# Patient Record
Sex: MALE | Race: WHITE | Employment: OTHER | URBAN - METROPOLITAN AREA
[De-identification: names, ages, dates, MRNs, and addresses within clinical notes are randomized per-mention and may not be internally consistent; named-entity substitution may affect disease eponyms.]

---

## 2017-03-13 PROBLEM — E66.9 OBESITY: Status: ACTIVE | Noted: 2017-03-13

## 2017-08-28 ENCOUNTER — OFFICE VISIT (OUTPATIENT)
Dept: UROLOGY | Age: 77
End: 2017-08-28
Payer: MEDICARE

## 2017-08-28 VITALS
TEMPERATURE: 98.1 F | HEIGHT: 67 IN | WEIGHT: 255 LBS | DIASTOLIC BLOOD PRESSURE: 65 MMHG | SYSTOLIC BLOOD PRESSURE: 112 MMHG | BODY MASS INDEX: 40.02 KG/M2 | HEART RATE: 72 BPM

## 2017-08-28 DIAGNOSIS — N48.1 BALANITIS: Primary | ICD-10-CM

## 2017-08-28 DIAGNOSIS — R35.1 NOCTURIA: ICD-10-CM

## 2017-08-28 DIAGNOSIS — R39.15 URGENCY OF URINATION: ICD-10-CM

## 2017-08-28 DIAGNOSIS — N40.1 BENIGN NON-NODULAR PROSTATIC HYPERPLASIA WITH LOWER URINARY TRACT SYMPTOMS: ICD-10-CM

## 2017-08-28 PROCEDURE — 1123F ACP DISCUSS/DSCN MKR DOCD: CPT | Performed by: NURSE PRACTITIONER

## 2017-08-28 PROCEDURE — G8417 CALC BMI ABV UP PARAM F/U: HCPCS | Performed by: NURSE PRACTITIONER

## 2017-08-28 PROCEDURE — 4040F PNEUMOC VAC/ADMIN/RCVD: CPT | Performed by: NURSE PRACTITIONER

## 2017-08-28 PROCEDURE — 99213 OFFICE O/P EST LOW 20 MIN: CPT | Performed by: NURSE PRACTITIONER

## 2017-08-28 PROCEDURE — G8427 DOCREV CUR MEDS BY ELIG CLIN: HCPCS | Performed by: NURSE PRACTITIONER

## 2017-08-28 PROCEDURE — G8598 ASA/ANTIPLAT THER USED: HCPCS | Performed by: NURSE PRACTITIONER

## 2017-08-28 PROCEDURE — 1036F TOBACCO NON-USER: CPT | Performed by: NURSE PRACTITIONER

## 2017-08-28 ASSESSMENT — ENCOUNTER SYMPTOMS
COLOR CHANGE: 0
COUGH: 0
WHEEZING: 0
BACK PAIN: 1
EYE REDNESS: 0
NAUSEA: 0
VOMITING: 0
SHORTNESS OF BREATH: 0
EYE PAIN: 0
ABDOMINAL PAIN: 0

## 2018-02-13 PROBLEM — M81.8 OTHER OSTEOPOROSIS WITHOUT CURRENT PATHOLOGICAL FRACTURE: Status: ACTIVE | Noted: 2018-02-13

## 2018-08-28 ENCOUNTER — OFFICE VISIT (OUTPATIENT)
Dept: UROLOGY | Age: 78
End: 2018-08-28
Payer: MEDICARE

## 2018-08-28 VITALS
DIASTOLIC BLOOD PRESSURE: 72 MMHG | BODY MASS INDEX: 41.12 KG/M2 | HEIGHT: 67 IN | HEART RATE: 63 BPM | SYSTOLIC BLOOD PRESSURE: 112 MMHG | TEMPERATURE: 97.8 F | WEIGHT: 262 LBS

## 2018-08-28 DIAGNOSIS — N48.1 BALANITIS: Primary | ICD-10-CM

## 2018-08-28 DIAGNOSIS — R35.1 NOCTURIA: ICD-10-CM

## 2018-08-28 PROCEDURE — 1101F PT FALLS ASSESS-DOCD LE1/YR: CPT | Performed by: NURSE PRACTITIONER

## 2018-08-28 PROCEDURE — G8427 DOCREV CUR MEDS BY ELIG CLIN: HCPCS | Performed by: NURSE PRACTITIONER

## 2018-08-28 PROCEDURE — 1123F ACP DISCUSS/DSCN MKR DOCD: CPT | Performed by: NURSE PRACTITIONER

## 2018-08-28 PROCEDURE — G8417 CALC BMI ABV UP PARAM F/U: HCPCS | Performed by: NURSE PRACTITIONER

## 2018-08-28 PROCEDURE — 4040F PNEUMOC VAC/ADMIN/RCVD: CPT | Performed by: NURSE PRACTITIONER

## 2018-08-28 PROCEDURE — 99213 OFFICE O/P EST LOW 20 MIN: CPT | Performed by: NURSE PRACTITIONER

## 2018-08-28 PROCEDURE — G8598 ASA/ANTIPLAT THER USED: HCPCS | Performed by: NURSE PRACTITIONER

## 2018-08-28 PROCEDURE — 1036F TOBACCO NON-USER: CPT | Performed by: NURSE PRACTITIONER

## 2018-08-28 RX ORDER — CLOTRIMAZOLE AND BETAMETHASONE DIPROPIONATE 10; .64 MG/G; MG/G
CREAM TOPICAL
Qty: 45 G | Refills: 0 | Status: SHIPPED | OUTPATIENT
Start: 2018-08-28 | End: 2019-08-27 | Stop reason: SDUPTHER

## 2018-08-28 ASSESSMENT — ENCOUNTER SYMPTOMS
EYE PAIN: 0
BACK PAIN: 1
VOMITING: 0
WHEEZING: 0
ABDOMINAL PAIN: 0
SHORTNESS OF BREATH: 0
COUGH: 0
COLOR CHANGE: 0
EYE REDNESS: 0
NAUSEA: 0

## 2018-08-28 NOTE — LETTER
[facility]  [facility] UROLOGY SPECIALISTS - [place]  [address]  [address]  [address] [place] [id number]  Dept: [phone]  Dept Fax: [phone]        8/28/18    Patient: Jevon Aguiar  YOB: 1940    Dear Gabriel Teran MD,    I had the pleasure of seeing one of your patients, Andreia Vale today in the office today. Below are the relevant portions of my assessment and plan of care. IMPRESSION:     1. Balanitis    2. Nocturia        PLAN:   keep skin clean and dry    Call with worsening symptoms. No Follow-up on file. Prescriptions Ordered:  Orders Placed This Encounter   Medications    clotrimazole-betamethasone (LOTRISONE) 1-0.05 % cream     Sig: Apply to clean and dry skin 1-2 times daily until redness ans soreness decrease- then stop. ( if not cost effective provide in 2 separate tubes or call for better covered script)     Dispense:  45 g     Refill:  0      Orders Placed:  No orders of the defined types were placed in this encounter. Thank you for allowing me to participate in the care of this patient. I will keep you updated on this patient's follow up and I look forward to serving you and your patients again in the future.     LILLI Velasquez - CNP

## 2018-08-28 NOTE — PROGRESS NOTES
[facility]  [facility] [address]  [address]  [address] [facility] [address]  Dept: [address] [facility] Urology Office Note - Established    Patient:  Evelin Capellan  YOB: 1940  Date: 8/28/2018    The patient is a 68 y.o. male who presents today for evaluation of the following problems:   Chief Complaint   Patient presents with    Follow-up     Balanitis has come back, he would like some cream        HPI  Here for follow up on balanitis. He has some retraction and this creates flairs. He needs more cream. He is voiding every 2 hours, feels he empties well, no straining, nocturia 1-2x is happy with urination. Summary of old records: N/A    Additional History: N/A    Procedures Today: N/A    Urinalysis today:  No results found for this visit on 08/28/18. Last several PSA's:  No results found for: PSA  Last total testosterone:  No results found for: TESTOSTERONE    AUA Symptom Score (8/28/2018):   INCOMPLETE EMPTYING: How often have you had the sensation of not emptying your bladder?: Not at all  FREQUENCY: How often do you have to urinate less than every two hours?: Not at all  INTERMITTENCY: How often have you found you stopped and started again several times when you urinated?: Not at all  URGENCY: How often have you found it difficult to postpone urination?: Not at all  WEAK STREAM: How often have you had a weak urinary stream?: Not at all  STRAINING: How often have you had to strain to start  urination?: Less than 1 to 5 times  NOCTURIA: How many times did you typically get up at night to uriniate?: 1 Time  TOTAL I-PSS SCORE[de-identified] 2  How would you feel if you were to spend the rest of your life with your urinary condition?: Pleased    Last BUN and creatinine:  No results found for: BUN  No results found for: CREATININE    Additional Lab/Culture results:     Imaging Reviewed during this Office Visit:   (results were independently reviewed by physician and 1/1/1991   Smokeless Tobacco    Never Used     (If patient a smoker, smoking cessation counseling offered)    History   Alcohol Use    0.6 oz/week    1 Standard drinks or equivalent per week     Comment: social       REVIEW OF SYSTEMS:  Review of Systems    Physical Exam:      Vitals:    08/28/18 0859   BP: 112/72   Pulse: 63   Temp: 97.8 °F (36.6 °C)     Body mass index is 41.04 kg/m². Patient is a 68 y.o. male in no acute distress and alert and oriented to person, place and time. Physical Exam  Constitutional: Patient in no acute distress. Neuro: Alert and oriented to person, place and time. Psych: Mood normal, affect normal  Skin: warm, pink, No rash noted  HEENT: Head: Normocephalic and atraumatic  Conjunctivae and EOM are normal. Pupils are equal, round  Nose: Normal  Right External Ear: Normal; Left External Ear: Normal  Mouth: Mucosa Moist  Neck: Supple  Lungs: Respiratory effort is normal  Cardiovascular: Warm & Pink  Abdomen: Soft, non-tender, non-distended  Lymphatics: No cervical palpable lymphadenopathy. Bladder non-tender and not distended. Musculoskeletal: cane  Penis: erythema on glans, no discharge. Assessment and Plan      1. Balanitis    2. Nocturia           Plan:    keep skin clean and dry    Call with worsening symptoms. No Follow-up on file. Prescriptions Ordered:  Orders Placed This Encounter   Medications    clotrimazole-betamethasone (LOTRISONE) 1-0.05 % cream     Sig: Apply to clean and dry skin 1-2 times daily until redness ans soreness decrease- then stop. ( if not cost effective provide in 2 separate tubes or call for better covered script)     Dispense:  45 g     Refill:  0      Orders Placed:  No orders of the defined types were placed in this encounter. Gladis Gonzalez APRN - CNP    Agree with the ROS entered by the MA.

## 2018-08-28 NOTE — PROGRESS NOTES
Review of Systems   Constitutional: Negative for appetite change, chills and fever. Eyes: Negative for pain, redness and visual disturbance. Respiratory: Negative for cough, shortness of breath and wheezing. Cardiovascular: Negative for chest pain and leg swelling. Gastrointestinal: Negative for abdominal pain, nausea and vomiting. Genitourinary: Negative for difficulty urinating, discharge, dysuria, flank pain, frequency, hematuria, penile pain, scrotal swelling, testicular pain and urgency. Musculoskeletal: Positive for back pain (chronic). Negative for joint swelling and myalgias. Skin: Positive for rash (on penis). Negative for color change and wound. Neurological: Negative for dizziness, tremors and numbness. Hematological: Negative for adenopathy. Does not bruise/bleed easily.

## 2018-12-20 ENCOUNTER — OFFICE VISIT (OUTPATIENT)
Dept: PRIMARY CARE CLINIC | Age: 78
End: 2018-12-20
Payer: MEDICARE

## 2018-12-20 VITALS
OXYGEN SATURATION: 96 % | BODY MASS INDEX: 41.38 KG/M2 | DIASTOLIC BLOOD PRESSURE: 64 MMHG | HEART RATE: 68 BPM | SYSTOLIC BLOOD PRESSURE: 108 MMHG | WEIGHT: 264.2 LBS

## 2018-12-20 DIAGNOSIS — E11.9 TYPE 2 DIABETES MELLITUS WITHOUT COMPLICATION, WITHOUT LONG-TERM CURRENT USE OF INSULIN (HCC): Primary | ICD-10-CM

## 2018-12-20 DIAGNOSIS — I10 ESSENTIAL HYPERTENSION: ICD-10-CM

## 2018-12-20 PROBLEM — Z95.5 PRESENCE OF CORONARY ANGIOPLASTY IMPLANT AND GRAFT: Status: ACTIVE | Noted: 2018-02-20

## 2018-12-20 LAB — HBA1C MFR BLD: 7.2 %

## 2018-12-20 PROCEDURE — G8427 DOCREV CUR MEDS BY ELIG CLIN: HCPCS | Performed by: FAMILY MEDICINE

## 2018-12-20 PROCEDURE — 4040F PNEUMOC VAC/ADMIN/RCVD: CPT | Performed by: FAMILY MEDICINE

## 2018-12-20 PROCEDURE — 83037 HB GLYCOSYLATED A1C HOME DEV: CPT | Performed by: FAMILY MEDICINE

## 2018-12-20 PROCEDURE — G8417 CALC BMI ABV UP PARAM F/U: HCPCS | Performed by: FAMILY MEDICINE

## 2018-12-20 PROCEDURE — 1101F PT FALLS ASSESS-DOCD LE1/YR: CPT | Performed by: FAMILY MEDICINE

## 2018-12-20 PROCEDURE — 1036F TOBACCO NON-USER: CPT | Performed by: FAMILY MEDICINE

## 2018-12-20 PROCEDURE — G8482 FLU IMMUNIZE ORDER/ADMIN: HCPCS | Performed by: FAMILY MEDICINE

## 2018-12-20 PROCEDURE — 99213 OFFICE O/P EST LOW 20 MIN: CPT | Performed by: FAMILY MEDICINE

## 2018-12-20 PROCEDURE — 1123F ACP DISCUSS/DSCN MKR DOCD: CPT | Performed by: FAMILY MEDICINE

## 2018-12-20 PROCEDURE — G8598 ASA/ANTIPLAT THER USED: HCPCS | Performed by: FAMILY MEDICINE

## 2018-12-20 ASSESSMENT — ENCOUNTER SYMPTOMS: BACK PAIN: 1

## 2018-12-20 NOTE — PROGRESS NOTES
Hyperlipidemia     Hypertension     Osteoarthritis         Social History   Substance Use Topics    Smoking status: Former Smoker     Packs/day: 1.50     Years: 40.00     Quit date: 1/1/1991    Smokeless tobacco: Never Used    Alcohol use 0.6 oz/week     1 Standard drinks or equivalent per week      Comment: social      Current Outpatient Prescriptions   Medication Sig Dispense Refill    metFORMIN (GLUCOPHAGE) 1000 MG tablet Take 1 tablet by mouth 2 times daily (with meals) 180 tablet 2    lisinopril (PRINIVIL;ZESTRIL) 2.5 MG tablet TAKE 1 TABLET DAILY 90 tablet 3    Dulaglutide (TRULICITY) 6.31 BK/5.6GD SOPN Inject 0.75 mg into the skin once a week 12 pen 1    meloxicam (MOBIC) 15 MG tablet       metoprolol succinate (TOPROL XL) 25 MG extended release tablet Take 0.5 tablets by mouth daily 90 tablet 0    omeprazole (PRILOSEC) 40 MG delayed release capsule TAKE 1 CAPSULE DAILY 90 capsule 3    hydrochlorothiazide (HYDRODIURIL) 25 MG tablet TAKE 1 TABLET DAILY 90 tablet 3    glucose blood VI test strips (ONE TOUCH ULTRA TEST) strip USE ONE STRIP TO TEST DAILY AS NEEDED 100 strip 2    atorvastatin (LIPITOR) 20 MG tablet       aspirin EC 81 MG EC tablet Take 81 mg by mouth daily      Multiple Vitamin (MULTI-VITAMIN DAILY) TABS Take 1 tablet by mouth daily      betamethasone dipropionate (DIPROLENE) 0.05 % cream APPLY TO AFFECTED AREA 1 TO 2 TIMES DAILY UNTIL IMPROVED THEN DISCONTINUE  0    clotrimazole (LOTRIMIN) 1 % cream APPLY TO AFFECTED AREA 1 TO 2 TIMES DAILY UNTIL IMPROVED THEN DISCONTINUE  0    clotrimazole-betamethasone (LOTRISONE) 1-0.05 % cream Apply to clean and dry skin 1-2 times daily until redness ans soreness decrease- then stop. ( if not cost effective provide in 2 separate tubes or call for better covered script) 45 g 0     No current facility-administered medications for this visit.       Allergies   Allergen Reactions    Feldene [Piroxicam]     Flagyl [Metronidazole]     Naprosyn normal mood and affect. His behavior is normal. Thought content normal.   Nursing note and vitals reviewed. Assessment:       Diagnosis Orders   1. Type 2 diabetes mellitus without complication, without long-term current use of insulin ([facility])  POCT glycosylated hemoglobin, home device (HbA1C)    MD COLLECTION CAPILLARY BLOOD SPECIMEN   2. Essential hypertension          Plan:      Return in about 3 months (around 3/20/2019) for diabetes mellitus, hypertension. He should f/u with PM.   Increase metformin, watch diet    Orders Placed This Encounter   Procedures    POCT glycosylated hemoglobin, home device (HbA1C)    MD COLLECTION CAPILLARY BLOOD SPECIMEN     Orders Placed This Encounter   Medications    metFORMIN (GLUCOPHAGE) 1000 MG tablet     Sig: Take 1 tablet by mouth 2 times daily (with meals)     Dispense:  180 tablet     Refill:  2       Patient given educational materials - see patient instructions. Do diabetic foot checks at home. Discussed use, benefit, and side effects of prescribed medications. All patient questionsanswered. Pt voiced understanding. Reviewed health maintenance. Instructed to continuecurrent medications, diet. Patient agreed with treatment plan. Followup as directed.      Electronically signed by Federica Andino MD on 12/20/2018 at 10:01 AM

## 2019-02-04 DIAGNOSIS — E11.9 TYPE 2 DIABETES MELLITUS WITHOUT COMPLICATION, WITHOUT LONG-TERM CURRENT USE OF INSULIN (HCC): ICD-10-CM

## 2019-02-06 RX ORDER — DULAGLUTIDE 0.75 MG/.5ML
INJECTION, SOLUTION SUBCUTANEOUS
Qty: 6 ML | Refills: 3 | Status: SHIPPED | OUTPATIENT
Start: 2019-02-06 | End: 2020-03-12 | Stop reason: SDUPTHER

## 2019-03-26 RX ORDER — HYDROCHLOROTHIAZIDE 25 MG/1
TABLET ORAL
Qty: 90 TABLET | Refills: 3 | Status: SHIPPED | OUTPATIENT
Start: 2019-03-26 | End: 2020-03-10 | Stop reason: SDUPTHER

## 2019-04-03 ENCOUNTER — OFFICE VISIT (OUTPATIENT)
Dept: PRIMARY CARE CLINIC | Age: 79
End: 2019-04-03
Payer: MEDICARE

## 2019-04-03 VITALS
DIASTOLIC BLOOD PRESSURE: 66 MMHG | WEIGHT: 265.4 LBS | HEART RATE: 76 BPM | SYSTOLIC BLOOD PRESSURE: 122 MMHG | BODY MASS INDEX: 41.57 KG/M2 | OXYGEN SATURATION: 93 %

## 2019-04-03 DIAGNOSIS — E78.49 OTHER HYPERLIPIDEMIA: ICD-10-CM

## 2019-04-03 DIAGNOSIS — I10 ESSENTIAL HYPERTENSION: Primary | ICD-10-CM

## 2019-04-03 DIAGNOSIS — E66.01 MORBID OBESITY WITH BMI OF 40.0-44.9, ADULT (HCC): ICD-10-CM

## 2019-04-03 DIAGNOSIS — E11.9 TYPE 2 DIABETES MELLITUS WITHOUT COMPLICATION, WITHOUT LONG-TERM CURRENT USE OF INSULIN (HCC): ICD-10-CM

## 2019-04-03 LAB — HBA1C MFR BLD: 6.9 %

## 2019-04-03 PROCEDURE — 1123F ACP DISCUSS/DSCN MKR DOCD: CPT | Performed by: FAMILY MEDICINE

## 2019-04-03 PROCEDURE — G8417 CALC BMI ABV UP PARAM F/U: HCPCS | Performed by: FAMILY MEDICINE

## 2019-04-03 PROCEDURE — 4040F PNEUMOC VAC/ADMIN/RCVD: CPT | Performed by: FAMILY MEDICINE

## 2019-04-03 PROCEDURE — 83036 HEMOGLOBIN GLYCOSYLATED A1C: CPT | Performed by: FAMILY MEDICINE

## 2019-04-03 PROCEDURE — G8427 DOCREV CUR MEDS BY ELIG CLIN: HCPCS | Performed by: FAMILY MEDICINE

## 2019-04-03 PROCEDURE — 1036F TOBACCO NON-USER: CPT | Performed by: FAMILY MEDICINE

## 2019-04-03 PROCEDURE — G8598 ASA/ANTIPLAT THER USED: HCPCS | Performed by: FAMILY MEDICINE

## 2019-04-03 PROCEDURE — 99214 OFFICE O/P EST MOD 30 MIN: CPT | Performed by: FAMILY MEDICINE

## 2019-04-03 ASSESSMENT — ENCOUNTER SYMPTOMS: BACK PAIN: 1

## 2019-04-03 NOTE — PROGRESS NOTES
[address] [place] [address] PRIMARY CARE  [address] [place] [address]  Dept: [phone]  Dept Fax: [phone]    Vj Kennedy is a 66 y.o. male who presents today for his medical conditions/complaintsas noted below. Vj Kennedy is c/o of   Chief Complaint   Patient presents with    Diabetes     Last a1c 12/20/18 7.2, microalbumin 6/12/18, lipid 6/21/18. HPI:     Diabetes   He presents for his follow-up diabetic visit. He has type 2 diabetes mellitus. His disease course has been stable. There are no hypoglycemic associated symptoms. There are no diabetic associated symptoms. There are no hypoglycemic complications. Symptoms are stable. Diabetic complications include heart disease. Risk factors for coronary artery disease include male sex, obesity, sedentary lifestyle, diabetes mellitus and dyslipidemia. Current diabetic treatment includes oral agent (monotherapy) (Trulicity). He is compliant with treatment all of the time. His weight is stable. He is following a generally healthy diet. He never participates in exercise. There is no change in his home blood glucose trend. His breakfast blood glucose range is generally 140-180 mg/dl. (140 average. ) An ACE inhibitor/angiotensin II receptor blocker is being taken. Eye exam is current. Home sugars : 150, 146, 146, 153, 167, 144, 210, 152, 155    Can't walk far or do work due to lower back pain. Sitting doesn't hurt.    Had 2 back surgeries and nerve burning  Hemoglobin A1C (%)   Date Value   04/03/2019 6.9   12/20/2018 7.2   09/17/2018 7.1         ( goal A1C is < 7)   No results found for: LABMICR  LDL Calculated (mg/dL)   Date Value   06/21/2018 77   06/23/2017 72       (goal LDL is <100)   No results found for: AST, ALT, BUN  BP Readings from Last 3 Encounters:   04/03/19 122/66   12/20/18 108/64   09/17/18 136/82          ([id number])    Past Medical History:   Diagnosis Date    Diabetes ([facility] [address]     Diverticulitis     History of allergy     Hyperlipidemia     Hypertension     Osteoarthritis         Social History     Tobacco Use    Smoking status: Former Smoker     Packs/day: 1.50     Years: 40.00     Pack years: 60.00     Last attempt to quit: 1991     Years since quittin.2    Smokeless tobacco: Never Used   Substance Use Topics    Alcohol use: Yes     Alcohol/week: 0.6 oz     Types: 1 Standard drinks or equivalent per week     Comment: social      Current Outpatient Medications   Medication Sig Dispense Refill    Omega-3 Fatty Acids (FISH OIL PO) Take 1 g by mouth      hydrochlorothiazide (HYDRODIURIL) 25 MG tablet TAKE 1 TABLET DAILY 90 tablet 3    TRULICITY 1.30 RQ/5.3SH SOPN INJECT 0.5ML (=0.75 MG)    SUBCUTANEOUSLY ONCE WEEKLY 6 mL 3    metFORMIN (GLUCOPHAGE) 1000 MG tablet Take 1 tablet by mouth 2 times daily (with meals) 180 tablet 2    lisinopril (PRINIVIL;ZESTRIL) 2.5 MG tablet TAKE 1 TABLET DAILY 90 tablet 3    betamethasone dipropionate (DIPROLENE) 0.05 % cream APPLY TO AFFECTED AREA 1 TO 2 TIMES DAILY UNTIL IMPROVED THEN DISCONTINUE  0    clotrimazole (LOTRIMIN) 1 % cream APPLY TO AFFECTED AREA 1 TO 2 TIMES DAILY UNTIL IMPROVED THEN DISCONTINUE  0    clotrimazole-betamethasone (LOTRISONE) 1-0.05 % cream Apply to clean and dry skin 1-2 times daily until redness ans soreness decrease- then stop.  ( if not cost effective provide in 2 separate tubes or call for better covered script) 45 g 0    meloxicam (MOBIC) 15 MG tablet       metoprolol succinate (TOPROL XL) 25 MG extended release tablet Take 0.5 tablets by mouth daily 90 tablet 0    omeprazole (PRILOSEC) 40 MG delayed release capsule TAKE 1 CAPSULE DAILY 90 capsule 3    glucose blood VI test strips (ONE TOUCH ULTRA TEST) strip USE ONE STRIP TO TEST DAILY AS NEEDED 100 strip 2    atorvastatin (LIPITOR) 20 MG tablet       aspirin EC 81 MG EC tablet Take 81 mg by mouth daily      Multiple Vitamin (MULTI-VITAMIN DAILY) TABS Take 1 tablet by mouth daily       No current facility-administered medications for this visit. Allergies   Allergen Reactions    Feldene [Piroxicam]     Flagyl [Metronidazole]     Naprosyn [Naproxen]     Penicillins     Sulfa Antibiotics     Sulfasalazine        Health Maintenance   Topic Date Due    DTaP/Tdap/Td vaccine (1 - Tdap) 11/02/1959    Shingles Vaccine (1 of 2) 11/02/1990    Diabetic foot exam  02/13/2019    A1C test (Diabetic or Prediabetic)  06/20/2019    Lipid screen  06/21/2019    Potassium monitoring  06/21/2019    Creatinine monitoring  06/21/2019    Diabetic retinal exam  12/17/2019    Flu vaccine  Completed    Pneumococcal 65+ years Vaccine  Completed       Subjective:     Review of Systems   Musculoskeletal: Positive for arthralgias and back pain. Objective:     /66   Pulse 76   Wt 265 lb 6.4 oz (120.4 kg)   SpO2 93%   BMI 41.57 kg/m²   Physical Exam   Constitutional: He is oriented to person, place, and time. He appears well-developed and well-nourished. No distress. HENT:   Head: Normocephalic and atraumatic. Right Ear: External ear normal.   Left Ear: External ear normal.   Mouth/Throat: Oropharynx is clear and moist.   Eyes: Pupils are equal, round, and reactive to light. Conjunctivae are normal. Right eye exhibits no discharge. Left eye exhibits no discharge. No scleral icterus. Neck: No tracheal deviation present. No thyromegaly present. Cardiovascular: Normal rate, regular rhythm, normal heart sounds and intact distal pulses. No murmur heard. Pulses:       Dorsalis pedis pulses are 1+ on the right side, and 1+ on the left side. Posterior tibial pulses are 1+ on the right side, and 1+ on the left side. No carotid bruits   Pulmonary/Chest: Effort normal and breath sounds normal. No respiratory distress. He has no wheezes. Musculoskeletal: He exhibits edema (lower leg edema). Right foot: There is deformity.         Left defined types were placed in this encounter. Patient given educational materials - see patient instructions. Do diabetic foot checks at home. Discussed use, benefit, and side effects of prescribed medications. All patient questions answered. Pt voiced understanding. Reviewed health maintenance. Instructed to continue current medications, diet and exercise. Patient agreed with treatment plan. Follow up as directed.        Electronically signed by Massiel Jaimes MD on 4/3/2019 at 10:54 AM

## 2019-05-31 RX ORDER — OMEPRAZOLE 40 MG/1
CAPSULE, DELAYED RELEASE ORAL
Qty: 90 CAPSULE | Refills: 3 | Status: SHIPPED | OUTPATIENT
Start: 2019-05-31 | End: 2020-05-04

## 2019-07-08 ENCOUNTER — OFFICE VISIT (OUTPATIENT)
Dept: PRIMARY CARE CLINIC | Age: 79
End: 2019-07-08
Payer: MEDICARE

## 2019-07-08 VITALS
BODY MASS INDEX: 40.75 KG/M2 | DIASTOLIC BLOOD PRESSURE: 70 MMHG | SYSTOLIC BLOOD PRESSURE: 116 MMHG | HEART RATE: 65 BPM | OXYGEN SATURATION: 97 % | WEIGHT: 260.2 LBS

## 2019-07-08 DIAGNOSIS — I10 ESSENTIAL HYPERTENSION: ICD-10-CM

## 2019-07-08 DIAGNOSIS — M54.40 CHRONIC MIDLINE LOW BACK PAIN WITH SCIATICA, SCIATICA LATERALITY UNSPECIFIED: ICD-10-CM

## 2019-07-08 DIAGNOSIS — G89.29 CHRONIC MIDLINE LOW BACK PAIN WITH SCIATICA, SCIATICA LATERALITY UNSPECIFIED: ICD-10-CM

## 2019-07-08 DIAGNOSIS — E66.01 MORBID OBESITY WITH BMI OF 40.0-44.9, ADULT (HCC): ICD-10-CM

## 2019-07-08 DIAGNOSIS — E11.9 TYPE 2 DIABETES MELLITUS WITHOUT COMPLICATION, WITHOUT LONG-TERM CURRENT USE OF INSULIN (HCC): Primary | ICD-10-CM

## 2019-07-08 LAB
CREATININE URINE POCT: 100
HBA1C MFR BLD: 6.7 %
MICROALBUMIN/CREAT 24H UR: 10 MG/G{CREAT}
MICROALBUMIN/CREAT UR-RTO: <30

## 2019-07-08 PROCEDURE — 99214 OFFICE O/P EST MOD 30 MIN: CPT | Performed by: FAMILY MEDICINE

## 2019-07-08 PROCEDURE — 82044 UR ALBUMIN SEMIQUANTITATIVE: CPT | Performed by: FAMILY MEDICINE

## 2019-07-08 PROCEDURE — G8427 DOCREV CUR MEDS BY ELIG CLIN: HCPCS | Performed by: FAMILY MEDICINE

## 2019-07-08 PROCEDURE — 1036F TOBACCO NON-USER: CPT | Performed by: FAMILY MEDICINE

## 2019-07-08 PROCEDURE — 83036 HEMOGLOBIN GLYCOSYLATED A1C: CPT | Performed by: FAMILY MEDICINE

## 2019-07-08 PROCEDURE — 4040F PNEUMOC VAC/ADMIN/RCVD: CPT | Performed by: FAMILY MEDICINE

## 2019-07-08 PROCEDURE — 1123F ACP DISCUSS/DSCN MKR DOCD: CPT | Performed by: FAMILY MEDICINE

## 2019-07-08 PROCEDURE — G8417 CALC BMI ABV UP PARAM F/U: HCPCS | Performed by: FAMILY MEDICINE

## 2019-07-08 PROCEDURE — G8598 ASA/ANTIPLAT THER USED: HCPCS | Performed by: FAMILY MEDICINE

## 2019-07-08 RX ORDER — TRAMADOL HYDROCHLORIDE 50 MG/1
50 TABLET ORAL EVERY 6 HOURS PRN
Qty: 120 TABLET | Refills: 0
Start: 2019-07-08 | End: 2019-08-07

## 2019-07-08 ASSESSMENT — PATIENT HEALTH QUESTIONNAIRE - PHQ9
1. LITTLE INTEREST OR PLEASURE IN DOING THINGS: 0
SUM OF ALL RESPONSES TO PHQ QUESTIONS 1-9: 0
SUM OF ALL RESPONSES TO PHQ QUESTIONS 1-9: 0
2. FEELING DOWN, DEPRESSED OR HOPELESS: 0
SUM OF ALL RESPONSES TO PHQ9 QUESTIONS 1 & 2: 0

## 2019-07-08 ASSESSMENT — ENCOUNTER SYMPTOMS
SHORTNESS OF BREATH: 1
BACK PAIN: 1

## 2019-07-08 NOTE — PROGRESS NOTES
Use Topics    Alcohol use: Yes     Alcohol/week: 0.6 oz     Types: 1 Standard drinks or equivalent per week     Comment: social      Current Outpatient Medications   Medication Sig Dispense Refill    traMADol (ULTRAM) 50 MG tablet Take 1 tablet by mouth every 6 hours as needed for Pain for up to 30 days. 1-2 tablets every 6 hours prn 120 tablet 0    blood glucose test strips (ASCENSIA AUTODISC VI;ONE TOUCH ULTRA TEST VI) strip Use with associated glucose meter. Use daily 100 strip 11    omeprazole (PRILOSEC) 40 MG delayed release capsule TAKE 1 CAPSULE DAILY 90 capsule 3    Omega-3 Fatty Acids (FISH OIL PO) Take 1 g by mouth      hydrochlorothiazide (HYDRODIURIL) 25 MG tablet TAKE 1 TABLET DAILY 90 tablet 3    TRULICITY 8.12 UH/5.4ZC SOPN INJECT 0.5ML (=0.75 MG)    SUBCUTANEOUSLY ONCE WEEKLY 6 mL 3    metFORMIN (GLUCOPHAGE) 1000 MG tablet Take 1 tablet by mouth 2 times daily (with meals) 180 tablet 2    lisinopril (PRINIVIL;ZESTRIL) 2.5 MG tablet TAKE 1 TABLET DAILY 90 tablet 3    betamethasone dipropionate (DIPROLENE) 0.05 % cream APPLY TO AFFECTED AREA 1 TO 2 TIMES DAILY UNTIL IMPROVED THEN DISCONTINUE  0    clotrimazole-betamethasone (LOTRISONE) 1-0.05 % cream Apply to clean and dry skin 1-2 times daily until redness ans soreness decrease- then stop.  ( if not cost effective provide in 2 separate tubes or call for better covered script) 45 g 0    meloxicam (MOBIC) 15 MG tablet       metoprolol succinate (TOPROL XL) 25 MG extended release tablet Take 0.5 tablets by mouth daily 90 tablet 0    glucose blood VI test strips (ONE TOUCH ULTRA TEST) strip USE ONE STRIP TO TEST DAILY AS NEEDED 100 strip 2    atorvastatin (LIPITOR) 20 MG tablet       aspirin EC 81 MG EC tablet Take 81 mg by mouth daily      Multiple Vitamin (MULTI-VITAMIN DAILY) TABS Take 1 tablet by mouth daily      clotrimazole (LOTRIMIN) 1 % cream APPLY TO AFFECTED AREA 1 TO 2 TIMES DAILY UNTIL IMPROVED THEN DISCONTINUE  0     No current facility-administered medications for this visit. Allergies   Allergen Reactions    Feldene [Piroxicam]     Flagyl [Metronidazole]     Naprosyn [Naproxen]     Penicillins     Sulfa Antibiotics     Sulfasalazine        Health Maintenance   Topic Date Due    DTaP/Tdap/Td vaccine (1 - Tdap) 11/02/1959    Shingles Vaccine (1 of 2) 11/02/1990    Annual Wellness Visit (AWV)  11/02/2003    Potassium monitoring  06/21/2019    Creatinine monitoring  06/21/2019    Lipid screen  06/21/2019    Flu vaccine (1) 09/01/2019    A1C test (Diabetic or Prediabetic)  10/03/2019    Diabetic retinal exam  12/17/2019    Diabetic foot exam  04/03/2020    Pneumococcal 65+ years Vaccine  Completed       Subjective:     Review of Systems   Respiratory: Positive for shortness of breath (with working in the yard). Cardiovascular: Positive for chest pain. Mid chest pain : mild with resting and occasional palpations. Warren Braun. Musculoskeletal: Positive for arthralgias and back pain. Skin: Positive for rash. Starts with 4-5 red papules left post hand and right lower leg and those coalesce into a flat red/brown rash. Has happened before with plavix. Had biopsy with Dr Savi Hair, nothing showed. Both are drying up and stopping growing now. Lower back pain: severely limits his activity. Tramadol sometimes helps. Objective:     /70   Pulse 65   Wt 260 lb 3.2 oz (118 kg)   SpO2 97%   BMI 40.75 kg/m²   Physical Exam   Constitutional: He is oriented to person, place, and time. He appears well-developed and well-nourished. No distress. HENT:   Head: Normocephalic and atraumatic. Right Ear: External ear normal.   Left Ear: External ear normal.   Mouth/Throat: Oropharynx is clear and moist.   Eyes: Pupils are equal, round, and reactive to light. Conjunctivae are normal. Right eye exhibits no discharge. Left eye exhibits no discharge. No scleral icterus.    Neck: No pain becomes manageable    blood glucose test strips (ASCENSIA AUTODISC VI;ONE TOUCH ULTRA TEST VI) strip     Sig: Use with associated glucose meter. Use daily     Dispense:  100 strip     Refill:  11       Patient given educational materials - see patient instructions. Do diabetic foot checks at home. Discussed use, benefit, and side effects of prescribed medications. All patient questions answered. Pt voiced understanding. Reviewed health maintenance. Instructed to continue current medications, diet and exercise. Patient agreed with treatment plan. Follow up as directed.        Electronically signed by Betzaida Nixon MD on 7/8/2019 at 9:34 AM

## 2019-07-10 LAB
ALBUMIN SERPL-MCNC: NORMAL G/DL
ALP BLD-CCNC: NORMAL U/L
ALT SERPL-CCNC: NORMAL U/L
AST SERPL-CCNC: NORMAL U/L
BILIRUB SERPL-MCNC: NORMAL MG/DL (ref 0.1–1.4)
BUN BLDV-MCNC: NORMAL MG/DL
CALCIUM SERPL-MCNC: NORMAL MG/DL
CHLORIDE BLD-SCNC: NORMAL MMOL/L
CHOLESTEROL, TOTAL: 154 MG/DL
CHOLESTEROL/HDL RATIO: 4.4
CO2: NORMAL MMOL/L
CREAT SERPL-MCNC: NORMAL MG/DL
GLUCOSE FASTING: 148 MG/DL
HDLC SERPL-MCNC: 35 MG/DL (ref 35–70)
LDL CHOLESTEROL CALCULATED: 98 MG/DL (ref 0–160)
POTASSIUM SERPL-SCNC: NORMAL MMOL/L
SODIUM BLD-SCNC: NORMAL MMOL/L
TOTAL PROTEIN: NORMAL G/DL (ref 6.4–8.2)
TRIGL SERPL-MCNC: 106 MG/DL
VLDLC SERPL CALC-MCNC: 21 MG/DL

## 2019-07-15 DIAGNOSIS — I10 ESSENTIAL HYPERTENSION: ICD-10-CM

## 2019-07-15 DIAGNOSIS — E11.9 TYPE 2 DIABETES MELLITUS WITHOUT COMPLICATION, WITHOUT LONG-TERM CURRENT USE OF INSULIN (HCC): ICD-10-CM

## 2019-07-15 DIAGNOSIS — E78.49 OTHER HYPERLIPIDEMIA: ICD-10-CM

## 2019-07-20 DIAGNOSIS — I10 ESSENTIAL HYPERTENSION: ICD-10-CM

## 2019-07-20 DIAGNOSIS — E11.8 TYPE 2 DIABETES MELLITUS WITH COMPLICATION (HCC): ICD-10-CM

## 2019-07-22 RX ORDER — LISINOPRIL 2.5 MG/1
TABLET ORAL
Qty: 90 TABLET | Refills: 3 | Status: SHIPPED | OUTPATIENT
Start: 2019-07-22 | End: 2021-02-23

## 2019-08-27 ENCOUNTER — OFFICE VISIT (OUTPATIENT)
Dept: UROLOGY | Age: 79
End: 2019-08-27
Payer: MEDICARE

## 2019-08-27 VITALS
DIASTOLIC BLOOD PRESSURE: 58 MMHG | HEIGHT: 67 IN | BODY MASS INDEX: 40.02 KG/M2 | WEIGHT: 255 LBS | SYSTOLIC BLOOD PRESSURE: 90 MMHG | TEMPERATURE: 97.8 F

## 2019-08-27 DIAGNOSIS — R35.0 FREQUENCY OF URINATION: ICD-10-CM

## 2019-08-27 DIAGNOSIS — N48.1 BALANITIS: Primary | ICD-10-CM

## 2019-08-27 DIAGNOSIS — R35.1 NOCTURIA: ICD-10-CM

## 2019-08-27 DIAGNOSIS — K59.00 CONSTIPATION, UNSPECIFIED CONSTIPATION TYPE: ICD-10-CM

## 2019-08-27 PROCEDURE — 99214 OFFICE O/P EST MOD 30 MIN: CPT | Performed by: NURSE PRACTITIONER

## 2019-08-27 PROCEDURE — G8417 CALC BMI ABV UP PARAM F/U: HCPCS | Performed by: NURSE PRACTITIONER

## 2019-08-27 PROCEDURE — 4040F PNEUMOC VAC/ADMIN/RCVD: CPT | Performed by: NURSE PRACTITIONER

## 2019-08-27 PROCEDURE — 1123F ACP DISCUSS/DSCN MKR DOCD: CPT | Performed by: NURSE PRACTITIONER

## 2019-08-27 PROCEDURE — 1036F TOBACCO NON-USER: CPT | Performed by: NURSE PRACTITIONER

## 2019-08-27 PROCEDURE — G8598 ASA/ANTIPLAT THER USED: HCPCS | Performed by: NURSE PRACTITIONER

## 2019-08-27 PROCEDURE — G8427 DOCREV CUR MEDS BY ELIG CLIN: HCPCS | Performed by: NURSE PRACTITIONER

## 2019-08-27 RX ORDER — CLOTRIMAZOLE 1 %
CREAM (GRAM) TOPICAL
Qty: 45 G | Refills: 1 | Status: ON HOLD | OUTPATIENT
Start: 2019-08-27 | End: 2020-12-30

## 2019-08-27 ASSESSMENT — ENCOUNTER SYMPTOMS
SHORTNESS OF BREATH: 0
ABDOMINAL PAIN: 0
EYE PAIN: 0
COLOR CHANGE: 0
EYE REDNESS: 0
BACK PAIN: 0
NAUSEA: 0
COUGH: 0
VOMITING: 0
WHEEZING: 0

## 2019-08-27 NOTE — PROGRESS NOTES
[facility]  [facility] UROLOGY SPECIALISTS - [facility]  [address] [facility] [id number]  Dept: [address] [facility] Urology Office Note - Established    Patient:  Raymon Dale  YOB: 1940  Date: 8/27/2019    The patient is a 66 y.o. male who presents todayfor evaluation of the following problems:   Chief Complaint   Patient presents with    Rash     Balanitis comes and goes        HPI  Here for follow up on ballanitis. He is using Lotrimin cream every other day after his shower. He tries to dab his skin with toilet paper to help with drying of skin after voiding, but he has varying flairs. The cream helps keep his rash stay controlled. Frequency- has tried Myrbetriq in the past, but the cost of his other meds prohibit the use of it. He continues with frequency, nocturia x1, feels he empties well. No dysuria, no hematuria, no UTI. Summary of old records: N/A    Additional History: N/A    Procedures Today: N/A    Urinalysis today:  No results found for this visit on 08/27/19.   Last several PSA's:  No results found for: PSA  Last total testosterone:  No results found for: TESTOSTERONE    AUA Symptom Score (8/27/2019):  INCOMPLETE EMPTYING: How often have you had the sensation of not emptying your bladder?: Not at all  FREQUENCY: How often do you have to urinate less than every two hours?: Almost always  INTERMITTENCY: How often have you found you stopped and started again several times when you urinated?: Not at all  URGENCY: How often have you found it difficult to postpone urination?: Not at all  WEAK STREAM: How often have you had a weak urinary stream?: Not at all  STRAINING: How often have you had to strain to start  urination?: Not at all  NOCTURIA: How many times did you typically get up at night to uriniate?: 1 Time  TOTAL I-PSS SCORE[de-identified] 6  How would you feel if you were to spend the rest of your life with your urinary condition?: Pleased    Last BUN and 90 tablet 0    glucose blood VI test strips (ONE TOUCH ULTRA TEST) strip USE ONE STRIP TO TEST DAILY AS NEEDED 100 strip 2    atorvastatin (LIPITOR) 20 MG tablet       aspirin EC 81 MG EC tablet Take 81 mg by mouth daily      Multiple Vitamin (MULTI-VITAMIN DAILY) TABS Take 1 tablet by mouth daily         Feldene [piroxicam]; Flagyl [metronidazole]; Naprosyn [naproxen]; Penicillins; Sulfa antibiotics; and Sulfasalazine  Social History     Tobacco Use   Smoking Status Former Smoker    Packs/day: 1.50    Years: 40.00    Pack years: 60.00    Last attempt to quit: 1991    Years since quittin.6   Smokeless Tobacco Never Used     (Ifpatient a smoker, smoking cessation counseling offered)    Social History     Substance and Sexual Activity   Alcohol Use Yes    Alcohol/week: 1.0 standard drinks    Types: 1 Standard drinks or equivalent per week    Comment: social       REVIEW OF SYSTEMS:  Review of Systems    Physical Exam:      Vitals:    19 0733   BP: (!) 90/58   Temp: 97.8 °F (36.6 °C)     Body mass index is 39.94 kg/m². Patient is a 66 y.o. male in no acute distress and alert and oriented to person, place and time. Physical Exam  Constitutional: Patient in no acute distress. Neuro: Alert and oriented to person, place and time. Psych: Mood normal, affect normal  Skin: No rash noted  HEENT: Head: Normocephalic andatraumatic  Conjunctivae and EOM are normal. Pupils are equal, round  Nose:Normal  Right External Ear: Normal; Left External Ear: Normal  Mouth: Mucosa Moist  Neck: Supple  Lungs: Respiratory effort is normal  Cardiovascular: strong and reg, lower leg edema  Abdomen: Soft, non-tender, non-distended    Bladder non-tender and not distended. Musculoskeletal: Normal gait and station  Penis: skin without evidence of rash or breakdown. Assessment and Plan      1. Balanitis    2. Nocturia    3. Frequency of urination    4.  Constipation, unspecified constipation type           Plan:

## 2019-08-27 NOTE — LETTER
[facility]  [facility] UROLOGY SPECIALISTS - [address]  Dept: [phone]  Dept Fax: [phone]        8/27/19    Patient: David De  YOB: 1940    Dear Jorge Hammonds MD,    I had the pleasure of seeing one of your patients, Mena Haile today in the office today. Below are the relevant portions of my assessment and plan of care. IMPRESSION:  1. Balanitis    2. Nocturia    3. Frequency of urination    4. Constipation, unspecified constipation type        PLAN:  continue to clean daily    May use cream as needed     Consider Miralax daily- start with a capful and taper to the smallest amount of daily med needed to promote a soft formed stool. Call with questions or concern. 1 yr and as needed follow up   No follow-ups on file. Prescriptions Ordered:  No orders of the defined types were placed in this encounter. Orders Placed:  No orders of the defined types were placed in this encounter. Thank you for allowing me to participate in the care of this patient. I will keep you updated on this patient's follow up and I look forward to serving you and your patients again in the future.     Carol Colon, LILLI - CNP

## 2019-11-08 ENCOUNTER — OFFICE VISIT (OUTPATIENT)
Dept: PRIMARY CARE CLINIC | Age: 79
End: 2019-11-08
Payer: MEDICARE

## 2019-11-08 VITALS
HEART RATE: 72 BPM | WEIGHT: 258.6 LBS | DIASTOLIC BLOOD PRESSURE: 76 MMHG | OXYGEN SATURATION: 97 % | SYSTOLIC BLOOD PRESSURE: 128 MMHG | BODY MASS INDEX: 40.5 KG/M2

## 2019-11-08 DIAGNOSIS — Z23 NEEDS FLU SHOT: ICD-10-CM

## 2019-11-08 DIAGNOSIS — I10 ESSENTIAL HYPERTENSION: ICD-10-CM

## 2019-11-08 DIAGNOSIS — B35.3 TINEA PEDIS OF LEFT FOOT: ICD-10-CM

## 2019-11-08 DIAGNOSIS — E11.9 TYPE 2 DIABETES MELLITUS WITHOUT COMPLICATION, WITHOUT LONG-TERM CURRENT USE OF INSULIN (HCC): Primary | ICD-10-CM

## 2019-11-08 LAB — HBA1C MFR BLD: 6.5 %

## 2019-11-08 PROCEDURE — G8598 ASA/ANTIPLAT THER USED: HCPCS | Performed by: FAMILY MEDICINE

## 2019-11-08 PROCEDURE — G8417 CALC BMI ABV UP PARAM F/U: HCPCS | Performed by: FAMILY MEDICINE

## 2019-11-08 PROCEDURE — 1123F ACP DISCUSS/DSCN MKR DOCD: CPT | Performed by: FAMILY MEDICINE

## 2019-11-08 PROCEDURE — G8482 FLU IMMUNIZE ORDER/ADMIN: HCPCS | Performed by: FAMILY MEDICINE

## 2019-11-08 PROCEDURE — 99214 OFFICE O/P EST MOD 30 MIN: CPT | Performed by: FAMILY MEDICINE

## 2019-11-08 PROCEDURE — 4040F PNEUMOC VAC/ADMIN/RCVD: CPT | Performed by: FAMILY MEDICINE

## 2019-11-08 PROCEDURE — 1036F TOBACCO NON-USER: CPT | Performed by: FAMILY MEDICINE

## 2019-11-08 PROCEDURE — G0008 ADMIN INFLUENZA VIRUS VAC: HCPCS | Performed by: FAMILY MEDICINE

## 2019-11-08 PROCEDURE — G8427 DOCREV CUR MEDS BY ELIG CLIN: HCPCS | Performed by: FAMILY MEDICINE

## 2019-11-08 PROCEDURE — 90653 IIV ADJUVANT VACCINE IM: CPT | Performed by: FAMILY MEDICINE

## 2019-11-08 PROCEDURE — 83036 HEMOGLOBIN GLYCOSYLATED A1C: CPT | Performed by: FAMILY MEDICINE

## 2019-11-08 RX ORDER — GABAPENTIN 300 MG/1
300 CAPSULE ORAL DAILY
COMMUNITY
Start: 2019-11-04 | End: 2021-02-23

## 2019-11-08 ASSESSMENT — ENCOUNTER SYMPTOMS
SINUS PRESSURE: 0
SORE THROAT: 0
SHORTNESS OF BREATH: 0
VISUAL CHANGE: 0
RHINORRHEA: 0
COUGH: 0
SINUS PAIN: 0
BLURRED VISION: 0

## 2020-02-13 ENCOUNTER — OFFICE VISIT (OUTPATIENT)
Dept: PRIMARY CARE CLINIC | Age: 80
End: 2020-02-13
Payer: MEDICARE

## 2020-02-13 VITALS
WEIGHT: 253.4 LBS | BODY MASS INDEX: 39.69 KG/M2 | SYSTOLIC BLOOD PRESSURE: 110 MMHG | DIASTOLIC BLOOD PRESSURE: 76 MMHG | OXYGEN SATURATION: 96 % | HEART RATE: 66 BPM | TEMPERATURE: 97.6 F

## 2020-02-13 PROCEDURE — G8482 FLU IMMUNIZE ORDER/ADMIN: HCPCS | Performed by: FAMILY MEDICINE

## 2020-02-13 PROCEDURE — 1123F ACP DISCUSS/DSCN MKR DOCD: CPT | Performed by: FAMILY MEDICINE

## 2020-02-13 PROCEDURE — G8417 CALC BMI ABV UP PARAM F/U: HCPCS | Performed by: FAMILY MEDICINE

## 2020-02-13 PROCEDURE — 99212 OFFICE O/P EST SF 10 MIN: CPT | Performed by: FAMILY MEDICINE

## 2020-02-13 PROCEDURE — 1036F TOBACCO NON-USER: CPT | Performed by: FAMILY MEDICINE

## 2020-02-13 PROCEDURE — 4040F PNEUMOC VAC/ADMIN/RCVD: CPT | Performed by: FAMILY MEDICINE

## 2020-02-13 PROCEDURE — G8427 DOCREV CUR MEDS BY ELIG CLIN: HCPCS | Performed by: FAMILY MEDICINE

## 2020-02-13 RX ORDER — NYSTATIN 100000 [USP'U]/G
POWDER TOPICAL
Qty: 30 G | Refills: 2 | Status: ON HOLD
Start: 2020-02-13 | End: 2021-01-05 | Stop reason: HOSPADM

## 2020-02-13 RX ORDER — NYSTATIN 100000 U/G
CREAM TOPICAL
Qty: 30 G | Refills: 3 | Status: ON HOLD
Start: 2020-02-13 | End: 2021-01-05 | Stop reason: HOSPADM

## 2020-02-13 ASSESSMENT — ENCOUNTER SYMPTOMS: CONSTIPATION: 1

## 2020-02-13 NOTE — PROGRESS NOTES
Cathi Sultana is a 78 y.o. Barbaraann Iba presents today for his medical conditions/complaints as noted below. Chief Complaint   Patient presents with    Mass     on right buttock x2 weeks         HPI:     HPI    2 small painful blisters 2 weeks ago, on buttock, right first and now both sides. Has been trying otc creams, spray for jock itch. Still painful. Has trouble sitting. Current Outpatient Medications   Medication Sig Dispense Refill    nystatin (MYCOSTATIN) 675193 UNIT/GM cream Apply topically 2 times daily. 30 g 3    nystatin (MYCOSTATIN) 179528 UNIT/GM powder Apply 3 times daily. 30 g 2    gabapentin (NEURONTIN) 300 MG capsule Take 300 mg by mouth daily.  metFORMIN (GLUCOPHAGE) 1000 MG tablet Take 1 tablet by mouth 2 times daily (with meals) 180 tablet 2    lisinopril (PRINIVIL;ZESTRIL) 2.5 MG tablet TAKE 1 TABLET DAILY 90 tablet 3    blood glucose test strips (ASCENSIA AUTODISC VI;ONE TOUCH ULTRA TEST VI) strip Use with associated glucose meter.  Use daily 100 strip 11    omeprazole (PRILOSEC) 40 MG delayed release capsule TAKE 1 CAPSULE DAILY 90 capsule 3    Omega-3 Fatty Acids (FISH OIL PO) Take 1 g by mouth      hydrochlorothiazide (HYDRODIURIL) 25 MG tablet TAKE 1 TABLET DAILY 90 tablet 3    TRULICITY 1.39 EO/5.9FX SOPN INJECT 0.5ML (=0.75 MG)    SUBCUTANEOUSLY ONCE WEEKLY 6 mL 3    betamethasone dipropionate (DIPROLENE) 0.05 % cream APPLY TO AFFECTED AREA 1 TO 2 TIMES DAILY UNTIL IMPROVED THEN DISCONTINUE  0    meloxicam (MOBIC) 15 MG tablet       metoprolol succinate (TOPROL XL) 25 MG extended release tablet Take 0.5 tablets by mouth daily 90 tablet 0    glucose blood VI test strips (ONE TOUCH ULTRA TEST) strip USE ONE STRIP TO TEST DAILY AS NEEDED 100 strip 2    atorvastatin (LIPITOR) 20 MG tablet       aspirin EC 81 MG EC tablet Take 81 mg by mouth daily      Multiple Vitamin (MULTI-VITAMIN DAILY) TABS Take 1 tablet by mouth daily      clotrimazole (LOTRIMIN) 1 %

## 2020-02-13 NOTE — PATIENT INSTRUCTIONS
staying in a wet bathing suit or sweaty clothes. When should you call for help? Call your doctor now or seek immediate medical care if:    · You have signs of infection such as:  ? Pain, warmth, or swelling in your skin. ? Red streaks near a wound in the skin. ? Pus coming from the rash on your skin. ? A fever.    Watch closely for changes in your health, and be sure to contact your doctor if:    · Your ringworm does not improve after 2 weeks of treatment.     · You do not get better as expected. Where can you learn more? Go to [email or web address] [email or web address] org and sign in to your Eag[employer]EyeMed account. Enter O370 in the Analytics Quotient box to learn more about \"Ringworm: Care Instructions. \"     If you do not have an account, please click on the \"Sign Up Now\" link. Current as of: April 1, 2019  Content Version: 12.3  © 3432-6324 Healthwise, [employer] (by [employer]). Care instructions adapted under license by [facility] ([facility]). If you have questions about a medical condition or this instruction, always ask your healthcare professional. [place] [address] any warranty or liability for your use of this information.

## 2020-02-18 ENCOUNTER — TELEPHONE (OUTPATIENT)
Dept: PRIMARY CARE CLINIC | Age: 80
End: 2020-02-18

## 2020-02-18 RX ORDER — CLOBETASOL PROPIONATE 0.5 MG/G
CREAM TOPICAL
Qty: 30 G | Refills: 1 | Status: ON HOLD | OUTPATIENT
Start: 2020-02-18 | End: 2020-12-30

## 2020-03-10 RX ORDER — HYDROCHLOROTHIAZIDE 25 MG/1
25 TABLET ORAL DAILY
Qty: 90 TABLET | Refills: 1 | Status: SHIPPED | OUTPATIENT
Start: 2020-03-10 | End: 2020-08-26

## 2020-03-12 ENCOUNTER — OFFICE VISIT (OUTPATIENT)
Dept: PRIMARY CARE CLINIC | Age: 80
End: 2020-03-12
Payer: MEDICARE

## 2020-03-12 VITALS
WEIGHT: 256.2 LBS | DIASTOLIC BLOOD PRESSURE: 64 MMHG | HEART RATE: 63 BPM | SYSTOLIC BLOOD PRESSURE: 112 MMHG | BODY MASS INDEX: 40.13 KG/M2 | OXYGEN SATURATION: 97 %

## 2020-03-12 LAB — HBA1C MFR BLD: 6.9 %

## 2020-03-12 PROCEDURE — 83036 HEMOGLOBIN GLYCOSYLATED A1C: CPT | Performed by: FAMILY MEDICINE

## 2020-03-12 PROCEDURE — G8482 FLU IMMUNIZE ORDER/ADMIN: HCPCS | Performed by: FAMILY MEDICINE

## 2020-03-12 PROCEDURE — G8417 CALC BMI ABV UP PARAM F/U: HCPCS | Performed by: FAMILY MEDICINE

## 2020-03-12 PROCEDURE — 1036F TOBACCO NON-USER: CPT | Performed by: FAMILY MEDICINE

## 2020-03-12 PROCEDURE — 99214 OFFICE O/P EST MOD 30 MIN: CPT | Performed by: FAMILY MEDICINE

## 2020-03-12 PROCEDURE — 1123F ACP DISCUSS/DSCN MKR DOCD: CPT | Performed by: FAMILY MEDICINE

## 2020-03-12 PROCEDURE — 4040F PNEUMOC VAC/ADMIN/RCVD: CPT | Performed by: FAMILY MEDICINE

## 2020-03-12 PROCEDURE — G8427 DOCREV CUR MEDS BY ELIG CLIN: HCPCS | Performed by: FAMILY MEDICINE

## 2020-03-12 RX ORDER — DULAGLUTIDE 0.75 MG/.5ML
INJECTION, SOLUTION SUBCUTANEOUS
Qty: 12 PEN | Refills: 3 | Status: SHIPPED | OUTPATIENT
Start: 2020-03-12 | End: 2021-03-18 | Stop reason: SDUPTHER

## 2020-03-12 ASSESSMENT — PATIENT HEALTH QUESTIONNAIRE - PHQ9
SUM OF ALL RESPONSES TO PHQ QUESTIONS 1-9: 0
SUM OF ALL RESPONSES TO PHQ QUESTIONS 1-9: 0
SUM OF ALL RESPONSES TO PHQ9 QUESTIONS 1 & 2: 0
2. FEELING DOWN, DEPRESSED OR HOPELESS: 0
1. LITTLE INTEREST OR PLEASURE IN DOING THINGS: 0

## 2020-03-12 ASSESSMENT — ENCOUNTER SYMPTOMS
BACK PAIN: 1
RESPIRATORY NEGATIVE: 1

## 2020-03-12 NOTE — PROGRESS NOTES
Conjunctiva/sclera: Conjunctivae normal.      Pupils: Pupils are equal, round, and reactive to light. Neck:      Thyroid: No thyromegaly. Trachea: No tracheal deviation. Cardiovascular:      Rate and Rhythm: Normal rate and regular rhythm. Pulses:           Dorsalis pedis pulses are 1+ on the right side and 1+ on the left side. Heart sounds: Normal heart sounds. Comments: No carotid bruits  Pulmonary:      Effort: Pulmonary effort is normal. No respiratory distress. Breath sounds: Normal breath sounds. No wheezing. Musculoskeletal:      Right lower leg: No edema. Left lower leg: No edema. Comments: luis eduardo ankle edema  Venous stasis changes lower legs and dry skin   Feet:      Right foot:      Skin integrity: Callus and dry skin present. Left foot:      Skin integrity: Callus and dry skin present. Lymphadenopathy:      Cervical: No cervical adenopathy. Skin:     General: Skin is warm. Findings: No rash. Neurological:      Mental Status: He is alert and oriented to person, place, and time. Psychiatric:         Mood and Affect: Mood normal.         Behavior: Behavior normal.         Thought Content: Thought content normal.         Assessment:       Diagnosis Orders   1. Type 2 diabetes mellitus with complication (HCC)  POCT glycosylated hemoglobin (Hb A1C)    VA COLLECTION CAPILLARY BLOOD SPECIMEN    Lipid Panel    Comprehensive Metabolic Panel, Fasting    Dulaglutide (TRULICITY) 0.61 OQ/1.0ZB SOPN   2. Essential hypertension  Lipid Panel    Comprehensive Metabolic Panel, Fasting        Plan:      Return in about 4 months (around 7/12/2020) for diabetes mellitus, hypertension.     Orders Placed This Encounter   Procedures    Lipid Panel     Standing Status:   Future     Standing Expiration Date:   3/12/2021     Order Specific Question:   Is Patient Fasting?/# of Hours     Answer:   yes    Comprehensive Metabolic Panel, Fasting     Standing Status:   Future

## 2020-03-20 ENCOUNTER — TELEPHONE (OUTPATIENT)
Dept: PRIMARY CARE CLINIC | Age: 80
End: 2020-03-20

## 2020-03-20 RX ORDER — AZITHROMYCIN 250 MG/1
TABLET, FILM COATED ORAL
Qty: 1 PACKET | Refills: 0 | Status: SHIPPED | OUTPATIENT
Start: 2020-03-20 | End: 2020-03-30

## 2020-04-22 ENCOUNTER — TELEPHONE (OUTPATIENT)
Dept: PRIMARY CARE CLINIC | Age: 80
End: 2020-04-22

## 2020-05-04 RX ORDER — OMEPRAZOLE 40 MG/1
CAPSULE, DELAYED RELEASE ORAL
Qty: 90 CAPSULE | Refills: 3 | Status: SHIPPED | OUTPATIENT
Start: 2020-05-04 | End: 2021-04-27

## 2020-07-13 ENCOUNTER — OFFICE VISIT (OUTPATIENT)
Dept: PRIMARY CARE CLINIC | Age: 80
End: 2020-07-13
Payer: MEDICARE

## 2020-07-13 VITALS
RESPIRATION RATE: 16 BRPM | WEIGHT: 249.6 LBS | OXYGEN SATURATION: 96 % | HEIGHT: 67 IN | HEART RATE: 60 BPM | SYSTOLIC BLOOD PRESSURE: 118 MMHG | DIASTOLIC BLOOD PRESSURE: 70 MMHG | TEMPERATURE: 97 F | BODY MASS INDEX: 39.18 KG/M2

## 2020-07-13 LAB — HBA1C MFR BLD: 6.2 %

## 2020-07-13 PROCEDURE — 90471 IMMUNIZATION ADMIN: CPT | Performed by: FAMILY MEDICINE

## 2020-07-13 PROCEDURE — 1036F TOBACCO NON-USER: CPT | Performed by: FAMILY MEDICINE

## 2020-07-13 PROCEDURE — 99214 OFFICE O/P EST MOD 30 MIN: CPT | Performed by: FAMILY MEDICINE

## 2020-07-13 PROCEDURE — 1123F ACP DISCUSS/DSCN MKR DOCD: CPT | Performed by: FAMILY MEDICINE

## 2020-07-13 PROCEDURE — G8427 DOCREV CUR MEDS BY ELIG CLIN: HCPCS | Performed by: FAMILY MEDICINE

## 2020-07-13 PROCEDURE — 4040F PNEUMOC VAC/ADMIN/RCVD: CPT | Performed by: FAMILY MEDICINE

## 2020-07-13 PROCEDURE — G8417 CALC BMI ABV UP PARAM F/U: HCPCS | Performed by: FAMILY MEDICINE

## 2020-07-13 PROCEDURE — 83036 HEMOGLOBIN GLYCOSYLATED A1C: CPT | Performed by: FAMILY MEDICINE

## 2020-07-13 ASSESSMENT — PATIENT HEALTH QUESTIONNAIRE - PHQ9
1. LITTLE INTEREST OR PLEASURE IN DOING THINGS: 0
SUM OF ALL RESPONSES TO PHQ9 QUESTIONS 1 & 2: 0
2. FEELING DOWN, DEPRESSED OR HOPELESS: 0
SUM OF ALL RESPONSES TO PHQ QUESTIONS 1-9: 0
SUM OF ALL RESPONSES TO PHQ QUESTIONS 1-9: 0

## 2020-07-13 ASSESSMENT — ENCOUNTER SYMPTOMS: SHORTNESS OF BREATH: 0

## 2020-07-13 NOTE — PATIENT INSTRUCTIONS
Patient Education        Diabetes Foot Health: Care Instructions  Your Care Instructions     When you have diabetes, your feet need extra care and attention. Diabetes can damage the nerve endings and blood vessels in your feet, making you less likely to notice when your feet are injured. Diabetes also limits your body's ability to fight infection and get blood to areas that need it. If you get a minor foot injury, it could become an ulcer or a serious infection. With good foot care, you can prevent most of these problems. Caring for your feet can be quick and easy. Most of the care can be done when you are bathing or getting ready for bed. Follow-up care is a key part of your treatment and safety. Be sure to make and go to all appointments, and call your doctor if you are having problems. It's also a good idea to know your test results and keep a list of the medicines you take. How can you care for yourself at home? · Keep your blood sugar close to normal by watching what and how much you eat, monitoring blood sugar, taking medicines if prescribed, and getting regular exercise. · Do not smoke. Smoking affects blood flow and can make foot problems worse. If you need help quitting, talk to your doctor about stop-smoking programs and medicines. These can increase your chances of quitting for good. · Eat a diet that is low in fats. High fat intake can cause fat to build up in your blood vessels and decrease blood flow. · Inspect your feet daily for blisters, cuts, cracks, or sores. If you cannot see well, use a mirror or have someone help you. · Take care of your feet:  ? Wash your feet every day. Use warm (not hot) water. Check the water temperature with your wrists or other part of your body, not your feet. ? Dry your feet well. Pat them dry. Do not rub the skin on your feet too hard. Dry well between your toes.  If the skin on your feet stays moist, bacteria or a fungus can grow, which can lead to infection. ? Keep your skin soft. Use moisturizing skin cream to keep the skin on your feet soft and prevent calluses and cracks. But do not put the cream between your toes, and stop using any cream that causes a rash. ? Clean underneath your toenails carefully. Do not use a sharp object to clean underneath your toenails. Use the blunt end of a nail file or other rounded tool. ? Trim and file your toenails straight across to prevent ingrown toenails. Use a nail clipper, not scissors. Use an emery board to smooth the edges. · Change socks daily. Socks without seams are best, because seams often rub the feet. You can find socks for people with diabetes from specialty catalogs. · Look inside your shoes every day for things like gravel or torn linings, which could cause blisters or sores. · Buy shoes that fit well:  ? Look for shoes that have plenty of space around the toes. This helps prevent bunions and blisters. ? Try on shoes while wearing the kind of socks you will usually wear with the shoes. ? Avoid plastic shoes. They may rub your feet and cause blisters. Good shoes should be made of materials that are flexible and breathable, such as leather or cloth. ? Break in new shoes slowly by wearing them for no more than an hour a day for several days. Take extra time to check your feet for red areas, blisters, or other problems after you wear new shoes. · Do not go barefoot. Do not wear sandals, and do not wear shoes with very thin soles. Thin soles are easy to puncture. They also do not protect your feet from hot pavement or cold weather. · Have your doctor check your feet during each visit. If you have a foot problem, see your doctor. Do not try to treat an early foot problem at home. Home remedies or treatments that you can buy without a prescription (such as corn removers) can be harmful. · Always get early treatment for foot problems.  A minor irritation can lead to a major problem if not properly cared for early. When should you call for help? Call your doctor now or seek immediate medical care if:  · You have a foot sore, an ulcer or break in the skin that is not healing after 4 days, bleeding corns or calluses, or an ingrown toenail. · You have blue or black areas, which can mean bruising or blood flow problems. · You have peeling skin or tiny blisters between your toes or cracking or oozing of the skin. · You have a fever for more than 24 hours and a foot sore. · You have new numbness or tingling in your feet that does not go away after you move your feet or change positions. · You have unexplained or unusual swelling of the foot or ankle. Watch closely for changes in your health, and be sure to contact your doctor if:  · You cannot do proper foot care. Where can you learn more? Go to https://PictureMe UniversepeIdentification Solutionseb.Xerico Technologies. org and sign in to your Novel account. Enter A739 in the iHookup Social box to learn more about \"Diabetes Foot Health: Care Instructions. \"     If you do not have an account, please click on the \"Sign Up Now\" link. Current as of: December 20, 2019               Content Version: 12.5  © 2006-2020 [employer]. Care instructions adapted under license by [facility] ([facility]). If you have questions about a medical condition or this instruction, always ask your healthcare professional. [place] [address] any warranty or liability for your use of this information. Patient Education        tetanus, diphtheria, acellular pertussis vaccine (Tdap)  Pronunciation:  JIMENEZ sun, dif RONN ordaz, and ay LANCE argelia ler per TUS iss  Brand:  Adacel (Tdap), Boostrix (Tdap)  What is the most important information I should know about this vaccine? You should not receive this vaccine if you have ever had had a life-threatening allergic reaction to a tetanus, diphtheria, or pertussis vaccine.  You also should not receive this vaccine if you had a neurologic disorder affecting your brain within 7 days after having a previous pertussis vaccine. What is tetanus, diphtheria, acellular pertussis vaccine (Tdap)? Tetanus, diphtheria, and pertussis are serious diseases caused by bacteria. Tetanus (lockjaw) causes painful tightening of the muscles, usually all over the body. It can lead to \"locking\" of the jaw so the victim cannot open the mouth or swallow. Tetanus leads to death in about 1 out of 10 cases. Diphtheria causes a thick coating in the nose, throat, and airways. It can lead to breathing problems, paralysis, heart failure, or death. Pertussis (whooping cough) causes coughing so severe that it interferes with eating, drinking, or breathing. These spells can last for weeks and can lead to pneumonia, seizures (convulsions), brain damage, and death. Diphtheria and pertussis are spread from person to person. Tetanus enters the body through a cut or wound. The diphtheria, tetanus acellular, and pertussis adult vaccine (also called Tdap) is used to help prevent these diseases in people who are at least 8years old. Most people in this age group require only one Tdap shot for protection against these diseases. Tdap vaccine is especially important for healthcare workers or people who have close contact with a baby younger than 13 months old. This vaccine works by exposing you to a small dose of the bacteria or a protein from the bacteria, which causes the body to develop immunity to the disease. This vaccine will not treat an active infection that has already developed in the body. Like any vaccine, the Tdap vaccine may not provide protection from disease in every person. What should I discuss with my healthcare provider before receiving this vaccine?   You should not receive this vaccine if:  · you had a life-threatening allergic reaction to any vaccine that contains tetanus, diphtheria, or pertussis; or  · you had a neurologic disorder affecting your brain (such as loss available for use in children younger than 8years old. How is this vaccine given? This vaccine is given as an injection (shot) into a muscle. You will receive this injection in a doctor's office or clinic setting. Tdap vaccine is usually given as a one-time injection. Unless your doctor's tells you otherwise, you will not need a booster vaccine. Tdap vaccine is usually given once every 10 years. What happens if I miss a dose? Since the Tdap vaccine is usually given only once, you are not likely to miss a dose. What happens if I overdose? An overdose of this vaccine is unlikely to occur. What should I avoid before or after receiving this vaccine? Follow your doctor's instructions about any restrictions on food, beverages, or activity after receiving a Tdap vaccine. What are the possible side effects of this vaccine? Keep track of any and all side effects you have after receiving this vaccine. If you ever need to receive a booster dose, you will need to tell your doctor if the previous shot caused any side effects. You should not receive a booster vaccine if you had a life threatening allergic reaction after the first shot. Becoming infected with diphtheria, pertussis, or tetanus is much more dangerous to your health than receiving this vaccine. However, like any medicine, this vaccine can cause side effects but the risk of serious side effects is extremely low. Get emergency medical help if you have signs of an allergic reaction: hives; difficult breathing; swelling of your face, lips, tongue, or throat.   Call your doctor at once if you have any of these side effects within 7 days after receiving Tdap vaccine:  · numbness, weakness, or tingling in your feet and legs;  · problems with walking or coordination;  · sudden pain in your arms or shoulders;  · a light-headed feeling, like you might pass out;  · vision problems, ringing in your ears;  · seizure (black-out or convulsions); or  · redness, swelling, bleeding, or severe pain where the shot was given. Common side effects may include:  · mild pain or tenderness where the shot was given;  · headache or tiredness;  · body aches; or  · mild nausea, diarrhea, or vomiting. This is not a complete list of side effects and others may occur. Call your doctor for medical advice about side effects. You may report vaccine side effects to the [address] [address] and [facility] at [phone]. What other drugs will affect tetanus, diphtheria, acellular pertussis vaccine? Before receiving this vaccine, tell your doctor about all other vaccines you have recently received. Also tell the doctor if you have recently received drugs or treatments that can weaken the immune system, including:  · an oral, nasal, inhaled, or injectable steroid medicine;  · medications to treat psoriasis, rheumatoid arthritis, or other autoimmune disorders; or  · medicines to treat or prevent organ transplant rejection. If you are using any of these medications, you may not be able to receive the vaccine, or may need to wait until the other treatments are finished. This list is not complete. Other drugs may interact with this vaccine, including prescription and over-the-counter medicines, vitamins, and herbal products. Not all possible interactions are listed in this medication guide. Where can I get more information? Your doctor or pharmacist can provide more information about this vaccine. Additional information is available from your local health department or the Centers for Disease Control and Prevention. Remember, keep this and all other medicines out of the reach of children, never share your medicines with others, and use this medication only for the indication prescribed. Every effort has been made to ensure that the information provided by [address] is accurate, up-to-date, and complete, but no guarantee is made to that effect.  Drug information contained herein may be time sensitive. [employer] information has been compiled for use by healthcare practitioners and consumers in the [place] and therefore [employer] does not warrant that uses outside of the [place] are appropriate, unless specifically indicated otherwise. [facility] drug information does not endorse drugs, diagnose patients or recommend therapy. [facility] drug information is an informational resource designed to assist licensed healthcare practitioners in caring for their patients and/or to serve consumers viewing this service as a supplement to, and not a substitute for, the expertise, skill, knowledge and judgment of healthcare practitioners. The absence of a warning for a given drug or drug combination in no way should be construed to indicate that the drug or drug combination is safe, effective or appropriate for any given patient. [facility] does not assume any responsibility for any aspect of healthcare administered with the aid of information [facility] provides. The information contained herein is not intended to cover all possible uses, directions, precautions, warnings, drug interactions, allergic reactions, or adverse effects. If you have questions about the drugs you are taking, check with your doctor, nurse or pharmacist.  Copyright 3542-6582 [address] [place] [address] Dr TRAVIS. Version: 3.01. Revision date: 8/19/2016. Care instructions adapted under license by [facility] ([facility]). If you have questions about a medical condition or this instruction, always ask your healthcare professional. [place] [address] any warranty or liability for your use of this information.

## 2020-07-13 NOTE — PROGRESS NOTES
Jake Gil a 78 y.o. male who presents today for his medical conditions/complaints as notedbelow. Chief Complaint   Patient presents with    Diabetes    Hypertension     No headaches, light-headedness, dizziness, chest pain or sob. HPI:   Diabetes   He presents for his follow-up diabetic visit. He has type 2 diabetes mellitus. His disease course has been stable. There are no hypoglycemic associated symptoms. Pertinent negatives for hypoglycemia include no dizziness. There are no diabetic associated symptoms. There are no hypoglycemic complications. Symptoms are stable. Risk factors for coronary artery disease include diabetes mellitus, obesity, sedentary lifestyle, male sex and hypertension. Current diabetic treatment includes oral agent (monotherapy) (trulicity). He is compliant with treatment most of the time. There is no change in his home blood glucose trend. His breakfast blood glucose range is generally 130-140 mg/dl. (Am 134 sugars  )   Hypertension   Pertinent negatives include no shortness of breath. 2 weeks ago had back surgery. And right ear surgery. Saw eye doctor Dr Gita Lemus. LDL Calculated (mg/dL)   Date Value   07/10/2019 98   06/21/2018 77   06/23/2017 72       (goal LDL is <100)   No results found for: AST, ALT, BUN  BP Readings from Last 3 Encounters:   07/13/20 118/70   03/12/20 112/64   02/13/20 110/76          (goal 120/80)    Past Medical History:   Diagnosis Date    Diabetes ([address]     Diverticulitis     History of allergy     Hyperlipidemia     Hypertension     Osteoarthritis       Past Surgical History:   Procedure Laterality Date    BACK SURGERY  09/03/2017    Marene Degree  Dr Dasha Dover, lumbar fusion   [facility]  2015    partial colectomy due to diveritc    COLONOSCOPY      TOTAL KNEE ARTHROPLASTY Right        No family history on file.     Social History     Tobacco Use    Smoking status: Former Smoker     Packs/day: 1.50     Years: 40.00     Pack years: 60.00     Last attempt to quit: 1991     Years since quittin.5    Smokeless tobacco: Never Used   Substance Use Topics    Alcohol use: Yes     Alcohol/week: 1.0 standard drinks     Types: 1 Standard drinks or equivalent per week     Comment: social      Current Outpatient Medications   Medication Sig Dispense Refill    metFORMIN (GLUCOPHAGE) 500 MG tablet Take 1 tablet by mouth 2 times daily (with meals) 180 tablet 1    omeprazole (PRILOSEC) 40 MG delayed release capsule TAKE 1 CAPSULE DAILY 90 capsule 3    Dulaglutide (TRULICITY) 0.29 EK/6.4AF SOPN INJECT 0.5ML (=0.75 MG)    SUBCUTANEOUSLY ONCE WEEKLY 12 pen 3    hydroCHLOROthiazide (HYDRODIURIL) 25 MG tablet Take 1 tablet by mouth daily 90 tablet 1    gabapentin (NEURONTIN) 300 MG capsule Take 300 mg by mouth daily.  lisinopril (PRINIVIL;ZESTRIL) 2.5 MG tablet TAKE 1 TABLET DAILY 90 tablet 3    blood glucose test strips (ASCENSIA AUTODISC VI;ONE TOUCH ULTRA TEST VI) strip Use with associated glucose meter. Use daily 100 strip 11    Omega-3 Fatty Acids (FISH OIL PO) Take 1 g by mouth      meloxicam (MOBIC) 15 MG tablet       metoprolol succinate (TOPROL XL) 25 MG extended release tablet Take 0.5 tablets by mouth daily 90 tablet 0    glucose blood VI test strips (ONE TOUCH ULTRA TEST) strip USE ONE STRIP TO TEST DAILY AS NEEDED 100 strip 2    atorvastatin (LIPITOR) 20 MG tablet       aspirin EC 81 MG EC tablet Take 81 mg by mouth daily      Multiple Vitamin (MULTI-VITAMIN DAILY) TABS Take 1 tablet by mouth daily      clobetasol (TEMOVATE) 0.05 % cream Apply topically 2 times daily. (Patient not taking: Reported on 2020) 30 g 1    nystatin (MYCOSTATIN) 250088 UNIT/GM cream Apply topically 2 times daily. (Patient not taking: Reported on 2020) 30 g 3    nystatin (MYCOSTATIN) 230264 UNIT/GM powder Apply 3 times daily.  (Patient not taking: Reported on 2020) 30 g 2    clotrimazole (LOTRIMIN) 1 % cream APPLY TO clean, dry skin, 1 TO 2 TIMES DAILY UNTIL IMPROVED THEN DISCONTINUE (Patient not taking: Reported on 7/13/2020) 45 g 1    betamethasone dipropionate (DIPROLENE) 0.05 % cream APPLY TO AFFECTED AREA 1 TO 2 TIMES DAILY UNTIL IMPROVED THEN DISCONTINUE  0     No current facility-administered medications for this visit. Allergies   Allergen Reactions    Feldene [Piroxicam]     Flagyl [Metronidazole]     Naprosyn [Naproxen]     Penicillins     Sulfa Antibiotics     Sulfasalazine        Health Maintenance   Topic Date Due    Shingles Vaccine (1 of 2) 11/02/1990    Annual Wellness Visit (AWV)  05/29/2019    Diabetic retinal exam  12/17/2019    Potassium monitoring  07/10/2020    Creatinine monitoring  07/10/2020    Lipid screen  07/10/2020    Flu vaccine (1) 09/01/2020    A1C test (Diabetic or Prediabetic)  01/13/2021    Diabetic foot exam  07/13/2021    DTaP/Tdap/Td vaccine (2 - Td) 07/13/2030    Pneumococcal 65+ years Vaccine  Completed    Hepatitis A vaccine  Aged Out    Hib vaccine  Aged Out    Meningococcal (ACWY) vaccine  Aged Out       Subjective:      Review of Systems   Respiratory: Negative for shortness of breath. Has to do catch up breath off and on   Cardiovascular: Negative. Neurological: Negative for dizziness and light-headedness. Objective:     /70   Pulse 60   Temp 97 °F (36.1 °C)   Resp 16   Ht 5' 7\" (1.702 m)   Wt 249 lb 9.6 oz (113.2 kg)   SpO2 96%   BMI 39.09 kg/m²   Physical Exam  Vitals signs and nursing note reviewed. Constitutional:       General: He is not in acute distress. Appearance: He is well-developed. HENT:      Head: Normocephalic and atraumatic. Right Ear: Tympanic membrane and ear canal normal.      Left Ear: Tympanic membrane and ear canal normal.      Mouth/Throat:      Mouth: Mucous membranes are moist.   Eyes:      General: No scleral icterus. Right eye: No discharge.          Left hemoglobin (Hb A1C)     DIABETES FOOT EXAM    NH COLLECTION CAPILLARY BLOOD SPECIMEN     Orders Placed This Encounter   Medications    metFORMIN (GLUCOPHAGE) 500 MG tablet     Sig: Take 1 tablet by mouth 2 times daily (with meals)     Dispense:  180 tablet     Refill:  1       Patient given educational materials - see patient instructions. Discussed use, benefit, and side effects of prescribed medications. All patient questions answered. Pt voiced understanding. Reviewed health maintenance. Instructed to continue current medications, diet and try to do some exercise. Patient agreed with treatment plan. Follow up as directed.      Electronicallysigned by Jeramy Celis MD on 7/13/2020 at 9:47 AM

## 2020-08-26 RX ORDER — HYDROCHLOROTHIAZIDE 25 MG/1
TABLET ORAL
Qty: 90 TABLET | Refills: 1 | Status: SHIPPED | OUTPATIENT
Start: 2020-08-26 | End: 2021-02-13

## 2020-09-03 ENCOUNTER — OFFICE VISIT (OUTPATIENT)
Dept: PRIMARY CARE CLINIC | Age: 80
End: 2020-09-03
Payer: MEDICARE

## 2020-09-03 VITALS
WEIGHT: 248.6 LBS | OXYGEN SATURATION: 96 % | BODY MASS INDEX: 39.02 KG/M2 | TEMPERATURE: 97.2 F | SYSTOLIC BLOOD PRESSURE: 90 MMHG | DIASTOLIC BLOOD PRESSURE: 58 MMHG | HEART RATE: 70 BPM | HEIGHT: 67 IN

## 2020-09-03 PROCEDURE — 90694 VACC AIIV4 NO PRSRV 0.5ML IM: CPT | Performed by: FAMILY MEDICINE

## 2020-09-03 PROCEDURE — 1123F ACP DISCUSS/DSCN MKR DOCD: CPT | Performed by: FAMILY MEDICINE

## 2020-09-03 PROCEDURE — 4040F PNEUMOC VAC/ADMIN/RCVD: CPT | Performed by: FAMILY MEDICINE

## 2020-09-03 PROCEDURE — G0438 PPPS, INITIAL VISIT: HCPCS | Performed by: FAMILY MEDICINE

## 2020-09-03 PROCEDURE — G0008 ADMIN INFLUENZA VIRUS VAC: HCPCS | Performed by: FAMILY MEDICINE

## 2020-09-03 ASSESSMENT — PATIENT HEALTH QUESTIONNAIRE - PHQ9
1. LITTLE INTEREST OR PLEASURE IN DOING THINGS: 0
SUM OF ALL RESPONSES TO PHQ QUESTIONS 1-9: 0
SUM OF ALL RESPONSES TO PHQ9 QUESTIONS 1 & 2: 0
SUM OF ALL RESPONSES TO PHQ QUESTIONS 1-9: 0
2. FEELING DOWN, DEPRESSED OR HOPELESS: 0

## 2020-09-03 NOTE — PATIENT INSTRUCTIONS
Personalized Preventive Plan for Horace June - 9/3/2020  Medicare offers a range of preventive health benefits. Some of the tests and screenings are paid in full while other may be subject to a deductible, co-insurance, and/or copay. Some of these benefits include a comprehensive review of your medical history including lifestyle, illnesses that may run in your family, and various assessments and screenings as appropriate. After reviewing your medical record and screening and assessments performed today your provider may have ordered immunizations, labs, imaging, and/or referrals for you. A list of these orders (if applicable) as well as your Preventive Care list are included within your After Visit Summary for your review. Other Preventive Recommendations:    · A preventive eye exam performed by an eye specialist is recommended every 1-2 years to screen for glaucoma; cataracts, macular degeneration, and other eye disorders. · A preventive dental visit is recommended every 6 months. · Try to get at least 150 minutes of exercise per week or 10,000 steps per day on a pedometer . · Order or download the FREE \"Exercise & Physical Activity: Your Everyday Guide\" from [employer]. Call 2-914.599.5434 or search [employer] online. · You need 1936-3581 mg of calcium and 0461-2249 IU of vitamin D per day. It is possible to meet your calcium requirement with diet alone, but a vitamin D supplement is usually necessary to meet this goal.  · When exposed to the sun, use a sunscreen that protects against both UVA and UVB radiation with an SPF of 30 or greater. Reapply every 2 to 3 hours or after sweating, drying off with a towel, or swimming. · Always wear a seat belt when traveling in a car. Always wear a helmet when riding a bicycle or motorcycle.

## 2020-09-03 NOTE — PROGRESS NOTES
Medicare Annual Wellness Visit  Name: Aubrey Palacios Date: 9/3/2020   MRN: [id number] Sex: Male   Age: 78 y.o. Ethnicity: Non-/Non    : 1940 Race: Helen Cohn is here for Medicare AWV and Other (Pt BP low 90/58)    Screenings for behavioral, psychosocial and functional/safety risks, and cognitive dysfunction are all negative except as indicated below. These results, as well as other patient data from the [address] form, are documented in Flowsheets linked to this Encounter. Allergies   Allergen Reactions    Feldene [Piroxicam]     Flagyl [Metronidazole]     Naprosyn [Naproxen]     Penicillins     Sulfa Antibiotics     Sulfasalazine          Prior to Visit Medications    Medication Sig Taking? Authorizing Provider   blood glucose test strips (ASCENSIA AUTODISC VI;ONE TOUCH ULTRA TEST VI) strip Use with associated glucose meter. Use daily. Type 2 DM not on insulin Yes Wilton Arias MD   hydroCHLOROthiazide (HYDRODIURIL) 25 MG tablet TAKE 1 TABLET DAILY Yes Wilton Arias MD   metFORMIN (GLUCOPHAGE) 500 MG tablet Take 1 tablet by mouth 2 times daily (with meals) Yes Wilton Arias MD   omeprazole (PRILOSEC) 40 MG delayed release capsule TAKE 1 CAPSULE DAILY Yes Wilton Arias MD   Dulaglutide (TRULICITY) 6.19 XN/8.2CY SOPN INJECT 0.5ML (=0.75 MG)    SUBCUTANEOUSLY ONCE WEEKLY Yes Wilton Arias MD   gabapentin (NEURONTIN) 300 MG capsule Take 300 mg by mouth daily.   Yes Historical Provider, MD   lisinopril (PRINIVIL;ZESTRIL) 2.5 MG tablet TAKE 1 TABLET DAILY Yes Wilton Arias MD   Omega-3 Fatty Acids (FISH OIL PO) Take 1 g by mouth Yes Historical Provider, MD   betamethasone dipropionate (DIPROLENE) 0.05 % cream APPLY TO AFFECTED AREA 1 TO 2 TIMES DAILY UNTIL IMPROVED THEN DISCONTINUE Yes Historical Provider, MD   meloxicam (MOBIC) 15 MG tablet  Yes Historical Provider, MD   metoprolol succinate (TOPROL XL) 25 MG extended release tablet Take 0.5 tablets by mouth daily Yes Jaci Busby MD   atorvastatin (LIPITOR) 20 MG tablet  Yes Historical Provider, MD   aspirin EC 81 MG EC tablet Take 81 mg by mouth daily Yes Jaci Busby MD   Multiple Vitamin (MULTI-VITAMIN DAILY) TABS Take 1 tablet by mouth daily Yes Jaci Busby MD   clobetasol (TEMOVATE) 0.05 % cream Apply topically 2 times daily. Patient not taking: Reported on 7/13/2020  Jaci Busby MD   nystatin (MYCOSTATIN) 954030 UNIT/GM cream Apply topically 2 times daily. Patient not taking: Reported on 7/13/2020  Jaci Busby MD   nystatin (MYCOSTATIN) 068955 UNIT/GM powder Apply 3 times daily. Patient not taking: Reported on 7/13/2020  Jaci Busby MD   clotrimazole (LOTRIMIN) 1 % cream APPLY TO clean, dry skin, 1 TO 2 TIMES DAILY UNTIL IMPROVED THEN DISCONTINUE  Patient not taking: Reported on 7/13/2020  Shavonne Sanford, APRN - CNP         Past Medical History:   Diagnosis Date    Diabetes [facility])     Diverticulitis     History of allergy     Hyperlipidemia     Hypertension     Osteoarthritis        Past Surgical History:   Procedure Laterality Date    BACK SURGERY  09/03/2017    Alisa Diamond, lumbar fusion   [facility]  2015    partial colectomy due to diveritc    COLONOSCOPY      TOTAL KNEE ARTHROPLASTY Right        No family history on file. CareTeam (Including outside providers/suppliers regularly involved in providing care):   Patient Care Team:  Jaci Busby MD as PCP - General (Family Medicine)  Jaci Busby MD as PCP - [facility] Empaneled Provider    Wt Readings from Last 3 Encounters:   09/03/20 248 lb 9.6 oz (112.8 kg)   07/13/20 249 lb 9.6 oz (113.2 kg)   03/12/20 256 lb 3.2 oz (116.2 kg)     Vitals:    09/03/20 1005   BP: (!) 90/58   Pulse: 70   Temp: 97.2 °F (36.2 °C)   SpO2: 96%   Weight: 248 lb 9.6 oz (112.8 kg)   Height: 5' 7\" (1.702 m)     Body mass index is 38.94 kg/m².     ROS: no lightheadedness, No  Have you seen a dentist within the past year?: Yes  Body mass index is 38.94 kg/m². Health Habits/Nutrition Interventions:  · had back surgery and was told not to exercise for 4 months. Hearing/Vision:  No exam data present  Hearing/Vision  Do you or your family notice any trouble with your hearing?: (!) Yes  Do you have difficulty driving, watching TV, or doing any of your daily activities because of your eyesight?: No  Have you had an eye exam within the past year?: Yes  Hearing/Vision Interventions:  · Hearing concerns:  audiology referral provided    Home sugars 181, 164, 147, 132, 155    Personalized Preventive Plan   Current Health Maintenance Status  Immunization History   Administered Date(s) Administered    Influenza Virus Vaccine 11/08/2010, 10/02/2014, 10/12/2015    Influenza, High Dose (Fluzone 65 yrs and older) 10/12/2016, 11/14/2017, 09/17/2018    Influenza, Triv, inactivated, subunit, adjuvanted, IM (Fluad 65 yrs and older) 11/08/2019    Pneumococcal Conjugate 13-valent ([id number]) 03/13/2017    Pneumococcal Polysaccharide ([id number]) 09/19/2014    Tdap (Boostrix, Adacel) 07/13/2020        Health Maintenance   Topic Date Due    Shingles Vaccine (1 of 2) 11/02/1990    Annual Wellness Visit (AWV)  05/29/2019    Diabetic retinal exam  12/17/2019    Lipid screen  07/10/2020    Potassium monitoring  07/10/2020    Creatinine monitoring  07/10/2020    Flu vaccine (1) 09/01/2020    A1C test (Diabetic or Prediabetic)  01/13/2021    Diabetic foot exam  07/13/2021    DTaP/Tdap/Td vaccine (2 - Td) 07/13/2030    Pneumococcal 65+ years Vaccine  Completed    Hepatitis A vaccine  Aged Out    Hib vaccine  Aged Out    Meningococcal (ACWY) vaccine  Aged Out     Recommendations for [employer] Due: see orders and patient instructions/AVS.  .   Recommended screening schedule for the next 5-10 years is provided to the patient in written form: see Patient Alexis Moise was seen today for medicare awv and other. Diagnoses and all orders for this visit:    Routine general medical examination at a health care facility    If BP continues to run low then consider cutting back HCTZ to 1/2 pill    Has been watching diet better  Try to walk 10 min a day.

## 2020-09-04 LAB
ALBUMIN SERPL-MCNC: NORMAL G/DL
ALP BLD-CCNC: NORMAL U/L
ALT SERPL-CCNC: NORMAL U/L
AST SERPL-CCNC: NORMAL U/L
BILIRUB SERPL-MCNC: NORMAL MG/DL
BUN BLDV-MCNC: NORMAL MG/DL
CALCIUM SERPL-MCNC: NORMAL MG/DL
CHLORIDE BLD-SCNC: NORMAL MMOL/L
CHOLESTEROL, TOTAL: 171 MG/DL
CHOLESTEROL/HDL RATIO: 5
CO2: NORMAL
CREAT SERPL-MCNC: NORMAL MG/DL
GLUCOSE FASTING: 157 MG/DL
HDLC SERPL-MCNC: 34 MG/DL (ref 35–70)
LDL CHOLESTEROL CALCULATED: 115 MG/DL (ref 0–160)
NONHDLC SERPL-MCNC: ABNORMAL MG/DL
POTASSIUM SERPL-SCNC: NORMAL MMOL/L
SODIUM BLD-SCNC: NORMAL MMOL/L
TOTAL PROTEIN: NORMAL
TRIGL SERPL-MCNC: 111 MG/DL
VLDLC SERPL CALC-MCNC: 22 MG/DL

## 2020-09-14 RX ORDER — ATORVASTATIN CALCIUM 20 MG/1
20 TABLET, FILM COATED ORAL NIGHTLY
Qty: 30 TABLET | Refills: 5 | Status: SHIPPED | OUTPATIENT
Start: 2020-09-14 | End: 2020-10-07 | Stop reason: SDUPTHER

## 2020-10-08 RX ORDER — ATORVASTATIN CALCIUM 20 MG/1
20 TABLET, FILM COATED ORAL NIGHTLY
Qty: 90 TABLET | Refills: 3 | Status: SHIPPED | OUTPATIENT
Start: 2020-10-08 | End: 2021-12-13

## 2020-11-19 ENCOUNTER — OFFICE VISIT (OUTPATIENT)
Dept: PRIMARY CARE CLINIC | Age: 80
End: 2020-11-19
Payer: MEDICARE

## 2020-11-19 VITALS
HEART RATE: 62 BPM | OXYGEN SATURATION: 95 % | WEIGHT: 245.4 LBS | SYSTOLIC BLOOD PRESSURE: 102 MMHG | TEMPERATURE: 97 F | BODY MASS INDEX: 38.44 KG/M2 | DIASTOLIC BLOOD PRESSURE: 60 MMHG

## 2020-11-19 PROBLEM — E66.01 MORBID OBESITY WITH BMI OF 40.0-44.9, ADULT (HCC): Status: ACTIVE | Noted: 2020-11-19

## 2020-11-19 LAB — HBA1C MFR BLD: 6.7 %

## 2020-11-19 PROCEDURE — G8427 DOCREV CUR MEDS BY ELIG CLIN: HCPCS | Performed by: FAMILY MEDICINE

## 2020-11-19 PROCEDURE — 83036 HEMOGLOBIN GLYCOSYLATED A1C: CPT | Performed by: FAMILY MEDICINE

## 2020-11-19 PROCEDURE — G8417 CALC BMI ABV UP PARAM F/U: HCPCS | Performed by: FAMILY MEDICINE

## 2020-11-19 PROCEDURE — 1123F ACP DISCUSS/DSCN MKR DOCD: CPT | Performed by: FAMILY MEDICINE

## 2020-11-19 PROCEDURE — 99214 OFFICE O/P EST MOD 30 MIN: CPT | Performed by: FAMILY MEDICINE

## 2020-11-19 PROCEDURE — G8484 FLU IMMUNIZE NO ADMIN: HCPCS | Performed by: FAMILY MEDICINE

## 2020-11-19 PROCEDURE — 1036F TOBACCO NON-USER: CPT | Performed by: FAMILY MEDICINE

## 2020-11-19 PROCEDURE — 4040F PNEUMOC VAC/ADMIN/RCVD: CPT | Performed by: FAMILY MEDICINE

## 2020-11-19 ASSESSMENT — ENCOUNTER SYMPTOMS: RESPIRATORY NEGATIVE: 1

## 2020-11-19 NOTE — PATIENT INSTRUCTIONS
Patient Education        Learning About Diabetes Food Guidelines  Your Care Instructions     Meal planning is important to manage diabetes. It helps keep your blood sugar at a target level (which you set with your doctor). You don't have to eat special foods. You can eat what your family eats, including sweets once in a while. But you do have to pay attention to how often you eat and how much you eat of certain foods. You may want to work with a dietitian or a certified diabetes educator (CDE) to help you plan meals and snacks. A dietitian or CDE can also help you lose weight if that is one of your goals. What should you know about eating carbs? Managing the amount of carbohydrate (carbs) you eat is an important part of healthy meals when you have diabetes. Carbohydrate is found in many foods. · Learn which foods have carbs. And learn the amounts of carbs in different foods. ? Bread, cereal, pasta, and rice have about 15 grams of carbs in a serving. A serving is 1 slice of bread (1 ounce), ½ cup of cooked cereal, or 1/3 cup of cooked pasta or rice. ? Fruits have 15 grams of carbs in a serving. A serving is 1 small fresh fruit, such as an apple or orange; ½ of a banana; ½ cup of cooked or canned fruit; ½ cup of fruit juice; 1 cup of melon or raspberries; or 2 tablespoons of dried fruit. ? Milk and no-sugar-added yogurt have 15 grams of carbs in a serving. A serving is 1 cup of milk or 2/3 cup of no-sugar-added yogurt. ? Starchy vegetables have 15 grams of carbs in a serving. A serving is ½ cup of mashed potatoes or sweet potato; 1 cup winter squash; ½ of a small baked potato; ½ cup of cooked beans; or ½ cup cooked corn or green peas. · Learn how much carbs to eat each day and at each meal. A dietitian or CDE can teach you how to keep track of the amount of carbs you eat. This is called carbohydrate counting. · If you are not sure how to count carbohydrate grams, use the Plate Method to plan meals.  It is a good, quick way to make sure that you have a balanced meal. It also helps you spread carbs throughout the day. ? Divide your plate by types of foods. Put non-starchy vegetables on half the plate, meat or other protein food on one-quarter of the plate, and a grain or starchy vegetable in the final quarter of the plate. To this you can add a small piece of fruit and 1 cup of milk or yogurt, depending on how many carbs you are supposed to eat at a meal.  · Try to eat about the same amount of carbs at each meal. Do not \"save up\" your daily allowance of carbs to eat at one meal.  · Proteins have very little or no carbs per serving. Examples of proteins are beef, chicken, turkey, fish, eggs, tofu, cheese, cottage cheese, and peanut butter. A serving size of meat is 3 ounces, which is about the size of a deck of cards. Examples of meat substitute serving sizes (equal to 1 ounce of meat) are 1/4 cup of cottage cheese, 1 egg, 1 tablespoon of peanut butter, and ½ cup of tofu. How can you eat out and still eat healthy? · Learn to estimate the serving sizes of foods that have carbohydrate. If you measure food at home, it will be easier to estimate the amount in a serving of restaurant food. · If the meal you order has too much carbohydrate (such as potatoes, corn, or baked beans), ask to have a low-carbohydrate food instead. Ask for a salad or green vegetables. · If you use insulin, check your blood sugar before and after eating out to help you plan how much to eat in the future. · If you eat more carbohydrate at a meal than you had planned, take a walk or do other exercise. This will help lower your blood sugar. What else should you know? · Limit saturated fat, such as the fat from meat and dairy products. This is a healthy choice because people who have diabetes are at higher risk of heart disease. So choose lean cuts of meat and nonfat or low-fat dairy products.  Use olive or canola oil instead of butter or shortening when cooking. · Don't skip meals. Your blood sugar may drop too low if you skip meals and take insulin or certain medicines for diabetes. · Check with your doctor before you drink alcohol. Alcohol can cause your blood sugar to drop too low. Alcohol can also cause a bad reaction if you take certain diabetes medicines. Follow-up care is a key part of your treatment and safety. Be sure to make and go to all appointments, and call your doctor if you are having problems. It's also a good idea to know your test results and keep a list of the medicines you take. Where can you learn more? Go to https://Ipselexpepiceweb.[employer]. org and sign in to your [employer] account. Enter V394 in the Bouf box to learn more about \"Learning About Diabetes Food Guidelines. \"     If you do not have an account, please click on the \"Sign Up Now\" link. Current as of: December 20, 2019               Content Version: 12.6  © 3632-6289 [employer]. Care instructions adapted under license by [facility] ([facility]). If you have questions about a medical condition or this instruction, always ask your healthcare professional. [place] [address] any warranty or liability for your use of this information.

## 2020-11-19 NOTE — PROGRESS NOTES
Lana Mosher a [de-identified] y.o. male who presents today for his medical conditions/complaints as notedbelow. Chief Complaint   Patient presents with    Diabetes     5mth Dm Check, Last A1C 7/13/20 6.5    Hypertension     5mth BP check         HPI:   Diabetes   He presents for his follow-up diabetic visit. He has type 2 diabetes mellitus. His disease course has been stable. There are no hypoglycemic associated symptoms. Pertinent negatives for hypoglycemia include no dizziness. There are no diabetic associated symptoms. There are no hypoglycemic complications. Diabetic complications include nephropathy. Risk factors for coronary artery disease include male sex, hypertension, diabetes mellitus and sedentary lifestyle. Current diabetic treatment includes oral agent (monotherapy) (Trulicity weekly). He is compliant with treatment all of the time. His weight is decreasing steadily. He is following a generally healthy diet. He never participates in exercise. There is no change in his home blood glucose trend. His breakfast blood glucose range is generally 130-140 mg/dl. An ACE inhibitor/angiotensin II receptor blocker is being taken. Eye exam is current. Doing well with his weight. Gets a flutter in his chest sometimes and he feels like  He has to take a deeper breath. Only flutters about 3 beats and then ok. Has severe back pain with walking unless he has assistive device.      LDL Calculated (mg/dL)   Date Value   09/04/2020 115   07/10/2019 98   06/21/2018 77       (goal LDL is <100)   No results found for: AST, ALT, BUN  BP Readings from Last 3 Encounters:   11/19/20 102/60   09/03/20 (!) 90/58   07/13/20 118/70          (goal 120/80)    Past Medical History:   Diagnosis Date    Diabetes ([address]     Diverticulitis     History of allergy     Hyperlipidemia     Hypertension     Osteoarthritis       Past Surgical History:   Procedure Laterality Date    BACK SURGERY  09/03/2017    Lucille [address] [facility], lumbar fusion   [place]      partial colectomy due to diveritc    COLONOSCOPY      TOTAL KNEE ARTHROPLASTY Right        No family history on file. Social History     Tobacco Use    Smoking status: Former Smoker     Packs/day: 1.50     Years: 40.00     Pack years: 60.00     Last attempt to quit: 1991     Years since quittin.9    Smokeless tobacco: Never Used   Substance Use Topics    Alcohol use: Yes     Alcohol/week: 1.0 standard drinks     Types: 1 Standard drinks or equivalent per week     Comment: social      Current Outpatient Medications   Medication Sig Dispense Refill    atorvastatin (LIPITOR) 20 MG tablet Take 1 tablet by mouth nightly 90 tablet 3    blood glucose test strips (ASCENSIA AUTODISC VI;ONE TOUCH ULTRA TEST VI) strip Use with associated glucose meter. Use daily. Type 2 DM not on insulin 100 strip 11    hydroCHLOROthiazide (HYDRODIURIL) 25 MG tablet TAKE 1 TABLET DAILY 90 tablet 1    metFORMIN (GLUCOPHAGE) 500 MG tablet Take 1 tablet by mouth 2 times daily (with meals) 180 tablet 1    omeprazole (PRILOSEC) 40 MG delayed release capsule TAKE 1 CAPSULE DAILY 90 capsule 3    Dulaglutide (TRULICITY) 1.12 HL/7.7AI SOPN INJECT 0.5ML (=0.75 MG)    SUBCUTANEOUSLY ONCE WEEKLY 12 pen 3    gabapentin (NEURONTIN) 300 MG capsule Take 300 mg by mouth daily.        lisinopril (PRINIVIL;ZESTRIL) 2.5 MG tablet TAKE 1 TABLET DAILY 90 tablet 3    Omega-3 Fatty Acids (FISH OIL PO) Take 1 g by mouth      betamethasone dipropionate (DIPROLENE) 0.05 % cream APPLY TO AFFECTED AREA 1 TO 2 TIMES DAILY UNTIL IMPROVED THEN DISCONTINUE  0    meloxicam (MOBIC) 15 MG tablet       metoprolol succinate (TOPROL XL) 25 MG extended release tablet Take 0.5 tablets by mouth daily 90 tablet 0    aspirin EC 81 MG EC tablet Take 81 mg by mouth daily      Multiple Vitamin (MULTI-VITAMIN DAILY) TABS Take 1 tablet by mouth daily      clobetasol (TEMOVATE) 0.05 % cream Apply topically 2 times daily. (Patient not taking: Reported on 7/13/2020) 30 g 1    nystatin (MYCOSTATIN) 700873 UNIT/GM cream Apply topically 2 times daily. (Patient not taking: Reported on 7/13/2020) 30 g 3    nystatin (MYCOSTATIN) 155128 UNIT/GM powder Apply 3 times daily. (Patient not taking: Reported on 7/13/2020) 30 g 2    clotrimazole (LOTRIMIN) 1 % cream APPLY TO clean, dry skin, 1 TO 2 TIMES DAILY UNTIL IMPROVED THEN DISCONTINUE (Patient not taking: Reported on 7/13/2020) 45 g 1     No current facility-administered medications for this visit. Allergies   Allergen Reactions    Feldene [Piroxicam]     Flagyl [Metronidazole]     Naprosyn [Naproxen]     Penicillins     Sulfa Antibiotics     Sulfasalazine        Health Maintenance   Topic Date Due    Shingles Vaccine (1 of 2) 11/02/1990    Diabetic retinal exam  12/17/2019    A1C test (Diabetic or Prediabetic)  01/13/2021    Diabetic foot exam  07/13/2021    Lipid screen  09/04/2021    Annual Wellness Visit (AWV)  09/04/2021    Potassium monitoring  09/04/2021    Creatinine monitoring  09/04/2021    DTaP/Tdap/Td vaccine (2 - Td) 07/13/2030    Flu vaccine  Completed    Pneumococcal 65+ years Vaccine  Completed    Hepatitis A vaccine  Aged Out    Hib vaccine  Aged Out    Meningococcal (ACWY) vaccine  Aged Out       Subjective:      Review of Systems   Respiratory: Negative. Cardiovascular: Positive for palpitations. Neurological: Negative for dizziness and light-headedness. Objective:     /60   Pulse 62   Temp 97 °F (36.1 °C)   Wt 245 lb 6.4 oz (111.3 kg)   SpO2 95%   BMI 38.44 kg/m²   Physical Exam  Vitals signs and nursing note reviewed. Constitutional:       General: He is not in acute distress. Appearance: He is well-developed. He is not ill-appearing. HENT:      Head: Normocephalic and atraumatic.       Right Ear: Tympanic membrane, ear canal and external ear normal.      Left Ear: Tympanic membrane, ear canal and external ear normal.   Eyes:      General: No scleral icterus. Right eye: No discharge. Left eye: No discharge. Conjunctiva/sclera: Conjunctivae normal.      Pupils: Pupils are equal, round, and reactive to light. Neck:      Thyroid: No thyromegaly. Trachea: No tracheal deviation. Cardiovascular:      Rate and Rhythm: Normal rate and regular rhythm. Heart sounds: Normal heart sounds. Pulmonary:      Effort: Pulmonary effort is normal. No respiratory distress. Breath sounds: Normal breath sounds. No wheezing. Musculoskeletal:      Right lower leg: Edema present. Left lower leg: Edema present. Comments: Mild lower leg edema   Lymphadenopathy:      Cervical: No cervical adenopathy. Skin:     General: Skin is warm. Findings: No rash. Neurological:      Mental Status: He is alert and oriented to person, place, and time. Psychiatric:         Mood and Affect: Mood normal.         Behavior: Behavior normal.         Thought Content: Thought content normal.         Assessment:       Diagnosis Orders   1. 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 Essential hypertension     3. BMI 38.0-38.9,adult          Plan:      Return in about 4 months (around 3/19/2021) for diabetes mellitus. Yissel Money He is doing well with his slow decrease in his weight, A1c and blood pressure. No orders of the defined types were placed in this encounter. No orders of the defined types were placed in this encounter. Patient given educational materials - see patient instructions. Discussed use, benefit, and side effects of prescribed medications. All patient questions answered. Pt voiced understanding. Reviewed health maintenance. Instructed to continue current medications, diet and try to do walking exercise. Patient agreed with treatment plan. Follow up as directed.      Electronicallysigned by Hitesh White MD on 11/19/2020 at 10:25 AM

## 2020-12-21 ENCOUNTER — TELEPHONE (OUTPATIENT)
Dept: PRIMARY CARE CLINIC | Age: 80
End: 2020-12-21

## 2020-12-21 NOTE — TELEPHONE ENCOUNTER
Dry cough usually doesn't need antibiotics. Call if worse or if productive.    Use mucinex or mucinex DM tablets

## 2020-12-21 NOTE — TELEPHONE ENCOUNTER
Pt complaining of a cough x2 days. Mostly non productive, no other sx. He declined an appt, states he gets it every year.  Pharm brenda Medel Masker

## 2020-12-29 ENCOUNTER — NURSE TRIAGE (OUTPATIENT)
Dept: OTHER | Facility: CLINIC | Age: 80
End: 2020-12-29

## 2020-12-29 ENCOUNTER — APPOINTMENT (OUTPATIENT)
Dept: GENERAL RADIOLOGY | Age: 80
DRG: 177 | End: 2020-12-29
Payer: MEDICARE

## 2020-12-29 ENCOUNTER — HOSPITAL ENCOUNTER (INPATIENT)
Age: 80
LOS: 14 days | Discharge: NURSING, REHAB OR LONG-TERM CARE | DRG: 177 | End: 2021-01-12
Attending: EMERGENCY MEDICINE | Admitting: FAMILY MEDICINE
Payer: MEDICARE

## 2020-12-29 ENCOUNTER — OFFICE VISIT (OUTPATIENT)
Dept: PRIMARY CARE CLINIC | Age: 80
End: 2020-12-29
Payer: MEDICARE

## 2020-12-29 VITALS
BODY MASS INDEX: 36.73 KG/M2 | HEART RATE: 79 BPM | OXYGEN SATURATION: 86 % | HEIGHT: 67 IN | TEMPERATURE: 98.4 F | WEIGHT: 234 LBS

## 2020-12-29 DIAGNOSIS — R09.02 HYPOXIA: Primary | ICD-10-CM

## 2020-12-29 DIAGNOSIS — J12.82 PNEUMONIA DUE TO COVID-19 VIRUS: ICD-10-CM

## 2020-12-29 DIAGNOSIS — J96.01 ACUTE RESPIRATORY FAILURE WITH HYPOXIA (HCC): ICD-10-CM

## 2020-12-29 DIAGNOSIS — U07.1 COVID-19: ICD-10-CM

## 2020-12-29 DIAGNOSIS — U07.1 PNEUMONIA DUE TO COVID-19 VIRUS: ICD-10-CM

## 2020-12-29 LAB
ABSOLUTE EOS #: 0 K/UL (ref 0–0.4)
ABSOLUTE IMMATURE GRANULOCYTE: ABNORMAL K/UL (ref 0–0.3)
ABSOLUTE LYMPH #: 0.6 K/UL (ref 1–4.8)
ABSOLUTE MONO #: 0.4 K/UL (ref 0.1–1.3)
ALBUMIN SERPL-MCNC: 3.9 G/DL (ref 3.5–5.2)
ALBUMIN/GLOBULIN RATIO: ABNORMAL (ref 1–2.5)
ALP BLD-CCNC: 59 U/L (ref 40–129)
ALT SERPL-CCNC: 55 U/L (ref 5–41)
ANION GAP SERPL CALCULATED.3IONS-SCNC: 13 MMOL/L (ref 9–17)
AST SERPL-CCNC: 58 U/L
BASOPHILS # BLD: 0 % (ref 0–2)
BASOPHILS ABSOLUTE: 0 K/UL (ref 0–0.2)
BILIRUB SERPL-MCNC: 0.95 MG/DL (ref 0.3–1.2)
BUN BLDV-MCNC: 14 MG/DL (ref 8–23)
BUN/CREAT BLD: ABNORMAL (ref 9–20)
C-REACTIVE PROTEIN: 56 MG/L (ref 0–5)
CALCIUM SERPL-MCNC: 9.2 MG/DL (ref 8.6–10.4)
CHLORIDE BLD-SCNC: 100 MMOL/L (ref 98–107)
CO2: 24 MMOL/L (ref 20–31)
CREAT SERPL-MCNC: 0.97 MG/DL (ref 0.7–1.2)
D-DIMER QUANTITATIVE: 0.84 MG/L FEU (ref 0–0.59)
DIFFERENTIAL TYPE: ABNORMAL
EOSINOPHILS RELATIVE PERCENT: 0 % (ref 0–4)
FERRITIN: 909 UG/L (ref 30–400)
GFR AFRICAN AMERICAN: >60 ML/MIN
GFR NON-AFRICAN AMERICAN: >60 ML/MIN
GFR SERPL CREATININE-BSD FRML MDRD: ABNORMAL ML/MIN/{1.73_M2}
GFR SERPL CREATININE-BSD FRML MDRD: ABNORMAL ML/MIN/{1.73_M2}
GLUCOSE BLD-MCNC: 118 MG/DL (ref 70–99)
HCT VFR BLD CALC: 42.9 % (ref 41–53)
HEMOGLOBIN: 14.3 G/DL (ref 13.5–17.5)
IMMATURE GRANULOCYTES: ABNORMAL %
INR BLD: 1
LYMPHOCYTES # BLD: 15 % (ref 24–44)
MAGNESIUM: 1.6 MG/DL (ref 1.6–2.6)
MCH RBC QN AUTO: 30.6 PG (ref 26–34)
MCHC RBC AUTO-ENTMCNC: 33.4 G/DL (ref 31–37)
MCV RBC AUTO: 91.6 FL (ref 80–100)
MONOCYTES # BLD: 10 % (ref 1–7)
NRBC AUTOMATED: ABNORMAL PER 100 WBC
PDW BLD-RTO: 14.5 % (ref 11.5–14.9)
PLATELET # BLD: 121 K/UL (ref 150–450)
PLATELET ESTIMATE: ABNORMAL
PMV BLD AUTO: 7.2 FL (ref 6–12)
POTASSIUM SERPL-SCNC: 4.7 MMOL/L (ref 3.7–5.3)
PROTHROMBIN TIME: 13.3 SEC (ref 11.8–14.6)
RBC # BLD: 4.68 M/UL (ref 4.5–5.9)
RBC # BLD: ABNORMAL 10*6/UL
SARS-COV-2, RAPID: DETECTED
SARS-COV-2: ABNORMAL
SARS-COV-2: ABNORMAL
SEG NEUTROPHILS: 75 % (ref 36–66)
SEGMENTED NEUTROPHILS ABSOLUTE COUNT: 2.9 K/UL (ref 1.3–9.1)
SODIUM BLD-SCNC: 137 MMOL/L (ref 135–144)
SOURCE: ABNORMAL
TOTAL PROTEIN: 7 G/DL (ref 6.4–8.3)
TROPONIN INTERP: NORMAL
TROPONIN T: NORMAL NG/ML
TROPONIN, HIGH SENSITIVITY: 21 NG/L (ref 0–22)
WBC # BLD: 3.8 K/UL (ref 3.5–11)
WBC # BLD: ABNORMAL 10*3/UL

## 2020-12-29 PROCEDURE — 80053 COMPREHEN METABOLIC PANEL: CPT

## 2020-12-29 PROCEDURE — 83735 ASSAY OF MAGNESIUM: CPT

## 2020-12-29 PROCEDURE — 82728 ASSAY OF FERRITIN: CPT

## 2020-12-29 PROCEDURE — 71045 X-RAY EXAM CHEST 1 VIEW: CPT

## 2020-12-29 PROCEDURE — 2500000003 HC RX 250 WO HCPCS: Performed by: INTERNAL MEDICINE

## 2020-12-29 PROCEDURE — 85025 COMPLETE CBC W/AUTO DIFF WBC: CPT

## 2020-12-29 PROCEDURE — G8417 CALC BMI ABV UP PARAM F/U: HCPCS | Performed by: NURSE PRACTITIONER

## 2020-12-29 PROCEDURE — 6370000000 HC RX 637 (ALT 250 FOR IP): Performed by: INTERNAL MEDICINE

## 2020-12-29 PROCEDURE — 6360000002 HC RX W HCPCS: Performed by: FAMILY MEDICINE

## 2020-12-29 PROCEDURE — G8484 FLU IMMUNIZE NO ADMIN: HCPCS | Performed by: NURSE PRACTITIONER

## 2020-12-29 PROCEDURE — 99285 EMERGENCY DEPT VISIT HI MDM: CPT

## 2020-12-29 PROCEDURE — 1123F ACP DISCUSS/DSCN MKR DOCD: CPT | Performed by: NURSE PRACTITIONER

## 2020-12-29 PROCEDURE — 85610 PROTHROMBIN TIME: CPT

## 2020-12-29 PROCEDURE — 2060000000 HC ICU INTERMEDIATE R&B

## 2020-12-29 PROCEDURE — 99212 OFFICE O/P EST SF 10 MIN: CPT | Performed by: NURSE PRACTITIONER

## 2020-12-29 PROCEDURE — 84484 ASSAY OF TROPONIN QUANT: CPT

## 2020-12-29 PROCEDURE — 2580000003 HC RX 258: Performed by: FAMILY MEDICINE

## 2020-12-29 PROCEDURE — XW033E5 INTRODUCTION OF REMDESIVIR ANTI-INFECTIVE INTO PERIPHERAL VEIN, PERCUTANEOUS APPROACH, NEW TECHNOLOGY GROUP 5: ICD-10-PCS | Performed by: FAMILY MEDICINE

## 2020-12-29 PROCEDURE — U0002 COVID-19 LAB TEST NON-CDC: HCPCS

## 2020-12-29 PROCEDURE — 93005 ELECTROCARDIOGRAM TRACING: CPT | Performed by: EMERGENCY MEDICINE

## 2020-12-29 PROCEDURE — 2700000000 HC OXYGEN THERAPY PER DAY

## 2020-12-29 PROCEDURE — 6360000002 HC RX W HCPCS: Performed by: EMERGENCY MEDICINE

## 2020-12-29 PROCEDURE — 86140 C-REACTIVE PROTEIN: CPT

## 2020-12-29 PROCEDURE — G8427 DOCREV CUR MEDS BY ELIG CLIN: HCPCS | Performed by: NURSE PRACTITIONER

## 2020-12-29 PROCEDURE — 36415 COLL VENOUS BLD VENIPUNCTURE: CPT

## 2020-12-29 PROCEDURE — 4040F PNEUMOC VAC/ADMIN/RCVD: CPT | Performed by: NURSE PRACTITIONER

## 2020-12-29 PROCEDURE — 1036F TOBACCO NON-USER: CPT | Performed by: NURSE PRACTITIONER

## 2020-12-29 PROCEDURE — 94761 N-INVAS EAR/PLS OXIMETRY MLT: CPT

## 2020-12-29 PROCEDURE — 2580000003 HC RX 258: Performed by: INTERNAL MEDICINE

## 2020-12-29 PROCEDURE — 85379 FIBRIN DEGRADATION QUANT: CPT

## 2020-12-29 RX ORDER — SODIUM CHLORIDE 0.9 % (FLUSH) 0.9 %
10 SYRINGE (ML) INJECTION EVERY 12 HOURS SCHEDULED
Status: DISCONTINUED | OUTPATIENT
Start: 2020-12-29 | End: 2021-01-12 | Stop reason: HOSPADM

## 2020-12-29 RX ORDER — SODIUM CHLORIDE 0.9 % (FLUSH) 0.9 %
10 SYRINGE (ML) INJECTION PRN
Status: DISCONTINUED | OUTPATIENT
Start: 2020-12-29 | End: 2021-01-12 | Stop reason: HOSPADM

## 2020-12-29 RX ORDER — DEXAMETHASONE 4 MG/1
6 TABLET ORAL ONCE
Status: COMPLETED | OUTPATIENT
Start: 2020-12-29 | End: 2020-12-29

## 2020-12-29 RX ORDER — DEXAMETHASONE 2 MG/1
6 TABLET ORAL DAILY
Status: COMPLETED | OUTPATIENT
Start: 2020-12-29 | End: 2021-01-07

## 2020-12-29 RX ORDER — 0.9 % SODIUM CHLORIDE 0.9 %
30 INTRAVENOUS SOLUTION INTRAVENOUS PRN
Status: DISCONTINUED | OUTPATIENT
Start: 2020-12-29 | End: 2021-01-12 | Stop reason: HOSPADM

## 2020-12-29 RX ORDER — ACETAMINOPHEN 325 MG/1
650 TABLET ORAL EVERY 4 HOURS PRN
Status: DISCONTINUED | OUTPATIENT
Start: 2020-12-29 | End: 2021-01-12 | Stop reason: HOSPADM

## 2020-12-29 RX ADMIN — ENOXAPARIN SODIUM 30 MG: 30 INJECTION SUBCUTANEOUS at 20:37

## 2020-12-29 RX ADMIN — Medication 10 ML: at 20:37

## 2020-12-29 RX ADMIN — DEXAMETHASONE 6 MG: 4 TABLET ORAL at 16:51

## 2020-12-29 RX ADMIN — REMDESIVIR 200 MG: 100 INJECTION, POWDER, LYOPHILIZED, FOR SOLUTION INTRAVENOUS at 22:40

## 2020-12-29 RX ADMIN — DEXAMETHASONE 6 MG: 2 TABLET ORAL at 20:38

## 2020-12-29 ASSESSMENT — ENCOUNTER SYMPTOMS
DIARRHEA: 1
COUGH: 1
COUGH: 1
SHORTNESS OF BREATH: 1
SHORTNESS OF BREATH: 1

## 2020-12-29 NOTE — TELEPHONE ENCOUNTER
C/o cough. Taking mucinex without relief. Reason for Disposition   Continuous (nonstop) coughing interferes with work or school and no improvement using cough treatment per Care Advice    Answer Assessment - Initial Assessment Questions  1. ONSET: \"When did the cough begin? \"       1 week ago    2. SEVERITY: \"How bad is the cough today? \"       Getting worse    3. RESPIRATORY DISTRESS: \"Describe your breathing. \"       Shortness of breath    4. FEVER: \"Do you have a fever? \" If so, ask: \"What is your temperature, how was it measured, and when did it start? \"      Denies    5. HEMOPTYSIS: \"Are you coughing up any blood? \" If so ask: \"How much? \" (flecks, streaks, tablespoons, etc.)      Denies    6. TREATMENT: \"What have you done so far to treat the cough? \" (e.g., meds, fluids, humidifier)      mucinex    7. CARDIAC HISTORY: \"Do you have any history of heart disease? \" (e.g., heart attack, congestive heart failure)       Denies    8. LUNG HISTORY: \"Do you have any history of lung disease? \"  (e.g., pulmonary embolus, asthma, emphysema)      Denies    9. PE RISK FACTORS: \"Do you have a history of blood clots? \" (or: recent major surgery, recent prolonged travel, bedridden)      Denies    10. OTHER SYMPTOMS: \"Do you have any other symptoms? (e.g., runny nose, wheezing, chest pain)        See above    11. PREGNANCY: \"Is there any chance you are pregnant? \" \"When was your last menstrual period? \"        Na    12. TRAVEL: \"Have you traveled out of the country in the last month? \" (e.g., travel history, exposures)        Denies    Protocols used: COUGH-ADULT-[place]    Patient called pre-service center [facility]) to schedule appointment, with red flag complaint, transferred to RN access for triage. See above questions and answers. Caller talking full sentences without any distress on phone. Discussed disposition and patient agreeable. Discussed potential consequences for not following disposition recommendation.   Aware to call back

## 2020-12-29 NOTE — ED PROVIDER NOTES
EMERGENCY DEPARTMENT ENCOUNTER    Pt Name: Lorna Wilson  MRN: [id number]  [place] 1940  Date of evaluation: 12/29/20  CHIEF COMPLAINT       Chief Complaint   Patient presents with    Shortness of Breath    Cough    Diarrhea     HISTORY OF PRESENT ILLNESS     Shortness of Breath  Severity:  Severe  Onset quality:  Gradual  Duration:  2 weeks  Timing:  Constant  Progression:  Worsening  Chronicity:  New  Context: URI and weather changes    Relieved by:  Nothing  Worsened by:  Nothing  Ineffective treatments:  None tried  Associated symptoms: cough      Wife is sick also  Loss of smell and taste  Had kids over for holidays      REVIEW OF SYSTEMS     Review of Systems   Constitutional: Positive for fatigue. Respiratory: Positive for cough and shortness of breath. All other systems reviewed and are negative.     PASTMEDICAL HISTORY     Past Medical History:   Diagnosis Date    Diabetes ([facility] [address]     Diverticulitis     History of allergy     Hyperlipidemia     Hypertension     Osteoarthritis      Past Problem List  Patient Active Problem List   Diagnosis Code    Acquired deviated nasal septum J34.2    Actinic keratosis L57.0    Arthritis M19.90    Benign prostatic hyperplasia N40.0    Bloating R14.0    Chronic serous otitis media H65.20    Claustrophobia F40.240    Coronary artery disease I25.10    Esophageal reflux K21.9    Flatus R14.3    Hearing loss H91.90    Hemorrhoids K64.9    History of allergy Z88.9    Hyperlipidemia E78.5    Essential hypertension I10    Leg swelling M79.89    Lumbar radiculopathy M54.16    Lumbar stenosis M48.061    Skin neoplasm D49.2    Tinea corporis B35.4    Ventral hernia K43.9    Weight gain R63.5    Type 2 diabetes mellitus with complication (HCC) T31.6    Obesity E66.9    Other osteoporosis without current pathological fracture M81.8    Presence of coronary angioplasty implant and graft Z95.5  Morbid obesity with BMI of 40.0-44.9, adult ([facility] [address] E66.01, Z68.41    COVID-19 U07.1     SURGICAL HISTORY       Past Surgical History:   Procedure Laterality Date    BACK SURGERY  09/03/2017    Sunny Mitchell, lumbar fusion   [place]  2015    partial colectomy due to diveritc    COLONOSCOPY      TOTAL KNEE ARTHROPLASTY Right      CURRENT MEDICATIONS       Previous Medications    ASPIRIN EC 81 MG EC TABLET    Take 81 mg by mouth daily    ATORVASTATIN (LIPITOR) 20 MG TABLET    Take 1 tablet by mouth nightly    BETAMETHASONE DIPROPIONATE (DIPROLENE) 0.05 % CREAM    APPLY TO AFFECTED AREA 1 TO 2 TIMES DAILY UNTIL IMPROVED THEN DISCONTINUE    BLOOD GLUCOSE TEST STRIPS (ASCENSIA AUTODISC VI;ONE TOUCH ULTRA TEST VI) STRIP    Use with associated glucose meter. Use daily. Type 2 DM not on insulin    CLOBETASOL (TEMOVATE) 0.05 % CREAM    Apply topically 2 times daily. CLOTRIMAZOLE (LOTRIMIN) 1 % CREAM    APPLY TO clean, dry skin, 1 TO 2 TIMES DAILY UNTIL IMPROVED THEN DISCONTINUE    DULAGLUTIDE (TRULICITY) 2.84 RH/1.9RI SOPN    INJECT 0.5ML (=0.75 MG)    SUBCUTANEOUSLY ONCE WEEKLY    GABAPENTIN (NEURONTIN) 300 MG CAPSULE    Take 300 mg by mouth daily. HYDROCHLOROTHIAZIDE (HYDRODIURIL) 25 MG TABLET    TAKE 1 TABLET DAILY    LISINOPRIL (PRINIVIL;ZESTRIL) 2.5 MG TABLET    TAKE 1 TABLET DAILY    MELOXICAM (MOBIC) 15 MG TABLET        METFORMIN (GLUCOPHAGE) 500 MG TABLET    Take 1 tablet by mouth 2 times daily (with meals)    METOPROLOL SUCCINATE (TOPROL XL) 25 MG EXTENDED RELEASE TABLET    Take 0.5 tablets by mouth daily    MULTIPLE VITAMIN (MULTI-VITAMIN DAILY) TABS    Take 1 tablet by mouth daily    NYSTATIN (MYCOSTATIN) 182699 UNIT/GM CREAM    Apply topically 2 times daily. NYSTATIN (MYCOSTATIN) 893588 UNIT/GM POWDER    Apply 3 times daily.     OMEGA-3 FATTY ACIDS (FISH OIL PO)    Take 1 g by mouth Palpations: Abdomen is soft. Tenderness: There is no abdominal tenderness. There is no guarding or rebound. Musculoskeletal: Normal range of motion. General: No tenderness or deformity. Skin:     General: Skin is warm and dry. Capillary Refill: Capillary refill takes less than 2 seconds. Findings: No erythema or rash. Neurological:      General: No focal deficit present. Mental Status: He is alert and oriented to person, place, and time. Cranial Nerves: No cranial nerve deficit. Coordination: Coordination normal.   Psychiatric:         Mood and Affect: Mood normal.         Behavior: Behavior normal.         Thought Content: Thought content normal.         Judgment: Judgment normal.         MEDICAL DECISION MAKING:       ED Course as of Dec 29 1751   Tue Dec 29, 2020   1512 Patient not tolerating nasal canula oxygen, put on high flow    [WM]   1607 Suspect covid    [WM]      ED Course User Index  [WM] Cynthia Artis MD       4:59 PM EST  DW Dr Burton Garrison for admission to intermediate ICU   Consulting pulm and ID  Will write short orders  Wife also being admitted for covid and hypoxia today in our ED    CRITICAL CARE:   Due to the immediate potential for life-threatening deterioration due to hypoxia and respiratory failure, I spent 33 minutes providing critical care. This time is excluding time spent performing procedures. PROCEDURES:    Procedures    DIAGNOSTIC RESULTS   EKG:All EKG's are interpreted by the Emergency Department Physician who either signs or Co-signs this chart in the absence of a cardiologist.  Isabel Webber with first degree av block, some artifact, no acute ischemic changes, rate 84 bpm      RADIOLOGY:All plain film, CT, MRI, and formal ultrasound images (except ED bedside ultrasound) are read by the radiologist, see reports below, unless otherwisenoted in MDM or here.   XR CHEST PORTABLE   Final Result New Prescriptions    No medications on file     Arthur Villavicencio MD  Attending Emergency Physician                   Arthur Villavicencio MD  12/29/20 1656    DW Dr Alisson Felix agrees with plan. Do not suspect PE or bacterial infection.      Arthur Villavicencio MD  12/29/20 7627

## 2020-12-29 NOTE — ED NOTES
Report given to AARON Milner from ICU. Report method in person. The following was reviewed with receiving RN:   Current vital signs:  BP (!) 124/108   Pulse 96   Temp 99.6 °F (37.6 °C) (Oral)   Resp 28   Ht 5' 7\" (1.702 m)   Wt 235 lb (106.6 kg)   SpO2 91%   BMI 36.81 kg/m²                MEWS Score: 2     Any medication or safety alerts were reviewed. Any pending diagnostics and notifications were also reviewed, as well as any safety concerns or issues, abnormal labs, abnormal imaging, and abnormal assessment findings. Questions were answered.             Kalyn Dejesus RN  12/29/20 4945

## 2020-12-29 NOTE — ED NOTES
Admission Dx: [id number]    Pts Chief Complaints on Arrival: SOB, weakness    ADL's - Partial assistance    Pending Diagnostics:  none    Residence PTA: single story home    Special Considerations/Circumstances:  none    Vitals: Current vital signs:  BP (!) 124/108   Pulse 96   Temp 99.6 °F (37.6 °C) (Oral)   Resp 28   Ht 5' 7\" (1.702 m)   Wt 235 lb (106.6 kg)   SpO2 91%   BMI 36.81 kg/m²                MEWS Score: 2            Yan Reyes RN  12/29/20 0234

## 2020-12-29 NOTE — PROGRESS NOTES
[facility] [address] [facility] [address]  [address] [place] [address]  Dept: [phone]  Dept Fax: [phone]    Yoni Cruz a [de-identified] y.o. male who presents to the urgent care today for his medical conditions/complaintsas noted below. Damion Villagran is c/o of Concern For COVID-19      HPI:     Cc- presents to walk in after battling illness since before [place] he says  Feels worse  Has shortness of breath, cough is worse, and chest hurts with walking at times  [facility] with cane  Has had some diarrhea  Denies vomiting  Tried mucinex, not helping  Much  Feels worse  Wife drove him      Past Medical History:   Diagnosis Date    Diabetes ([address]     Diverticulitis     History of allergy     Hyperlipidemia     Hypertension     Osteoarthritis        Current Outpatient Medications   Medication Sig Dispense Refill    atorvastatin (LIPITOR) 20 MG tablet Take 1 tablet by mouth nightly 90 tablet 3    blood glucose test strips (ASCENSIA AUTODISC VI;ONE TOUCH ULTRA TEST VI) strip Use with associated glucose meter. Use daily. Type 2 DM not on insulin 100 strip 11    hydroCHLOROthiazide (HYDRODIURIL) 25 MG tablet TAKE 1 TABLET DAILY 90 tablet 1    metFORMIN (GLUCOPHAGE) 500 MG tablet Take 1 tablet by mouth 2 times daily (with meals) 180 tablet 1    omeprazole (PRILOSEC) 40 MG delayed release capsule TAKE 1 CAPSULE DAILY 90 capsule 3    Dulaglutide (TRULICITY) 0.86 VX/7.1UY SOPN INJECT 0.5ML (=0.75 MG)    SUBCUTANEOUSLY ONCE WEEKLY 12 pen 3    clobetasol (TEMOVATE) 0.05 % cream Apply topically 2 times daily. (Patient not taking: Reported on 7/13/2020) 30 g 1    nystatin (MYCOSTATIN) 923213 UNIT/GM cream Apply topically 2 times daily. (Patient not taking: Reported on 7/13/2020) 30 g 3    nystatin (MYCOSTATIN) 088278 UNIT/GM powder Apply 3 times daily. (Patient not taking: Reported on 7/13/2020) 30 g 2    gabapentin (NEURONTIN) 300 MG capsule Take 300 mg by mouth daily.  clotrimazole (LOTRIMIN) 1 % cream APPLY TO clean, dry skin, 1 TO 2 TIMES DAILY UNTIL IMPROVED THEN DISCONTINUE (Patient not taking: Reported on 7/13/2020) 45 g 1    lisinopril (PRINIVIL;ZESTRIL) 2.5 MG tablet TAKE 1 TABLET DAILY 90 tablet 3    Omega-3 Fatty Acids (FISH OIL PO) Take 1 g by mouth      betamethasone dipropionate (DIPROLENE) 0.05 % cream APPLY TO AFFECTED AREA 1 TO 2 TIMES DAILY UNTIL IMPROVED THEN DISCONTINUE  0    meloxicam (MOBIC) 15 MG tablet       metoprolol succinate (TOPROL XL) 25 MG extended release tablet Take 0.5 tablets by mouth daily 90 tablet 0    aspirin EC 81 MG EC tablet Take 81 mg by mouth daily      Multiple Vitamin (MULTI-VITAMIN DAILY) TABS Take 1 tablet by mouth daily       No current facility-administered medications for this visit. Allergies   Allergen Reactions    Feldene [Piroxicam]     Flagyl [Metronidazole]     Naprosyn [Naproxen]     Penicillins     Sulfa Antibiotics     Sulfasalazine        Subjective:     Review of Systems   Constitutional: Positive for fatigue. HENT: Positive for congestion. Respiratory: Positive for cough and shortness of breath. Cardiovascular: Positive for chest pain. Gastrointestinal: Positive for diarrhea. All other systems reviewed and are negative. Objective:      Physical Exam  Vitals signs and nursing note reviewed. Constitutional:       Appearance: Normal appearance. He is well-developed. He is ill-appearing. He is not diaphoretic. Comments: Pulse ox initial is 90%   Pulse ox after ambulation in small hallway 86% and visible shortness of breath   HENT:      Head: Normocephalic and atraumatic. Nose:      Comments: Wearing a mask during covid 19 pandemic  Eyes:      General: No scleral icterus. Right eye: No discharge. Left eye: No discharge. Neck:      Musculoskeletal: Normal range of motion and neck supple. No neck rigidity.    Cardiovascular: Rate and Rhythm: Regular rhythm. Tachycardia present. Pulmonary:      Effort: Respiratory distress (visibly short of breath after walking and has frequent dry cough) present. Musculoskeletal: Normal range of motion. General: No signs of injury. Comments: Walks with cane   Skin:     General: Skin is warm and dry. Coloration: Skin is not jaundiced or pale. Findings: No erythema or rash. Neurological:      General: No focal deficit present. Mental Status: He is alert and oriented to person, place, and time. Psychiatric:         Mood and Affect: Mood normal.         Behavior: Behavior normal.       Pulse 79   Temp 98.4 °F (36.9 °C) (Infrared)   Ht 5' 7\" (1.702 m)   Wt 234 lb (106.1 kg)   SpO2 (!) 86%   BMI 36.65 kg/m²     Assessment:          Diagnosis Orders   1. Shortness of breath     2. Hypoxia         Plan:    Recommend ER evaluation  Short of breath, cough  Pulse ox 86% with ambulation on room air  Return for recommend er evaluation. Wife driving to ER for evaluation right across the street     Patient given educational materials - see patientinstructions. Discussed use, benefit, and side effects of prescribed medications. All patient questions answered. Pt voiced understanding.     Electronically signed by LILLI Prince 12/29/2020 at 2:41 PM

## 2020-12-30 ENCOUNTER — APPOINTMENT (OUTPATIENT)
Dept: GENERAL RADIOLOGY | Age: 80
DRG: 177 | End: 2020-12-30
Payer: MEDICARE

## 2020-12-30 LAB
D-DIMER QUANTITATIVE: 0.55 MG/L FEU (ref 0–0.59)
EKG ATRIAL RATE: 84 BPM
EKG P AXIS: 36 DEGREES
EKG P-R INTERVAL: 296 MS
EKG Q-T INTERVAL: 352 MS
EKG QRS DURATION: 102 MS
EKG QTC CALCULATION (BAZETT): 415 MS
EKG R AXIS: -41 DEGREES
EKG T AXIS: 13 DEGREES
EKG VENTRICULAR RATE: 84 BPM
GLUCOSE BLD-MCNC: 192 MG/DL (ref 75–110)
GLUCOSE BLD-MCNC: 197 MG/DL (ref 75–110)
GLUCOSE BLD-MCNC: 206 MG/DL (ref 75–110)
GLUCOSE BLD-MCNC: 234 MG/DL (ref 75–110)

## 2020-12-30 PROCEDURE — 6360000002 HC RX W HCPCS: Performed by: FAMILY MEDICINE

## 2020-12-30 PROCEDURE — 2580000003 HC RX 258: Performed by: FAMILY MEDICINE

## 2020-12-30 PROCEDURE — 71045 X-RAY EXAM CHEST 1 VIEW: CPT

## 2020-12-30 PROCEDURE — 82947 ASSAY GLUCOSE BLOOD QUANT: CPT

## 2020-12-30 PROCEDURE — 93010 ELECTROCARDIOGRAM REPORT: CPT | Performed by: INTERNAL MEDICINE

## 2020-12-30 PROCEDURE — 6370000000 HC RX 637 (ALT 250 FOR IP): Performed by: INTERNAL MEDICINE

## 2020-12-30 PROCEDURE — 36415 COLL VENOUS BLD VENIPUNCTURE: CPT

## 2020-12-30 PROCEDURE — 94761 N-INVAS EAR/PLS OXIMETRY MLT: CPT

## 2020-12-30 PROCEDURE — 2580000003 HC RX 258: Performed by: INTERNAL MEDICINE

## 2020-12-30 PROCEDURE — 2500000003 HC RX 250 WO HCPCS: Performed by: INTERNAL MEDICINE

## 2020-12-30 PROCEDURE — 6370000000 HC RX 637 (ALT 250 FOR IP): Performed by: FAMILY MEDICINE

## 2020-12-30 PROCEDURE — 85379 FIBRIN DEGRADATION QUANT: CPT

## 2020-12-30 PROCEDURE — 99222 1ST HOSP IP/OBS MODERATE 55: CPT | Performed by: FAMILY MEDICINE

## 2020-12-30 PROCEDURE — 2700000000 HC OXYGEN THERAPY PER DAY

## 2020-12-30 PROCEDURE — 99222 1ST HOSP IP/OBS MODERATE 55: CPT | Performed by: INTERNAL MEDICINE

## 2020-12-30 PROCEDURE — 2060000000 HC ICU INTERMEDIATE R&B

## 2020-12-30 RX ORDER — HYDROCHLOROTHIAZIDE 25 MG/1
25 TABLET ORAL DAILY
Status: DISCONTINUED | OUTPATIENT
Start: 2020-12-30 | End: 2021-01-12 | Stop reason: HOSPADM

## 2020-12-30 RX ORDER — METOPROLOL SUCCINATE 25 MG/1
12.5 TABLET, EXTENDED RELEASE ORAL DAILY
Status: DISCONTINUED | OUTPATIENT
Start: 2020-12-30 | End: 2020-12-31

## 2020-12-30 RX ORDER — GABAPENTIN 300 MG/1
300 CAPSULE ORAL DAILY
Status: DISCONTINUED | OUTPATIENT
Start: 2020-12-30 | End: 2021-01-12 | Stop reason: HOSPADM

## 2020-12-30 RX ORDER — PANTOPRAZOLE SODIUM 40 MG/1
40 TABLET, DELAYED RELEASE ORAL
Status: DISCONTINUED | OUTPATIENT
Start: 2020-12-31 | End: 2021-01-12 | Stop reason: HOSPADM

## 2020-12-30 RX ORDER — DEXTROMETHORPHAN POLISTIREX 30 MG/5ML
30 SUSPENSION ORAL EVERY 12 HOURS SCHEDULED
Status: DISCONTINUED | OUTPATIENT
Start: 2020-12-30 | End: 2021-01-12 | Stop reason: HOSPADM

## 2020-12-30 RX ORDER — ATORVASTATIN CALCIUM 20 MG/1
20 TABLET, FILM COATED ORAL NIGHTLY
Status: DISCONTINUED | OUTPATIENT
Start: 2020-12-30 | End: 2021-01-12 | Stop reason: HOSPADM

## 2020-12-30 RX ORDER — ASPIRIN 81 MG/1
81 TABLET ORAL DAILY
Status: DISCONTINUED | OUTPATIENT
Start: 2020-12-30 | End: 2021-01-12 | Stop reason: HOSPADM

## 2020-12-30 RX ORDER — BENZONATATE 100 MG/1
100 CAPSULE ORAL 3 TIMES DAILY PRN
Status: DISCONTINUED | OUTPATIENT
Start: 2020-12-30 | End: 2021-01-12 | Stop reason: HOSPADM

## 2020-12-30 RX ADMIN — REMDESIVIR 100 MG: 100 INJECTION, POWDER, LYOPHILIZED, FOR SOLUTION INTRAVENOUS at 22:41

## 2020-12-30 RX ADMIN — HYDROCHLOROTHIAZIDE 25 MG: 25 TABLET ORAL at 10:32

## 2020-12-30 RX ADMIN — GABAPENTIN 300 MG: 300 CAPSULE ORAL at 10:32

## 2020-12-30 RX ADMIN — ENOXAPARIN SODIUM 30 MG: 30 INJECTION SUBCUTANEOUS at 08:26

## 2020-12-30 RX ADMIN — ASPIRIN 81 MG: 81 TABLET, COATED ORAL at 10:32

## 2020-12-30 RX ADMIN — INSULIN LISPRO 2 UNITS: 100 INJECTION, SOLUTION INTRAVENOUS; SUBCUTANEOUS at 17:05

## 2020-12-30 RX ADMIN — Medication 10 ML: at 08:27

## 2020-12-30 RX ADMIN — INSULIN LISPRO 1 UNITS: 100 INJECTION, SOLUTION INTRAVENOUS; SUBCUTANEOUS at 20:19

## 2020-12-30 RX ADMIN — BENZONATATE 100 MG: 100 CAPSULE ORAL at 20:14

## 2020-12-30 RX ADMIN — DEXAMETHASONE 6 MG: 2 TABLET ORAL at 08:26

## 2020-12-30 RX ADMIN — BENZONATATE 100 MG: 100 CAPSULE ORAL at 10:33

## 2020-12-30 RX ADMIN — INSULIN LISPRO 1 UNITS: 100 INJECTION, SOLUTION INTRAVENOUS; SUBCUTANEOUS at 12:10

## 2020-12-30 RX ADMIN — Medication 30 MG: at 20:11

## 2020-12-30 RX ADMIN — Medication 10 ML: at 20:18

## 2020-12-30 RX ADMIN — ENOXAPARIN SODIUM 30 MG: 30 INJECTION SUBCUTANEOUS at 20:12

## 2020-12-30 RX ADMIN — METOPROLOL SUCCINATE 12.5 MG: 25 TABLET, EXTENDED RELEASE ORAL at 10:32

## 2020-12-30 RX ADMIN — ATORVASTATIN CALCIUM 20 MG: 20 TABLET, FILM COATED ORAL at 20:14

## 2020-12-30 NOTE — PLAN OF CARE
Problem: Airway Clearance - Ineffective  Goal: Achieve or maintain patent airway  12/30/2020 1547 by Suha Abarca RN  Outcome: Ongoing  Note: Patient remained above 88% on high flow nasal cannula. Problem: Body Temperature -  Risk of, Imbalanced  Goal: Ability to maintain a body temperature within defined limits  12/30/2020 1547 by Suha Abarca RN  Outcome: Ongoing  Note: Patient's temperature were within normal limits throughout this shift. Problem: Risk for Fluid Volume Deficit  Goal: Maintain normal heart rhythm  12/30/2020 1547 by Suha Abarca RN  Outcome: Ongoing  Note: Patient's HR remained within normal limits throughout this shift. Problem: Falls - Risk of:  Goal: Will remain free from falls  Description: Will remain free from falls  12/30/2020 1547 by Suha Abarca RN  Outcome: Ongoing  Note: Patient remained free from falls. Call light within reach.

## 2020-12-30 NOTE — PROGRESS NOTES
[facility]ULMON[facility] CRIT[facility] CARE SPECIALISTS   CONSULT NOTE:      DATE OF CONSULT 12/30/2020    REASON FOR CONSULTATION:  Acute restaurant failure with hypoxemia      PCP Maribel Mclean MD     CHIEF COMPLAINT: ' I have been short of breath for a few days'    HISTORY OF PRESENT ILLNESS:     Patient is a very pleasant 75-year-old male. Former smoker quitting in [address] [facility]. He has no history of asthma or COPD. He is never been diagnosed with sleep apnea. He presented because of a 2-week history of cough, diarrhea, headache. He did have problems with progressive worsening shortness throat for the couple days. He underwent chest x-ray which revealed low lung volumes in the left side infiltrate. The patient is on a considerable amount of oxygen. He was seen with an FiO2 of 70% 40 L/min high flow with O2 saturations 90 to 93%. Today's chest x-ray reveals persistent low lung volumes as well. Labs included a CRP elevated at 56, elevated transaminases, ferritin elevated 909  D-dimer elevated 0.84. Repeat D-dimer today 0.55. Currently patient is on Decadron, Lovenox for DVT prophylaxis, remdesivir which was started yesterday. He claims he is feeling okay all things considered and a little better from yesterday. ALLERGIES:  Allergies   Allergen Reactions    Feldene [Piroxicam]     Flagyl [Metronidazole]     Naprosyn [Naproxen]     Penicillins     Sulfa Antibiotics     Sulfasalazine        HOME MEDICATIONS:  Medications Prior to Admission: atorvastatin (LIPITOR) 20 MG tablet, Take 1 tablet by mouth nightly  blood glucose test strips (ASCENSIA AUTODISC VI;ONE TOUCH ULTRA TEST VI) strip, Use with associated glucose meter. Use daily.  Type 2 DM not on insulin  hydroCHLOROthiazide (HYDRODIURIL) 25 MG tablet, TAKE 1 TABLET DAILY  metFORMIN (GLUCOPHAGE) 500 MG tablet, Take 1 tablet by mouth 2 times daily (with meals)  omeprazole (PRILOSEC) 40 MG delayed release capsule, TAKE 1 CAPSULE DAILY Dulaglutide (TRULICITY) 4.31 YL/8.9QY SOPN, INJECT 0.5ML (=0.75 MG)    SUBCUTANEOUSLY ONCE WEEKLY  [DISCONTINUED] clobetasol (TEMOVATE) 0.05 % cream, Apply topically 2 times daily. (Patient not taking: Reported on 7/13/2020)  nystatin (MYCOSTATIN) 349905 UNIT/GM cream, Apply topically 2 times daily. (Patient not taking: Reported on 7/13/2020)  nystatin (MYCOSTATIN) 762984 UNIT/GM powder, Apply 3 times daily. (Patient not taking: Reported on 7/13/2020)  gabapentin (NEURONTIN) 300 MG capsule, Take 300 mg by mouth daily.    [DISCONTINUED] clotrimazole (LOTRIMIN) 1 % cream, APPLY TO clean, dry skin, 1 TO 2 TIMES DAILY UNTIL IMPROVED THEN DISCONTINUE (Patient not taking: Reported on 7/13/2020)  lisinopril (PRINIVIL;ZESTRIL) 2.5 MG tablet, TAKE 1 TABLET DAILY  Omega-3 Fatty Acids (FISH OIL PO), Take 1 g by mouth  betamethasone dipropionate (DIPROLENE) 0.05 % cream, APPLY TO AFFECTED AREA 1 TO 2 TIMES DAILY UNTIL IMPROVED THEN DISCONTINUE  meloxicam (MOBIC) 15 MG tablet,   metoprolol succinate (TOPROL XL) 25 MG extended release tablet, Take 0.5 tablets by mouth daily  aspirin EC 81 MG EC tablet, Take 81 mg by mouth daily  Multiple Vitamin (MULTI-VITAMIN DAILY) TABS, Take 1 tablet by mouth daily      PAST MEDICAL HISTORY:  Past Medical History:   Diagnosis Date    Diabetes ([address]     Diverticulitis     History of allergy     Hyperlipidemia     Hypertension     Osteoarthritis        PAST SURGICAL HISTORY:  Past Surgical History:   Procedure Laterality Date    BACK SURGERY  09/03/2017    Danielito Herron, lumbar fusion   [facility]  2015    partial colectomy due to diveritc    COLONOSCOPY      TOTAL KNEE ARTHROPLASTY Right           SOCIAL HISTORY:  Social History     Socioeconomic History    Marital status:      Spouse name: Not on file    Number of children: Not on file    Years of education: Not on file    Highest education level: Not on file   Occupational History  Not on file   Social Needs    Financial resource strain: Not on file    Food insecurity     Worry: Not on file     Inability: Not on file    Transportation needs     Medical: Not on file     Non-medical: Not on file   Tobacco Use    Smoking status: Former Smoker     Packs/day: 1.50     Years: 40.00     Pack years: 60.00     Quit date: 1991     Years since quittin.0    Smokeless tobacco: Never Used   Substance and Sexual Activity    Alcohol use: Yes     Alcohol/week: 1.0 standard drinks     Types: 1 Standard drinks or equivalent per week     Comment: social    Drug use: No    Sexual activity: Not on file   Lifestyle    Physical activity     Days per week: Not on file     Minutes per session: Not on file    Stress: Not on file   Relationships    Social connections     Talks on phone: Not on file     Gets together: Not on file     Attends Jain service: Not on file     Active member of club or organization: Not on file     Attends meetings of clubs or organizations: Not on file     Relationship status: Not on file    Intimate partner violence     Fear of current or ex partner: Not on file     Emotionally abused: Not on file     Physically abused: Not on file     Forced sexual activity: Not on file   Other Topics Concern    Not on file   Social History Narrative    Not on file       FAMILY HISTORY:  History reviewed. No pertinent family history. REVIEW OF SYSTEMS:  All other systems reviewed and are negative. PHYSICAL EXAM:  Vital Signs Blood pressure (!) 158/78, pulse 62, temperature 98.1 °F (36.7 °C), temperature source Oral, resp. rate 26, height 5' 7\" (1.702 m), weight 238 lb 12.1 oz (108.3 kg), SpO2 93 %. Oxygen Amount and Delivery: O2 Flow Rate (L/min): 40 L/min    Admission Weight Weight: 235 lb (106.6 kg)    General Appearance     Head  Normocephalic, without obvious abnormality, atraumatic    Eyes  conjunctivae clear. PERRL, EOM's intact. Fundi benign. Neck  no adenopathy,   Lungs coarse breath sounds posteriorly but no crackles rales or wheezes  Heart: regular rate and rhythm, S1, S2 normal, no murmur, click, rub or gallop  Abdomen  soft, non-tender; bowel sounds normal obese  Extremities no gross pitting edema    Skin  Skin color, texture, turgor normal. No rashes or lesions  Neurologic: Alert and oriented X 3,           Lab Review  CBC     Lab Results   Component Value Date    WBC 3.8 12/29/2020    RBC 4.68 12/29/2020    HGB 14.3 12/29/2020    HCT 42.9 12/29/2020     12/29/2020    MCV 91.6 12/29/2020    MCH 30.6 12/29/2020    MCHC 33.4 12/29/2020    RDW 14.5 12/29/2020    LYMPHOPCT 15 12/29/2020    MONOPCT 10 12/29/2020    BASOPCT 0 12/29/2020    MONOSABS 0.40 12/29/2020    LYMPHSABS 0.60 12/29/2020    EOSABS 0.00 12/29/2020    BASOSABS 0.00 12/29/2020    DIFFTYPE NOT REPORTED 12/29/2020       BMP   Lab Results   Component Value Date     12/29/2020    K 4.7 12/29/2020     12/29/2020    CO2 24 12/29/2020    BUN 14 12/29/2020    CREATININE 0.97 12/29/2020    GLUCOSE 118 12/29/2020    CALCIUM 9.2 12/29/2020       LFTS  Lab Results   Component Value Date    ALKPHOS 59 12/29/2020    ALT 55 12/29/2020    AST 58 12/29/2020    PROT 7.0 12/29/2020    BILITOT 0.95 12/29/2020    LABALBU 3.9 12/29/2020       INR  Recent Labs     12/29/20  1543   PROTIME 13.3   INR 1.0       APTT  No results for input(s): APTT in the last 72 hours. Lactic Acid  No results found for: LACTA     PRO-BNP   No results for input(s): PROBNP in the last 72 hours. ABGs: No results found for: PHART, PO2ART, UMH1HBN    No results found for: IFIO2, MODE, SETTIDVOL, SETPEEP      Impression  #1 acute respiratory failure with hypoxemia --- severe  #2. Viral pneumonia from COVID-19  #3. Elevated inflammatory markers    Continue with Decadron and remdesivir  O2 therapy. Encouraging prone or laying on his side. Patient is declining.   Feels like it is not comfortable Repeat chest x-ray in a.m. Repeat D-dimer in a.m.     We will have to watch closely for signs of worsening respiratory decline    Thank you for the consult    Syed Davis MD

## 2020-12-30 NOTE — PROGRESS NOTES
Progress Note  12/30/2020 9:13 AM  Subjective:   Admit Date: 12/29/2020  PCP: Joes Harvey MD  CC: covid pneumonia  Interval History: pt started with symptoms he states about 1-2 weeks ago. Got worse over last few days. Having diarrhea, cough, SOB. On high flow oxygen right now. Diet: DIET CARDIAC;  Medications:   Scheduled Meds:   aspirin EC  81 mg Oral Daily    atorvastatin  20 mg Oral Nightly    gabapentin  300 mg Oral Daily    hydroCHLOROthiazide  25 mg Oral Daily    metoprolol succinate  12.5 mg Oral Daily    [START ON 12/31/2020] pantoprazole  40 mg Oral QAM AC    sodium chloride flush  10 mL Intravenous 2 times per day    enoxaparin  30 mg Subcutaneous BID    dexamethasone  6 mg Oral Daily    remdesivir IVPB  100 mg Intravenous Q24H     Continuous Infusions:  CBC:   Recent Labs     12/29/20  1543   WBC 3.8   HGB 14.3   *     BMP:    Recent Labs     12/29/20  1543      K 4.7      CO2 24   BUN 14   CREATININE 0.97   GLUCOSE 118*     Hepatic:   Recent Labs     12/29/20  1543   AST 58*   ALT 55*   BILITOT 0.95   ALKPHOS 59     Troponin: No results for input(s): TROPONINI in the last 72 hours. BNP: No results for input(s): BNP in the last 72 hours. Lipids: No results for input(s): CHOL, HDL in the last 72 hours.     Invalid input(s): LDLCALCU  INR:   Recent Labs     12/29/20  1543   INR 1.0       Objective:   Vitals: BP (!) 156/69   Pulse 76   Temp 98.2 °F (36.8 °C) (Oral)   Resp 19   Ht 5' 7\" (1.702 m)   Wt 238 lb 12.1 oz (108.3 kg)   SpO2 (!) 89%   BMI 37.39 kg/m²   General appearance: alert and cooperative with exam  Neck: supple, symmetrical, trachea midline  Lungs: clear to auscultation bilaterally  Heart: regular rate and rhythm, S1, S2 normal, no murmur, click, rub or gallop  Abdomen: soft, non-tender; bowel sounds normal; no masses,  no organomegaly  Extremities: extremities normal, atraumatic, no cyanosis or edema Neurologic: Mental status: Alert, oriented, thought content appropriate    Assessment and Plan:   1. covid pneumonia- continue remdesivir and decadron and oxygen. 2. DM  3. HTN  4. Some home meds ordered, but will wait and see how he is eating and how his BP is before restarting them all.      Patient Active Problem List:     Acquired deviated nasal septum     Actinic keratosis     Arthritis     Benign prostatic hyperplasia     Bloating     Chronic serous otitis media     Claustrophobia     Coronary artery disease     Esophageal reflux     Flatus     Hearing loss     Hemorrhoids     History of allergy     Hyperlipidemia     Essential hypertension     Leg swelling     Lumbar radiculopathy     Lumbar stenosis     Skin neoplasm     Tinea corporis     Ventral hernia     Weight gain     Type 2 diabetes mellitus with complication (HCC)     Obesity     Other osteoporosis without current pathological fracture     Presence of coronary angioplasty implant and graft     Morbid obesity with BMI of 40.0-44.9, adult ([facility] [address]     COVID-19      Electronically signed by Jacquenette Osler, MD on 12/30/2020 at 9:13 AM

## 2020-12-30 NOTE — FLOWSHEET NOTE
12/30/20 1125   Encounter Summary   Services provided to: Patient not available  (Bad Phone Connection )

## 2020-12-30 NOTE — PLAN OF CARE
Problem: Airway Clearance - Ineffective  Goal: Achieve or maintain patent airway  Outcome: Ongoing   Patent airway maintained this shift    Problem: Breathing Pattern - Ineffective  Goal: Ability to achieve and maintain a regular respiratory rate  Outcome: Ongoing   No s/s respiratory distress noted this shift    Problem: Nutrition Deficits  Goal: Optimize nutrtional status  Outcome: Ongoing     Problem: Fatigue  Goal: Verbalize increase energy and improved vitality  Outcome: Ongoing     Problem: Falls - Risk of:  Goal: Will remain free from falls  Description: Will remain free from falls  Outcome: Ongoing   Pt assessed as a fall risk this shift. Remains free from falls and accidental injury at this time. Fall precautions in place, including falling star sign and fall risk band on pt. Floor free from obstacles, and bed is locked and in lowest position. Adequate lighting provided. Pt encouraged to call before getting OOB for any need. Bed alarm activated. Will continue to monitor needs during hourly rounding, and reinforce education on use of call light.

## 2020-12-30 NOTE — ACP (ADVANCE CARE PLANNING)
Advance Care Planning     Advance Care Planning Activator (Inpatient)  Conversation Note      Date of ACP Conversation: 12/29/2020    Conversation Conducted with: Patient with Decision Making Capacity    ACP Activator: Mirella Pearl makes decisions on behalf of the incapacitated patient: Decision Maker is asked to consider and make decisions based on patient values, known preferences, or best interests. Health Care Decision Maker:     Current Designated Health Care Decision Maker:   (If there is a valid [place] named in the [address]\" box in the ACP activity, but it is not visible above, be sure to open that field and then select the health care decision maker relationship (ie \"primary\") in the blank space to the right of the name.) Validate  this information as still accurate & up-to-date; edit [place] field as needed.)    Note: Assess and validate information in current ACP documents, as indicated. If no Decision Maker listed above or available through scanned documents, then:    If no Authorized Decision Maker has previously been identified, then patient chooses [place]:  \"Who would you like to name as your primary health care decision-maker? \"               Name: Juan Antonio Funk       Relationship: Spouse          Phone number: [phone]  [place] this person be reached easily? \" Yes  \"Who would you like to name as your back-up decision maker? \"   Name: Oscar Galvez        Relationship: Daughter          Phone number: [phone]  [place] this person be reached easily? \" Yes    Note: If the relationship of these Decision-Makers to the patient does NOT follow your state's Next of Kin hierarchy, recommend that patient complete ACP document that meets state-specific requirements to allow them to act on the patient's behalf when appropriate.     Care Preferences    Ventilation: \"If you were in your present state of health and suddenly became very ill and were unable to breathe on your own, what would your preference be about the use of a ventilator (breathing machine) if it were available to you? \"      Would the patient desire the use of ventilator (breathing machine)?: yes    \"If your health worsens and it becomes clear that your chance of recovery is unlikely, what would your preference be about the use of a ventilator (breathing machine) if it were available to you? \"     Would the patient desire the use of ventilator (breathing machine)?: Yes      Resuscitation  \"CPR works best to restart the heart when there is a sudden event, like a heart attack, in someone who is otherwise healthy. Unfortunately, CPR does not typically restart the heart for people who have serious health conditions or who are very sick. \"    \"In the event your heart stopped as a result of an underlying serious health condition, would you want attempts to be made to restart your heart (answer \"yes\" for attempt to resuscitate) or would you prefer a natural death (answer \"no\" for do not attempt to resuscitate)? \" yes      NOTE: If the patient has a valid advance directive AND now provides care preference(s) that are inconsistent with that prior directive, advise the patient to consider either: creating a new advance directive that complies with state-specific requirements; or, if that is not possible, orally revoking that prior directive in accordance with state-specific requirements, which must be documented in the EHR. [x] Yes   [] No   Educated Patient / Monico Avila regarding differences between Advance Directives and portable DNR orders.     Length of ACP Conversation in minutes:      Conversation Outcomes:  [x] ACP discussion completed  [] Existing advance directive reviewed with patient; no changes to patient's previously recorded wishes  [] New Advance Directive completed [] Portable Do Not Rescitate prepared for Provider review and signature  [] POLST/POST/MOLST/MOST prepared for Provider review and signature      Follow-up plan:    [] Schedule follow-up conversation to continue planning  [] Referred individual to Provider for additional questions/concerns   [] Advised patient/agent/surrogate to review completed ACP document and update if needed with changes in condition, patient preferences or care setting    [x] This note routed to one or more involved healthcare providers

## 2020-12-30 NOTE — H&P
History and Physical    Patient:  Nayla Garzon  MRN: [id number]    CHIEF COMPLAINT:  SOB    History Obtained From:  patient, spouse  PCP: Yao Feliz MD    HISTORY OF PRESENT ILLNESS:   The patient is a [de-identified] y.o. male who presents with SOB with diarrhea and cough for the last 1-2 weeks. Got worse with SOB and came in. Found to have covid pneumonia and hypoxia. On high flow oxygen and remdesivir and decadron now. Past Medical History:        Diagnosis Date    Diabetes ([address]     Diverticulitis     History of allergy     Hyperlipidemia     Hypertension     Osteoarthritis        Past Surgical History:        Procedure Laterality Date    BACK SURGERY  09/03/2017    Katy Rangel, lumbar fusion   [place]  2015    partial colectomy due to diveritc    COLONOSCOPY      TOTAL KNEE ARTHROPLASTY Right        Medications Prior to Admission:    Prior to Admission medications    Medication Sig Start Date End Date Taking? Authorizing Provider   atorvastatin (LIPITOR) 20 MG tablet Take 1 tablet by mouth nightly 10/8/20   Yao Feliz MD   blood glucose test strips (ASCENSIA AUTODISC VI;ONE TOUCH ULTRA TEST VI) strip Use with associated glucose meter. Use daily. Type 2 DM not on insulin 9/3/20   Yao Feliz MD   hydroCHLOROthiazide (HYDRODIURIL) 25 MG tablet TAKE 1 TABLET DAILY 8/26/20   Yao Feliz MD   metFORMIN (GLUCOPHAGE) 500 MG tablet Take 1 tablet by mouth 2 times daily (with meals) 7/13/20   Yao Feliz MD   omeprazole (PRILOSEC) 40 MG delayed release capsule TAKE 1 CAPSULE DAILY 5/4/20   Yao Feliz MD   Dulaglutide (TRULICITY) 8.18 SA/7.3OT SOPN INJECT 0.5ML (=0.75 MG)    SUBCUTANEOUSLY ONCE WEEKLY 3/12/20   Yao Feliz MD   nystatin (MYCOSTATIN) 709287 UNIT/GM cream Apply topically 2 times daily.   Patient not taking: Reported on 7/13/2020 2/13/20   Yao Feliz MD nystatin (MYCOSTATIN) 971944 UNIT/GM powder Apply 3 times daily. Patient not taking: Reported on 7/13/2020 2/13/20   Mary Farah MD   gabapentin (NEURONTIN) 300 MG capsule Take 300 mg by mouth daily. 11/4/19   Historical Provider, MD   lisinopril (PRINIVIL;ZESTRIL) 2.5 MG tablet TAKE 1 TABLET DAILY 7/22/19   Mary Farah MD   Omega-3 Fatty Acids (FISH OIL PO) Take 1 g by mouth    Historical Provider, MD   betamethasone dipropionate (DIPROLENE) 0.05 % cream APPLY TO AFFECTED AREA 1 TO 2 TIMES DAILY UNTIL IMPROVED THEN DISCONTINUE 8/28/18   Historical Provider, MD   meloxicam (MOBIC) 15 MG tablet  5/14/18   Historical Provider, MD   metoprolol succinate (TOPROL XL) 25 MG extended release tablet Take 0.5 tablets by mouth daily 6/12/18   Mary Farah MD   aspirin EC 81 MG EC tablet Take 81 mg by mouth daily    Mary Farah MD   Multiple Vitamin (MULTI-VITAMIN DAILY) TABS Take 1 tablet by mouth daily    Mary Farah MD       Allergies:  Feldene [piroxicam], Flagyl [metronidazole], Naprosyn [naproxen], Penicillins, Sulfa antibiotics, and Sulfasalazine    Social History:   TOBACCO:   reports that he quit smoking about 30 years ago. He has a 60.00 pack-year smoking history. He has never used smokeless tobacco.  ETOH:   reports current alcohol use of about 1.0 standard drinks of alcohol per week. OCCUPATION:      Family History:   History reviewed. No pertinent family history. REVIEW OF SYSTEMS:  Negative except for fever, chills, body aches, nausea, diarrhea, SOB and bad cough. Physical Exam:    Vitals: BP (!) 156/69   Pulse 76   Temp 98.2 °F (36.8 °C) (Oral)   Resp 19   Ht 5' 7\" (1.702 m)   Wt 238 lb 12.1 oz (108.3 kg)   SpO2 (!) 89%   BMI 37.39 kg/m²   General appearance: alert, appears stated age and cooperative  Skin: Skin color, texture, turgor normal. No rashes or lesions  HEENT: Head: Normocephalic, no lesions, without obvious abnormality. Neck: supple, symmetrical, trachea midline  Lungs: clear to auscultation bilaterally  Heart: regular rate and rhythm, S1, S2 normal, no murmur, click, rub or gallop  Abdomen: soft, non-tender; bowel sounds normal; no masses,  no organomegaly  Extremities: extremities normal, atraumatic, no cyanosis or edema  Neurologic: Mental status: Alert, oriented, thought content appropriate    CBC:   Recent Labs     12/29/20  1543   WBC 3.8   HGB 14.3   *     BMP:    Recent Labs     12/29/20  1543      K 4.7      CO2 24   BUN 14   CREATININE 0.97   GLUCOSE 118*     Hepatic:   Recent Labs     12/29/20  1543   AST 58*   ALT 55*   BILITOT 0.95   ALKPHOS 59     Troponin: No results for input(s): TROPONINI in the last 72 hours. BNP: No results for input(s): BNP in the last 72 hours. Lipids: No results for input(s): CHOL, HDL in the last 72 hours. Invalid input(s): LDLCALCU  ABGs: No results found for: PHART, PO2ART, [id number]  INR:   Recent Labs     12/29/20  1543   INR 1.0     -----------------------------------------------------------------  PA/lat CXR: see results  EKG: -    Assessment and Plan   1. covid pneumonia  2. HTN  3. DM    Continue some home meds, but will wait and see how BP and HR and BS are doing before restarting all.       Patient Active Problem List   Diagnosis Code    Acquired deviated nasal septum J34.2    Actinic keratosis L57.0    Arthritis M19.90    Benign prostatic hyperplasia N40.0    Bloating R14.0    Chronic serous otitis media H65.20    Claustrophobia F40.240    Coronary artery disease I25.10    Esophageal reflux K21.9    Flatus R14.3    Hearing loss H91.90    Hemorrhoids K64.9    History of allergy Z88.9    Hyperlipidemia E78.5    Essential hypertension I10    Leg swelling M79.89    Lumbar radiculopathy M54.16    Lumbar stenosis M48.061    Skin neoplasm D49.2    Tinea corporis B35.4    Ventral hernia K43.9    Weight gain R63.5  Type 2 diabetes mellitus with complication (HCC) P03.8    Obesity E66.9    Other osteoporosis without current pathological fracture M81.8    Presence of coronary angioplasty implant and graft Z95.5    Morbid obesity with BMI of 40.0-44.9, adult [facility] E66.01, Z68.41    COVID-19 U07.1       Electronically signed by Pilo Manuel MD on 12/30/2020 at 9:15 AM

## 2020-12-30 NOTE — CARE COORDINATION
CASE MANAGEMENT NOTE:    Admission Date:  12/29/2020 Don Mcnally is a [de-identified] y.o.  male    Admitted for : COVID-19 [U07.1]    Met with:  Per telephone conversation with spouse Pranay Walker    PCP:  Dr Cassidy Hall:  Medicare       Current Residence/ Living Arrangements:  independently at home             Current Services PTA:  No    Is patient agreeable to VNS: No    Freedom of choice provided:  NA    List of [address] provided: NA    VNS chosen:  No    DME:  tiffany Lopez, Built in [facility]: No    Nebulizer: No    CPAP/BIPAP: No    Supplier: N/A    Potential Assistance Needed: No    SNF needed: No    Freedom of choice and list provided: NA    Pharmacy:  [facility]       Does Patient want to use MEDS to BEDS? No    Is patient currently receiving oral anticoagulation therapy? No    Is the Patient an [facility] with Readmission Risk Score greater than 14%? No  If yes, pt needs a follow up appointment made within 7 days. Family Members/Caregivers that pt would like involved in their care:    Yes    If yes, list name here:  [address] [place] [address]    Transportation Provider:  Family             Is patient in Isolation/One on One/Altered Mental Status? Yes  If yes, skip next question. If no, would they like an I-Pad to  use? No  If yes, call [phone]. Discharge Plan:  12/30/20  + COVID,  Temp 98.2, on high flow oxygen 93% on 40L. Medicare - Patient is from home with spouse. DME; [employer], built in shower seat. Denies VNS at this time. Will have PT/Ot work with Patient to get their recommendations. Following for home oxygen eval and anti coags at discharge. Spouse Pranay Walker is admitted in ICU bed 20. Rowdy Thurston Plan is for home with no needs. Will follow closely.  .//pf            Electronically signed by: Stacie Voss RN on 12/30/2020 at 2:09 PM

## 2020-12-30 NOTE — CONSULTS
[facility] -   Infectious diseases evaluation  admission date 12/29/2020    reason for consultation:   COVID-19 infection    Impression :   Current:  · COVID-19 infection  · Acute respiratory failure with hypoxia required high flow oxygen  · Elevated D-dimer  · History of smoking  · Diabetes mellitus  · Diverticulitis  · Hyperlipidemia  · Hypertension    Other:  · Flagyl, penicillin and sulfa allergy    Recommendations   · IV Decadron and remdesivir  · On Lovenox  · Blood cultures pending  · Stool for C. difficile pending  · Follow inflammatory markers and chest x-ray  · Follow CBC, renal function and liver enzymes  · Supportive care  · Droplet plus isolation  · Discussed with nursing staff      History of Present Illness:   Initial history:  Lorelei Sarmiento is a [de-identified]y.o.-year-old male presented to the hospital with worsening shortness of breath for 2 days associated with cough headache and diarrhea for 2 weeks  He was hypoxic was placed on high flow oxygen  COVID-19 rapid test was positive  Inflammatory markers and liver enzymes were elevated  Chest x-ray showed left lower lobe infiltrate with possible small pleural fluid  Interval changes  12/30/2020   Temperature max was 99.6, blood pressure stable  Patient Vitals for the past 8 hrs:   BP Temp Temp src Pulse Resp SpO2   12/30/20 1620 (!) 156/74 98.1 °F (36.7 °C) Oral 70 16 90 %   12/30/20 1200 (!) 158/78 98.1 °F (36.7 °C) Oral 62 26 93 %   12/30/20 1144     29 92 %   12/30/20 1032 (!) 145/78   68             I have personally reviewed the past medical history, past surgical history, medications, social history, and family history, and I haveupdated the database accordingly.       Allergies:   Feldene [piroxicam], Flagyl [metronidazole], Naprosyn [naproxen], Penicillins, Sulfa antibiotics, and Sulfasalazine     Review of Systems:     Review of Systems  Smoking status: Former Smoker     Packs/day: 1.50     Years: 40.00     Pack years: 60.00     Quit date: 1991     Years since quittin.0    Smokeless tobacco: Never Used   Substance and Sexual Activity    Alcohol use: Yes     Alcohol/week: 1.0 standard drinks     Types: 1 Standard drinks or equivalent per week     Comment: social    Drug use: No    Sexual activity: Not on file   Lifestyle    Physical activity     Days per week: Not on file     Minutes per session: Not on file    Stress: Not on file   Relationships    Social connections     Talks on phone: Not on file     Gets together: Not on file     Attends Quaker service: Not on file     Active member of club or organization: Not on file     Attends meetings of clubs or organizations: Not on file     Relationship status: Not on file    Intimate partner violence     Fear of current or ex partner: Not on file     Emotionally abused: Not on file     Physically abused: Not on file     Forced sexual activity: Not on file   Other Topics Concern    Not on file   Social History Narrative    Not on file       Family History:   History reviewed. No pertinent family history. Medical Decision Making:   I have independently reviewed/ordered the following labs:    CBC with Differential:   Recent Labs     20  1543   WBC 3.8   HGB 14.3   HCT 42.9   *   LYMPHOPCT 15*   MONOPCT 10*     BMP:  Recent Labs     20  1543      K 4.7      CO2 24   BUN 14   CREATININE 0.97   MG 1.6     Hepatic Function Panel:   Recent Labs     20  1543   PROT 7.0   LABALBU 3.9   BILITOT 0.95   ALKPHOS 59   ALT 55*   AST 58*     No results for input(s): RPR in the last 72 hours. No results for input(s): HIV in the last 72 hours. No results for input(s): BC in the last 72 hours.   Lab Results   Component Value Date    CREATININE 0.97 2020    GLUCOSE 118 2020       Detailed results: Thank you for allowing us to participate in the care of this patient. Please call with questions. This note is created with the assistance of a speech recognition program.  While intending to generate adocument that actually reflects the content of the visit, the document can still have some errors including those of syntax and sound a like substitutions which may escape proof reading. It such instances, actual meaningcan be extrapolated by contextual diversion.     Thea Merlos MD  Office: [phone]  Perfect serve / office [phone]

## 2020-12-31 ENCOUNTER — APPOINTMENT (OUTPATIENT)
Dept: GENERAL RADIOLOGY | Age: 80
DRG: 177 | End: 2020-12-31
Payer: MEDICARE

## 2020-12-31 LAB
ANION GAP SERPL CALCULATED.3IONS-SCNC: 12 MMOL/L (ref 9–17)
BUN BLDV-MCNC: 20 MG/DL (ref 8–23)
BUN/CREAT BLD: ABNORMAL (ref 9–20)
CALCIUM SERPL-MCNC: 9 MG/DL (ref 8.6–10.4)
CHLORIDE BLD-SCNC: 96 MMOL/L (ref 98–107)
CO2: 23 MMOL/L (ref 20–31)
CREAT SERPL-MCNC: 1.01 MG/DL (ref 0.7–1.2)
D-DIMER QUANTITATIVE: 0.54 MG/L FEU (ref 0–0.59)
GFR AFRICAN AMERICAN: >60 ML/MIN
GFR NON-AFRICAN AMERICAN: >60 ML/MIN
GFR SERPL CREATININE-BSD FRML MDRD: ABNORMAL ML/MIN/{1.73_M2}
GFR SERPL CREATININE-BSD FRML MDRD: ABNORMAL ML/MIN/{1.73_M2}
GLUCOSE BLD-MCNC: 153 MG/DL (ref 75–110)
GLUCOSE BLD-MCNC: 185 MG/DL (ref 75–110)
GLUCOSE BLD-MCNC: 232 MG/DL (ref 75–110)
GLUCOSE BLD-MCNC: 251 MG/DL (ref 70–99)
GLUCOSE BLD-MCNC: 264 MG/DL (ref 75–110)
MAGNESIUM: 1.8 MG/DL (ref 1.6–2.6)
POTASSIUM SERPL-SCNC: 4.2 MMOL/L (ref 3.7–5.3)
SODIUM BLD-SCNC: 131 MMOL/L (ref 135–144)
TROPONIN INTERP: NORMAL
TROPONIN T: NORMAL NG/ML
TROPONIN, HIGH SENSITIVITY: 14 NG/L (ref 0–22)

## 2020-12-31 PROCEDURE — 71045 X-RAY EXAM CHEST 1 VIEW: CPT

## 2020-12-31 PROCEDURE — 2060000000 HC ICU INTERMEDIATE R&B

## 2020-12-31 PROCEDURE — 85379 FIBRIN DEGRADATION QUANT: CPT

## 2020-12-31 PROCEDURE — 83735 ASSAY OF MAGNESIUM: CPT

## 2020-12-31 PROCEDURE — 84484 ASSAY OF TROPONIN QUANT: CPT

## 2020-12-31 PROCEDURE — 6370000000 HC RX 637 (ALT 250 FOR IP): Performed by: INTERNAL MEDICINE

## 2020-12-31 PROCEDURE — 99232 SBSQ HOSP IP/OBS MODERATE 35: CPT | Performed by: FAMILY MEDICINE

## 2020-12-31 PROCEDURE — 2500000003 HC RX 250 WO HCPCS: Performed by: INTERNAL MEDICINE

## 2020-12-31 PROCEDURE — 6360000002 HC RX W HCPCS: Performed by: FAMILY MEDICINE

## 2020-12-31 PROCEDURE — 80048 BASIC METABOLIC PNL TOTAL CA: CPT

## 2020-12-31 PROCEDURE — 2700000000 HC OXYGEN THERAPY PER DAY

## 2020-12-31 PROCEDURE — 82947 ASSAY GLUCOSE BLOOD QUANT: CPT

## 2020-12-31 PROCEDURE — 6370000000 HC RX 637 (ALT 250 FOR IP): Performed by: FAMILY MEDICINE

## 2020-12-31 PROCEDURE — 2580000003 HC RX 258: Performed by: INTERNAL MEDICINE

## 2020-12-31 PROCEDURE — 94761 N-INVAS EAR/PLS OXIMETRY MLT: CPT

## 2020-12-31 PROCEDURE — 93005 ELECTROCARDIOGRAM TRACING: CPT | Performed by: INTERNAL MEDICINE

## 2020-12-31 PROCEDURE — 2580000003 HC RX 258: Performed by: FAMILY MEDICINE

## 2020-12-31 PROCEDURE — 36415 COLL VENOUS BLD VENIPUNCTURE: CPT

## 2020-12-31 RX ORDER — NICOTINE POLACRILEX 4 MG
15 LOZENGE BUCCAL PRN
Status: DISCONTINUED | OUTPATIENT
Start: 2020-12-31 | End: 2021-01-12 | Stop reason: HOSPADM

## 2020-12-31 RX ORDER — DEXTROSE MONOHYDRATE 25 G/50ML
12.5 INJECTION, SOLUTION INTRAVENOUS PRN
Status: DISCONTINUED | OUTPATIENT
Start: 2020-12-31 | End: 2021-01-12 | Stop reason: HOSPADM

## 2020-12-31 RX ORDER — DEXTROSE MONOHYDRATE 50 MG/ML
100 INJECTION, SOLUTION INTRAVENOUS PRN
Status: DISCONTINUED | OUTPATIENT
Start: 2020-12-31 | End: 2021-01-12 | Stop reason: HOSPADM

## 2020-12-31 RX ADMIN — ENOXAPARIN SODIUM 30 MG: 30 INJECTION SUBCUTANEOUS at 20:43

## 2020-12-31 RX ADMIN — INSULIN LISPRO 2 UNITS: 100 INJECTION, SOLUTION INTRAVENOUS; SUBCUTANEOUS at 20:48

## 2020-12-31 RX ADMIN — ATORVASTATIN CALCIUM 20 MG: 20 TABLET, FILM COATED ORAL at 20:40

## 2020-12-31 RX ADMIN — Medication 10 ML: at 20:45

## 2020-12-31 RX ADMIN — DEXAMETHASONE 6 MG: 2 TABLET ORAL at 08:27

## 2020-12-31 RX ADMIN — BENZONATATE 100 MG: 100 CAPSULE ORAL at 05:19

## 2020-12-31 RX ADMIN — METOPROLOL SUCCINATE 12.5 MG: 25 TABLET, EXTENDED RELEASE ORAL at 08:27

## 2020-12-31 RX ADMIN — Medication 10 ML: at 08:34

## 2020-12-31 RX ADMIN — Medication 30 MG: at 20:42

## 2020-12-31 RX ADMIN — INSULIN LISPRO 1 UNITS: 100 INJECTION, SOLUTION INTRAVENOUS; SUBCUTANEOUS at 08:00

## 2020-12-31 RX ADMIN — INSULIN LISPRO 2 UNITS: 100 INJECTION, SOLUTION INTRAVENOUS; SUBCUTANEOUS at 17:47

## 2020-12-31 RX ADMIN — ASPIRIN 81 MG: 81 TABLET, COATED ORAL at 08:27

## 2020-12-31 RX ADMIN — ENOXAPARIN SODIUM 30 MG: 30 INJECTION SUBCUTANEOUS at 08:27

## 2020-12-31 RX ADMIN — BENZONATATE 100 MG: 100 CAPSULE ORAL at 13:01

## 2020-12-31 RX ADMIN — BENZONATATE 100 MG: 100 CAPSULE ORAL at 20:41

## 2020-12-31 RX ADMIN — HYDROCHLOROTHIAZIDE 25 MG: 25 TABLET ORAL at 08:27

## 2020-12-31 RX ADMIN — PANTOPRAZOLE SODIUM 40 MG: 40 TABLET, DELAYED RELEASE ORAL at 06:43

## 2020-12-31 RX ADMIN — REMDESIVIR 100 MG: 100 INJECTION, POWDER, LYOPHILIZED, FOR SOLUTION INTRAVENOUS at 21:26

## 2020-12-31 RX ADMIN — GABAPENTIN 300 MG: 300 CAPSULE ORAL at 08:27

## 2020-12-31 RX ADMIN — INSULIN LISPRO 1 UNITS: 100 INJECTION, SOLUTION INTRAVENOUS; SUBCUTANEOUS at 12:20

## 2020-12-31 RX ADMIN — Medication 30 MG: at 08:27

## 2020-12-31 ASSESSMENT — PAIN SCALES - GENERAL
PAINLEVEL_OUTOF10: 0

## 2020-12-31 NOTE — PROGRESS NOTES
Progress Note  12/31/2020 3:18 PM  Subjective:   Admit Date: 12/29/2020  PCP: Yao Feliz MD  CC: covid  Interval History: pt states breathing is still not good. No new complaints. Appetite is okay. No nausea. Diet: DIET CARDIAC;  Medications:   Scheduled Meds:   aspirin EC  81 mg Oral Daily    atorvastatin  20 mg Oral Nightly    gabapentin  300 mg Oral Daily    hydroCHLOROthiazide  25 mg Oral Daily    metoprolol succinate  12.5 mg Oral Daily    pantoprazole  40 mg Oral QAM AC    insulin lispro  0-6 Units Subcutaneous TID WC    insulin lispro  0-3 Units Subcutaneous Nightly    dextromethorphan  30 mg Oral 2 times per day    sodium chloride flush  10 mL Intravenous 2 times per day    enoxaparin  30 mg Subcutaneous BID    dexamethasone  6 mg Oral Daily    remdesivir IVPB  100 mg Intravenous Q24H     Continuous Infusions:  CBC:   Recent Labs     12/29/20  1543   WBC 3.8   HGB 14.3   *     BMP:    Recent Labs     12/29/20  1543 12/31/20  1349    131*   K 4.7 4.2    96*   CO2 24 23   BUN 14 20   CREATININE 0.97 1.01   GLUCOSE 118* 251*     Hepatic:   Recent Labs     12/29/20  1543   AST 58*   ALT 55*   BILITOT 0.95   ALKPHOS 59     Troponin: No results for input(s): TROPONINI in the last 72 hours. BNP: No results for input(s): BNP in the last 72 hours. Lipids: No results for input(s): CHOL, HDL in the last 72 hours.     Invalid input(s): LDLCALCU  INR:   Recent Labs     12/29/20  1543   INR 1.0       Objective:   Vitals: /67   Pulse 60   Temp 97.8 °F (36.6 °C) (Oral)   Resp 28   Ht 5' 7\" (1.702 m)   Wt 238 lb 12.1 oz (108.3 kg)   SpO2 94%   BMI 37.39 kg/m²   General appearance: alert and cooperative with exam  Neck: supple, symmetrical, trachea midline  Lungs: clear to auscultation bilaterally  Heart: regular rate and rhythm, S1, S2 normal, no murmur, click, rub or gallop  Abdomen: soft, non-tender; bowel sounds normal; no masses,  no organomegaly Extremities: extremities normal, atraumatic, no cyanosis or edema  Neurologic: Mental status: Alert, oriented, thought content appropriate    Assessment and Plan:   1. covid pneumonia with hypoxia- continue oxygen as needed. Continue remdesivir and steroids. 2. DM- continue sliding scale, home meds.       Patient Active Problem List:     Acquired deviated nasal septum     Actinic keratosis     Arthritis     Benign prostatic hyperplasia     Bloating     Chronic serous otitis media     Claustrophobia     Coronary artery disease     Esophageal reflux     Flatus     Hearing loss     Hemorrhoids     History of allergy     Hyperlipidemia     Essential hypertension     Leg swelling     Lumbar radiculopathy     Lumbar stenosis     Skin neoplasm     Tinea corporis     Ventral hernia     Weight gain     Type 2 diabetes mellitus with complication (HCC)     Obesity     Other osteoporosis without current pathological fracture     Presence of coronary angioplasty implant and graft     Morbid obesity with BMI of 40.0-44.9, adult ([facility] [address]     COVID-19      Electronically signed by Val Howard MD on 12/31/2020 at 3:18 PM

## 2020-12-31 NOTE — PLAN OF CARE
Problem: Airway Clearance - Ineffective  Goal: Achieve or maintain patent airway  Outcome: Ongoing     Problem: Gas Exchange - Impaired  Goal: Absence of hypoxia  Outcome: Ongoing     Problem: Gas Exchange - Impaired  Goal: Promote optimal lung function  Outcome: Ongoing     Problem: Breathing Pattern - Ineffective  Goal: Ability to achieve and maintain a regular respiratory rate  Outcome: Ongoing     Problem: Body Temperature -  Risk of, Imbalanced  Goal: Ability to maintain a body temperature within defined limits  Outcome: Ongoing     Problem: Body Temperature -  Risk of, Imbalanced  Goal: Will regain or maintain usual level of consciousness  Outcome: Ongoing     Problem:  Body Temperature -  Risk of, Imbalanced  Goal: Complications related to the disease process, condition or treatment will be avoided or minimized  Outcome: Ongoing     Problem: Isolation Precautions - Risk of Spread of Infection  Goal: Prevent transmission of infection  Outcome: Ongoing     Problem: Nutrition Deficits  Goal: Optimize nutrtional status  Outcome: Ongoing     Problem: Risk for Fluid Volume Deficit  Goal: Maintain normal heart rhythm  Outcome: Ongoing     Problem: Risk for Fluid Volume Deficit  Goal: Maintain absence of muscle cramping  Outcome: Ongoing     Problem: Risk for Fluid Volume Deficit  Goal: Maintain normal serum potassium, sodium, calcium, phosphorus, and pH  Outcome: Ongoing     Problem: Loneliness or Risk for Loneliness  Goal: Demonstrate positive use of time alone when socialization is not possible  Outcome: Ongoing     Problem: Fatigue  Goal: Verbalize increase energy and improved vitality  Outcome: Ongoing     Problem: Patient Education: Go to Patient Education Activity  Goal: Patient/Family Education  Outcome: Ongoing     Problem: Falls - Risk of:  Goal: Will remain free from falls  Description: Will remain free from falls  Outcome: Ongoing     Problem: Falls - Risk of:  Goal: Absence of physical injury Description: Absence of physical injury  Outcome: Ongoing

## 2020-12-31 NOTE — DISCHARGE INSTR - COC
Continuity of Care Form    Patient Name: Lorelei Sarmiento   :  1940  MRN:  [id number]    Admit date:  2020  Discharge date:  2021    Code Status Order: Full Code   Advance Directives:      Admitting Physician:  Maegan Yan MD  PCP: Maegan Yan MD    Discharging Nurse: [facility] Unit/Room#:   Discharging Unit Phone Number: [phone]    Emergency Contact:   Extended Emergency Contact Information  Primary Emergency Contact: Jaki Escalante [address] Phone: [phone]  Relation: Spouse  Secondary Emergency Contact: Liane Meza Phone: [phone]  Relation: Child    Past Surgical History:  Past Surgical History:   Procedure Laterality Date    BACK SURGERY  2017    Lizabeth Cordero, lumbar fusion   [place]  2015    partial colectomy due to diveritc    COLONOSCOPY      TOTAL KNEE ARTHROPLASTY Right        Immunization History:   Immunization History   Administered Date(s) Administered    Influenza Virus Vaccine 2010, 10/02/2014, 10/12/2015    Influenza, High Dose (Fluzone 65 yrs and older) 10/12/2016, 2017, 2018    Influenza, Quadv, adjuvanted, 65 yrs +, IM, PF (Fluad) 2020    Influenza, Triv, inactivated, subunit, adjuvanted, IM (Fluad 65 yrs and older) 2019    Pneumococcal Conjugate 13-valent ([id number]) 2017    Pneumococcal Polysaccharide ([id number]) 2014    Tdap (Boostrix, Adacel) 2020       Active Problems:  Patient Active Problem List   Diagnosis Code    Acquired deviated nasal septum J34.2    Actinic keratosis L57.0    Arthritis M19.90    Benign prostatic hyperplasia N40.0    Bloating R14.0    Chronic serous otitis media H65.20    Claustrophobia F40.240    Coronary artery disease I25.10    Esophageal reflux K21.9    Flatus R14.3    Hearing loss H91.90    Hemorrhoids K64.9    History of allergy Z88.9  Hyperlipidemia E78.5    Essential hypertension I10    Leg swelling M79.89    Lumbar radiculopathy M54.16    Lumbar stenosis M48.061    Skin neoplasm D49.2    Tinea corporis B35.4    Ventral hernia K43.9    Weight gain R63.5    Type 2 diabetes mellitus with complication (HCC) Q95.4    Obesity E66.9    Other osteoporosis without current pathological fracture M81.8    Presence of coronary angioplasty implant and graft Z95.5    Morbid obesity with BMI of 40.0-44.9, adult (HCC) E66.01, Z68.41    COVID-19 U07.1       Isolation/Infection:   Isolation            Contact  Droplet Plus          Patient Infection Status       Infection Onset Added Last Indicated Last Indicated By Review Planned Expiration Resolved Resolved By    C-diff Rule Out 12/30/20 12/30/20 12/30/20 C DIFF TOXIN/ANTIGEN (Ordered)        COVID-19 12/29/20 12/29/20 12/29/20 COVID-19 01/05/21 01/12/21      Resolved    COVID-19 Rule Out 12/29/20 12/29/20 12/29/20 COVID-19 (Ordered)   12/29/20 Rule-Out Test Resulted            Nurse Assessment:  Last Vital Signs: BP (!) 161/94   Pulse 66   Temp 97.8 °F (36.6 °C) (Oral)   Resp 28   Ht 5' 7\" (1.702 m)   Wt 238 lb 12.1 oz (108.3 kg)   SpO2 94%   BMI 37.39 kg/m²     Last documented pain score (0-10 scale): Pain Level: 0  Last Weight:   Wt Readings from Last 1 Encounters:   12/30/20 238 lb 12.1 oz (108.3 kg)     Mental Status:  oriented, alert and able to concentrate and follow conversation    IV Access:  - None    Nursing Mobility/ADLs:  Walking   Assisted  Transfer  Assisted  Bathing  Assisted  Dressing  Assisted  Toileting  Assisted  Feeding  Independent  Med Admin  Assisted  Med Delivery   whole    Wound Care Documentation and Therapy:        Elimination:  Continence:   · Bowel: No  · Bladder: Yes  Urinary Catheter: None   Colostomy/Ileostomy/Ileal Conduit: No       Date of Last BM: 1/06/2021        Intake/Output Summary (Last 24 hours) at 12/31/2020 1616 Last data filed at 12/31/2020 1227  Gross per 24 hour   Intake 10 ml   Output 725 ml   Net -715 ml     I/O last 3 completed shifts: In: 10 [I.V.:10]  Out: 725 [Urine:725]    Safety Concerns: At Risk for Falls    Impairments/Disabilities:      None    Nutrition Therapy:  Current Nutrition Therapy:   - Oral Diet:  Cardiac    Routes of Feeding: Oral  Liquids: No Restrictions  Daily Fluid Restriction: no  Last Modified Barium Swallow with Video (Video Swallowing Test): not done    Treatments at the Time of Hospital Discharge:   Respiratory Treatments:       Oxygen Therapy:    Ventilator:    - No ventilator support    Rehab Therapies: Physical Therapy and Occupational Therapy  Weight Bearing Status/Restrictions: No weight bearing restirctions  Other Medical Equipment (for information only, NOT a DME order):  cane  Other Treatments: Skilled Nursing assessment and monitoring, medication Educartion    Patient's personal belongings (please select all that are sent with patient):  Deric WALKER SIGNATURE:  Alcario Cockayne RN    CASE MANAGEMENT/SOCIAL WORK SECTION    Inpatient Status Date: 12/29/2020    Readmission Risk Assessment Score:  Readmission Risk              Risk of Unplanned Readmission:        8           Discharging to [address] [place] [address]: [phone]  F: [facility]'s  to evaluate patient two weeks prior to discharge from home health to determine post-discharge services. / signature: Electronically signed by Roseanne Oliver RN on 12/31/20 at 4:17 PM EST    PHYSICIAN SECTION    Prognosis: Good    Condition at Discharge: Stable    Rehab Potential (if transferring to Rehab): Good    Recommended Labs or Other Treatments After Discharge: BMP, CRP in 3-4 days. Physician Certification: I certify the above information and transfer of Alex Melara  is necessary for the continuing treatment of the diagnosis listed and that he requires [facility] for less 30 days.      Update Admission H&P: No change in H&P    PHYSICIAN SIGNATURE:  Electronically signed by Cece Sidhu MD on 1/5/21 at 7:49 AM EST

## 2020-12-31 NOTE — PROGRESS NOTES
PROVIDE ADEQUATE OXYGENATION WITH ACCEPTABLE SP02/ABG'S    [x]  IDENTIFY APPROPRIATE OXYGEN THERAPY  [x]   MONITOR SP02/ABG'S AS NEEDED   [x]   PATIENT EDUCATION AS NEEDED    Pt frequently coughing while at the bedside. Pt doesn't appear to be short of breath and reports no breathing concerns. Pt tolerating heated high flow nasal cannula set to 40LPM and 70% O2 with SpO2 of 93%.

## 2020-12-31 NOTE — PROGRESS NOTES
Dr. Izbaela Cardenas notified of consult/ bradycardia. Patient baseline Sinus with 1 degree AV block. Orders received.

## 2020-12-31 NOTE — PROGRESS NOTES
Physician Progress Note      PATIENT:               Roxy ZULUAGA #:                  [id number]  :                       1940  ADMIT DATE:       2020 2:40 PM  [facility] DATE:  RESPONDING  PROVIDER #:        Kash Avendano MD          QUERY TEXT:    Patient admitted with COVID-19 pneumonia. Documentation reflects Acute   respiratory failure with hypoxia in ED notes dated 2020. If possible,   please document in the progress notes and discharge summary if:    The medical record reflects the following:  Risk Factors: [de-identified] yr old male, HTN, DM  Clinical Indicators: Pt requiring high flow oxygen, RR 19-36, hypoxic 71% on   room air  Treatment: Admit to ICU, Pulm consult, initiate oxygen therapy protocol, resp   eval & treat, spot check pulse ox, decadron/remdesivir  Options provided:  -- Acute respiratory failure with hypoxia ruled out after study  -- Acute respiratory failure with hypoxia confirmed after study  -- Other - I will add my own diagnosis  -- Disagree - Not applicable / Not valid  -- Disagree - Clinically unable to determine / Unknown  -- Refer to Clinical Documentation Reviewer    PROVIDER RESPONSE TEXT:    Acute respiratory failure with hypoxia confirmed after study. Query created by:  Remi Ibarra on 2020 11:04 AM      Electronically signed by:  Kash Avendano MD 2020 3:21 PM

## 2020-12-31 NOTE — CARE COORDINATION
ONGOING DISCHARGE PLAN:    Writer following for discharge needs at discharge. Per chart notes patient denies VNS at this time. Writer did not call patient to discuss D/C plan s as his rate of oxygen has increased  And continues to be on High flow but at 40L FIO2 is 80% sats 91%. LW may need to follow for ECF. Will follow closely. Remains on Po Decadron, OV Remdesivir    Will continue to follow for additional discharge needs. Electronically signed by Matthew Cartwright RN on 12/31/2020 at 11:52 AM     Sent referral to Eunice Parikh at [facility].     Electronically signed by Matthew Cartwright RN on 12/31/2020 at 4:18 PM

## 2020-12-31 NOTE — CONSULTS
Consult        Date of Admission:  12/29/2020  Date of Consultation:  12/31/2020      PCP:  Isma Bailey MD      Reason for the consult: Bradycardia    History of Present Illness:  Johnathan English is a [de-identified] y.o. male admitted for Covid pneumonia and hypoxemia, I was asked to see for bradycardia and. Of high-grade AV block  I spoke to the patient through the intercom did not enter his room. Denies any chest pains he denies any significant shortness of breath but has been having dry cough he feels lightheaded at times    PMH:   has a past medical history of Diabetes ([facility] [address] Diverticulitis, History of allergy, Hyperlipidemia, Hypertension, and Osteoarthritis. PSH:   has a past surgical history that includes Cardiac catheterization; Colonoscopy; Total knee arthroplasty (Right); colectomy (2015); and back surgery (09/03/2017). Allergies: Allergies   Allergen Reactions    Feldene [Piroxicam]     Flagyl [Metronidazole]     Naprosyn [Naproxen]     Penicillins     Sulfa Antibiotics     Sulfasalazine         Home Meds:    Prior to Admission medications    Medication Sig Start Date End Date Taking? Authorizing Provider   atorvastatin (LIPITOR) 20 MG tablet Take 1 tablet by mouth nightly 10/8/20   Isma Bailey MD   blood glucose test strips (ASCENSIA AUTODISC VI;ONE TOUCH ULTRA TEST VI) strip Use with associated glucose meter. Use daily.  Type 2 DM not on insulin 9/3/20   Isma Bailey MD   hydroCHLOROthiazide (HYDRODIURIL) 25 MG tablet TAKE 1 TABLET DAILY 8/26/20   Isma Bailey MD   metFORMIN (GLUCOPHAGE) 500 MG tablet Take 1 tablet by mouth 2 times daily (with meals) 7/13/20   Isma Bailey MD   omeprazole (PRILOSEC) 40 MG delayed release capsule TAKE 1 CAPSULE DAILY 5/4/20   Isma Bailey MD   Dulaglutide (TRULICITY) 6.52 HP/1.1YW SOPN INJECT 0.5ML (=0.75 MG)    SUBCUTANEOUSLY ONCE WEEKLY 3/12/20   Isma Bailey MD nystatin (MYCOSTATIN) 561207 UNIT/GM cream Apply topically 2 times daily. Patient not taking: Reported on 7/13/2020 2/13/20   Dony Olea MD   nystatin (MYCOSTATIN) 694795 UNIT/GM powder Apply 3 times daily. Patient not taking: Reported on 7/13/2020 2/13/20   Dony Olea MD   gabapentin (NEURONTIN) 300 MG capsule Take 300 mg by mouth daily.   11/4/19   Historical Provider, MD   lisinopril (PRINIVIL;ZESTRIL) 2.5 MG tablet TAKE 1 TABLET DAILY 7/22/19   Dony Olea MD   Omega-3 Fatty Acids (FISH OIL PO) Take 1 g by mouth    Historical Provider, MD   betamethasone dipropionate (DIPROLENE) 0.05 % cream APPLY TO AFFECTED AREA 1 TO 2 TIMES DAILY UNTIL IMPROVED THEN DISCONTINUE 8/28/18   Historical Provider, MD   meloxicam (MOBIC) 15 MG tablet  5/14/18   Historical Provider, MD   metoprolol succinate (TOPROL XL) 25 MG extended release tablet Take 0.5 tablets by mouth daily 6/12/18   Dony Olea MD   aspirin EC 81 MG EC tablet Take 81 mg by mouth daily    Dony Olea MD   Multiple Vitamin (MULTI-VITAMIN DAILY) TABS Take 1 tablet by mouth daily    Dony Olea MD        Hospital Meds:    Current Facility-Administered Medications   Medication Dose Route Frequency Provider Last Rate Last Admin    benzonatate (TESSALON) capsule 100 mg  100 mg Oral TID PRN Mell Rosario MD   100 mg at 12/31/20 1301    aspirin EC tablet 81 mg  81 mg Oral Daily Mell Rosario MD   81 mg at 12/31/20 0827    atorvastatin (LIPITOR) tablet 20 mg  20 mg Oral Nightly Mell Rosario MD   20 mg at 12/30/20 2014    gabapentin (NEURONTIN) capsule 300 mg  300 mg Oral Daily Mell Rosario MD   300 mg at 12/31/20 0827    hydroCHLOROthiazide (HYDRODIURIL) tablet 25 mg  25 mg Oral Daily Mell Rosario MD   25 mg at 12/31/20 0827    metoprolol succinate (TOPROL XL) extended release tablet 12.5 mg  12.5 mg Oral Daily Mell Rosario MD   12.5 mg at 12/31/20 0255  pantoprazole (PROTONIX) tablet 40 mg  40 mg Oral QAM AC Oleg Mas MD   40 mg at 20 2813    insulin lispro (HUMALOG) injection vial 0-6 Units  0-6 Units Subcutaneous TID WC Oleg Mas MD   1 Units at 20 1220    insulin lispro (HUMALOG) injection vial 0-3 Units  0-3 Units Subcutaneous Nightly Oleg Mas MD   1 Units at 20    dextromethorphan (DELSYM) 30 MG/5ML extended release liquid 30 mg  30 mg Oral 2 times per day Oleg Mas MD   30 mg at 20 0827    sodium chloride flush 0.9 % injection 10 mL  10 mL Intravenous 2 times per day Sean Jeans, MD   10 mL at 20 0834    sodium chloride flush 0.9 % injection 10 mL  10 mL Intravenous PRN Sean Jeans, MD        enoxaparin (LOVENOX) injection 30 mg  30 mg Subcutaneous BID Sean Jeans, MD   30 mg at 20 0827    acetaminophen (TYLENOL) tablet 650 mg  650 mg Oral Q4H PRN Sean Jeans, MD        dexamethasone (DECADRON) tablet 6 mg  6 mg Oral Daily Crispin Cagle MD   6 mg at 20 0827    remdesivir 100 mg in sodium chloride 0.9 % 250 mL IVPB  100 mg Intravenous Q24H Crispin Cagle MD   Stopped at 20 2311    0.9 % sodium chloride bolus  30 mL Intravenous PRN Crispin Cagle MD           Social History:    Social History     Socioeconomic History    Marital status:      Spouse name: Not on file    Number of children: Not on file    Years of education: Not on file    Highest education level: Not on file   Occupational History    Not on file   Social Needs    Financial resource strain: Not on file    Food insecurity     Worry: Not on file     Inability: Not on file    Transportation needs     Medical: Not on file     Non-medical: Not on file   Tobacco Use    Smoking status: Former Smoker     Packs/day: 1.50     Years: 40.00     Pack years: 60.00     Quit date: 1991     Years since quittin.0  Smokeless tobacco: Never Used   Substance and Sexual Activity    Alcohol use: Yes     Alcohol/week: 1.0 standard drinks     Types: 1 Standard drinks or equivalent per week     Comment: social    Drug use: No    Sexual activity: Not on file   Lifestyle    Physical activity     Days per week: Not on file     Minutes per session: Not on file    Stress: Not on file   Relationships    Social connections     Talks on phone: Not on file     Gets together: Not on file     Attends [employer] service: Not on file     Active member of club or organization: Not on file     Attends meetings of clubs or organizations: Not on file     Relationship status: Not on file    Intimate partner violence     Fear of current or ex partner: Not on file     Emotionally abused: Not on file     Physically abused: Not on file     Forced sexual activity: Not on file   Other Topics Concern    Not on file   Social History Narrative    Not on file      Family History: History reviewed. No pertinent family history. Review of Systems:   · Constitutional: No Fevers/Chills, No recent weight gain weight loss, No fatigue. · Eyes: No visual changes or diplopia. · ENT: No Headaches, hearing loss or vertigo. · Cardiovascular: Per HPI  · Respiratory: Cough and shortness of breath  · Gastrointestinal: No Nausea, Vomiting, Diarrhea, or Constipation  · Genitourinary: No dysuria,hematuria. · Musculoskeletal:  No gait disturbance, weakness or joint complaints. · Integumentary: No rash or pruritis. · Neurological: No headache, No Hx CVA/TIA  · Psychiatric: No anxiety, or depression. · Endocrine: No temperature intolerance.    · Hematologic/Lymphatic: No abnormal bruising or bleeding     Physical Exam   Vital Signs: /67   Pulse 60   Temp 97.8 °F (36.6 °C) (Oral)   Resp 24   Ht 5' 7\" (1.702 m)   Wt 238 lb 12.1 oz (108.3 kg)   SpO2 91%   BMI 37.39 kg/m²  O2 Flow Rate (L/min): 40 L/min     Admission Weight: 235 lb (106.6 kg) General appearance: Alert oriented and cooperative. In no acute distress     patient was not examined to reduce exposure to COVID-19 pneumonia    Pertinent Testing:     Cardiac Cath:   2012 patent stent in the left anterior descending      STRESS: 2017  · Abnormal nuclear study showing reversibility to mild extent in the lateral wall over a moderate area which indicates an area of ischemia  · Normal LV function with ejection fraction of 73%  · No ischemic ECG changes  · Intermediate risk study  EKG:      CXR:  Low lung volumes without brody consolidation or effusion. Labs:      CBC:   Recent Labs     12/29/20  1543   WBC 3.8   HGB 14.3   HCT 42.9   MCV 91.6   *     BMP:   Recent Labs     12/29/20  1543 12/31/20  1349    131*   K 4.7 4.2    96*   CO2 24 23   BUN 14 20   CREATININE 0.97 1.01     PT/INR:   Recent Labs     12/29/20  1543   PROTIME 13.3   INR 1.0     APTT: No results for input(s): APTT in the last 72 hours. MAG:   Recent Labs     12/29/20  1543 12/31/20  1349   MG 1.6 1.8     D Dimer:   Recent Labs     12/29/20  1543 12/30/20  0426 12/31/20  0402   DDIMER 0.84* 0.55 0.54     Troponin T   Recent Labs     12/29/20  1543 12/31/20  1349   TROPONINT NOT REPORTED NOT REPORTED      Pro-BNP No results for input(s): PROBNP in the last 72 hours. Impression   1. Sinus bradycardia with first-degree AV block, second-degree AV block and.'s of high-grade AV block with P waves no QRS probably related to use of beta-blockers and possible underlying sleep apnea together with the acute illness. 2. Respiratory failure with COVID-19 pneumonia  2.  History of coronary artery disease remote stenting of the left anterior descending   In 1997    Recommendations  Discontinue beta-blockers  Discussed with Dr. Bhavik Bailey to monitor  Advised nursing staff if worsening or recurrent pauses to call us            Electronically signed by Norm Moreira MD on 12/31/2020 at 2:28 PM This note was created with the assistance of a speech-recognition program.  Although the intention is to generate a document that actually reflects the content of the visit, no guarantees can be provided that every mistake has been identified and corrected by editing.     Jalyn Duran MD [facility]

## 2020-12-31 NOTE — PLAN OF CARE
Problem: Airway Clearance - Ineffective  Goal: Achieve or maintain patent airway  12/31/2020 1721 by Kris Kinney RN  Outcome: Met This Shift  12/31/2020 0758 by Ion Lopez RN  Outcome: Ongoing     Problem: Gas Exchange - Impaired  Goal: Absence of hypoxia  12/31/2020 1721 by Kris Kinney RN  Outcome: Met This Shift  12/31/2020 0758 by Ion Lopez RN  Outcome: Ongoing  Goal: Promote optimal lung function  12/31/2020 1721 by Kris Kinney RN  Outcome: Met This Shift  12/31/2020 0758 by Ion Lopez RN  Outcome: Ongoing     Problem: Breathing Pattern - Ineffective  Goal: Ability to achieve and maintain a regular respiratory rate  12/31/2020 1721 by Kris Kinney RN  Outcome: Met This Shift  12/31/2020 0758 by Ion Lopez RN  Outcome: Ongoing     Problem:  Body Temperature -  Risk of, Imbalanced  Goal: Ability to maintain a body temperature within defined limits  12/31/2020 1721 by Kris Kinney RN  Outcome: Met This Shift  12/31/2020 0758 by Ion Lopez RN  Outcome: Ongoing  Goal: Will regain or maintain usual level of consciousness  12/31/2020 1721 by Kris Kinney RN  Outcome: Met This Shift  12/31/2020 0758 by Ion Lopez RN  Outcome: Ongoing  Goal: Complications related to the disease process, condition or treatment will be avoided or minimized  12/31/2020 1721 by Kris Kinney RN  Outcome: Met This Shift  12/31/2020 0758 by Ion Lopez RN  Outcome: Ongoing     Problem: Isolation Precautions - Risk of Spread of Infection  Goal: Prevent transmission of infection  12/31/2020 1721 by Kris Kinney RN  Outcome: Met This Shift  12/31/2020 0758 by Ion Lopez RN  Outcome: Ongoing     Problem: Nutrition Deficits  Goal: Optimize nutrtional status  12/31/2020 1721 by Kris Kinney RN  Outcome: Met This Shift  12/31/2020 0758 by Ion Lopez RN  Outcome: Ongoing     Problem: Risk for Fluid Volume Deficit  Goal: Maintain normal heart rhythm  12/31/2020 1721 by Kris Kinney RN Outcome: Met This Shift  12/31/2020 0758 by Mary Lamas RN  Outcome: Ongoing  Goal: Maintain absence of muscle cramping  12/31/2020 1721 by Gayla Osuna RN  Outcome: Met This Shift  12/31/2020 0758 by Mary Lamas RN  Outcome: Ongoing  Goal: Maintain normal serum potassium, sodium, calcium, phosphorus, and pH  12/31/2020 1721 by Gayla Osuna RN  Outcome: Met This Shift  12/31/2020 0758 by Mary Lamas RN  Outcome: Ongoing     Problem: Loneliness or Risk for Loneliness  Goal: Demonstrate positive use of time alone when socialization is not possible  12/31/2020 1721 by Gayla Osuna RN  Outcome: Met This Shift  12/31/2020 0758 by Mary Lamas RN  Outcome: Ongoing     Problem: Fatigue  Goal: Verbalize increase energy and improved vitality  12/31/2020 1721 by Gayla Osuna RN  Outcome: Met This Shift  12/31/2020 0758 by Mary Lamas RN  Outcome: Ongoing     Problem: Patient Education: Go to Patient Education Activity  Goal: Patient/Family Education  12/31/2020 1721 by Gayla Osuna RN  Outcome: Met This Shift  12/31/2020 0758 by Mary Lamas RN  Outcome: Ongoing     Problem: Falls - Risk of:  Goal: Will remain free from falls  12/31/2020 1721 by Gayla Osuna RN  Outcome: Met This Shift  12/31/2020 0758 by Mary Lamas RN  Outcome: Ongoing  Goal: Absence of physical injury  12/31/2020 1721 by Gayla Osuna RN  Outcome: Met This Shift  12/31/2020 0758 by Mary Lamas RN  Outcome: Ongoing

## 2020-12-31 NOTE — PROGRESS NOTES
Dr. Ambrose Art was in to evaluate patient. Spoke to him phone. Writer spoke with Dr. Ambrose Art. Oder to continue to evaluate patient, if he continues to have second degree blocks and high grade AV blocks call for further orders. Dr. Ambrose Art to speak with Dr. Ramone Cruz for evaluation of sleep apnea and possible use of bipap.

## 2020-12-31 NOTE — PROGRESS NOTES
ICU Progress Note (Non-Vent)  [facility]    Patient - Esequiel Aldridge,  Age - [de-identified] y.o.    - 1940      Room Number -    MRN -  [id number]   Acct # - [de-identified]  Date of Admission -  2020  2:40 PM    Events of Past 24 Hours   Of note, there was a concern for bradycardia. I did request a cardiology consult for this. Patient currently is on a flow rate of 40 L/min but FiO2 requirements up to 80% on high flow. He does not look like he is using accessory muscles no belly breathing at this time. No fevers appreciated. Very alert and oriented. Vitals    height is 5' 7\" (1.702 m) and weight is 238 lb 12.1 oz (108.3 kg). His oral temperature is 97.8 °F (36.6 °C). His blood pressure is 132/67 and his pulse is 60. His respiration is 24 and oxygen saturation is 91%.        Temperature Range: Temp: 97.8 °F (36.6 °C) Temp  Av.9 °F (36.6 °C)  Min: 97.5 °F (36.4 °C)  Max: 98.5 °F (36.9 °C)  BP Range:  Systolic (16KND), ATY:266 , Min:103 , NBI:249     Diastolic (88LUB), BIE:32, Min:56, Max:112    Pulse Range: Pulse  Av.5  Min: 53  Max: 83  Respiration Range: Resp  Av.8  Min: 16  Max: 37  Current Pulse Ox[de-identified]  SpO2: 91 %  24HR Pulse Ox Range:  SpO2  Av.8 %  Min: 87 %  Max: 95 %  Oxygen Amount and Delivery: O2 Flow Rate (L/min): 40 L/min    Wt Readings from Last 3 Encounters:   20 238 lb 12.1 oz (108.3 kg)   20 234 lb (106.1 kg)   20 245 lb 6.4 oz (111.3 kg)     I/O       Intake/Output Summary (Last 24 hours) at 2020 1502  Last data filed at 2020 1227  Gross per 24 hour   Intake 10 ml   Output 725 ml   Net -715 ml     DRAIN/TUBE OUTPUT       Invasive Lines   ICP PRESSURE RANGE  No data recorded  CVP PRESSURE RANGE  No data recorded      Medications      aspirin EC  81 mg Oral Daily    atorvastatin  20 mg Oral Nightly  gabapentin  300 mg Oral Daily    hydroCHLOROthiazide  25 mg Oral Daily    metoprolol succinate  12.5 mg Oral Daily    pantoprazole  40 mg Oral QAM AC    insulin lispro  0-6 Units Subcutaneous TID WC    insulin lispro  0-3 Units Subcutaneous Nightly    dextromethorphan  30 mg Oral 2 times per day    sodium chloride flush  10 mL Intravenous 2 times per day    enoxaparin  30 mg Subcutaneous BID    dexamethasone  6 mg Oral Daily    remdesivir IVPB  100 mg Intravenous Q24H     benzonatate, sodium chloride flush, acetaminophen, sodium chloride  IV Drips/Infusions      Diet/Nutrition   DIET CARDIAC; Exam      Constitutional - Alert, arousable  General Appearance  well developed, well nourished  HEENT -normocephalic, atraumatic. Lungs - Chest expands equally, no wheezes, rales or rhonchi.   Posteriorly  Cardiovascular - Heart sounds are normal.  normal rate and rhythm regular  Abdomen - soft, nontender, nondistended, obese    Skin - no bruising or bleeding  Extremities - no cyanosis, clubbing or edema    Lab Results   CBC     Lab Results   Component Value Date    WBC 3.8 12/29/2020    RBC 4.68 12/29/2020    HGB 14.3 12/29/2020    HCT 42.9 12/29/2020     12/29/2020    MCV 91.6 12/29/2020    MCH 30.6 12/29/2020    MCHC 33.4 12/29/2020    RDW 14.5 12/29/2020    LYMPHOPCT 15 12/29/2020    MONOPCT 10 12/29/2020    BASOPCT 0 12/29/2020    MONOSABS 0.40 12/29/2020    LYMPHSABS 0.60 12/29/2020    EOSABS 0.00 12/29/2020    BASOSABS 0.00 12/29/2020    DIFFTYPE NOT REPORTED 12/29/2020       BMP   Lab Results   Component Value Date     12/31/2020    K 4.2 12/31/2020    CL 96 12/31/2020    CO2 23 12/31/2020    BUN 20 12/31/2020    CREATININE 1.01 12/31/2020    GLUCOSE 251 12/31/2020       LFTS  Lab Results   Component Value Date    ALKPHOS 59 12/29/2020    ALT 55 12/29/2020    AST 58 12/29/2020    PROT 7.0 12/29/2020    BILITOT 0.95 12/29/2020    LABALBU 3.9 12/29/2020 ABG ABGs: No results found for: PHART, PO2ART, CZC7TXP    No results found for: IFIO2, MODE, SETTIDVOL, SETPEEP      INR  Recent Labs     12/29/20  1543   PROTIME 13.3   INR 1.0       APTT  No results for input(s): APTT in the last 72 hours. Lactic Acid  No results found for: LACTA     BNP   No results for input(s): BNP in the last 72 hours. Cultures       Radiology     This x-ray revealed no change in the bilateral infiltrates appreciated    SYSTEMS ASSESSMENT    #1   acute respiratory failure with hypoxemia --- severe  #2. Viral pneumonia from COVID-19  #3. Elevated inflammatory markers    Neuro   Alert and oriented    Respiratory   Wean oxygen as tolerated. Keep O2 sat > 88%    Cardiovascular   Not in shock  Bradycardia noted. Cardiology evaluation underway    Gastrointestinal       Renal   Creatinine normal    Infectious Disease       Hematology/Oncology     D-dimer normal.  Endocrine       Social/Spiritual/DNR/Disposition/Other   Following closely for signs of worsening respiratory type a repeat chest x-ray in a.m.     Critical Care Time   30 min    Electronically signed by Tania Dhaliwal MD on 12/31/2020 at 3:02 PM

## 2020-12-31 NOTE — PROGRESS NOTES
Pt SpO2 88% on heated high flow while at rest on his side. O2 titrated from 70% to 80%, per order to maintaining at least 90% SpO2.

## 2021-01-01 ENCOUNTER — APPOINTMENT (OUTPATIENT)
Dept: GENERAL RADIOLOGY | Age: 81
DRG: 177 | End: 2021-01-01
Payer: MEDICARE

## 2021-01-01 LAB
ANION GAP SERPL CALCULATED.3IONS-SCNC: 11 MMOL/L (ref 9–17)
BUN BLDV-MCNC: 18 MG/DL (ref 8–23)
BUN/CREAT BLD: ABNORMAL (ref 9–20)
CALCIUM SERPL-MCNC: 9.3 MG/DL (ref 8.6–10.4)
CHLORIDE BLD-SCNC: 101 MMOL/L (ref 98–107)
CO2: 27 MMOL/L (ref 20–31)
CREAT SERPL-MCNC: 0.81 MG/DL (ref 0.7–1.2)
GFR AFRICAN AMERICAN: >60 ML/MIN
GFR NON-AFRICAN AMERICAN: >60 ML/MIN
GFR SERPL CREATININE-BSD FRML MDRD: ABNORMAL ML/MIN/{1.73_M2}
GFR SERPL CREATININE-BSD FRML MDRD: ABNORMAL ML/MIN/{1.73_M2}
GLUCOSE BLD-MCNC: 170 MG/DL (ref 75–110)
GLUCOSE BLD-MCNC: 191 MG/DL (ref 70–99)
GLUCOSE BLD-MCNC: 198 MG/DL (ref 75–110)
GLUCOSE BLD-MCNC: 241 MG/DL (ref 75–110)
GLUCOSE BLD-MCNC: 242 MG/DL (ref 75–110)
GLUCOSE BLD-MCNC: 260 MG/DL (ref 75–110)
POTASSIUM SERPL-SCNC: 4.8 MMOL/L (ref 3.7–5.3)
SODIUM BLD-SCNC: 139 MMOL/L (ref 135–144)

## 2021-01-01 PROCEDURE — 94761 N-INVAS EAR/PLS OXIMETRY MLT: CPT

## 2021-01-01 PROCEDURE — 2580000003 HC RX 258: Performed by: FAMILY MEDICINE

## 2021-01-01 PROCEDURE — 6370000000 HC RX 637 (ALT 250 FOR IP): Performed by: FAMILY MEDICINE

## 2021-01-01 PROCEDURE — 2060000000 HC ICU INTERMEDIATE R&B

## 2021-01-01 PROCEDURE — 99232 SBSQ HOSP IP/OBS MODERATE 35: CPT | Performed by: NURSE PRACTITIONER

## 2021-01-01 PROCEDURE — 36415 COLL VENOUS BLD VENIPUNCTURE: CPT

## 2021-01-01 PROCEDURE — 2700000000 HC OXYGEN THERAPY PER DAY

## 2021-01-01 PROCEDURE — 2500000003 HC RX 250 WO HCPCS: Performed by: INTERNAL MEDICINE

## 2021-01-01 PROCEDURE — 99232 SBSQ HOSP IP/OBS MODERATE 35: CPT | Performed by: FAMILY MEDICINE

## 2021-01-01 PROCEDURE — 80048 BASIC METABOLIC PNL TOTAL CA: CPT

## 2021-01-01 PROCEDURE — 82947 ASSAY GLUCOSE BLOOD QUANT: CPT

## 2021-01-01 PROCEDURE — 6360000002 HC RX W HCPCS: Performed by: FAMILY MEDICINE

## 2021-01-01 PROCEDURE — 2580000003 HC RX 258: Performed by: INTERNAL MEDICINE

## 2021-01-01 PROCEDURE — 71045 X-RAY EXAM CHEST 1 VIEW: CPT

## 2021-01-01 PROCEDURE — 6370000000 HC RX 637 (ALT 250 FOR IP): Performed by: INTERNAL MEDICINE

## 2021-01-01 RX ADMIN — Medication 10 ML: at 08:43

## 2021-01-01 RX ADMIN — ENOXAPARIN SODIUM 30 MG: 30 INJECTION SUBCUTANEOUS at 21:28

## 2021-01-01 RX ADMIN — HYDROCHLOROTHIAZIDE 25 MG: 25 TABLET ORAL at 08:42

## 2021-01-01 RX ADMIN — ATORVASTATIN CALCIUM 20 MG: 20 TABLET, FILM COATED ORAL at 21:28

## 2021-01-01 RX ADMIN — INSULIN LISPRO 1 UNITS: 100 INJECTION, SOLUTION INTRAVENOUS; SUBCUTANEOUS at 08:43

## 2021-01-01 RX ADMIN — Medication 30 MG: at 08:42

## 2021-01-01 RX ADMIN — REMDESIVIR 100 MG: 100 INJECTION, POWDER, LYOPHILIZED, FOR SOLUTION INTRAVENOUS at 21:30

## 2021-01-01 RX ADMIN — ENOXAPARIN SODIUM 30 MG: 30 INJECTION SUBCUTANEOUS at 08:42

## 2021-01-01 RX ADMIN — GABAPENTIN 300 MG: 300 CAPSULE ORAL at 08:41

## 2021-01-01 RX ADMIN — INSULIN LISPRO 1 UNITS: 100 INJECTION, SOLUTION INTRAVENOUS; SUBCUTANEOUS at 21:28

## 2021-01-01 RX ADMIN — INSULIN LISPRO 3 UNITS: 100 INJECTION, SOLUTION INTRAVENOUS; SUBCUTANEOUS at 17:54

## 2021-01-01 RX ADMIN — BENZONATATE 100 MG: 100 CAPSULE ORAL at 14:22

## 2021-01-01 RX ADMIN — INSULIN LISPRO 1 UNITS: 100 INJECTION, SOLUTION INTRAVENOUS; SUBCUTANEOUS at 13:30

## 2021-01-01 RX ADMIN — ASPIRIN 81 MG: 81 TABLET, COATED ORAL at 08:41

## 2021-01-01 RX ADMIN — Medication 10 ML: at 21:00

## 2021-01-01 RX ADMIN — DEXAMETHASONE 6 MG: 2 TABLET ORAL at 08:42

## 2021-01-01 RX ADMIN — Medication 30 MG: at 21:28

## 2021-01-01 RX ADMIN — PANTOPRAZOLE SODIUM 40 MG: 40 TABLET, DELAYED RELEASE ORAL at 06:22

## 2021-01-01 RX ADMIN — BENZONATATE 100 MG: 100 CAPSULE ORAL at 06:22

## 2021-01-01 ASSESSMENT — ENCOUNTER SYMPTOMS
GASTROINTESTINAL NEGATIVE: 1
SHORTNESS OF BREATH: 1
EYES NEGATIVE: 1
COUGH: 1

## 2021-01-01 ASSESSMENT — PAIN SCALES - GENERAL: PAINLEVEL_OUTOF10: 0

## 2021-01-01 NOTE — PLAN OF CARE
Problem: Airway Clearance - Ineffective  Goal: Achieve or maintain patent airway  1/1/2021 1727 by Vonnie Dakin, RN  Outcome: Ongoing  Note: Patients respiratory status stable at this time. No signs or symptoms of distress noted. Respirations non-labored and regular. High flow nasal canula in place as needed to maintain patient sats, see flowsheets. Continuous SpO2 monitoring in place. 1/1/2021 0455 by Jackelyn Navarro RN  Outcome: Ongoing     Problem: Gas Exchange - Impaired  Goal: Absence of hypoxia  1/1/2021 1727 by Vonnie Dakin, RN  Outcome: Ongoing  1/1/2021 0455 by Jackelyn Navarro RN  Outcome: Ongoing  Goal: Promote optimal lung function  1/1/2021 1727 by Vonnie Dakin, RN  Outcome: Ongoing  1/1/2021 0455 by Jackelyn Navarro RN  Outcome: Ongoing     Problem:  Body Temperature -  Risk of, Imbalanced  Goal: Ability to maintain a body temperature within defined limits  1/1/2021 1727 by Vonnie Dakin, RN  Outcome: Ongoing  1/1/2021 0455 by Jackelyn Navarro RN  Outcome: Ongoing  Goal: Will regain or maintain usual level of consciousness  1/1/2021 1727 by Vonnie Dakin, RN  Outcome: Ongoing  1/1/2021 0455 by Jackelyn Navarro RN  Outcome: Ongoing  Goal: Complications related to the disease process, condition or treatment will be avoided or minimized  1/1/2021 1727 by Vonnie Dakin, RN  Outcome: Ongoing  1/1/2021 0455 by Jackelyn Navarro RN  Outcome: Ongoing     Problem: Isolation Precautions - Risk of Spread of Infection  Goal: Prevent transmission of infection  1/1/2021 1727 by Vonnie Dakin, RN  Outcome: Ongoing  1/1/2021 0455 by Jackelyn Navarro RN  Outcome: Ongoing     Problem: Risk for Fluid Volume Deficit  Goal: Maintain normal heart rhythm  1/1/2021 1727 by Vonnie Dakin, RN  Outcome: Ongoing  1/1/2021 0455 by Jackelyn Navarro RN  Outcome: Ongoing  Goal: Maintain absence of muscle cramping  1/1/2021 1727 by Vonnie Dakin, RN  Outcome: Ongoing  1/1/2021 0455 by Jackelyn Navarro RN  Outcome: Ongoing Goal: Maintain normal serum potassium, sodium, calcium, phosphorus, and pH  1/1/2021 1727 by Lilian Asif RN  Outcome: Ongoing  1/1/2021 0455 by Jennifer Olivas RN  Outcome: Ongoing     Problem: Loneliness or Risk for Loneliness  Goal: Demonstrate positive use of time alone when socialization is not possible  1/1/2021 1727 by Lilian Asif RN  Outcome: Ongoing  1/1/2021 0455 by Jennifer Olivas RN  Outcome: Ongoing     Problem: Patient Education: Go to Patient Education Activity  Goal: Patient/Family Education  1/1/2021 1727 by Lilian Asif RN  Outcome: Ongoing  1/1/2021 0455 by Jennifer Olivas RN  Outcome: Ongoing     Problem: Falls - Risk of:  Goal: Will remain free from falls  Description: Will remain free from falls  1/1/2021 1727 by Lilian Asif RN  Outcome: Ongoing  Note: Patient remains free of falls this shift  1/1/2021 0455 by Jennifer Olivas RN  Outcome: Ongoing  Goal: Absence of physical injury  Description: Absence of physical injury  1/1/2021 1727 by Lilian Asif RN  Outcome: Ongoing  1/1/2021 0455 by Jennifer Olivas RN  Outcome: Ongoing

## 2021-01-01 NOTE — PROGRESS NOTES
During pt care pt desaturated to 84-85% for a few minutes. Fio2 increased to 75% on HHFNC in order to reach goal of spo2 >88%.

## 2021-01-01 NOTE — PROGRESS NOTES
Progress Note  1/1/2021 12:28 PM  Subjective:   Admit Date: 12/29/2020  PCP: Durga Raygoza MD  CC: covid pneumonia  Interval History: Pt would like to get in the chair today. No new complaints except congestion. No pain today. Diet: DIET CARDIAC;  Medications:   Scheduled Meds:   aspirin EC  81 mg Oral Daily    atorvastatin  20 mg Oral Nightly    gabapentin  300 mg Oral Daily    hydroCHLOROthiazide  25 mg Oral Daily    pantoprazole  40 mg Oral QAM AC    insulin lispro  0-6 Units Subcutaneous TID WC    insulin lispro  0-3 Units Subcutaneous Nightly    dextromethorphan  30 mg Oral 2 times per day    sodium chloride flush  10 mL Intravenous 2 times per day    enoxaparin  30 mg Subcutaneous BID    dexamethasone  6 mg Oral Daily    remdesivir IVPB  100 mg Intravenous Q24H     Continuous Infusions:   dextrose       CBC:   Recent Labs     12/29/20  1543   WBC 3.8   HGB 14.3   *     BMP:    Recent Labs     12/29/20  1543 12/31/20  1349 01/01/21  0535    131* 139   K 4.7 4.2 4.8    96* 101   CO2 24 23 27   BUN 14 20 18   CREATININE 0.97 1.01 0.81   GLUCOSE 118* 251* 191*     Hepatic:   Recent Labs     12/29/20  1543   AST 58*   ALT 55*   BILITOT 0.95   ALKPHOS 59     Troponin: No results for input(s): TROPONINI in the last 72 hours. BNP: No results for input(s): BNP in the last 72 hours. Lipids: No results for input(s): CHOL, HDL in the last 72 hours.     Invalid input(s): LDLCALCU  INR:   Recent Labs     12/29/20  1543   INR 1.0       Objective:   Vitals: BP (!) 132/46   Pulse (!) 43   Temp 97.5 °F (36.4 °C) (Oral)   Resp 21   Ht 5' 7\" (1.702 m)   Wt 238 lb 12.1 oz (108.3 kg)   SpO2 93%   BMI 37.39 kg/m²   General appearance: alert and cooperative with exam  Neck: supple, symmetrical, trachea midline  Lungs: clear to auscultation bilaterally  Heart: regular rate and rhythm, S1, S2 normal, no murmur, click, rub or gallop  Abdomen: soft, non tender Extremities: extremities normal, atraumatic, no cyanosis or edema  Neurologic: Mental status: Alert, oriented, thought content appropriate    Assessment and Plan:   1. covid pneumonia- continue hig flow oxygen. Wean as tolerated. Continue remdesivir and decadron. Up in chair if tolerates. 2. Bradycardia- cardiology following. On bipap at night.    3. HTN- stable    Patient Active Problem List:     Acquired deviated nasal septum     Actinic keratosis     Arthritis     Benign prostatic hyperplasia     Bloating     Chronic serous otitis media     Claustrophobia     Coronary artery disease     Esophageal reflux     Flatus     Hearing loss     Hemorrhoids     History of allergy     Hyperlipidemia     Essential hypertension     Leg swelling     Lumbar radiculopathy     Lumbar stenosis     Skin neoplasm     Tinea corporis     Ventral hernia     Weight gain     Type 2 diabetes mellitus with complication (HCC)     Obesity     Other osteoporosis without current pathological fracture     Presence of coronary angioplasty implant and graft     Morbid obesity with BMI of 40.0-44.9, adult ([facility] [address]     COVID-19      Electronically signed by Layla Marrero MD on 1/1/2021 at 12:28 PM

## 2021-01-01 NOTE — PROGRESS NOTES
ICU Progress Note (Non-Vent)  [facility]    Patient - Gregory Sevilla,  Age - [de-identified] y.o.    - 1940      Room Number -    N -  [id number]   [facility] # - [de-identified]  Date of Admission -  2020  2:40 PM    Events of Past 24 Hours   Patient remains on high flow. He denies having issues breathing when he is resting. Vitals    height is 5' 7\" (1.702 m) and weight is 238 lb 12.1 oz (108.3 kg). His oral temperature is 98 °F (36.7 °C). His blood pressure is 103/84 and his pulse is 71. His respiration is 24 and oxygen saturation is 93%.        Temperature Range: Temp: 98 °F (36.7 °C) Temp  Av °F (36.7 °C)  Min: 97.5 °F (36.4 °C)  Max: 98.5 °F (36.9 °C)  BP Range:  Systolic (52LZQ), ODL:845 , Min:103 , YVZ:220     Diastolic (35UES), WIP:62, Min:46, Max:96    Pulse Range: Pulse  Av.7  Min: 38  Max: 82  Respiration Range: Resp  Av.7  Min: 17  Max: 33  Current Pulse Ox[de-identified]  SpO2: 93 %  24HR Pulse Ox Range:  SpO2  Av.6 %  Min: 85 %  Max: 95 %  Oxygen Amount and Delivery: O2 Flow Rate (L/min): 40 L/min    Wt Readings from Last 3 Encounters:   20 238 lb 12.1 oz (108.3 kg)   20 234 lb (106.1 kg)   20 245 lb 6.4 oz (111.3 kg)     I/O       Intake/Output Summary (Last 24 hours) at 2021 1346  Last data filed at 2021 1217  Gross per 24 hour   Intake 500 ml   Output 2000 ml   Net -1500 ml     DRAIN/TUBE OUTPUT       Invasive Lines   ICP PRESSURE RANGE  No data recorded  CVP PRESSURE RANGE  No data recorded      Medications      aspirin EC  81 mg Oral Daily    atorvastatin  20 mg Oral Nightly    gabapentin  300 mg Oral Daily    hydroCHLOROthiazide  25 mg Oral Daily    pantoprazole  40 mg Oral QAM AC    insulin lispro  0-6 Units Subcutaneous TID WC    insulin lispro  0-3 Units Subcutaneous Nightly    dextromethorphan  30 mg Oral 2 times per day  sodium chloride flush  10 mL Intravenous 2 times per day    enoxaparin  30 mg Subcutaneous BID    dexamethasone  6 mg Oral Daily    remdesivir IVPB  100 mg Intravenous Q24H     glucose, dextrose, glucagon (rDNA), dextrose, benzonatate, sodium chloride flush, acetaminophen, sodium chloride  IV Drips/Infusions   dextrose         Diet/Nutrition   DIET CARDIAC; Exam      Constitutional - Alert, arousable  General Appearance  well developed, well nourished  HEENT -normocephalic, atraumatic  Lungs - Chest expands equally, no wheezes, rales or rhonchi. Cardiovascular - Heart sounds are normal.  normal rate and rhythm regular, no murmur, gallop or rub. Abdomen - soft, nontender, nondistended obese   Skin - no bruising or bleeding  Extremities - no cyanosis,    Lab Results   CBC     Lab Results   Component Value Date    WBC 3.8 12/29/2020    RBC 4.68 12/29/2020    HGB 14.3 12/29/2020    HCT 42.9 12/29/2020     12/29/2020    MCV 91.6 12/29/2020    MCH 30.6 12/29/2020    MCHC 33.4 12/29/2020    RDW 14.5 12/29/2020    LYMPHOPCT 15 12/29/2020    MONOPCT 10 12/29/2020    BASOPCT 0 12/29/2020    MONOSABS 0.40 12/29/2020    LYMPHSABS 0.60 12/29/2020    EOSABS 0.00 12/29/2020    BASOSABS 0.00 12/29/2020    DIFFTYPE NOT REPORTED 12/29/2020       BMP   Lab Results   Component Value Date     01/01/2021    K 4.8 01/01/2021     01/01/2021    CO2 27 01/01/2021    BUN 18 01/01/2021    CREATININE 0.81 01/01/2021    GLUCOSE 191 01/01/2021       LFTS  Lab Results   Component Value Date    ALKPHOS 59 12/29/2020    ALT 55 12/29/2020    AST 58 12/29/2020    PROT 7.0 12/29/2020    BILITOT 0.95 12/29/2020    LABALBU 3.9 12/29/2020       ABG ABGs: No results found for: PHART, PO2ART, RFG5IPE    No results found for: IFIO2, MODE, SETTIDVOL, SETPEEP      INR  Recent Labs     12/29/20  1543   PROTIME 13.3   INR 1.0       APTT  No results for input(s): APTT in the last 72 hours.     Lactic Acid

## 2021-01-01 NOTE — CARE COORDINATION
ONGOING DISCHARGE PLAN:    COVID POSITIVE. Afebrile, on 40L HFNC at 75% Fi02. Spoke with patient's spouse regarding discharge plan over the phone, and she confirmed that plan is home with Danielle Roe. Active order for IV Remdesivir through 1/2. PO Decadron. D-Dimer 0.54. On Lovenox 30mg BID for anticoagulation. Following for home 02 and PO AC. Will continue to follow for additional discharge needs.     Electronically signed by Ariella Kern RN on 1/1/2021 at 3:27 PM

## 2021-01-01 NOTE — PROGRESS NOTES
[facility] -   Infectious diseases evaluation  admission date 12/29/2020    reason for consultation:   COVID-19 infection    Impression :   Current:  · COVID-19 infection  · Acute respiratory failure with hypoxia required high flow oxygen  · Elevated D-dimer  · History of smoking  · Diabetes mellitus  · Diverticulitis  · Hyperlipidemia  · Hypertension    Other:  · Flagyl, penicillin and sulfa allergy    Recommendations   · IV Decadron and remdesivir  · On Lovenox  · Blood cultures pending  · Stool for C. difficile pending  · Follow inflammatory markers and chest x-ray  · Follow CBC, renal function and liver enzymes  · Supportive care  · Droplet plus isolation  · Discussed with nursing staff      History of Present Illness:   Initial history:  Matilde Winn is a [de-identified]y.o.-year-old male presented to the hospital with worsening shortness of breath for 2 days associated with cough headache and diarrhea for 2 weeks  He was hypoxic was placed on high flow oxygen  COVID-19 rapid test was positive  Inflammatory markers and liver enzymes were elevated  Chest x-ray showed left lower lobe infiltrate with possible small pleural fluid    Interval changes  1/1/2021   Afebrile  Hi Flow 40L, FIO2 75%  No complaints    Labs  Creat 0.97-1.01-0.81  CRP 56.0  WBC 3.8  Ferritin 909  D dimer 0.84-0.55-0.54    Micro  12/29 COVID19 positive    Imaging  1/1/ CXR  Cardiac silhouette is enlarged.  No pneumothorax.  No pleural effusion.  Hazy multifocal airspace opacities, worse on the left, unchanged      Patient Vitals for the past 8 hrs:   BP Temp Temp src Pulse Resp SpO2   01/01/21 1247 103/84 98 °F (36.7 °C) Oral 71 24 93 %   01/01/21 1128     21 93 %   01/01/21 1100 (!) 132/46   (!) 43 26 92 %   01/01/21 1000 (!) 145/70   (!) 44 25 94 %   01/01/21 0900 (!) 137/57   72 29 94 %   01/01/21 0804     25 (!) 88 %   01/01/21 0800 (!) 148/62 97.5 °F (36.4 °C) Oral 65 27 90 % 01/01/21 0700 (!) 147/74   (!) 45 21 90 %         I have personally reviewed the past medical history, past surgical history, medications, social history, and family history, and I haveupdated the database accordingly. Allergies:   Feldene [piroxicam], Flagyl [metronidazole], Naprosyn [naproxen], Penicillins, Sulfa antibiotics, and Sulfasalazine     Review of Systems:     Review of Systems   Constitutional: Negative. HENT: Negative. Eyes: Negative. Respiratory: Positive for cough and shortness of breath. Cardiovascular: Negative. Gastrointestinal: Negative. Musculoskeletal: Negative. Skin: Negative. As per history present illness, other than above 12 system review was negative  Physical Examination :       Physical Exam  HENT:      Head: Normocephalic and atraumatic. Nose: Nose normal.      Mouth/Throat:      Pharynx: Oropharynx is clear. Eyes:      Conjunctiva/sclera: Conjunctivae normal.   Pulmonary:      Comments: Hi Flow, 75% FIO2, diminished, NIETO  Abdominal:      Palpations: Abdomen is soft. Skin:     General: Skin is warm and dry. Neurological:      Mental Status: He is alert and oriented to person, place, and time.    Psychiatric:         Behavior: Behavior normal.         Past Medical History:     Past Medical History:   Diagnosis Date    Diabetes ([facility] [address]     Diverticulitis     History of allergy     Hyperlipidemia     Hypertension     Osteoarthritis        Past Surgical  History:     Past Surgical History:   Procedure Laterality Date    BACK SURGERY  09/03/2017    Arlene Yanes, lumbar fusion   [place]  2015    partial colectomy due to diveritc    COLONOSCOPY      TOTAL KNEE ARTHROPLASTY Right        Medications:      aspirin EC  81 mg Oral Daily    atorvastatin  20 mg Oral Nightly    gabapentin  300 mg Oral Daily    hydroCHLOROthiazide  25 mg Oral Daily    pantoprazole  40 mg Oral QAM AC  insulin lispro  0-6 Units Subcutaneous TID WC    insulin lispro  0-3 Units Subcutaneous Nightly    dextromethorphan  30 mg Oral 2 times per day    sodium chloride flush  10 mL Intravenous 2 times per day    enoxaparin  30 mg Subcutaneous BID    dexamethasone  6 mg Oral Daily    remdesivir IVPB  100 mg Intravenous Q24H       Social History:     Social History     Socioeconomic History    Marital status:      Spouse name: Not on file    Number of children: Not on file    Years of education: Not on file    Highest education level: Not on file   Occupational History    Not on file   Social Needs    Financial resource strain: Not on file    Food insecurity     Worry: Not on file     Inability: Not on file    Transportation needs     Medical: Not on file     Non-medical: Not on file   Tobacco Use    Smoking status: Former Smoker     Packs/day: 1.50     Years: 40.00     Pack years: 60.00     Quit date: 1991     Years since quittin.0    Smokeless tobacco: Never Used   Substance and Sexual Activity    Alcohol use:  Yes     Alcohol/week: 1.0 standard drinks     Types: 1 Standard drinks or equivalent per week     Comment: social    Drug use: No    Sexual activity: Not on file   Lifestyle    Physical activity     Days per week: Not on file     Minutes per session: Not on file    Stress: Not on file   Relationships    Social connections     Talks on phone: Not on file     Gets together: Not on file     Attends Zoroastrian service: Not on file     Active member of club or organization: Not on file     Attends meetings of clubs or organizations: Not on file     Relationship status: Not on file    Intimate partner violence     Fear of current or ex partner: Not on file     Emotionally abused: Not on file     Physically abused: Not on file     Forced sexual activity: Not on file   Other Topics Concern    Not on file   Social History Narrative    Not on file       Family History: History reviewed. No pertinent family history. Medical Decision Making:   I have independently reviewed/ordered the following labs:    CBC with Differential:   Recent Labs     12/29/20  1543   WBC 3.8   HGB 14.3   HCT 42.9   *   LYMPHOPCT 15*   MONOPCT 10*     BMP:  Recent Labs     12/29/20  1543 12/31/20  1349 01/01/21  0535    131* 139   K 4.7 4.2 4.8    96* 101   CO2 24 23 27   BUN 14 20 18   CREATININE 0.97 1.01 0.81   MG 1.6 1.8  --      Hepatic Function Panel:   Recent Labs     12/29/20  1543   PROT 7.0   LABALBU 3.9   BILITOT 0.95   ALKPHOS 59   ALT 55*   AST 58*     No results for input(s): RPR in the last 72 hours. No results for input(s): HIV in the last 72 hours. No results for input(s): BC in the last 72 hours. Lab Results   Component Value Date    CREATININE 0.81 01/01/2021    GLUCOSE 191 01/01/2021       Detailed results: Thank you for allowing us to participate in the care of this patient. Please call with questions. This note is created with the assistance of a speech recognition program.  While intending to generate adocument that actually reflects the content of the visit, the document can still have some errors including those of syntax and sound a like substitutions which may escape proof reading. It such instances, actual meaningcan be extrapolated by contextual diversion.     LILLI Hallman - CNP  Office: [phone]  Perfect serve / office [phone]

## 2021-01-01 NOTE — PROGRESS NOTES
Writer allowed this pt to use hospital cell phone to be able to speak with his wife, Esme Hutson, pt in  2020

## 2021-01-01 NOTE — PROGRESS NOTES
Progress Note    2021 8:16 AM      Subjective:  Mr. Alethea Cm continues to have problems with hypoxia on high oxygen intake but not intubated so far. Not requiring inotropic therapy. Bradycardia continues to happen mostly Wenckebach as reviewed in telemetry. No syncope or dizzy spells           LABS:     CBC:   Recent Labs     20  1543   WBC 3.8   HGB 14.3   HCT 42.9   MCV 91.6   *     BMP:   Recent Labs     20  1543 20  1349 21  0535    131* 139   K 4.7 4.2 4.8    96* 101   CO2 24 23 27   BUN 14 20 18   CREATININE 0.97 1.01 0.81     PT/INR:   Recent Labs     20  1543   PROTIME 13.3   INR 1.0     APTT: No results for input(s): APTT in the last 72 hours. MAG:   Recent Labs     20  1543 20  1349   MG 1.6 1.8     D Dimer:   Recent Labs     20  1543 20  0426 20  0402   DDIMER 0.84* 0.55 0.54     Troponin I No results for input(s): TROPONINI in the last 72 hours. BNP No results for input(s): BNP in the last 72 hours. Pulse Ox:  SpO2  Av.6 %  Min: 85 %  Max: 95 %  Supplemental O2: O2 Flow Rate (L/min): 40 L/min     Current Meds:    aspirin EC  81 mg Oral Daily    atorvastatin  20 mg Oral Nightly    gabapentin  300 mg Oral Daily    hydroCHLOROthiazide  25 mg Oral Daily    pantoprazole  40 mg Oral QAM AC    insulin lispro  0-6 Units Subcutaneous TID WC    insulin lispro  0-3 Units Subcutaneous Nightly    dextromethorphan  30 mg Oral 2 times per day    sodium chloride flush  10 mL Intravenous 2 times per day    enoxaparin  30 mg Subcutaneous BID    dexamethasone  6 mg Oral Daily    remdesivir IVPB  100 mg Intravenous Q24H     Continuous Infusions:    dextrose            VITAL SIGNS:    BP (!) 147/74   Pulse (!) 45   Temp 98.5 °F (36.9 °C) (Axillary)   Resp 25   Ht 5' 7\" (1.702 m)   Wt 238 lb 12.1 oz (108.3 kg)   SpO2 (!) 88%   BMI 37.39 kg/m²  40 L/min      Admit Weight:  235 lb (106.6 kg)    Last 3 weights: Wt Readings from Last 3 Encounters:   12/30/20 238 lb 12.1 oz (108.3 kg)   12/29/20 234 lb (106.1 kg)   11/19/20 245 lb 6.4 oz (111.3 kg)       BMI: Body mass index is 37.39 kg/m². INPUT/OUTPUT:          Intake/Output Summary (Last 24 hours) at 1/1/2021 0817  Last data filed at 1/1/2021 0725  Gross per 24 hour   Intake 980 ml   Output 2125 ml   Net -1145 ml         EXAM:     Did not enter the room to avoid spread of infection and preserved PPE's per CDC guidelines. Amatory reviewed, patient is in sinus rhythm with occasional Wenckebach and dropped beats noted. But no significant pauses seen    Active Problems:    COVID-19  Resolved Problems:    * No resolved hospital problems. *  1. Sinus bradycardia with first-degree AV block, second-degree AV block and.'s of high-grade AV block with P waves no QRS probably related to use of beta-blockers and possible underlying sleep apnea together with the acute illness. 2. Respiratory failure with COVID-19 pneumonia  2. History of coronary artery disease remote stenting of the left anterior descending   In 1997    PLAN  Conservative cardiac care at this point there is no significant pauses. So far no indication for pacemaker implantation. No AV dawn blocking agents to be given.       Electronically signed by Eran Zheng MD on 1/1/2021 at 8:17 AM

## 2021-01-01 NOTE — PLAN OF CARE
Problem: Airway Clearance - Ineffective  Goal: Achieve or maintain patent airway  1/1/2021 0455 by Cristi Davis RN  Outcome: Ongoing     Problem: Airway Clearance - Ineffective  Goal: Achieve or maintain patent airway  1/1/2021 0455 by Cristi Davis RN  Outcome: Ongoing     Problem: Gas Exchange - Impaired  Goal: Absence of hypoxia  1/1/2021 0455 by Cristi Davis RN  Outcome: Ongoing     Problem: Gas Exchange - Impaired  Goal: Absence of hypoxia  1/1/2021 0455 by Cristi Davis RN  Outcome: Ongoing     Problem: Gas Exchange - Impaired  Goal: Promote optimal lung function  1/1/2021 0455 by Cristi Davis RN  Outcome: Ongoing     Problem: Breathing Pattern - Ineffective  Goal: Ability to achieve and maintain a regular respiratory rate  1/1/2021 0455 by Cristi Davis RN  Outcome: Ongoing     Problem: Body Temperature -  Risk of, Imbalanced  Goal: Ability to maintain a body temperature within defined limits  1/1/2021 0455 by Cristi Davis RN  Outcome: Ongoing     Problem: Body Temperature -  Risk of, Imbalanced  Goal: Will regain or maintain usual level of consciousness  1/1/2021 0455 by Cristi Davis RN  Outcome: Ongoing     Problem:  Body Temperature -  Risk of, Imbalanced  Goal: Complications related to the disease process, condition or treatment will be avoided or minimized  1/1/2021 0455 by Cristi Davis RN  Outcome: Ongoing     Problem: Isolation Precautions - Risk of Spread of Infection  Goal: Prevent transmission of infection  1/1/2021 0455 by Cristi Davis RN  Outcome: Ongoing     Problem: Isolation Precautions - Risk of Spread of Infection  Goal: Prevent transmission of infection  1/1/2021 0455 by Cristi Davis RN  Outcome: Ongoing     Problem: Nutrition Deficits  Goal: Optimize nutrtional status  1/1/2021 0455 by Cristi Davis RN  Outcome: Ongoing     Problem: Risk for Fluid Volume Deficit  Goal: Maintain normal heart rhythm  1/1/2021 0455 by Cristi Davis RN  Outcome: Ongoing

## 2021-01-02 LAB
ABSOLUTE EOS #: 0 K/UL (ref 0–0.4)
ABSOLUTE IMMATURE GRANULOCYTE: ABNORMAL K/UL (ref 0–0.3)
ABSOLUTE LYMPH #: 0.6 K/UL (ref 1–4.8)
ABSOLUTE MONO #: 0.5 K/UL (ref 0.1–1.3)
ANION GAP SERPL CALCULATED.3IONS-SCNC: 10 MMOL/L (ref 9–17)
BASOPHILS # BLD: 1 % (ref 0–2)
BASOPHILS ABSOLUTE: 0 K/UL (ref 0–0.2)
BUN BLDV-MCNC: 17 MG/DL (ref 8–23)
BUN/CREAT BLD: ABNORMAL (ref 9–20)
CALCIUM SERPL-MCNC: 9.1 MG/DL (ref 8.6–10.4)
CHLORIDE BLD-SCNC: 99 MMOL/L (ref 98–107)
CO2: 28 MMOL/L (ref 20–31)
CREAT SERPL-MCNC: 0.89 MG/DL (ref 0.7–1.2)
DIFFERENTIAL TYPE: ABNORMAL
EKG ATRIAL RATE: 67 BPM
EKG P AXIS: 40 DEGREES
EKG P-R INTERVAL: 300 MS
EKG Q-T INTERVAL: 396 MS
EKG QRS DURATION: 100 MS
EKG QTC CALCULATION (BAZETT): 418 MS
EKG R AXIS: -36 DEGREES
EKG T AXIS: -4 DEGREES
EKG VENTRICULAR RATE: 67 BPM
EOSINOPHILS RELATIVE PERCENT: 0 % (ref 0–4)
GFR AFRICAN AMERICAN: >60 ML/MIN
GFR NON-AFRICAN AMERICAN: >60 ML/MIN
GFR SERPL CREATININE-BSD FRML MDRD: ABNORMAL ML/MIN/{1.73_M2}
GFR SERPL CREATININE-BSD FRML MDRD: ABNORMAL ML/MIN/{1.73_M2}
GLUCOSE BLD-MCNC: 196 MG/DL (ref 70–99)
GLUCOSE BLD-MCNC: 197 MG/DL (ref 75–110)
GLUCOSE BLD-MCNC: 202 MG/DL (ref 75–110)
GLUCOSE BLD-MCNC: 261 MG/DL (ref 75–110)
HCT VFR BLD CALC: 42.4 % (ref 41–53)
HEMOGLOBIN: 14.3 G/DL (ref 13.5–17.5)
IMMATURE GRANULOCYTES: ABNORMAL %
LYMPHOCYTES # BLD: 11 % (ref 24–44)
MAGNESIUM: 1.7 MG/DL (ref 1.6–2.6)
MCH RBC QN AUTO: 30.6 PG (ref 26–34)
MCHC RBC AUTO-ENTMCNC: 33.7 G/DL (ref 31–37)
MCV RBC AUTO: 90.8 FL (ref 80–100)
MONOCYTES # BLD: 9 % (ref 1–7)
NRBC AUTOMATED: ABNORMAL PER 100 WBC
PDW BLD-RTO: 14 % (ref 11.5–14.9)
PLATELET # BLD: 195 K/UL (ref 150–450)
PLATELET ESTIMATE: ABNORMAL
PMV BLD AUTO: 8.1 FL (ref 6–12)
POTASSIUM SERPL-SCNC: 3.9 MMOL/L (ref 3.7–5.3)
RBC # BLD: 4.67 M/UL (ref 4.5–5.9)
RBC # BLD: ABNORMAL 10*6/UL
SEG NEUTROPHILS: 79 % (ref 36–66)
SEGMENTED NEUTROPHILS ABSOLUTE COUNT: 4.5 K/UL (ref 1.3–9.1)
SODIUM BLD-SCNC: 137 MMOL/L (ref 135–144)
WBC # BLD: 5.7 K/UL (ref 3.5–11)
WBC # BLD: ABNORMAL 10*3/UL

## 2021-01-02 PROCEDURE — 93010 ELECTROCARDIOGRAM REPORT: CPT | Performed by: INTERNAL MEDICINE

## 2021-01-02 PROCEDURE — 99231 SBSQ HOSP IP/OBS SF/LOW 25: CPT | Performed by: FAMILY MEDICINE

## 2021-01-02 PROCEDURE — 2700000000 HC OXYGEN THERAPY PER DAY

## 2021-01-02 PROCEDURE — 36415 COLL VENOUS BLD VENIPUNCTURE: CPT

## 2021-01-02 PROCEDURE — 99232 SBSQ HOSP IP/OBS MODERATE 35: CPT | Performed by: NURSE PRACTITIONER

## 2021-01-02 PROCEDURE — 6360000002 HC RX W HCPCS: Performed by: FAMILY MEDICINE

## 2021-01-02 PROCEDURE — 2580000003 HC RX 258: Performed by: INTERNAL MEDICINE

## 2021-01-02 PROCEDURE — 2500000003 HC RX 250 WO HCPCS: Performed by: INTERNAL MEDICINE

## 2021-01-02 PROCEDURE — 85025 COMPLETE CBC W/AUTO DIFF WBC: CPT

## 2021-01-02 PROCEDURE — 6370000000 HC RX 637 (ALT 250 FOR IP): Performed by: INTERNAL MEDICINE

## 2021-01-02 PROCEDURE — 2000000000 HC ICU R&B

## 2021-01-02 PROCEDURE — 2580000003 HC RX 258: Performed by: FAMILY MEDICINE

## 2021-01-02 PROCEDURE — 82947 ASSAY GLUCOSE BLOOD QUANT: CPT

## 2021-01-02 PROCEDURE — 94660 CPAP INITIATION&MGMT: CPT

## 2021-01-02 PROCEDURE — 94761 N-INVAS EAR/PLS OXIMETRY MLT: CPT

## 2021-01-02 PROCEDURE — 80048 BASIC METABOLIC PNL TOTAL CA: CPT

## 2021-01-02 PROCEDURE — 6370000000 HC RX 637 (ALT 250 FOR IP): Performed by: FAMILY MEDICINE

## 2021-01-02 PROCEDURE — 83735 ASSAY OF MAGNESIUM: CPT

## 2021-01-02 RX ORDER — MAGNESIUM SULFATE 1 G/100ML
1 INJECTION INTRAVENOUS PRN
Status: DISCONTINUED | OUTPATIENT
Start: 2021-01-02 | End: 2021-01-12 | Stop reason: HOSPADM

## 2021-01-02 RX ADMIN — Medication 30 MG: at 20:10

## 2021-01-02 RX ADMIN — INSULIN LISPRO 2 UNITS: 100 INJECTION, SOLUTION INTRAVENOUS; SUBCUTANEOUS at 20:13

## 2021-01-02 RX ADMIN — Medication 10 ML: at 21:00

## 2021-01-02 RX ADMIN — Medication 30 MG: at 09:02

## 2021-01-02 RX ADMIN — Medication 10 ML: at 09:02

## 2021-01-02 RX ADMIN — GABAPENTIN 300 MG: 300 CAPSULE ORAL at 09:02

## 2021-01-02 RX ADMIN — INSULIN LISPRO 2 UNITS: 100 INJECTION, SOLUTION INTRAVENOUS; SUBCUTANEOUS at 17:23

## 2021-01-02 RX ADMIN — PANTOPRAZOLE SODIUM 40 MG: 40 TABLET, DELAYED RELEASE ORAL at 06:22

## 2021-01-02 RX ADMIN — HYDROCHLOROTHIAZIDE 25 MG: 25 TABLET ORAL at 09:02

## 2021-01-02 RX ADMIN — ENOXAPARIN SODIUM 30 MG: 30 INJECTION SUBCUTANEOUS at 20:10

## 2021-01-02 RX ADMIN — REMDESIVIR 100 MG: 100 INJECTION, POWDER, LYOPHILIZED, FOR SOLUTION INTRAVENOUS at 22:06

## 2021-01-02 RX ADMIN — INSULIN LISPRO 1 UNITS: 100 INJECTION, SOLUTION INTRAVENOUS; SUBCUTANEOUS at 09:02

## 2021-01-02 RX ADMIN — ENOXAPARIN SODIUM 30 MG: 30 INJECTION SUBCUTANEOUS at 09:02

## 2021-01-02 RX ADMIN — ATORVASTATIN CALCIUM 20 MG: 20 TABLET, FILM COATED ORAL at 20:10

## 2021-01-02 RX ADMIN — INSULIN LISPRO 1 UNITS: 100 INJECTION, SOLUTION INTRAVENOUS; SUBCUTANEOUS at 12:37

## 2021-01-02 RX ADMIN — DEXAMETHASONE 6 MG: 2 TABLET ORAL at 12:36

## 2021-01-02 RX ADMIN — ASPIRIN 81 MG: 81 TABLET, COATED ORAL at 09:02

## 2021-01-02 ASSESSMENT — ENCOUNTER SYMPTOMS
SHORTNESS OF BREATH: 1
EYES NEGATIVE: 1
COUGH: 1
GASTROINTESTINAL NEGATIVE: 1

## 2021-01-02 ASSESSMENT — PAIN SCALES - GENERAL: PAINLEVEL_OUTOF10: 0

## 2021-01-02 NOTE — PROGRESS NOTES
Pt continues to be on HFNC (vapotherm) 40LPM and 75%. SpO2 90%. Did educated by on the importance of laying on sides and rotating.  He appears to understand and stated he has been doing that.pt has bipap in room however does not tolerate wearing it

## 2021-01-02 NOTE — PROGRESS NOTES
Acquired deviated nasal septum     Actinic keratosis     Arthritis     Benign prostatic hyperplasia     Bloating     Chronic serous otitis media     Claustrophobia     Coronary artery disease     Esophageal reflux     Flatus     Hearing loss     Hemorrhoids     History of allergy     Hyperlipidemia     Essential hypertension     Leg swelling     Lumbar radiculopathy     Lumbar stenosis     Skin neoplasm     Tinea corporis     Ventral hernia     Weight gain     Type 2 diabetes mellitus with complication (HCC)     Obesity     Other osteoporosis without current pathological fracture     Presence of coronary angioplasty implant and graft     Morbid obesity with BMI of 40.0-44.9, adult ([facility] [address]     COVID-19      Electronically signed by Alem Orozco MD on 1/2/2021 at 10:19 AM

## 2021-01-02 NOTE — PROGRESS NOTES
NONINVASIVE VENTILATION    PROVIDE OPTIMAL VENTILATION/ACCEPTABLE SPO2   IMPLEMENT NONINVASIVE VENTILATION PROTOCOL   MAINTAIN ACCEPTABLE SPO2   ASSESS SKIN INTEGRITY/BREAKDOWN SCORE   PATIENT EDUCATION AS NEEDED   BIPAP AS NEEDED        PROVIDE ADEQUATE OXYGENATION WITH ACCEPTABLE SP02/ABG'S    [x]  IDENTIFY APPROPRIATE OXYGEN THERAPY  [x]   MONITOR SP02/ABG'S AS NEEDED   [x]   PATIENT EDUCATION AS NEEDED

## 2021-01-02 NOTE — PLAN OF CARE
Problem: Falls - Risk of:  Goal: Will remain free from falls  Description: Will remain free from falls  1/2/2021 0502 by Kristan Dumas RN  Outcome: Met This Shift  Note: Patient was free of falls and injuries this shift. 1/1/2021 1727 by Ernesto Green RN  Outcome: Ongoing  Note: Patient remains free of falls this shift  Goal: Absence of physical injury  Description: Absence of physical injury  1/2/2021 0502 by Kristan Dumas RN  Outcome: Met This Shift  1/1/2021 1727 by Ernesto Green RN  Outcome: Ongoing     Problem: Airway Clearance - Ineffective  Goal: Achieve or maintain patent airway  1/2/2021 0502 by Kristan Dumas RN  Outcome: Ongoing  1/1/2021 1727 by Ernesto Green RN  Outcome: Ongoing  Note: Patients respiratory status stable at this time. No signs or symptoms of distress noted. Respirations non-labored and regular. High flow nasal canula in place as needed to maintain patient sats, see flowsheets. Continuous SpO2 monitoring in place. Problem: Gas Exchange - Impaired  Goal: Absence of hypoxia  1/2/2021 0502 by Kristan Dumas RN  Outcome: Ongoing  1/1/2021 1727 by Ernesto Green RN  Outcome: Ongoing  Goal: Promote optimal lung function  1/2/2021 0502 by Kristan Dumas RN  Outcome: Ongoing  1/1/2021 1727 by Ernesto Green RN  Outcome: Ongoing     Problem: Breathing Pattern - Ineffective  Goal: Ability to achieve and maintain a regular respiratory rate  1/2/2021 0502 by Kristan Dumas RN  Outcome: Ongoing  1/1/2021 1727 by Ernesto Green RN  Outcome: Ongoing     Problem:  Body Temperature -  Risk of, Imbalanced  Goal: Ability to maintain a body temperature within defined limits  1/2/2021 0502 by Kristan Dumas RN  Outcome: Ongoing  1/1/2021 1727 by Ernesto Green RN  Outcome: Ongoing  Goal: Will regain or maintain usual level of consciousness  1/2/2021 0502 by Kristan Dumas RN  Outcome: Ongoing  1/1/2021 1727 by Ernesto Green RN  Outcome: Ongoing Goal: Complications related to the disease process, condition or treatment will be avoided or minimized  1/2/2021 0502 by Varsha Ross RN  Outcome: Ongoing  1/1/2021 1727 by Clinton Smith RN  Outcome: Ongoing     Problem: Isolation Precautions - Risk of Spread of Infection  Goal: Prevent transmission of infection  1/2/2021 0502 by Varsha Ross RN  Outcome: Ongoing  1/1/2021 1727 by Clinton Smith RN  Outcome: Ongoing     Problem: Nutrition Deficits  Goal: Optimize nutrtional status  1/2/2021 0502 by Varsha Ross RN  Outcome: Ongoing  1/1/2021 1727 by Clinton Smith RN  Outcome: Ongoing     Problem: Risk for Fluid Volume Deficit  Goal: Maintain normal heart rhythm  1/2/2021 0502 by Varsha Ross RN  Outcome: Ongoing  1/1/2021 1727 by Clinton Smith RN  Outcome: Ongoing  Goal: Maintain absence of muscle cramping  1/2/2021 0502 by Varsha Ross RN  Outcome: Ongoing  1/1/2021 1727 by Clinton Smith RN  Outcome: Ongoing  Goal: Maintain normal serum potassium, sodium, calcium, phosphorus, and pH  1/2/2021 0502 by Varsha Ross RN  Outcome: Ongoing  1/1/2021 1727 by Clinton Smith RN  Outcome: Ongoing     Problem: Loneliness or Risk for Loneliness  Goal: Demonstrate positive use of time alone when socialization is not possible  1/2/2021 0502 by Varsha Ross RN  Outcome: Ongoing  1/1/2021 1727 by Clinton Smith RN  Outcome: Ongoing     Problem: Fatigue  Goal: Verbalize increase energy and improved vitality  1/2/2021 0502 by Varsha Ross RN  Outcome: Ongoing  1/1/2021 1727 by Clinton Smith RN  Outcome: Ongoing     Problem: Patient Education: Go to Patient Education Activity  Goal: Patient/Family Education  1/2/2021 0502 by Varsha Ross RN  Outcome: Ongoing  1/1/2021 1727 by Clinton Smith RN  Outcome: Ongoing

## 2021-01-02 NOTE — PROGRESS NOTES
Progress Note    2021 9:09 AM      Subjective:  Mr. Maddie Lira continues to have problems with hypoxia on high oxygen intake but not intubated so far. Not requiring inotropic therapy. Bradycardia has resolved she is not physically of a block heart rate in the mid 60s. No pauses and no dropped beats noted           LABS:     CBC:   No results for input(s): WBC, HGB, HCT, MCV, PLT in the last 72 hours. BMP:   Recent Labs     20  1349 21  0535 21  0421   * 139 137   K 4.2 4.8 3.9   CL 96* 101 99   CO2 23 27 28   BUN 20 18 17   CREATININE 1.01 0.81 0.89     PT/INR:   No results for input(s): PROTIME, INR in the last 72 hours. APTT: No results for input(s): APTT in the last 72 hours. MAG:   Recent Labs     20  1349   MG 1.8     D Dimer:   Recent Labs     20  0402   DDIMER 0.54     Troponin I No results for input(s): TROPONINI in the last 72 hours. BNP No results for input(s): BNP in the last 72 hours. Pulse Ox: SpO2  Av.4 %  Min: 89 %  Max: 96 %  Supplemental O2: O2 Flow Rate (L/min): 40 L/min     Current Meds:    aspirin EC  81 mg Oral Daily    atorvastatin  20 mg Oral Nightly    gabapentin  300 mg Oral Daily    hydroCHLOROthiazide  25 mg Oral Daily    pantoprazole  40 mg Oral QAM AC    insulin lispro  0-6 Units Subcutaneous TID WC    insulin lispro  0-3 Units Subcutaneous Nightly    dextromethorphan  30 mg Oral 2 times per day    sodium chloride flush  10 mL Intravenous 2 times per day    enoxaparin  30 mg Subcutaneous BID    dexamethasone  6 mg Oral Daily    remdesivir IVPB  100 mg Intravenous Q24H     Continuous Infusions:    dextrose            VITAL SIGNS:    /66   Pulse 60   Temp 98.4 °F (36.9 °C) (Oral)   Resp 29   Ht 5' 7\" (1.702 m)   Wt 238 lb 12.1 oz (108.3 kg)   SpO2 90%   BMI 37.39 kg/m²  40 L/min      Admit Weight:  235 lb (106.6 kg)    Last 3 weights:   Wt Readings from Last 3 Encounters:   20 238 lb 12.1 oz (108.3 kg) 12/29/20 234 lb (106.1 kg)   11/19/20 245 lb 6.4 oz (111.3 kg)       BMI: Body mass index is 37.39 kg/m². INPUT/OUTPUT:          Intake/Output Summary (Last 24 hours) at 1/2/2021 0909  Last data filed at 1/2/2021 0500  Gross per 24 hour   Intake 260 ml   Output 1750 ml   Net -1490 ml         EXAM:     Did not enter the room to avoid spread of infection and preserved PPE's per CDC guidelines. Amatory reviewed, patient is in sinus rhythm with occasional Wenckebach and dropped beats noted. But no significant pauses seen    Active Problems:    COVID-19  Resolved Problems:    * No resolved hospital problems. *  1. Sinus bradycardia with first-degree AV block, second-degree AV block and.'s of high-grade AV block with P waves no QRS probably related to use of beta-blockers and possible underlying sleep apnea together with the acute illness. 2. Respiratory failure with COVID-19 pneumonia  2. History of coronary artery disease remote stenting of the left anterior descending   In 1997    PLAN  Conservative cardiac care at this point there is no significant pauses. Rhythm strips overnight all indicative of first-degree AV block no pauses no dropped beats anymore. Overall seems to be improving rhythm. Would continue to avoid AV dawn blocking agents. Please call back if needed cardiology signing off will need follow-up as outpatient after discharge.     Electronically signed by Yesica Rinaldi MD on 1/2/2021 at 9:09 AM

## 2021-01-02 NOTE — PROGRESS NOTES
Pulmonary Progress Note  [facility]      Patient - Kristy Poole,  Age - [de-identified] y.o.    - 1940      Room Number -    N -  [id number]   Acct # - [de-identified]  Date of Admission -  2020  2:40 Alison Payan MD  Primary Care Physician - Jose Harvey MD     SUBJECTIVE   Patient is actually looking like he is in no distress. Resting quietly    OBJECTIVE   VITALS    height is 5' 7\" (1.702 m) and weight is 238 lb 12.1 oz (108.3 kg). His axillary temperature is 98.4 °F (36.9 °C). His blood pressure is 153/91 (abnormal) and his pulse is 72. His respiration is 26 and oxygen saturation is 89% (abnormal). Body mass index is 37.39 kg/m². Temperature Range: Temp: 98.4 °F (36.9 °C) Temp  Av.1 °F (36.7 °C)  Min: 97.5 °F (36.4 °C)  Max: 98.4 °F (36.9 °C)  BP Range:  Systolic (57UMF), IF , Min:94 , QXA:624     Diastolic (09QCB), UOO:59, Min:50, Max:91    Pulse Range: Pulse  Av.2  Min: 48  Max: 80  Respiration Range: Resp  Av.3  Min: 19  Max: 30  Current Pulse Ox[de-identified]  SpO2: (!) 89 %  24HR Pulse Ox Range:  SpO2  Av.4 %  Min: 87 %  Max: 96 %  Oxygen Amount and Delivery: O2 Flow Rate (L/min): 40 L/min    Wt Readings from Last 3 Encounters:   20 238 lb 12.1 oz (108.3 kg)   20 234 lb (106.1 kg)   20 245 lb 6.4 oz (111.3 kg)       I/O (24 Hours)    Intake/Output Summary (Last 24 hours) at 2021 1401  Last data filed at 2021 0500  Gross per 24 hour   Intake 260 ml   Output 1550 ml   Net -1290 ml       EXAM     General Appearance  Awake, alert, oriented, in no acute distress  HEENT - normocephalic, atraumatic. Neck - Supple,  trachea midline   Lungs -coarse breath sounds posteriorly no crackles rales or  Heart Exam:PMI normal. No lifts, heaves, or thrills. RRR.  No murmurs, clicks, gallops, or rubs  Abdomen Exam: Abdomen soft, obese   extremity Exam: No signs of cyanosis    MEDS  aspirin EC  81 mg Oral Daily    atorvastatin  20 mg Oral Nightly    gabapentin  300 mg Oral Daily    hydroCHLOROthiazide  25 mg Oral Daily    pantoprazole  40 mg Oral QAM AC    insulin lispro  0-6 Units Subcutaneous TID WC    insulin lispro  0-3 Units Subcutaneous Nightly    dextromethorphan  30 mg Oral 2 times per day    sodium chloride flush  10 mL Intravenous 2 times per day    enoxaparin  30 mg Subcutaneous BID    dexamethasone  6 mg Oral Daily    remdesivir IVPB  100 mg Intravenous Q24H      dextrose       magnesium sulfate, glucose, dextrose, glucagon (rDNA), dextrose, benzonatate, sodium chloride flush, acetaminophen, sodium chloride    LABS   CBC   Recent Labs     01/02/21  0421   WBC 5.7   HGB 14.3   HCT 42.4   MCV 90.8        BMP:   Lab Results   Component Value Date     01/02/2021    K 3.9 01/02/2021    CL 99 01/02/2021    CO2 28 01/02/2021    BUN 17 01/02/2021    LABALBU 3.9 12/29/2020    CREATININE 0.89 01/02/2021    CALCIUM 9.1 01/02/2021    GFRAA >60 01/02/2021    LABGLOM >60 01/02/2021     ABGs:No results found for: PHART, PO2ART, [id number] No results found for: IFIO2, MODE, SETTIDVOL, SETPEEP  Ionized Calcium:  No results found for: IONCA  Magnesium:    Lab Results   Component Value Date    MG 1.7 01/02/2021     Phosphorus:  No results found for: PHOS     LIVER PROFILE No results for input(s): AST, ALT, LIPASE, BILIDIR, BILITOT, ALKPHOS in the last 72 hours. Invalid input(s): AMYLASE,  ALB  INR No results for input(s): INR in the last 72 hours.   PTT No results found for: APTT      RADIOLOGY     (See actual reports for details)    ASSESSMENT/PLAN     Patient Active Problem List   Diagnosis    Acquired deviated nasal septum    Actinic keratosis    Arthritis    Benign prostatic hyperplasia    Bloating    Chronic serous otitis media    Claustrophobia    Coronary artery disease    Esophageal reflux    Flatus    Hearing loss    Hemorrhoids    History of allergy  Hyperlipidemia    Essential hypertension    Leg swelling    Lumbar radiculopathy    Lumbar stenosis    Skin neoplasm    Tinea corporis    Ventral hernia    Weight gain    Type 2 diabetes mellitus with complication (HCC)    Obesity    Other osteoporosis without current pathological fracture    Presence of coronary angioplasty implant and graft    Morbid obesity with BMI of 40.0-44.9, adult ([facility] [address]    COVID-19     Impression     #1   acute respiratory failure with hypoxemia --- severe  #2.  Viral pneumonia from COVID-19  #3.  Elevated inflammatory markers  #4. Bradycardia  #5. Clinical suspicion for KAREY  6. Obesity. On Decadron. Last dose of remdesivir. Recheck D-dimer in a.m. Recheck chest x-ray in a.m. Still requiring a consider amount of high flow. Currently FiO2 of 75% with 40 L. O2 saturations 89 to 93%. Watching very closely.       Electronically signed by Reed Rodriguez MD on 1/2/2021 at 2:01 PM

## 2021-01-02 NOTE — CARE COORDINATION
Social Work-Received phone call from daughter. She and her mother have decided on [facility] as first choice. Referral sent.  Yo Wilson

## 2021-01-02 NOTE — CARE COORDINATION
Social Work-Spoke with wife regarding [facility]. Discussed that he may need LTAC at dc and explained LTAC. Lyla Galvin Offered choice of LTAC. Her first choice is [facility]. Sent referral. If patient does not require high flow O2, she would prefer patient to dc to [address] [place] [address]. Sent referral there also.  Flakita Gallegos

## 2021-01-02 NOTE — CARE COORDINATION
ONGOING DISCHARGE PLAN:    COVID POSITIVE. Afebrile, on 40L HFNC @ 75% Fi02. Writer did not speak with patient as he has high 02 requirements at this time. Active order for IV Remdesivir through today. PO Decadron. D-Dimer 0.54. On Lovenox 30mg BID. Following for home 02. Asked LSW to follow for possible LTACH as pt has high 02 requirements. Will continue to follow for additional discharge needs.     Electronically signed by Patricia Sharma RN on 1/2/2021 at 9:50 AM

## 2021-01-02 NOTE — PROGRESS NOTES
[facility] -   Infectious diseases evaluation  admission date 12/29/2020    reason for consultation:   COVID-19 infection    Impression :   Current:  · COVID-19 infection  · Acute respiratory failure with hypoxia required high flow oxygen  · Elevated D-dimer  · History of smoking  · Diabetes mellitus  · Diverticulitis  · Hyperlipidemia  · Hypertension    Other:  · Flagyl, penicillin and sulfa allergy    Recommendations   · On Decadron  · Last dose remdesivir today  · On Lovenox  · Follow inflammatory markers and chest x-ray  · Follow CBC, renal function and liver enzymes  · Supportive care  · Droplet plus isolation      History of Present Illness:   Initial history:  Karina Gonzalez is a [de-identified]y.o.-year-old male presented to the hospital with worsening shortness of breath for 2 days associated with cough headache and diarrhea for 2 weeks  He was hypoxic was placed on high flow oxygen  COVID-19 rapid test was positive  Inflammatory markers and liver enzymes were elevated  Chest x-ray showed left lower lobe infiltrate with possible small pleural fluid    Interval changes  1/2/2021   Afebrile  Hi Flow 40L, FIO2 75% SpO2 89%  No complaints    Labs  Creat 0.97-1.01-0.81  CRP 56.0  WBC 3.8-5.7  Ferritin 909  D dimer 0.84-0.55-0.54    Micro  12/29 COVID19 positive    Imaging  1/1/ CXR  Cardiac silhouette is enlarged.  No pneumothorax.  No pleural effusion.  Hazy multifocal airspace opacities, worse on the left, unchanged      Patient Vitals for the past 8 hrs:   BP Temp Temp src Pulse Resp SpO2   01/02/21 1300 (!) 153/91   72 26 (!) 89 %   01/02/21 1200 (!) 133/50 98.4 °F (36.9 °C) Axillary (!) 48 25 (!) 89 %   01/02/21 1128     29 (!) 89 %   01/02/21 1100 (!) 152/69   80 19 (!) 88 %   01/02/21 1000 120/64   80 29 90 %   01/02/21 0900 94/60 98 °F (36.7 °C) Axillary 77 25 (!) 87 %   01/02/21 0801     29    01/02/21 0800 (!) 140/73   59  91 % 01/02/21 0759     23 90 %   01/02/21 0700 138/66   60 28 90 %         I have personally reviewed the past medical history, past surgical history, medications, social history, and family history, and I haveupdated the database accordingly. Allergies:   Feldene [piroxicam], Flagyl [metronidazole], Naprosyn [naproxen], Penicillins, Sulfa antibiotics, and Sulfasalazine     Review of Systems:     Review of Systems   Constitutional: Negative. HENT: Negative. Eyes: Negative. Respiratory: Positive for cough and shortness of breath. Cardiovascular: Negative. Gastrointestinal: Negative. Musculoskeletal: Negative. Skin: Negative. As per history present illness, other than above 12 system review was negative  Physical Examination :       Physical Exam  HENT:      Head: Normocephalic and atraumatic. Nose: Nose normal.      Mouth/Throat:      Pharynx: Oropharynx is clear. Eyes:      Conjunctiva/sclera: Conjunctivae normal.   Pulmonary:      Comments: Hi Flow, 75% FIO2, diminished, NIETO  Abdominal:      Palpations: Abdomen is soft. Skin:     General: Skin is warm and dry. Neurological:      Mental Status: He is oriented to person, place, and time.    Psychiatric:         Behavior: Behavior normal.         Past Medical History:     Past Medical History:   Diagnosis Date    Diabetes ([facility] [address]     Diverticulitis     History of allergy     Hyperlipidemia     Hypertension     Osteoarthritis        Past Surgical  History:     Past Surgical History:   Procedure Laterality Date    BACK SURGERY  09/03/2017    Darian Hint  Dr Bailey Evans, lumbar fusion   [facility]  2015    partial colectomy due to diveritc    COLONOSCOPY      TOTAL KNEE ARTHROPLASTY Right        Medications:      aspirin EC  81 mg Oral Daily    atorvastatin  20 mg Oral Nightly    gabapentin  300 mg Oral Daily    hydroCHLOROthiazide  25 mg Oral Daily    pantoprazole  40 mg Oral QAM AC  insulin lispro  0-6 Units Subcutaneous TID WC    insulin lispro  0-3 Units Subcutaneous Nightly    dextromethorphan  30 mg Oral 2 times per day    sodium chloride flush  10 mL Intravenous 2 times per day    enoxaparin  30 mg Subcutaneous BID    dexamethasone  6 mg Oral Daily    remdesivir IVPB  100 mg Intravenous Q24H       Social History:     Social History     Socioeconomic History    Marital status:      Spouse name: Not on file    Number of children: Not on file    Years of education: Not on file    Highest education level: Not on file   Occupational History    Not on file   Social Needs    Financial resource strain: Not on file    Food insecurity     Worry: Not on file     Inability: Not on file    Transportation needs     Medical: Not on file     Non-medical: Not on file   Tobacco Use    Smoking status: Former Smoker     Packs/day: 1.50     Years: 40.00     Pack years: 60.00     Quit date: 1991     Years since quittin.0    Smokeless tobacco: Never Used   Substance and Sexual Activity    Alcohol use:  Yes     Alcohol/week: 1.0 standard drinks     Types: 1 Standard drinks or equivalent per week     Comment: social    Drug use: No    Sexual activity: Not on file   Lifestyle    Physical activity     Days per week: Not on file     Minutes per session: Not on file    Stress: Not on file   Relationships    Social connections     Talks on phone: Not on file     Gets together: Not on file     Attends Congregation service: Not on file     Active member of club or organization: Not on file     Attends meetings of clubs or organizations: Not on file     Relationship status: Not on file    Intimate partner violence     Fear of current or ex partner: Not on file     Emotionally abused: Not on file     Physically abused: Not on file     Forced sexual activity: Not on file   Other Topics Concern    Not on file   Social History Narrative    Not on file       Family History: History reviewed. No pertinent family history. Medical Decision Making:   I have independently reviewed/ordered the following labs:    CBC with Differential:   Recent Labs     01/02/21 0421   WBC 5.7   HGB 14.3   HCT 42.4      LYMPHOPCT 11*   MONOPCT 9*     BMP:  Recent Labs     12/31/20  1349 01/01/21  0535 01/02/21  0421   * 139 137   K 4.2 4.8 3.9   CL 96* 101 99   CO2 23 27 28   BUN 20 18 17   CREATININE 1.01 0.81 0.89   MG 1.8  --  1.7     Hepatic Function Panel:   No results for input(s): PROT, LABALBU, BILIDIR, IBILI, BILITOT, ALKPHOS, ALT, AST in the last 72 hours. No results for input(s): RPR in the last 72 hours. No results for input(s): HIV in the last 72 hours. No results for input(s): BC in the last 72 hours. Lab Results   Component Value Date    CREATININE 0.89 01/02/2021    GLUCOSE 196 01/02/2021       Detailed results: Thank you for allowing us to participate in the care of this patient. Please call with questions. This note is created with the assistance of a speech recognition program.  While intending to generate adocument that actually reflects the content of the visit, the document can still have some errors including those of syntax and sound a like substitutions which may escape proof reading. It such instances, actual meaningcan be extrapolated by contextual diversion.     LILLI Hallman - CNP  Office: [phone]  Perfect serve / office [phone]

## 2021-01-03 ENCOUNTER — APPOINTMENT (OUTPATIENT)
Dept: GENERAL RADIOLOGY | Age: 81
DRG: 177 | End: 2021-01-03
Payer: MEDICARE

## 2021-01-03 LAB
ANION GAP SERPL CALCULATED.3IONS-SCNC: 12 MMOL/L (ref 9–17)
BUN BLDV-MCNC: 20 MG/DL (ref 8–23)
BUN/CREAT BLD: ABNORMAL (ref 9–20)
C-REACTIVE PROTEIN: 14.1 MG/L (ref 0–5)
CALCIUM SERPL-MCNC: 9.3 MG/DL (ref 8.6–10.4)
CHLORIDE BLD-SCNC: 95 MMOL/L (ref 98–107)
CO2: 25 MMOL/L (ref 20–31)
CREAT SERPL-MCNC: 0.83 MG/DL (ref 0.7–1.2)
D-DIMER QUANTITATIVE: 0.58 MG/L FEU (ref 0–0.59)
FERRITIN: 736 UG/L (ref 30–400)
GFR AFRICAN AMERICAN: >60 ML/MIN
GFR NON-AFRICAN AMERICAN: >60 ML/MIN
GFR SERPL CREATININE-BSD FRML MDRD: ABNORMAL ML/MIN/{1.73_M2}
GFR SERPL CREATININE-BSD FRML MDRD: ABNORMAL ML/MIN/{1.73_M2}
GLUCOSE BLD-MCNC: 180 MG/DL (ref 75–110)
GLUCOSE BLD-MCNC: 218 MG/DL (ref 70–99)
GLUCOSE BLD-MCNC: 275 MG/DL (ref 75–110)
LACTATE DEHYDROGENASE: 329 U/L (ref 135–225)
POTASSIUM SERPL-SCNC: 3.8 MMOL/L (ref 3.7–5.3)
PROCALCITONIN: 0.08 NG/ML
SODIUM BLD-SCNC: 132 MMOL/L (ref 135–144)

## 2021-01-03 PROCEDURE — 6370000000 HC RX 637 (ALT 250 FOR IP): Performed by: FAMILY MEDICINE

## 2021-01-03 PROCEDURE — 6370000000 HC RX 637 (ALT 250 FOR IP): Performed by: INTERNAL MEDICINE

## 2021-01-03 PROCEDURE — 6360000002 HC RX W HCPCS: Performed by: FAMILY MEDICINE

## 2021-01-03 PROCEDURE — 86140 C-REACTIVE PROTEIN: CPT

## 2021-01-03 PROCEDURE — 82728 ASSAY OF FERRITIN: CPT

## 2021-01-03 PROCEDURE — 36415 COLL VENOUS BLD VENIPUNCTURE: CPT

## 2021-01-03 PROCEDURE — 94761 N-INVAS EAR/PLS OXIMETRY MLT: CPT

## 2021-01-03 PROCEDURE — 71045 X-RAY EXAM CHEST 1 VIEW: CPT

## 2021-01-03 PROCEDURE — 2580000003 HC RX 258: Performed by: FAMILY MEDICINE

## 2021-01-03 PROCEDURE — 99232 SBSQ HOSP IP/OBS MODERATE 35: CPT | Performed by: NURSE PRACTITIONER

## 2021-01-03 PROCEDURE — 85379 FIBRIN DEGRADATION QUANT: CPT

## 2021-01-03 PROCEDURE — 83615 LACTATE (LD) (LDH) ENZYME: CPT

## 2021-01-03 PROCEDURE — 82947 ASSAY GLUCOSE BLOOD QUANT: CPT

## 2021-01-03 PROCEDURE — 80048 BASIC METABOLIC PNL TOTAL CA: CPT

## 2021-01-03 PROCEDURE — 2700000000 HC OXYGEN THERAPY PER DAY

## 2021-01-03 PROCEDURE — 2000000000 HC ICU R&B

## 2021-01-03 PROCEDURE — 84145 PROCALCITONIN (PCT): CPT

## 2021-01-03 PROCEDURE — 99231 SBSQ HOSP IP/OBS SF/LOW 25: CPT | Performed by: FAMILY MEDICINE

## 2021-01-03 RX ADMIN — HYDROCHLOROTHIAZIDE 25 MG: 25 TABLET ORAL at 07:42

## 2021-01-03 RX ADMIN — INSULIN LISPRO 2 UNITS: 100 INJECTION, SOLUTION INTRAVENOUS; SUBCUTANEOUS at 07:47

## 2021-01-03 RX ADMIN — Medication 30 MG: at 20:02

## 2021-01-03 RX ADMIN — DEXAMETHASONE 6 MG: 2 TABLET ORAL at 12:18

## 2021-01-03 RX ADMIN — INSULIN LISPRO 3 UNITS: 100 INJECTION, SOLUTION INTRAVENOUS; SUBCUTANEOUS at 12:18

## 2021-01-03 RX ADMIN — Medication 10 ML: at 22:07

## 2021-01-03 RX ADMIN — ENOXAPARIN SODIUM 30 MG: 30 INJECTION SUBCUTANEOUS at 07:43

## 2021-01-03 RX ADMIN — PANTOPRAZOLE SODIUM 40 MG: 40 TABLET, DELAYED RELEASE ORAL at 07:42

## 2021-01-03 RX ADMIN — ASPIRIN 81 MG: 81 TABLET, COATED ORAL at 07:42

## 2021-01-03 RX ADMIN — GABAPENTIN 300 MG: 300 CAPSULE ORAL at 07:42

## 2021-01-03 RX ADMIN — Medication 30 MG: at 07:43

## 2021-01-03 RX ADMIN — ATORVASTATIN CALCIUM 20 MG: 20 TABLET, FILM COATED ORAL at 20:02

## 2021-01-03 RX ADMIN — INSULIN LISPRO 2 UNITS: 100 INJECTION, SOLUTION INTRAVENOUS; SUBCUTANEOUS at 22:06

## 2021-01-03 RX ADMIN — ENOXAPARIN SODIUM 30 MG: 30 INJECTION SUBCUTANEOUS at 20:02

## 2021-01-03 RX ADMIN — INSULIN LISPRO 3 UNITS: 100 INJECTION, SOLUTION INTRAVENOUS; SUBCUTANEOUS at 18:25

## 2021-01-03 ASSESSMENT — ENCOUNTER SYMPTOMS
SHORTNESS OF BREATH: 1
GASTROINTESTINAL NEGATIVE: 1
COUGH: 1
EYES NEGATIVE: 1

## 2021-01-03 NOTE — PROGRESS NOTES
Pt continues to be on HFNC 40LPM and 75% FiO2.  Pt refused to wear bipap overnight, pt appears to be in good spirits, current SpO2 91%

## 2021-01-03 NOTE — PLAN OF CARE
Problem: Airway Clearance - Ineffective  Goal: Achieve or maintain patent airway  Outcome: Ongoing     Problem: Gas Exchange - Impaired  Goal: Absence of hypoxia  Outcome: Ongoing  Continuous pulse ox. HOB elevated. TCDB encouraged. Pt maintained on HFNC. Decadron and Delsym administered. Breathing tx and equipment checks per RT. Goal: Promote optimal lung function  Outcome: Ongoing     Problem: Breathing Pattern - Ineffective  Goal: Ability to achieve and maintain a regular respiratory rate  Outcome: Ongoing     Problem: Body Temperature -  Risk of, Imbalanced  Goal: Ability to maintain a body temperature within defined limits  Outcome: Ongoing  Pt remains afebrile this shift. Goal: Will regain or maintain usual level of consciousness  Outcome: Ongoing  Goal: Complications related to the disease process, condition or treatment will be avoided or minimized  Outcome: Ongoing     Problem: Isolation Precautions - Risk of Spread of Infection  Goal: Prevent transmission of infection  Outcome: Ongoing  Pt maintained in COVID isolation. Problem: Nutrition Deficits  Goal: Optimize nutrtional status  Outcome: Ongoing     Problem: Risk for Fluid Volume Deficit  Goal: Maintain normal heart rhythm  Outcome: Ongoing  Goal: Maintain absence of muscle cramping  Outcome: Ongoing  Goal: Maintain normal serum potassium, sodium, calcium, phosphorus, and pH  Outcome: Ongoing  Monitoring intake, output, labs, and vital signs.       Problem: Loneliness or Risk for Loneliness  Goal: Demonstrate positive use of time alone when socialization is not possible  Outcome: Ongoing     Problem: Fatigue  Goal: Verbalize increase energy and improved vitality  Outcome: Ongoing     Problem: Patient Education: Go to Patient Education Activity  Goal: Patient/Family Education  Outcome: Ongoing     Problem: Falls - Risk of:  Goal: Will remain free from falls  Description: Will remain free from falls  Outcome: Ongoing Pt remains free of falls this shift. Side rails up. Bed alarm on. Bed in lowest position. Bed wheels locked. Call light and bedside table within reach. Goal: Absence of physical injury  Description: Absence of physical injury  Outcome: Ongoing     Problem: Skin Integrity:  Goal: Will show no infection signs and symptoms  Description: Will show no infection signs and symptoms  Outcome: Ongoing  Goal: Absence of new skin breakdown  Description: Absence of new skin breakdown  Outcome: Ongoing  No new skin breakdown noted this shift. Pt bathed. Lotion applied. Linens changed. Feet elevated.

## 2021-01-03 NOTE — PROGRESS NOTES
Progress Note    1/3/2021 10:02 AM  Subjective: Interval History: staff report no problems. Still on 75% FIO2. In good spirits. Diet: DIET CARDIAC;    Medications:   Reviewed medications    Labs:   CBC:   Recent Labs     01/02/21 0421   WBC 5.7   HGB 14.3        BMP:    Recent Labs     01/03/21  0434   *   K 3.8   CL 95*   CO2 25   BUN 20   CREATININE 0.83   GLUCOSE 218*     Hepatic: No results for input(s): AST, ALT, ALB, BILITOT, ALKPHOS in the last 72 hours. Troponin: No results for input(s): TROPONINI in the last 72 hours. BNP: No results for input(s): BNP in the last 72 hours. Lipids: No results for input(s): CHOL, HDL in the last 72 hours. Invalid input(s): LDLCALCU  INR: No results for input(s): INR in the last 72 hours. CBC:   Lab Results   Component Value Date    WBC 5.7 01/02/2021    RBC 4.67 01/02/2021    HGB 14.3 01/02/2021    HCT 42.4 01/02/2021    MCV 90.8 01/02/2021    MCH 30.6 01/02/2021    MCHC 33.7 01/02/2021    RDW 14.0 01/02/2021     01/02/2021    MPV 8.1 01/02/2021     BMP:    Lab Results   Component Value Date     01/03/2021    K 3.8 01/03/2021    CL 95 01/03/2021    CO2 25 01/03/2021    BUN 20 01/03/2021    LABALBU 3.9 12/29/2020    CREATININE 0.83 01/03/2021    CALCIUM 9.3 01/03/2021    GFRAA >60 01/03/2021    LABGLOM >60 01/03/2021    GLUCOSE 218 01/03/2021       Objective:   Vitals: BP (!) 145/69   Pulse 68   Temp 97.5 °F (36.4 °C) (Oral)   Resp 27   Ht 5' 7\" (1.702 m)   Wt 238 lb 12.1 oz (108.3 kg)   SpO2 90%   BMI 37.39 kg/m²     PE deferred to preserve PPE    Assessment and Plan:   1. Covid pneumonia- per ID and pulmonary  2. Continue supportive care.      Patient Active Problem List:     Acquired deviated nasal septum     Actinic keratosis     Arthritis     Benign prostatic hyperplasia     Bloating     Chronic serous otitis media     Claustrophobia     Coronary artery disease     Esophageal reflux     Flatus     Hearing loss Hemorrhoids     History of allergy     Hyperlipidemia     Essential hypertension     Leg swelling     Lumbar radiculopathy     Lumbar stenosis     Skin neoplasm     Tinea corporis     Ventral hernia     Weight gain     Type 2 diabetes mellitus with complication (HCC)     Obesity     Other osteoporosis without current pathological fracture     Presence of coronary angioplasty implant and graft     Morbid obesity with BMI of 40.0-44.9, adult (HCC)     COVID-19     Elevated C-reactive protein (CRP)      Electronically signed by Monie Carranza MD on 1/3/2021 at 10:02 AM

## 2021-01-03 NOTE — PROGRESS NOTES
ICU Progress Note (Non-Vent)  [facility]    Patient - Ja Dyer,  Age - [de-identified] y.o.    - 1940      Room Number -    MRN -  [id number]   Acct # - [de-identified]  Date of Admission -  2020  2:40 PM    Events of Past 24 Hours   Patient alert in very good spirits. Resting quietly. He denies any acute distress while resting. He does relate to dyspnea upon movement    Vitals    height is 5' 7\" (1.702 m) and weight is 238 lb 12.1 oz (108.3 kg). His oral temperature is 97.5 °F (36.4 °C). His blood pressure is 115/64 and his pulse is 67. His respiration is 23 and oxygen saturation is 91%.        Temperature Range: Temp: 97.5 °F (36.4 °C) Temp  Av.4 °F (36.3 °C)  Min: 97 °F (36.1 °C)  Max: 98.4 °F (36.9 °C)  BP Range:  Systolic (63LAG), KME:723 , Min:115 , RVD:780     Diastolic (20OWM), EVX:01, Min:52, Max:120    Pulse Range: Pulse  Av.7  Min: 36  Max: 82  Respiration Range: Resp  Av.7  Min: 14  Max: 34  Current Pulse Ox[de-identified]  SpO2: 91 %  24HR Pulse Ox Range:  SpO2  Av.9 %  Min: 83 %  Max: 95 %  Oxygen Amount and Delivery: O2 Flow Rate (L/min): 40 L/min    Wt Readings from Last 3 Encounters:   20 238 lb 12.1 oz (108.3 kg)   20 234 lb (106.1 kg)   20 245 lb 6.4 oz (111.3 kg)     I/O       Intake/Output Summary (Last 24 hours) at 1/3/2021 1330  Last data filed at 1/3/2021 0600  Gross per 24 hour   Intake 275 ml   Output 900 ml   Net -625 ml     DRAIN/TUBE OUTPUT       Invasive Lines   ICP PRESSURE RANGE  No data recorded  CVP PRESSURE RANGE  No data recorded      Medications      aspirin EC  81 mg Oral Daily    atorvastatin  20 mg Oral Nightly    gabapentin  300 mg Oral Daily    hydroCHLOROthiazide  25 mg Oral Daily    pantoprazole  40 mg Oral QAM AC    insulin lispro  0-6 Units Subcutaneous TID WC    insulin lispro  0-3 Units Subcutaneous Nightly  dextromethorphan  30 mg Oral 2 times per day    sodium chloride flush  10 mL Intravenous 2 times per day    enoxaparin  30 mg Subcutaneous BID    dexamethasone  6 mg Oral Daily     magnesium sulfate, glucose, dextrose, glucagon (rDNA), dextrose, benzonatate, sodium chloride flush, acetaminophen, sodium chloride  IV Drips/Infusions   dextrose         Diet/Nutrition   DIET CARDIAC; Exam      Constitutional - Alert, arousable  General Appearance  well developed, well nourished  HEENT -normocephalic, atraumatic. Lungs - Chest expands equally,  Cardiovascular - Heart sounds are normal.  normal rate and rhythm regular, no murmur, gallop or rub. Abdomen - soft, nontender, nondistended, no masses or   Skin - no bruising or bleeding  Extremities - no cyanosis,     Lab Results   CBC     Lab Results   Component Value Date    WBC 5.7 01/02/2021    RBC 4.67 01/02/2021    HGB 14.3 01/02/2021    HCT 42.4 01/02/2021     01/02/2021    MCV 90.8 01/02/2021    MCH 30.6 01/02/2021    MCHC 33.7 01/02/2021    RDW 14.0 01/02/2021    LYMPHOPCT 11 01/02/2021    MONOPCT 9 01/02/2021    BASOPCT 1 01/02/2021    MONOSABS 0.50 01/02/2021    LYMPHSABS 0.60 01/02/2021    EOSABS 0.00 01/02/2021    BASOSABS 0.00 01/02/2021    DIFFTYPE NOT REPORTED 01/02/2021       BMP   Lab Results   Component Value Date     01/03/2021    K 3.8 01/03/2021    CL 95 01/03/2021    CO2 25 01/03/2021    BUN 20 01/03/2021    CREATININE 0.83 01/03/2021    GLUCOSE 218 01/03/2021       LFTS  Lab Results   Component Value Date    ALKPHOS 59 12/29/2020    ALT 55 12/29/2020    AST 58 12/29/2020    PROT 7.0 12/29/2020    BILITOT 0.95 12/29/2020    LABALBU 3.9 12/29/2020       ABG ABGs: No results found for: PHART, PO2ART, ZYC5CDZ    No results found for: IFIO2, MODE, SETTIDVOL, SETPEEP      INR  No results for input(s): PROTIME, INR in the last 72 hours. APTT  No results for input(s): APTT in the last 72 hours.     Lactic Acid No results found for: LACTA     BNP   No results for input(s): BNP in the last 72 hours. Cultures       Radiology     Chest x-ray reviewed. There is left lower lung density that is not better or worse. Low lung volumes persist    SYSTEMS ASSESSMENT    #1   acute respiratory failure with hypoxemia --- severe  #2.  Viral pneumonia from COVID-19  #3.  Elevated inflammatory markers  #4.  Bradycardia  #5.  Clinical suspicion for KAREY  #6. Obesity. D-dimer is normal      Neuro       Respiratory   Currently the patient is between 70 and 75% of oxygen FiO2 with 40 L bleed in. He was at 80% [employer] [place] to [employer]. Cardiovascular     Appreciate cardiology input  Gastrointestinal       Renal     0.83 creatinine  Infectious Disease     On Decadron, remdesivir completed  Hematology/Oncology     Lovenox for DVT prophylaxis. Endocrine     Blood sugars under fair control   Social/Spiritual/DNR/Disposition/Other   Following very closely. No need for to intubate at this time. Trying to have the patient acclimate the BiPAP given high clinical suspicion for KAREY while sleeping.      Critical Care Time   30 min    Electronically signed by Elida St MD on 1/3/2021 at 1:30 PM

## 2021-01-03 NOTE — PLAN OF CARE
Problem: Airway Clearance - Ineffective  Goal: Achieve or maintain patent airway  1/3/2021 0428 by Tejas Rosales  Outcome: Ongoing       Problem: Gas Exchange - Impaired  Goal: Absence of hypoxia  1/3/2021 0428 by Tejas Rosales  Outcome: Ongoing   Pt continues to be on heated high flow with O2 sats remaining in the 90's. Problem: Breathing Pattern - Ineffective  Goal: Ability to achieve and maintain a regular respiratory rate  1/3/2021 0428 by Tejas Rosales  Outcome: Ongoing       Problem: Body Temperature -  Risk of, Imbalanced  Goal: Will regain or maintain usual level of consciousness  1/3/2021 0428 by Tejas Rosales  Outcome: Ongoing         Problem: Falls - Risk of:  Goal: Will remain free from falls  Description: Will remain free from falls  1/3/2021 0428 by Tejas Rosales  Outcome: Ongoing  Pt remained free from falls this shift. 2/4 side rails up, bed wheels locked and in lowest position, nonskid footwear on, and proper safety measures put in place.

## 2021-01-04 PROBLEM — R09.02 HYPOXEMIA REQUIRING SUPPLEMENTAL OXYGEN: Status: ACTIVE | Noted: 2021-01-04

## 2021-01-04 PROBLEM — Z99.81 HYPOXEMIA REQUIRING SUPPLEMENTAL OXYGEN: Status: ACTIVE | Noted: 2021-01-04

## 2021-01-04 LAB
ABSOLUTE EOS #: 0.1 K/UL (ref 0–0.4)
ABSOLUTE IMMATURE GRANULOCYTE: ABNORMAL K/UL (ref 0–0.3)
ABSOLUTE LYMPH #: 0.7 K/UL (ref 1–4.8)
ABSOLUTE MONO #: 0.5 K/UL (ref 0.1–1.3)
ANION GAP SERPL CALCULATED.3IONS-SCNC: 10 MMOL/L (ref 9–17)
BASOPHILS # BLD: 1 % (ref 0–2)
BASOPHILS ABSOLUTE: 0.1 K/UL (ref 0–0.2)
BUN BLDV-MCNC: 21 MG/DL (ref 8–23)
BUN/CREAT BLD: ABNORMAL (ref 9–20)
CALCIUM SERPL-MCNC: 9.3 MG/DL (ref 8.6–10.4)
CHLORIDE BLD-SCNC: 94 MMOL/L (ref 98–107)
CO2: 30 MMOL/L (ref 20–31)
CREAT SERPL-MCNC: 0.9 MG/DL (ref 0.7–1.2)
DIFFERENTIAL TYPE: ABNORMAL
EOSINOPHILS RELATIVE PERCENT: 1 % (ref 0–4)
GFR AFRICAN AMERICAN: >60 ML/MIN
GFR NON-AFRICAN AMERICAN: >60 ML/MIN
GFR SERPL CREATININE-BSD FRML MDRD: ABNORMAL ML/MIN/{1.73_M2}
GFR SERPL CREATININE-BSD FRML MDRD: ABNORMAL ML/MIN/{1.73_M2}
GLUCOSE BLD-MCNC: 207 MG/DL (ref 70–99)
GLUCOSE BLD-MCNC: 260 MG/DL (ref 75–110)
GLUCOSE BLD-MCNC: 267 MG/DL (ref 75–110)
GLUCOSE BLD-MCNC: 349 MG/DL (ref 75–110)
GLUCOSE BLD-MCNC: 371 MG/DL (ref 75–110)
HCT VFR BLD CALC: 45.5 % (ref 41–53)
HEMOGLOBIN: 15.4 G/DL (ref 13.5–17.5)
IMMATURE GRANULOCYTES: ABNORMAL %
LYMPHOCYTES # BLD: 10 % (ref 24–44)
MCH RBC QN AUTO: 30.8 PG (ref 26–34)
MCHC RBC AUTO-ENTMCNC: 33.8 G/DL (ref 31–37)
MCV RBC AUTO: 91.1 FL (ref 80–100)
MONOCYTES # BLD: 7 % (ref 1–7)
NRBC AUTOMATED: ABNORMAL PER 100 WBC
PDW BLD-RTO: 14.1 % (ref 11.5–14.9)
PLATELET # BLD: 235 K/UL (ref 150–450)
PLATELET ESTIMATE: ABNORMAL
PMV BLD AUTO: 8.5 FL (ref 6–12)
POTASSIUM SERPL-SCNC: 4.3 MMOL/L (ref 3.7–5.3)
RBC # BLD: 4.99 M/UL (ref 4.5–5.9)
RBC # BLD: ABNORMAL 10*6/UL
SEG NEUTROPHILS: 81 % (ref 36–66)
SEGMENTED NEUTROPHILS ABSOLUTE COUNT: 5.5 K/UL (ref 1.3–9.1)
SODIUM BLD-SCNC: 134 MMOL/L (ref 135–144)
WBC # BLD: 6.9 K/UL (ref 3.5–11)
WBC # BLD: ABNORMAL 10*3/UL

## 2021-01-04 PROCEDURE — 99232 SBSQ HOSP IP/OBS MODERATE 35: CPT | Performed by: NURSE PRACTITIONER

## 2021-01-04 PROCEDURE — 6370000000 HC RX 637 (ALT 250 FOR IP): Performed by: INTERNAL MEDICINE

## 2021-01-04 PROCEDURE — 2580000003 HC RX 258: Performed by: FAMILY MEDICINE

## 2021-01-04 PROCEDURE — 2060000000 HC ICU INTERMEDIATE R&B

## 2021-01-04 PROCEDURE — 6370000000 HC RX 637 (ALT 250 FOR IP): Performed by: FAMILY MEDICINE

## 2021-01-04 PROCEDURE — 80048 BASIC METABOLIC PNL TOTAL CA: CPT

## 2021-01-04 PROCEDURE — 85025 COMPLETE CBC W/AUTO DIFF WBC: CPT

## 2021-01-04 PROCEDURE — 82947 ASSAY GLUCOSE BLOOD QUANT: CPT

## 2021-01-04 PROCEDURE — 94761 N-INVAS EAR/PLS OXIMETRY MLT: CPT

## 2021-01-04 PROCEDURE — 99232 SBSQ HOSP IP/OBS MODERATE 35: CPT | Performed by: FAMILY MEDICINE

## 2021-01-04 PROCEDURE — 6360000002 HC RX W HCPCS: Performed by: FAMILY MEDICINE

## 2021-01-04 PROCEDURE — 2700000000 HC OXYGEN THERAPY PER DAY

## 2021-01-04 PROCEDURE — 36415 COLL VENOUS BLD VENIPUNCTURE: CPT

## 2021-01-04 RX ORDER — DEXMEDETOMIDINE HYDROCHLORIDE 4 UG/ML
0.2 INJECTION, SOLUTION INTRAVENOUS CONTINUOUS PRN
Status: DISCONTINUED | OUTPATIENT
Start: 2021-01-04 | End: 2021-01-04

## 2021-01-04 RX ADMIN — ENOXAPARIN SODIUM 30 MG: 30 INJECTION SUBCUTANEOUS at 08:14

## 2021-01-04 RX ADMIN — DEXAMETHASONE 6 MG: 2 TABLET ORAL at 08:13

## 2021-01-04 RX ADMIN — Medication 30 MG: at 21:13

## 2021-01-04 RX ADMIN — INSULIN LISPRO 3 UNITS: 100 INJECTION, SOLUTION INTRAVENOUS; SUBCUTANEOUS at 21:13

## 2021-01-04 RX ADMIN — Medication 10 ML: at 21:00

## 2021-01-04 RX ADMIN — INSULIN LISPRO 2 UNITS: 100 INJECTION, SOLUTION INTRAVENOUS; SUBCUTANEOUS at 08:14

## 2021-01-04 RX ADMIN — ENOXAPARIN SODIUM 30 MG: 30 INJECTION SUBCUTANEOUS at 21:13

## 2021-01-04 RX ADMIN — INSULIN LISPRO 3 UNITS: 100 INJECTION, SOLUTION INTRAVENOUS; SUBCUTANEOUS at 17:54

## 2021-01-04 RX ADMIN — ATORVASTATIN CALCIUM 20 MG: 20 TABLET, FILM COATED ORAL at 21:13

## 2021-01-04 RX ADMIN — ASPIRIN 81 MG: 81 TABLET, COATED ORAL at 08:13

## 2021-01-04 RX ADMIN — Medication 30 MG: at 08:13

## 2021-01-04 RX ADMIN — ACETAMINOPHEN 650 MG: 325 TABLET, FILM COATED ORAL at 04:45

## 2021-01-04 RX ADMIN — INSULIN LISPRO 3 UNITS: 100 INJECTION, SOLUTION INTRAVENOUS; SUBCUTANEOUS at 12:34

## 2021-01-04 RX ADMIN — PANTOPRAZOLE SODIUM 40 MG: 40 TABLET, DELAYED RELEASE ORAL at 08:14

## 2021-01-04 RX ADMIN — Medication 10 ML: at 08:14

## 2021-01-04 RX ADMIN — GABAPENTIN 300 MG: 300 CAPSULE ORAL at 08:13

## 2021-01-04 RX ADMIN — ACETAMINOPHEN 650 MG: 325 TABLET, FILM COATED ORAL at 21:13

## 2021-01-04 RX ADMIN — HYDROCHLOROTHIAZIDE 25 MG: 25 TABLET ORAL at 08:13

## 2021-01-04 ASSESSMENT — ENCOUNTER SYMPTOMS
SHORTNESS OF BREATH: 1
GASTROINTESTINAL NEGATIVE: 1
COUGH: 1
EYES NEGATIVE: 1

## 2021-01-04 ASSESSMENT — PAIN SCALES - GENERAL
PAINLEVEL_OUTOF10: 0
PAINLEVEL_OUTOF10: 0

## 2021-01-04 NOTE — CARE COORDINATION
Social work; called [facility] [phone] [address] rep [phone] and fax [phone]. Await a call back to see how bed situation may be after weekend.  Will need jose at discharge Her[facility] dugan

## 2021-01-04 NOTE — PROGRESS NOTES
ICU Progress Note (Non-Vent)  [facility]    Patient - Don Mcnally,  Age - [de-identified] y.o.    - 1940      Room Number -    MRN -  [id number]   Acct # - [de-identified]  Date of Admission -  2020  2:40 PM     Follow-up: Acute respiratory failure    Events of Past 24 Hours   Feeling better, on high flow nasal cannula at 40 L and 75% FiO2  Short of breath with activity, little dry cough, no wheezing  Denies any fever or chills, no chest pain  No nausea vomiting or diarrhea    Vitals    height is 5' 7\" (1.702 m) and weight is 238 lb 12.1 oz (108.3 kg). His axillary temperature is 98.2 °F (36.8 °C). His blood pressure is 139/79 and his pulse is 65. His respiration is 27 and oxygen saturation is 93%.        Temperature Range: Temp: 98.2 °F (36.8 °C) Temp  Av.1 °F (36.7 °C)  Min: 97.8 °F (36.6 °C)  Max: 98.3 °F (36.8 °C)  BP Range:  Systolic (47NPG), XUS:435 , Min:96 , KZL:622     Diastolic (48DZU), ALW:08, Min:52, Max:126    Pulse Range: Pulse  Av.5  Min: 43  Max: 87  Respiration Range: Resp  Av.2  Min: 10  Max: 31  Current Pulse Ox[de-identified]  SpO2: 93 %  24HR Pulse Ox Range:  SpO2  Av.9 %  Min: 85 %  Max: 97 %  Oxygen Amount and Delivery: O2 Flow Rate (L/min): 40 L/min    Wt Readings from Last 3 Encounters:   20 238 lb 12.1 oz (108.3 kg)   20 234 lb (106.1 kg)   20 245 lb 6.4 oz (111.3 kg)     I/O       Intake/Output Summary (Last 24 hours) at 2021 1354  Last data filed at 2021 0800  Gross per 24 hour   Intake 240 ml   Output 1075 ml   Net -835 ml     DRAIN/TUBE OUTPUT       Invasive Lines   ICP PRESSURE RANGE  No data recorded  CVP PRESSURE RANGE  No data recorded      Medications      aspirin EC  81 mg Oral Daily    atorvastatin  20 mg Oral Nightly    gabapentin  300 mg Oral Daily    hydroCHLOROthiazide  25 mg Oral Daily    pantoprazole  40 mg Oral QAM AC  insulin lispro  0-6 Units Subcutaneous TID     insulin lispro  0-3 Units Subcutaneous Nightly    dextromethorphan  30 mg Oral 2 times per day    sodium chloride flush  10 mL Intravenous 2 times per day    enoxaparin  30 mg Subcutaneous BID    dexamethasone  6 mg Oral Daily     magnesium sulfate, glucose, dextrose, glucagon (rDNA), dextrose, benzonatate, sodium chloride flush, acetaminophen, sodium chloride  IV Drips/Infusions   dextrose         Diet/Nutrition   DIET CARDIAC; Exam      Constitutional - Alert, awake  General Appearance  well developed, well nourished  HEENT -normocephalic, atraumatic. PERRLA  Lungs - Chest expands equally, no wheezes, rales or rhonchi. Decreased sounds, coarse  Cardiovascular - Heart sounds are normal.  normal rate and rhythm regular, no murmur, gallop or rub.   Abdomen - soft, nontender, nondistended, no guarding  Neurologic -appropriate, following commands  Skin - no bruising or bleeding  Extremities - no cyanosis, clubbing or edema    Lab Results   CBC     Lab Results   Component Value Date    WBC 6.9 01/04/2021    RBC 4.99 01/04/2021    HGB 15.4 01/04/2021    HCT 45.5 01/04/2021     01/04/2021    MCV 91.1 01/04/2021    MCH 30.8 01/04/2021    MCHC 33.8 01/04/2021    RDW 14.1 01/04/2021    LYMPHOPCT 10 01/04/2021    MONOPCT 7 01/04/2021    BASOPCT 1 01/04/2021    MONOSABS 0.50 01/04/2021    LYMPHSABS 0.70 01/04/2021    EOSABS 0.10 01/04/2021    BASOSABS 0.10 01/04/2021    DIFFTYPE NOT REPORTED 01/04/2021       BMP   Lab Results   Component Value Date     01/04/2021    K 4.3 01/04/2021    CL 94 01/04/2021    CO2 30 01/04/2021    BUN 21 01/04/2021    CREATININE 0.90 01/04/2021    GLUCOSE 207 01/04/2021       LFTS  Lab Results   Component Value Date    ALKPHOS 59 12/29/2020    ALT 55 12/29/2020    AST 58 12/29/2020    PROT 7.0 12/29/2020    BILITOT 0.95 12/29/2020    LABALBU 3.9 12/29/2020       ABG ABGs: No results found for: PHART, PO2ART, XCY1JNI No results found for: IFIO2, MODE, SETTIDVOL, SETPEEP      INR  No results for input(s): PROTIME, INR in the last 72 hours. APTT  No results for input(s): APTT in the last 72 hours. Lactic Acid  No results found for: LACTA     BNP   No results for input(s): BNP in the last 72 hours. Cultures       Radiology     CXR      CT Scans    (See actual reports for details)      SYSTEMS ASSESSMENT    Neuro       Respiratory   Wean oxygen as tolerated.  Keep O2 sat > 88%    Cardiovascular        Gastrointestinal       Renal       Infectious Disease       Hematology/Oncology       Endocrine       Social/Spiritual/DNR/Disposition/Other     Acute respiratory failure with hypoxia  COVID-19 pneumonia  Elevated inflammatory markers with CRP, LDH and ferritin, unremarkable D-dimer  Former smoker 60 pack year history quit 1991  HTN  Diabetes mellitus    Wean FiO2 as tolerated, as needed BiPAP  Completed remdesivir, dexamethasone through 1/8  On Lovenox  Discussed with staff    Electronically signed by Daylin Galvin MD on 1/4/2021 at 1:54 PM

## 2021-01-04 NOTE — PLAN OF CARE
Problem: Airway Clearance - Ineffective  Goal: Achieve or maintain patent airway  Outcome: Ongoing       Problem: Gas Exchange - Impaired  Goal: Absence of hypoxia  Outcome: Ongoing   Pt continues to be on heated high flow with sats maintaining in the 90's. Problem: Breathing Pattern - Ineffective  Goal: Ability to achieve and maintain a regular respiratory rate  Outcome: Ongoing       Problem: Falls - Risk of:  Goal: Will remain free from falls  Description: Will remain free from falls  Outcome: Ongoing   Pt remained free from falls this shift. 2/4 side rails up, bed wheels locked and in lowest position, nonskid footwear on, and proper safety measures put in place.

## 2021-01-04 NOTE — PLAN OF CARE
Problem: Airway Clearance - Ineffective  Goal: Achieve or maintain patent airway  1/4/2021 1825 by Sushila Alvarado RN  Outcome: Ongoing  Airway way maintained     Problem: Breathing Pattern - Ineffective  Goal: Ability to achieve and maintain a regular respiratory rate  1/4/2021 1825 by Sushila Alvarado RN  Outcome: Ongoing  Resp rate maintained     Problem: Falls - Risk of:  Goal: Will remain free from falls  Description: Will remain free from falls  1/4/2021 1825 by Sushila Alvarado RN  Outcome: Ongoing  No falls this shift. Problem: Skin Integrity:  Goal: Absence of new skin breakdown  Description: Absence of new skin breakdown  1/4/2021 1825 by Sushila Alvarado RN  Outcome: Ongoing  Skin maintained no new skin breakdown.  Patient turns self well

## 2021-01-04 NOTE — PROGRESS NOTES
[facility] -   Infectious diseases evaluation  admission date 12/29/2020    reason for consultation:   COVID-19 infection    Impression :   Current:  · COVID-19 infection  · Acute respiratory failure with hypoxia required high flow oxygen  · Elevated D-dimer  · History of smoking  · Diabetes mellitus  · Diverticulitis  · Hyperlipidemia  · Hypertension    Other:  · Flagyl, penicillin and sulfa allergy    Recommendations   · Decadron last dose scheduled for 1/8  · Last dose remdesivir completed 1/2  · On Lovenox  · Follow inflammatory markers and chest x-ray  · Follow CBC, renal function and liver enzymes  · Supportive care  · Droplet plus isolation, re-assess 1/8      History of Present Illness:   Initial history:  Kenn Pena is a [de-identified]y.o.-year-old male presented to the hospital with worsening shortness of breath for 2 days associated with cough headache and diarrhea for 2 weeks  He was hypoxic was placed on high flow oxygen  COVID-19 rapid test was positive  Inflammatory markers and liver enzymes were elevated  Chest x-ray showed left lower lobe infiltrate with possible small pleural fluid    Interval changes  1/4/2021   Afebrile  Hi Flow 40L, FIO2 75% SpO2 89%  No complaints  Inflammatory markers trending down    Labs  Creat 0.97-1.01-0.81-0.89-0.83-0.90  CRP 56.0-14.1  Procalcitonin 0.08  CRP 56.0-14.1    WBC 3.8-5.7  Ferritin 909-736  D dimer 0.84-0.55-0.54-0.58    Micro  12/29 COVID19 positive    Imaging  1/3 CXR  No significant interval change    1/1/ CXR  Cardiac silhouette is enlarged.  No pneumothorax.  No pleural effusion.  Hazy multifocal airspace opacities, worse on the left, unchanged      Patient Vitals for the past 8 hrs:   BP Temp Temp src Pulse Resp SpO2   01/04/21 0800 117/75 97.8 °F (36.6 °C) Axillary 50 21 (!) 89 %   01/04/21 0715     22 91 %   01/04/21 0700 (!) 143/61   56 18 93 %   01/04/21 0615     21 95 % 01/04/21 0600 128/79   (!) 43 18 95 %   01/04/21 0545    62 21 97 %   01/04/21 0530    67 25 91 %   01/04/21 0515    69 21 96 %   01/04/21 0500 135/77   65 26 94 %   01/04/21 0445    66 23 93 %   01/04/21 0430    72 25 (!) 86 %   01/04/21 0415    65 27 91 %   01/04/21 0400 (!) 154/80 98 °F (36.7 °C) Oral 57 25    01/04/21 0345    51 26 90 %   01/04/21 0330    63 26 90 %   01/04/21 0317     26 90 %   01/04/21 0315     22 90 %   01/04/21 0300     22 (!) 89 %   01/04/21 0245    66 24 90 %   01/04/21 0230    62 22 92 %   01/04/21 0215    (!) 43 23 91 %   01/04/21 0200    66 26 93 %   01/04/21 0145    50 27 93 %   01/04/21 0130    70 25 (!) 89 %         I have personally reviewed the past medical history, past surgical history, medications, social history, and family history, and I haveupdated the database accordingly. Allergies:   Feldene [piroxicam], Flagyl [metronidazole], Naprosyn [naproxen], Penicillins, Sulfa antibiotics, and Sulfasalazine     Review of Systems:     Review of Systems   Constitutional: Negative. HENT: Negative. Eyes: Negative. Respiratory: Positive for cough and shortness of breath. Cardiovascular: Negative. Gastrointestinal: Negative. Genitourinary: Negative. Musculoskeletal: Negative. Skin: Negative. As per history present illness, other than above 12 system review was negative  Physical Examination :       Physical Exam  Vitals signs and nursing note reviewed. HENT:      Head: Normocephalic and atraumatic. Nose: Nose normal.      Mouth/Throat:      Pharynx: Oropharynx is clear. Eyes:      Conjunctiva/sclera: Conjunctivae normal.   Pulmonary:      Comments:  diminished, INETO  Abdominal:      Palpations: Abdomen is soft. Skin:     General: Skin is warm and dry. Neurological:      Mental Status: He is oriented to person, place, and time.    Psychiatric:         Behavior: Behavior normal. Past Medical History:     Past Medical History:   Diagnosis Date    Diabetes ([facility] [address]     Diverticulitis     History of allergy     Hyperlipidemia     Hypertension     Osteoarthritis        Past Surgical  History:     Past Surgical History:   Procedure Laterality Date    BACK SURGERY  2017    Regla Moreno, lumbar fusion   [place]      partial colectomy due to diveritc    COLONOSCOPY      TOTAL KNEE ARTHROPLASTY Right        Medications:      aspirin EC  81 mg Oral Daily    atorvastatin  20 mg Oral Nightly    gabapentin  300 mg Oral Daily    hydroCHLOROthiazide  25 mg Oral Daily    pantoprazole  40 mg Oral QAM AC    insulin lispro  0-6 Units Subcutaneous TID WC    insulin lispro  0-3 Units Subcutaneous Nightly    dextromethorphan  30 mg Oral 2 times per day    sodium chloride flush  10 mL Intravenous 2 times per day    enoxaparin  30 mg Subcutaneous BID    dexamethasone  6 mg Oral Daily       Social History:     Social History     Socioeconomic History    Marital status:      Spouse name: Not on file    Number of children: Not on file    Years of education: Not on file    Highest education level: Not on file   Occupational History    Not on file   Social Needs    Financial resource strain: Not on file    Food insecurity     Worry: Not on file     Inability: Not on file    Transportation needs     Medical: Not on file     Non-medical: Not on file   Tobacco Use    Smoking status: Former Smoker     Packs/day: 1.50     Years: 40.00     Pack years: 60.00     Quit date: 1991     Years since quittin.0    Smokeless tobacco: Never Used   Substance and Sexual Activity    Alcohol use:  Yes     Alcohol/week: 1.0 standard drinks     Types: 1 Standard drinks or equivalent per week     Comment: social    Drug use: No    Sexual activity: Not on file   Lifestyle    Physical activity     Days per week: Not on file This note is created with the assistance of a speech recognition program.  While intending to generate adocument that actually reflects the content of the visit, the document can still have some errors including those of syntax and sound a like substitutions which may escape proof reading. It such instances, actual meaningcan be extrapolated by contextual diversion.     LILLI Hallman - CNP  Office: [phone]  Perfect serve / office [phone]

## 2021-01-04 NOTE — PROGRESS NOTES
Progress Note    1/4/2021 7:48 AM  Subjective: Interval History: poor apettite. Still has harsh cough. No complaints of abdomen pain or nausea. Diet: DIET CARDIAC;    Medications:   Reviewed medications    Labs:   CBC:   Recent Labs     01/02/21 0421   WBC 5.7   HGB 14.3        BMP:    Recent Labs     01/04/21  0421   *   K 4.3   CL 94*   CO2 30   BUN 21   CREATININE 0.90   GLUCOSE 207*     INR: No results for input(s): INR in the last 72 hours.       Objective:   Vitals: /79   Pulse (!) 43   Temp 98 °F (36.7 °C) (Oral)   Resp 22   Ht 5' 7\" (1.702 m)   Wt 238 lb 12.1 oz (108.3 kg)   SpO2 91%   BMI 37.39 kg/m²   General appearance: alert and cooperative with exam  Neck: no adenopathy and thyroid not enlarged, symmetric, no tenderness/mass/nodules  Lungs: decreased BS and mild end exp wheezing in left base  Heart: regular rate and rhythm  Abdomen: soft, non-tender; bowel sounds normal; no masses,  no organomegaly  Extremities: extremities normal, atraumatic, no cyanosis or edema  Neurologic: Mental status: Alert, oriented, thought content appropriate    Assessment and Plan:   covid infectio  Hypoxemia    P: encourage po intake: ask about supplements he may like  Encourage deep breaths and IS use and changing body position  H is ok with regency    Patient Active Problem List:     Acquired deviated nasal septum     Actinic keratosis     Arthritis     Benign prostatic hyperplasia     Bloating     Chronic serous otitis media     Claustrophobia     Coronary artery disease     Esophageal reflux     Flatus     Hearing loss     Hemorrhoids     History of allergy     Hyperlipidemia     Essential hypertension     Leg swelling     Lumbar radiculopathy     Lumbar stenosis     Skin neoplasm     Tinea corporis     Ventral hernia     Weight gain     Type 2 diabetes mellitus with complication (HCC)     Obesity     Other osteoporosis without current pathological fracture Presence of coronary angioplasty implant and graft     Morbid obesity with BMI of 40.0-44.9, adult (HCC)     COVID-19     Elevated C-reactive protein (CRP)      Electronically signed by Suzanna Martínez MD on 1/4/2021 at 7:48 AM

## 2021-01-05 LAB
ANION GAP SERPL CALCULATED.3IONS-SCNC: 12 MMOL/L (ref 9–17)
BUN BLDV-MCNC: 24 MG/DL (ref 8–23)
BUN/CREAT BLD: ABNORMAL (ref 9–20)
CALCIUM SERPL-MCNC: 9.1 MG/DL (ref 8.6–10.4)
CHLORIDE BLD-SCNC: 96 MMOL/L (ref 98–107)
CO2: 26 MMOL/L (ref 20–31)
CREAT SERPL-MCNC: 0.82 MG/DL (ref 0.7–1.2)
GFR AFRICAN AMERICAN: >60 ML/MIN
GFR NON-AFRICAN AMERICAN: >60 ML/MIN
GFR SERPL CREATININE-BSD FRML MDRD: ABNORMAL ML/MIN/{1.73_M2}
GFR SERPL CREATININE-BSD FRML MDRD: ABNORMAL ML/MIN/{1.73_M2}
GLUCOSE BLD-MCNC: 203 MG/DL (ref 70–99)
GLUCOSE BLD-MCNC: 259 MG/DL (ref 75–110)
GLUCOSE BLD-MCNC: 263 MG/DL (ref 75–110)
GLUCOSE BLD-MCNC: 307 MG/DL (ref 75–110)
GLUCOSE BLD-MCNC: 433 MG/DL (ref 75–110)
POTASSIUM SERPL-SCNC: 3.8 MMOL/L (ref 3.7–5.3)
SODIUM BLD-SCNC: 134 MMOL/L (ref 135–144)

## 2021-01-05 PROCEDURE — 6360000002 HC RX W HCPCS: Performed by: FAMILY MEDICINE

## 2021-01-05 PROCEDURE — 99232 SBSQ HOSP IP/OBS MODERATE 35: CPT | Performed by: INTERNAL MEDICINE

## 2021-01-05 PROCEDURE — 94761 N-INVAS EAR/PLS OXIMETRY MLT: CPT

## 2021-01-05 PROCEDURE — 6370000000 HC RX 637 (ALT 250 FOR IP): Performed by: FAMILY MEDICINE

## 2021-01-05 PROCEDURE — 36415 COLL VENOUS BLD VENIPUNCTURE: CPT

## 2021-01-05 PROCEDURE — 97530 THERAPEUTIC ACTIVITIES: CPT

## 2021-01-05 PROCEDURE — 2060000000 HC ICU INTERMEDIATE R&B

## 2021-01-05 PROCEDURE — 97166 OT EVAL MOD COMPLEX 45 MIN: CPT

## 2021-01-05 PROCEDURE — 6370000000 HC RX 637 (ALT 250 FOR IP): Performed by: INTERNAL MEDICINE

## 2021-01-05 PROCEDURE — 99232 SBSQ HOSP IP/OBS MODERATE 35: CPT | Performed by: FAMILY MEDICINE

## 2021-01-05 PROCEDURE — 97535 SELF CARE MNGMENT TRAINING: CPT

## 2021-01-05 PROCEDURE — 2700000000 HC OXYGEN THERAPY PER DAY

## 2021-01-05 PROCEDURE — 2580000003 HC RX 258: Performed by: FAMILY MEDICINE

## 2021-01-05 PROCEDURE — 82947 ASSAY GLUCOSE BLOOD QUANT: CPT

## 2021-01-05 PROCEDURE — 80048 BASIC METABOLIC PNL TOTAL CA: CPT

## 2021-01-05 RX ORDER — PSEUDOEPHEDRINE HCL 30 MG
100 TABLET ORAL 2 TIMES DAILY PRN
Qty: 60 CAPSULE | Refills: 0
Start: 2021-01-05

## 2021-01-05 RX ORDER — BENZONATATE 100 MG/1
100 CAPSULE ORAL 3 TIMES DAILY PRN
Qty: 30 CAPSULE | Refills: 0
Start: 2021-01-05 | End: 2021-01-12

## 2021-01-05 RX ORDER — DOCUSATE SODIUM 100 MG/1
100 CAPSULE, LIQUID FILLED ORAL 2 TIMES DAILY PRN
Status: DISCONTINUED | OUTPATIENT
Start: 2021-01-05 | End: 2021-01-12 | Stop reason: HOSPADM

## 2021-01-05 RX ORDER — MELOXICAM 15 MG/1
15 TABLET ORAL DAILY
Status: DISCONTINUED | OUTPATIENT
Start: 2021-01-05 | End: 2021-01-12 | Stop reason: HOSPADM

## 2021-01-05 RX ORDER — INSULIN GLARGINE 100 [IU]/ML
5 INJECTION, SOLUTION SUBCUTANEOUS NIGHTLY
Qty: 1 VIAL | Refills: 3
Start: 2021-01-05 | End: 2021-01-12 | Stop reason: HOSPADM

## 2021-01-05 RX ORDER — DEXAMETHASONE 6 MG/1
6 TABLET ORAL DAILY
Qty: 2 TABLET | Refills: 0
Start: 2021-01-06 | End: 2021-01-12 | Stop reason: HOSPADM

## 2021-01-05 RX ORDER — DEXTROMETHORPHAN POLISTIREX 30 MG/5ML
30 SUSPENSION ORAL EVERY 12 HOURS SCHEDULED
Qty: 100 ML | Refills: 0
Start: 2021-01-05 | End: 2021-01-15

## 2021-01-05 RX ORDER — LISINOPRIL 5 MG/1
2.5 TABLET ORAL DAILY
Status: DISCONTINUED | OUTPATIENT
Start: 2021-01-05 | End: 2021-01-12 | Stop reason: HOSPADM

## 2021-01-05 RX ORDER — NICOTINE POLACRILEX 4 MG
15 LOZENGE BUCCAL PRN
Qty: 45 G | Refills: 1
Start: 2021-01-05

## 2021-01-05 RX ORDER — INSULIN GLARGINE 100 [IU]/ML
5 INJECTION, SOLUTION SUBCUTANEOUS NIGHTLY
Status: DISCONTINUED | OUTPATIENT
Start: 2021-01-05 | End: 2021-01-08

## 2021-01-05 RX ADMIN — ACETAMINOPHEN 650 MG: 325 TABLET, FILM COATED ORAL at 21:30

## 2021-01-05 RX ADMIN — ENOXAPARIN SODIUM 30 MG: 30 INJECTION SUBCUTANEOUS at 21:23

## 2021-01-05 RX ADMIN — BENZONATATE 100 MG: 100 CAPSULE ORAL at 10:34

## 2021-01-05 RX ADMIN — Medication 10 ML: at 21:23

## 2021-01-05 RX ADMIN — INSULIN GLARGINE 5 UNITS: 100 INJECTION, SOLUTION SUBCUTANEOUS at 21:26

## 2021-01-05 RX ADMIN — INSULIN LISPRO 4 UNITS: 100 INJECTION, SOLUTION INTRAVENOUS; SUBCUTANEOUS at 09:00

## 2021-01-05 RX ADMIN — INSULIN LISPRO 3 UNITS: 100 INJECTION, SOLUTION INTRAVENOUS; SUBCUTANEOUS at 21:26

## 2021-01-05 RX ADMIN — ATORVASTATIN CALCIUM 20 MG: 20 TABLET, FILM COATED ORAL at 21:24

## 2021-01-05 RX ADMIN — METFORMIN HYDROCHLORIDE 500 MG: 500 TABLET ORAL at 17:31

## 2021-01-05 RX ADMIN — Medication 30 MG: at 09:18

## 2021-01-05 RX ADMIN — Medication 10 ML: at 09:19

## 2021-01-05 RX ADMIN — HYDROCHLOROTHIAZIDE 25 MG: 25 TABLET ORAL at 09:18

## 2021-01-05 RX ADMIN — ENOXAPARIN SODIUM 30 MG: 30 INJECTION SUBCUTANEOUS at 09:18

## 2021-01-05 RX ADMIN — INSULIN LISPRO 6 UNITS: 100 INJECTION, SOLUTION INTRAVENOUS; SUBCUTANEOUS at 17:53

## 2021-01-05 RX ADMIN — PANTOPRAZOLE SODIUM 40 MG: 40 TABLET, DELAYED RELEASE ORAL at 09:18

## 2021-01-05 RX ADMIN — INSULIN LISPRO 3 UNITS: 100 INJECTION, SOLUTION INTRAVENOUS; SUBCUTANEOUS at 12:45

## 2021-01-05 RX ADMIN — LISINOPRIL 2.5 MG: 2.5 TABLET ORAL at 09:18

## 2021-01-05 RX ADMIN — MELOXICAM 15 MG: 15 TABLET ORAL at 09:17

## 2021-01-05 RX ADMIN — METFORMIN HYDROCHLORIDE 500 MG: 500 TABLET ORAL at 10:26

## 2021-01-05 RX ADMIN — ASPIRIN 81 MG: 81 TABLET, COATED ORAL at 09:18

## 2021-01-05 RX ADMIN — Medication 30 MG: at 21:24

## 2021-01-05 RX ADMIN — DEXAMETHASONE 6 MG: 2 TABLET ORAL at 10:26

## 2021-01-05 RX ADMIN — GABAPENTIN 300 MG: 300 CAPSULE ORAL at 09:17

## 2021-01-05 ASSESSMENT — PAIN SCALES - GENERAL
PAINLEVEL_OUTOF10: 0

## 2021-01-05 ASSESSMENT — PAIN SCALES - WONG BAKER
WONGBAKER_NUMERICALRESPONSE: 0

## 2021-01-05 ASSESSMENT — ENCOUNTER SYMPTOMS
COUGH: 1
GASTROINTESTINAL NEGATIVE: 1
SHORTNESS OF BREATH: 1
EYES NEGATIVE: 1

## 2021-01-05 NOTE — PROGRESS NOTES
Pt has incentive spirometer ordered. Incentive spirometer provided to pt with education on use including technique, frequency. Pt states understanding. Denies questions at this time.

## 2021-01-05 NOTE — PROGRESS NOTES
Pt continues to be on vapotherm 40lpm and 65% FiO2 SpO2 at this time 91% pt has bipap in room however he as refused wearing it 1/1/21.

## 2021-01-05 NOTE — FLOWSHEET NOTE
Writer spoke with pt's wife Regina Kocher, who had also been a patient here. She is feeling better and just does what she can do with her fatigue and SOB from time to time. They have a daughter who is helping with needs. Regina Kocher appreciated phone call and prayer. 01/05/21 1300   Encounter Summary   Services provided to: Family   Referral/Consult From: Rounding  (by phone)   Support System Spouse; Children   Continue Visiting   (1-5-21)   Complexity of Encounter Moderate   Length of Encounter 15 minutes   Spiritual Assessment Completed Yes   Routine   Type Initial   Spiritual/Amish   Type Spiritual support   Assessment Approachable;Calm   Intervention Active listening;Explored feelings, thoughts, concerns;Prayer;Sustaining presence/ Ministry of presence; Discussed illness/injury and it's impact   Outcome Expressed gratitude;Engaged in conversation;Expressed feelings/needs/concerns;Receptive; Hopeful

## 2021-01-05 NOTE — PLAN OF CARE
Problem: Airway Clearance - Ineffective  Goal: Achieve or maintain patent airway  1/5/2021 1852 by Trang Green  Outcome: Ongoing  Note: Pt maintained a patent airway, remains on HFNC    1/5/2021 0526 by Aide Bermeo  Outcome: Ongoing     Problem: Gas Exchange - Impaired  Goal: Absence of hypoxia  1/5/2021 1852 by Trang Green  Outcome: Ongoing  Note: Pt maintained adequate oxygenation this shift, minimal desaturation with increased exertion. 1/5/2021 0526 by Aide Bermeo  Outcome: Ongoing  Goal: Promote optimal lung function  1/5/2021 1852 by Trang Green  Outcome: Ongoing  1/5/2021 0526 by Aide Bermeo  Outcome: Ongoing     Problem: Breathing Pattern - Ineffective  Goal: Ability to achieve and maintain a regular respiratory rate  1/5/2021 1852 by Trang Green  Outcome: Ongoing  1/5/2021 0526 by Aide Bermeo  Outcome: Ongoing     Problem:  Body Temperature -  Risk of, Imbalanced  Goal: Ability to maintain a body temperature within defined limits  1/5/2021 1852 by Trang Green  Outcome: Ongoing  1/5/2021 0526 by Aide Bermeo  Outcome: Ongoing  Goal: Will regain or maintain usual level of consciousness  1/5/2021 1852 by Trang Green  Outcome: Ongoing  1/5/2021 0526 by Aide Bermeo  Outcome: Ongoing  Goal: Complications related to the disease process, condition or treatment will be avoided or minimized  1/5/2021 1852 by Trang Green  Outcome: Ongoing  1/5/2021 0526 by Aide Bermeo  Outcome: Ongoing     Problem: Isolation Precautions - Risk of Spread of Infection  Goal: Prevent transmission of infection  1/5/2021 1852 by Trang Green  Outcome: Ongoing  1/5/2021 0526 by Aide Bermeo  Outcome: Ongoing     Problem: Nutrition Deficits  Goal: Optimize nutrtional status  1/5/2021 1852 by Trang Green  Outcome: Ongoing  1/5/2021 0526 by Aide Bermeo  Outcome: Ongoing     Problem: Risk for Fluid Volume Deficit  Goal: Maintain normal heart rhythm  1/5/2021 1852 by Trang Green Outcome: Ongoing  1/5/2021 0526 by Ban Kathleen  Outcome: Ongoing  Goal: Maintain absence of muscle cramping  1/5/2021 1852 by Millie Andino  Outcome: Ongoing  1/5/2021 0526 by Ban Kathleen  Outcome: Ongoing  Goal: Maintain normal serum potassium, sodium, calcium, phosphorus, and pH  1/5/2021 1852 by Millie Andino  Outcome: Ongoing  1/5/2021 0526 by Ban Kathleen  Outcome: Ongoing     Problem: Loneliness or Risk for Loneliness  Goal: Demonstrate positive use of time alone when socialization is not possible  1/5/2021 1852 by Millie Andino  Outcome: Ongoing  1/5/2021 0526 by Ban Kathleen  Outcome: Ongoing     Problem: Fatigue  Goal: Verbalize increase energy and improved vitality  1/5/2021 1852 by Millie Andino  Outcome: Ongoing  1/5/2021 0526 by Ban Kathleen  Outcome: Ongoing     Problem: Patient Education: Go to Patient Education Activity  Goal: Patient/Family Education  1/5/2021 1852 by Millie Andino  Outcome: Ongoing  1/5/2021 0526 by Ban Kathleen  Outcome: Ongoing     Problem: Falls - Risk of:  Goal: Will remain free from falls  Description: Will remain free from falls  1/5/2021 1852 by Millie Andino  Outcome: Ongoing  Note: Bed remains in lowest position, call light within reach. Patient remains free of falls at this time. RN will continue to monitor.    1/5/2021 0526 by Ban Kathleen  Outcome: Ongoing  Goal: Absence of physical injury  Description: Absence of physical injury  1/5/2021 1852 by Millie Andino  Outcome: Ongoing  1/5/2021 0526 by Ban Kathleen  Outcome: Ongoing     Problem: Skin Integrity:  Goal: Will show no infection signs and symptoms  Description: Will show no infection signs and symptoms  1/5/2021 1852 by Millie Andino  Outcome: Ongoing  1/5/2021 0526 by Ban Kathleen  Outcome: Ongoing  Goal: Absence of new skin breakdown  Description: Absence of new skin breakdown  1/5/2021 1852 by Millie Andino  Outcome: Ongoing

## 2021-01-05 NOTE — PROGRESS NOTES
[address]    Occupational Therapy Evaluation  Date: 21  Patient Name: Johnathan English       Room:   MRN: [id number]  Account: [de-identified]   : 1940  ([de-identified] y.o.) Gender: male     Discharge Recommendations: The patient would benefit from an intensive level of therapy after discharge from the facility. They should be able to tolerate 3-hours of Combined OT/PT/ST over 5 days/week or at least 900 minutes of  Combined Therapy over 7 days. Equipment Needed: (TBD)    Referring Practitioner: Isma Bailey MD  Diagnosis: COVID-19       Treatment Diagnosis: Impaired self-care status. Past Medical History:  has a past medical history of Diabetes ([address] Diverticulitis, History of allergy, Hyperlipidemia, Hypertension, and Osteoarthritis. Past Surgical History:   has a past surgical history that includes Cardiac catheterization; Colonoscopy; Total knee arthroplasty (Right); colectomy (2015); and back surgery (2017). Restrictions  Restrictions/Precautions: General Precautions, Isolation, Contact Precautions, Fall Risk(DROPLET PLUS (+) COVID)  Implants present? : Metal implants(back sx, cardiac stent)  Other position/activity restrictions: PT OT eval and treat; up in chair  Required Braces or Orthoses? : (has back brace for comfort)     Vitals  Temp: 98.2 °F (36.8 °C)  Pulse: 73  Resp: 18  BP: 110/62  Height: 5' 7\" (170.2 cm)  Weight: 235 lb 14.3 oz (107 kg)  BMI (Calculated): 37  Oxygen Therapy  SpO2: 99 %  Pulse Oximeter Device Mode: Continuous  Pulse Oximeter Device Location: Finger  O2 Device: High flow nasal cannula  Skin Assessment: Clean, dry, & intact  FiO2 : 98 %  O2 Flow Rate (L/min): 40 L/min  Blood Gas  Performed?: No  Level of Consciousness: Alert (0)    Subjective  Subjective: \"Way too many wires\" Pt is frustrated regarding wires, however understands their purpose  Overall Orientation Status: Impaired Orientation Level: Oriented to person, Oriented to situation, Oriented to time, Disoriented to place  Vision  Vision: Impaired  Vision Exceptions: Wears glasses at all times  Hearing  Hearing: Exceptions to [facility]  Hearing Exceptions: Hard of hearing/hearing concerns, No hearing aid  Social/Functional History  Lives With: Spouse  Type of Home: House  Home Layout: One level  Home Access: Level entry(3 steps into kitchen without handrails)  Bathroom Shower/Tub: Walk-in shower, Curtain  Bathroom Equipment: Built-in shower seat, Hand-held shower  Bathroom Accessibility: Accessible  Home Equipment: Cane, Standard walker, 4 wheeled walker  ADL Assistance: [facility]: (wife does)  Homemaking Responsibilities: No  Ambulation Assistance: Independent(uses cane/walker prn, sometimes no device)  Transfer Assistance: Independent  Active : No  Patient's  Info: wife  Mode of Transportation: Car  Occupation: Retired  Additional Comments: Pt has supportive daughter. Pt reports wife doing better post COVID. Objective  Vision - Basic Assessment  Prior Vision: Wears glasses all the time   Cognition  Overall Cognitive Status: Impaired  Arousal/Alertness: Delayed responses to stimuli  Following Directions: Follows one step commands  Attention Span: Attends with cues to redirect  Memory: Decreased short term memory, Decreased recall of recent events  Following Commands:  Follows one step commands with repetition  Safety Judgement: Decreased awareness of need for safety  Awareness of Errors: Assistance required to correct errors made  Insights: Decreased awareness of deficits  Sequencing and Organization: Assistance required to generate solutions, Assistance required to identify errors made   Perception  Overall Perceptual Status: WFL  Sensation  Overall Sensation Status: WFL(pt denies)   ADL  Feeding: Setup, Verbal cueing, Increased time to complete Grooming: Minimal assistance, Setup, Increased time to complete  UE Bathing: Minimal assistance, Setup, Increased time to complete  LE Bathing: Maximum assistance, Setup, Verbal cueing, Increased time to complete  UE Dressing: Moderate assistance, Setup, Verbal cueing, Increased time to complete  LE Dressing: Maximum assistance, Setup, Verbal cueing, Increased time to complete  Toileting: Dependent/Total  Additional Comments: Pt rolled side to side in bed with stand by assist for bedpan placement. Deferred ambulation to toilet due to Pt's respiratory status. ADL scores based on skilled observations and clinical reasoning unless otherwise noted. UE Function           LUE Strength  Gross LUE Strength: WFL  L Hand General: 4/5  LUE Strength Comment: Grossly 4/5     LUE Tone: Normotonic     LUE AROM (degrees)  LUE AROM : WFL     Left Hand AROM (degrees)  Left Hand AROM: WFL  RUE Strength  Gross RUE Strength: WFL  R Hand General: 4/5  RUE Strength Comment: Grossly 4/5      RUE Tone: Normotonic     RUE AROM (degrees)  RUE AROM : WFL     Right Hand AROM (degrees)  Right Hand AROM: WFL    Fine Motor Skills  Coordination  Movements Are Fluid And Coordinated: Yes                           Mobility  Supine to Sit: Stand by assistance       Balance  Sitting Balance: Contact guard assistance  Standing Balance: Minimal assistance        Bed mobility  Rolling to Left: Stand by assistance  Rolling to Right: Stand by assistance  Supine to Sit: Stand by assistance  Scooting: Stand by assistance  Comment: head of bed slightly elevated     Transfers  Stand Pivot Transfers: Minimal assistance  Sit to stand: Minimal assistance  Stand to sit: Minimal assistance  Transfer Comments: to bedside recliner from edge of bed  Functional Activity Tolerance  Functional Activity Tolerance:  Tolerates 10 - 20 min exercise with multiple rests   Assessment Performance deficits / Impairments: Decreased functional mobility , Decreased ADL status, Decreased strength, Decreased cognition, Decreased safe awareness, Decreased endurance, Decreased balance, Decreased high-level IADLs, Decreased fine motor control, Decreased posture  Treatment Diagnosis: Impaired self-care status. Prognosis: Good  Decision Making: Medium Complexity  REQUIRES OT FOLLOW UP: Yes  Discharge Recommendations: Patient would benefit from continued therapy after discharge  Activity Tolerance: Patient Tolerated treatment well         Functional Outcome Measures  AM-PAC Daily Activity Inpatient   How much help for putting on and taking off regular lower body clothing?: A Lot  How much help for Bathing?: A Lot  How much help for Toileting?: Total  How much help for putting on and taking off regular upper body clothing?: A Lot  How much help for taking care of personal grooming?: A Little  How much help for eating meals?: A Little  AM-[facility] Inpatient Daily Activity Raw Score: 13  AM-PAC Inpatient ADL T-Scale Score : 32.03  ADL Inpatient CMS 0-100% Score: 63.03  ADL Inpatient CMS G-Code Modifier : CL       Goals  Patient Goals   Patient goals : To feel better  Short term goals  Time Frame for Short term goals: By discharge  Short term goal 1: Pt will verbalize/demonstrate Good understanding of assistive equipment/durable medical equipment/modified techniques for increased independence with self-care and mobility. Short term goal 2: Pt will verbalize/demonstrate Good understanding of breathing techniques for increased independence with self-care and mobility. Short term goal 3: Pt will perform upper body bathing/dressing with stand by assist and lower body bathing/dressing with moderate assist while maintaining oxygen saturation greater than 90%. Short term goal 4: Pt will perform functional mobility and transfers during self-care consistently with contact guard assist, least restrictive mobility device and Good safety while maintaining oxygen saturation greater than 90%. Short term goal 5: Pt will actively participate in 15+ minutes of therapeutic exercise/functional activities to promote increased independence with self-care and mobility while maintaining oxygen saturation greater than 90%. Plan  Safety Devices  Safety Devices in place: Yes  Type of devices:  All fall risk precautions in place, Call light within reach, Gait belt, Patient at risk for falls, Left in chair, Nurse notified     Plan  Times per week: 3-5  Times per day: Daily  Current Treatment Recommendations: Self-Care / ADL, Strengthening, Balance Training, Functional Mobility Training, Endurance Training, Pain Management, Safety Education & Training, Patient/Caregiver Education & Training, Equipment Evaluation, Education, & procurement, Cognitive/Perceptual Training       Equipment Recommendations  Equipment Needed: (TBD)  OT Individual Minutes  Time In: 9669  Time Out: 5167  Minutes: 34    Electronically signed by Flavia Belle OT on 1/5/21 at 2:49 PM EST

## 2021-01-05 NOTE — PROGRESS NOTES
[facility] -   Infectious diseases evaluation  admission date 12/29/2020    reason for consultation:   COVID-19 infection    Impression :   Current:  · COVID-19 infection first test + 12/29/2020  · Acute respiratory failure with hypoxia required high flow oxygen  · Elevated D-dimer  · History of smoking  · Diabetes mellitus  · Diverticulitis  · Hyperlipidemia  · Hypertension    Other:  · Flagyl, penicillin and sulfa allergy    Recommendations   · Decadron until 1/8  · Remdesivir completed 1/2  · On Lovenox  · Follow inflammatory markers and chest x-ray  · Follow CBC, renal function and liver enzymes  · Supportive care  · Droplet plus isolation  · Discussed with nursing staff      History of Present Illness:   Initial history:  Esequiel Aldridge is a [de-identified]y.o.-year-old male presented to the hospital with worsening shortness of breath for 2 days associated with cough headache and diarrhea for 2 weeks  He was hypoxic was placed on high flow oxygen  COVID-19 rapid test was positive  Inflammatory markers and liver enzymes were elevated  Chest x-ray showed left lower lobe infiltrate with possible small pleural fluid    Interval changes  1/5/2021   Afebrile  He remains on high flow oxygen, no reported fever, shortness of breath improved, no reported vomiting or diarrhea, no acute events    Micro  12/29 COVID19 positive    Imaging  1/3 CXR  No significant interval change    1/1/ CXR  Cardiac silhouette is enlarged.  No pneumothorax.  No pleural effusion.  Hazy multifocal airspace opacities, worse on the left, unchanged      Patient Vitals for the past 8 hrs:   BP Temp Temp src Pulse Resp SpO2 Weight   01/05/21 1223     18 (!) 89 %    01/05/21 1200 110/62 98.2 °F (36.8 °C) Oral 73 24 95 %    01/05/21 1100 118/68   80 25 97 %    01/05/21 1030 122/66   82 (!) 33 95 %    01/05/21 1000 122/66 98 °F (36.7 °C) Oral 85 26 97 %    01/05/21 0900 (!) 126/57   77 27 96 %   insulin glargine  5 Units Subcutaneous Nightly    lisinopril  2.5 mg Oral Daily    metFORMIN  500 mg Oral BID WC    meloxicam  15 mg Oral Daily    aspirin EC  81 mg Oral Daily    atorvastatin  20 mg Oral Nightly    gabapentin  300 mg Oral Daily    hydroCHLOROthiazide  25 mg Oral Daily    pantoprazole  40 mg Oral QAM AC    insulin lispro  0-6 Units Subcutaneous TID WC    insulin lispro  0-3 Units Subcutaneous Nightly    dextromethorphan  30 mg Oral 2 times per day    sodium chloride flush  10 mL Intravenous 2 times per day    enoxaparin  30 mg Subcutaneous BID    dexamethasone  6 mg Oral Daily       Social History:     Social History     Socioeconomic History    Marital status:      Spouse name: Not on file    Number of children: Not on file    Years of education: Not on file    Highest education level: Not on file   Occupational History    Not on file   Social Needs    Financial resource strain: Not on file    Food insecurity     Worry: Not on file     Inability: Not on file    Transportation needs     Medical: Not on file     Non-medical: Not on file   Tobacco Use    Smoking status: Former Smoker     Packs/day: 1.50     Years: 40.00     Pack years: 60.00     Quit date: 1991     Years since quittin.0    Smokeless tobacco: Never Used   Substance and Sexual Activity    Alcohol use:  Yes     Alcohol/week: 1.0 standard drinks     Types: 1 Standard drinks or equivalent per week     Comment: social    Drug use: No    Sexual activity: Not on file   Lifestyle    Physical activity     Days per week: Not on file     Minutes per session: Not on file    Stress: Not on file   Relationships    Social connections     Talks on phone: Not on file     Gets together: Not on file     Attends Judaism service: Not on file     Active member of club or organization: Not on file     Attends meetings of clubs or organizations: Not on file     Relationship status: Not on file  Intimate partner violence     Fear of current or ex partner: Not on file     Emotionally abused: Not on file     Physically abused: Not on file     Forced sexual activity: Not on file   Other Topics Concern    Not on file   Social History Narrative    Not on file       Family History:   History reviewed. No pertinent family history. Medical Decision Making:   I have independently reviewed/ordered the following labs:    CBC with Differential:   Recent Labs     01/04/21 0421   WBC 6.9   HGB 15.4   HCT 45.5      LYMPHOPCT 10*   MONOPCT 7     BMP:  Recent Labs     01/04/21 0421 01/05/21  0457   * 134*   K 4.3 3.8   CL 94* 96*   CO2 30 26   BUN 21 24*   CREATININE 0.90 0.82     Hepatic Function Panel:   No results for input(s): PROT, LABALBU, BILIDIR, IBILI, BILITOT, ALKPHOS, ALT, AST in the last 72 hours. No results for input(s): RPR in the last 72 hours. No results for input(s): HIV in the last 72 hours. No results for input(s): BC in the last 72 hours. Lab Results   Component Value Date    CREATININE 0.82 01/05/2021    GLUCOSE 203 01/05/2021       Detailed results: Thank you for allowing us to participate in the care of this patient. Please call with questions. This note is created with the assistance of a speech recognition program.  While intending to generate adocument that actually reflects the content of the visit, the document can still have some errors including those of syntax and sound a like substitutions which may escape proof reading. It such instances, actual meaningcan be extrapolated by contextual diversion.     Dotty Ba MD  Office: [phone]  [employer] / office [phone]

## 2021-01-05 NOTE — CARE COORDINATION
Social work: Mine RN Care manager was able to speak with [facility] [phone]. They will accept when bed opens possibly tomorrow. Will need jose. Phone for report [phone] and fax [phone].  Mena rajput

## 2021-01-05 NOTE — PROGRESS NOTES
Pt continues to have occasional dry, non-productive cough despite PRN medication. Continue to monitor at this time.  Pt states \"tolerable\"

## 2021-01-05 NOTE — PROGRESS NOTES
Physical Therapy    Facility/Department: [facility] ICU  Initial Assessment    NAME: Johnathan English  : 1940  MRN: [id number]    Date of Service: 2021    Discharge Recommendations:  Patient would benefit from continued therapy after discharge  The patient would benefit from an intensive level of therapy after discharge from the facility. They should be able to tolerate 3-hours of Combined OT/PT/ST over 5 days/week or at least 900 minutes of  Combined Therapy over 7 days. PT Equipment Recommendations  Equipment Needed: No    Assessment   Body structures, Functions, Activity limitations: Decreased functional mobility ; Decreased ADL status; Decreased strength;Decreased endurance;Decreased balance;Decreased posture  Assessment: Pt requires 1 assist for mobility, remains on heated high flow. Pt is unsteady and limited by heated high flow this date. Pt would benefit from continued PT and is motivated. Treatment Diagnosis: Impaired functional mobility 2* covid 19  Specific instructions for Next Treatment: progress gait distance, therex, monitor SaO2/HR  Prognosis: Good  Decision Making: Medium Complexity  History: [de-identified] y.o. male who presents with SOB with diarrhea and cough for the last 1-2 weeks. Got worse with SOB and came in. Found to have covid pneumonia and hypoxia. On high flow oxygen and remdesivir and decadron now. Exam: ROM, MMT, bed mobility, transfers, balance, activity tolerance  Clinical Presentation: Pt alert, cooperative, motivated. Barriers to Learning: none  REQUIRES PT FOLLOW UP: Yes  Activity Tolerance  Activity Tolerance: Patient Tolerated treatment well;Patient limited by endurance       Patient Diagnosis(es): The primary encounter diagnosis was Hypoxia. Diagnoses of Acute respiratory failure with hypoxia ([address] and COVID-19 were also pertinent to this visit. has a past medical history of Diabetes ([address] Diverticulitis, History of allergy, Hyperlipidemia, Hypertension, and Osteoarthritis. has a past surgical history that includes Cardiac catheterization; Colonoscopy; Total knee arthroplasty (Right); colectomy (2015); and back surgery (09/03/2017). Restrictions  Restrictions/Precautions  Restrictions/Precautions: General Precautions, Isolation, Contact Precautions, Fall Risk(DROPLET PLUS (+) COVID)  Required Braces or Orthoses? : (has back brace for comfort)  Implants present? : Metal implants(back sx, cardiac stent)  Position Activity Restriction  Other position/activity restrictions: PT OT eval and treat; up in chair  Vision/Hearing  Vision: Impaired  Vision Exceptions: Wears glasses at all times  Hearing: Exceptions to [facility]  Hearing Exceptions: Hard of hearing/hearing concerns; No hearing aid     Subjective  General  Chart Reviewed: Yes  Patient assessed for rehabilitation services?: Yes  Additional Pertinent Hx: DM, OA, colectomy  Family / Caregiver Present: No  Referring Practitioner: Daya Chan MD  Referral Date : 01/05/21  Diagnosis: covid 23  Follows Commands: Within Functional Limits  Subjective  Subjective: Pt in bed, using incentive spirometer. RN Destiney Evans.    Pain Screening  Patient Currently in Pain: Denies  Vital Signs  Patient Currently in Pain: Denies  Oxygen Therapy  SpO2: 99 %  O2 Device: High flow nasal cannula       Orientation  Orientation  Overall Orientation Status: Within Functional Limits  Social/Functional History  Social/Functional History  Lives With: Spouse  Type of Home: House  Home Layout: One level  Home Access: Level entry(3 steps into kitchen without handrails)  Bathroom Shower/Tub: Walk-in shower, Curtain  Bathroom Equipment: Built-in shower seat, Hand-held shower  Bathroom Accessibility: Accessible  Home Equipment: Cane, Standard walker, 4 wheeled walker  ADL Assistance: [address] [place] [address] Avenue: (wife does)  Homemaking Responsibilities: No  Ambulation Assistance: Independent(uses cane/walker prn, sometimes no device) Transfer Assistance: Independent  Active : No  Patient's  Info: wife  Mode of Transportation: Car  Occupation: Retired  IADL Comments: sleeps in [address] chair  Additional Comments: Pt has supportive daughter. Pt reports wife doing better post COVID. Cognition        Objective          AROM RLE (degrees)  RLE AROM: WFL  AROM LLE (degrees)  LLE AROM : WFL  AROM RUE (degrees)  RUE General AROM: See OT  AROM LUE (degrees)  LUE General AROM: See OT  Strength RLE  Strength RLE: WFL  Comment: Grossly 3+ to 4-/5  Strength LLE  Strength LLE: WFL  Comment: Grossly 3+ to 4-/5  Strength RUE  Comment: See OT  Strength LUE  Comment: See OT     Sensation  Overall Sensation Status: WFL(pt denies)  Bed mobility  Rolling to Left: Minimal assistance  Rolling to Right: Minimal assistance  Supine to Sit: Stand by assistance  Sit to Supine: Unable to assess  Scooting: Stand by assistance  Comment: Head of bed slightly elevated. mod assist to roll for bed pan use/pericare. Pt sits EOB without difficulty, vitals monitored throughout. Pt denies dizziness. Transfers  Sit to Stand: Minimal Assistance  Stand to sit: Minimal Assistance  Bed to Chair: Minimal assistance  Comment: Min x1 no device. Writer managed oxygen and multiple lines. Pt requires increased time and cueing to pivot to recliner. Ambulation  Ambulation?: No  Stairs/Curb  Stairs?: No     Balance  Posture: Fair  Sitting - Static: Good  Sitting - Dynamic: Good;-  Standing - Static: Fair;+  Standing - Dynamic: Fair  Comments: Fall risk, standing balance no device for pivot this date.         Plan   Plan  Times per week: 3-4x/week  Specific instructions for Next Treatment: progress gait distance, therex, monitor SaO2/HR

## 2021-01-05 NOTE — PROGRESS NOTES
Attempt to page Dr. Ivet Chandra again to notify of  [phone], answering service states there is \"no-one on at this time. \" When asked when coverage will be available, they stated they were unsure. Perfect serve message sent to Dr. Antwon Lozano to notify.

## 2021-01-05 NOTE — PLAN OF CARE
Problem: Gas Exchange - Impaired  Goal: Absence of hypoxia  Outcome: Ongoing   Pt continues to be on heated high flow with oxygen saturations maintaining in the 90's. Problem: Airway Clearance - Ineffective  Goal: Achieve or maintain patent airway  1/5/2021 0526 by Ban Kathleen  Outcome: Ongoing       Problem: Body Temperature -  Risk of, Imbalanced  Goal: Ability to maintain a body temperature within defined limits  Outcome: Ongoing       Problem: Falls - Risk of:  Goal: Will remain free from falls  Description: Will remain free from falls  1/5/2021 0526 by Ban Kathleen  Outcome: Ongoing  Pt remained free from falls this shift. 2/4 side rails up, bed wheels locked and in lowest position, nonskid footwear on, and proper safety measures put in place.         Problem: Skin Integrity:  Goal: Absence of new skin breakdown  Description: Absence of new skin breakdown  1/5/2021 0526 by Ban Kathleen  Outcome: Ongoing

## 2021-01-05 NOTE — PROGRESS NOTES
Progress Note    1/5/2021 7:49 AM  Subjective: Interval History: patient states he feels ok. He liked the nutrition supplement. He states he did not get up in a chair yesterday. Diet: DIET CARDIAC; Dietary Nutrition Supplements: Clear Liquid Oral Supplement    Medications:   Reviewed medications    Labs:   CBC:   Recent Labs     01/04/21  0421   WBC 6.9   HGB 15.4        BMP:    Recent Labs     01/05/21  0457   *   K 3.8   CL 96*   CO2 26   BUN 24*   CREATININE 0.82   GLUCOSE 203*     INR: No results for input(s): INR in the last 72 hours. Objective:   Vitals: /78   Pulse 63   Temp 98.5 °F (36.9 °C) (Oral)   Resp 23   Ht 5' 7\" (1.702 m)   Wt 238 lb 12.1 oz (108.3 kg)   SpO2 96%   BMI 37.39 kg/m²   General appearance: alert and cooperative with exam  Neck: no adenopathy and thyroid not enlarged, symmetric, no tenderness/mass/nodules  Lungs: clear to auscultation bilaterally and distant BS, fair insp effort  Heart: regular rate and rhythm, S1, S2 normal, no murmur, click, rub or gallop  Abdomen: normal findings: soft, non-tender  Extremities: extremities normal, atraumatic, no cyanosis or edema  Neurologic: Mental status: Alert, oriented, thought content appropriate    Assessment and Plan:    COVID-19   hypoxia  Occasional bradycardia. Type 2 diabetes    P: encourage deep breathing, IS. PT and OT to get patient moving. He needs to start sitting up and moving. Sugars running high with steroids will start basal insulin if needed. Reorder mobic, metformin and lisinopril.      Patient Active Problem List:     Acquired deviated nasal septum     Actinic keratosis     Arthritis     Benign prostatic hyperplasia     Bloating     Chronic serous otitis media     Claustrophobia     Coronary artery disease     Esophageal reflux     Flatus     Hearing loss     Hemorrhoids     History of allergy     Hyperlipidemia     Essential hypertension     Leg swelling     Lumbar radiculopathy

## 2021-01-05 NOTE — PROGRESS NOTES
ICU Progress Note (Non-Vent)  [facility]    Patient - Donna Smith,  Age - [de-identified] y.o.    - 1940      Room Number -    MR[facility]  [id number]   [facility]  Date of Admission -  2020  2:40 PM     Follow-up: Acute respiratory failure    Events of Past 24 Hours    little better, on high flow nasal cannula at 40 L and 65% FiO2  Decreased Short of breath ,  dry cough, no wheezing  Denies any fever or chills, no chest pain  No nausea vomiting or diarrhea  Discussed with staff    Vitals    height is 5' 7\" (1.702 m) and weight is 235 lb 14.3 oz (107 kg). His oral temperature is 98 °F (36.7 °C). His blood pressure is 118/68 and his pulse is 80. His respiration is 25 and oxygen saturation is 97%.        Temperature Range: Temp: 98 °F (36.7 °C) Temp  Av °F (36.7 °C)  Min: 97.5 °F (36.4 °C)  Max: 98.5 °F (36.9 °C)  BP Range:  Systolic (04BQB), GNZ:199 , Min:112 , CBN:771     Diastolic (91JVY), MHB:73, Min:53, Max:85    Pulse Range: Pulse  Av.1  Min: 35  Max: 85  Respiration Range: Resp  Av.9  Min: 19  Max: 34  Current Pulse Ox[de-identified]  SpO2: 97 %  24HR Pulse Ox Range:  SpO2  Av.6 %  Min: 79 %  Max: 98 %  Oxygen Amount and Delivery: O2 Flow Rate (L/min): 40 L/min    Wt Readings from Last 3 Encounters:   21 235 lb 14.3 oz (107 kg)   20 234 lb (106.1 kg)   20 245 lb 6.4 oz (111.3 kg)     I/O       Intake/Output Summary (Last 24 hours) at 2021 1117  Last data filed at 2021 0930  Gross per 24 hour   Intake 360 ml   Output 1475 ml   Net -1115 ml     DRAIN/TUBE OUTPUT       Invasive Lines   ICP PRESSURE RANGE  No data recorded  CVP PRESSURE RANGE  No data recorded      Medications      insulin glargine  5 Units Subcutaneous Nightly    lisinopril  2.5 mg Oral Daily    metFORMIN  500 mg Oral BID WC    meloxicam  15 mg Oral Daily    aspirin EC  81 mg Oral Daily  atorvastatin  20 mg Oral Nightly    gabapentin  300 mg Oral Daily    hydroCHLOROthiazide  25 mg Oral Daily    pantoprazole  40 mg Oral QAM AC    insulin lispro  0-6 Units Subcutaneous TID WC    insulin lispro  0-3 Units Subcutaneous Nightly    dextromethorphan  30 mg Oral 2 times per day    sodium chloride flush  10 mL Intravenous 2 times per day    enoxaparin  30 mg Subcutaneous BID    dexamethasone  6 mg Oral Daily     magnesium sulfate, glucose, dextrose, glucagon (rDNA), dextrose, benzonatate, sodium chloride flush, acetaminophen, sodium chloride  IV Drips/Infusions   dexmedetomidine (PRECEDEX) IV infusion      dextrose         Diet/Nutrition   DIET CARDIAC; Dietary Nutrition Supplements: Clear Liquid Oral Supplement    Exam      Constitutional - Alert, awake, no distress  General Appearance  well developed, well nourished  HEENT -normocephalic, atraumatic. PERRLA  Lungs - Chest expands equally, no wheezes, rales or rhonchi. Decreased sounds, coarse  Cardiovascular - Heart sounds are normal.  normal rate and rhythm regular, no murmur, gallop or rub.   Abdomen - soft, nontender, nondistended, no guarding  Neurologic -appropriate, following commands  Skin - no bruising or bleeding  Extremities - no cyanosis, clubbing or edema    Lab Results   CBC     Lab Results   Component Value Date    WBC 6.9 01/04/2021    RBC 4.99 01/04/2021    HGB 15.4 01/04/2021    HCT 45.5 01/04/2021     01/04/2021    MCV 91.1 01/04/2021    MCH 30.8 01/04/2021    MCHC 33.8 01/04/2021    RDW 14.1 01/04/2021    LYMPHOPCT 10 01/04/2021    MONOPCT 7 01/04/2021    BASOPCT 1 01/04/2021    MONOSABS 0.50 01/04/2021    LYMPHSABS 0.70 01/04/2021    EOSABS 0.10 01/04/2021    BASOSABS 0.10 01/04/2021    DIFFTYPE NOT REPORTED 01/04/2021       BMP   Lab Results   Component Value Date     01/05/2021    K 3.8 01/05/2021    CL 96 01/05/2021    CO2 26 01/05/2021    BUN 24 01/05/2021    CREATININE 0.82 01/05/2021 GLUCOSE 203 01/05/2021       LFTS  Lab Results   Component Value Date    ALKPHOS 59 12/29/2020    ALT 55 12/29/2020    AST 58 12/29/2020    PROT 7.0 12/29/2020    BILITOT 0.95 12/29/2020    LABALBU 3.9 12/29/2020       ABG ABGs: No results found for: PHART, PO2ART, JMZ3YCQ    No results found for: IFIO2, MODE, SETTIDVOL, SETPEEP      INR  No results for input(s): PROTIME, INR in the last 72 hours. APTT  No results for input(s): APTT in the last 72 hours. Lactic Acid  No results found for: LACTA     BNP   No results for input(s): BNP in the last 72 hours. Cultures       Radiology     CXR      CT Scans    (See actual reports for details)      SYSTEMS ASSESSMENT    Neuro       Respiratory   Wean oxygen as tolerated.  Keep O2 sat > 88%    Cardiovascular        Gastrointestinal       Renal       Infectious Disease       Hematology/Oncology       Endocrine       Social/Spiritual/DNR/Disposition/Other     Acute respiratory failure with hypoxia  COVID-19 pneumonia  Elevated inflammatory markers with CRP, LDH and ferritin, unremarkable D-dimer  Former smoker 60 pack year history quit 1991  HTN  Diabetes mellitus    Wean FiO2 as tolerated, as needed BiPAP, IS  Completed remdesivir, dexamethasone through 1/8  On Lovenox  Discussed with staff    Electronically signed by Jeanmarie Basilio MD on 1/5/2021 at 11:17 AM

## 2021-01-05 NOTE — PROGRESS NOTES
Pt back to bed with RN minimal assist, gait belt in use. Use of furniture for transport. Non-slip footwear in place. Moderate SOB with transfer, maintained SPO2. Positioned for comfort, offloading, optimal oxygenation. Call light in reach. Pt denies further needs at this time. Safety maintained.

## 2021-01-05 NOTE — PROGRESS NOTES
Noted occasional cough. Per pt, cough is not productive in nature. PRN Tessalon give.  Will re evaluate

## 2021-01-06 ENCOUNTER — APPOINTMENT (OUTPATIENT)
Dept: GENERAL RADIOLOGY | Age: 81
DRG: 177 | End: 2021-01-06
Payer: MEDICARE

## 2021-01-06 LAB
ANION GAP SERPL CALCULATED.3IONS-SCNC: 9 MMOL/L (ref 9–17)
BUN BLDV-MCNC: 26 MG/DL (ref 8–23)
BUN/CREAT BLD: ABNORMAL (ref 9–20)
C-REACTIVE PROTEIN: 5.1 MG/L (ref 0–5)
CALCIUM SERPL-MCNC: 9.3 MG/DL (ref 8.6–10.4)
CHLORIDE BLD-SCNC: 93 MMOL/L (ref 98–107)
CO2: 30 MMOL/L (ref 20–31)
CREAT SERPL-MCNC: 1.01 MG/DL (ref 0.7–1.2)
D-DIMER QUANTITATIVE: 0.66 MG/L FEU (ref 0–0.59)
FERRITIN: 704 UG/L (ref 30–400)
GFR AFRICAN AMERICAN: >60 ML/MIN
GFR NON-AFRICAN AMERICAN: >60 ML/MIN
GFR SERPL CREATININE-BSD FRML MDRD: ABNORMAL ML/MIN/{1.73_M2}
GFR SERPL CREATININE-BSD FRML MDRD: ABNORMAL ML/MIN/{1.73_M2}
GLUCOSE BLD-MCNC: 150 MG/DL (ref 75–110)
GLUCOSE BLD-MCNC: 191 MG/DL (ref 70–99)
GLUCOSE BLD-MCNC: 219 MG/DL (ref 75–110)
GLUCOSE BLD-MCNC: 241 MG/DL (ref 75–110)
GLUCOSE BLD-MCNC: 300 MG/DL (ref 75–110)
GLUCOSE BLD-MCNC: 302 MG/DL (ref 75–110)
LACTATE DEHYDROGENASE: 300 U/L (ref 135–225)
POTASSIUM SERPL-SCNC: 4.2 MMOL/L (ref 3.7–5.3)
PROCALCITONIN: 0.11 NG/ML
SODIUM BLD-SCNC: 132 MMOL/L (ref 135–144)

## 2021-01-06 PROCEDURE — 6360000002 HC RX W HCPCS: Performed by: FAMILY MEDICINE

## 2021-01-06 PROCEDURE — 85379 FIBRIN DEGRADATION QUANT: CPT

## 2021-01-06 PROCEDURE — 71045 X-RAY EXAM CHEST 1 VIEW: CPT

## 2021-01-06 PROCEDURE — 82947 ASSAY GLUCOSE BLOOD QUANT: CPT

## 2021-01-06 PROCEDURE — 36415 COLL VENOUS BLD VENIPUNCTURE: CPT

## 2021-01-06 PROCEDURE — 83615 LACTATE (LD) (LDH) ENZYME: CPT

## 2021-01-06 PROCEDURE — 80048 BASIC METABOLIC PNL TOTAL CA: CPT

## 2021-01-06 PROCEDURE — 6370000000 HC RX 637 (ALT 250 FOR IP): Performed by: FAMILY MEDICINE

## 2021-01-06 PROCEDURE — 6370000000 HC RX 637 (ALT 250 FOR IP): Performed by: INTERNAL MEDICINE

## 2021-01-06 PROCEDURE — 2580000003 HC RX 258: Performed by: FAMILY MEDICINE

## 2021-01-06 PROCEDURE — 99232 SBSQ HOSP IP/OBS MODERATE 35: CPT | Performed by: FAMILY MEDICINE

## 2021-01-06 PROCEDURE — 82728 ASSAY OF FERRITIN: CPT

## 2021-01-06 PROCEDURE — 84145 PROCALCITONIN (PCT): CPT

## 2021-01-06 PROCEDURE — 99233 SBSQ HOSP IP/OBS HIGH 50: CPT | Performed by: INTERNAL MEDICINE

## 2021-01-06 PROCEDURE — 2060000000 HC ICU INTERMEDIATE R&B

## 2021-01-06 PROCEDURE — 94761 N-INVAS EAR/PLS OXIMETRY MLT: CPT

## 2021-01-06 PROCEDURE — 2700000000 HC OXYGEN THERAPY PER DAY

## 2021-01-06 PROCEDURE — 86140 C-REACTIVE PROTEIN: CPT

## 2021-01-06 RX ADMIN — METFORMIN HYDROCHLORIDE 500 MG: 500 TABLET ORAL at 07:59

## 2021-01-06 RX ADMIN — INSULIN LISPRO 4 UNITS: 100 INJECTION, SOLUTION INTRAVENOUS; SUBCUTANEOUS at 12:59

## 2021-01-06 RX ADMIN — Medication 30 MG: at 20:59

## 2021-01-06 RX ADMIN — DEXAMETHASONE 6 MG: 2 TABLET ORAL at 10:38

## 2021-01-06 RX ADMIN — ENOXAPARIN SODIUM 30 MG: 30 INJECTION SUBCUTANEOUS at 20:59

## 2021-01-06 RX ADMIN — ASPIRIN 81 MG: 81 TABLET, COATED ORAL at 07:58

## 2021-01-06 RX ADMIN — METFORMIN HYDROCHLORIDE 500 MG: 500 TABLET ORAL at 17:42

## 2021-01-06 RX ADMIN — GABAPENTIN 300 MG: 300 CAPSULE ORAL at 07:59

## 2021-01-06 RX ADMIN — ENOXAPARIN SODIUM 30 MG: 30 INJECTION SUBCUTANEOUS at 07:59

## 2021-01-06 RX ADMIN — HYDROCHLOROTHIAZIDE 25 MG: 25 TABLET ORAL at 07:58

## 2021-01-06 RX ADMIN — INSULIN LISPRO 4 UNITS: 100 INJECTION, SOLUTION INTRAVENOUS; SUBCUTANEOUS at 17:43

## 2021-01-06 RX ADMIN — ATORVASTATIN CALCIUM 20 MG: 20 TABLET, FILM COATED ORAL at 20:59

## 2021-01-06 RX ADMIN — INSULIN LISPRO 1 UNITS: 100 INJECTION, SOLUTION INTRAVENOUS; SUBCUTANEOUS at 09:02

## 2021-01-06 RX ADMIN — INSULIN GLARGINE 5 UNITS: 100 INJECTION, SOLUTION SUBCUTANEOUS at 20:58

## 2021-01-06 RX ADMIN — Medication 30 MG: at 07:58

## 2021-01-06 RX ADMIN — Medication 10 ML: at 20:59

## 2021-01-06 RX ADMIN — INSULIN LISPRO 4 UNITS: 100 INJECTION, SOLUTION INTRAVENOUS; SUBCUTANEOUS at 20:58

## 2021-01-06 RX ADMIN — LISINOPRIL 2.5 MG: 2.5 TABLET ORAL at 12:58

## 2021-01-06 RX ADMIN — PANTOPRAZOLE SODIUM 40 MG: 40 TABLET, DELAYED RELEASE ORAL at 06:00

## 2021-01-06 RX ADMIN — MELOXICAM 15 MG: 15 TABLET ORAL at 07:58

## 2021-01-06 RX ADMIN — Medication 10 ML: at 07:59

## 2021-01-06 ASSESSMENT — ENCOUNTER SYMPTOMS
COUGH: 1
SHORTNESS OF BREATH: 1
EYES NEGATIVE: 1
GASTROINTESTINAL NEGATIVE: 1

## 2021-01-06 ASSESSMENT — PAIN SCALES - GENERAL
PAINLEVEL_OUTOF10: 0

## 2021-01-06 NOTE — PROGRESS NOTES
ICU Progress Note (Non-Vent)  [facility]    Patient - Chace Solis,  Age - [de-identified] y.o.    - 1940      Room Number -    [facility]  [id number]   [facility]  Date of Admission -  2020  2:40 PM     Follow-up: Acute respiratory failure    Events of Past 24 Hours   better, on high flow nasal cannula at 35 L and 40 % FiO2  Not much Short of breath ,   cough, or wheezing  Denies any fever or chills, no chest pain  No nausea vomiting or diarrhea  Discussed with staff    Vitals    height is 5' 7\" (1.702 m) and weight is 235 lb 14.3 oz (107 kg). His oral temperature is 97.9 °F (36.6 °C). His blood pressure is 93/42 (abnormal) and his pulse is 74. His respiration is 20 and oxygen saturation is 96%.        Temperature Range: Temp: 97.9 °F (36.6 °C) Temp  Av.9 °F (36.6 °C)  Min: 97.6 °F (36.4 °C)  Max: 98.4 °F (36.9 °C)  BP Range:  Systolic (06MYP), XCK:151 , Min:93 , JLA:477     Diastolic (44WHI), ANQ:53, Min:42, Max:80    Pulse Range: Pulse  Av.6  Min: 32  Max: 79  Respiration Range: Resp  Av.9  Min: 18  Max: 29  Current Pulse Ox[de-identified]  SpO2: 96 %  24HR Pulse Ox Range:  SpO2  Av.8 %  Min: 89 %  Max: 100 %  Oxygen Amount and Delivery: O2 Flow Rate (L/min): 35 L/min    Wt Readings from Last 3 Encounters:   21 235 lb 14.3 oz (107 kg)   20 234 lb (106.1 kg)   20 245 lb 6.4 oz (111.3 kg)     I/O       Intake/Output Summary (Last 24 hours) at 2021 1314  Last data filed at 2021 0947  Gross per 24 hour   Intake 250 ml   Output 1325 ml   Net -1075 ml     DRAIN/TUBE OUTPUT       Invasive Lines   ICP PRESSURE RANGE  No data recorded  CVP PRESSURE RANGE  No data recorded      Medications      insulin glargine  5 Units Subcutaneous Nightly    lisinopril  2.5 mg Oral Daily    metFORMIN  500 mg Oral BID WC    meloxicam  15 mg Oral Daily  insulin lispro  0-12 Units Subcutaneous TID WC    insulin lispro  0-6 Units Subcutaneous Nightly    aspirin EC  81 mg Oral Daily    atorvastatin  20 mg Oral Nightly    gabapentin  300 mg Oral Daily    hydroCHLOROthiazide  25 mg Oral Daily    pantoprazole  40 mg Oral QAM AC    dextromethorphan  30 mg Oral 2 times per day    sodium chloride flush  10 mL Intravenous 2 times per day    enoxaparin  30 mg Subcutaneous BID    dexamethasone  6 mg Oral Daily     docusate sodium, magnesium sulfate, glucose, dextrose, glucagon (rDNA), dextrose, benzonatate, sodium chloride flush, acetaminophen, sodium chloride  IV Drips/Infusions   dexmedetomidine (PRECEDEX) IV infusion      dextrose         Diet/Nutrition   DIET CARDIAC; Dietary Nutrition Supplements: Clear Liquid Oral Supplement    Exam      Constitutional - Alert, awake, no distress  General Appearance  well developed, well nourished  HEENT -normocephalic, atraumatic. PERRLA  Lungs - Chest expands equally, no wheezes, rales or rhonchi. Decreased sounds, coarse  Cardiovascular - Heart sounds are normal.  normal rate and rhythm regular, no murmur, gallop or rub.   Abdomen - soft, nontender, nondistended, no guarding  Neurologic -appropriate, following commands  Skin - no bruising or bleeding  Extremities - no cyanosis, clubbing or edema    Lab Results   CBC     Lab Results   Component Value Date    WBC 6.9 01/04/2021    RBC 4.99 01/04/2021    HGB 15.4 01/04/2021    HCT 45.5 01/04/2021     01/04/2021    MCV 91.1 01/04/2021    MCH 30.8 01/04/2021    MCHC 33.8 01/04/2021    RDW 14.1 01/04/2021    LYMPHOPCT 10 01/04/2021    MONOPCT 7 01/04/2021    BASOPCT 1 01/04/2021    MONOSABS 0.50 01/04/2021    LYMPHSABS 0.70 01/04/2021    EOSABS 0.10 01/04/2021    BASOSABS 0.10 01/04/2021    DIFFTYPE NOT REPORTED 01/04/2021       BMP   Lab Results   Component Value Date     01/06/2021    K 4.2 01/06/2021    CL 93 01/06/2021    CO2 30 01/06/2021

## 2021-01-06 NOTE — PROGRESS NOTES
Progress Note  1/6/2021 11:54 AM  Subjective:   Admit Date: 12/29/2020  PCP: Jasper Davidson MD  CC: alise  Interval History:  Pt still on high flow. Working on weaning so pt can go to rehab. No c/o today. Nursing states pt was a bit confused this morning. Sleeping now. Diet: DIET CARDIAC; Dietary Nutrition Supplements: Clear Liquid Oral Supplement  Medications:   Scheduled Meds:   insulin glargine  5 Units Subcutaneous Nightly    lisinopril  2.5 mg Oral Daily    metFORMIN  500 mg Oral BID WC    meloxicam  15 mg Oral Daily    insulin lispro  0-12 Units Subcutaneous TID WC    insulin lispro  0-6 Units Subcutaneous Nightly    aspirin EC  81 mg Oral Daily    atorvastatin  20 mg Oral Nightly    gabapentin  300 mg Oral Daily    hydroCHLOROthiazide  25 mg Oral Daily    pantoprazole  40 mg Oral QAM AC    dextromethorphan  30 mg Oral 2 times per day    sodium chloride flush  10 mL Intravenous 2 times per day    enoxaparin  30 mg Subcutaneous BID    dexamethasone  6 mg Oral Daily     Continuous Infusions:   dexmedetomidine (PRECEDEX) IV infusion      dextrose       CBC:   Recent Labs     01/04/21  0421   WBC 6.9   HGB 15.4        BMP:    Recent Labs     01/04/21  0421 01/05/21  0457 01/06/21  0407   * 134* 132*   K 4.3 3.8 4.2   CL 94* 96* 93*   CO2 30 26 30   BUN 21 24* 26*   CREATININE 0.90 0.82 1.01   GLUCOSE 207* 203* 191*     Hepatic: No results for input(s): AST, ALT, ALB, BILITOT, ALKPHOS in the last 72 hours. Troponin: No results for input(s): TROPONINI in the last 72 hours. BNP: No results for input(s): BNP in the last 72 hours. Lipids: No results for input(s): CHOL, HDL in the last 72 hours. Invalid input(s): LDLCALCU  INR: No results for input(s): INR in the last 72 hours.     Objective:   Vitals: BP (!) 93/42   Pulse 74   Temp 97.9 °F (36.6 °C) (Oral)   Resp 20   Ht 5' 7\" (1.702 m)   Wt 235 lb 14.3 oz (107 kg)   SpO2 96%   BMI 36.95 kg/m² General appearance: alert and cooperative with exam  Neck: supple, symmetrical, trachea midline  Lungs: clear to auscultation bilaterally  Heart: regular rate and rhythm, S1, S2 normal, no murmur, click, rub or gallop  Abdomen: soft, non tender  Extremities: extremities normal, atraumatic, no cyanosis or edema  Neurologic: Mental status: Alert, oriented, thought content appropriate    Assessment and Plan:   1. covid- continue to wean oxygen. Work on discharge.       Patient Active Problem List:     Acquired deviated nasal septum     Actinic keratosis     Arthritis     Benign prostatic hyperplasia     Bloating     Chronic serous otitis media     Claustrophobia     Coronary artery disease     Esophageal reflux     Flatus     Hearing loss     Hemorrhoids     History of allergy     Hyperlipidemia     Essential hypertension     Leg swelling     Lumbar radiculopathy     Lumbar stenosis     Skin neoplasm     Tinea corporis     Ventral hernia     Weight gain     Type 2 diabetes mellitus with complication (HCC)     Obesity     Other osteoporosis without current pathological fracture     Presence of coronary angioplasty implant and graft     Morbid obesity with BMI of 40.0-44.9, adult (HCC)     COVID-19     Elevated C-reactive protein (CRP)     Hypoxemia requiring supplemental oxygen      Electronically signed by Maribell Jaeger MD on 1/6/2021 at 11:54 AM

## 2021-01-06 NOTE — PROGRESS NOTES
Dr. Arlene Foster rounding a bedside. Discussed patient's condition, Oxygen demand, plan of care. See Dr. Saloni Woodward progress.

## 2021-01-06 NOTE — PLAN OF CARE
Problem: Airway Clearance - Ineffective  Goal: Achieve or maintain patent airway  1/6/2021 1841 by Melody Jorge RN  Outcome: Met This Shift  1/6/2021 0510 by Simon Veliz RN  Outcome: Ongoing     Problem: Gas Exchange - Impaired  Goal: Absence of hypoxia  1/6/2021 1841 by Melody Jorge RN  Outcome: Met This Shift  1/6/2021 0510 by Simon Veliz RN  Outcome: Ongoing  Goal: Promote optimal lung function  1/6/2021 1841 by Melody Jorge RN  Outcome: Met This Shift  1/6/2021 0510 by Simon Veliz RN  Outcome: Ongoing     Problem: Breathing Pattern - Ineffective  Goal: Ability to achieve and maintain a regular respiratory rate  1/6/2021 1841 by Melody Jorge RN  Outcome: Met This Shift  1/6/2021 0510 by Simon Veliz RN  Outcome: Ongoing     Problem:  Body Temperature -  Risk of, Imbalanced  Goal: Ability to maintain a body temperature within defined limits  1/6/2021 1841 by Melody Jorge RN  Outcome: Met This Shift  1/6/2021 0510 by Simon Veliz RN  Outcome: Ongoing  Goal: Will regain or maintain usual level of consciousness  1/6/2021 1841 by Melody Jorge RN  Outcome: Met This Shift  1/6/2021 0510 by Simon Veliz RN  Outcome: Ongoing  Goal: Complications related to the disease process, condition or treatment will be avoided or minimized  1/6/2021 1841 by Melody Jorge RN  Outcome: Met This Shift  1/6/2021 0510 by Simon Veliz RN  Outcome: Ongoing     Problem: Isolation Precautions - Risk of Spread of Infection  Goal: Prevent transmission of infection  1/6/2021 1841 by Melody Jorge RN  Outcome: Met This Shift  1/6/2021 0510 by Simon Veliz RN  Outcome: Ongoing     Problem: Nutrition Deficits  Goal: Optimize nutrtional status  1/6/2021 1841 by Melody Jorge RN  Outcome: Met This Shift  1/6/2021 0510 by Simno Veliz RN  Outcome: Ongoing     Problem: Risk for Fluid Volume Deficit  Goal: Maintain normal heart rhythm  1/6/2021 1841 by Melody Jorge RN  Outcome: Met This Shift 1/6/2021 0510 by Dick Aldridge RN  Outcome: Ongoing  Goal: Maintain absence of muscle cramping  1/6/2021 1841 by Wili Robb RN  Outcome: Met This Shift  1/6/2021 0510 by Dick Aldridge RN  Outcome: Ongoing  Goal: Maintain normal serum potassium, sodium, calcium, phosphorus, and pH  1/6/2021 1841 by Wili Robb RN  Outcome: Met This Shift  1/6/2021 0510 by Dick Aldridge RN  Outcome: Ongoing     Problem: Loneliness or Risk for Loneliness  Goal: Demonstrate positive use of time alone when socialization is not possible  1/6/2021 1841 by Wili Robb RN  Outcome: Met This Shift  1/6/2021 0510 by Dick Aldridge RN  Outcome: Ongoing     Problem: Fatigue  Goal: Verbalize increase energy and improved vitality  1/6/2021 1841 by Wili Robb RN  Outcome: Met This Shift  1/6/2021 0510 by Dick Aldridge RN  Outcome: Ongoing     Problem: Patient Education: Go to Patient Education Activity  Goal: Patient/Family Education  1/6/2021 1841 by Wlii Robb RN  Outcome: Met This Shift  1/6/2021 0510 by Dick Aldridge RN  Outcome: Ongoing     Problem: Falls - Risk of:  Goal: Will remain free from falls  1/6/2021 1841 by Wili Robb RN  Outcome: Met This Shift  1/6/2021 0510 by Dick Aldridge RN  Outcome: Met This Shift  Note: Patient was free of falls and injuries this shift.    Goal: Absence of physical injury  1/6/2021 1841 by Wili Robb RN  Outcome: Met This Shift  1/6/2021 0510 by Dick Aldridge RN  Outcome: Met This Shift     Problem: Skin Integrity:  Goal: Will show no infection signs and symptoms  1/6/2021 1841 by Wili Robb RN  Outcome: Met This Shift  1/6/2021 0510 by Dick Aldridge RN  Outcome: Ongoing  Goal: Absence of new skin breakdown  1/6/2021 1841 by Wili Robb RN  Outcome: Met This Shift  1/6/2021 0510 by Dick Aldridge RN  Outcome: Ongoing     Problem: Musculor/Skeletal Functional Status  Goal: Highest potential functional level  1/6/2021 1841 by Wili Robb RN  Outcome: Met This Shift 1/6/2021 0510 by Dick Aldridge RN  Outcome: Ongoing  Goal: Absence of falls  1/6/2021 1841 by Wili Robb RN  Outcome: Met This Shift  1/6/2021 0510 by Dick Aldridge RN  Outcome: Ongoing     Problem: Musculor/Skeletal Functional Status  Goal: Highest potential functional level  1/6/2021 1841 by Wili Robb RN  Outcome: Met This Shift  1/6/2021 0510 by Dick Aldridge RN  Outcome: Ongoing  Goal: Absence of falls  1/6/2021 1841 by Wili Robb RN  Outcome: Met This Shift  1/6/2021 0510 by Dick Aldridge RN  Outcome: Ongoing

## 2021-01-06 NOTE — PLAN OF CARE
Problem: Falls - Risk of:  Goal: Will remain free from falls  Description: Will remain free from falls  1/6/2021 0510 by Carlee Lamas RN  Outcome: Met This Shift  Note: Patient was free of falls and injuries this shift. 1/5/2021 1852 by Troy Hansen  Outcome: Ongoing  Note: Bed remains in lowest position, call light within reach. Patient remains free of falls at this time. RN will continue to monitor. Goal: Absence of physical injury  Description: Absence of physical injury  1/6/2021 0510 by Carlee Lamas RN  Outcome: Met This Shift  1/5/2021 1852 by Troy Hansen  Outcome: Ongoing     Problem: Airway Clearance - Ineffective  Goal: Achieve or maintain patent airway  1/6/2021 0510 by Carlee Lamas RN  Outcome: Ongoing  1/5/2021 1852 by Troy Hansen  Outcome: Ongoing  Note: Pt maintained a patent airway, remains on HFNC       Problem: Gas Exchange - Impaired  Goal: Absence of hypoxia  1/6/2021 0510 by Carlee Lamas RN  Outcome: Ongoing  1/5/2021 1852 by Troy Hansen  Outcome: Ongoing  Note: Pt maintained adequate oxygenation this shift, minimal desaturation with increased exertion. Goal: Promote optimal lung function  1/6/2021 0510 by Carlee Lamas RN  Outcome: Ongoing  1/5/2021 1852 by Troy Hansen  Outcome: Ongoing     Problem: Breathing Pattern - Ineffective  Goal: Ability to achieve and maintain a regular respiratory rate  1/6/2021 0510 by Carlee Lamas RN  Outcome: Ongoing  1/5/2021 1852 by Troy Hansen  Outcome: Ongoing     Problem:  Body Temperature -  Risk of, Imbalanced  Goal: Ability to maintain a body temperature within defined limits  1/6/2021 0510 by Carlee Lamas RN  Outcome: Ongoing  1/5/2021 1852 by Troy Hansen  Outcome: Ongoing  Goal: Will regain or maintain usual level of consciousness  1/6/2021 0510 by Carlee Lamas RN  Outcome: Ongoing  1/5/2021 1852 by Troy Hansen  Outcome: Ongoing Goal: Complications related to the disease process, condition or treatment will be avoided or minimized  1/6/2021 0510 by Karina Ortega RN  Outcome: Ongoing  1/5/2021 1852 by Vee Oleary  Outcome: Ongoing     Problem: Isolation Precautions - Risk of Spread of Infection  Goal: Prevent transmission of infection  1/6/2021 0510 by Karina Ortega RN  Outcome: Ongoing  1/5/2021 1852 by Vee Oleary  Outcome: Ongoing     Problem: Nutrition Deficits  Goal: Optimize nutrtional status  1/6/2021 0510 by Karina Ortega RN  Outcome: Ongoing  1/5/2021 1852 by Vee Oleary  Outcome: Ongoing     Problem: Risk for Fluid Volume Deficit  Goal: Maintain normal heart rhythm  1/6/2021 0510 by Karina Ortega RN  Outcome: Ongoing  1/5/2021 1852 by Vee Oleary  Outcome: Ongoing  Goal: Maintain absence of muscle cramping  1/6/2021 0510 by Karina Ortega RN  Outcome: Ongoing  1/5/2021 1852 by Vee Oleary  Outcome: Ongoing  Goal: Maintain normal serum potassium, sodium, calcium, phosphorus, and pH  1/6/2021 0510 by Karina Ortega RN  Outcome: Ongoing  1/5/2021 1852 by Vee Oleary  Outcome: Ongoing     Problem: Loneliness or Risk for Loneliness  Goal: Demonstrate positive use of time alone when socialization is not possible  1/6/2021 0510 by Karina Ortega RN  Outcome: Ongoing  1/5/2021 1852 by Vee Oleary  Outcome: Ongoing     Problem: Fatigue  Goal: Verbalize increase energy and improved vitality  1/6/2021 0510 by Karina Ortega RN  Outcome: Ongoing  1/5/2021 1852 by Vee Oleary  Outcome: Ongoing     Problem: Patient Education: Go to Patient Education Activity  Goal: Patient/Family Education  1/6/2021 0510 by Karina Ortega RN  Outcome: Ongoing  1/5/2021 1852 by Vee Oleary  Outcome: Ongoing     Problem: Skin Integrity:  Goal: Will show no infection signs and symptoms  Description: Will show no infection signs and symptoms  1/6/2021 0510 by Karina Ortega RN  Outcome: Ongoing 1/5/2021 1852 by Marcia Rodriguez  Outcome: Ongoing  Goal: Absence of new skin breakdown  Description: Absence of new skin breakdown  1/6/2021 0510 by Dread Barber RN  Outcome: Ongoing  1/5/2021 1852 by Marcia Rodriguez  Outcome: Ongoing  Note: Patient turned and repositioned every 2 hours and as needed for comfort. Skin remains dry and intact. No new skin breakdown noted.       Problem: Musculor/Skeletal Functional Status  Goal: Highest potential functional level  1/6/2021 0510 by Dread Barber RN  Outcome: Ongoing  1/5/2021 1852 by Marcia Rodriguez  Outcome: Ongoing  Goal: Absence of falls  1/6/2021 0510 by Dread Barber RN  Outcome: Ongoing  1/5/2021 1852 by Marcia Rodriguez  Outcome: Ongoing     Problem: Musculor/Skeletal Functional Status  Goal: Highest potential functional level  1/6/2021 0510 by Dread Barber RN  Outcome: Ongoing  1/5/2021 1852 by Marcia Rodriguez  Outcome: Ongoing  Goal: Absence of falls  1/6/2021 0510 by Dread Barber RN  Outcome: Ongoing  1/5/2021 1852 by Marcia Rodriguez  Outcome: Ongoing

## 2021-01-06 NOTE — CARE COORDINATION
Social work: called liaison Diomedesee [phone] and await her ok to set up pt to come today if they do have the discharge they are planning today. Will need jose at discharge.  Madelin dugan

## 2021-01-06 NOTE — PROGRESS NOTES
[facility] -   Infectious diseases evaluation  admission date 12/29/2020    reason for consultation:   COVID-19 infection    Impression :   Current:  · COVID-19 infection first test + 12/29/2020  · Acute respiratory failure with hypoxia required high flow oxygen  · Elevated D-dimer  · History of smoking  · Diabetes mellitus  · Diverticulitis  · Hyperlipidemia  · Hypertension    Other:  · Flagyl, penicillin and sulfa allergy    Recommendations   · Decadron until 1/8  · Remdesivir completed 1/2  · On Lovenox  · Follow inflammatory markers and chest x-ray  · Follow CBC, renal function and liver enzymes  · Supportive care  · Droplet plus isolation  · Discussed with nursing staff      History of Present Illness:   Initial history:  Di Lyle is a [de-identified]y.o.-year-old male presented to the hospital with worsening shortness of breath for 2 days associated with cough headache and diarrhea for 2 weeks  He was hypoxic was placed on high flow oxygen  COVID-19 rapid test was positive  Inflammatory markers and liver enzymes were elevated  Chest x-ray showed left lower lobe infiltrate with possible small pleural fluid    Interval changes  1/6/2021   Afebrile  The patient remains on high flow oxygen 50% 35 L, no increased shortness of breath or cough, had loose stool, no reported vomiting, no new events    Micro  12/29 COVID19 positive    Imaging  1/3 CXR  No significant interval change    1/1/ CXR  Cardiac silhouette is enlarged.  No pneumothorax.  No pleural effusion.  Hazy multifocal airspace opacities, worse on the left, unchanged      Patient Vitals for the past 8 hrs:   BP Temp Temp src Pulse Resp SpO2   01/06/21 1116     20 96 %   01/06/21 1000 (!) 111/57   70 20 95 %   01/06/21 0900 125/65   72 29 94 %   01/06/21 0842     24 93 %   01/06/21 0800 114/80   61 23 96 %   01/06/21 0755  97.9 °F (36.6 °C) Oral   96 %   01/06/21 0700 (!) 121/43   (!) 45 26 93 % 01/06/21 0600 (!) 136/50   (!) 35 18 93 %   01/06/21 0500 (!) 139/46   (!) 42 18 93 %   01/06/21 0418     23 93 %   01/06/21 0400 (!) 122/46 97.6 °F (36.4 °C) Oral (!) 33 18 94 %         I have personally reviewed the past medical history, past surgical history, medications, social history, and family history, and I haveupdated the database accordingly. Allergies:   Feldene [piroxicam], Flagyl [metronidazole], Naprosyn [naproxen], Penicillins, Sulfa antibiotics, and Sulfasalazine     Review of Systems:     Review of Systems   Constitutional: Negative. HENT: Negative. Eyes: Negative. Respiratory: Positive for cough and shortness of breath. Cardiovascular: Negative. Gastrointestinal: Negative. Genitourinary: Negative. Musculoskeletal: Negative. Skin: Negative. As per history present illness, other than above 12 system review was negative  Physical Examination :       Remainder of examination deferred due to excessive risk conferred by physical examination during a global pandemic due to coronavirus 19.   In a setting where local and national supplies of personal protective equipment are depleted    Past Medical History:     Past Medical History:   Diagnosis Date    Diabetes ([facility] [address]     Diverticulitis     History of allergy     Hyperlipidemia     Hypertension     Osteoarthritis        Past Surgical  History:     Past Surgical History:   Procedure Laterality Date    BACK SURGERY  09/03/2017    [facility]    partial colectomy due to diveritc    COLONOSCOPY      TOTAL KNEE ARTHROPLASTY Right        Medications:      insulin glargine  5 Units Subcutaneous Nightly    lisinopril  2.5 mg Oral Daily    metFORMIN  500 mg Oral BID WC    meloxicam  15 mg Oral Daily    insulin lispro  0-12 Units Subcutaneous TID WC    insulin lispro  0-6 Units Subcutaneous Nightly    aspirin EC  81 mg Oral Daily  atorvastatin  20 mg Oral Nightly    gabapentin  300 mg Oral Daily    hydroCHLOROthiazide  25 mg Oral Daily    pantoprazole  40 mg Oral QAM AC    dextromethorphan  30 mg Oral 2 times per day    sodium chloride flush  10 mL Intravenous 2 times per day    enoxaparin  30 mg Subcutaneous BID    dexamethasone  6 mg Oral Daily       Social History:     Social History     Socioeconomic History    Marital status:      Spouse name: Not on file    Number of children: Not on file    Years of education: Not on file    Highest education level: Not on file   Occupational History    Not on file   Social Needs    Financial resource strain: Not on file    Food insecurity     Worry: Not on file     Inability: Not on file    Transportation needs     Medical: Not on file     Non-medical: Not on file   Tobacco Use    Smoking status: Former Smoker     Packs/day: 1.50     Years: 40.00     Pack years: 60.00     Quit date: 1991     Years since quittin.0    Smokeless tobacco: Never Used   Substance and Sexual Activity    Alcohol use:  Yes     Alcohol/week: 1.0 standard drinks     Types: 1 Standard drinks or equivalent per week     Comment: social    Drug use: No    Sexual activity: Not on file   Lifestyle    Physical activity     Days per week: Not on file     Minutes per session: Not on file    Stress: Not on file   Relationships    Social connections     Talks on phone: Not on file     Gets together: Not on file     Attends Gnosticist service: Not on file     Active member of club or organization: Not on file     Attends meetings of clubs or organizations: Not on file     Relationship status: Not on file    Intimate partner violence     Fear of current or ex partner: Not on file     Emotionally abused: Not on file     Physically abused: Not on file     Forced sexual activity: Not on file   Other Topics Concern    Not on file   Social History Narrative    Not on file       Family History: History reviewed. No pertinent family history. Medical Decision Making:   I have independently reviewed/ordered the following labs:    CBC with Differential:   Recent Labs     01/04/21  0421   WBC 6.9   HGB 15.4   HCT 45.5      LYMPHOPCT 10*   MONOPCT 7     BMP:  Recent Labs     01/05/21  0457 01/06/21  0407   * 132*   K 3.8 4.2   CL 96* 93*   CO2 26 30   BUN 24* 26*   CREATININE 0.82 1.01     Hepatic Function Panel:   No results for input(s): PROT, LABALBU, BILIDIR, IBILI, BILITOT, ALKPHOS, ALT, AST in the last 72 hours. No results for input(s): RPR in the last 72 hours. No results for input(s): HIV in the last 72 hours. No results for input(s): BC in the last 72 hours. Lab Results   Component Value Date    CREATININE 1.01 01/06/2021    GLUCOSE 191 01/06/2021       Detailed results: Thank you for allowing us to participate in the care of this patient. Please call with questions. This note is created with the assistance of a speech recognition program.  While intending to generate adocument that actually reflects the content of the visit, the document can still have some errors including those of syntax and sound a like substitutions which may escape proof reading. It such instances, actual meaningcan be extrapolated by contextual diversion.     Esequiel Florence MD  Office: [phone]  Perfect serve / office [phone]

## 2021-01-06 NOTE — CARE COORDINATION
nHpredict in media. Variation noted between nHpredict recommendations and current patient discharge plan. Please note that nHpredict is a tool, used as a guide, and should not alter the final discharge plan. Please see the attached nH Predict/Outcome completed on patient Jackeline Garcia. Recommended discharge plan at this time is for SNF placement with an Estimated Length of Stay to be approximately 16 days. Please share with patient, family, appropriate staff and upload to the EMR as per your process. If you have any questions, feel free to call or contact me.       Thanks,  Tamara Ortega, Centralized Team  O: [phone]  C: [phone]

## 2021-01-07 LAB
-: NORMAL
ANION GAP SERPL CALCULATED.3IONS-SCNC: 11 MMOL/L (ref 9–17)
BUN BLDV-MCNC: 25 MG/DL (ref 8–23)
BUN/CREAT BLD: ABNORMAL (ref 9–20)
CALCIUM SERPL-MCNC: 8.9 MG/DL (ref 8.6–10.4)
CHLORIDE BLD-SCNC: 94 MMOL/L (ref 98–107)
CO2: 26 MMOL/L (ref 20–31)
CREAT SERPL-MCNC: 0.82 MG/DL (ref 0.7–1.2)
GFR AFRICAN AMERICAN: >60 ML/MIN
GFR NON-AFRICAN AMERICAN: >60 ML/MIN
GFR SERPL CREATININE-BSD FRML MDRD: ABNORMAL ML/MIN/{1.73_M2}
GFR SERPL CREATININE-BSD FRML MDRD: ABNORMAL ML/MIN/{1.73_M2}
GLUCOSE BLD-MCNC: 143 MG/DL (ref 75–110)
GLUCOSE BLD-MCNC: 187 MG/DL (ref 75–110)
GLUCOSE BLD-MCNC: 187 MG/DL (ref 75–110)
GLUCOSE BLD-MCNC: 188 MG/DL (ref 70–99)
GLUCOSE BLD-MCNC: 219 MG/DL (ref 75–110)
POTASSIUM SERPL-SCNC: 4.2 MMOL/L (ref 3.7–5.3)
REASON FOR REJECTION: NORMAL
SODIUM BLD-SCNC: 131 MMOL/L (ref 135–144)
ZZ NTE CLEAN UP: ORDERED TEST: NORMAL
ZZ NTE WITH NAME CLEAN UP: SPECIMEN SOURCE: NORMAL

## 2021-01-07 PROCEDURE — 6360000002 HC RX W HCPCS: Performed by: FAMILY MEDICINE

## 2021-01-07 PROCEDURE — 97110 THERAPEUTIC EXERCISES: CPT

## 2021-01-07 PROCEDURE — 99232 SBSQ HOSP IP/OBS MODERATE 35: CPT | Performed by: FAMILY MEDICINE

## 2021-01-07 PROCEDURE — 6370000000 HC RX 637 (ALT 250 FOR IP): Performed by: FAMILY MEDICINE

## 2021-01-07 PROCEDURE — 36415 COLL VENOUS BLD VENIPUNCTURE: CPT

## 2021-01-07 PROCEDURE — 2700000000 HC OXYGEN THERAPY PER DAY

## 2021-01-07 PROCEDURE — 6370000000 HC RX 637 (ALT 250 FOR IP): Performed by: INTERNAL MEDICINE

## 2021-01-07 PROCEDURE — 99233 SBSQ HOSP IP/OBS HIGH 50: CPT | Performed by: INTERNAL MEDICINE

## 2021-01-07 PROCEDURE — 2580000003 HC RX 258: Performed by: FAMILY MEDICINE

## 2021-01-07 PROCEDURE — 80048 BASIC METABOLIC PNL TOTAL CA: CPT

## 2021-01-07 PROCEDURE — 2060000000 HC ICU INTERMEDIATE R&B

## 2021-01-07 PROCEDURE — 97530 THERAPEUTIC ACTIVITIES: CPT

## 2021-01-07 PROCEDURE — 82947 ASSAY GLUCOSE BLOOD QUANT: CPT

## 2021-01-07 PROCEDURE — 94761 N-INVAS EAR/PLS OXIMETRY MLT: CPT

## 2021-01-07 RX ADMIN — INSULIN LISPRO 2 UNITS: 100 INJECTION, SOLUTION INTRAVENOUS; SUBCUTANEOUS at 17:34

## 2021-01-07 RX ADMIN — METFORMIN HYDROCHLORIDE 500 MG: 500 TABLET ORAL at 17:33

## 2021-01-07 RX ADMIN — INSULIN GLARGINE 5 UNITS: 100 INJECTION, SOLUTION SUBCUTANEOUS at 20:39

## 2021-01-07 RX ADMIN — Medication 10 ML: at 08:59

## 2021-01-07 RX ADMIN — MELOXICAM 15 MG: 15 TABLET ORAL at 08:58

## 2021-01-07 RX ADMIN — PANTOPRAZOLE SODIUM 40 MG: 40 TABLET, DELAYED RELEASE ORAL at 05:48

## 2021-01-07 RX ADMIN — INSULIN LISPRO 2 UNITS: 100 INJECTION, SOLUTION INTRAVENOUS; SUBCUTANEOUS at 12:16

## 2021-01-07 RX ADMIN — HYDROCHLOROTHIAZIDE 25 MG: 25 TABLET ORAL at 08:58

## 2021-01-07 RX ADMIN — ENOXAPARIN SODIUM 30 MG: 30 INJECTION SUBCUTANEOUS at 20:27

## 2021-01-07 RX ADMIN — GABAPENTIN 300 MG: 300 CAPSULE ORAL at 08:58

## 2021-01-07 RX ADMIN — ACETAMINOPHEN 650 MG: 325 TABLET, FILM COATED ORAL at 20:39

## 2021-01-07 RX ADMIN — ASPIRIN 81 MG: 81 TABLET, COATED ORAL at 08:58

## 2021-01-07 RX ADMIN — LISINOPRIL 2.5 MG: 2.5 TABLET ORAL at 08:59

## 2021-01-07 RX ADMIN — Medication 30 MG: at 20:28

## 2021-01-07 RX ADMIN — METFORMIN HYDROCHLORIDE 500 MG: 500 TABLET ORAL at 08:58

## 2021-01-07 RX ADMIN — Medication 10 ML: at 20:27

## 2021-01-07 RX ADMIN — Medication 30 MG: at 09:00

## 2021-01-07 RX ADMIN — ATORVASTATIN CALCIUM 20 MG: 20 TABLET, FILM COATED ORAL at 20:28

## 2021-01-07 RX ADMIN — INSULIN LISPRO 2 UNITS: 100 INJECTION, SOLUTION INTRAVENOUS; SUBCUTANEOUS at 09:01

## 2021-01-07 RX ADMIN — INSULIN LISPRO 4 UNITS: 100 INJECTION, SOLUTION INTRAVENOUS; SUBCUTANEOUS at 20:39

## 2021-01-07 RX ADMIN — ENOXAPARIN SODIUM 30 MG: 30 INJECTION SUBCUTANEOUS at 08:59

## 2021-01-07 RX ADMIN — DEXAMETHASONE 6 MG: 2 TABLET ORAL at 17:33

## 2021-01-07 ASSESSMENT — PAIN DESCRIPTION - PAIN TYPE: TYPE: ACUTE PAIN

## 2021-01-07 ASSESSMENT — PAIN SCALES - GENERAL
PAINLEVEL_OUTOF10: 0
PAINLEVEL_OUTOF10: 0
PAINLEVEL_OUTOF10: 3
PAINLEVEL_OUTOF10: 0

## 2021-01-07 ASSESSMENT — ENCOUNTER SYMPTOMS
EYES NEGATIVE: 1
COUGH: 1
GASTROINTESTINAL NEGATIVE: 1
SHORTNESS OF BREATH: 1

## 2021-01-07 ASSESSMENT — PAIN DESCRIPTION - LOCATION: LOCATION: GENERALIZED

## 2021-01-07 ASSESSMENT — PAIN DESCRIPTION - DESCRIPTORS: DESCRIPTORS: ACHING

## 2021-01-07 NOTE — PROGRESS NOTES
ICU Progress Note (Non-Vent)  [facility]    Patient - Kenn Pena,  Age - [de-identified] y.o.    - 1940      Room Number -    N -  [id number]   [facility]  Date of Admission -  2020  2:40 PM     Follow-up: Acute respiratory failure    Events of Past 24 Hours   Feeling better, on 6 L nasal cannula   No Short of breath , cough, or wheezing  Denies any fever or chills, no chest pain  No nausea vomiting or diarrhea  Discussed with staff    Vitals    height is 5' 7\" (1.702 m) and weight is 235 lb 14.3 oz (107 kg). His oral temperature is 97.5 °F (36.4 °C). His blood pressure is 118/59 (abnormal) and his pulse is 72. His respiration is 26 and oxygen saturation is 93%.        Temperature Range: Temp: 97.5 °F (36.4 °C) Temp  Av.8 °F (36.6 °C)  Min: 97.5 °F (36.4 °C)  Max: 98.4 °F (36.9 °C)  BP Range:  Systolic (06ZOT), KAF:418 , Min:103 , AWJ:971     Diastolic (02GSR), EDITH:61, Min:43, Max:110    Pulse Range: Pulse  Av.5  Min: 32  Max: 78  Respiration Range: Resp  Av.8  Min: 15  Max: 29  Current Pulse Ox[de-identified]  SpO2: 93 %  24HR Pulse Ox Range:  SpO2  Av.4 %  Min: 90 %  Max: 98 %  Oxygen Amount and Delivery: O2 Flow Rate (L/min): 6 L/min    Wt Readings from Last 3 Encounters:   21 235 lb 14.3 oz (107 kg)   20 234 lb (106.1 kg)   20 245 lb 6.4 oz (111.3 kg)     I/O       Intake/Output Summary (Last 24 hours) at 2021 1212  Last data filed at 2021 0900  Gross per 24 hour   Intake 650 ml   Output 700 ml   Net -50 ml     DRAIN/TUBE OUTPUT       Invasive Lines   ICP PRESSURE RANGE  No data recorded  CVP PRESSURE RANGE  No data recorded      Medications      insulin glargine  5 Units Subcutaneous Nightly    lisinopril  2.5 mg Oral Daily    metFORMIN  500 mg Oral BID WC    meloxicam  15 mg Oral Daily    insulin lispro  0-12 Units Subcutaneous TID WC  insulin lispro  0-6 Units Subcutaneous Nightly    aspirin EC  81 mg Oral Daily    atorvastatin  20 mg Oral Nightly    gabapentin  300 mg Oral Daily    hydroCHLOROthiazide  25 mg Oral Daily    pantoprazole  40 mg Oral QAM AC    dextromethorphan  30 mg Oral 2 times per day    sodium chloride flush  10 mL Intravenous 2 times per day    enoxaparin  30 mg Subcutaneous BID    dexamethasone  6 mg Oral Daily     docusate sodium, magnesium sulfate, glucose, dextrose, glucagon (rDNA), dextrose, benzonatate, sodium chloride flush, acetaminophen, sodium chloride  IV Drips/Infusions   dexmedetomidine (PRECEDEX) IV infusion      dextrose         Diet/Nutrition   DIET CARDIAC; Dietary Nutrition Supplements: Clear Liquid Oral Supplement    Exam      Constitutional - Alert, awake, no distress  General Appearance  well developed, well nourished  HEENT -normocephalic, atraumatic. PERRLA  Lungs - Chest expands equally, no wheezes, rales or rhonchi. Decreased sounds, coarse  Cardiovascular - Heart sounds are normal.  normal rate and rhythm regular, no murmur, gallop or rub.   Abdomen - soft, nontender, nondistended, no guarding  Neurologic -appropriate, following commands  Skin - no bruising or bleeding  Extremities - no cyanosis, clubbing or edema    Lab Results   CBC     Lab Results   Component Value Date    WBC 6.9 01/04/2021    RBC 4.99 01/04/2021    HGB 15.4 01/04/2021    HCT 45.5 01/04/2021     01/04/2021    MCV 91.1 01/04/2021    MCH 30.8 01/04/2021    MCHC 33.8 01/04/2021    RDW 14.1 01/04/2021    LYMPHOPCT 10 01/04/2021    MONOPCT 7 01/04/2021    BASOPCT 1 01/04/2021    MONOSABS 0.50 01/04/2021    LYMPHSABS 0.70 01/04/2021    EOSABS 0.10 01/04/2021    BASOSABS 0.10 01/04/2021    DIFFTYPE NOT REPORTED 01/04/2021       BMP   Lab Results   Component Value Date     01/07/2021    K 4.2 01/07/2021    CL 94 01/07/2021    CO2 26 01/07/2021    BUN 25 01/07/2021    CREATININE 0.82 01/07/2021 GLUCOSE 188 01/07/2021       LFTS  Lab Results   Component Value Date    ALKPHOS 59 12/29/2020    ALT 55 12/29/2020    AST 58 12/29/2020    PROT 7.0 12/29/2020    BILITOT 0.95 12/29/2020    LABALBU 3.9 12/29/2020       ABG ABGs: No results found for: PHART, PO2ART, FJL8FIB    No results found for: IFIO2, MODE, SETTIDVOL, SETPEEP      INR  No results for input(s): PROTIME, INR in the last 72 hours. APTT  No results for input(s): APTT in the last 72 hours. Lactic Acid  No results found for: LACTA     BNP   No results for input(s): BNP in the last 72 hours. Cultures       Radiology     CXR       CT Scans    (See actual reports for details)      SYSTEMS ASSESSMENT    Neuro       Respiratory   Wean oxygen as tolerated.  Keep O2 sat > 88%    Cardiovascular        Gastrointestinal       Renal       Infectious Disease       Hematology/Oncology       Endocrine       Social/Spiritual/DNR/Disposition/Other     Acute respiratory failure with hypoxia  COVID-19 pneumonia  Elevated inflammatory markers with CRP, LDH and ferritin, unremarkable D-dimer  Former smoker 60 pack year history quit 1991  HTN  Diabetes mellitus     wean FiO2 as tolerated, as needed BiPAP, IS  Home O2 eval upon discharge if goes home  Completed remdesivir, dexamethasone   On Lovenox  Discussed with staff, no objection for intermediate ICU    Electronically signed by Jacky Breen MD on 1/7/2021 at 12:12 PM

## 2021-01-07 NOTE — PROGRESS NOTES
[facility]    INPATIENT OCCUPATIONAL THERAPY  PROGRESS NOTE  Date: 2021  Patient Name: Tosha Ozuna      Room:   MRN: [id number]    : 1940  ([de-identified] y.o.) Gender: male     Discharge Recommendations:  Further Occupational Therapy is recommended upon facility discharge. Equipment Needed: (TBD)    Referring Practitioner: Mehnaz Nelson MD  Diagnosis: COVID-19  General  Chart Reviewed: Yes, Orders, Progress Notes, History and Physical  Patient assessed for rehabilitation services?: Yes  Family / Caregiver Present: No  Referring Practitioner: Mehnaz Nelson MD  Diagnosis: COVID-19    Restrictions  Restrictions/Precautions: General Precautions, Isolation, Contact Precautions, Fall Risk(DROPLET PLUS (+) COVID)  Implants present? : Metal implants(back sx, cardiac stent, R TKA)  Other position/activity restrictions: PT OT eval and treat; up in chair  Required Braces or Orthoses? : (has back brace for comfort)      Subjective  Subjective: \"No, but if I have too\" pt reponds when therapists ask pt about therapy participation and getting out of bed. \"I just got back in\" regarding comfortable in bed. Comments: Pt pleasant and agreeable with minimal encouragement. Patient Currently in Pain: Denies  Overall Orientation Status: Within Functional Limits          Objective  Cognition  Overall Cognitive Status: Impaired  Arousal/Alertness: Delayed responses to stimuli  Following Directions: Follows two step commands  Attention Span: Attends with cues to redirect  Memory: Appears intact  Following Commands:  Follows one step commands with increased time  Safety Judgement: Decreased awareness of need for safety  Awareness of Errors: Assistance required to correct errors made  Insights: Decreased awareness of deficits  Sequencing and Organization: Assistance required to generate solutions;Assistance required to identify errors made  Bed mobility  Supine to Sit: Stand by assistance Sit to Supine: Stand by assistance  Comment: head of bed slightly elevated. Balance  Sitting Balance: Stand by assistance (~15 min)  Standing Balance: Contact guard assistance  Standing Balance  Time: ~1 min, ~2 min  Activity: static standing, brief dynamic standing. Comment: O2 tolerance limited in standing; desats to 86% on high flow. Increases with rest breaks, Verbal cues provided for proper breathing techniques while seated with fair followthrough. No distress verbalized or demonstrated. ADL  Additional Comments: Writer offered grooming tasks however pt stated already completed with set up earlier this date. Pt states he is using hand held urinal for voiding with good management; assist for emptying only. Transfers  Sit to stand: Minimal assistance  Stand to sit: Minimal assistance  Transfer Comments: pt uses BUE to pull self up into RW. Type of ROM/Therapeutic Exercise  Type of ROM/Therapeutic Exercise: AROM; Resistive Bands  Comment: only minimal resistance tolerated due decrease O2 levels-drops into 80s consistently with exertion, AROM completed for seated endurance. Increased rest breaks required. Exercises  Scapular Protraction: x  Scapular Retraction: x  Shoulder Flexion: x  Shoulder Extension: x  Horizontal ABduction: x  Horizontal ADduction: x     Assessment  Performance deficits / Impairments: Decreased functional mobility ; Decreased ADL status; Decreased strength;Decreased cognition;Decreased safe awareness;Decreased endurance;Decreased balance;Decreased high-level IADLs;Decreased fine motor control;Decreased posture  Prognosis: Good  Discharge Recommendations: Patient would benefit from continued therapy after discharge  Activity Tolerance: Patient Tolerated treatment well  Safety Devices in place: Yes  Type of devices: All fall risk precautions in place;Call light within reach;Gait belt;Patient at risk for falls; Left in chair;Nurse notified           Plan  Safety Devices Safety Devices in place: Yes  Type of devices: All fall risk precautions in place, Call light within reach, Gait belt, Patient at risk for falls, Left in chair, Nurse notified  Plan  Times per week: 3-5  Times per day: Daily  Current Treatment Recommendations: Self-Care / ADL, Strengthening, Balance Training, Functional Mobility Training, Endurance Training, Pain Management, Safety Education & Training, Patient/Caregiver Education & Training, Equipment Evaluation, Education, & procurement, Cognitive/Perceptual Training      Goals  Short term goals  Time Frame for Short term goals: By discharge  Short term goal 1: Pt will verbalize/demonstrate Good understanding of assistive equipment/durable medical equipment/modified techniques for increased independence with self-care and mobility. Short term goal 2: Pt will verbalize/demonstrate Good understanding of breathing techniques for increased independence with self-care and mobility. Short term goal 3: Pt will perform upper body bathing/dressing with stand by assist and lower body bathing/dressing with moderate assist while maintaining oxygen saturation greater than 90%. Short term goal 4: Pt will perform functional mobility and transfers during self-care consistently with contact guard assist, least restrictive mobility device and Good safety while maintaining oxygen saturation greater than 90%. Short term goal 5: Pt will actively participate in 15+ minutes of therapeutic exercise/functional activities to promote increased independence with self-care and mobility while maintaining oxygen saturation greater than 90%.     OT Individual Minutes  Time In: 1440  Time Out: 0681  Minutes: 28      Electronically signed by DOMINGA Alonso on 1/7/21 at 4:07 PM EST

## 2021-01-07 NOTE — PROGRESS NOTES
Coordination of Nutrition Care:  Continue to monitor while inpatient    Goals:  Meet 75% of nutrient needs with PO diet and supplements. Nutrition Monitoring and Evaluation:   Food/Nutrient Intake Outcomes:  Food and Nutrient Intake, Supplement Intake  Physical Signs/Symptoms Outcomes:  Biochemical Data, Hemodynamic Status, Nutrition Focused Physical Findings, Skin, Weight     Discharge Planning:    Continue current diet, Continue Oral Nutrition Supplement     Some areas of assessment may be incomplete due to COVID-19 precautions. Valerie Cortez R.D., L.D.   Clinical Dietitian  Office: [phone]

## 2021-01-07 NOTE — PLAN OF CARE
Problem: Airway Clearance - Ineffective  Goal: Achieve or maintain patent airway  1/7/2021 0427 by Jessie Mei RN  Outcome: Ongoing  Note: Patient maintained patent airway throughout this shift. Problem: Gas Exchange - Impaired  Goal: Absence of hypoxia  1/7/2021 0427 by Jessie Mei RN  Outcome: Ongoing  Note: Patient stating in 90s on high flow throughout this shift. Problem: Body Temperature -  Risk of, Imbalanced  Goal: Ability to maintain a body temperature within defined limits  1/7/2021 0427 by Jessie Mei RN  Outcome: Ongoing  Note: No fever detected throughout this shift. Problem: Falls - Risk of:  Goal: Will remain free from falls  Description: Will remain free from falls  1/7/2021 0427 by Jessie Mei RN  Outcome: Ongoing  Note: Patient remained free from falls. Call light within reach.

## 2021-01-07 NOTE — CARE COORDINATION
Social work; spoke to rep for [facility] at her morning meeting she will discuss how soon they could bring in this pt if ready. Will need jose completed at discharge.  Herberth dugan

## 2021-01-07 NOTE — PROGRESS NOTES
Pt placed on 6lnc. Pt tolerating well at this time.  Current SpO2 95%, pt does not appear to be in any distress at this time

## 2021-01-07 NOTE — PROGRESS NOTES
Progress Note    1/7/2021 8:10 AM  Subjective: Interval History: Patient states he feels somewhat better and he is anxious to go home. He states he did tolerate getting up and participating with physical therapy. Diet: DIET CARDIAC; Dietary Nutrition Supplements: Clear Liquid Oral Supplement    Medications:   Reviewed medications    Labs:   CBC: No results for input(s): WBC, HGB, PLT in the last 72 hours. BMP:    Recent Labs     01/07/21  0732   *   K 4.2   CL 94*   CO2 26   BUN 25*   CREATININE 0.82   GLUCOSE 188*     INR: No results for input(s): INR in the last 72 hours. Objective:   Vitals: BP (!) 129/43   Pulse (!) 37   Temp 97.5 °F (36.4 °C) (Oral)   Resp 18   Ht 5' 7\" (1.702 m)   Wt 235 lb 14.3 oz (107 kg)   SpO2 93%   BMI 36.95 kg/m²   General appearance: alert and cooperative with exam  Neck: no adenopathy and thyroid not enlarged, symmetric, no tenderness/mass/nodules  Lungs: rhonchi posterior - right and distant BS  Heart: regular rate and rhythm, S1, S2 normal, no murmur, click, rub or gallop  Abdomen: soft, non-tender; bowel sounds normal; no masses,  no organomegaly  Extremities: extremities normal, atraumatic, no cyanosis or edema  Neurologic: Mental status: Alert, oriented, thought content appropriate    Assessment and Plan:    COVID-19   hypoxemia   cough     P: Continue supportive care. He continues to need high flow oxygen. Hopefully this can be weaned soon. Continue to work with physical therapy.   Encourage patient to take deep breaths, sit in a chair, walk in the room    Patient Active Problem List:     Acquired deviated nasal septum     Actinic keratosis     Arthritis     Benign prostatic hyperplasia     Bloating     Chronic serous otitis media     Claustrophobia     Coronary artery disease     Esophageal reflux     Flatus     Hearing loss     Hemorrhoids     History of allergy     Hyperlipidemia     Essential hypertension     Leg swelling     Lumbar radiculopathy Lumbar stenosis     Skin neoplasm     Tinea corporis     Ventral hernia     Weight gain     Type 2 diabetes mellitus with complication (HCC)     Obesity     Other osteoporosis without current pathological fracture     Presence of coronary angioplasty implant and graft     Morbid obesity with BMI of 40.0-44.9, adult (HCC)     COVID-19     Elevated C-reactive protein (CRP)     Hypoxemia requiring supplemental oxygen      Electronically signed by Sergio Mancia MD on 1/7/2021 at 8:10 AM

## 2021-01-07 NOTE — PLAN OF CARE
Nutrition Problem #1: Inadequate oral intake  Intervention: Food and/or Nutrient Delivery: Continue Current Diet, Continue Oral Nutrition Supplement  Nutritional Goals: Meet 75% of nutrient needs with PO diet and supplements.

## 2021-01-07 NOTE — PROGRESS NOTES
Physical Therapy  Facility/Department: [facility] ICU  Daily Treatment Note  NAME: Chace Solis  : 1940  MRN: [id number]    Date of Service: 2021    Discharge Recommendations:  Patient would benefit from continued therapy after discharge   PT Equipment Recommendations  Equipment Needed: No    Assessment   Body structures, Functions, Activity limitations: Decreased functional mobility ; Decreased ADL status; Decreased strength;Decreased endurance;Decreased balance;Decreased posture  Treatment Diagnosis: Impaired functional mobility 2* covid 19  History: [de-identified] y.o. male who presents with SOB with diarrhea and cough for the last 1-2 weeks. Got worse with SOB and came in. Found to have covid pneumonia and hypoxia. On high flow oxygen and remdesivir and decadron now. Barriers to Learning: none  REQUIRES PT FOLLOW UP: Yes  Activity Tolerance  Activity Tolerance: Patient Tolerated treatment well;Patient limited by endurance     Patient Diagnosis(es): The primary encounter diagnosis was Hypoxia. Diagnoses of Acute respiratory failure with hypoxia ([address] and COVID-19 were also pertinent to this visit. has a past medical history of Diabetes ([address] Diverticulitis, History of allergy, Hyperlipidemia, Hypertension, and Osteoarthritis. has a past surgical history that includes Cardiac catheterization; Colonoscopy; Total knee arthroplasty (Right); colectomy (2015); and back surgery (2017). Restrictions  Restrictions/Precautions  Restrictions/Precautions: General Precautions, Isolation, Contact Precautions, Fall Risk(DROPLET PLUS (+) COVID)  Required Braces or Orthoses? : (has back brace for comfort)  Implants present? : Metal implants(back sx, cardiac stent, R TKA)  Position Activity Restriction  Other position/activity restrictions: PT OT eval and treat; up in chair  Subjective   General  Chart Reviewed:  Yes  Additional Pertinent Hx: DM, OA, colectomy  Family / Caregiver Present: No Referring Practitioner: Ryne Olvera MD  Subjective  Subjective: Patient very pleasant. Patient sleeping upon arrival but agreeable to therapy, requests to lay back down at end of session. General Comment  Comments: RN reports patient has been up in recliner most of day, ok'd if he would like to rest in bed at end of session. SpO2 desat to 86% during activites on high flow, able to recover quickly with seated rest breaks. Pain Screening  Patient Currently in Pain: Denies  Vital Signs  Patient Currently in Pain: Denies       Orientation  Orientation  Overall Orientation Status: Within Functional Limits  Objective   Bed mobility  Rolling to Left: Stand by assistance  Rolling to Right: Stand by assistance  Supine to Sit: Stand by assistance  Sit to Supine: Stand by assistance  Scooting: Stand by assistance  Transfers  Sit to Stand: Minimal Assistance  Stand to sit: Minimal Assistance  Comment: Patient pulls up on RW, cues for hand placement and safety with poor return. Writer braces walker for support. Ambulation  Ambulation?: Yes(Unable to assess at this time, desat with standing activities. Patient defers up to chair at this time.)  Ambulation 1  Surface: level tile  Device: [address]: Minimal assistance  Distance: 5 steps  Comments: x5 lateral steps towards [facility], patient defers up to chair at this time     Other exercises  Other exercises?: Yes  Other exercises 1: Sit to stand x3 (standing tolerance 1-3 minutes, SpO2 86% with standing activities but recovers >90% with seated rest break)  Other exercises 2: Seated (B) LE exercises x15 (AROM, rest breaks PRN)  Other exercises 3: Seated (B) UE exercises x15 (initially orange minimal resistance band utilized, patient begins to desat below 90%, continues exercises with AROM)        Goals  Short term goals  Time Frame for Short term goals: 4-5 days  Short term goal 1: pt to demo L/R rolling min to CGA x1. Short term goal 2: Pt to perform supine<-->sit with supervision. Short term goal 3: Pt to perform transfers with device CGA. Short term goal 4: Pt to progress to amb 48' with device min to CGA x1. Short term goal 5: Pt to improve BLE strength by 1/2 MMG. Short term goal 6: Pt to tolerate 30-45 minute PT session with O2 sats maintained >90%  Patient Goals   Patient goals : To move better/get stronger    Plan    Plan  Times per week: 3-4x/week  Specific instructions for Next Treatment: progress gait distance, therex, monitor SaO2/HR  Current Treatment Recommendations: Strengthening, Balance Training, Functional Mobility Training, Transfer Training, Endurance Training, Gait Training, Equipment Evaluation, Education, & procurement, Patient/Caregiver Education & Training, Safety Education & Training  Safety Devices  Type of devices:  All fall risk precautions in place, Call light within reach, Gait belt, Patient at risk for falls, Nurse notified, Left in bed, Bed alarm in place     Therapy Time   Individual Concurrent Group Co-treatment   Time In 1440         Time Out 1508         Minutes [address] [place] [address]

## 2021-01-07 NOTE — PLAN OF CARE
Goal: Absence of physical injury  Outcome: Met This Shift  Patient free from falls and injuries. Effectively able to utilize call system for help. Problem: Skin Integrity:  Goal: Will show no infection signs and symptoms  Outcome: Met This Shift  Goal: Absence of new skin breakdown  Outcome: Met This Shift     Problem: Musculor/Skeletal Functional Status  Goal: Highest potential functional level  Outcome: Met This Shift  Goal: Absence of falls  Outcome: Met This Shift     Problem: Musculor/Skeletal Functional Status  Goal: Highest potential functional level  Outcome: Met This Shift  Goal: Absence of falls  Outcome: Met This Shift     Problem: Nutrition  Goal: Optimal nutrition therapy  Outcome: Met This Shift  Patient eating and tolerating at least 50% of diet. Glucose check completed and coverage given as needed.

## 2021-01-08 LAB
ANION GAP SERPL CALCULATED.3IONS-SCNC: 12 MMOL/L (ref 9–17)
BUN BLDV-MCNC: 29 MG/DL (ref 8–23)
BUN/CREAT BLD: ABNORMAL (ref 9–20)
CALCIUM SERPL-MCNC: 8.6 MG/DL (ref 8.6–10.4)
CHLORIDE BLD-SCNC: 91 MMOL/L (ref 98–107)
CO2: 23 MMOL/L (ref 20–31)
CREAT SERPL-MCNC: 1.01 MG/DL (ref 0.7–1.2)
GFR AFRICAN AMERICAN: >60 ML/MIN
GFR NON-AFRICAN AMERICAN: >60 ML/MIN
GFR SERPL CREATININE-BSD FRML MDRD: ABNORMAL ML/MIN/{1.73_M2}
GFR SERPL CREATININE-BSD FRML MDRD: ABNORMAL ML/MIN/{1.73_M2}
GLUCOSE BLD-MCNC: 144 MG/DL (ref 75–110)
GLUCOSE BLD-MCNC: 147 MG/DL (ref 75–110)
GLUCOSE BLD-MCNC: 272 MG/DL (ref 75–110)
GLUCOSE BLD-MCNC: 295 MG/DL (ref 75–110)
GLUCOSE BLD-MCNC: 380 MG/DL (ref 70–99)
POTASSIUM SERPL-SCNC: 4.6 MMOL/L (ref 3.7–5.3)
SODIUM BLD-SCNC: 126 MMOL/L (ref 135–144)

## 2021-01-08 PROCEDURE — 6370000000 HC RX 637 (ALT 250 FOR IP): Performed by: FAMILY MEDICINE

## 2021-01-08 PROCEDURE — 2060000000 HC ICU INTERMEDIATE R&B

## 2021-01-08 PROCEDURE — 97110 THERAPEUTIC EXERCISES: CPT

## 2021-01-08 PROCEDURE — 2700000000 HC OXYGEN THERAPY PER DAY

## 2021-01-08 PROCEDURE — 82947 ASSAY GLUCOSE BLOOD QUANT: CPT

## 2021-01-08 PROCEDURE — 94761 N-INVAS EAR/PLS OXIMETRY MLT: CPT

## 2021-01-08 PROCEDURE — 80048 BASIC METABOLIC PNL TOTAL CA: CPT

## 2021-01-08 PROCEDURE — 36415 COLL VENOUS BLD VENIPUNCTURE: CPT

## 2021-01-08 PROCEDURE — 97530 THERAPEUTIC ACTIVITIES: CPT

## 2021-01-08 PROCEDURE — 99232 SBSQ HOSP IP/OBS MODERATE 35: CPT | Performed by: INTERNAL MEDICINE

## 2021-01-08 PROCEDURE — 6360000002 HC RX W HCPCS: Performed by: FAMILY MEDICINE

## 2021-01-08 PROCEDURE — 99232 SBSQ HOSP IP/OBS MODERATE 35: CPT | Performed by: FAMILY MEDICINE

## 2021-01-08 PROCEDURE — 2580000003 HC RX 258: Performed by: FAMILY MEDICINE

## 2021-01-08 RX ORDER — INSULIN GLARGINE 100 [IU]/ML
10 INJECTION, SOLUTION SUBCUTANEOUS NIGHTLY
Status: DISCONTINUED | OUTPATIENT
Start: 2021-01-08 | End: 2021-01-12 | Stop reason: HOSPADM

## 2021-01-08 RX ADMIN — MELOXICAM 15 MG: 15 TABLET ORAL at 09:02

## 2021-01-08 RX ADMIN — HYDROCHLOROTHIAZIDE 25 MG: 25 TABLET ORAL at 09:01

## 2021-01-08 RX ADMIN — Medication 10 ML: at 20:33

## 2021-01-08 RX ADMIN — METFORMIN HYDROCHLORIDE 500 MG: 500 TABLET ORAL at 18:25

## 2021-01-08 RX ADMIN — GABAPENTIN 300 MG: 300 CAPSULE ORAL at 09:01

## 2021-01-08 RX ADMIN — INSULIN LISPRO 2 UNITS: 100 INJECTION, SOLUTION INTRAVENOUS; SUBCUTANEOUS at 09:00

## 2021-01-08 RX ADMIN — Medication 30 MG: at 20:33

## 2021-01-08 RX ADMIN — PANTOPRAZOLE SODIUM 40 MG: 40 TABLET, DELAYED RELEASE ORAL at 09:02

## 2021-01-08 RX ADMIN — LISINOPRIL 2.5 MG: 2.5 TABLET ORAL at 09:01

## 2021-01-08 RX ADMIN — ENOXAPARIN SODIUM 30 MG: 30 INJECTION SUBCUTANEOUS at 20:33

## 2021-01-08 RX ADMIN — INSULIN LISPRO 2 UNITS: 100 INJECTION, SOLUTION INTRAVENOUS; SUBCUTANEOUS at 12:45

## 2021-01-08 RX ADMIN — METFORMIN HYDROCHLORIDE 500 MG: 500 TABLET ORAL at 09:01

## 2021-01-08 RX ADMIN — ENOXAPARIN SODIUM 30 MG: 30 INJECTION SUBCUTANEOUS at 09:00

## 2021-01-08 RX ADMIN — Medication 30 MG: at 09:00

## 2021-01-08 RX ADMIN — INSULIN LISPRO 2 UNITS: 100 INJECTION, SOLUTION INTRAVENOUS; SUBCUTANEOUS at 18:56

## 2021-01-08 RX ADMIN — INSULIN LISPRO 3 UNITS: 100 INJECTION, SOLUTION INTRAVENOUS; SUBCUTANEOUS at 20:43

## 2021-01-08 RX ADMIN — Medication 10 ML: at 09:02

## 2021-01-08 RX ADMIN — INSULIN GLARGINE 10 UNITS: 100 INJECTION, SOLUTION SUBCUTANEOUS at 20:43

## 2021-01-08 RX ADMIN — ASPIRIN 81 MG: 81 TABLET, COATED ORAL at 09:01

## 2021-01-08 RX ADMIN — ATORVASTATIN CALCIUM 20 MG: 20 TABLET, FILM COATED ORAL at 20:33

## 2021-01-08 RX ADMIN — ACETAMINOPHEN 650 MG: 325 TABLET, FILM COATED ORAL at 20:43

## 2021-01-08 ASSESSMENT — PAIN SCALES - GENERAL
PAINLEVEL_OUTOF10: 0
PAINLEVEL_OUTOF10: 4

## 2021-01-08 ASSESSMENT — PAIN DESCRIPTION - DESCRIPTORS: DESCRIPTORS: ACHING

## 2021-01-08 ASSESSMENT — ENCOUNTER SYMPTOMS
SHORTNESS OF BREATH: 1
GASTROINTESTINAL NEGATIVE: 1
COUGH: 1
EYES NEGATIVE: 1

## 2021-01-08 ASSESSMENT — PAIN DESCRIPTION - PAIN TYPE: TYPE: ACUTE PAIN

## 2021-01-08 NOTE — CARE COORDINATION
Social work: faxed referral to [facility] of  [address] and [address]. Will need jose and Rx at discharge.  Abad rajputw

## 2021-01-08 NOTE — CARE COORDINATION
ONGOING DISCHARGE PLAN:    Writer spoke with Patients spouse Regina Kocher. And advised [address] [facility] will not accept patient for another 7 days. Advised bell that is not acceptable. Gave her a SNF selection that take COVID patients, such as [facility] in [address]. Regina Kocher wants us to try [facility]. Notified Paresh Myrick of this. Electronically signed by Alexei Moser RN on 1/8/2021 at 4:29 PM        Will continue to follow for additional discharge needs.     Electronically signed by Alexei Moser RN on 1/8/2021 at 4:27 PM

## 2021-01-08 NOTE — PROGRESS NOTES
[facility] -   Infectious diseases evaluation  admission date 12/29/2020    reason for consultation:   COVID-19 infection    Impression :   Current:  · COVID-19 infection first test + 12/29/2020  · Acute respiratory failure with hypoxia required high flow oxygen  · Elevated D-dimer  · History of smoking  · Diabetes mellitus  · Diverticulitis  · Hyperlipidemia  · Hypertension    Other:  · Flagyl, penicillin and sulfa allergy    Recommendations   · Decadron day 10 course completed today/ discontinued  · Remdesivir completed 1/2/21  · On Lovenox per pulmonary  · Supportive care  · Droplet plus isolation  · Discussed with nursing staff  · Home O2 evaluation  · The patient may be discharged from infectious disease point of view. History of Present Illness:   Initial history:  Kodi Che is a [de-identified]y.o.-year-old male presented to the hospital with worsening shortness of breath for 2 days associated with cough headache and diarrhea for 2 weeks  He was hypoxic was placed on high flow oxygen initially  COVID-19 rapid test was positive  Inflammatory markers and liver enzymes were elevated  Chest x-ray showed left lower lobe infiltrate with possible small pleural fluid    Interval changes  1/8/2021   Afebrile  The patient is down to 4 L of oxygen per nasal cannula, no reported fever, up to the chair, mild cough, no reported diarrhea no acute events  Chest x-ray 1/6/2021 reviewed no significant change .     1/6/2021 D-dimer 0.66, procalcitonin 0.11, ferritin 704, , C-reactive proteins 5.1  Micro  12/29 COVID19 positive    Imaging  1/3 CXR  No significant interval change    1/1/ CXR  Cardiac silhouette is enlarged.  No pneumothorax.  No pleural effusion.  Hazy multifocal airspace opacities, worse on the left, unchanged      Patient Vitals for the past 8 hrs:   BP Temp Pulse Resp SpO2   01/08/21 1118    20 97 %   01/08/21 1000   71 21    01/08/21 0930   78 21  01/08/21 0908  98 °F (36.7 °C)      01/08/21 0900 (!) 141/63 98 °F (36.7 °C) (!) 39 17    01/08/21 0822    17 95 %   01/08/21 0800 (!) 141/63  (!) 33 16    01/08/21 0700 (!) 145/52  (!) 33 15 96 %   01/08/21 0600 (!) 131/49  (!) 35 21 95 %   01/08/21 0500 (!) 139/41  (!) 39 22 94 %   01/08/21 0441    19 93 %         I have personally reviewed the past medical history, past surgical history, medications, social history, and family history, and I haveupdated the database accordingly. Allergies:   Feldene [piroxicam], Flagyl [metronidazole], Naprosyn [naproxen], Penicillins, Sulfa antibiotics, and Sulfasalazine     Review of Systems:     Review of Systems   Constitutional: Negative. HENT: Negative. Eyes: Negative. Respiratory: Positive for cough and shortness of breath. Cardiovascular: Negative. Gastrointestinal: Negative. Genitourinary: Negative. Musculoskeletal: Negative. Skin: Negative. As per history present illness, other than above 12 system review was negative  Physical Examination :       Remainder of examination deferred due to excessive risk conferred by physical examination during a global pandemic due to coronavirus 19.   In a setting where local and national supplies of personal protective equipment are depleted    Past Medical History:     Past Medical History:   Diagnosis Date    Diabetes ([address]     Diverticulitis     History of allergy     Hyperlipidemia     Hypertension     Osteoarthritis        Past Surgical  History:     Past Surgical History:   Procedure Laterality Date    BACK SURGERY  09/03/2017    Jeanne Lawson, lumbar fusion   [facility]  2015    partial colectomy due to diveritc    COLONOSCOPY      TOTAL KNEE ARTHROPLASTY Right        Medications:      insulin glargine  10 Units Subcutaneous Nightly    lisinopril  2.5 mg Oral Daily    metFORMIN  500 mg Oral BID WC Forced sexual activity: Not on file   Other Topics Concern    Not on file   Social History Narrative    Not on file       Family History:   History reviewed. No pertinent family history. Medical Decision Making:   I have independently reviewed/ordered the following labs:    CBC with Differential:   No results for input(s): WBC, HGB, HCT, PLT, SEGSPCT, BANDSPCT, LYMPHOPCT, MONOPCT, EOSPCT in the last 72 hours. BMP:  Recent Labs     01/07/21  0732 01/08/21  0535   * 126*   K 4.2 4.6   CL 94* 91*   CO2 26 23   BUN 25* 29*   CREATININE 0.82 1.01     Hepatic Function Panel:   No results for input(s): PROT, LABALBU, BILIDIR, IBILI, BILITOT, ALKPHOS, ALT, AST in the last 72 hours. No results for input(s): RPR in the last 72 hours. No results for input(s): HIV in the last 72 hours. No results for input(s): BC in the last 72 hours. Lab Results   Component Value Date    CREATININE 1.01 01/08/2021    GLUCOSE 380 01/08/2021       Detailed results: Thank you for allowing us to participate in the care of this patient. Please call with questions. This note is created with the assistance of a speech recognition program.  While intending to generate adocument that actually reflects the content of the visit, the document can still have some errors including those of syntax and sound a like substitutions which may escape proof reading. It such instances, actual meaningcan be extrapolated by contextual diversion.     Eboni Sapp MD  Office: [phone]  Perfect serve / office [phone]

## 2021-01-08 NOTE — PROGRESS NOTES
[facility] -   Infectious diseases evaluation  admission date 12/29/2020    reason for consultation:   COVID-19 infection    Impression :   Current:  · COVID-19 infection first test + 12/29/2020  · Acute respiratory failure with hypoxia required high flow oxygen  · Elevated D-dimer  · History of smoking  · Diabetes mellitus  · Diverticulitis  · Hyperlipidemia  · Hypertension    Other:  · Flagyl, penicillin and sulfa allergy    Recommendations   · Decadron until 1/8  · Remdesivir completed 1/2  · On Lovenox  · Follow inflammatory markers and chest x-ray  · Follow CBC, renal function and liver enzymes  · Supportive care  · Droplet plus isolation  · Discussed with nursing staff  · Home O2 evaluation      History of Present Illness:   Initial history:  Alex Melara is a [de-identified]y.o.-year-old male presented to the hospital with worsening shortness of breath for 2 days associated with cough headache and diarrhea for 2 weeks  He was hypoxic was placed on high flow oxygen  COVID-19 rapid test was positive  Inflammatory markers and liver enzymes were elevated  Chest x-ray showed left lower lobe infiltrate with possible small pleural fluid    Interval changes  1/7/2021   Afebrile  The patient is on 6 L of oxygen per nasal cannula, no reported fever no increased cough or shortness of breath, no new events    Micro  12/29 COVID19 positive    Imaging  1/3 CXR  No significant interval change    1/1/ CXR  Cardiac silhouette is enlarged.  No pneumothorax.  No pleural effusion.  Hazy multifocal airspace opacities, worse on the left, unchanged      Patient Vitals for the past 8 hrs:   BP Temp Temp src Pulse Resp SpO2   01/07/21 1800 134/62   (!) 42 19 90 %   01/07/21 1700 (!) 116/52   (!) 49 26 93 %   01/07/21 1600 (!) 102/27 98.7 °F (37.1 °C) Axillary (!) 39 21 95 %   01/07/21 1541     22 96 %   01/07/21 1500 (!) 111/94   78 22 (!) 89 % 01/07/21 1400 (!) 103/59   69 22 94 %   01/07/21 1300 (!) 102/55   63 21 96 %   01/07/21 1221     21 93 %   01/07/21 1200 (!) 98/41 97.6 °F (36.4 °C) Oral (!) 35 20 95 %         I have personally reviewed the past medical history, past surgical history, medications, social history, and family history, and I haveupdated the database accordingly. Allergies:   Feldene [piroxicam], Flagyl [metronidazole], Naprosyn [naproxen], Penicillins, Sulfa antibiotics, and Sulfasalazine     Review of Systems:     Review of Systems   Constitutional: Negative. HENT: Negative. Eyes: Negative. Respiratory: Positive for cough and shortness of breath. Cardiovascular: Negative. Gastrointestinal: Negative. Genitourinary: Negative. Musculoskeletal: Negative. Skin: Negative. As per history present illness, other than above 12 system review was negative  Physical Examination :       Remainder of examination deferred due to excessive risk conferred by physical examination during a global pandemic due to coronavirus 19.   In a setting where local and national supplies of personal protective equipment are depleted    Past Medical History:     Past Medical History:   Diagnosis Date    Diabetes ([facility] [address]     Diverticulitis     History of allergy     Hyperlipidemia     Hypertension     Osteoarthritis        Past Surgical  History:     Past Surgical History:   Procedure Laterality Date    BACK SURGERY  09/03/2017    Humza Isaacs, lumbar fusion   [facility]  2015    partial colectomy due to diveritc    COLONOSCOPY      TOTAL KNEE ARTHROPLASTY Right        Medications:      insulin glargine  5 Units Subcutaneous Nightly    lisinopril  2.5 mg Oral Daily    metFORMIN  500 mg Oral BID WC    meloxicam  15 mg Oral Daily    insulin lispro  0-12 Units Subcutaneous TID WC    insulin lispro  0-6 Units Subcutaneous Nightly    aspirin EC  81 mg Oral Daily  atorvastatin  20 mg Oral Nightly    gabapentin  300 mg Oral Daily    hydroCHLOROthiazide  25 mg Oral Daily    pantoprazole  40 mg Oral QAM AC    dextromethorphan  30 mg Oral 2 times per day    sodium chloride flush  10 mL Intravenous 2 times per day    enoxaparin  30 mg Subcutaneous BID       Social History:     Social History     Socioeconomic History    Marital status:      Spouse name: Not on file    Number of children: Not on file    Years of education: Not on file    Highest education level: Not on file   Occupational History    Not on file   Social Needs    Financial resource strain: Not on file    Food insecurity     Worry: Not on file     Inability: Not on file    Transportation needs     Medical: Not on file     Non-medical: Not on file   Tobacco Use    Smoking status: Former Smoker     Packs/day: 1.50     Years: 40.00     Pack years: 60.00     Quit date: 1991     Years since quittin.0    Smokeless tobacco: Never Used   Substance and Sexual Activity    Alcohol use:  Yes     Alcohol/week: 1.0 standard drinks     Types: 1 Standard drinks or equivalent per week     Comment: social    Drug use: No    Sexual activity: Not on file   Lifestyle    Physical activity     Days per week: Not on file     Minutes per session: Not on file    Stress: Not on file   Relationships    Social connections     Talks on phone: Not on file     Gets together: Not on file     Attends [employer] service: Not on file     Active member of club or organization: Not on file     Attends meetings of clubs or organizations: Not on file     Relationship status: Not on file    Intimate partner violence     Fear of current or ex partner: Not on file     Emotionally abused: Not on file     Physically abused: Not on file     Forced sexual activity: Not on file   Other Topics Concern    Not on file   Social History Narrative    Not on file       Family History: History reviewed. No pertinent family history. Medical Decision Making:   I have independently reviewed/ordered the following labs:    CBC with Differential:   No results for input(s): WBC, HGB, HCT, PLT, SEGSPCT, BANDSPCT, LYMPHOPCT, MONOPCT, EOSPCT in the last 72 hours. BMP:  Recent Labs     01/06/21  0407 01/07/21  0732   * 131*   K 4.2 4.2   CL 93* 94*   CO2 30 26   BUN 26* 25*   CREATININE 1.01 0.82     Hepatic Function Panel:   No results for input(s): PROT, LABALBU, BILIDIR, IBILI, BILITOT, ALKPHOS, ALT, AST in the last 72 hours. No results for input(s): RPR in the last 72 hours. No results for input(s): HIV in the last 72 hours. No results for input(s): BC in the last 72 hours. Lab Results   Component Value Date    CREATININE 0.82 01/07/2021    GLUCOSE 188 01/07/2021       Detailed results: Thank you for allowing us to participate in the care of this patient. Please call with questions. This note is created with the assistance of a speech recognition program.  While intending to generate adocument that actually reflects the content of the visit, the document can still have some errors including those of syntax and sound a like substitutions which may escape proof reading. It such instances, actual meaningcan be extrapolated by contextual diversion.     Luis A Wolff MD  Office: [phone]  Perfect serve / office [phone]

## 2021-01-08 NOTE — PROGRESS NOTES
Progress Note    1/8/2021 7:50 AM  Subjective: Interval History: patient states he is feeling better. Did have PT and OT yesterday. He feels he has an apettite today. Diet: DIET CARDIAC; Dietary Nutrition Supplements: Clear Liquid Oral Supplement    Medications:   Reviewed medications    Labs:   CBC: No results for input(s): WBC, HGB, PLT in the last 72 hours. BMP:    Recent Labs     01/08/21  0535   *   K 4.6   CL 91*   CO2 23   BUN 29*   CREATININE 1.01   GLUCOSE 380*     INR: No results for input(s): INR in the last 72 hours. Objective:   Vitals: BP (!) 145/52   Pulse (!) 33   Temp 97.8 °F (36.6 °C) (Oral)   Resp 15   Ht 5' 7\" (1.702 m)   Wt 235 lb 14.3 oz (107 kg)   SpO2 96%   BMI 36.95 kg/m²   General appearance: alert and cooperative with exam  Neck: no adenopathy and thyroid not enlarged, symmetric, no tenderness/mass/nodules  Lungs: dry rales posterior lobes, ant is clear, good insp effort  Heart: regular rate and rhythm  Abdomen: normal findings: soft, non-tender  Extremities: extremities normal, atraumatic, no cyanosis or edema  Neurologic: Mental status: Alert, oriented, thought content appropriate    Assessment and Plan:    COVID-19 pneumonia   weakness : improved  Intermittent bradycardia  Elevated sugars with the steroids. Steroid finished now    P: increase lantus and hold if sugars go below 200. Ready for skilled or LTAC       On nasal cannula now.      Patient Active Problem List:     Acquired deviated nasal septum     Actinic keratosis     Arthritis     Benign prostatic hyperplasia     Bloating     Chronic serous otitis media     Claustrophobia     Coronary artery disease     Esophageal reflux     Flatus     Hearing loss     Hemorrhoids     History of allergy     Hyperlipidemia     Essential hypertension     Leg swelling     Lumbar radiculopathy     Lumbar stenosis     Skin neoplasm     Tinea corporis     Ventral hernia     Weight gain Type 2 diabetes mellitus with complication (HCC)     Obesity     Other osteoporosis without current pathological fracture     Presence of coronary angioplasty implant and graft     Morbid obesity with BMI of 40.0-44.9, adult (HCC)     COVID-19     Elevated C-reactive protein (CRP)     Hypoxemia requiring supplemental oxygen      Electronically signed by Maegan Yan MD on 1/8/2021 at 7:50 AM

## 2021-01-08 NOTE — PROGRESS NOTES
ICU Progress Note (Non-Vent)  [facility]    Patient - Lis Parkinson,  Age - [de-identified] y.o.    - 1940      Room Number -    MRN -  [id number]   Acct # - [de-identified]  Date of Admission -  2020  2:40 PM     Follow-up: Acute respiratory failure    Events of Past 24 Hours   Feeling fine, on 4 L nasal cannula   No Short of breath , some cough, no wheezing  Denies any fever or chills, no chest pain  No nausea vomiting or diarrhea  Discussed with staff     Vitals    height is 5' 7\" (1.702 m) and weight is 235 lb 14.3 oz (107 kg). His temperature is 98 °F (36.7 °C). His blood pressure is 141/63 (abnormal) and his pulse is 71. His respiration is 20 and oxygen saturation is 97%.        Temperature Range: Temp: 98 °F (36.7 °C) Temp  Av.9 °F (36.6 °C)  Min: 97.3 °F (36.3 °C)  Max: 98.7 °F (37.1 °C)  BP Range:  Systolic (20OYI), PQX:785 , Min:102 , YNW:802     Diastolic (51WIF), JFR:40, Min:27, Max:94    Pulse Range: Pulse  Av.3  Min: 33  Max: 78  Respiration Range: Resp  Av.6  Min: 15  Max: 26  Current Pulse Ox[de-identified]  SpO2: 97 %  24HR Pulse Ox Range:  SpO2  Av.6 %  Min: 88 %  Max: 97 %  Oxygen Amount and Delivery: O2 Flow Rate (L/min): 6 L/min    Wt Readings from Last 3 Encounters:   21 235 lb 14.3 oz (107 kg)   20 234 lb (106.1 kg)   20 245 lb 6.4 oz (111.3 kg)     I/O       Intake/Output Summary (Last 24 hours) at 2021 1232  Last data filed at 2021 0957  Gross per 24 hour   Intake 440 ml   Output 1190 ml   Net -750 ml     DRAIN/TUBE OUTPUT       Invasive Lines   ICP PRESSURE RANGE  No data recorded  CVP PRESSURE RANGE  No data recorded      Medications      insulin glargine  10 Units Subcutaneous Nightly    lisinopril  2.5 mg Oral Daily    metFORMIN  500 mg Oral BID WC    meloxicam  15 mg Oral Daily    insulin lispro  0-12 Units Subcutaneous TID WC  insulin lispro  0-6 Units Subcutaneous Nightly    aspirin EC  81 mg Oral Daily    atorvastatin  20 mg Oral Nightly    gabapentin  300 mg Oral Daily    hydroCHLOROthiazide  25 mg Oral Daily    pantoprazole  40 mg Oral QAM AC    dextromethorphan  30 mg Oral 2 times per day    sodium chloride flush  10 mL Intravenous 2 times per day    enoxaparin  30 mg Subcutaneous BID     docusate sodium, magnesium sulfate, glucose, dextrose, glucagon (rDNA), dextrose, benzonatate, sodium chloride flush, acetaminophen, sodium chloride  IV Drips/Infusions   dexmedetomidine (PRECEDEX) IV infusion      dextrose         Diet/Nutrition   DIET CARDIAC; Exam      Constitutional - Alert, awake, no distress  General Appearance  well developed, well nourished  HEENT -normocephalic, atraumatic. PERRLA  Lungs - Chest expands equally, no wheezes, rales or rhonchi. Decreased sounds, coarse  Cardiovascular - Heart sounds are normal.  normal rate and rhythm regular, no murmur, gallop or rub.   Abdomen - soft, nontender, nondistended, no guarding  Neurologic -appropriate, following commands  Extremities - no cyanosis, clubbing or edema    Lab Results   CBC     Lab Results   Component Value Date    WBC 6.9 01/04/2021    RBC 4.99 01/04/2021    HGB 15.4 01/04/2021    HCT 45.5 01/04/2021     01/04/2021    MCV 91.1 01/04/2021    MCH 30.8 01/04/2021    MCHC 33.8 01/04/2021    RDW 14.1 01/04/2021    LYMPHOPCT 10 01/04/2021    MONOPCT 7 01/04/2021    BASOPCT 1 01/04/2021    MONOSABS 0.50 01/04/2021    LYMPHSABS 0.70 01/04/2021    EOSABS 0.10 01/04/2021    BASOSABS 0.10 01/04/2021    DIFFTYPE NOT REPORTED 01/04/2021       BMP   Lab Results   Component Value Date     01/08/2021    K 4.6 01/08/2021    CL 91 01/08/2021    CO2 23 01/08/2021    BUN 29 01/08/2021    CREATININE 1.01 01/08/2021    GLUCOSE 380 01/08/2021       LFTS  Lab Results   Component Value Date    ALKPHOS 59 12/29/2020    ALT 55 12/29/2020    AST 58 12/29/2020

## 2021-01-08 NOTE — CARE COORDINATION
Social work: [address] has the referral .  Await a call back, Will need jose and Rx at discharge.  Regla dugan

## 2021-01-08 NOTE — CARE COORDINATION
ONGOING DISCHARGE PLAN:    + COVID    LSW have been following for LTACH, but they were unable to take until he was down between 40L on high flow. Today, He is off high flow and on 4L/nc at 95% , therefore does not qualify for [facility].  LSW following for SNF at [facility]. Following for home oxygen eval  And any anti coags if patient goes home    Patient has been accepted to [facility] should this be the discharge plan. Current vitals, on 4L/nc at 95%, Temp 98, P 71, Resp 21    Will continue to follow for additional discharge needs.     Electronically signed by Lakeisha Cornejo RN on 1/8/2021 at 12:41 PM

## 2021-01-08 NOTE — PROGRESS NOTES
Physical Therapy  [address] [facility]    Physical Therapy Progress Note    Date: 21  Patient Name: Lis Parkinson       Room:   MRN: [id number]   Account: [de-identified]   : 1940  ([de-identified] y.o.)   Gender: male     Discharge Recommendations   Patient would benefit from continued therapy after discharge  Equipment Needed: No    Referring Practitioner: Gladys Baum MD  Diagnosis: COVID-19  Restrictions/Precautions: General Precautions, Isolation, Contact Precautions, Fall Risk(DROPLET PLUS (+) COVID)  Implants present? : Metal implants(back sx, cardiac stent, R TKA)  Other position/activity restrictions: PT OT eval and treat; up in chair   Past Medical History:  has a past medical history of Diabetes ([facility] [address] Diverticulitis, History of allergy, Hyperlipidemia, Hypertension, and Osteoarthritis. Past Surgical History:   has a past surgical history that includes Cardiac catheterization; Colonoscopy; Total knee arthroplasty (Right); colectomy (2015); and back surgery (2017). Additional Pertinent Hx: DM, OA, colectomy    Overall Orientation Status: Within Functional Limits  Restrictions/Precautions  Restrictions/Precautions: General Precautions;Isolation;Contact Precautions; Fall Risk(DROPLET PLUS (+) COVID)  Required Braces or Orthoses? : (has back brace for comfort)  Implants present? : Metal implants(back sx, cardiac stent, R TKA)    Subjective: Patient pleasant and sleeping in recliner upon my arrival.  Roma Encinas to complete PT, but wanting to return to bed post session. Comments: Patient returned to bed post PT session. Desat. on 4L to 80's with activity.     Vital Signs  Patient Currently in Pain: Denies        Oxygen Therapy  SpO2: 92 %(desat to 86 with standing and short distance amb.)  Pulse Oximeter Device Mode: Continuous  Pulse Oximeter Device Location: Finger  O2 Device: Nasal cannula  O2 Flow Rate (L/min): 4 L/min          Bed Mobility:   Sit to Supine: Stand by assistance Scooting: Stand by assistance      Transfers:  Sit to Stand: Minimal Assistance  Stand to sit: Minimal Assistance  Bed to Chair: Minimal assistance              Ambulation 1  Surface: level tile  Device: Rolling Walker  Assistance: Minimal assistance  Quality of Gait: Slow pace with poor rolling walker safety. Cues for upright posture. Poor endurance. Distance: 5 ft within room  Comments: Safety concerns. Stairs/Curb  Stairs?: No         Posture: Fair  Sitting - Static: Good  Sitting - Dynamic: Good;-  Standing - Static: Fair;+  Standing - Dynamic: Fair     Other exercises?: Yes  Other exercises 1: Sit to stand x3 (standing tolerance 1-3 minutes, SpO2 86% with standing activities but recovers >90% with seated rest break)  Other exercises 2: Seated (B) LE exercises x15 (AROM, rest breaks PRN)  Other exercises 3: Seated (B) UE exercises x15 (initially orange minimal resistance band utilized, patient begins to desat below 90%, continues exercises with AROM)  Other Activities  Comment: Breaks given PRN        Activity Tolerance: Patient limited by endurance; Patient limited by fatigue  PT Equipment Recommendations  Equipment Needed: No       Assessment  Activity Tolerance: Patient limited by endurance; Patient limited by fatigue   Body structures, Functions, Activity limitations: Decreased functional mobility ; Decreased ADL status; Decreased strength;Decreased endurance;Decreased balance;Decreased posture  Specific instructions for Next Treatment: progress gait distance, therex, monitor SaO2/HR  Prognosis: Good  Discharge Recommendations: Patient would benefit from continued therapy after discharge     Type of devices: All fall risk precautions in place;Call light within reach;Gait belt;Patient at risk for falls;Nurse notified; Left in bed;Bed alarm in place     Plan  Times per week: 3-4x/week Current Treatment Recommendations: Strengthening, Balance Training, Functional Mobility Training, Transfer Training, Endurance Training, Gait Training, Equipment Evaluation, Education, & procurement, Patient/Caregiver Education & Training, Safety Education & Training      Goals  Short term goals  Time Frame for Short term goals: 4-5 days  Short term goal 1: pt to demo L/R rolling min to CGA x1. Short term goal 2: Pt to perform supine<-->sit with supervision. Short term goal 3: Pt to perform transfers with device CGA. Short term goal 4: Pt to progress to amb 48' with device min to CGA x1. Short term goal 5: Pt to improve BLE strength by 1/2 MMG.   Short term goal 6: Pt to tolerate 30-45 minute PT session with O2 sats maintained >90%    PT Individual Minutes  Time In: 1450  Time Out: 1515  Minutes: 25    Electronically signed by Heraclio Ramirez PTA on 1/8/21 at 4:20 PM EST

## 2021-01-08 NOTE — PLAN OF CARE
Patient alert and oriented this shift. Call light in reach. Airway clear. Patient on 4 L NC. Patient afebrile this shift. No falls this shift/   Patient ambulated this shift with assistance. Problem: Airway Clearance - Ineffective  Goal: Achieve or maintain patent airway  Outcome: Ongoing     Problem: Gas Exchange - Impaired  Goal: Promote optimal lung function  Outcome: Ongoing     Problem: Breathing Pattern - Ineffective  Goal: Ability to achieve and maintain a regular respiratory rate  Outcome: Ongoing     Problem: Body Temperature -  Risk of, Imbalanced  Goal: Ability to maintain a body temperature within defined limits  Outcome: Ongoing     Problem: Body Temperature -  Risk of, Imbalanced  Goal: Will regain or maintain usual level of consciousness  Outcome: Ongoing     Problem:  Body Temperature -  Risk of, Imbalanced  Goal: Complications related to the disease process, condition or treatment will be avoided or minimized  Outcome: Ongoing     Problem: Risk for Fluid Volume Deficit  Goal: Maintain normal heart rhythm  Outcome: Ongoing     Problem: Risk for Fluid Volume Deficit  Goal: Maintain absence of muscle cramping  Outcome: Ongoing     Problem: Loneliness or Risk for Loneliness  Goal: Demonstrate positive use of time alone when socialization is not possible  Outcome: Ongoing     Problem: Falls - Risk of:  Goal: Will remain free from falls  Description: Will remain free from falls  Outcome: Ongoing

## 2021-01-09 LAB
ANION GAP SERPL CALCULATED.3IONS-SCNC: 10 MMOL/L (ref 9–17)
BUN BLDV-MCNC: 25 MG/DL (ref 8–23)
BUN/CREAT BLD: ABNORMAL (ref 9–20)
CALCIUM SERPL-MCNC: 8.5 MG/DL (ref 8.6–10.4)
CHLORIDE BLD-SCNC: 96 MMOL/L (ref 98–107)
CO2: 26 MMOL/L (ref 20–31)
CREAT SERPL-MCNC: 0.97 MG/DL (ref 0.7–1.2)
GFR AFRICAN AMERICAN: >60 ML/MIN
GFR NON-AFRICAN AMERICAN: >60 ML/MIN
GFR SERPL CREATININE-BSD FRML MDRD: ABNORMAL ML/MIN/{1.73_M2}
GFR SERPL CREATININE-BSD FRML MDRD: ABNORMAL ML/MIN/{1.73_M2}
GLUCOSE BLD-MCNC: 129 MG/DL (ref 70–99)
GLUCOSE BLD-MCNC: 134 MG/DL (ref 75–110)
GLUCOSE BLD-MCNC: 186 MG/DL (ref 75–110)
GLUCOSE BLD-MCNC: 206 MG/DL (ref 75–110)
POTASSIUM SERPL-SCNC: 4 MMOL/L (ref 3.7–5.3)
SODIUM BLD-SCNC: 132 MMOL/L (ref 135–144)

## 2021-01-09 PROCEDURE — 6370000000 HC RX 637 (ALT 250 FOR IP): Performed by: FAMILY MEDICINE

## 2021-01-09 PROCEDURE — 2060000000 HC ICU INTERMEDIATE R&B

## 2021-01-09 PROCEDURE — 6360000002 HC RX W HCPCS: Performed by: FAMILY MEDICINE

## 2021-01-09 PROCEDURE — 99232 SBSQ HOSP IP/OBS MODERATE 35: CPT | Performed by: FAMILY MEDICINE

## 2021-01-09 PROCEDURE — 80048 BASIC METABOLIC PNL TOTAL CA: CPT

## 2021-01-09 PROCEDURE — 82947 ASSAY GLUCOSE BLOOD QUANT: CPT

## 2021-01-09 PROCEDURE — 99232 SBSQ HOSP IP/OBS MODERATE 35: CPT | Performed by: INTERNAL MEDICINE

## 2021-01-09 PROCEDURE — 2580000003 HC RX 258: Performed by: FAMILY MEDICINE

## 2021-01-09 PROCEDURE — 2700000000 HC OXYGEN THERAPY PER DAY

## 2021-01-09 PROCEDURE — 36415 COLL VENOUS BLD VENIPUNCTURE: CPT

## 2021-01-09 PROCEDURE — 94761 N-INVAS EAR/PLS OXIMETRY MLT: CPT

## 2021-01-09 RX ADMIN — INSULIN GLARGINE 10 UNITS: 100 INJECTION, SOLUTION SUBCUTANEOUS at 20:25

## 2021-01-09 RX ADMIN — ENOXAPARIN SODIUM 30 MG: 30 INJECTION SUBCUTANEOUS at 20:32

## 2021-01-09 RX ADMIN — MELOXICAM 15 MG: 15 TABLET ORAL at 08:53

## 2021-01-09 RX ADMIN — Medication 10 ML: at 21:14

## 2021-01-09 RX ADMIN — LISINOPRIL 2.5 MG: 2.5 TABLET ORAL at 08:53

## 2021-01-09 RX ADMIN — ACETAMINOPHEN 650 MG: 325 TABLET, FILM COATED ORAL at 18:04

## 2021-01-09 RX ADMIN — HYDROCHLOROTHIAZIDE 25 MG: 25 TABLET ORAL at 08:53

## 2021-01-09 RX ADMIN — GABAPENTIN 300 MG: 300 CAPSULE ORAL at 08:53

## 2021-01-09 RX ADMIN — Medication 30 MG: at 08:53

## 2021-01-09 RX ADMIN — ENOXAPARIN SODIUM 30 MG: 30 INJECTION SUBCUTANEOUS at 08:53

## 2021-01-09 RX ADMIN — INSULIN LISPRO 4 UNITS: 100 INJECTION, SOLUTION INTRAVENOUS; SUBCUTANEOUS at 13:05

## 2021-01-09 RX ADMIN — ASPIRIN 81 MG: 81 TABLET, COATED ORAL at 08:53

## 2021-01-09 RX ADMIN — METFORMIN HYDROCHLORIDE 500 MG: 500 TABLET ORAL at 17:48

## 2021-01-09 RX ADMIN — INSULIN LISPRO 1 UNITS: 100 INJECTION, SOLUTION INTRAVENOUS; SUBCUTANEOUS at 20:27

## 2021-01-09 RX ADMIN — PANTOPRAZOLE SODIUM 40 MG: 40 TABLET, DELAYED RELEASE ORAL at 11:04

## 2021-01-09 RX ADMIN — METFORMIN HYDROCHLORIDE 500 MG: 500 TABLET ORAL at 08:53

## 2021-01-09 RX ADMIN — Medication 30 MG: at 20:32

## 2021-01-09 RX ADMIN — Medication 10 ML: at 08:54

## 2021-01-09 RX ADMIN — ATORVASTATIN CALCIUM 20 MG: 20 TABLET, FILM COATED ORAL at 20:32

## 2021-01-09 ASSESSMENT — PAIN SCALES - GENERAL
PAINLEVEL_OUTOF10: 0
PAINLEVEL_OUTOF10: 4

## 2021-01-09 ASSESSMENT — ENCOUNTER SYMPTOMS
EYES NEGATIVE: 1
COUGH: 1
GASTROINTESTINAL NEGATIVE: 1
SHORTNESS OF BREATH: 1

## 2021-01-09 ASSESSMENT — PAIN DESCRIPTION - LOCATION: LOCATION: HEAD

## 2021-01-09 NOTE — CARE COORDINATION
SOPHIA spoke with Stacey Steel, liaison for [facility] and she reported that as long as his vital signs remain stable (apparantley low yesterday) then they do not have an issue with accepting this patient when he is ready.

## 2021-01-09 NOTE — PLAN OF CARE
Problem: Gas Exchange - Impaired  Goal: Absence of hypoxia  1/9/2021 0513 by Pattie Nation, RN  Outcome: Ongoing  Note: Pt now in 4l/NC and pulse ox 90's. No resp distress noted. 1/8/2021 1702 by Jena Rivera RN  Outcome: Ongoing     Problem: Pain:  Goal: Pain level will decrease  Description: Pain level will decrease  Outcome: Ongoing  Note: Pt c/o headache and was medicated times 1. Repositions self for comfort as needed.  Restful environment provided

## 2021-01-09 NOTE — PROGRESS NOTES
Progress Note  1/9/2021 11:07 AM  Subjective:   Admit Date: 12/29/2020  PCP: Julia Walker MD  CC: covid pneumonia  Interval History: pt doing better. Off high flow. Needs some rehab before home. No new issues otherwise. Diet: DIET CARDIAC;  Medications:   Scheduled Meds:   insulin glargine  10 Units Subcutaneous Nightly    lisinopril  2.5 mg Oral Daily    metFORMIN  500 mg Oral BID WC    meloxicam  15 mg Oral Daily    insulin lispro  0-12 Units Subcutaneous TID WC    insulin lispro  0-6 Units Subcutaneous Nightly    aspirin EC  81 mg Oral Daily    atorvastatin  20 mg Oral Nightly    gabapentin  300 mg Oral Daily    hydroCHLOROthiazide  25 mg Oral Daily    pantoprazole  40 mg Oral QAM AC    dextromethorphan  30 mg Oral 2 times per day    sodium chloride flush  10 mL Intravenous 2 times per day    enoxaparin  30 mg Subcutaneous BID     Continuous Infusions:   dexmedetomidine (PRECEDEX) IV infusion      dextrose       CBC: No results for input(s): WBC, HGB, PLT in the last 72 hours. BMP:    Recent Labs     01/07/21  0732 01/08/21  0535 01/09/21  0415   * 126* 132*   K 4.2 4.6 4.0   CL 94* 91* 96*   CO2 26 23 26   BUN 25* 29* 25*   CREATININE 0.82 1.01 0.97   GLUCOSE 188* 380* 129*     Hepatic: No results for input(s): AST, ALT, ALB, BILITOT, ALKPHOS in the last 72 hours. Troponin: No results for input(s): TROPONINI in the last 72 hours. BNP: No results for input(s): BNP in the last 72 hours. Lipids: No results for input(s): CHOL, HDL in the last 72 hours. Invalid input(s): LDLCALCU  INR: No results for input(s): INR in the last 72 hours.     Objective:   Vitals: BP (!) 135/51   Pulse 72   Temp 97.9 °F (36.6 °C) (Oral)   Resp 18   Ht 5' 7\" (1.702 m)   Wt 235 lb 14.3 oz (107 kg)   SpO2 92%   BMI 36.95 kg/m²   General appearance: alert and cooperative with exam  Neck: supple, symmetrical, trachea midline  Lungs: clear to auscultation bilaterally Heart: regular rate and rhythm, S1, S2 normal, no murmur, click, rub or gallop  Abdomen: soft, non tender  Extremities: extremities normal, atraumatic, no cyanosis or edema  Neurologic: Mental status: Alert, oriented, thought content appropriate    Assessment and Plan:   1. covid pnemonia with hypoxia- continue to wean off oxygen. Work on placement.    2. DM- stable    Patient Active Problem List:     Acquired deviated nasal septum     Actinic keratosis     Arthritis     Benign prostatic hyperplasia     Bloating     Chronic serous otitis media     Claustrophobia     Coronary artery disease     Esophageal reflux     Flatus     Hearing loss     Hemorrhoids     History of allergy     Hyperlipidemia     Essential hypertension     Leg swelling     Lumbar radiculopathy     Lumbar stenosis     Skin neoplasm     Tinea corporis     Ventral hernia     Weight gain     Type 2 diabetes mellitus with complication (HCC)     Obesity     Other osteoporosis without current pathological fracture     Presence of coronary angioplasty implant and graft     Morbid obesity with BMI of 40.0-44.9, adult (HCC)     COVID-19     Elevated C-reactive protein (CRP)     Hypoxemia requiring supplemental oxygen      Electronically signed by Mell Rosario MD on 1/9/2021 at 11:07 AM

## 2021-01-10 LAB
ANION GAP SERPL CALCULATED.3IONS-SCNC: 10 MMOL/L (ref 9–17)
BUN BLDV-MCNC: 24 MG/DL (ref 8–23)
BUN/CREAT BLD: ABNORMAL (ref 9–20)
CALCIUM SERPL-MCNC: 8.7 MG/DL (ref 8.6–10.4)
CHLORIDE BLD-SCNC: 92 MMOL/L (ref 98–107)
CO2: 24 MMOL/L (ref 20–31)
CREAT SERPL-MCNC: 0.85 MG/DL (ref 0.7–1.2)
GFR AFRICAN AMERICAN: >60 ML/MIN
GFR NON-AFRICAN AMERICAN: >60 ML/MIN
GFR SERPL CREATININE-BSD FRML MDRD: ABNORMAL ML/MIN/{1.73_M2}
GFR SERPL CREATININE-BSD FRML MDRD: ABNORMAL ML/MIN/{1.73_M2}
GLUCOSE BLD-MCNC: 113 MG/DL (ref 75–110)
GLUCOSE BLD-MCNC: 131 MG/DL (ref 70–99)
GLUCOSE BLD-MCNC: 155 MG/DL (ref 75–110)
GLUCOSE BLD-MCNC: 165 MG/DL (ref 75–110)
GLUCOSE BLD-MCNC: 205 MG/DL (ref 75–110)
POTASSIUM SERPL-SCNC: 4.1 MMOL/L (ref 3.7–5.3)
SODIUM BLD-SCNC: 126 MMOL/L (ref 135–144)

## 2021-01-10 PROCEDURE — 2580000003 HC RX 258: Performed by: FAMILY MEDICINE

## 2021-01-10 PROCEDURE — 6370000000 HC RX 637 (ALT 250 FOR IP): Performed by: FAMILY MEDICINE

## 2021-01-10 PROCEDURE — 94761 N-INVAS EAR/PLS OXIMETRY MLT: CPT

## 2021-01-10 PROCEDURE — 2700000000 HC OXYGEN THERAPY PER DAY

## 2021-01-10 PROCEDURE — 2060000000 HC ICU INTERMEDIATE R&B

## 2021-01-10 PROCEDURE — 6360000002 HC RX W HCPCS: Performed by: FAMILY MEDICINE

## 2021-01-10 PROCEDURE — 99238 HOSP IP/OBS DSCHRG MGMT 30/<: CPT | Performed by: FAMILY MEDICINE

## 2021-01-10 PROCEDURE — 80048 BASIC METABOLIC PNL TOTAL CA: CPT

## 2021-01-10 PROCEDURE — 36415 COLL VENOUS BLD VENIPUNCTURE: CPT

## 2021-01-10 PROCEDURE — 82947 ASSAY GLUCOSE BLOOD QUANT: CPT

## 2021-01-10 PROCEDURE — 99232 SBSQ HOSP IP/OBS MODERATE 35: CPT | Performed by: INTERNAL MEDICINE

## 2021-01-10 RX ADMIN — PANTOPRAZOLE SODIUM 40 MG: 40 TABLET, DELAYED RELEASE ORAL at 06:24

## 2021-01-10 RX ADMIN — HYDROCHLOROTHIAZIDE 25 MG: 25 TABLET ORAL at 10:06

## 2021-01-10 RX ADMIN — Medication 30 MG: at 20:27

## 2021-01-10 RX ADMIN — LISINOPRIL 2.5 MG: 2.5 TABLET ORAL at 10:08

## 2021-01-10 RX ADMIN — INSULIN LISPRO 2 UNITS: 100 INJECTION, SOLUTION INTRAVENOUS; SUBCUTANEOUS at 21:00

## 2021-01-10 RX ADMIN — INSULIN LISPRO 2 UNITS: 100 INJECTION, SOLUTION INTRAVENOUS; SUBCUTANEOUS at 10:13

## 2021-01-10 RX ADMIN — Medication 10 ML: at 10:06

## 2021-01-10 RX ADMIN — ENOXAPARIN SODIUM 30 MG: 30 INJECTION SUBCUTANEOUS at 10:06

## 2021-01-10 RX ADMIN — INSULIN GLARGINE 10 UNITS: 100 INJECTION, SOLUTION SUBCUTANEOUS at 21:00

## 2021-01-10 RX ADMIN — INSULIN LISPRO 2 UNITS: 100 INJECTION, SOLUTION INTRAVENOUS; SUBCUTANEOUS at 18:10

## 2021-01-10 RX ADMIN — MELOXICAM 15 MG: 15 TABLET ORAL at 10:06

## 2021-01-10 RX ADMIN — Medication 30 MG: at 10:06

## 2021-01-10 RX ADMIN — ATORVASTATIN CALCIUM 20 MG: 20 TABLET, FILM COATED ORAL at 20:27

## 2021-01-10 RX ADMIN — ASPIRIN 81 MG: 81 TABLET, COATED ORAL at 10:06

## 2021-01-10 RX ADMIN — GABAPENTIN 300 MG: 300 CAPSULE ORAL at 10:06

## 2021-01-10 RX ADMIN — METFORMIN HYDROCHLORIDE 500 MG: 500 TABLET ORAL at 10:13

## 2021-01-10 RX ADMIN — ENOXAPARIN SODIUM 30 MG: 30 INJECTION SUBCUTANEOUS at 20:27

## 2021-01-10 RX ADMIN — METFORMIN HYDROCHLORIDE 500 MG: 500 TABLET ORAL at 18:10

## 2021-01-10 RX ADMIN — Medication 10 ML: at 20:27

## 2021-01-10 ASSESSMENT — PAIN SCALES - GENERAL
PAINLEVEL_OUTOF10: 0
PAINLEVEL_OUTOF10: 0

## 2021-01-10 ASSESSMENT — ENCOUNTER SYMPTOMS
COUGH: 1
SHORTNESS OF BREATH: 1
GASTROINTESTINAL NEGATIVE: 1
EYES NEGATIVE: 1

## 2021-01-10 NOTE — PROGRESS NOTES
ICU Progress Note (Non-Vent)  [facility]    Patient - Flo Duverney,  Age - [de-identified] y.o.    - 1940      Room Number -    [facility]  [id number]   [facility]  Date of Admission -  2020  2:40 PM     Follow-up: Acute respiratory failure    Events of Past 24 Hours   Feels fine, on 3 L nasal cannula   No Short of breath ,  cough, or  wheezing  Denies any fever or chills, no chest pain  No nausea vomiting, or diarrhea  Staff  noted some bradycardia down to 30s during sleep, as per cardiology note on  that patient having sinus bradycardia and first-degree AV block without any pauses or other arrhythmia and they signed off  Patient is not aware of any snoring history or apnea       Vitals    height is 5' 7\" (1.702 m) and weight is 227 lb 1.2 oz (103 kg). His oral temperature is 97.9 °F (36.6 °C). His blood pressure is 105/58 (abnormal) and his pulse is 73. His respiration is 24 and oxygen saturation is 98%.        Temperature Range: Temp: 97.9 °F (36.6 °C) Temp  Av °F (36.7 °C)  Min: 97.6 °F (36.4 °C)  Max: 98.4 °F (36.9 °C)  BP Range:  Systolic (04KXD), DMM:502 , Min:88 , GZC:583     Diastolic (85ZPN), HAROON:06, Min:37, Max:122    Pulse Range: Pulse  Av.3  Min: 34  Max: 90  Respiration Range: Resp  Av.2  Min: 18  Max: 30  Current Pulse Ox[de-identified]  SpO2: 98 %  24HR Pulse Ox Range:  SpO2  Av.8 %  Min: 81 %  Max: 99 %  Oxygen Amount and Delivery: O2 Flow Rate (L/min): 3 L/min    Wt Readings from Last 3 Encounters:   01/10/21 227 lb 1.2 oz (103 kg)   20 234 lb (106.1 kg)   20 245 lb 6.4 oz (111.3 kg)     I/O       Intake/Output Summary (Last 24 hours) at 1/10/2021 1718  Last data filed at 1/10/2021 1603  Gross per 24 hour   Intake 855 ml   Output 1300 ml   Net -445 ml     DRAIN/TUBE OUTPUT       Invasive Lines   ICP PRESSURE RANGE  No data recorded CVP PRESSURE RANGE  No data recorded      Medications      insulin glargine  10 Units Subcutaneous Nightly    lisinopril  2.5 mg Oral Daily    metFORMIN  500 mg Oral BID WC    meloxicam  15 mg Oral Daily    insulin lispro  0-12 Units Subcutaneous TID WC    insulin lispro  0-6 Units Subcutaneous Nightly    aspirin EC  81 mg Oral Daily    atorvastatin  20 mg Oral Nightly    gabapentin  300 mg Oral Daily    hydroCHLOROthiazide  25 mg Oral Daily    pantoprazole  40 mg Oral QAM AC    dextromethorphan  30 mg Oral 2 times per day    sodium chloride flush  10 mL Intravenous 2 times per day    enoxaparin  30 mg Subcutaneous BID     docusate sodium, magnesium sulfate, glucose, dextrose, glucagon (rDNA), dextrose, benzonatate, sodium chloride flush, acetaminophen, sodium chloride  IV Drips/Infusions   dexmedetomidine (PRECEDEX) IV infusion      dextrose         Diet/Nutrition   DIET CARDIAC; Exam      Constitutional - Alert, awake, no distress  General Appearance  well developed, well nourished  HEENT -normocephalic, atraumatic. PERRLA  Lungs - Chest expands equally, no wheezes, rales or rhonchi. Decreased sounds, coarse  Cardiovascular - Heart sounds are normal.  normal rate and rhythm regular, no murmur, gallop or rub.   Abdomen - soft, nontender, nondistended, no guarding  Neurologic -appropriate, following commands  Extremities - no cyanosis, clubbing or edema    Lab Results   CBC     Lab Results   Component Value Date    WBC 6.9 01/04/2021    RBC 4.99 01/04/2021    HGB 15.4 01/04/2021    HCT 45.5 01/04/2021     01/04/2021    MCV 91.1 01/04/2021    MCH 30.8 01/04/2021    MCHC 33.8 01/04/2021    RDW 14.1 01/04/2021    LYMPHOPCT 10 01/04/2021    MONOPCT 7 01/04/2021    BASOPCT 1 01/04/2021    MONOSABS 0.50 01/04/2021    LYMPHSABS 0.70 01/04/2021    EOSABS 0.10 01/04/2021    BASOSABS 0.10 01/04/2021    DIFFTYPE NOT REPORTED 01/04/2021       BMP   Lab Results   Component Value Date  01/10/2021    K 4.1 01/10/2021    CL 92 01/10/2021    CO2 24 01/10/2021    BUN 24 01/10/2021    CREATININE 0.85 01/10/2021    GLUCOSE 131 01/10/2021       LFTS  Lab Results   Component Value Date    ALKPHOS 59 12/29/2020    ALT 55 12/29/2020    AST 58 12/29/2020    PROT 7.0 12/29/2020    BILITOT 0.95 12/29/2020    LABALBU 3.9 12/29/2020       ABG ABGs: No results found for: PHART, PO2ART, RBL8DEO    No results found for: IFIO2, MODE, SETTIDVOL, SETPEEP      INR  No results for input(s): PROTIME, INR in the last 72 hours. APTT  No results for input(s): APTT in the last 72 hours. Lactic Acid  No results found for: LACTA     BNP   No results for input(s): BNP in the last 72 hours. Cultures       Radiology     CXR       CT Scans    (See actual reports for details)      SYSTEMS ASSESSMENT    Neuro       Respiratory   Wean oxygen as tolerated.  Keep O2 sat > 88%    Cardiovascular        Gastrointestinal       Renal       Infectious Disease       Hematology/Oncology       Endocrine       Social/Spiritual/DNR/Disposition/Other     Acute respiratory failure with hypoxia  COVID-19 pneumonia  Elevated inflammatory markers with CRP, LDH and ferritin, unremarkable D-dimer  Former smoker 60 pack year history quit 1991  HTN  Diabetes mellitus     wean FiO2 as tolerated,   IS  Home O2 eval upon discharge if goes home  Completed remdesivir, and dexamethasone   On Lovenox   discharge planning  Discussed with staff to look for any apnea or snoring during sleep and to notify cardiology if can clear patient for discharge to [facility] or home    Electronically signed by Vinny Graham MD on 1/10/2021 at 5:18 PM

## 2021-01-10 NOTE — PLAN OF CARE
Problem: Airway Clearance - Ineffective  Goal: Achieve or maintain patent airway  Outcome: Ongoing     Problem: Gas Exchange - Impaired  Goal: Absence of hypoxia  Outcome: Ongoing     Problem: Breathing Pattern - Ineffective  Goal: Ability to achieve and maintain a regular respiratory rate  1/10/2021 1645 by Barrie Lundborg, RN  Outcome: Ongoing     Problem:  Body Temperature -  Risk of, Imbalanced  Goal: Ability to maintain a body temperature within defined limits  1/10/2021 1645 by Barrie Lundborg, RN  Outcome: Ongoing     Problem: Isolation Precautions - Risk of Spread of Infection  Goal: Prevent transmission of infection  Outcome: Ongoing     Problem: Nutrition Deficits  Goal: Optimize nutrtional status  Outcome: Ongoing     Problem: Risk for Fluid Volume Deficit  Goal: Maintain normal heart rhythm  Outcome: Ongoing     Problem: Loneliness or Risk for Loneliness  Goal: Demonstrate positive use of time alone when socialization is not possible  Outcome: Ongoing     Problem: Falls - Risk of:  Goal: Will remain free from falls  1/10/2021 1645 by Barrie Lundborg, RN  Outcome: Ongoing     Problem: Skin Integrity:  Goal: Will show no infection signs and symptoms  1/10/2021 1645 by Barrie Lundborg, RN  Outcome: Ongoing     Problem: Musculor/Skeletal Functional Status  Goal: Highest potential functional level  Outcome: Ongoing     Problem: Musculor/Skeletal Functional Status  Goal: Highest potential functional level  Outcome: Ongoing     Problem: Nutrition  Goal: Optimal nutrition therapy  Outcome: Ongoing     Problem: Pain:  Goal: Pain level will decrease  1/10/2021 1645 by Barrie Lundborg, RN  Outcome: Ongoing

## 2021-01-10 NOTE — PROGRESS NOTES
RN spoke with pt's wife Ceci Carpenter, and updated on pts progress through the night. Pt had a good night with little sleep, but SpO2 stayed between 94-95% on 4L Nasal canula with a decrease from beginning of shift of 5L. No further questions asked by family.

## 2021-01-10 NOTE — PROGRESS NOTES
Progress Note  1/10/2021 10:41 AM  Subjective:   Admit Date: 12/29/2020  PCP: Izabel Schmidt MD  CC: covid pneumonia  Interval History: pt seen from room window and doing fine. Up in chair and tolerating NC oxygen. No new issues. Diet: DIET CARDIAC;  Medications:   Scheduled Meds:   insulin glargine  10 Units Subcutaneous Nightly    lisinopril  2.5 mg Oral Daily    metFORMIN  500 mg Oral BID WC    meloxicam  15 mg Oral Daily    insulin lispro  0-12 Units Subcutaneous TID WC    insulin lispro  0-6 Units Subcutaneous Nightly    aspirin EC  81 mg Oral Daily    atorvastatin  20 mg Oral Nightly    gabapentin  300 mg Oral Daily    hydroCHLOROthiazide  25 mg Oral Daily    pantoprazole  40 mg Oral QAM AC    dextromethorphan  30 mg Oral 2 times per day    sodium chloride flush  10 mL Intravenous 2 times per day    enoxaparin  30 mg Subcutaneous BID     Continuous Infusions:   dexmedetomidine (PRECEDEX) IV infusion      dextrose       CBC: No results for input(s): WBC, HGB, PLT in the last 72 hours. BMP:    Recent Labs     01/08/21  0535 01/09/21  0415 01/10/21  0436   * 132* 126*   K 4.6 4.0 4.1   CL 91* 96* 92*   CO2 23 26 24   BUN 29* 25* 24*   CREATININE 1.01 0.97 0.85   GLUCOSE 380* 129* 131*     Hepatic: No results for input(s): AST, ALT, ALB, BILITOT, ALKPHOS in the last 72 hours. Troponin: No results for input(s): TROPONINI in the last 72 hours. BNP: No results for input(s): BNP in the last 72 hours. Lipids: No results for input(s): CHOL, HDL in the last 72 hours. Invalid input(s): LDLCALCU  INR: No results for input(s): INR in the last 72 hours.     Objective:   Vitals: BP (!) 126/50   Pulse 69   Temp 98.4 °F (36.9 °C) (Oral)   Resp 23   Ht 5' 7\" (1.702 m)   Wt 227 lb 1.2 oz (103 kg)   SpO2 95%   BMI 35.56 kg/m²   General appearance: alert   Up in chair, no visible SOB- other exam deferred today    Assessment and Plan: 1. covid pneumonia- discharge to [facility] if okay with others. Summary/ MARIAM and meds done.      Patient Active Problem List:     Acquired deviated nasal septum     Actinic keratosis     Arthritis     Benign prostatic hyperplasia     Bloating     Chronic serous otitis media     Claustrophobia     Coronary artery disease     Esophageal reflux     Flatus     Hearing loss     Hemorrhoids     History of allergy     Hyperlipidemia     Essential hypertension     Leg swelling     Lumbar radiculopathy     Lumbar stenosis     Skin neoplasm     Tinea corporis     Ventral hernia     Weight gain     Type 2 diabetes mellitus with complication (HCC)     Obesity     Other osteoporosis without current pathological fracture     Presence of coronary angioplasty implant and graft     Morbid obesity with BMI of 40.0-44.9, adult (HCC)     COVID-19     Elevated C-reactive protein (CRP)     Hypoxemia requiring supplemental oxygen      Electronically signed by Val Howard MD on 1/10/2021 at 10:41 AM

## 2021-01-10 NOTE — DISCHARGE SUMMARY
Discharge Summary    Tori Regalado  :  1940  MRN:  [id number]    Admit date:  2020  Discharge date:      Admitting Physician:  Roberto Balbuena MD    PCP: Roberto Balbuena MD    Discharge Diagnoses:    Patient Active Problem List   Diagnosis    Acquired deviated nasal septum    Actinic keratosis    Arthritis    Benign prostatic hyperplasia    Bloating    Chronic serous otitis media    Claustrophobia    Coronary artery disease    Esophageal reflux    Flatus    Hearing loss    Hemorrhoids    History of allergy    Hyperlipidemia    Essential hypertension    Leg swelling    Lumbar radiculopathy    Lumbar stenosis    Skin neoplasm    Tinea corporis    Ventral hernia    Weight gain    Type 2 diabetes mellitus with complication ([address]    Obesity    Other osteoporosis without current pathological fracture    Presence of coronary angioplasty implant and graft    Morbid obesity with BMI of 40.0-44.9, adult (HCC)    COVID-19    Elevated C-reactive protein (CRP)    Hypoxemia requiring supplemental oxygen       Discharged Condition:  stable    Hospital Course:   Patient admitted for covid pneumonia- pt had hypoxia on admission that continued to worsen and pt required high flow oxygen and was given remdesivir and decadron. Eventually improved and oxygen levels improved requiring only NC and no longer needing high flow. Pt had bradycardia into 30's especially when sleeping, but HR remained stable and asymptomatic. Hallie Rad       Discharge Medications:       [facility] Medication Instructions [id number]    Printed on:01/10/21 1041   Medication Information                      aspirin EC 81 MG EC tablet  Take 81 mg by mouth daily             atorvastatin (LIPITOR) 20 MG tablet  Take 1 tablet by mouth nightly             benzonatate (TESSALON) 100 MG capsule  Take 1 capsule by mouth 3 times daily as needed for Cough MONOSABS 0.50 01/04/2021    LYMPHSABS 0.70 01/04/2021    EOSABS 0.10 01/04/2021    BASOSABS 0.10 01/04/2021    DIFFTYPE NOT REPORTED 01/04/2021     BMP:    Lab Results   Component Value Date     01/10/2021    K 4.1 01/10/2021    CL 92 01/10/2021    CO2 24 01/10/2021    BUN 24 01/10/2021    LABALBU 3.9 12/29/2020    CREATININE 0.85 01/10/2021    CALCIUM 8.7 01/10/2021    GFRAA >60 01/10/2021    LABGLOM >60 01/10/2021    GLUCOSE 131 01/10/2021     Radiology Review:  See results  Other diagnostic test:  -    Disposition:   Skilled care  Follow up with Sergio Mancia MD in 2 weeks. Discharge patient to:  To a [facility] facility    Electronically signed by Zarina Wing MD on 1/10/2021 at 10:48 AM

## 2021-01-10 NOTE — PROGRESS NOTES
[facility] -   Infectious diseases evaluation  admission date 12/29/2020    reason for consultation:   COVID-19 infection    Impression :   Current:  · COVID-19 infection first test + 12/29/2020  · Acute respiratory failure with hypoxia required high flow oxygen  · Elevated D-dimer  · History of smoking  · Diabetes mellitus  · Diverticulitis  · Hyperlipidemia  · Hypertension    Other:  · Flagyl, penicillin and sulfa allergy    Recommendations   · Decadron day 10 course completed 1/8/2021  · Remdesivir completed 1/2/21  · On Lovenox per pulmonary  · Supportive care  · Droplet plus isolation  · Discussed with nursing staff  · Home O2 evaluation  · The patient may be discharged from infectious disease point of view.       History of Present Illness:   Initial history:  Johnathan English is a [de-identified]y.o.-year-old male presented to the hospital with worsening shortness of breath for 2 days associated with cough headache and diarrhea for 2 weeks  He was hypoxic was placed on high flow oxygen initially  COVID-19 rapid test was positive  Inflammatory markers and liver enzymes were elevated  Chest x-ray showed left lower lobe infiltrate with possible small pleural fluid    1/6/2021 D-dimer 0.66, procalcitonin 0.11, ferritin 704, , C-reactive proteins 5.1  Micro  12/29 COVID19 positive  Interval changes  1/9/2021   Afebrile  The patient on 4 L of oxygen per nasal cannula, nonproductive cough, no increased shortness of breath, no reported nausea or vomiting, no fever no new events      Imaging  1/3 CXR  No significant interval change    1/1/ CXR  Cardiac silhouette is enlarged.  No pneumothorax.  No pleural effusion.  Hazy multifocal airspace opacities, worse on the left, unchanged      Patient Vitals for the past 8 hrs:   BP Temp Temp src Pulse Resp SpO2   01/09/21 2000 117/65   77 22 93 %   01/09/21 1947      93 %   01/09/21 1931     22 94 % 01/09/21 1900 (!) 115/57 97.9 °F (36.6 °C) Oral 81 25 93 %   01/09/21 1800 (!) 145/122   90 29 92 %   01/09/21 1300  97.7 °F (36.5 °C) Oral  18          I have personally reviewed the past medical history, past surgical history, medications, social history, and family history, and I haveupdated the database accordingly. Allergies:   Feldene [piroxicam], Flagyl [metronidazole], Naprosyn [naproxen], Penicillins, Sulfa antibiotics, and Sulfasalazine     Review of Systems:     Review of Systems   Constitutional: Negative. HENT: Negative. Eyes: Negative. Respiratory: Positive for cough and shortness of breath. Cardiovascular: Negative. Gastrointestinal: Negative. Genitourinary: Negative. Musculoskeletal: Negative. Skin: Negative. As per history present illness, other than above 12 system review was negative  Physical Examination :       Remainder of examination deferred due to excessive risk conferred by physical examination during a global pandemic due to coronavirus 19.   In a setting where local and national supplies of personal protective equipment are depleted    Past Medical History:     Past Medical History:   Diagnosis Date    Diabetes ([facility] [address]     Diverticulitis     History of allergy     Hyperlipidemia     Hypertension     Osteoarthritis        Past Surgical  History:     Past Surgical History:   Procedure Laterality Date    BACK SURGERY  09/03/2017    Arlene Yanes, lumbar fusion   Yen  2015    partial colectomy due to diveritc    COLONOSCOPY      TOTAL KNEE ARTHROPLASTY Right        Medications:      insulin glargine  10 Units Subcutaneous Nightly    lisinopril  2.5 mg Oral Daily    metFORMIN  500 mg Oral BID WC    meloxicam  15 mg Oral Daily    insulin lispro  0-12 Units Subcutaneous TID WC    insulin lispro  0-6 Units Subcutaneous Nightly    aspirin EC  81 mg Oral Daily    atorvastatin  20 mg Oral Nightly I have independently reviewed/ordered the following labs:    CBC with Differential:   No results for input(s): WBC, HGB, HCT, PLT, SEGSPCT, BANDSPCT, LYMPHOPCT, MONOPCT, EOSPCT in the last 72 hours. BMP:  Recent Labs     01/08/21  0535 01/09/21  0415   * 132*   K 4.6 4.0   CL 91* 96*   CO2 23 26   BUN 29* 25*   CREATININE 1.01 0.97     Hepatic Function Panel:   No results for input(s): PROT, LABALBU, BILIDIR, IBILI, BILITOT, ALKPHOS, ALT, AST in the last 72 hours. No results for input(s): RPR in the last 72 hours. No results for input(s): HIV in the last 72 hours. No results for input(s): BC in the last 72 hours. Lab Results   Component Value Date    CREATININE 0.97 01/09/2021    GLUCOSE 129 01/09/2021       Detailed results: Thank you for allowing us to participate in the care of this patient. Please call with questions. This note is created with the assistance of a speech recognition program.  While intending to generate adocument that actually reflects the content of the visit, the document can still have some errors including those of syntax and sound a like substitutions which may escape proof reading. It such instances, actual meaningcan be extrapolated by contextual diversion.     Nathaly Patten MD  Office: [phone]  Perfect serve / office [phone]

## 2021-01-10 NOTE — CARE COORDINATION
ONGOING DISCHARGE PLAN:    Writer noted discharge order. Discharge plan is [facility]. Writer in communication with bedside RN Lis and Clinical Lead Annia regarding potential for discharge. Writer made aware that patients HR drops as low as 30 at rest/ sleeping but is asymptomatic and no Cardiology intervention at this time. Patients blood pressure is stable. Write in communication with [facility] and told the above. He was then in communication with [facility] and he told the writer that they cannot take the patient with a heart rate going into the 30s. [facility] told writer there is not anything that can be done with this case today and that it will need to be looked into by [place] tomorrow. Clinical Lead and bedside RN aware. Will continue to follow for additional discharge needs.     Electronically signed by Dennie Sobers, RN on 1/10/2021 at 3:12 PM

## 2021-01-10 NOTE — PROGRESS NOTES
ICU Progress Note (Non-Vent)  [facility]    Patient - Dane Stoner,  Age - [de-identified] y.o.    - 1940      Room Number -    MRN -  [id number]   Acct # - [de-identified]  Date of Admission -  2020  2:40 PM     Follow-up: Acute respiratory failure    Events of Past 24 Hours   Feels fine, on 4 L nasal cannula   No Short of breath , some dry cough, no wheezing  Denies any fever or chills, no chest pain  No nausea vomiting, soft stools x2       Vitals    height is 5' 7\" (1.702 m) and weight is 235 lb 14.3 oz (107 kg). His oral temperature is 97.7 °F (36.5 °C). His blood pressure is 135/51 (abnormal) and his pulse is 72. His respiration is 18 and oxygen saturation is 95%.        Temperature Range: Temp: 97.7 °F (36.5 °C) Temp  Av.7 °F (36.5 °C)  Min: 97.4 °F (36.3 °C)  Max: 97.9 °F (36.6 °C)  BP Range:  Systolic (69SZA), FLU:515 , Min:110 , KKO:518     Diastolic (54PYK), CFS:26, Min:44, Max:77    Pulse Range: Pulse  Av.9  Min: 32  Max: 72  Respiration Range: Resp  Av.9  Min: 14  Max: 26  Current Pulse Ox[de-identified]  SpO2: 95 %  24HR Pulse Ox Range:  SpO2  Av %  Min: 85 %  Max: 96 %  Oxygen Amount and Delivery: O2 Flow Rate (L/min): 4 L/min    Wt Readings from Last 3 Encounters:   21 235 lb 14.3 oz (107 kg)   20 234 lb (106.1 kg)   20 245 lb 6.4 oz (111.3 kg)     I/O       Intake/Output Summary (Last 24 hours) at 2021  Last data filed at 2021 1300  Gross per 24 hour   Intake 200 ml   Output 1100 ml   Net -900 ml     DRAIN/TUBE OUTPUT       Invasive Lines   ICP PRESSURE RANGE  No data recorded  CVP PRESSURE RANGE  No data recorded      Medications      insulin glargine  10 Units Subcutaneous Nightly    lisinopril  2.5 mg Oral Daily    metFORMIN  500 mg Oral BID WC    meloxicam  15 mg Oral Daily    insulin lispro  0-12 Units Subcutaneous TID WC  insulin lispro  0-6 Units Subcutaneous Nightly    aspirin EC  81 mg Oral Daily    atorvastatin  20 mg Oral Nightly    gabapentin  300 mg Oral Daily    hydroCHLOROthiazide  25 mg Oral Daily    pantoprazole  40 mg Oral QAM AC    dextromethorphan  30 mg Oral 2 times per day    sodium chloride flush  10 mL Intravenous 2 times per day    enoxaparin  30 mg Subcutaneous BID     docusate sodium, magnesium sulfate, glucose, dextrose, glucagon (rDNA), dextrose, benzonatate, sodium chloride flush, acetaminophen, sodium chloride  IV Drips/Infusions   dexmedetomidine (PRECEDEX) IV infusion      dextrose         Diet/Nutrition   DIET CARDIAC; Exam      Constitutional - Alert, awake, no distress  General Appearance  well developed, well nourished  HEENT -normocephalic, atraumatic. PERRLA  Lungs - Chest expands equally, no wheezes, rales or rhonchi. Decreased sounds, coarse  Cardiovascular - Heart sounds are normal.  normal rate and rhythm regular, no murmur, gallop or rub.   Abdomen - soft, nontender, nondistended, no guarding  Neurologic -appropriate, following commands  Extremities - no cyanosis, clubbing or edema    Lab Results   CBC     Lab Results   Component Value Date    WBC 6.9 01/04/2021    RBC 4.99 01/04/2021    HGB 15.4 01/04/2021    HCT 45.5 01/04/2021     01/04/2021    MCV 91.1 01/04/2021    MCH 30.8 01/04/2021    MCHC 33.8 01/04/2021    RDW 14.1 01/04/2021    LYMPHOPCT 10 01/04/2021    MONOPCT 7 01/04/2021    BASOPCT 1 01/04/2021    MONOSABS 0.50 01/04/2021    LYMPHSABS 0.70 01/04/2021    EOSABS 0.10 01/04/2021    BASOSABS 0.10 01/04/2021    DIFFTYPE NOT REPORTED 01/04/2021       BMP   Lab Results   Component Value Date     01/09/2021    K 4.0 01/09/2021    CL 96 01/09/2021    CO2 26 01/09/2021    BUN 25 01/09/2021    CREATININE 0.97 01/09/2021    GLUCOSE 129 01/09/2021       LFTS  Lab Results   Component Value Date    ALKPHOS 59 12/29/2020    ALT 55 12/29/2020    AST 58 12/29/2020 PROT 7.0 12/29/2020    BILITOT 0.95 12/29/2020    LABALBU 3.9 12/29/2020       ABG ABGs: No results found for: PHART, PO2ART, BSX1ZBO    No results found for: IFIO2, MODE, SETTIDVOL, SETPEEP      INR  No results for input(s): PROTIME, INR in the last 72 hours. APTT  No results for input(s): APTT in the last 72 hours. Lactic Acid  No results found for: LACTA     BNP   No results for input(s): BNP in the last 72 hours. Cultures       Radiology     CXR       CT Scans    (See actual reports for details)      SYSTEMS ASSESSMENT    Neuro       Respiratory   Wean oxygen as tolerated.  Keep O2 sat > 88%    Cardiovascular        Gastrointestinal       Renal       Infectious Disease       Hematology/Oncology       Endocrine       Social/Spiritual/DNR/Disposition/Other     Acute respiratory failure with hypoxia  COVID-19 pneumonia  Elevated inflammatory markers with CRP, LDH and ferritin, unremarkable D-dimer  Former smoker 60 pack year history quit 1991  HTN  Diabetes mellitus     wean FiO2 as tolerated,   IS  Home O2 eval upon discharge if goes home  Completed remdesivir, and dexamethasone   On Lovenox   discharge planning    Electronically signed by Della Gamez MD on 1/9/2021 at 7:21 PM

## 2021-01-10 NOTE — PROGRESS NOTES
Per Dr Jewel Nolen have cardiology see patient again tomorrow to evaluate  Patient having bradycardia episodes while sleeping.

## 2021-01-10 NOTE — PROGRESS NOTES
[address] cardiology updated on status and will have physician see patient tomorrow in rounds. Need more recent progress note from cardiology, last seen 1/2, and patient has had further episodes on bradycardia. Possible !st degree, mobitz 1 and mobitz 2. Blood pressure stable, however [facility] may not take if christina at [facility].  Social work to also follow up tomorrow 1/11

## 2021-01-10 NOTE — PLAN OF CARE
Problem: Breathing Pattern - Ineffective  Goal: Ability to achieve and maintain a regular respiratory rate  Outcome: Ongoing  Note: Patient resp rate 20-25 breath/min; pulse ox 93-95% on 4L oxygen via nasal canula; lung sounds bilateral fine crackles in the bases; will continue to monitor breathing pattern. Problem: Body Temperature -  Risk of, Imbalanced  Goal: Ability to maintain a body temperature within defined limits  Outcome: Ongoing  Note: Pt remains afebrile during this shift; will continue to monitor body temp. Problem: Falls - Risk of:  Goal: Will remain free from falls  Description: Will remain free from falls  Outcome: Ongoing  Note: No falls noted this shift. Patient ambulates with x1 staff assistance without difficulty. Bed kept in low position. Safe environment maintained. Bedside table & call light in reach. Uses call light appropriately when needing assistance. Problem: Skin Integrity:  Goal: Will show no infection signs and symptoms  Description: Will show no infection signs and symptoms  Outcome: Ongoing  Note: Skin assessment complete. Waffle mattress in place. Coccyx reddened. Sensicare applied PRN. Turned and repositioned every two hours per self. Area kept free from moisture. Proper nourishment and fluids encouraged, as appropriate. Will continue to monitor for additional needs and changes in skin breakdown. Problem: Pain:  Goal: Pain level will decrease  Description: Pain level will decrease  Outcome: Ongoing  Note: Pt medicated with pain medication prn. Assessed all pain characteristics including level, type, location, frequency, and onset. Non-pharmacologic interventions offered to pt as well. Pt states pain is tolerable at this time. Will continue to monitor.

## 2021-01-11 PROBLEM — J12.82 PNEUMONIA DUE TO COVID-19 VIRUS: Status: ACTIVE | Noted: 2020-12-29

## 2021-01-11 PROBLEM — J96.01 ACUTE RESPIRATORY FAILURE WITH HYPOXIA (HCC): Status: ACTIVE | Noted: 2021-01-11

## 2021-01-11 PROBLEM — G47.33 OSA (OBSTRUCTIVE SLEEP APNEA): Chronic | Status: ACTIVE | Noted: 2021-01-11

## 2021-01-11 LAB
ANION GAP SERPL CALCULATED.3IONS-SCNC: 9 MMOL/L (ref 9–17)
BUN BLDV-MCNC: 22 MG/DL (ref 8–23)
BUN/CREAT BLD: ABNORMAL (ref 9–20)
CALCIUM SERPL-MCNC: 8.7 MG/DL (ref 8.6–10.4)
CHLORIDE BLD-SCNC: 92 MMOL/L (ref 98–107)
CO2: 27 MMOL/L (ref 20–31)
CREAT SERPL-MCNC: 0.83 MG/DL (ref 0.7–1.2)
EKG ATRIAL RATE: 67 BPM
EKG P AXIS: 29 DEGREES
EKG P-R INTERVAL: 182 MS
EKG Q-T INTERVAL: 520 MS
EKG QRS DURATION: 108 MS
EKG QTC CALCULATION (BAZETT): 390 MS
EKG R AXIS: -33 DEGREES
EKG T AXIS: 23 DEGREES
EKG VENTRICULAR RATE: 34 BPM
GFR AFRICAN AMERICAN: >60 ML/MIN
GFR NON-AFRICAN AMERICAN: >60 ML/MIN
GFR SERPL CREATININE-BSD FRML MDRD: ABNORMAL ML/MIN/{1.73_M2}
GFR SERPL CREATININE-BSD FRML MDRD: ABNORMAL ML/MIN/{1.73_M2}
GLUCOSE BLD-MCNC: 116 MG/DL (ref 75–110)
GLUCOSE BLD-MCNC: 117 MG/DL (ref 70–99)
GLUCOSE BLD-MCNC: 118 MG/DL (ref 75–110)
GLUCOSE BLD-MCNC: 148 MG/DL (ref 75–110)
GLUCOSE BLD-MCNC: 159 MG/DL (ref 75–110)
POTASSIUM SERPL-SCNC: 3.9 MMOL/L (ref 3.7–5.3)
SODIUM BLD-SCNC: 128 MMOL/L (ref 135–144)
TSH SERPL DL<=0.05 MIU/L-ACNC: 1.65 MIU/L (ref 0.3–5)

## 2021-01-11 PROCEDURE — 97116 GAIT TRAINING THERAPY: CPT

## 2021-01-11 PROCEDURE — 97110 THERAPEUTIC EXERCISES: CPT

## 2021-01-11 PROCEDURE — 2060000000 HC ICU INTERMEDIATE R&B

## 2021-01-11 PROCEDURE — 2580000003 HC RX 258: Performed by: FAMILY MEDICINE

## 2021-01-11 PROCEDURE — 6370000000 HC RX 637 (ALT 250 FOR IP): Performed by: FAMILY MEDICINE

## 2021-01-11 PROCEDURE — 99232 SBSQ HOSP IP/OBS MODERATE 35: CPT | Performed by: INTERNAL MEDICINE

## 2021-01-11 PROCEDURE — 80048 BASIC METABOLIC PNL TOTAL CA: CPT

## 2021-01-11 PROCEDURE — 6360000002 HC RX W HCPCS: Performed by: FAMILY MEDICINE

## 2021-01-11 PROCEDURE — 94761 N-INVAS EAR/PLS OXIMETRY MLT: CPT

## 2021-01-11 PROCEDURE — 97530 THERAPEUTIC ACTIVITIES: CPT

## 2021-01-11 PROCEDURE — 99232 SBSQ HOSP IP/OBS MODERATE 35: CPT | Performed by: FAMILY MEDICINE

## 2021-01-11 PROCEDURE — 36415 COLL VENOUS BLD VENIPUNCTURE: CPT

## 2021-01-11 PROCEDURE — 84443 ASSAY THYROID STIM HORMONE: CPT

## 2021-01-11 PROCEDURE — 2700000000 HC OXYGEN THERAPY PER DAY

## 2021-01-11 PROCEDURE — 82947 ASSAY GLUCOSE BLOOD QUANT: CPT

## 2021-01-11 RX ADMIN — PANTOPRAZOLE SODIUM 40 MG: 40 TABLET, DELAYED RELEASE ORAL at 12:37

## 2021-01-11 RX ADMIN — METFORMIN HYDROCHLORIDE 500 MG: 500 TABLET ORAL at 08:58

## 2021-01-11 RX ADMIN — Medication 30 MG: at 21:40

## 2021-01-11 RX ADMIN — ACETAMINOPHEN 650 MG: 325 TABLET, FILM COATED ORAL at 21:42

## 2021-01-11 RX ADMIN — LISINOPRIL 2.5 MG: 2.5 TABLET ORAL at 08:58

## 2021-01-11 RX ADMIN — ENOXAPARIN SODIUM 30 MG: 30 INJECTION SUBCUTANEOUS at 08:57

## 2021-01-11 RX ADMIN — GABAPENTIN 300 MG: 300 CAPSULE ORAL at 08:58

## 2021-01-11 RX ADMIN — ENOXAPARIN SODIUM 30 MG: 30 INJECTION SUBCUTANEOUS at 21:40

## 2021-01-11 RX ADMIN — INSULIN LISPRO 2 UNITS: 100 INJECTION, SOLUTION INTRAVENOUS; SUBCUTANEOUS at 12:37

## 2021-01-11 RX ADMIN — MELOXICAM 15 MG: 15 TABLET ORAL at 08:58

## 2021-01-11 RX ADMIN — Medication 10 ML: at 08:57

## 2021-01-11 RX ADMIN — ASPIRIN 81 MG: 81 TABLET, COATED ORAL at 08:58

## 2021-01-11 RX ADMIN — INSULIN LISPRO 2 UNITS: 100 INJECTION, SOLUTION INTRAVENOUS; SUBCUTANEOUS at 17:52

## 2021-01-11 RX ADMIN — METFORMIN HYDROCHLORIDE 500 MG: 500 TABLET ORAL at 17:52

## 2021-01-11 RX ADMIN — HYDROCHLOROTHIAZIDE 25 MG: 25 TABLET ORAL at 08:58

## 2021-01-11 RX ADMIN — Medication 30 MG: at 09:00

## 2021-01-11 RX ADMIN — Medication 10 ML: at 21:41

## 2021-01-11 RX ADMIN — ATORVASTATIN CALCIUM 20 MG: 20 TABLET, FILM COATED ORAL at 21:40

## 2021-01-11 ASSESSMENT — PAIN SCALES - GENERAL
PAINLEVEL_OUTOF10: 0

## 2021-01-11 ASSESSMENT — PAIN SCALES - WONG BAKER: WONGBAKER_NUMERICALRESPONSE: 0

## 2021-01-11 ASSESSMENT — ENCOUNTER SYMPTOMS
EYES NEGATIVE: 1
GASTROINTESTINAL NEGATIVE: 1
SHORTNESS OF BREATH: 1
COUGH: 1

## 2021-01-11 NOTE — PLAN OF CARE
Problem: Airway Clearance - Ineffective  Goal: Achieve or maintain patent airway  Outcome: Ongoing     Problem: Gas Exchange - Impaired  Goal: Absence of hypoxia  Outcome: Ongoing     Problem: Breathing Pattern - Ineffective  Goal: Ability to achieve and maintain a regular respiratory rate  Outcome: Ongoing     Problem:  Body Temperature -  Risk of, Imbalanced  Goal: Ability to maintain a body temperature within defined limits  Outcome: Ongoing     Problem: Nutrition Deficits  Goal: Optimize nutrtional status  Outcome: Ongoing     Problem: Risk for Fluid Volume Deficit  Goal: Maintain normal heart rhythm  Outcome: Ongoing     Problem: Fatigue  Goal: Verbalize increase energy and improved vitality  Outcome: Ongoing     Problem: Patient Education: Go to Patient Education Activity  Goal: Patient/Family Education  Outcome: Ongoing     Problem: Falls - Risk of:  Goal: Will remain free from falls  Outcome: Ongoing     Problem: Skin Integrity:  Goal: Will show no infection signs and symptoms  Outcome: Ongoing     Problem: Musculor/Skeletal Functional Status  Goal: Highest potential functional level  Outcome: Ongoing

## 2021-01-11 NOTE — PROGRESS NOTES
Writer called to verify that PT and OT will be seeing this patient since he has not been seen for therapy since 1/8/21 and there are discharge issues pending that will be decided on their recommendations.

## 2021-01-11 NOTE — CARE COORDINATION
Social work: asked Care coordinator if heart rate still in 30's and it is. Therefore snf cannot take pt until that is resolved. If pt likely to continue to have issues can reconsult LTAC. Will need jose and Rx at discharge.  Lesley dugan

## 2021-01-11 NOTE — PLAN OF CARE
Problem: Airway Clearance - Ineffective  Goal: Achieve or maintain patent airway  1/11/2021 0410 by Ashlie Archibald RN  Outcome: Met This Shift  Note: On N/C at 3l/m and tolerated well  1/10/2021 1645 by Lance Yeung RN  Outcome: Ongoing     Problem: Breathing Pattern - Ineffective  Goal: Ability to achieve and maintain a regular respiratory rate  1/11/2021 0410 by Ashlie Archibald RN  Outcome: Met This Shift  1/10/2021 1645 by Lance Yeung RN  Outcome: Ongoing     Problem:  Body Temperature -  Risk of, Imbalanced  Goal: Ability to maintain a body temperature within defined limits  1/11/2021 0410 by Ashlie Archibald RN  Outcome: Met This Shift  1/10/2021 1645 by Lance Yeung RN  Outcome: Ongoing  Goal: Will regain or maintain usual level of consciousness  1/11/2021 0410 by Ashlie Archibald RN  Outcome: Met This Shift  1/10/2021 1645 by Lance Yeung RN  Outcome: Ongoing  Goal: Complications related to the disease process, condition or treatment will be avoided or minimized  1/11/2021 0410 by Ashlie Archibald RN  Outcome: Met This Shift  1/10/2021 1645 by Lance Yeung RN  Outcome: Ongoing     Problem: Nutrition Deficits  Goal: Optimize nutrtional status  1/11/2021 0410 by Ashlie Archibald RN  Outcome: Met This Shift  1/10/2021 1645 by Lance Yeung RN  Outcome: Ongoing     Problem: Risk for Fluid Volume Deficit  Goal: Maintain normal heart rhythm  1/11/2021 0410 by Ashlie Archibald RN  Outcome: Met This Shift  1/10/2021 1645 by Lance Yeung RN  Outcome: Ongoing  Goal: Maintain absence of muscle cramping  1/11/2021 0410 by Ashlie Archibald RN  Outcome: Met This Shift  1/10/2021 1645 by Lance Yeung RN  Outcome: Ongoing  Goal: Maintain normal serum potassium, sodium, calcium, phosphorus, and pH  1/11/2021 0410 by Ashlie Archibald RN  Outcome: Met This Shift  1/10/2021 1645 by Lance Yeung RN  Outcome: Ongoing     Problem: Falls - Risk of: Goal: Will remain free from falls  Description: Will remain free from falls  1/11/2021 0410 by Taylor Pickens RN  Outcome: Met This Shift  1/10/2021 1645 by Yany Geronimo RN  Outcome: Ongoing  Goal: Absence of physical injury  Description: Absence of physical injury  1/11/2021 0410 by Taylor Pickens RN  Outcome: Met This Shift  1/10/2021 1645 by Yany Geronimo RN  Outcome: Ongoing     Problem: Skin Integrity:  Goal: Will show no infection signs and symptoms  Description: Will show no infection signs and symptoms  1/11/2021 0410 by Taylor Pickens RN  Outcome: Met This Shift  1/10/2021 1645 by Yany Geronimo RN  Outcome: Ongoing  Goal: Absence of new skin breakdown  Description: Absence of new skin breakdown  1/11/2021 0410 by Taylor Pickens RN  Outcome: Met This Shift  1/10/2021 1645 by Yany Geronimo RN  Outcome: Ongoing     Problem: Nutrition  Goal: Optimal nutrition therapy  1/11/2021 0410 by Taylor Pickens RN  Outcome: Met This Shift  1/10/2021 1645 by Yany Geronimo RN  Outcome: Ongoing     Problem: Pain:  Goal: Pain level will decrease  Description: Pain level will decrease  1/11/2021 0410 by Taylor Pickens RN  Outcome: Met This Shift  1/10/2021 1645 by Yany Geronimo RN  Outcome: Ongoing  Goal: Control of acute pain  Description: Control of acute pain  1/11/2021 0410 by Taylor Pickens RN  Outcome: Met This Shift  1/10/2021 1645 by Yany Geronimo RN  Outcome: Ongoing

## 2021-01-11 NOTE — PROGRESS NOTES
Progress Note    Patient Name:  Lis Parkinson    :  1940 7:04 AM      SUBJECTIVE       Mr. Megan Holcomb seen in person today due to isolation for COVID-19 infection, to decrease risk of transmission of the disease, and preserved PPE's. I did speak to patient's nurse at bedside. Reviewed telemetry and patient's chart. Nurse, patient has been doing well overnight. Asymptomatic. Heart rate averages in the 60s while awake, and runs down to 30s when sleeping. No cardiac symptoms. Vitals show bradycardia in the 30s overnight. Blood pressure fairly stable. Sodium 128, potassium 3.9. Telemetry showing patient in second-degree Mobitz 2 AV block, heart rate in the 30s. Patient sleeping at that time. Pressure stable. OBJECTIVE     Vital signs:    BP (!) 148/50   Pulse (!) 35   Temp 97.6 °F (36.4 °C) (Oral)   Resp 18   Ht 5' 7\" (1.702 m)   Wt 227 lb 1.2 oz (103 kg)   SpO2 95%   BMI 35.56 kg/m²  3 L/min      Admit Weight:  235 lb (106.6 kg)    Last 3 weights: Wt Readings from Last 3 Encounters:   01/10/21 227 lb 1.2 oz (103 kg)   20 234 lb (106.1 kg)   20 245 lb 6.4 oz (111.3 kg)       BMI: Body mass index is 35.56 kg/m².     Input/Output:       Intake/Output Summary (Last 24 hours) at 2021 0704  Last data filed at 2021 0454  Gross per 24 hour   Intake 710 ml   Output 1150 ml   Net -440 ml       Date 21 0000 - 21 2359   Shift 7553-0053 4431-5637 4782-7749 24 Hour Total   INTAKE   Shift Total(mL/kg)       OUTPUT   Urine(mL/kg/hr) 200   200   Shift Total(mL/kg) 200(1.9)   200(1.9)   Weight (kg) 103 103 103 103     Exam: Patient will be physically examined once COVID has been officially ruled out. There is no current clinical indication to risk additional necessary exposures. The patient was already examined in the emergency room. CDC recommendations have been to limit physical exams to the minimum, and the remainder of the physical exam has been deferred due to an excessive risk of spreading SARS-CoV-2/COVID-19 in the context of a global pandemic -- and with limited local and national supplies of PPE, physical contact with patients is being limited to an absolute minimum. Laboratory Studies:     CBC: No results for input(s): WBC, HGB, HCT, MCV, PLT in the last 72 hours. BMP:   Recent Labs     21  0415 01/10/21  0436 21  0439   * 126* 128*   K 4.0 4.1 3.9   CL 96* 92* 92*   CO2 26 24 27   BUN 25* 24* 22   CREATININE 0.97 0.85 0.83     PT/INR: No results for input(s): PROTIME, INR in the last 72 hours. APTT: No results for input(s): APTT in the last 72 hours. MAG: No results for input(s): MG in the last 72 hours. D Dimer: No results for input(s): DDIMER in the last 72 hours. Troponin  No results for input(s): TROPONINI in the last 72 hours. No results for input(s): TROPONINT in the last 72 hours. BNP No results for input(s): BNP in the last 72 hours. No results for input(s): PROBNP in the last 72 hours. Pulse Ox:  SpO2  Av.3 %  Min: 81 %  Max: 99 %  Supplemental O2: O2 Flow Rate (L/min): 3 L/min     Current Meds:    insulin glargine  10 Units Subcutaneous Nightly    lisinopril  2.5 mg Oral Daily    metFORMIN  500 mg Oral BID WC    meloxicam  15 mg Oral Daily    insulin lispro  0-12 Units Subcutaneous TID WC    insulin lispro  0-6 Units Subcutaneous Nightly    aspirin EC  81 mg Oral Daily    atorvastatin  20 mg Oral Nightly    gabapentin  300 mg Oral Daily    hydroCHLOROthiazide  25 mg Oral Daily    pantoprazole  40 mg Oral QAM AC  dextromethorphan  30 mg Oral 2 times per day    sodium chloride flush  10 mL Intravenous 2 times per day    enoxaparin  30 mg Subcutaneous BID     Continuous Infusions:    dexmedetomidine (PRECEDEX) IV infusion      dextrose              ASSESSMENT     Principal Problem:    COVID-19  Active Problems:    Essential hypertension    Lumbar radiculopathy    Morbid obesity with BMI of 40.0-44.9, adult (HCC)    Elevated C-reactive protein (CRP)    Hypoxemia requiring supplemental oxygen  Resolved Problems:    * No resolved hospital problems. *      PLAN     Diagnosis    Bradycardia -Mobitz 2 second-degree AV block  On EKG 01/11/2021, Asymptomatic. First-degree AV block -/20 9/2020 EKG: Sinus rhythm, first-degree AV block left axis deviation, early repolarization leads I and aVL. Acute hypoxic respiratory failure  COVID-19 pneumonia -infectious disease following  Hypertension -hydrochlorothiazide, lisinopril  Hyperlipidemia -Lipitor  Diabetes  Obesity  Hyponatremia -defer to primary team    -Review of telemetry patient in second-degree history of AV block today.  -Heart rate in the 30s while sleeping, up to the 60s when awake. Patient asymptomatic.  -EKG today showing  Patient in second-degree AV block Mobitz 2 with 2-1 conduction, heart rate in the 30s.   -Recommend checking potassium and magnesium levels regularly with maintaining potassium levels > 4 and magnesium levels > 2  -   Check thyroid function.  -Recommend keeping tabs on test of patient, consider using transcutaneous pacing if emergently needed  -Can consider using IV dopamine infusion for rate control if patient can symptomatic  -  Maintain good oxygen saturations,  -Trend troponins -   Monitor overnight, will plan for monitoring heart rate and rhythm while exerting himself at bedside tomorrow to assess his response, as this will aid in determining whether AV dawn versus infranodal block. Discussed with electrophysiology team, agree to plan. Patient not a candidate for permanent pacemaker placement at this point.     We will continue to follow with you      Electronically signed by Chu Nuñez MD on 1/11/2021 at 7:04 AM

## 2021-01-11 NOTE — CARE COORDINATION
Social work called [facility] as requested and dania is looking for any possible option for ltac. Not found yet.  Chastity Found lsw

## 2021-01-11 NOTE — PROGRESS NOTES
Writer called and spoke with pt's wife, Celina Valente, condition update given re: pt now on N/C at 3 l.m and maintained O2 sats above 90%;also sodium level reviewed and that CXR report is not yet resulted; was also informed that Dr Yanni Jett, cardiology, was consulted again to evaluate cardiac dysrhythmia; was informed pt is asymptomatic at this time; Celina Valente was allowed time for questions and offered none, just hopes that pt can come home soon; was informed that [address] [place] [address] Rd will call her with findings from cardiology; Nel Philip RN in\formed to call Celina Valente with update

## 2021-01-11 NOTE — CARE COORDINATION
Social work: spoke to [facility] from [place]. She states that currently pt does not meet LTAC criteria. Will continue to work with snf's to see if any would consider him.   Kiera dugan

## 2021-01-11 NOTE — PROGRESS NOTES
Progress Note  1/11/2021 8:41 AM  Subjective:   Admit Date: 12/29/2020  PCP: Fito Carmen MD  CC: covid penumonia  Interval History: pt still bradycardic- asymptomatic- has had HR in 30's on and off for days. Rehab will not take him with bradycardia. Pt actually now ambulating to the toilet and chair on his own. Cardiology in to see him again today. Pt denies any symptoms or issues. Wants to know when he can go home. Diet: DIET CARDIAC;  Medications:   Scheduled Meds:   insulin glargine  10 Units Subcutaneous Nightly    lisinopril  2.5 mg Oral Daily    metFORMIN  500 mg Oral BID WC    meloxicam  15 mg Oral Daily    insulin lispro  0-12 Units Subcutaneous TID WC    insulin lispro  0-6 Units Subcutaneous Nightly    aspirin EC  81 mg Oral Daily    atorvastatin  20 mg Oral Nightly    gabapentin  300 mg Oral Daily    hydroCHLOROthiazide  25 mg Oral Daily    pantoprazole  40 mg Oral QAM AC    dextromethorphan  30 mg Oral 2 times per day    sodium chloride flush  10 mL Intravenous 2 times per day    enoxaparin  30 mg Subcutaneous BID     Continuous Infusions:   dexmedetomidine (PRECEDEX) IV infusion      dextrose       CBC: No results for input(s): WBC, HGB, PLT in the last 72 hours. BMP:    Recent Labs     01/09/21  0415 01/10/21  0436 01/11/21  0439   * 126* 128*   K 4.0 4.1 3.9   CL 96* 92* 92*   CO2 26 24 27   BUN 25* 24* 22   CREATININE 0.97 0.85 0.83   GLUCOSE 129* 131* 117*     Hepatic: No results for input(s): AST, ALT, ALB, BILITOT, ALKPHOS in the last 72 hours. Troponin: No results for input(s): TROPONINI in the last 72 hours. BNP: No results for input(s): BNP in the last 72 hours. Lipids: No results for input(s): CHOL, HDL in the last 72 hours. Invalid input(s): LDLCALCU  INR: No results for input(s): INR in the last 72 hours.     Objective: Vitals: BP (!) 148/50   Pulse (!) 35   Temp 97.6 °F (36.4 °C) (Oral)   Resp 20   Ht 5' 7\" (1.702 m)   Wt 227 lb 1.2 oz (103 kg)   SpO2 96%   BMI 35.56 kg/m²   General appearance: alert and cooperative with exam  Neck: supple, symmetrical, trachea midline  Lungs: clear to auscultation bilaterally  Heart: regular, but christina- in 30's  Abdomen: soft, non tender  Extremities: extremities normal, atraumatic, no cyanosis or edema  Neurologic: Mental status: Alert, oriented, thought content appropriate    Assessment and Plan:   1. covid pneumonia  2. Bradycardia  3. DM  4. Weakness  Continue with therapy- hopefully can just go home instead of rehab.       Patient Active Problem List:     Acquired deviated nasal septum     Actinic keratosis     Arthritis     Benign prostatic hyperplasia     Bloating     Chronic serous otitis media     Claustrophobia     Coronary artery disease     Esophageal reflux     Flatus     Hearing loss     Hemorrhoids     History of allergy     Hyperlipidemia     Essential hypertension     Leg swelling     Lumbar radiculopathy     Lumbar stenosis     Skin neoplasm     Tinea corporis     Ventral hernia     Weight gain     Type 2 diabetes mellitus with complication (HCC)     Obesity     Other osteoporosis without current pathological fracture     Presence of coronary angioplasty implant and graft     Morbid obesity with BMI of 40.0-44.9, adult (HCC)     COVID-19     Elevated C-reactive protein (CRP)     Hypoxemia requiring supplemental oxygen      Electronically signed by Pablo Stoeks MD on 1/11/2021 at 8:41 AM

## 2021-01-11 NOTE — PROGRESS NOTES
[facility] -   Infectious diseases evaluation  admission date 12/29/2020    reason for consultation:   COVID-19 infection    Impression :   Current:  · COVID-19 infection first test + 12/29/2020  · Acute respiratory failure with hypoxia required high flow oxygen  · Bradycardia  · Elevated D-dimer  · History of smoking  · Diabetes mellitus  · Diverticulitis  · Hyperlipidemia  · Hypertension    Other:  · Flagyl, penicillin and sulfa allergy    Recommendations   · Decadron day 10 course completed 1/8/2021  · Remdesivir completed 1/2/21  · On Lovenox per pulmonary  · Supportive care  · Droplet plus isolation  · Discussed with nursing staff  · Home O2 evaluation  · The patient may be discharged from infectious disease point of view. History of Present Illness:   Initial history:  Lis Parkinson is a [de-identified]y.o.-year-old male presented to the hospital with worsening shortness of breath for 2 days associated with cough headache and diarrhea for 2 weeks  He was hypoxic was placed on high flow oxygen initially  COVID-19 rapid test was positive  Inflammatory markers and liver enzymes were elevated  Chest x-ray showed left lower lobe infiltrate with possible small pleural fluid    1/6/2021 D-dimer 0.66, procalcitonin 0.11, ferritin 704, , C-reactive proteins 5.1  Micro  12/29 COVID19 positive  Interval changes  1/10/2021   Afebrile  The patient on 3 L of oxygen via nasal cannula, episodes of bradycardia while sleeping reported, no reported fever. Chest x-ray 1/6/2021 stable.     Imaging  1/3 CXR  No significant interval change    1/1/ CXR  Cardiac silhouette is enlarged.  No pneumothorax.  No pleural effusion.  Hazy multifocal airspace opacities, worse on the left, unchanged      Patient Vitals for the past 8 hrs:   BP Temp Temp src Pulse Resp SpO2   01/10/21 1603    73 24 98 %   01/10/21 1602    73 20 99 %   01/10/21 1601    73 22 96 % 01/10/21 1600 (!) 105/58 97.9 °F (36.6 °C) Oral 73 25 99 %   01/10/21 1559    76 19 94 %   01/10/21 1558    77 22 97 %   01/10/21 1430    72 22 92 %   01/10/21 1400 (!) 92/58   72 23 97 %   01/10/21 1330    71 19 92 %   01/10/21 1300 (!) 88/50   67 23 96 %   01/10/21 1230    72 24 95 %   01/10/21 1214     22 92 %   01/10/21 1200 (!) 132/38 97.9 °F (36.6 °C) Oral 56 24 (!) 81 %   01/10/21 1145    (!) 41 24 97 %         I have personally reviewed the past medical history, past surgical history, medications, social history, and family history, and I haveupdated the database accordingly. Allergies:   Feldene [piroxicam], Flagyl [metronidazole], Naprosyn [naproxen], Penicillins, Sulfa antibiotics, and Sulfasalazine     Review of Systems:     Review of Systems   Constitutional: Negative. HENT: Negative. Eyes: Negative. Respiratory: Positive for cough and shortness of breath. Cardiovascular: Negative. Gastrointestinal: Negative. Genitourinary: Negative. Musculoskeletal: Negative. Skin: Negative. As per history present illness, other than above 12 system review was negative  Physical Examination :       Remainder of examination deferred due to excessive risk conferred by physical examination during a global pandemic due to coronavirus 19.   In a setting where local and national supplies of personal protective equipment are depleted    Past Medical History:     Past Medical History:   Diagnosis Date    Diabetes ([address]     Diverticulitis     History of allergy     Hyperlipidemia     Hypertension     Osteoarthritis        Past Surgical  History:     Past Surgical History:   Procedure Laterality Date    BACK SURGERY  09/03/2017    Harvinder Duncan, lumbar fusion   [facility]  2015    partial colectomy due to diveritc    COLONOSCOPY      TOTAL KNEE ARTHROPLASTY Right        Medications:  insulin glargine  10 Units Subcutaneous Nightly    lisinopril  2.5 mg Oral Daily    metFORMIN  500 mg Oral BID WC    meloxicam  15 mg Oral Daily    insulin lispro  0-12 Units Subcutaneous TID WC    insulin lispro  0-6 Units Subcutaneous Nightly    aspirin EC  81 mg Oral Daily    atorvastatin  20 mg Oral Nightly    gabapentin  300 mg Oral Daily    hydroCHLOROthiazide  25 mg Oral Daily    pantoprazole  40 mg Oral QAM AC    dextromethorphan  30 mg Oral 2 times per day    sodium chloride flush  10 mL Intravenous 2 times per day    enoxaparin  30 mg Subcutaneous BID       Social History:     Social History     Socioeconomic History    Marital status:      Spouse name: Not on file    Number of children: Not on file    Years of education: Not on file    Highest education level: Not on file   Occupational History    Not on file   Social Needs    Financial resource strain: Not on file    Food insecurity     Worry: Not on file     Inability: Not on file   [employer] needs     Medical: Not on file     Non-medical: Not on file   Tobacco Use    Smoking status: Former Smoker     Packs/day: 1.50     Years: 40.00     Pack years: 60.00     Quit date: 1991     Years since quittin.0    Smokeless tobacco: Never Used   Substance and Sexual Activity    Alcohol use:  Yes     Alcohol/week: 1.0 standard drinks     Types: 1 Standard drinks or equivalent per week     Comment: social    Drug use: No    Sexual activity: Not on file   Lifestyle    Physical activity     Days per week: Not on file     Minutes per session: Not on file    Stress: Not on file   Relationships    Social connections     Talks on phone: Not on file     Gets together: Not on file     Attends Holiness service: Not on file     Active member of club or organization: Not on file     Attends meetings of clubs or organizations: Not on file     Relationship status: Not on file    Intimate partner violence Fear of current or ex partner: Not on file     Emotionally abused: Not on file     Physically abused: Not on file     Forced sexual activity: Not on file   Other Topics Concern    Not on file   Social History Narrative    Not on file       Family History:   History reviewed. No pertinent family history. Medical Decision Making:   I have independently reviewed/ordered the following labs:    CBC with Differential:   No results for input(s): WBC, HGB, HCT, PLT, SEGSPCT, BANDSPCT, LYMPHOPCT, MONOPCT, EOSPCT in the last 72 hours. BMP:  Recent Labs     01/09/21  0415 01/10/21  0436   * 126*   K 4.0 4.1   CL 96* 92*   CO2 26 24   BUN 25* 24*   CREATININE 0.97 0.85     Hepatic Function Panel:   No results for input(s): PROT, LABALBU, BILIDIR, IBILI, BILITOT, ALKPHOS, ALT, AST in the last 72 hours. No results for input(s): RPR in the last 72 hours. No results for input(s): HIV in the last 72 hours. No results for input(s): BC in the last 72 hours. Lab Results   Component Value Date    CREATININE 0.85 01/10/2021    GLUCOSE 131 01/10/2021       Detailed results: Thank you for allowing us to participate in the care of this patient. Please call with questions. This note is created with the assistance of a speech recognition program.  While intending to generate adocument that actually reflects the content of the visit, the document can still have some errors including those of syntax and sound a like substitutions which may escape proof reading. It such instances, actual meaningcan be extrapolated by contextual diversion.     Saul Singh MD  Office: [phone]  Perfect serve / office [phone]

## 2021-01-11 NOTE — PROGRESS NOTES
[facility]    INPATIENT OCCUPATIONAL THERAPY  PROGRESS NOTE  Date: 2021  Patient Name: Arthur White      Room:   MRN: [id number]    : 1940  ([de-identified] y.o.) Gender: male     Discharge Recommendations:  Further Occupational Therapy is recommended upon facility discharge. Equipment Needed: (TBD)    Referring Practitioner: Selena Rose MD  Diagnosis: COVID-19  General  Chart Reviewed: Yes, Orders, Progress Notes, History and Physical  Patient assessed for rehabilitation services?: Yes  Family / Caregiver Present: No  Referring Practitioner: Selena Rose MD  Diagnosis: COVID-19    Restrictions  Restrictions/Precautions: General Precautions, Isolation, Contact Precautions, Fall Risk(DROPLET PLUS (+) COVID)  Implants present? : Metal implants(back sx, cardiac stent, R TKA)  Other position/activity restrictions: PT OT eval and treat; up in chair  Required Braces or Orthoses? : (has back brace for comfort)      Subjective  Subjective: pt states thathe isn't too happy being on a cardiac diet  Comments: Pt pleasant and cooperative  Patient Currently in Pain: Denies  Overall Orientation Status: Within Functional Limits  Patient Observation  Observations:   Pain Assessment  Garza-Beyer Pain Rating: No hurt  Response to Pain Intervention: Patient Satisfied    Objective    Bed mobility  Comment: pt up in chair  Balance  Sitting Balance: Mod I  Standing Balance: (pt declines)         ADL  Feeding: Setup;Verbal cueing; Increased time to complete  Grooming: Setup; Increased time to complete  UE Bathing: Setup; Increased time to complete  LE Bathing: Setup;Verbal cueing; Increased time to complete;Minimal assistance(assist with foot care)  UE Dressing: Setup;Verbal cueing; Increased time to complete  LE Dressing: Setup;Verbal cueing; Increased time to complete;Stand by assistance(footies only this date)  Toileting: Increased time to complete;Stand by assistance(per pt report) Short term goals  Time Frame for Short term goals: By discharge  Short term goal 1: Pt will verbalize/demonstrate Good understanding of assistive equipment/durable medical equipment/modified techniques for increased independence with self-care and mobility. Short term goal 2: Pt will verbalize/demonstrate Good understanding of breathing techniques for increased independence with self-care and mobility. Short term goal 3: Pt will perform upper body bathing/dressing with stand by assist and lower body bathing/dressing with moderate assist while maintaining oxygen saturation greater than 90%. Short term goal 4: Pt will perform functional mobility and transfers during self-care consistently with contact guard assist, least restrictive mobility device and Good safety while maintaining oxygen saturation greater than 90%. Short term goal 5: Pt will actively participate in 15+ minutes of therapeutic exercise/functional activities to promote increased independence with self-care and mobility while maintaining oxygen saturation greater than 90%.     OT Individual Minutes  Time In: 5313  Time Out: 3983  Minutes: 24      Electronically signed by SCOOTER Mcgowan on 1/11/21 at 5:19 PM EST

## 2021-01-11 NOTE — PROGRESS NOTES
Pulmonary Progress Note  [facility]      Patient - Don Mcnally,  Age - [de-identified] y.o.    - 1940      Room Number -    MRN -  [id number]   Acct # - [de-identified]  Date of Admission -  2020  2:40 Sumeet Grant MD  Primary Care Physician - Rahel Gibson MD     SUBJECTIVE   He seems to be in good spirits, currently at 3 L nasal cannula, I cut him down to 2    OBJECTIVE   VITALS    height is 5' 7\" (1.702 m) and weight is 227 lb 1.2 oz (103 kg). His oral temperature is 97.6 °F (36.4 °C). His blood pressure is 161/50 (abnormal) and his pulse is 35 (abnormal). His respiration is 20 and oxygen saturation is 96%. Body mass index is 35.56 kg/m². Temperature Range: Temp: 97.6 °F (36.4 °C) Temp  Av.9 °F (36.6 °C)  Min: 97.5 °F (36.4 °C)  Max: 98.4 °F (36.9 °C)  BP Range:  Systolic (60FQJ), ENW:643 , Min:88 , ETO:077     Diastolic (39AJC), LSC:65, Min:37, Max:70    Pulse Range: Pulse  Av  Min: 33  Max: 77  Respiration Range: Resp  Av.5  Min: 16  Max: 28  Current Pulse Ox[de-identified]  SpO2: 96 %  24HR Pulse Ox Range:  SpO2  Av.5 %  Min: 81 %  Max: 99 %  Oxygen Amount and Delivery: O2 Flow Rate (L/min): 3 L/min    Wt Readings from Last 3 Encounters:   01/10/21 227 lb 1.2 oz (103 kg)   20 234 lb (106.1 kg)   20 245 lb 6.4 oz (111.3 kg)       I/O (24 Hours)    Intake/Output Summary (Last 24 hours) at 2021 0911  Last data filed at 2021 0454  Gross per 24 hour   Intake 710 ml   Output 1150 ml   Net -440 ml       EXAM     General Appearance  Awake, alert, oriented, in no acute distress  HEENT - normocephalic, atraumatic.  []  Mallampati  [x] Crowded airway   [x] Macroglossia  []  Retrognathia  [] Micrognathia  []  Normal tongue size []  Normal Bite  [] Grainger sign positive    Neck - Supple,  trachea midline   Lungs -diminished but clear Cardiovascular - Heart sounds are normal.  Regular rate and rhythm   Abdomen - Soft, nontender, nondistended, no masses or organomegaly  Neurologic - There are no focal motor or sensory deficits  Skin - No bruising or bleeding  Extremities - No clubbing, cyanosis, edema    MEDS      insulin glargine  10 Units Subcutaneous Nightly    lisinopril  2.5 mg Oral Daily    metFORMIN  500 mg Oral BID WC    meloxicam  15 mg Oral Daily    insulin lispro  0-12 Units Subcutaneous TID WC    insulin lispro  0-6 Units Subcutaneous Nightly    aspirin EC  81 mg Oral Daily    atorvastatin  20 mg Oral Nightly    gabapentin  300 mg Oral Daily    hydroCHLOROthiazide  25 mg Oral Daily    pantoprazole  40 mg Oral QAM AC    dextromethorphan  30 mg Oral 2 times per day    sodium chloride flush  10 mL Intravenous 2 times per day    enoxaparin  30 mg Subcutaneous BID      dexmedetomidine (PRECEDEX) IV infusion      dextrose       docusate sodium, magnesium sulfate, glucose, dextrose, glucagon (rDNA), dextrose, benzonatate, sodium chloride flush, acetaminophen, sodium chloride    LABS   CBC No results for input(s): WBC, HGB, HCT, MCV, PLT in the last 72 hours. BMP:   Lab Results   Component Value Date     01/11/2021    K 3.9 01/11/2021    CL 92 01/11/2021    CO2 27 01/11/2021    BUN 22 01/11/2021    LABALBU 3.9 12/29/2020    CREATININE 0.83 01/11/2021    CALCIUM 8.7 01/11/2021    GFRAA >60 01/11/2021    LABGLOM >60 01/11/2021     ABGs:No results found for: PHART, PO2ART, PHZ5FWF No results found for: IFIO2, MODE, SETTIDVOL, SETPEEP  Ionized Calcium:  No results found for: IONCA  Magnesium:    Lab Results   Component Value Date    MG 1.7 01/02/2021     Phosphorus:  No results found for: PHOS     LIVER PROFILE No results for input(s): AST, ALT, LIPASE, BILIDIR, BILITOT, ALKPHOS in the last 72 hours. Invalid input(s): AMYLASE,  ALB  INR No results for input(s): INR in the last 72 hours.   PTT No results found for: APTT RADIOLOGY     (See actual reports for details)    ASSESSMENT/PLAN   Principal Problem:    Pneumonia due to COVID-19 virus  Active Problems:    Essential hypertension    Lumbar radiculopathy    Morbid obesity with BMI of 40.0-44.9, adult (HCC)    Elevated C-reactive protein (CRP)    Acute respiratory failure with hypoxia (HCC)    KAREY (obstructive sleep apnea)  Resolved Problems:    * No resolved hospital problems.  *    Continue to wean oxygen down, may be able to go home tomorrow with oxygen  He is sitting in a chair but needs to walk around  He has clinically severe obstructive sleep apnea which I think is contributing to his bradycardia especially at nighttime  Recheck inflammatory markers tomorrow      Electronically signed by Lois Shirley MD on 1/11/2021 at 9:11 AM

## 2021-01-11 NOTE — PROGRESS NOTES
Physical Therapy  [facility]: VENKATESH SANTANA   Physical Therapy Progress Note    Date: 21  Patient Name: Ana Maria Payan       Room:   MRN: [id number]   Account: [de-identified]   : 1940  ([de-identified] y.o.) Gender: male     Referring Practitioner: Fito Carmen MD  Diagnosis: COVID-19  Past Medical History:  has a past medical history of Diabetes ([address] Diverticulitis, History of allergy, Hyperlipidemia, Hypertension, and Osteoarthritis. Past Surgical History:   has a past surgical history that includes Cardiac catheterization; Colonoscopy; Total knee arthroplasty (Right); colectomy (2015); and back surgery (2017). Additional Pertinent Hx: DM, OA, colectomy       Restrictions/Precautions  Restrictions/Precautions: General Precautions;Isolation;Contact Precautions; Fall Risk(DROPLET PLUS (+) COVID)  Required Braces or Orthoses? : (has back brace for comfort)  Implants present? : Metal implants(back sx, cardiac stent, R TKA)            Vital Signs  Patient Currently in Pain: Denies        Oxygen Therapy  SpO2: 95 %(at rest)  Pulse Oximeter Device Mode: Continuous  O2 Device: Nasal cannula  O2 Flow Rate (L/min): 2 L/min          Bed Mobility:   Bed Mobility  Supine to Sit: Stand by assistance(HOB elevated)  Scooting: Stand by assistance  Bed mobility  Scooting: Stand by assistance    Transfers:  Sit to Stand: Contact guard assistance(vc's for hand placment)  Stand to sit: Contact guard assistance           Ambulation 1  Surface: level tile  Device: Rolling Walker  Assistance: Contact guard assistance  Quality of Gait: Slow pace with poor rolling walker safety. Cues for upright posture. Poor endurance.   Distance: 5 ft to recliner  Comments: SPO2 87% on 2L post amb        Stairs/Curb  Stairs?: No(has 3 steps to enter home without rails)                BALANCE Posture: Fair  Sitting - Static: Good  Sitting - Dynamic: Good;-  Standing - Static: Fair;+  Standing - Dynamic: Fair Comments: standing with RW    EXERCISES    Other exercises?: Yes  Other exercises 1: (B) LE supine x 10(destated to 89% with ex)  Other exercises 2: (B) LE seated ex x 10-15, including lime green t-band  Other exercises 3: (B) LE standing ex x 5, seated rest breaks prn(pt destats to 75% with ex)  Other exercises 4: sit<>stand w/RW x 4              PT Equipment Recommendations  Equipment Needed: No  Other: Reports he has DME at  home  Other Comments  Comments: Pt able to maintain SPO2 levels at or above 90% on 2L while at rest, however destats with little exertion/activity. Pt weak and deconditioned. Current Treatment Recommendations: Strengthening, Balance Training, Functional Mobility Training, Transfer Training, Endurance Training, Gait Training, Equipment Evaluation, Education, & procurement, Patient/Caregiver Education & Training, Safety Education & Training    Conditions Requiring Skilled Therapeutic Intervention  Body structures, Functions, Activity limitations: Decreased functional mobility ; Decreased ADL status; Decreased strength;Decreased endurance;Decreased balance;Decreased posture  Assessment: Pt requires 1 assist for mobility. Pt is unsteady and limited by heated high flow this date. Pt would benefit from continued PT and is motivated. Treatment Diagnosis: Impaired functional mobility 2* covid 19  History: [de-identified] y.o. male who presents with SOB with diarrhea and cough for the last 1-2 weeks. Got worse with SOB and came in. Found to have covid pneumonia and hypoxia. REQUIRES PT FOLLOW UP: Yes  Discharge Recommendations: Patient would benefit from continued therapy after discharge    Goals  Short term goals  Time Frame for Short term goals: 4-5 days  Short term goal 1: pt to demo L/R rolling min to CGA x1. Short term goal 2: Pt to perform supine<-->sit with supervision. Short term goal 3: Pt to perform transfers with device CGA. Short term goal 4: Pt to progress to amb 48' with device min to CGA x1. Short term goal 5: Pt to improve BLE strength by 1/2 MMG.   Short term goal 6: Pt to tolerate 30-45 minute PT session with O2 sats maintained >90%       01/11/21 1341   PT Individual Minutes   Time In 1337   Time Out 1418   Minutes 41       Electronically signed by Parish Tapia PTA on 1/11/21 at 2:19 PM EST

## 2021-01-12 VITALS
BODY MASS INDEX: 35.64 KG/M2 | HEIGHT: 67 IN | HEART RATE: 78 BPM | WEIGHT: 227.07 LBS | RESPIRATION RATE: 30 BRPM | TEMPERATURE: 97.5 F | OXYGEN SATURATION: 95 % | DIASTOLIC BLOOD PRESSURE: 63 MMHG | SYSTOLIC BLOOD PRESSURE: 118 MMHG

## 2021-01-12 LAB
ABSOLUTE EOS #: 0.2 K/UL (ref 0–0.4)
ABSOLUTE IMMATURE GRANULOCYTE: ABNORMAL K/UL (ref 0–0.3)
ABSOLUTE LYMPH #: 1.5 K/UL (ref 1–4.8)
ABSOLUTE MONO #: 0.8 K/UL (ref 0.1–1.3)
ANION GAP SERPL CALCULATED.3IONS-SCNC: 7 MMOL/L (ref 9–17)
BASOPHILS # BLD: 1 % (ref 0–2)
BASOPHILS ABSOLUTE: 0.1 K/UL (ref 0–0.2)
BUN BLDV-MCNC: 17 MG/DL (ref 8–23)
BUN/CREAT BLD: ABNORMAL (ref 9–20)
C-REACTIVE PROTEIN: 4.7 MG/L (ref 0–5)
CALCIUM SERPL-MCNC: 8.7 MG/DL (ref 8.6–10.4)
CHLORIDE BLD-SCNC: 92 MMOL/L (ref 98–107)
CO2: 27 MMOL/L (ref 20–31)
CREAT SERPL-MCNC: 0.97 MG/DL (ref 0.7–1.2)
D-DIMER QUANTITATIVE: 0.36 MG/L FEU (ref 0–0.59)
DIFFERENTIAL TYPE: ABNORMAL
EOSINOPHILS RELATIVE PERCENT: 2 % (ref 0–4)
FERRITIN: 705 UG/L (ref 30–400)
GFR AFRICAN AMERICAN: >60 ML/MIN
GFR NON-AFRICAN AMERICAN: >60 ML/MIN
GFR SERPL CREATININE-BSD FRML MDRD: ABNORMAL ML/MIN/{1.73_M2}
GFR SERPL CREATININE-BSD FRML MDRD: ABNORMAL ML/MIN/{1.73_M2}
GLUCOSE BLD-MCNC: 113 MG/DL (ref 70–99)
GLUCOSE BLD-MCNC: 120 MG/DL (ref 75–110)
GLUCOSE BLD-MCNC: 130 MG/DL (ref 75–110)
HCT VFR BLD CALC: 42.6 % (ref 41–53)
HEMOGLOBIN: 14.1 G/DL (ref 13.5–17.5)
IMMATURE GRANULOCYTES: ABNORMAL %
LACTATE DEHYDROGENASE: 234 U/L (ref 135–225)
LYMPHOCYTES # BLD: 18 % (ref 24–44)
MAGNESIUM: 1.6 MG/DL (ref 1.6–2.6)
MCH RBC QN AUTO: 30.8 PG (ref 26–34)
MCHC RBC AUTO-ENTMCNC: 33.2 G/DL (ref 31–37)
MCV RBC AUTO: 92.8 FL (ref 80–100)
MONOCYTES # BLD: 9 % (ref 1–7)
NRBC AUTOMATED: ABNORMAL PER 100 WBC
PDW BLD-RTO: 13.9 % (ref 11.5–14.9)
PLATELET # BLD: 175 K/UL (ref 150–450)
PLATELET ESTIMATE: ABNORMAL
PMV BLD AUTO: 8 FL (ref 6–12)
POTASSIUM SERPL-SCNC: 4.4 MMOL/L (ref 3.7–5.3)
RBC # BLD: 4.59 M/UL (ref 4.5–5.9)
RBC # BLD: ABNORMAL 10*6/UL
SEG NEUTROPHILS: 70 % (ref 36–66)
SEGMENTED NEUTROPHILS ABSOLUTE COUNT: 5.8 K/UL (ref 1.3–9.1)
SODIUM BLD-SCNC: 126 MMOL/L (ref 135–144)
WBC # BLD: 8.4 K/UL (ref 3.5–11)
WBC # BLD: ABNORMAL 10*3/UL

## 2021-01-12 PROCEDURE — 2580000003 HC RX 258: Performed by: FAMILY MEDICINE

## 2021-01-12 PROCEDURE — 97530 THERAPEUTIC ACTIVITIES: CPT

## 2021-01-12 PROCEDURE — 83735 ASSAY OF MAGNESIUM: CPT

## 2021-01-12 PROCEDURE — 83615 LACTATE (LD) (LDH) ENZYME: CPT

## 2021-01-12 PROCEDURE — 85379 FIBRIN DEGRADATION QUANT: CPT

## 2021-01-12 PROCEDURE — 86140 C-REACTIVE PROTEIN: CPT

## 2021-01-12 PROCEDURE — 6370000000 HC RX 637 (ALT 250 FOR IP): Performed by: FAMILY MEDICINE

## 2021-01-12 PROCEDURE — 99238 HOSP IP/OBS DSCHRG MGMT 30/<: CPT | Performed by: FAMILY MEDICINE

## 2021-01-12 PROCEDURE — 99232 SBSQ HOSP IP/OBS MODERATE 35: CPT | Performed by: INTERNAL MEDICINE

## 2021-01-12 PROCEDURE — 6360000002 HC RX W HCPCS: Performed by: FAMILY MEDICINE

## 2021-01-12 PROCEDURE — 80048 BASIC METABOLIC PNL TOTAL CA: CPT

## 2021-01-12 PROCEDURE — 36415 COLL VENOUS BLD VENIPUNCTURE: CPT

## 2021-01-12 PROCEDURE — 82947 ASSAY GLUCOSE BLOOD QUANT: CPT

## 2021-01-12 PROCEDURE — 85025 COMPLETE CBC W/AUTO DIFF WBC: CPT

## 2021-01-12 PROCEDURE — 82728 ASSAY OF FERRITIN: CPT

## 2021-01-12 PROCEDURE — 97116 GAIT TRAINING THERAPY: CPT

## 2021-01-12 RX ADMIN — PANTOPRAZOLE SODIUM 40 MG: 40 TABLET, DELAYED RELEASE ORAL at 05:50

## 2021-01-12 RX ADMIN — ENOXAPARIN SODIUM 30 MG: 30 INJECTION SUBCUTANEOUS at 08:30

## 2021-01-12 RX ADMIN — LISINOPRIL 2.5 MG: 2.5 TABLET ORAL at 08:30

## 2021-01-12 RX ADMIN — Medication 30 MG: at 08:30

## 2021-01-12 RX ADMIN — ASPIRIN 81 MG: 81 TABLET, COATED ORAL at 08:30

## 2021-01-12 RX ADMIN — MELOXICAM 15 MG: 15 TABLET ORAL at 08:30

## 2021-01-12 RX ADMIN — HYDROCHLOROTHIAZIDE 25 MG: 25 TABLET ORAL at 08:30

## 2021-01-12 RX ADMIN — METFORMIN HYDROCHLORIDE 500 MG: 500 TABLET ORAL at 08:32

## 2021-01-12 RX ADMIN — MAGNESIUM SULFATE HEPTAHYDRATE 1 G: 1 INJECTION, SOLUTION INTRAVENOUS at 10:29

## 2021-01-12 RX ADMIN — Medication 10 ML: at 08:31

## 2021-01-12 RX ADMIN — GABAPENTIN 300 MG: 300 CAPSULE ORAL at 08:30

## 2021-01-12 ASSESSMENT — ENCOUNTER SYMPTOMS
GASTROINTESTINAL NEGATIVE: 1
COUGH: 1
SHORTNESS OF BREATH: 1
EYES NEGATIVE: 1

## 2021-01-12 ASSESSMENT — PAIN SCALES - GENERAL
PAINLEVEL_OUTOF10: 0

## 2021-01-12 NOTE — PLAN OF CARE
Problem: Airway Clearance - Ineffective  Goal: Achieve or maintain patent airway  1/12/2021 0441 by Francisco Mc RN  Outcome: Ongoing  Note: Patent airway maintained throughout this shift. No distress noted. Will continue to monitor. 1/11/2021 1817 by Anna Mireles RN  Outcome: Ongoing     Problem: Risk for Fluid Volume Deficit  Goal: Maintain normal heart rhythm  1/12/2021 0441 by Francisco Mc RN  Outcome: Ongoing  Note: Patient remains bradycardic throughout this shift. Physicians aware. Will continue to monitor. 1/11/2021 1817 by Anna Mireles RN  Outcome: Ongoing     Problem: Falls - Risk of:  Goal: Will remain free from falls  Description: Will remain free from falls  1/12/2021 0441 by Francisco Mc RN  Outcome: Ongoing  Note: No falls during this shift. Call light is within reach. Side rails up x2 with bed in lowest position. Patient safety maintained. 1/11/2021 1817 by Anna Mireles RN  Outcome: Ongoing     Problem: Falls - Risk of:  Goal: Absence of physical injury  Description: Absence of physical injury  1/12/2021 0441 by Francisco Mc RN  Outcome: Ongoing  Note: No injury this shift. Patient safety maintained.    1/11/2021 1817 by Anna Mireles RN  Outcome: Ongoing

## 2021-01-12 NOTE — PROGRESS NOTES
INR No results for input(s): INR in the last 72 hours. PTT No results found for: APTT      RADIOLOGY     (See actual reports for details)    ASSESSMENT/PLAN   Principal Problem:    Pneumonia due to COVID-19 virus  Active Problems:    Essential hypertension    Lumbar radiculopathy    Morbid obesity with BMI of 40.0-44.9, adult (HCC)    Elevated C-reactive protein (CRP)    Acute respiratory failure with hypoxia (HCC)    KAREY (obstructive sleep apnea)  Resolved Problems:    * No resolved hospital problems.  *    He requires oxygen for home use  A face-to-face discussion was done on the benefits of oxygen therapy and he is agreeable  We will keep him on 2 L at rest and 4 L with exertion and at nighttime  He does sleep in a recliner  He has asymptomatic bradycardia which is worse when he is sleeping  He will need an outpatient evaluation for obstructive sleep apnea  Inflammatory markers are stable or decreasing  He does not need outpatient anticoagulation since his D-dimer is normal   Patient does not want to go to [facility], he feels he can go home  He tells me his wife also can take care of him  We will check with his wife  Discussed with primary service  Electronically signed by Lydia Gamble MD on 1/12/2021 at 9:10 AM

## 2021-01-12 NOTE — CARE COORDINATION
CSN                                    Req/Control # [Problem retrieving Specimen ID]                                   Order Date:  2021  [id number]                                          Patient Information      Name:  Kodi Che  :  1940  Age:  [de-identified] y.o.    Address:  [address] [place] [address]   Zip:  [address]  PCP: Robbie Alanis MD Sex:  Thor Greene: [id number]  Home Phone: [phone]  Work Phone:    Patient MRN:  [id number]    Alt Patient ID:  [id number]  PCP Phone: [phone]       Authorizing Provider Information       AUTHORIZING PROVIDER: Deepti Smith MD  Physician ID: [id number]  NPI:  [id number]  Site:   Address: [address]: [facility], [address] [place] [address]  Phone: [phone]  Fax:               EQUIPMENT ORDERED  DME Order for Home Oxygen as OP [[id number]] (ORD   #:   [id number]) Priority  Routine Class  Hospital Performed        Associated Diagnosis:  Pneumonia due to COVID-19 virus (U07.1,J12.82 [ICD-10-CM])        Comments:   Stationary Oxygen Concentrator at 2-4 lpm via Nasal Cannula     Stationary Prescribed at: 2 L at rest, 4 L with exertion, and 4 L at night continuous     Portable Gaseous O2 System and contents at 4 lpm via Nasal Cannula           Diagnosis: COVID-19 pneumonia  Length of need: 12 Months            Scheduling Instructions:                                 Specimen Source             Collection Date    Collection Time    Order Status    Expected Date                Electronically Signed By  Deepti Smith MD Date  2021           Responsible Party Erasto Giullory     Guar-ActID   Relationship Account Type Home Phone   Rick Howell [id number] [facility], [address] Self P/F [phone]   Employer   Work Phone                  [employer].  Insurance/Subscriber ID:  [id number] Name:  Peggy Jesus              Relationship to Patient: Self    Secondary Insurance  Insurance/Subscriber ID: [id number]  Subscriber Name: Muna pichardo Patient: Self  Signed ABN: N    Payor Name:  MEDICARE   Plan:  MEDICARE PART A AND B   Group:         Payor Name: [employer]  Plan:  [employer] [address]  Group: [id number]  Worker's Comp Date of Injury:            Eric Cortez MD   Physician   Pulmonology   Progress Notes   Signed   Date of Service:  2021  9:10 AM               Signed        Expand AllCollapse All      Show:Clear all  [x]Manual[x]Template[]Copied    Added by:  [x]Estevan Barnes MD    []Nathen for details                                 Pulmonary Progress Note  [facility]        Patient - Damion Haile,  Age - [de-identified] y.o.    - 1940      Room Number -    N -  [id number]   Acct # - [de-identified]  Date of Admission -  2020  2:40 PM           Mellisa Stout MD  Primary Care Physician - Frederic Rivera MD      SUBJECTIVE   He looks comfortable, 2 L at rest saturating around 93%. I took him off his nasal cannula and he dropped down to 87% at rest     OBJECTIVE   VITALS    height is 5' 7\" (1.702 m) and weight is 227 lb 1.2 oz (103 kg). His oral temperature is 97.5 °F (36.4 °C). His blood pressure is 156/53 (abnormal) and his pulse is 45 (abnormal). His respiration is 16 and oxygen saturation is 94%. Body mass index is 35.56 kg/m².   Temperature Range: Temp: 97.5 °F (36.4 °C) Temp  Av.6 °F (36.4 °C)  Min: 97.5 °F (36.4 °C)  Max: 97.7 °F (36.5 °C)  BP Range:  Systolic (51FEA), KTN:182 , Min:110 , UYB:914     Diastolic (53TLD), TXI:74, Min:43, Max:88     Pulse Range: Pulse  Av.6  Min: 33  Max: 80  Respiration Range: Resp  Av.3  Min: 16  Max: 25  Current Pulse Ox[de-identified]  SpO2: 94 %  24HR Pulse Ox Range:  SpO2  Av.4 %  Min: 82 %  Max: 96 %  Oxygen Amount and Delivery: O2 Flow Rate (L/min): 2 L/min    Wt Readings from Last 3 Encounters:   01/10/21 227 lb 1.2 oz (103 kg)   12/29/20 234 lb (106.1 kg)   11/19/20 245 lb 6.4 oz (111.3 kg)         I/O (24 Hours)     Intake/Output Summary (Last 24 hours) at 1/12/2021 0910  Last data filed at 1/12/2021 0800      Gross per 24 hour   Intake 1505 ml   Output 900 ml   Net 605 ml         EXAM      General Appearance  Awake, alert, oriented, in no acute distress  HEENT - normocephalic, atraumatic. []?  Mallampati  [x]? Crowded airway   [x]? Macroglossia          []? Retrognathia  []? Micrognathia  []? Normal tongue size           []? Normal Bite  []?  Jey sign positive     Neck - Supple,  trachea midline   Lungs -diminished but clear  Cardiovascular - Heart sounds are normal.  Regular rate and rhythm   Abdomen - Soft, nontender, nondistended, no masses or organomegaly  Neurologic - There are no focal motor or sensory deficits  Skin - No bruising or bleeding  Extremities - No clubbing, cyanosis, edema     MEDS      Scheduled Medications    insulin glargine  10 Units Subcutaneous Nightly    lisinopril  2.5 mg Oral Daily    metFORMIN  500 mg Oral BID WC    meloxicam  15 mg Oral Daily    insulin lispro  0-12 Units Subcutaneous TID WC    insulin lispro  0-6 Units Subcutaneous Nightly    aspirin EC  81 mg Oral Daily    atorvastatin  20 mg Oral Nightly    gabapentin  300 mg Oral Daily    hydroCHLOROthiazide  25 mg Oral Daily    pantoprazole  40 mg Oral QAM AC    dextromethorphan  30 mg Oral 2 times per day    sodium chloride flush  10 mL Intravenous 2 times per day    enoxaparin  30 mg Subcutaneous BID         Infusions Meds    dexmedetomidine (PRECEDEX) IV infusion      dextrose           PRN Medications   docusate sodium, magnesium sulfate, glucose, dextrose, glucagon (rDNA), dextrose, benzonatate, sodium chloride flush, acetaminophen, sodium chloride        LABS   CBC       Recent Labs     01/12/21  0506   WBC 8.4   HGB 14.1   HCT 42.6   MCV 92.8       BMP:         Lab Results   Component Value Date      01/12/2021     K 4.4 01/12/2021     CL 92 01/12/2021     CO2 27 01/12/2021     BUN 17 01/12/2021     LABALBU 3.9 12/29/2020     CREATININE 0.97 01/12/2021     CALCIUM 8.7 01/12/2021     GFRAA >60 01/12/2021     LABGLOM >60 01/12/2021      ABGs:No results found for: PHART, PO2ART, YTX4GBQ No results found for: IFIO2, MODE, SETTIDVOL, SETPEEP  Ionized Calcium:  No results found for: IONCA  Magnesium:          Lab Results   Component Value Date     MG 1.6 01/12/2021      Phosphorus:  No results found for: PHOS      LIVER PROFILE No results for input(s): AST, ALT, LIPASE, BILIDIR, BILITOT, ALKPHOS in the last 72 hours.     Invalid input(s): AMYLASE,  ALB  INR No results for input(s): INR in the last 72 hours. PTT No results found for: APTT        RADIOLOGY      (See actual reports for details)     ASSESSMENT/PLAN   Principal Problem:    Pneumonia due to COVID-19 virus  Active Problems:    Essential hypertension    Lumbar radiculopathy    Morbid obesity with BMI of 40.0-44.9, adult (HCC)    Elevated C-reactive protein (CRP)    Acute respiratory failure with hypoxia (HCC)    KAREY (obstructive sleep apnea)  Resolved Problems:    * No resolved hospital problems.  *     He requires oxygen for home use  A face-to-face discussion was done on the benefits of oxygen therapy and he is agreeable  We will keep him on 2 L at rest and 4 L with exertion and at nighttime  He does sleep in a recliner  He has asymptomatic bradycardia which is worse when he is sleeping  He will need an outpatient evaluation for obstructive sleep apnea  Inflammatory markers are stable or decreasing  He does not need outpatient anticoagulation since his D-dimer is normal   Patient does not want to go to [facility], he feels he can go home  He tells me his wife also can take care of him  We will check with his wife  Discussed with primary service Electronically signed by Lonn Osgood, MD on 1/12/2021 at 9:10 AM

## 2021-01-12 NOTE — PLAN OF CARE
Problem: Airway Clearance - Ineffective  Goal: Achieve or maintain patent airway  1/12/2021 1446 by Ailyn Franklin RN  Outcome: Completed  1/12/2021 0441 by Erwin Fernandez RN  Outcome: Ongoing  Note: Patent airway maintained throughout this shift. No distress noted. Will continue to monitor. Problem: Gas Exchange - Impaired  Goal: Absence of hypoxia  1/12/2021 1446 by Ailyn Franklin RN  Outcome: Completed  1/12/2021 0441 by Erwin Fernandez RN  Outcome: Ongoing  Goal: Promote optimal lung function  1/12/2021 1446 by Ailyn Franklin RN  Outcome: Completed  1/12/2021 0441 by Erwin Fernandez RN  Outcome: Ongoing     Problem: Breathing Pattern - Ineffective  Goal: Ability to achieve and maintain a regular respiratory rate  1/12/2021 1446 by Ailyn Franklin RN  Outcome: Completed  1/12/2021 0441 by Erwin Fernandez RN  Outcome: Ongoing     Problem:  Body Temperature -  Risk of, Imbalanced  Goal: Ability to maintain a body temperature within defined limits  1/12/2021 1446 by Ailyn Franklin RN  Outcome: Completed  1/12/2021 0441 by Erwin Fernandez RN  Outcome: Ongoing  Goal: Will regain or maintain usual level of consciousness  1/12/2021 1446 by Ailyn Franklin RN  Outcome: Completed  1/12/2021 0441 by Erwin Fernandez RN  Outcome: Ongoing  Goal: Complications related to the disease process, condition or treatment will be avoided or minimized  1/12/2021 1446 by Ailyn Franklin RN  Outcome: Completed  1/12/2021 0441 by Erwin Fernandez RN  Outcome: Ongoing     Problem: Isolation Precautions - Risk of Spread of Infection  Goal: Prevent transmission of infection  1/12/2021 1446 by Ailyn Franklin RN  Outcome: Completed  1/12/2021 0441 by Erwin Fernandez RN  Outcome: Ongoing     Problem: Nutrition Deficits  Goal: Optimize nutrtional status  1/12/2021 1446 by Ailyn Franklin RN  Outcome: Completed  1/12/2021 0441 by Erwin Fernandez RN  Outcome: Ongoing     Problem: Risk for Fluid Volume Deficit  Goal: Maintain normal heart rhythm 1/12/2021 1446 by Cortez Nava RN  Outcome: Completed  1/12/2021 0441 by Mike Yost RN  Outcome: Ongoing  Note: Patient remains bradycardic throughout this shift. Physicians aware. Will continue to monitor. Goal: Maintain absence of muscle cramping  1/12/2021 1446 by Cortez Nava RN  Outcome: Completed  1/12/2021 0441 by Mike Yost RN  Outcome: Ongoing  Goal: Maintain normal serum potassium, sodium, calcium, phosphorus, and pH  1/12/2021 1446 by Cortez Nava RN  Outcome: Completed  1/12/2021 0441 by Mike Yost RN  Outcome: Ongoing     Problem: Fatigue  Goal: Verbalize increase energy and improved vitality  1/12/2021 1446 by Cortez Nava RN  Outcome: Completed  1/12/2021 0441 by Mike Yost RN  Outcome: Ongoing     Problem: Patient Education: Go to Patient Education Activity  Goal: Patient/Family Education  1/12/2021 1446 by Cortez Nava RN  Outcome: Completed  1/12/2021 0441 by Mike Yost RN  Outcome: Ongoing     Problem: Falls - Risk of:  Goal: Will remain free from falls  1/12/2021 1446 by Cortez Nava RN  Outcome: Completed  1/12/2021 0441 by Mike Yost RN  Outcome: Ongoing  Note: No falls during this shift. Call light is within reach. Side rails up x2 with bed in lowest position. Patient safety maintained. Goal: Absence of physical injury  1/12/2021 1446 by Cortez Nava RN  Outcome: Completed  1/12/2021 0441 by Mike Yost RN  Outcome: Ongoing  Note: No injury this shift. Patient safety maintained.       Problem: Skin Integrity:  Goal: Will show no infection signs and symptoms  1/12/2021 1446 by Cortez Nava RN  Outcome: Completed  1/12/2021 0441 by Mike Yost RN  Outcome: Ongoing  Goal: Absence of new skin breakdown  1/12/2021 1446 by Cortez Nava RN  Outcome: Completed  1/12/2021 0441 by Mike Yost RN  Outcome: Ongoing     Problem: Musculor/Skeletal Functional Status  Goal: Highest potential functional level  1/12/2021 1446 by Cortez Nava, RN  Outcome: Completed 1/12/2021 0441 by Marvin Rai RN  Outcome: Ongoing  Goal: Absence of falls  1/12/2021 1446 by Philippe Pacheco RN  Outcome: Completed  1/12/2021 0441 by Marvin Rai RN  Outcome: Ongoing     Problem: Musculor/Skeletal Functional Status  Goal: Highest potential functional level  1/12/2021 1446 by Philippe Pacheco RN  Outcome: Completed  1/12/2021 0441 by Marvin Rai RN  Outcome: Ongoing  Goal: Absence of falls  1/12/2021 1446 by Philippe Pacheco RN  Outcome: Completed  1/12/2021 0441 by Marvin Rai RN  Outcome: Ongoing     Problem: Nutrition  Goal: Optimal nutrition therapy  1/12/2021 1446 by Philippe Pacheco RN  Outcome: Completed  1/12/2021 0441 by Marvin Rai RN  Outcome: Ongoing     Problem: Pain:  Goal: Pain level will decrease  1/12/2021 1446 by Philippe Pacheco RN  Outcome: Completed  1/12/2021 0441 by Marvin Rai RN  Outcome: Ongoing  Goal: Control of acute pain  1/12/2021 1446 by Philippe Pacheco RN  Outcome: Completed  1/12/2021 0441 by Marvin Rai RN  Outcome: Ongoing  Care plan completed. Patient to be discharged home.

## 2021-01-12 NOTE — PROGRESS NOTES
Physical Therapy  [address] [facility]   Physical Therapy Progress Note    Date: 21  Patient Name: Tosha Ozuna       Room:   MRN: [id number]   Account: [de-identified]   : 1940  ([de-identified] y.o.)   Gender: male     Discharge Recommendations   Patient would benefit from continued therapy after discharge  Equipment Needed: No  Other: Reports he has DME at  home    Referring Practitioner: Les Garza MD  Diagnosis: COVID-19  Restrictions/Precautions: General Precautions, Isolation, Contact Precautions, Fall Risk(DROPLET PLUS (+) COVID)  Implants present? : Metal implants(back sx, cardiac stent, R TKA)  Other position/activity restrictions: PT OT eval and treat; up in chair   Past Medical History:  has a past medical history of Diabetes ([facility] [address] Diverticulitis, History of allergy, Hyperlipidemia, Hypertension, and Osteoarthritis. Past Surgical History:   has a past surgical history that includes Cardiac catheterization; Colonoscopy; Total knee arthroplasty (Right); colectomy (); and back surgery (2017). Additional Pertinent Hx: DM, OA, colectomy    Subjective: Pt asking for help upon entry (call light not activated), states he needs to use toilet. Comments: Pt sitting in recliner with chair blocking access to toilet, line position also preventing quick access to toilet. Upon standing with rolling walker, Pt actively having liquid stool run down leg, onto floor, sheet over chair and in his sock. Chair moved, lines adjusted to allow Pt to use walker to get to toilet. Environment cleaned while Pt toileted; Pt's feet on clean towel after removing soiled socks, cleaning feet and putting new socks on.     Vital Signs  Patient Currently in Pain: Denies    Oxygen Therapy  SpO2: (Did not monitor during activity; monitor off for pt Tx. )  O2 Device: Nasal cannula  O2 Flow Rate (L/min): 2 L/min    Bed Mobility  Sit to Supine: Supervision(HOB <30*, rail, pillow) Scooting: Supervision(hips to center of bed)    Transfers:  Sit to Stand: Contact guard assistance(Rolling walker)  Stand to sit: Minimal Assistance(Rolling walker; from toilet seat)  Bed to Chair: Minimal assistance(rolling walker)  Stand pivot transfers:     Ambulation 1  Surface: level tile  Device: Rolling Walker  Assistance: Contact guard assistance  Quality of Gait: Slow pace, Pt able to manuever on slick floor without loss of balance. Distance: 5 ft to toilet, 10' to bed. Stairs/Curb  Stairs?: No(has 3 steps to enter home without rails)    BALANCE  Posture: Fair  Sitting - Static: Good(Edge of bed, no back or UE support)  Sitting - Dynamic: Good;-(Edge of bed, no back or UE support)  Standing - Static: Fair;+(Rolling walker)  Standing - Dynamic: Fair(Rolling walker)    Other exercises?: Yes  Other exercises 1: Standing tolerance, 3 min. , with rolling walker support, withstanding perturbations. Other exercises 2: Seated static and dynamic balance, 15 min. + 5 min. (with and without UE support)  Other exercises 3: Seated marching, 5x each  Other exercises 4: Sit <> stand x2, from recliner & toilet    Activity Tolerance: Patient Tolerated treatment well;Patient limited by endurance     PT Equipment Recommendations  Equipment Needed: No    Assessment  Activity Tolerance: Patient Tolerated treatment well;Patient limited by endurance   Body structures, Functions, Activity limitations: Decreased functional mobility ; Decreased ADL status; Decreased strength;Decreased endurance;Decreased balance;Decreased posture  Discharge Recommendations: Patient would benefit from continued therapy after discharge    Type of devices: All fall risk precautions in place;Call light within reach;Gait belt;Nurse notified; Left in bed     Plan  Times per week: 3-4x/week Current Treatment Recommendations: Strengthening, Balance Training, Functional Mobility Training, Transfer Training, Endurance Training, Gait Training, Equipment Evaluation, Education, & procurement, Patient/Caregiver Education & Training, Safety Education & Training    Goals  Short term goals  Time Frame for Short term goals: 4-5 days  Short term goal 1: pt to demo L/R rolling min to CGA x1. Short term goal 2: Pt to perform supine<-->sit with supervision. Short term goal 3: Pt to perform transfers with device CGA. Short term goal 4: Pt to progress to amb 48' with device min to CGA x1. Short term goal 5: Pt to improve BLE strength by 1/2 MMG.   Short term goal 6: Pt to tolerate 30-45 minute PT session with O2 sats maintained >90%    PT Individual Minutes  Time In: 1344  Time Out: 1432  Minutes: 48    Electronically signed by Wilfrido Gerardo PTA on 1/12/21 at 5:03 PM EST

## 2021-01-12 NOTE — PROGRESS NOTES
[address]  [phone]          Progress Note    Patient Name:  Karina Gonzalez    :  1940 10:48 AM      SUBJECTIVE       Mr. Radha Mata     I did speak to patient's nurse at bedside. Reviewed telemetry and patient's chart.     Nurse, patient has been doing well overnight. Asymptomatic. Heart rate averages in the 60s while awake, and runs down to 30s when sleeping. No cardiac symptoms. OBJECTIVE     Vital signs:    BP (!) 144/71   Pulse 69   Temp 97.5 °F (36.4 °C) (Oral)   Resp 22   Ht 5' 7\" (1.702 m)   Wt 227 lb 1.2 oz (103 kg)   SpO2 (!) 83%   BMI 35.56 kg/m²  2 L/min  .tro    Admit Weight:  235 lb (106.6 kg)    Last 3 weights: Wt Readings from Last 3 Encounters:   01/10/21 227 lb 1.2 oz (103 kg)   20 234 lb (106.1 kg)   20 245 lb 6.4 oz (111.3 kg)       BMI: Body mass index is 35.56 kg/m². Input/Output:       Intake/Output Summary (Last 24 hours) at 2021 1048  Last data filed at 2021 0800  Gross per 24 hour   Intake 1150 ml   Output 700 ml   Net 450 ml       Date 21 0000 - 21 2359   Shift 5517-2435 9409-3078 1965-3597 24 Hour Total   INTAKE   P.O.(mL/kg/hr) 240(0.3) 200  440   Shift Total(mL/kg) 240(2.3) 200(1.9)  440(4.3)   OUTPUT   Urine(mL/kg/hr) 100(0.1)   100   Shift Total(mL/kg) 100(1)   100(1)   Weight (kg) 103 103 103 103     Exam:     General appearance: awake and alert moves all ext   Through window        Laboratory Studies:     CBC:   Recent Labs     21  0506   WBC 8.4   HGB 14.1   HCT 42.6   MCV 92.8        BMP:   Recent Labs     01/10/21  0436 21  0439 21  0506   * 128* 126*   K 4.1 3.9 4.4   CL 92* 92* 92*   CO2 24 27 27   BUN 24* 22 17   CREATININE 0.85 0.83 0.97     PT/INR: No results for input(s): PROTIME, INR in the last 72 hours. APTT: No results for input(s): APTT in the last 72 hours.   MAG:   Recent Labs     21  0506 Electronically signed by Felice Giron MD on 1/12/2021 at 10:48 AM

## 2021-01-12 NOTE — PROGRESS NOTES
Progress Note  1/12/2021 9:34 AM  Subjective:   Admit Date: 12/29/2020  PCP: Joann Boucher MD  CC: covid  Interval History: pt doing fine. Wanting to go home. Pt gets SOB with activity and then returns to baseline with rest.  HR up during the day and in 30's at night due to sleep apnea. Diet: DIET CARDIAC;  Medications:   Scheduled Meds:   insulin glargine  10 Units Subcutaneous Nightly    lisinopril  2.5 mg Oral Daily    metFORMIN  500 mg Oral BID WC    meloxicam  15 mg Oral Daily    insulin lispro  0-12 Units Subcutaneous TID WC    insulin lispro  0-6 Units Subcutaneous Nightly    aspirin EC  81 mg Oral Daily    atorvastatin  20 mg Oral Nightly    gabapentin  300 mg Oral Daily    hydroCHLOROthiazide  25 mg Oral Daily    pantoprazole  40 mg Oral QAM AC    dextromethorphan  30 mg Oral 2 times per day    sodium chloride flush  10 mL Intravenous 2 times per day    enoxaparin  30 mg Subcutaneous BID     Continuous Infusions:   dexmedetomidine (PRECEDEX) IV infusion      dextrose       CBC:   Recent Labs     01/12/21  0506   WBC 8.4   HGB 14.1        BMP:    Recent Labs     01/10/21  0436 01/11/21  0439 01/12/21  0506   * 128* 126*   K 4.1 3.9 4.4   CL 92* 92* 92*   CO2 24 27 27   BUN 24* 22 17   CREATININE 0.85 0.83 0.97   GLUCOSE 131* 117* 113*     Hepatic: No results for input(s): AST, ALT, ALB, BILITOT, ALKPHOS in the last 72 hours. Troponin: No results for input(s): TROPONINI in the last 72 hours. BNP: No results for input(s): BNP in the last 72 hours. Lipids: No results for input(s): CHOL, HDL in the last 72 hours. Invalid input(s): LDLCALCU  INR: No results for input(s): INR in the last 72 hours.     Objective:   Vitals: BP (!) 144/71   Pulse 69   Temp 97.5 °F (36.4 °C) (Oral)   Resp 22   Ht 5' 7\" (1.702 m)   Wt 227 lb 1.2 oz (103 kg)   SpO2 (!) 83%   BMI 35.56 kg/m² General appearance: sitting in chair, no distress, Pt seen through room window to conserve PPE and limit exposure  Neck: supple, symmetrical, trachea midline   No issues per nursing on exam.        Assessment and Plan:   1. covid  2. Bradycardia    Pt insists that he can get around at home enough to tolerate. Will get set up for home care and oxygen and pt can increase oxygen at night and with activity as needed.       Patient Active Problem List:     Acquired deviated nasal septum     Actinic keratosis     Arthritis     Benign prostatic hyperplasia     Bloating     Chronic serous otitis media     Claustrophobia     Coronary artery disease     Esophageal reflux     Flatus     Hearing loss     Hemorrhoids     History of allergy     Hyperlipidemia     Essential hypertension     Leg swelling     Lumbar radiculopathy     Lumbar stenosis     Skin neoplasm     Tinea corporis     Ventral hernia     Weight gain     Type 2 diabetes mellitus with complication (HCC)     Obesity     Other osteoporosis without current pathological fracture     Presence of coronary angioplasty implant and graft     Morbid obesity with BMI of 40.0-44.9, adult (HCC)     Pneumonia due to COVID-19 virus     Elevated C-reactive protein (CRP)     Acute respiratory failure with hypoxia (HCC)     KAREY (obstructive sleep apnea)      Electronically signed by Mary Mosher MD on 1/12/2021 at 9:34 AM

## 2021-01-12 NOTE — PROGRESS NOTES
Patient worked with PT in room. While working with PT patient's heart rate did increase into the 80's.

## 2021-01-12 NOTE — PROGRESS NOTES
[facility] -   Infectious diseases evaluation  admission date 12/29/2020    reason for consultation:   COVID-19 infection    Impression :   Current:  · COVID-19 infection first test + 12/29/2020  · Acute respiratory failure with hypoxia required high flow oxygen  · Bradycardia  · Elevated D-dimer  · History of smoking  · Diabetes mellitus  · Diverticulitis  · Hyperlipidemia  · Hypertension    Other:  · Flagyl, penicillin and sulfa allergy    Recommendations   · Decadron day 10 course completed 1/8/2021  · Remdesivir completed 1/2/21  · On Lovenox   · Supportive care  · Droplet plus isolation  · Discussed with nursing staff  · Home oxygen  · The patient may be discharged from infectious disease point of view        History of Present Illness:   Initial history:  Saji Singh is a [de-identified]y.o.-year-old male presented to the hospital with worsening shortness of breath for 2 days associated with cough headache and diarrhea for 2 weeks  He was hypoxic was placed on high flow oxygen initially  COVID-19 rapid test was positive  Inflammatory markers and liver enzymes were elevated  Chest x-ray showed left lower lobe infiltrate with possible small pleural fluid    1/6/2021 D-dimer 0.66, procalcitonin 0.11, ferritin 704, , C-reactive proteins 5.1  Micro  12/29 COVID19 positive  Interval changes  1/12/2021   Afebrile  The patient remains on 2 L of oxygen by nasal cannula, asymptomatic bradycardia, no reported fever no new events        Patient Vitals for the past 8 hrs:   BP Temp Temp src Pulse Resp SpO2   01/12/21 1400 118/69   80 22    01/12/21 1300 (!) 116/46   (!) 40  95 %   01/12/21 1244      96 %   01/12/21 1200 106/79 97.5 °F (36.4 °C) Oral (!) 35 21 93 %   01/12/21 1100 (!) 127/43   (!) 38 20 92 %   01/12/21 0900 (!) 144/71   69 22 (!) 83 % I have personally reviewed the past medical history, past surgical history, medications, social history, and family history, and I haveupdated the database accordingly. Allergies:   Feldene [piroxicam], Flagyl [metronidazole], Naprosyn [naproxen], Penicillins, Sulfa antibiotics, and Sulfasalazine     Review of Systems:     Review of Systems   Constitutional: Negative. HENT: Negative. Eyes: Negative. Respiratory: Positive for cough and shortness of breath. Cardiovascular: Negative. Gastrointestinal: Negative. Genitourinary: Negative. Musculoskeletal: Negative. Skin: Negative. As per history present illness, other than above 12 system review was negative  Physical Examination :       Remainder of examination deferred due to excessive risk conferred by physical examination during a global pandemic due to coronavirus 19.   In a setting where local and national supplies of personal protective equipment are depleted    Past Medical History:     Past Medical History:   Diagnosis Date    Diabetes ([facility] [address]     Diverticulitis     History of allergy     Hyperlipidemia     Hypertension     Osteoarthritis        Past Surgical  History:     Past Surgical History:   Procedure Laterality Date    BACK SURGERY  09/03/2017    Illene Music  Dr Melendez Figures, lumbar fusion   [facility]  2015    partial colectomy due to diveritc    COLONOSCOPY      TOTAL KNEE ARTHROPLASTY Right        Medications:      insulin glargine  10 Units Subcutaneous Nightly    lisinopril  2.5 mg Oral Daily    metFORMIN  500 mg Oral BID WC    meloxicam  15 mg Oral Daily    insulin lispro  0-12 Units Subcutaneous TID WC    insulin lispro  0-6 Units Subcutaneous Nightly    aspirin EC  81 mg Oral Daily    atorvastatin  20 mg Oral Nightly    gabapentin  300 mg Oral Daily    hydroCHLOROthiazide  25 mg Oral Daily    pantoprazole  40 mg Oral QAM AC  dextromethorphan  30 mg Oral 2 times per day    sodium chloride flush  10 mL Intravenous 2 times per day    enoxaparin  30 mg Subcutaneous BID       Social History:     Social History     Socioeconomic History    Marital status:      Spouse name: Not on file    Number of children: Not on file    Years of education: Not on file    Highest education level: Not on file   Occupational History    Not on file   Social Needs    Financial resource strain: Not on file    Food insecurity     Worry: Not on file     Inability: Not on file    Transportation needs     Medical: Not on file     Non-medical: Not on file   Tobacco Use    Smoking status: Former Smoker     Packs/day: 1.50     Years: 40.00     Pack years: 60.00     Quit date: 1991     Years since quittin.0    Smokeless tobacco: Never Used   Substance and Sexual Activity    Alcohol use: Yes     Alcohol/week: 1.0 standard drinks     Types: 1 Standard drinks or equivalent per week     Comment: social    Drug use: No    Sexual activity: Not on file   Lifestyle    Physical activity     Days per week: Not on file     Minutes per session: Not on file    Stress: Not on file   Relationships    Social connections     Talks on phone: Not on file     Gets together: Not on file     Attends [employer] service: Not on file     Active member of club or organization: Not on file     Attends meetings of clubs or organizations: Not on file     Relationship status: Not on file    Intimate partner violence     Fear of current or ex partner: Not on file     Emotionally abused: Not on file     Physically abused: Not on file     Forced sexual activity: Not on file   Other Topics Concern    Not on file   Social History Narrative    Not on file       Family History:   History reviewed. No pertinent family history.    Medical Decision Making:   I have independently reviewed/ordered the following labs:    CBC with Differential:   Recent Labs     21 0506   WBC 8.4   HGB 14.1   HCT 42.6      LYMPHOPCT 18*   MONOPCT 9*     BMP:  Recent Labs     01/11/21  0439 01/12/21  0506   * 126*   K 3.9 4.4   CL 92* 92*   CO2 27 27   BUN 22 17   CREATININE 0.83 0.97   MG  --  1.6     Hepatic Function Panel:   No results for input(s): PROT, LABALBU, BILIDIR, IBILI, BILITOT, ALKPHOS, ALT, AST in the last 72 hours. No results for input(s): RPR in the last 72 hours. No results for input(s): HIV in the last 72 hours. No results for input(s): BC in the last 72 hours. Lab Results   Component Value Date    CREATININE 0.97 01/12/2021    GLUCOSE 113 01/12/2021       Detailed results: Thank you for allowing us to participate in the care of this patient. Please call with questions. This note is created with the assistance of a speech recognition program.  While intending to generate adocument that actually reflects the content of the visit, the document can still have some errors including those of syntax and sound a like substitutions which may escape proof reading. It such instances, actual meaningcan be extrapolated by contextual diversion.     Collette Louis, MD  Office: [phone]  Perfect serve / office [phone]

## 2021-01-12 NOTE — PROGRESS NOTES
[facility] -   Infectious diseases evaluation  admission date 12/29/2020    reason for consultation:   COVID-19 infection    Impression :   Current:  · COVID-19 infection first test + 12/29/2020  · Acute respiratory failure with hypoxia required high flow oxygen  · Bradycardia  · Elevated D-dimer  · History of smoking  · Diabetes mellitus  · Diverticulitis  · Hyperlipidemia  · Hypertension    Other:  · Flagyl, penicillin and sulfa allergy    Recommendations   · Decadron day 10 course completed 1/8/2021  · Remdesivir completed 1/2/21  · On Lovenox per pulmonary  · Supportive care  · Droplet plus isolation  · Discussed with nursing staff        History of Present Illness:   Initial history:  Alex Melara is a [de-identified]y.o.-year-old male presented to the hospital with worsening shortness of breath for 2 days associated with cough headache and diarrhea for 2 weeks  He was hypoxic was placed on high flow oxygen initially  COVID-19 rapid test was positive  Inflammatory markers and liver enzymes were elevated  Chest x-ray showed left lower lobe infiltrate with possible small pleural fluid    1/6/2021 D-dimer 0.66, procalcitonin 0.11, ferritin 704, , C-reactive proteins 5.1  Micro  12/29 COVID19 positive  Interval changes  1/11/2021   Afebrile  The patient on 2 L of oxygen via nasal cannula, no reported fever, bradycardia, no acute events        Patient Vitals for the past 8 hrs:   BP Temp Pulse Resp SpO2   01/11/21 1927    18 95 %   01/11/21 1630   74  (!) 89 %   01/11/21 1600 120/88 97.7 °F (36.5 °C) 60 20 (!) 86 %   01/11/21 1530   60 20 93 %   01/11/21 1514    19 94 %   01/11/21 1500   68 20 93 %   01/11/21 1400   80 20 (!) 88 %   01/11/21 1341     95 %   01/11/21 1300 (!) 158/83 97.5 °F (36.4 °C) 52 23 93 % I have personally reviewed the past medical history, past surgical history, medications, social history, and family history, and I haveupdated the database accordingly. Allergies:   Feldene [piroxicam], Flagyl [metronidazole], Naprosyn [naproxen], Penicillins, Sulfa antibiotics, and Sulfasalazine     Review of Systems:     Review of Systems   Constitutional: Negative. HENT: Negative. Eyes: Negative. Respiratory: Positive for cough and shortness of breath. Cardiovascular: Negative. Gastrointestinal: Negative. Genitourinary: Negative. Musculoskeletal: Negative. Skin: Negative. As per history present illness, other than above 12 system review was negative  Physical Examination :       Remainder of examination deferred due to excessive risk conferred by physical examination during a global pandemic due to coronavirus 19.   In a setting where local and national supplies of personal protective equipment are depleted    Past Medical History:     Past Medical History:   Diagnosis Date    Diabetes ([facility] [address]     Diverticulitis     History of allergy     Hyperlipidemia     Hypertension     Osteoarthritis        Past Surgical  History:     Past Surgical History:   Procedure Laterality Date    BACK SURGERY  09/03/2017    Radha Joseph, lumbar fusion   [facility]  2015    partial colectomy due to diveritc    COLONOSCOPY      TOTAL KNEE ARTHROPLASTY Right        Medications:      insulin glargine  10 Units Subcutaneous Nightly    lisinopril  2.5 mg Oral Daily    metFORMIN  500 mg Oral BID WC    meloxicam  15 mg Oral Daily    insulin lispro  0-12 Units Subcutaneous TID WC    insulin lispro  0-6 Units Subcutaneous Nightly    aspirin EC  81 mg Oral Daily    atorvastatin  20 mg Oral Nightly    gabapentin  300 mg Oral Daily    hydroCHLOROthiazide  25 mg Oral Daily    pantoprazole  40 mg Oral QAM AC No results for input(s): WBC, HGB, HCT, PLT, SEGSPCT, BANDSPCT, LYMPHOPCT, MONOPCT, EOSPCT in the last 72 hours. BMP:  Recent Labs     01/10/21  0436 01/11/21  0439   * 128*   K 4.1 3.9   CL 92* 92*   CO2 24 27   BUN 24* 22   CREATININE 0.85 0.83     Hepatic Function Panel:   No results for input(s): PROT, LABALBU, BILIDIR, IBILI, BILITOT, ALKPHOS, ALT, AST in the last 72 hours. No results for input(s): RPR in the last 72 hours. No results for input(s): HIV in the last 72 hours. No results for input(s): BC in the last 72 hours. Lab Results   Component Value Date    CREATININE 0.83 01/11/2021    GLUCOSE 117 01/11/2021       Detailed results: Thank you for allowing us to participate in the care of this patient. Please call with questions. This note is created with the assistance of a speech recognition program.  While intending to generate adocument that actually reflects the content of the visit, the document can still have some errors including those of syntax and sound a like substitutions which may escape proof reading. It such instances, actual meaningcan be extrapolated by contextual diversion.     Iesha Argueta MD  Office: [phone]  Perfect serve / office [phone]

## 2021-01-12 NOTE — PROGRESS NOTES
All discharge medications verified with Dr. Kalli Baldwin. Instructions and medications went over with patient. Patient verified understanding utilizing teach back. Patient discharged in stable condition with O2 on at 4L/NC via wheel chair. Patient assisted into vehicle. Writer discussed instructions with patient's wife regarding O2 use, activity, instructions. Wife verbalized how to use O2, stating she has her own already. Wife notified company was called to bring O2 condenser to home. That doctors names highlighted to call for appointments and follow up.

## 2021-01-12 NOTE — CARE COORDINATION
[address] Encounter Date/Time: 2020 [address] Account: [de-identified]    MRN: [id number]    Patient: Lorelei Sarmiento    Contact Serial #: [id number]      ENCOUNTER          Patient Class: I Private Enc? No Unit RM BD: [facility]    Hospital Service: Intermediate   ADM DX: [id number] [U07.1]   ADM Provider: Maegan Yan MD   Procedure:     ATT Provider: Maegan Yan MD   REF Provider:        PATIENT                 Name: Lorelei Sarmiento : 1940 (80 yrs)   Address: [address] [place] [address] Sex: Male   [facility] [address]         Marital Status:    Employer: [employer]         Sikh:     Primary Care Provider: Maegan Yan MD         Primary Phone: [phone]   EMERGENCY CONTACT   Contact Name Legal Guardian? Relationship to Patient Home Phone Work Phone   1. Cassandra Jovel  2. Leidador Jonathan      Spouse  Child [phone]              GUARANTOR            Guarantor: Lorelei Sarmiento     : 1940   Address: [address] [place] [address] Sex: Male     [place] [address]     Relation to Patient: Self       Home Phone: [phone]   Guarantor ID: [id number]       Work Phone:     Guarantor Employer: [employer]         Status: RETIRED      COVERAGE        PRIMARY INSURANCE   Payor: MEDICARE Plan: MEDICARE PART A AND B   Payor Address: [address] [address]       Group Number:   Insurance Type: INDEMNITY   Subscriber Name: Jared Pantoja : 1940   Subscriber ID: [id number] Pat. Rel. to Sub: Self   SECONDARY INSURANCE   Payor: MEDICAL MUTUAL Plan: MED[employer]  [address]   Payor Address:  [place], [place] [address]          Group Number: [id number] Insurance Type: INDEMNITY   Subscriber Name: Jared Pantoja : 1940   Subscriber ID: [id number] Pat.  Rel. to Sub: SELF         Tito Johnson MD   Physician   Cardiology   Progress Notes   Signed   Date of Service:  2021 10:48 AM               Signed Expand AllCollapse All      Show:Clear all  [x]Manual[x]Template[x]Copied    Added by:  Martha Wan MD    []ver for details           [address] [place] [address]  [phone]                                                      Progress Note     Patient Name:  Flo Duverney    :  1940 10:48 AM        SUBJECTIVE        Mr. Jerry Rojas      I did speak to patient's nurse at bedside.  Reviewed telemetry and patient's chart.     Nurse, patient has been doing well overnight.  Asymptomatic.  Heart rate averages in the 60s while awake, and runs down to 30s when sleeping.  No cardiac symptoms. OBJECTIVE      Vital signs:     BP (!) 144/71   Pulse 69   Temp 97.5 °F (36.4 °C) (Oral)   Resp 22   Ht 5' 7\" (1.702 m)   Wt 227 lb 1.2 oz (103 kg)   SpO2 (!) 83%   BMI 35.56 kg/m²  2 L/min  .tro     Admit Weight:  235 lb (106.6 kg)     Last 3 weights:       Wt Readings from Last 3 Encounters:   01/10/21 227 lb 1.2 oz (103 kg)   20 234 lb (106.1 kg)   20 245 lb 6.4 oz (111.3 kg)         BMI: Body mass index is 35.56 kg/m².     Input/Output:         Intake/Output Summary (Last 24 hours) at 2021 1048  Last data filed at 2021 0800      Gross per 24 hour   Intake 1150 ml   Output 700 ml   Net 450 ml                Date 21 0000 - 21 2359   Shift 9528-8256 8510-0180 0848-0689 24 Hour Total   INTAKE   P.O.(mL/kg/hr) 240(0.3) 200   440   Shift Total(mL/kg) 240(2.3) 200(1.9)   440(4.3)   OUTPUT   Urine(mL/kg/hr) 100(0.1)     100   Shift Total(mL/kg) 100(1)     100(1)   Weight (kg) 103 103 103 103      Exam:      General appearance: awake and alert moves all ext   Through window                                                    Laboratory Studies:      CBC:       Recent Labs     21  0506   WBC 8.4   HGB 14.1   HCT 42.6   MCV 92.8         BMP:         Recent Labs     01/10/21  0436 21  0439 21  0506 * 128* 126*   K 4.1 3.9 4.4   CL 92* 92* 92*   CO2 24 27 27   BUN 24* 22 17   CREATININE 0.85 0.83 0.97      PT/INR: No results for input(s): PROTIME, INR in the last 72 hours. APTT: No results for input(s): APTT in the last 72 hours. MAG:       Recent Labs     21  0506   MG 1.6      D Dimer:       Recent Labs     21  0506   DDIMER 0.36      Troponin  No results for input(s): TROPHS in the last 72 hours. BNP No results for input(s): BNP in the last 72 hours. No results for input(s): PROBNP in the last 72 hours. Pulse Ox: SpO2  Av.5 %  Min: 83 %  Max: 96 %  Supplemental O2: O2 Flow Rate (L/min): 2 L/min      Current Meds:   Scheduled Medications    insulin glargine  10 Units Subcutaneous Nightly    lisinopril  2.5 mg Oral Daily    metFORMIN  500 mg Oral BID WC    meloxicam  15 mg Oral Daily    insulin lispro  0-12 Units Subcutaneous TID WC    insulin lispro  0-6 Units Subcutaneous Nightly    aspirin EC  81 mg Oral Daily    atorvastatin  20 mg Oral Nightly    gabapentin  300 mg Oral Daily    hydroCHLOROthiazide  25 mg Oral Daily    pantoprazole  40 mg Oral QAM AC    dextromethorphan  30 mg Oral 2 times per day    sodium chloride flush  10 mL Intravenous 2 times per day    enoxaparin  30 mg Subcutaneous BID         Continuous Infusions:   Infusions Meds    dexmedetomidine (PRECEDEX) IV infusion      dextrose              Echo:        ASSESSMENT      Principal Problem:    Pneumonia due to COVID-19 virus  Active Problems:    Essential hypertension    Lumbar radiculopathy    Morbid obesity with BMI of 40.0-44.9, adult (HCC)    Elevated C-reactive protein (CRP)    Acute respiratory failure with hypoxia (HCC)    KAREY (obstructive sleep apnea)  Resolved Problems:    * No resolved hospital problems. *        PLAN         1.  Bradycardia -Mobitz 2 second-degree AV block  On EKG 2021, Asymptomatic.  -2;1 and Renée -no indication for pacemaker  As no sx  -ambulate and watch HR  -I feel he could go home and we can reassess this as an outpatient     2. COVID-19 pneumonia -infectious disease following  3. Hypertension -hydrochlorothiazide, lisinopril  4. Hyperlipidemia -Lipitor     We will sign off, could see in office 3-4 weeks     Electronically signed by Keila Regalado MD on 1/12/2021 at 10:48 AM                            Bo Christianson MD   Physician   Family Medicine   Progress Notes   Signed   Date of Service:  1/12/2021  9:34 AM               Signed        Expand AllCollapse All      Show:Clear all  [x]Manual[x]Template[]Copied    Added by:  [x]Mony Moreno MD    []Anthonyver for details  Progress Note  1/12/2021 9:34 AM  Subjective:   Admit Date: 12/29/2020                      PCP: Kuldip Meier MD  CC: covid  Interval History: pt doing fine. Wanting to go home.   Pt gets SOB with activity and then returns to baseline with rest.  HR up during the day and in 30's at night due to sleep apnea.      Diet: DIET CARDIAC;  Medications:   Scheduled Meds:  Scheduled Medications    insulin glargine  10 Units Subcutaneous Nightly    lisinopril  2.5 mg Oral Daily    metFORMIN  500 mg Oral BID WC    meloxicam  15 mg Oral Daily    insulin lispro  0-12 Units Subcutaneous TID WC    insulin lispro  0-6 Units Subcutaneous Nightly    aspirin EC  81 mg Oral Daily    atorvastatin  20 mg Oral Nightly    gabapentin  300 mg Oral Daily    hydroCHLOROthiazide  25 mg Oral Daily    pantoprazole  40 mg Oral QAM AC    dextromethorphan  30 mg Oral 2 times per day    sodium chloride flush  10 mL Intravenous 2 times per day    enoxaparin  30 mg Subcutaneous BID         Continuous Infusions:  Infusions Meds    dexmedetomidine (PRECEDEX) IV infusion      dextrose           CBC:       Recent Labs     01/12/21  0506   WBC 8.4   HGB 14.1         BMP:          Recent Labs     01/10/21  0436 01/11/21  0439 01/12/21 0506   * 128* 126*   K 4.1 3.9 4.4   CL 92* 92* 92*   CO2 24 27 27   BUN 24* 22 17   CREATININE 0.85 0.83 0.97   GLUCOSE 131* 117* 113*      Hepatic: No results for input(s): AST, ALT, ALB, BILITOT, ALKPHOS in the last 72 hours. Troponin: No results for input(s): TROPONINI in the last 72 hours. BNP: No results for input(s): BNP in the last 72 hours. Lipids: No results for input(s): CHOL, HDL in the last 72 hours.     Invalid input(s): LDLCALCU  INR: No results for input(s): INR in the last 72 hours.     Objective:   Vitals: BP (!) 144/71   Pulse 69   Temp 97.5 °F (36.4 °C) (Oral)   Resp 22   Ht 5' 7\" (1.702 m)   Wt 227 lb 1.2 oz (103 kg)   SpO2 (!) 83%   BMI 35.56 kg/m²   General appearance: sitting in chair, no distress, Pt seen through room window to conserve PPE and limit exposure  Neck: supple, symmetrical, trachea midline   No issues per nursing on exam.          Assessment and Plan: 1.   covid  2. Bradycardia     Pt insists that he can get around at home enough to tolerate.  Will get set up for home care and oxygen and pt can increase oxygen at night and with activity as needed.       Patient Active Problem List:     Acquired deviated nasal septum     Actinic keratosis     Arthritis     Benign prostatic hyperplasia     Bloating     Chronic serous otitis media     Claustrophobia     Coronary artery disease     Esophageal reflux     Flatus     Hearing loss     Hemorrhoids     History of allergy     Hyperlipidemia     Essential hypertension     Leg swelling     Lumbar radiculopathy     Lumbar stenosis     Skin neoplasm     Tinea corporis     Ventral hernia     Weight gain     Type 2 diabetes mellitus with complication (HCC)     Obesity     Other osteoporosis without current pathological fracture     Presence of coronary angioplasty implant and graft     Morbid obesity with BMI of 40.0-44.9, adult (HCC)     Pneumonia due to COVID-19 virus     Elevated C-reactive protein (CRP)

## 2021-01-13 ENCOUNTER — CARE COORDINATION (OUTPATIENT)
Dept: CASE MANAGEMENT | Age: 81
End: 2021-01-13

## 2021-01-13 ENCOUNTER — TELEPHONE (OUTPATIENT)
Dept: PRIMARY CARE CLINIC | Age: 81
End: 2021-01-13

## 2021-01-13 NOTE — TELEPHONE ENCOUNTER
Pts spouse called to schedule hospital f/u for pt. Pt admitted into hospital on 12/29 for Covid-19 diagnosis and was instructed to f/u with Dr. Kushal Grimaldo.  Pt scheduled for 1/28

## 2021-01-14 ENCOUNTER — CARE COORDINATION (OUTPATIENT)
Dept: CASE MANAGEMENT | Age: 81
End: 2021-01-14

## 2021-01-14 NOTE — CARE COORDINATION
Brittanie 45 Transitions Initial Follow Up Call    Call within 2 business days of discharge: Yes    Patient: Karina Gonzalez Patient : 1940   MRN: [id number]  Reason for Admission: covid  Discharge Date: 21 RARS: Readmission Risk Score: 10      Last Discharge [facility]       Complaint Diagnosis Description Type Department Provider    20 Shortness of Breath; Cough; Diarrhea Hypoxia . .. ED to Hosp-Admission (Discharged) (ADMITTED) [facility] ICU Durga Raygoza MD; [facility]... Spoke with: Anuj Greenberg's wife    Facility: [facility]    Non-face-to-face services provided:  Writer spoke to patient's wife Delfino Olivas, patient doing ok, still is weak and tired, has some sob and a cough but doing much better, reviewed discharge instructions and medications, 1111F order completed, patient has vns/elara, nurse scheduled to come out today, reviewed covid precautions and healthcare decision makers, patient has hospital follow up appointment on 2021, Judi Lagunas provided contact information, will follow///JU    Challenges to be reviewed by the provider   Additional needs identified to be addressed with provider No  none    Discussed COVID-19 related testing which was available at this time. Test results were positive. Patient informed of results, if available? Method of communication with provider : none    Advance Care Planning:   Does patient have an Advance Directive:  reviewed and current. Was this a readmission? No  Patient stated reason for admission: covid  Patients top risk factors for readmission: medical condition    Care Transition Nurse (CTN) contacted the patient by telephone to perform post hospital discharge assessment. Verified name and  with family as identifiers. Provided introduction to self, and explanation of the CTN role. CTN reviewed discharge instructions, medical action plan and red flags with family who verbalized understanding.  Family given an opportunity to ask questions and does not have any further questions or concerns at this time. Were discharge instructions available to patient? Yes. Reviewed appropriate site of care based on symptoms and resources available to patient including: PCP, Specialist, Urgent care clinics, Home health, When to call 911 and CTN. The family agrees to contact the PCP office for questions related to their healthcare. Medication reconciliation was performed with family, who verbalizes understanding of administration of home medications. Advised obtaining a 90-day supply of all daily and as-needed medications. Covid Risk Education    Patient has following risk factors of: diabetes. Education provided regarding infection prevention, and signs and symptoms of COVID-19 and when to seek medical attention with family who verbalized understanding. Discussed exposure protocols and quarantine From CDC: Are you at higher risk for severe illness?   and given an opportunity for questions and concerns. The family agrees to contact the COVID-19 hotline [phone] or PCP office for questions related to COVID-19. For more information on steps you can take to protect yourself, see CDC's How to Protect Yourself     Patient/family/caregiver given information for GetWell Loop and agrees to enroll no  Patient's preferred e-mail: declines  Patient's preferred phone number: declines    Discussed follow-up appointments. If no appointment was previously scheduled, appointment scheduling offered: Yes. Is follow up appointment scheduled within 7 days of discharge? Yes  Non-[facility] follow up appointment(s):     Plan for follow-up call in 5-7 days based on severity of symptoms and risk factors. Plan for next call: routine  CTN provided contact information for future needs.         Care Transitions 24 Hour Call    Do you have any ongoing symptoms?: Yes  Patient-reported symptoms: Weakness, Shortness of Breath, Fatigue, Cough  Do you have a copy of your

## 2021-01-21 ENCOUNTER — CARE COORDINATION (OUTPATIENT)
Dept: CASE MANAGEMENT | Age: 81
End: 2021-01-21

## 2021-01-26 ENCOUNTER — CARE COORDINATION (OUTPATIENT)
Dept: CASE MANAGEMENT | Age: 81
End: 2021-01-26

## 2021-01-26 NOTE — CARE COORDINATION
Brittanie 45 Transitions Follow Up Call    2021    Patient: Ja Dyer  Patient : 1940   MRN: [id number]  Reason for Admission: covid  Discharge Date: 21 RARS: Readmission Risk Score: 10         Spoke with: Janelle MarreroGrant, Anuj's wife  Writer spoke to patient's wife Janelle Mccray, patient doing much better, had VN  From [facility] out to see him today, will have therapy out tomorrow patient did get the covid vaccination on 2021, no new needs or concerns at this time, will continue to follow//JU           Plan for follow-up call in 7-10 days based on severity of symptoms and risk factors. Plan for next call: routine  CTN provided contact information for future needs. Care Transitions Subsequent and Final Call    Subsequent and Final Calls  Do you currently have any active services?: Yes  Are you currently active with any services?: Home Health  Do you have any needs or concerns that I can assist you with?: No  Care Transitions Interventions  Other Interventions:            Follow Up  Future Appointments   Date Time Provider Nisreen Duarn   2021 11:00 AM MD COLBY Cota [facility]   3/18/2021  9:20 AM MD COLBY Cota RN

## 2021-01-28 ENCOUNTER — OFFICE VISIT (OUTPATIENT)
Dept: PRIMARY CARE CLINIC | Age: 81
End: 2021-01-28
Payer: MEDICARE

## 2021-01-28 VITALS
WEIGHT: 230 LBS | OXYGEN SATURATION: 96 % | HEART RATE: 86 BPM | TEMPERATURE: 98.2 F | DIASTOLIC BLOOD PRESSURE: 60 MMHG | SYSTOLIC BLOOD PRESSURE: 116 MMHG | BODY MASS INDEX: 36.02 KG/M2

## 2021-01-28 DIAGNOSIS — R09.02 HYPOXIA: ICD-10-CM

## 2021-01-28 DIAGNOSIS — J12.82 PNEUMONIA DUE TO COVID-19 VIRUS: Primary | ICD-10-CM

## 2021-01-28 DIAGNOSIS — U07.1 PNEUMONIA DUE TO COVID-19 VIRUS: Primary | ICD-10-CM

## 2021-01-28 DIAGNOSIS — L89.311 PRESSURE INJURY OF RIGHT BUTTOCK, STAGE 1: ICD-10-CM

## 2021-01-28 DIAGNOSIS — E11.8 TYPE 2 DIABETES MELLITUS WITH COMPLICATION (HCC): ICD-10-CM

## 2021-01-28 PROBLEM — E66.01 MORBID OBESITY WITH BMI OF 40.0-44.9, ADULT (HCC): Status: RESOLVED | Noted: 2020-11-19 | Resolved: 2021-01-28

## 2021-01-28 PROCEDURE — G8427 DOCREV CUR MEDS BY ELIG CLIN: HCPCS | Performed by: FAMILY MEDICINE

## 2021-01-28 PROCEDURE — 1036F TOBACCO NON-USER: CPT | Performed by: FAMILY MEDICINE

## 2021-01-28 PROCEDURE — G8484 FLU IMMUNIZE NO ADMIN: HCPCS | Performed by: FAMILY MEDICINE

## 2021-01-28 PROCEDURE — 1123F ACP DISCUSS/DSCN MKR DOCD: CPT | Performed by: FAMILY MEDICINE

## 2021-01-28 PROCEDURE — G8417 CALC BMI ABV UP PARAM F/U: HCPCS | Performed by: FAMILY MEDICINE

## 2021-01-28 PROCEDURE — 1111F DSCHRG MED/CURRENT MED MERGE: CPT | Performed by: FAMILY MEDICINE

## 2021-01-28 PROCEDURE — 4040F PNEUMOC VAC/ADMIN/RCVD: CPT | Performed by: FAMILY MEDICINE

## 2021-01-28 PROCEDURE — 99214 OFFICE O/P EST MOD 30 MIN: CPT | Performed by: FAMILY MEDICINE

## 2021-01-28 ASSESSMENT — ENCOUNTER SYMPTOMS
COUGH: 1
SHORTNESS OF BREATH: 1

## 2021-01-28 ASSESSMENT — PATIENT HEALTH QUESTIONNAIRE - PHQ9
SUM OF ALL RESPONSES TO PHQ9 QUESTIONS 1 & 2: 0
2. FEELING DOWN, DEPRESSED OR HOPELESS: 0

## 2021-01-28 NOTE — PROGRESS NOTES
[facility] PRIMARY CARE  [address] [facility] [address]  Dept: [phone]    Levy Dash is a [de-identified] y.o. male Established patient, who presents today for his medical conditions/complaintsas noted below. Chief Complaint   Patient presents with    Follow-Up from Hospital     Pt Admitted on 12/29/20 D/C1/12/21 -Pneumona tony to [facility]       HPI:     HPI    Has oxygen at home 4 /L is on it all the time since he was in the hospital with the Covid pneumonia  His wife also had Covid pneumonia  Walking with walker and walking slow  Deep breaths makes him cough. Coughing is much better than when he was in the hospital  Patient states his skin is very dry. His upper ears are being irritated and having skin rash from the oxygen tubing. His right buttock near the anal area has had skin breakdown from when he was laying in the hospital.  He is using a cream from the hospital twice a day  He states his skin is dry all over. Sugars: 131 this am, usually doing good, 116, 112.    Has been off steroids now    Reviewed prior notes hospital notes  Reviewed previous Labs, Imaging and Hospital Records    LDL Calculated (mg/dL)   Date Value   09/04/2020 115   07/10/2019 98   06/21/2018 77       (goal LDL is <100)   AST (U/L)   Date Value   12/29/2020 58 (H)     ALT (U/L)   Date Value   12/29/2020 55 (H)     BUN (mg/dL)   Date Value   01/12/2021 17     Hemoglobin A1C (%)   Date Value   11/19/2020 6.7     TSH (mIU/L)   Date Value   01/11/2021 1.65     BP Readings from Last 3 Encounters:   01/28/21 116/60   01/12/21 118/63   11/19/20 102/60          (goal 120/80)    Past Medical History:   Diagnosis Date    Diabetes ([address]     Diverticulitis     History of allergy     Hyperlipidemia     Hypertension     Osteoarthritis       Past Surgical History:   Procedure Laterality Date    BACK SURGERY  09/03/2017    Carlota Robbins, lumbar fusion    CARDIAC CATHETERIZATION  COLECTOMY      partial colectomy due to diveritc    COLONOSCOPY      TOTAL KNEE ARTHROPLASTY Right        No family history on file. Social History     Tobacco Use    Smoking status: Former Smoker     Packs/day: 1.50     Years: 40.00     Pack years: 60.00     Quit date: 1991     Years since quittin.0    Smokeless tobacco: Never Used   Substance Use Topics    Alcohol use: Yes     Alcohol/week: 1.0 standard drinks     Types: 1 Standard drinks or equivalent per week     Comment: social      Current Outpatient Medications   Medication Sig Dispense Refill    glucose (GLUTOSE) 40 % GEL Take 37.5 mLs by mouth as needed (low sugar) 45 g 1    docusate sodium (COLACE, DULCOLAX) 100 MG CAPS Take 100 mg by mouth 2 times daily as needed for Constipation 60 capsule 0    atorvastatin (LIPITOR) 20 MG tablet Take 1 tablet by mouth nightly 90 tablet 3    hydroCHLOROthiazide (HYDRODIURIL) 25 MG tablet TAKE 1 TABLET DAILY 90 tablet 1    metFORMIN (GLUCOPHAGE) 500 MG tablet Take 1 tablet by mouth 2 times daily (with meals) 180 tablet 1    omeprazole (PRILOSEC) 40 MG delayed release capsule TAKE 1 CAPSULE DAILY 90 capsule 3    Dulaglutide (TRULICITY) 0.13 VY/9.9UU SOPN INJECT 0.5ML (=0.75 MG)    SUBCUTANEOUSLY ONCE WEEKLY 12 pen 3    lisinopril (PRINIVIL;ZESTRIL) 2.5 MG tablet TAKE 1 TABLET DAILY 90 tablet 3    meloxicam (MOBIC) 15 MG tablet       metoprolol succinate (TOPROL XL) 25 MG extended release tablet Take 0.5 tablets by mouth daily 90 tablet 0    aspirin EC 81 MG EC tablet Take 81 mg by mouth daily      Multiple Vitamin (MULTI-VITAMIN DAILY) TABS Take 1 tablet by mouth daily      gabapentin (NEURONTIN) 300 MG capsule Take 300 mg by mouth daily. No current facility-administered medications for this visit.       Allergies   Allergen Reactions    Feldene [Piroxicam]     Flagyl [Metronidazole]     Naprosyn [Naproxen]     Penicillins     Sulfa Antibiotics     Sulfasalazine Health Maintenance   Topic Date Due    COVID-19 Vaccine (1 of 2) 11/02/1956    Shingles Vaccine (1 of 2) 11/02/1990    Diabetic retinal exam  12/17/2019    A1C test (Diabetic or Prediabetic)  05/19/2021    Diabetic foot exam  07/13/2021    Lipid screen  09/04/2021    Annual Wellness Visit (AWV)  09/04/2021    Potassium monitoring  01/12/2022    Creatinine monitoring  01/12/2022    DTaP/Tdap/Td vaccine (2 - Td) 07/13/2030    Flu vaccine  Completed    Pneumococcal 65+ years Vaccine  Completed    Hepatitis A vaccine  Aged Out    Hib vaccine  Aged Out    Meningococcal (ACWY) vaccine  Aged Out       Subjective:      Review of Systems   Respiratory: Positive for cough and shortness of breath. Cardiovascular: Negative. Skin: Positive for rash. Objective:     /60   Pulse 86   Temp 98.2 °F (36.8 °C)   Wt 230 lb (104.3 kg)   SpO2 96%   BMI 36.02 kg/m²   Physical Exam  Vitals signs and nursing note reviewed. Constitutional:       General: He is not in acute distress. Appearance: He is well-developed. He is obese. He is not ill-appearing. HENT:      Head: Normocephalic and atraumatic. Right Ear: Tympanic membrane and ear canal normal.      Left Ear: Tympanic membrane and ear canal normal.      Ears:      Comments: Patient has skin cracking,  slightly raised scabbing superior pinna where the oxygen tubing is resting on the top of his ear  Eyes:      General: No scleral icterus. Right eye: No discharge. Left eye: No discharge. Conjunctiva/sclera: Conjunctivae normal.      Pupils: Pupils are equal, round, and reactive to light. Neck:      Thyroid: No thyromegaly. Trachea: No tracheal deviation. Cardiovascular:      Rate and Rhythm: Normal rate and regular rhythm. Heart sounds: Normal heart sounds. Pulmonary:      Effort: Pulmonary effort is normal. No respiratory distress. Breath sounds: Normal breath sounds. No wheezing. Genitourinary:     Comments: Redness around the perianal area  Lymphadenopathy:      Cervical: No cervical adenopathy. Skin:     General: Skin is warm. Findings: Rash present. Comments: Right buttock patient has multiple small areas of dry redness some are slightly raised. One small and has in the center small area 1 mm of skin breakdown superficially. Skin is very dry on the buttocks   Neurological:      Mental Status: He is alert and oriented to person, place, and time. Psychiatric:         Mood and Affect: Mood normal.         Behavior: Behavior normal.         Thought Content: Thought content normal.         Assessment:       Diagnosis Orders   1. Pneumonia due to COVID-19 virus  DME Order for [facility]) as OP   2. Type 2 diabetes mellitus with complication (HCC)     3. Hypoxia  DME Order for (Specify) as OP   4. Pressure injury of right buttock, stage 1          Plan:      No follow-ups on file. [address] office visit in 4 to 6 weeks. Patient was given a pulse ox to monitor his pulse oxygen at home. He is to continue to wear the oxygen since his oxygen level at rest is 88% with 1 L  He is currently on 4 L which I think he can wean down to 2 to 3 L depending on his pulse oxygen level  He should have home nurse follow-up  Do breathing exercises. Call as needed  Use regular hand lotion on the buttocks and the skin at nighttime. Continue use the paste cream that he received from the hospital on the decubitus ulcers twice a day during the day  Orders Placed This Encounter   Procedures    DME Order for (Specify) as OP     - DME device ordered - pulse ox   - Diagnosis: COVID pneumonia with hypoxia  - Length of Need: 3 Months     No orders of the defined types were placed in this encounter. Patient given educationalmaterials - see patient instructions. Discussed use, benefit, and side effectsof prescribed medications. All patient questions answered. Pt voiced understanding. Reviewed health maintenance. Instructed to continue current medications, diet and walking. Patient agreed with treatment plan. Follow up as directed.      Electronicallysigned by Maribel Mclean MD on 1/28/2021 at 11:25 AM

## 2021-02-01 ENCOUNTER — CARE COORDINATION (OUTPATIENT)
Dept: CARE COORDINATION | Age: 81
End: 2021-02-01

## 2021-02-01 NOTE — CARE COORDINATION
Admit for COVID, (diagnosed 20) pneumonia, discharged 21. Had PCP follow up appt last week and she ordered pulse oximetry since he is now wearing oxygen at home. He is getting twice a week nursing visits from [facility] and also therapy visits. Still very fatigued but improving. He can finally take some deep breaths today without causing coughing jag. Pulse ox 94-95% at rest but low at 88-90 when he gets up in the morning. He is wearing oxygen ATC at 3 lpm. Discussed instructions for pulse oximeter use including parameter numbers of when needs to return to ED. Using a walker at all times right now due to some weakness. No falls, no dizziness. Eating good, does have some diarrhea, stated is not taking docusate. Patient contacted regarding CVDTE-25 diagnosis\". Discussed COVID-19 related testing which was available at this time. Test results were positive. Patient informed of results, if available? Yes    Care Transition Nurse/ Ambulatory Care Manager contacted the patient by telephone to perform post discharge assessment. Verified name and  with patient as identifiers. Provided introduction to self, and explanation of the CTN/ACM role, and reason for call due to risk factors for infection and/or exposure to COVID-19. Symptoms reviewed with patient who verbalized the following symptoms: fatigue, cough, shortness of breath, diarrhea and no worsening symptoms. Due to no new or worsening symptoms encounter was not routed to provider for escalation. Discussed follow-up appointments.  If no appointment was previously scheduled, appointment scheduling offered: [facility] follow up appointment(s):   Future Appointments   Date Time Provider Nisreen Duran   3/18/2021  9:20 AM MD COLBY Atkinson [facility]     Non-[facility] follow up appointment(s): NA    Non-face-to-face services provided:  Education of patient/family/caregiver/guardian to support self-management-symptom monitoring and treatment     Advance Care Planning:   Does patient have an Advance Directive:  reviewed and current. Patient has following risk factors of: diabetes. CTN/ACM reviewed discharge instructions, medical action plan and red flags such as increased shortness of breath, increasing fever and signs of decompensation with patient who verbalized understanding. Discussed exposure protocols and quarantine with CDC Guidelines What to do if you are sick with coronavirus disease 2019.  Patient was given an opportunity for questions and concerns. The patient agrees to contact the [facility] exposure line [phone], [facility] department [place] Department of Health: ([phone]) and PCP office for questions related to their healthcare. CTN/ACM provided contact information for future needs. Reviewed and educated patient on any new and changed medications related to discharge diagnosis     Patient/family/caregiver given information for GetWell Loop and agrees to enroll no  Patient's preferred e-mail: NA   Patient's preferred phone number: NA  Based on Loop alert triggers, patient will be contacted by nurse care manager for worsening symptoms. Plan for follow-up call in 5-7 days based on severity of symptoms and risk factors.

## 2021-02-08 ENCOUNTER — CARE COORDINATION (OUTPATIENT)
Dept: CARE COORDINATION | Age: 81
End: 2021-02-08

## 2021-02-08 NOTE — CARE COORDINATION
Shortness of breath and fatigue slowly improving. Still wearing oxygen ATC at 3 lpm. Average during the day is 93-96, still low at 88-90 when first gets up in the morning even though is wearing the oxygen at HS. Diarrhea improved. Denied problems with urination. Blood sugars stable, was 136 this morning, was high in hospital when on steroids. Future Appointments   Date Time Provider Nisreen Duran   3/18/2021  9:20 AM MD COLBY [facility]         Patient contacted regarding COVID-19 risk and screening. Discussed COVID-19 related testing which was available at this time. Test results were positive. Patient informed of results, if available? Yes. Care Transition Nurse/ Ambulatory Care Manager contacted the patient by telephone to perform follow-up assessment. Verified name and  with patient as identifiers. Patient has following risk factors of: diabetes. Symptoms reviewed with patient who verbalized the following symptoms: fatigue, cough, no new symptoms and no worsening symptoms. Due to no new or worsening symptoms encounter was not routed to provider for escalation. Education provided regarding infection prevention, and signs and symptoms of COVID-19 and when to seek medical attention with patient who verbalized understanding. Discussed exposure protocols and quarantine from [address] you at higher risk for severe illness  and given an opportunity for questions and concerns. The patient agrees to contact the COVID-19 hotline [phone] or PCP office for questions related to their healthcare. CTN/ACM provided contact information for future reference. From CDC: Are you at higher risk for severe illness?  Wash your hands often.  Avoid close contact (6 feet, which is about two arm lengths) with people who are sick.  Put distance between yourself and other people if COVID-19 is spreading in your community.    Clean and disinfect frequently touched surfaces.  Avoid all cruise travel and non-essential air travel.  Call your healthcare professional if you have concerns about COVID-19 and your underlying condition or if you are sick. For more information on steps you can take to protect yourself, see CDC's How to [place] for follow-up call in 5-7 days based on severity of symptoms and risk factors.

## 2021-02-13 RX ORDER — HYDROCHLOROTHIAZIDE 25 MG/1
TABLET ORAL
Qty: 90 TABLET | Refills: 3 | Status: SHIPPED | OUTPATIENT
Start: 2021-02-13 | End: 2022-06-06 | Stop reason: SDUPTHER

## 2021-02-16 ENCOUNTER — CARE COORDINATION (OUTPATIENT)
Dept: CARE COORDINATION | Age: 81
End: 2021-02-16

## 2021-02-16 RX ORDER — MAGNESIUM GLUCONATE 27 MG(500)
1 TABLET ORAL 2 TIMES DAILY
COMMUNITY

## 2021-02-16 SDOH — ECONOMIC STABILITY: INCOME INSECURITY: HOW HARD IS IT FOR YOU TO PAY FOR THE VERY BASICS LIKE FOOD, HOUSING, MEDICAL CARE, AND HEATING?: NOT HARD AT ALL

## 2021-02-16 SDOH — SOCIAL STABILITY: SOCIAL NETWORK: IN A TYPICAL WEEK, HOW MANY TIMES DO YOU TALK ON THE PHONE WITH FAMILY, FRIENDS, OR NEIGHBORS?: TWICE A WEEK

## 2021-02-16 SDOH — HEALTH STABILITY: PHYSICAL HEALTH: ON AVERAGE, HOW MANY DAYS PER WEEK DO YOU ENGAGE IN MODERATE TO STRENUOUS EXERCISE (LIKE A BRISK WALK)?: 0 DAYS

## 2021-02-16 SDOH — SOCIAL STABILITY: SOCIAL NETWORK: HOW OFTEN DO YOU ATTENT MEETINGS OF THE CLUB OR ORGANIZATION YOU BELONG TO?: NEVER

## 2021-02-16 SDOH — HEALTH STABILITY: MENTAL HEALTH
STRESS IS WHEN SOMEONE FEELS TENSE, NERVOUS, ANXIOUS, OR CAN'T SLEEP AT NIGHT BECAUSE THEIR MIND IS TROUBLED. HOW STRESSED ARE YOU?: ONLY A LITTLE

## 2021-02-16 SDOH — ECONOMIC STABILITY: FOOD INSECURITY: WITHIN THE PAST 12 MONTHS, YOU WORRIED THAT YOUR FOOD WOULD RUN OUT BEFORE YOU GOT MONEY TO BUY MORE.: NEVER TRUE

## 2021-02-16 SDOH — ECONOMIC STABILITY: TRANSPORTATION INSECURITY
IN THE PAST 12 MONTHS, HAS THE LACK OF TRANSPORTATION KEPT YOU FROM MEDICAL APPOINTMENTS OR FROM GETTING MEDICATIONS?: NO

## 2021-02-16 SDOH — SOCIAL STABILITY: SOCIAL NETWORK: HOW OFTEN DO YOU GET TOGETHER WITH FRIENDS OR RELATIVES?: ONCE A WEEK

## 2021-02-16 SDOH — SOCIAL STABILITY: SOCIAL NETWORK: ARE YOU MARRIED, WIDOWED, DIVORCED, SEPARATED, NEVER MARRIED, OR LIVING WITH A PARTNER?: MARRIED

## 2021-02-16 SDOH — SOCIAL STABILITY: SOCIAL NETWORK
DO YOU BELONG TO ANY CLUBS OR ORGANIZATIONS SUCH AS CHURCH GROUPS UNIONS, FRATERNAL OR ATHLETIC GROUPS, OR SCHOOL GROUPS?: NO

## 2021-02-16 SDOH — SOCIAL STABILITY: SOCIAL NETWORK: HOW OFTEN DO YOU ATTEND CHURCH OR RELIGIOUS SERVICES?: 1 TO 4 TIMES PER YEAR

## 2021-02-16 SDOH — HEALTH STABILITY: PHYSICAL HEALTH: ON AVERAGE, HOW MANY MINUTES DO YOU ENGAGE IN EXERCISE AT THIS LEVEL?: 0 MIN

## 2021-02-16 NOTE — CARE COORDINATION
Ambulatory Care Coordination Note  2/16/2021  CM Risk Score: 2  Charlson 10 Year Mortality Risk Score: 79%     ACC: Jerman Gray, AARON    Summary Note: Enrolled in care management to monitor for longer period of time after his admission. Still wearing oxygen at 3 lpm since admission for COVID pneumonia. Pulse ox low to mid 90s. He only takes oxygen off to shower or go to bathroom, gets dyspneic if goes more than about 15 minutes without it. Discussed about possibly trying to wean oxygen down to 2 lpm to see if oxygen levels stay up. Left VM message asking [facility] nurse to call me back to see if they are trying to wean oxygen. Per last  PCP note: \"He is currently on 4 L which I think he can wean down to 2 to 3 L depending on his pulse oxygen level\". Next PCP appt in a month. Eating good, blood sugars stable at less than 130 in mornings. No low blood sugars. Getting therapy and nursing visits from [facility]. No falls or dizziness. Using walker, still feels shaky sometimes. Strength slowly improving. CC Plan:   -Follow in a week to check on oxygen levels, continue pneumonia education. Diabetes Assessment    Medic Alert ID: No  Meal Planning: Avoidance of concentrated sweets   How often do you test your blood sugar?: Daily   Do you have barriers with adherence to non-pharmacologic self-management interventions?  (Nutrition/Exercise/Self-Monitoring): No   Have you ever had to go to the ED for symptoms of low blood sugar?: No       No patient-reported symptoms   Do you have hyperglycemia symptoms?: No   Do you have hypoglycemia symptoms?: No   Blood Sugar Monitoring Regimen: Once a Day, Morning Fasting   Blood Sugar Trends: No Change (Comment: usually 110-140 range)       and   General Assessment    Do you have any symptoms that are causing concern?: Yes  Progression since Onset: Gradually Improving  Reported Symptoms: Fatigue, Weakness               Care Coordination Interventions    Program Enrollment: Complex Care  Referral from Primary Care Provider: No  Suggested Interventions and Community Resources  Home Health Services: Completed (Comment: [facility] )  Occupational Therapy: Completed (Comment: [facility])  Physical Therapy: Completed (Comment: [facility])         Goals Addressed                 This Visit's Progress     Conditions and Symptoms        I will schedule office visits, as directed by my provider. I will keep my appointment or reschedule if I have to cancel. I will notify my provider of any barriers to my plan of care. I will follow my Zone Management tool to seek urgent or emergent care. I will notify my provider of any symptoms that indicate a worsening of my condition. Pneumonia from COVID      Barriers: lack of education  Plan for overcoming my barriers: ACM calls, education, home health visits  Confidence: 8/10  Anticipated Goal Completion Date: 4/20/21              Prior to Admission medications    Medication Sig Start Date End Date Taking?  Authorizing Provider   Omega-3 Fatty Acids (OMEGA III EPA+DHA) 1000 MG CAPS Take 1 capsule by mouth 2 times daily   Yes Historical Provider, MD   hydroCHLOROthiazide (HYDRODIURIL) 25 MG tablet TAKE 1 TABLET DAILY 2/13/21  Yes Daya Chan MD   glucose (GLUTOSE) 40 % GEL Take 37.5 mLs by mouth as needed (low sugar) 1/5/21  Yes Daya Chan MD   docusate sodium (COLACE, DULCOLAX) 100 MG CAPS Take 100 mg by mouth 2 times daily as needed for Constipation 1/5/21  Yes Daya Chan MD   atorvastatin (LIPITOR) 20 MG tablet Take 1 tablet by mouth nightly 10/8/20  Yes Daya Chan MD   metFORMIN (GLUCOPHAGE) 500 MG tablet Take 1 tablet by mouth 2 times daily (with meals) 7/13/20  Yes Daya Chan MD   omeprazole (PRILOSEC) 40 MG delayed release capsule TAKE 1 CAPSULE DAILY 5/4/20  Yes Daya Chan MD   Dulaglutide (TRULICITY) 3.66 AA/2.3IA SOPN INJECT 0.5ML (=0.75 MG)    SUBCUTANEOUSLY ONCE WEEKLY 3/12/20  Yes Daya Chan MD gabapentin (NEURONTIN) 300 MG capsule Take 300 mg by mouth daily.   11/4/19  Yes Historical Provider, MD   lisinopril (PRINIVIL;ZESTRIL) 2.5 MG tablet TAKE 1 TABLET DAILY 7/22/19  Yes Fede Shi MD   meloxicam NITESH RICCI [facility]) 15 MG tablet  5/14/18  Yes Historical Provider, MD   metoprolol succinate (TOPROL XL) 25 MG extended release tablet Take 0.5 tablets by mouth daily 6/12/18  Yes Fede Shi MD   aspirin EC 81 MG EC tablet Take 81 mg by mouth daily   Yes Fede Shi MD   Multiple Vitamin (MULTI-VITAMIN DAILY) TABS Take 1 tablet by mouth daily   Yes Fede Shi MD       Future Appointments   Date Time Provider Nisreen Duran   3/18/2021  9:20 AM Fede Shi MD [facility] [address] [facility]

## 2021-02-17 ENCOUNTER — CARE COORDINATION (OUTPATIENT)
Dept: CARE COORDINATION | Age: 81
End: 2021-02-17

## 2021-02-17 NOTE — CARE COORDINATION
Pneumonia zone tool, right care brochure, walk-in clinic sheet ,and Mychart sheet, was mailed out to pt.

## 2021-02-23 ENCOUNTER — CARE COORDINATION (OUTPATIENT)
Dept: CARE COORDINATION | Age: 81
End: 2021-02-23

## 2021-02-23 NOTE — CARE COORDINATION
Ambulatory Care Coordination Note  2/23/2021  CM Risk Score: 2  Charlson 10 Year Mortality Risk Score: 79%     ACC: Irma Panda RN    Summary Note: Spoke with wife, reviewed all medications again since she manages them. He is slowly improving on strength and his breathing. Only occasional cough now. Weaned his oxygen to 2 liters and has been 96 at rest, a little less with activity. Still low in mornings when first gets up, around 90 or sometimes 80s even though he wears oxygen at HS but it was like that when was wearing at 3 liters also. She may try to wean him down to 1 liter during the day and keep at 2 liters at HS. Blood sugars stable, 122 this morning. Eating good. No low blood sugar symptoms. Still getting a few more physical therapy sessions then will be stopped. Using a cane now instead of walker. Had his second COVID vaccine last week and slept all the next day since had chills that day. CC Plan:   -Follow again next week to check on oxygen levels, continue education. Diabetes Assessment    Medic Alert ID: No  Meal Planning: Avoidance of concentrated sweets   How often do you test your blood sugar?: Daily   Do you have barriers with adherence to non-pharmacologic self-management interventions?  (Nutrition/Exercise/Self-Monitoring): No   Have you ever had to go to the ED for symptoms of low blood sugar?: No       Do you have hyperglycemia symptoms?: No   Do you have hypoglycemia symptoms?: No   Last Blood Sugar Value: 122   Blood Sugar Monitoring Regimen: Once a Day, Morning Fasting   Blood Sugar Trends: No Change       and   General Assessment    Do you have any symptoms that are causing concern?: No               Care Coordination Interventions    Program Enrollment: Complex Care  Referral from Primary Care Provider: No  Suggested Interventions and [address] [place] [address] Hwy: Completed (Comment: Stephan Romero )  Occupational Therapy: Completed (Comment: [facility])  Physical Therapy: Completed (Comment: [facility])         Goals Addressed                 This Visit's Progress     Conditions and Symptoms   On track     I will schedule office visits, as directed by my provider. I will keep my appointment or reschedule if I have to cancel. I will notify my provider of any barriers to my plan of care. I will follow my Zone Management tool to seek urgent or emergent care. I will notify my provider of any symptoms that indicate a worsening of my condition. Pneumonia from COVID      Barriers: lack of education  Plan for overcoming my barriers: ACM calls, education, home health visits  Confidence: 8/10  Anticipated Goal Completion Date: 4/20/21              Prior to Admission medications    Medication Sig Start Date End Date Taking?  Authorizing Provider   Omega-3 Fatty Acids (OMEGA III EPA+DHA) 1000 MG CAPS Take 1 capsule by mouth 2 times daily   Yes Historical Provider, MD   hydroCHLOROthiazide (HYDRODIURIL) 25 MG tablet TAKE 1 TABLET DAILY 2/13/21  Yes Robbie Alanis MD   docusate sodium (COLACE, DULCOLAX) 100 MG CAPS Take 100 mg by mouth 2 times daily as needed for Constipation 1/5/21  Yes Robbie Alanis MD   atorvastatin (LIPITOR) 20 MG tablet Take 1 tablet by mouth nightly 10/8/20  Yes Robbie Alanis MD   metFORMIN (GLUCOPHAGE) 500 MG tablet Take 1 tablet by mouth 2 times daily (with meals)  Patient taking differently: Take 1,000 mg by mouth 2 times daily (with meals)  7/13/20  Yes Robbie Alanis MD   omeprazole (PRILOSEC) 40 MG delayed release capsule TAKE 1 CAPSULE DAILY 5/4/20  Yes Robbie Alanis MD   Dulaglutide (TRULICITY) 8.68 SZ/8.8HK SOPN INJECT 0.5ML (=0.75 MG)    SUBCUTANEOUSLY ONCE WEEKLY  Patient taking differently: INJECT 0.5ML (=0.75 MG)    SUBCUTANEOUSLY ONCE WEEKLY, takes Sunday mornings 3/12/20  Yes Robbie Alanis MD   meloxicam [facility]ESH [facility]) 15 MG tablet Dr. Alan Reyna 5/14/18  Yes Historical Provider, MD   metoprolol succinate (TOPROL XL) 25 MG extended release tablet Take 0.5 tablets by mouth daily  Patient taking differently: Take 25 mg by mouth daily Dr. Prerna Beltran 6/12/18  Yes Roberto Balbuena MD   aspirin EC 81 MG EC tablet Take 81 mg by mouth daily   Yes Roberto Balbuena MD   Multiple Vitamin (MULTI-VITAMIN DAILY) TABS Take 1 tablet by mouth daily   Yes Roberto Balbuena MD   glucose (GLUTOSE) 40 % GEL Take 37.5 mLs by mouth as needed (low sugar) 1/5/21   Roberto Balbuena MD       Future Appointments   Date Time Provider Nisreen Duran   3/18/2021  9:20 AM Roberto Balbuena MD [facility]

## 2021-03-02 ENCOUNTER — CARE COORDINATION (OUTPATIENT)
Dept: CARE COORDINATION | Age: 81
End: 2021-03-02

## 2021-03-02 SDOH — ECONOMIC STABILITY: HOUSING INSECURITY
IN THE LAST 12 MONTHS, WAS THERE A TIME WHEN YOU DID NOT HAVE A STEADY PLACE TO SLEEP OR SLEPT IN A SHELTER (INCLUDING NOW)?: NO

## 2021-03-02 NOTE — CARE COORDINATION
Ambulatory Care Coordination Note  3/2/2021  CM Risk Score: 2  Charlson 10 Year Mortality Risk Score: 79%     ACC: Zayra Ballard RN    Summary Note: Gradually improving but still fatigues easily but overall is feeling stronger. Voice sounds more clear. Can't stand very long, gets tired and has to sit. Had some dizziness today that he's never had since having COVID. Very little cough. Discussed safety precautions related to dizziness. Wearing oxygen at 1 L now and oxygen sat still mid 90s except sometimes still low at 88-90 in the mornings with the oxygen on since he wears at HS also. Went to dentist today and took portable oxygen with him. CC Plan:   -Follow in 2 weeks to check on symptoms, oxygen level and complete pneumonia education. Diabetes Assessment    Medic Alert ID: No  Meal Planning: Avoidance of concentrated sweets   How often do you test your blood sugar?: Daily   Do you have barriers with adherence to non-pharmacologic self-management interventions? (Nutrition/Exercise/Self-Monitoring): No   Have you ever had to go to the ED for symptoms of low blood sugar?: No       No patient-reported symptoms       and   General Assessment    Do you have any symptoms that are causing concern?: Yes  Progression since Onset: Gradually Improving  Reported Symptoms: Fatigue, Shortness of Breath               Care Coordination Interventions    Program Enrollment: Complex Care  Referral from Primary Care Provider: No  Suggested Interventions and [address] [place] [address] Hwy: Completed (Comment: Beverly Campbell )  Occupational Therapy: Completed (Comment: [facility])  Physical Therapy: Completed (Comment: [facility])         Goals Addressed                 This Visit's Progress     Conditions and Symptoms   On track     I will schedule office visits, as directed by my provider. I will keep my appointment or reschedule if I have to cancel.   I will notify my provider of any barriers to my plan of care.  I will follow my Zone Management tool to seek urgent or emergent care. I will notify my provider of any symptoms that indicate a worsening of my condition. Pneumonia from COVID      Barriers: lack of education  Plan for overcoming my barriers: ACM calls, education, home health visits  Confidence: 8/10  Anticipated Goal Completion Date: 4/20/21              Prior to Admission medications    Medication Sig Start Date End Date Taking?  Authorizing Provider   Omega-3 Fatty Acids (OMEGA III EPA+DHA) 1000 MG CAPS Take 1 capsule by mouth 2 times daily    Historical Provider, MD   hydroCHLOROthiazide (HYDRODIURIL) 25 MG tablet TAKE 1 TABLET DAILY 2/13/21   Castro Ba MD   glucose (GLUTOSE) 40 % GEL Take 37.5 mLs by mouth as needed (low sugar) 1/5/21   Castro Ba MD   docusate sodium (COLACE, DULCOLAX) 100 MG CAPS Take 100 mg by mouth 2 times daily as needed for Constipation 1/5/21   Castro Ba MD   atorvastatin (LIPITOR) 20 MG tablet Take 1 tablet by mouth nightly 10/8/20   Castro Ba MD   metFORMIN (GLUCOPHAGE) 500 MG tablet Take 1 tablet by mouth 2 times daily (with meals)  Patient taking differently: Take 1,000 mg by mouth 2 times daily (with meals)  7/13/20   Castro Ba MD   omeprazole (PRILOSEC) 40 MG delayed release capsule TAKE 1 CAPSULE DAILY 5/4/20   Castro Ba MD   Dulaglutide (TRULICITY) 8.01 TC/0.2TA SOPN INJECT 0.5ML (=0.75 MG)    SUBCUTANEOUSLY ONCE WEEKLY  Patient taking differently: INJECT 0.5ML (=0.75 MG)    SUBCUTANEOUSLY ONCE WEEKLY, takes Jovanni mornings 3/12/20   Castro Ba MD   meloxicam NITESH [facility]) 15 MG tablet Dr. Vish Billingsley 5/14/18   Historical Provider, MD   metoprolol succinate (TOPROL XL) 25 MG extended release tablet Take 0.5 tablets by mouth daily  Patient taking differently: Take 25 mg by mouth daily Dr. Susie Ramirez 6/12/18   Castro Ba MD   aspirin EC 81 MG EC tablet Take 81 mg by mouth daily    Castro Ba MD   Multiple Vitamin (MULTI-VITAMIN DAILY) TABS Take 1 tablet by mouth daily    Isma Bailey MD       Future Appointments   Date Time Provider Nisreen Duran   3/18/2021  9:20 AM MD COLBY Miguel  Jae Adams

## 2021-03-04 ENCOUNTER — HOSPITAL ENCOUNTER (OUTPATIENT)
Age: 81
Discharge: HOME | End: 2021-03-06
Payer: MEDICARE

## 2021-03-04 ENCOUNTER — HOSPITAL ENCOUNTER (OUTPATIENT)
Dept: GENERAL RADIOLOGY | Age: 81
Discharge: HOME | End: 2021-03-06
Payer: MEDICARE

## 2021-03-04 DIAGNOSIS — J12.89 OTHER VIRAL PNEUMONIA: ICD-10-CM

## 2021-03-04 PROCEDURE — 71046 X-RAY EXAM CHEST 2 VIEWS: CPT

## 2021-03-17 ENCOUNTER — CARE COORDINATION (OUTPATIENT)
Dept: CARE COORDINATION | Age: 81
End: 2021-03-17

## 2021-03-17 NOTE — CARE COORDINATION
Ambulatory Care Coordination Note  3/17/2021  CM Risk Score: 2  Charlson 10 Year Mortality Risk Score: 79%     ACC: Shelia Vargas RN    Summary Note: he saw pulmonologist, is agreeing to sleep study, has refused them in the past, is scheduled 5/27 at [facility]. Will also have a PFT. Breathing and fatigue improving every week. Went to grocery store, was able to walk and push a cart without problems, did not wear oxygen. Going outside and doing things, more stamina and doesn't feel weak. Wearing oxygen at 1 lpm usually only when sitting, not when up walking around so couldn't tell me what pulse ox reading is with activity, but usually around 95 when sitting. Rarely coughing now. He was certed for home care until 3/14/21. He has received both COVID vaccines. Blood sugars stable at less than 130 every morning. CC Plan:   -Follow in one month to review symptoms, scheduled testing and appts, assess for graduation from care management. Diabetes Assessment    Medic Alert ID: No  Meal Planning: Avoidance of concentrated sweets   How often do you test your blood sugar?: Daily   Do you have barriers with adherence to non-pharmacologic self-management interventions?  (Nutrition/Exercise/Self-Monitoring): No   Have you ever had to go to the ED for symptoms of low blood sugar?: No       No patient-reported symptoms   Do you have hyperglycemia symptoms?: No   Do you have hypoglycemia symptoms?: No   Last Blood Sugar Value: 112   Blood Sugar Monitoring Regimen: Once a Day, Morning Fasting   Blood Sugar Trends: No Change       and   General Assessment    Do you have any symptoms that are causing concern?: Yes  Progression since Onset: Gradually Improving  Reported Symptoms: Fatigue, Shortness of Breath               Care Coordination Interventions    Program Enrollment: Complex Care  Referral from Primary Care Provider: No  Suggested Interventions and [address] [place], [place] [address]y: Completed (Comment: John Soria )  Occupational Therapy: Completed (Comment: [facility])  Physical Therapy: Completed (Comment: [facility])         Goals Addressed                 This Visit's Progress     Conditions and Symptoms   On track     I will schedule office visits, as directed by my provider. I will keep my appointment or reschedule if I have to cancel. I will notify my provider of any barriers to my plan of care. I will follow my Zone Management tool to seek urgent or emergent care. I will notify my provider of any symptoms that indicate a worsening of my condition. Pneumonia from COVID      Barriers: lack of education  Plan for overcoming my barriers: ACM calls, education, home health visits  Confidence: 8/10  Anticipated Goal Completion Date: 4/20/21              Prior to Admission medications    Medication Sig Start Date End Date Taking?  Authorizing Provider   Omega-3 Fatty Acids (OMEGA III EPA+DHA) 1000 MG CAPS Take 1 capsule by mouth 2 times daily    Historical Provider, MD   hydroCHLOROthiazide (HYDRODIURIL) 25 MG tablet TAKE 1 TABLET DAILY 2/13/21   Tammy Luong MD   glucose (GLUTOSE) 40 % GEL Take 37.5 mLs by mouth as needed (low sugar) 1/5/21   Tammy Luong MD   docusate sodium (COLACE, DULCOLAX) 100 MG CAPS Take 100 mg by mouth 2 times daily as needed for Constipation 1/5/21   Tammy Luong MD   atorvastatin (LIPITOR) 20 MG tablet Take 1 tablet by mouth nightly 10/8/20   Tammy Luong MD   metFORMIN (GLUCOPHAGE) 500 MG tablet Take 1 tablet by mouth 2 times daily (with meals)  Patient taking differently: Take 1,000 mg by mouth 2 times daily (with meals)  7/13/20   Tammy Luong MD   omeprazole (PRILOSEC) 40 MG delayed release capsule TAKE 1 CAPSULE DAILY 5/4/20   Tammy Luong MD   Dulaglutide (TRULICITY) 5.40 DH/9.9RG SOPN INJECT 0.5ML (=0.75 MG)    SUBCUTANEOUSLY ONCE WEEKLY  Patient taking differently: INJECT 0.5ML (=0.75 MG)    SUBCUTANEOUSLY ONCE WEEKLY, takes Sunday mornings 3/12/20

## 2021-03-18 ENCOUNTER — OFFICE VISIT (OUTPATIENT)
Dept: PRIMARY CARE CLINIC | Age: 81
End: 2021-03-18
Payer: MEDICARE

## 2021-03-18 VITALS
TEMPERATURE: 98.2 F | DIASTOLIC BLOOD PRESSURE: 60 MMHG | HEART RATE: 74 BPM | SYSTOLIC BLOOD PRESSURE: 124 MMHG | HEIGHT: 67 IN | BODY MASS INDEX: 37.89 KG/M2 | OXYGEN SATURATION: 93 % | WEIGHT: 241.4 LBS

## 2021-03-18 DIAGNOSIS — E11.8 TYPE 2 DIABETES MELLITUS WITH COMPLICATION (HCC): Primary | ICD-10-CM

## 2021-03-18 DIAGNOSIS — I10 ESSENTIAL HYPERTENSION: ICD-10-CM

## 2021-03-18 DIAGNOSIS — Z86.16 HISTORY OF COVID-19: ICD-10-CM

## 2021-03-18 LAB — HBA1C MFR BLD: 6.2 %

## 2021-03-18 PROCEDURE — 1123F ACP DISCUSS/DSCN MKR DOCD: CPT | Performed by: FAMILY MEDICINE

## 2021-03-18 PROCEDURE — G8427 DOCREV CUR MEDS BY ELIG CLIN: HCPCS | Performed by: FAMILY MEDICINE

## 2021-03-18 PROCEDURE — G8417 CALC BMI ABV UP PARAM F/U: HCPCS | Performed by: FAMILY MEDICINE

## 2021-03-18 PROCEDURE — 99214 OFFICE O/P EST MOD 30 MIN: CPT | Performed by: FAMILY MEDICINE

## 2021-03-18 PROCEDURE — 4040F PNEUMOC VAC/ADMIN/RCVD: CPT | Performed by: FAMILY MEDICINE

## 2021-03-18 PROCEDURE — 83036 HEMOGLOBIN GLYCOSYLATED A1C: CPT | Performed by: FAMILY MEDICINE

## 2021-03-18 PROCEDURE — 1036F TOBACCO NON-USER: CPT | Performed by: FAMILY MEDICINE

## 2021-03-18 PROCEDURE — G8484 FLU IMMUNIZE NO ADMIN: HCPCS | Performed by: FAMILY MEDICINE

## 2021-03-18 RX ORDER — DULAGLUTIDE 0.75 MG/.5ML
INJECTION, SOLUTION SUBCUTANEOUS
Qty: 12 PEN | Refills: 3 | Status: SHIPPED | OUTPATIENT
Start: 2021-03-18 | End: 2021-05-28 | Stop reason: SDUPTHER

## 2021-03-18 ASSESSMENT — PATIENT HEALTH QUESTIONNAIRE - PHQ9
1. LITTLE INTEREST OR PLEASURE IN DOING THINGS: 0
SUM OF ALL RESPONSES TO PHQ QUESTIONS 1-9: 0
2. FEELING DOWN, DEPRESSED OR HOPELESS: 0

## 2021-03-18 ASSESSMENT — ENCOUNTER SYMPTOMS: SHORTNESS OF BREATH: 1

## 2021-03-18 NOTE — PATIENT INSTRUCTIONS
Patient Education        Learning About Low-Carbohydrate Foods  What foods are low in carbohydrate? The foods you eat contain nutrients, such as vitamins and minerals. Carbohydrate is a nutrient. Your body needs the right amount to stay healthy and work as it should. You can use the list below to help you make choices about which foods to eat. Some foods that are lower in carbohydrate include:  Dairy and dairy alternatives  · Cheese  · Cottage cheese  · Cream cheese  · Nut milk (unsweetened)  · Soy milk (unsweetened)  · Yogurt (Greek, plain)  Fruits  · Avocado  · [employer] and other protein foods  · Almonds  · Beef  · Chicken  · Cod  · Eggs  · Halibut  · Peanut butter and other nut butters  · Pistachios  · Pork  · Pumpkin seeds  · Tofu  · Trout  · [place]  · [place] ([facility])  · Walnuts  Vegetables  · Broccoli  · Carrots  · Cauliflower  · Green beans  · Mushrooms  · Peppers  · Salad greens  · Spinach  · Tomatoes  Work with your doctor to find out how much of this nutrient you need. Depending on your health, you may need more or less of it in your diet. Where can you learn more? Go to [email or web address] and sign in to your [employer] account. Enter [phone] in the [facility] box to learn more about \"Learning About Low-Carbohydrate Foods. \"     If you do not have an account, please click on the \"Sign Up Now\" link. Current as of: December 17, 2020               Content Version: 12.8  © 2006-2021 Healthwise, Incorporated. Care instructions adapted under license by [facility] ([facility]). If you have questions about a medical condition or this instruction, always ask your healthcare professional. [place] [address] any warranty or liability for your use of this information. Patient Education        Learning About Low-Carbohydrate Diets  What is a low-carbohydrate diet? A low-carbohydrate (or \"low-carb\") diet limits foods and drinks that have carbohydrates.  This includes grains, fruits, milk and yogurt, and starchy vegetables like potatoes, beans, and corn. It also avoids foods and drinks that have added sugar. Instead, low-carb diets include foods that are high in protein and fat. Why might you follow a low-carb diet? Low-carb diets may be used for a variety of reasons, such as for weight loss. People who have diabetes may use a low-carb diet to help manage their blood sugar levels. What should you do before you start the diet? Talk to your doctor before you try any diet. This is even more important if you have health problems like kidney disease, heart disease, or diabetes. Your doctor may suggest that you meet with a registered dietitian. A dietitian can help you make an eating plan that works for you. What foods do you eat on a low-carb diet? On a low-carb diet, you choose foods that are high in protein and fat. Examples of these are:  · Meat, poultry, and fish. · Eggs. · Nuts, such as walnuts, pecans, almonds, and peanuts. · Peanut butter and other nut butters. · Tofu. · Avocado. · Cleave Efe. · Non-starchy vegetables like broccoli, cauliflower, green beans, mushrooms, peppers, lettuce, and spinach. · Unsweetened non-dairy milks like almond milk and coconut milk. · Cheese, cottage cheese, and cream cheese. Current as of: December 17, 2020               Content Version: 12.8  © 2006-2021 [employer], [facility]. Care instructions adapted under license by [facility] ([facility]). If you have questions about a medical condition or this instruction, always ask your healthcare professional. [place] [address] any warranty or liability for your use of this information.

## 2021-03-18 NOTE — PROGRESS NOTES
[address] [place] [address] PRIMARY CARE  [address] [facility] [address]  Dept: [phone]    Ja Dyer is a [de-identified] y.o. male Established patient, who presents today for his medical conditions/complaintsas noted below. Chief Complaint   Patient presents with    Diabetes     4mth  f/u, Last A1C 20 6.7       HPI:     HPI    Reviewed prior notes None  Reviewed previous Labs  Only uses oxygen prn, when he is sitting down and catches his breath  Has been doing breathing exercises      Home sugars 156, 134, 110, 136, 153, 116, 128, 138, 149. No low sugar reactions    Has been seeing pulmonary and is going to get sleep study. LDL Calculated (mg/dL)   Date Value   2020 115   07/10/2019 98   2018 77       (goal LDL is <100)   AST (U/L)   Date Value   2020 58 (H)     ALT (U/L)   Date Value   2020 55 (H)     BUN (mg/dL)   Date Value   2021 17     Hemoglobin A1C (%)   Date Value   2020 6.7     TSH (mIU/L)   Date Value   2021 1.65     BP Readings from Last 3 Encounters:   21 124/60   21 116/60   21 118/63          (goal 120/80)    Past Medical History:   Diagnosis Date    Diabetes ([address]     Diverticulitis     History of allergy     Hyperlipidemia     Hypertension     Osteoarthritis       Past Surgical History:   Procedure Laterality Date    BACK SURGERY  2017    Humza Isaacs, lumbar fusion   [facility]  2015    partial colectomy due to diveritc    COLONOSCOPY      TOTAL KNEE ARTHROPLASTY Right        No family history on file.     Social History     Tobacco Use    Smoking status: Former Smoker     Packs/day: 1.50     Years: 40.00     Pack years: 60.00     Quit date: 1991     Years since quittin.2    Smokeless tobacco: Never Used   Substance Use Topics    Alcohol use: Not Currently     Alcohol/week: 1.0 standard drinks     Types: 1 Standard drinks or equivalent per week     Frequency: Never     Binge frequency: Never     Comment: social      Current Outpatient Medications   Medication Sig Dispense Refill    Dulaglutide (TRULICITY) 8.48 AB/0.5GH SOPN INJECT 0.5ML (=0.75 MG)    SUBCUTANEOUSLY ONCE WEEKLY 12 pen 3    Omega-3 Fatty Acids (OMEGA III EPA+DHA) 1000 MG CAPS Take 1 capsule by mouth 2 times daily      hydroCHLOROthiazide (HYDRODIURIL) 25 MG tablet TAKE 1 TABLET DAILY 90 tablet 3    glucose (GLUTOSE) 40 % GEL Take 37.5 mLs by mouth as needed (low sugar) 45 g 1    atorvastatin (LIPITOR) 20 MG tablet Take 1 tablet by mouth nightly 90 tablet 3    metFORMIN (GLUCOPHAGE) 500 MG tablet Take 1 tablet by mouth 2 times daily (with meals) (Patient taking differently: Take 1,000 mg by mouth 2 times daily (with meals) ) 180 tablet 1    omeprazole (PRILOSEC) 40 MG delayed release capsule TAKE 1 CAPSULE DAILY 90 capsule 3    meloxicam (MOBIC) 15 MG tablet Dr. Cleve Lesch      metoprolol succinate (TOPROL XL) 25 MG extended release tablet Take 0.5 tablets by mouth daily (Patient taking differently: Take 25 mg by mouth daily Dr. Bettye Castillo) 90 tablet 0    aspirin EC 81 MG EC tablet Take 81 mg by mouth daily      Multiple Vitamin (MULTI-VITAMIN DAILY) TABS Take 1 tablet by mouth daily      docusate sodium (COLACE, DULCOLAX) 100 MG CAPS Take 100 mg by mouth 2 times daily as needed for Constipation (Patient not taking: Reported on 3/18/2021) 60 capsule 0     No current facility-administered medications for this visit.       Allergies   Allergen Reactions    Feldene [Piroxicam]     Flagyl [Metronidazole]     Naprosyn [Naproxen]     Penicillins     Sulfa Antibiotics     Sulfasalazine     Lisinopril      Other reaction(s): Dizziness       Health Maintenance   Topic Date Due    Shingles Vaccine (1 of 2) Never done    Diabetic retinal exam  12/17/2019    A1C test (Diabetic or Prediabetic)  05/19/2021    Diabetic foot exam  07/13/2021    Lipid screen  09/04/2021  Annual Wellness Visit (AWV)  09/04/2021    Potassium monitoring  01/12/2022    Creatinine monitoring  01/12/2022    DTaP/Tdap/Td vaccine (2 - Td) 07/13/2030    Flu vaccine  Completed    Pneumococcal 65+ years Vaccine  Completed    COVID-19 Vaccine  Completed    Hepatitis A vaccine  Aged Out    Hib vaccine  Aged Out    Meningococcal (ACWY) vaccine  Aged Out       Subjective:      Review of Systems   Respiratory: Positive for shortness of breath. Cough is much better. Cardiovascular: Positive for chest pain. Mid sternal discomfort and it is helped with deep breaths       Objective:     /60   Pulse 74   Temp 98.2 °F (36.8 °C)   Ht 5' 7\" (1.702 m)   Wt 241 lb 6.4 oz (109.5 kg)   SpO2 93%   BMI 37.81 kg/m²   Physical Exam  Vitals signs and nursing note reviewed. Constitutional:       General: He is not in acute distress. Appearance: He is well-developed. He is obese. He is not ill-appearing. HENT:      Head: Normocephalic and atraumatic. Right Ear: External ear normal.      Left Ear: External ear normal.   Eyes:      General: No scleral icterus. Right eye: No discharge. Left eye: No discharge. Conjunctiva/sclera: Conjunctivae normal.      Pupils: Pupils are equal, round, and reactive to light. Neck:      Thyroid: No thyromegaly. Trachea: No tracheal deviation. Cardiovascular:      Rate and Rhythm: Normal rate and regular rhythm. Heart sounds: Normal heart sounds. Pulmonary:      Effort: Pulmonary effort is normal. No respiratory distress. Breath sounds: Normal breath sounds. No wheezing. Musculoskeletal:      Right lower leg: Edema present. Left lower leg: Edema present. Comments: 1-2 + pitting edema     Lymphadenopathy:      Cervical: No cervical adenopathy. Skin:     General: Skin is warm. Findings: No rash. Neurological:      Mental Status: He is alert and oriented to person, place, and time. Psychiatric:         Mood and Affect: Mood normal.         Behavior: Behavior normal.         Thought Content: Thought content normal.         Assessment:       Diagnosis Orders   1. Type 2 diabetes mellitus with complication (HCC)  POCT glycosylated hemoglobin (Hb A1C)    IL COLLECTION CAPILLARY BLOOD SPECIMEN    Dulaglutide (TRULICITY) 4.86 TI/3.6KF SOPN   2. BMI 38.0-38.9,adult     3. Essential hypertension     4. History of COVID-19          Plan:      Return in about 4 months (around 7/18/2021) for diabetes mellitus, hypertension. Can try cutting back metformin to once daily and see how his sugars go. Call prn  F/u with pulmonary. Increase activity since he states he is watching his diet already. Orders Placed This Encounter   Procedures    POCT glycosylated hemoglobin (Hb A1C)    IL COLLECTION CAPILLARY BLOOD SPECIMEN     Orders Placed This Encounter   Medications    Dulaglutide (TRULICITY) 4.08 FF/2.3CL SOPN     Sig: INJECT 0.5ML (=0.75 MG)    SUBCUTANEOUSLY ONCE WEEKLY     Dispense:  12 pen     Refill:  3       Patient given educationalmaterials - see patient instructions. Discussed use, benefit, and side effectsof prescribed medications. All patient questions answered. Pt voiced understanding. Reviewed health maintenance. Instructed to continue current medications, low carb diet and improve exercise. Patient agreed with treatment plan. Follow up as directed.      Electronicallysigned by Kuldip Meier MD on 3/18/2021 at 9:49 AM

## 2021-04-19 ENCOUNTER — CARE COORDINATION (OUTPATIENT)
Dept: CARE COORDINATION | Age: 81
End: 2021-04-19

## 2021-04-19 NOTE — CARE COORDINATION
overcoming my barriers: ACM calls, education, home health visits  Confidence: 8/10  Anticipated Goal Completion Date: 4/20/21              Prior to Admission medications    Medication Sig Start Date End Date Taking?  Authorizing Provider   Dulaglutide (TRULICITY) 7.58 GZ/5.0WI SOPN INJECT 0.5ML (=0.75 MG)    SUBCUTANEOUSLY ONCE WEEKLY 3/18/21   Remi Mitchell MD   Omega-3 Fatty Acids (OMEGA III EPA+DHA) 1000 MG CAPS Take 1 capsule by mouth 2 times daily    Historical Provider, MD   hydroCHLOROthiazide (HYDRODIURIL) 25 MG tablet TAKE 1 TABLET DAILY 2/13/21   Remi Mitchell MD   glucose (GLUTOSE) 40 % GEL Take 37.5 mLs by mouth as needed (low sugar) 1/5/21   Remi Mitchell MD   docusate sodium (COLACE, DULCOLAX) 100 MG CAPS Take 100 mg by mouth 2 times daily as needed for Constipation  Patient not taking: Reported on 3/18/2021 1/5/21   Remi Mitchell MD   atorvastatin (LIPITOR) 20 MG tablet Take 1 tablet by mouth nightly 10/8/20   Remi Mitchell MD   metFORMIN (GLUCOPHAGE) 500 MG tablet Take 1 tablet by mouth 2 times daily (with meals)  Patient taking differently: Take 1,000 mg by mouth 2 times daily (with meals)  7/13/20   Remi Mitchell MD   omeprazole (PRILOSEC) 40 MG delayed release capsule TAKE 1 CAPSULE DAILY 5/4/20   Remi Mitchell MD   meloxicam [facility]) 15 MG tablet Dr. Hung Reid 5/14/18   Historical Provider, MD   metoprolol succinate (TOPROL XL) 25 MG extended release tablet Take 0.5 tablets by mouth daily  Patient taking differently: Take 25 mg by mouth daily Dr. Mally Mosqueda 6/12/18   Remi Mitchell MD   aspirin EC 81 MG EC tablet Take 81 mg by mouth daily    Remi Mitchell MD   Multiple Vitamin (MULTI-VITAMIN DAILY) TABS Take 1 tablet by mouth daily    Remi Mitchell MD       Future Appointments   Date Time Provider Nisreen Duran   7/19/2021 10:00 AM Remi Mitchell MD [facility] [address] [facility]

## 2021-04-27 RX ORDER — OMEPRAZOLE 40 MG/1
CAPSULE, DELAYED RELEASE ORAL
Qty: 90 CAPSULE | Refills: 3 | Status: SHIPPED | OUTPATIENT
Start: 2021-04-27 | End: 2022-04-04 | Stop reason: SDUPTHER

## 2021-05-28 DIAGNOSIS — E11.8 TYPE 2 DIABETES MELLITUS WITH COMPLICATION (HCC): ICD-10-CM

## 2021-05-28 RX ORDER — DULAGLUTIDE 0.75 MG/.5ML
INJECTION, SOLUTION SUBCUTANEOUS
Qty: 12 PEN | Refills: 3 | Status: SHIPPED | OUTPATIENT
Start: 2021-05-28 | End: 2022-03-18 | Stop reason: ALTCHOICE

## 2021-06-04 ENCOUNTER — TELEPHONE (OUTPATIENT)
Dept: PRIMARY CARE CLINIC | Age: 81
End: 2021-06-04

## 2021-06-04 NOTE — TELEPHONE ENCOUNTER
Please call pt's spouse re [facility]. [phone] Akua GARCIA!

## 2021-07-19 ENCOUNTER — OFFICE VISIT (OUTPATIENT)
Dept: PRIMARY CARE CLINIC | Age: 81
End: 2021-07-19
Payer: MEDICARE

## 2021-07-19 VITALS
HEART RATE: 48 BPM | DIASTOLIC BLOOD PRESSURE: 68 MMHG | BODY MASS INDEX: 39.05 KG/M2 | SYSTOLIC BLOOD PRESSURE: 118 MMHG | WEIGHT: 248.8 LBS | HEIGHT: 67 IN | OXYGEN SATURATION: 93 %

## 2021-07-19 DIAGNOSIS — I25.10 CORONARY ARTERY DISEASE, UNSPECIFIED VESSEL OR LESION TYPE, UNSPECIFIED WHETHER ANGINA PRESENT, UNSPECIFIED WHETHER NATIVE OR TRANSPLANTED HEART: ICD-10-CM

## 2021-07-19 DIAGNOSIS — E11.8 TYPE 2 DIABETES MELLITUS WITH COMPLICATION (HCC): Primary | ICD-10-CM

## 2021-07-19 DIAGNOSIS — E66.01 SEVERE OBESITY (BMI 35.0-35.9 WITH COMORBIDITY) (HCC): ICD-10-CM

## 2021-07-19 DIAGNOSIS — I10 ESSENTIAL HYPERTENSION: ICD-10-CM

## 2021-07-19 PROCEDURE — 1036F TOBACCO NON-USER: CPT | Performed by: FAMILY MEDICINE

## 2021-07-19 PROCEDURE — G8427 DOCREV CUR MEDS BY ELIG CLIN: HCPCS | Performed by: FAMILY MEDICINE

## 2021-07-19 PROCEDURE — 99214 OFFICE O/P EST MOD 30 MIN: CPT | Performed by: FAMILY MEDICINE

## 2021-07-19 PROCEDURE — G8417 CALC BMI ABV UP PARAM F/U: HCPCS | Performed by: FAMILY MEDICINE

## 2021-07-19 PROCEDURE — 4040F PNEUMOC VAC/ADMIN/RCVD: CPT | Performed by: FAMILY MEDICINE

## 2021-07-19 PROCEDURE — 1123F ACP DISCUSS/DSCN MKR DOCD: CPT | Performed by: FAMILY MEDICINE

## 2021-07-19 ASSESSMENT — ENCOUNTER SYMPTOMS: SHORTNESS OF BREATH: 1

## 2021-07-19 NOTE — PATIENT INSTRUCTIONS
DISPLAY PLAN FREE TEXT DISPLAY PLAN FREE TEXT Patient Education        Learning About Low-Carbohydrate Foods  What foods are low in carbohydrate? The foods you eat contain nutrients, such as vitamins and minerals. Carbohydrate is a nutrient. Your body needs the right amount to stay healthy and work as it should. You can use the list below to help you make choices about which foods to eat. Some foods that are lower in carbohydrate include:  Dairy and dairy alternatives  · Cheese  · Cottage cheese  · Cream cheese  · Nut milk (unsweetened)  · Soy milk (unsweetened)  · Yogurt (Greek, plain)  Fruits  · Avocado  · [employer] and other protein foods  · Almonds  · Beef  · Chicken  · Cod  · Eggs  · Halibut  · Peanut butter and other nut butters  · Pistachios  · Pork  · Pumpkin seeds  · Tofu  · Trout  · [place]  · [place] [facility])  · Walnuts  Vegetables  · Broccoli  · Carrots  · Cauliflower  · Green beans  · Mushrooms  · Peppers  · Salad greens  · Spinach  · Tomatoes  Work with your doctor to find out how much of this nutrient you need. Depending on your health, you may need more or less of it in your diet. Where can you learn more? Go to [email or web address] and sign in to your [employer] account. Enter [phone] in the [facility] box to learn more about \"Learning About Low-Carbohydrate Foods. \"     If you do not have an account, please click on the \"Sign Up Now\" link. Current as of: December 17, 2020               Content Version: 12.9  © 6028-5941 Healthwise, Incorporated. Care instructions adapted under license by [facility] [address] If you have questions about a medical condition or this instruction, always ask your healthcare professional. [place] [address] any warranty or liability for your use of this information. DISPLAY PLAN FREE TEXT DISPLAY PLAN FREE TEXT DISPLAY PLAN FREE TEXT DISPLAY PLAN FREE TEXT

## 2021-07-19 NOTE — PROGRESS NOTES
[facility] PRIMARY CARE  [address] [address] [facility] [place] [address]  Dept: [phone]    Carito Marie is a [de-identified] y.o. male Established patient, who presents today for his medical conditions/complaintsas noted below. Chief Complaint   Patient presents with    Diabetes    Hypertension    Other     spot by left eye       HPI:     HPI  Home sugars: 118, 132, 149, 152, 136,  278, 167, 167, 146, 80    Was running in the 160's  Stamina is not as good since he had COVID    He increased metformin to 1000 mg BID due to elevated sugars  Has been watching diet. Toast and royal for breakfast  Lunch: TV dinner or sandwich. Supper meat, veggies. Reviewed prior notes None  Reviewed previous Labs    LDL Calculated (mg/dL)   Date Value   2020 115   07/10/2019 98   2018 77       (goal LDL is <100)   AST (U/L)   Date Value   2020 58 (H)     ALT (U/L)   Date Value   2020 55 (H)     BUN (mg/dL)   Date Value   2021 17     Hemoglobin A1C (%)   Date Value   2021 6.2     TSH (mIU/L)   Date Value   2021 1.65     BP Readings from Last 3 Encounters:   21 118/68   21 124/60   21 116/60          (goal 120/80)    Past Medical History:   Diagnosis Date    Diabetes ([address]     Diverticulitis     History of allergy     Hyperlipidemia     Hypertension     Osteoarthritis       Past Surgical History:   Procedure Laterality Date    BACK SURGERY  2017    Kiana Patricio, lumbar fusion   [facility]  2015    partial colectomy due to diveritc    COLONOSCOPY      TOTAL KNEE ARTHROPLASTY Right        No family history on file.     Social History     Tobacco Use    Smoking status: Former Smoker     Packs/day: 1.50     Years: 40.00     Pack years: 60.00     Quit date: 1991     Years since quittin.5    Smokeless tobacco: Never Used   Substance Use Topics    Alcohol use: Not Currently Alcohol/week: 1.0 standard drinks     Types: 1 Standard drinks or equivalent per week     Comment: social      Current Outpatient Medications   Medication Sig Dispense Refill    metFORMIN (GLUCOPHAGE) 500 MG tablet Take 2 tablets by mouth 2 times daily (with meals) 360 tablet 3    Dulaglutide (TRULICITY) 3.18 TJ/7.7OF SOPN INJECT 0.5ML (=0.75 MG)    SUBCUTANEOUSLY ONCE WEEKLY 12 pen 3    omeprazole (PRILOSEC) 40 MG delayed release capsule TAKE 1 CAPSULE DAILY 90 capsule 3    Omega-3 Fatty Acids (OMEGA III EPA+DHA) 1000 MG CAPS Take 1 capsule by mouth 2 times daily      hydroCHLOROthiazide (HYDRODIURIL) 25 MG tablet TAKE 1 TABLET DAILY 90 tablet 3    glucose (GLUTOSE) 40 % GEL Take 37.5 mLs by mouth as needed (low sugar) 45 g 1    docusate sodium (COLACE, DULCOLAX) 100 MG CAPS Take 100 mg by mouth 2 times daily as needed for Constipation 60 capsule 0    atorvastatin (LIPITOR) 20 MG tablet Take 1 tablet by mouth nightly 90 tablet 3    meloxicam (MOBIC) 15 MG tablet Dr. Otoniel Argueta      metoprolol succinate (TOPROL XL) 25 MG extended release tablet Take 0.5 tablets by mouth daily (Patient taking differently: Take 25 mg by mouth daily Dr. Bridgett Hair) 90 tablet 0    aspirin EC 81 MG EC tablet Take 81 mg by mouth daily      Multiple Vitamin (MULTI-VITAMIN DAILY) TABS Take 1 tablet by mouth daily       No current facility-administered medications for this visit.      Allergies   Allergen Reactions    Feldene [Piroxicam]     Flagyl [Metronidazole]     Naprosyn [Naproxen]     Penicillins     Sulfa Antibiotics     Sulfasalazine     Lisinopril      Other reaction(s): Dizziness       Health Maintenance   Topic Date Due    Shingles Vaccine (1 of 2) Never done    Flu vaccine (1) 09/01/2021    Lipid screen  09/04/2021    Annual Wellness Visit (AWV)  09/04/2021    A1C test (Diabetic or Prediabetic)  09/18/2021    Potassium monitoring  01/12/2022    Creatinine monitoring  01/12/2022    Diabetic retinal exam 05/25/2022    Diabetic foot exam  07/19/2022    DTaP/Tdap/Td vaccine (2 - Td or Tdap) 07/13/2030    Pneumococcal 65+ years Vaccine  Completed    COVID-19 Vaccine  Completed    Hepatitis A vaccine  Aged Out    Hib vaccine  Aged Out    Meningococcal (ACWY) vaccine  Aged Out       Subjective:      Review of Systems   Constitutional: Positive for fatigue. Respiratory: Positive for shortness of breath. Cardiovascular: Negative for chest pain. Objective:     /68   Pulse (!) 48   Ht 5' 7\" (1.702 m)   Wt 248 lb 12.8 oz (112.9 kg)   SpO2 93%   BMI 38.97 kg/m²   Physical Exam  Vitals and nursing note reviewed. Constitutional:       General: He is not in acute distress. Appearance: He is well-developed. He is not ill-appearing. HENT:      Head: Normocephalic and atraumatic. Right Ear: External ear normal.      Left Ear: External ear normal.   Eyes:      General: No scleral icterus. Right eye: No discharge. Left eye: No discharge. Conjunctiva/sclera: Conjunctivae normal.      Pupils: Pupils are equal, round, and reactive to light. Neck:      Thyroid: No thyromegaly. Trachea: No tracheal deviation. Cardiovascular:      Rate and Rhythm: Normal rate and regular rhythm. Pulses:           Dorsalis pedis pulses are 2+ on the right side and 2+ on the left side. Heart sounds: Normal heart sounds. Pulmonary:      Effort: Pulmonary effort is normal. No respiratory distress. Breath sounds: Normal breath sounds. No wheezing. Musculoskeletal:      Right lower leg: No edema. Left lower leg: No edema. Comments: Very minimal lower leg edema   Feet:      Right foot:      Protective Sensation: 6 sites tested. 5 sites sensed. Skin integrity: Callus present. Left foot:      Protective Sensation: 6 sites tested. 6 sites sensed. Skin integrity: Callus present. Lymphadenopathy:      Cervical: No cervical adenopathy.    Skin: General: Skin is warm. Findings: Lesion present. No rash. Comments: Left temple dry lesion about 1 cm with increased vascularity, pink and tan. Neurological:      Mental Status: He is alert and oriented to person, place, and time. Psychiatric:         Mood and Affect: Mood normal.         Behavior: Behavior normal.         Thought Content: Thought content normal.         Assessment:       Diagnosis Orders   1. Type 2 diabetes mellitus with complication (HCC)  Comprehensive Metabolic Panel, Fasting    Lipid Panel    Hemoglobin A1C     DIABETES FOOT EXAM   2. Essential hypertension  Comprehensive Metabolic Panel, Fasting    Lipid Panel    Hemoglobin A1C   3. Coronary artery disease, unspecified vessel or lesion type, unspecified whether angina present, unspecified whether native or transplanted heart  Comprehensive Metabolic Panel, Fasting    Lipid Panel   4. Severe obesity (BMI 35.0-35.9 with comorbidity) ([facility] [address]          Plan:      Return in about 4 months (around 11/19/2021) for diabetes mellitus. Breathing exercises  Possible BCC left temple. Needs biopsy and possible removal  Orders Placed This Encounter   Procedures    Comprehensive Metabolic Panel, Fasting     Standing Status:   Future     Standing Expiration Date:   7/19/2022    Lipid Panel     Standing Status:   Future     Standing Expiration Date:   7/19/2022     Order Specific Question:   Is Patient Fasting?/# of Hours     Answer:   yes    Hemoglobin A1C     Standing Status:   Future     Standing Expiration Date:   7/19/2022     DIABETES FOOT EXAM     Orders Placed This Encounter   Medications    metFORMIN (GLUCOPHAGE) 500 MG tablet     Sig: Take 2 tablets by mouth 2 times daily (with meals)     Dispense:  360 tablet     Refill:  3       Patient given educationalmaterials - see patient instructions. Discussed use, benefit, and side effectsof prescribed medications. All patient questions answered. Pt voiced understanding. Reviewed

## 2021-08-20 ENCOUNTER — PROCEDURE VISIT (OUTPATIENT)
Dept: PRIMARY CARE CLINIC | Age: 81
End: 2021-08-20
Payer: MEDICARE

## 2021-08-20 VITALS
OXYGEN SATURATION: 94 % | HEART RATE: 54 BPM | WEIGHT: 247 LBS | SYSTOLIC BLOOD PRESSURE: 120 MMHG | HEIGHT: 67 IN | DIASTOLIC BLOOD PRESSURE: 76 MMHG | BODY MASS INDEX: 38.77 KG/M2

## 2021-08-20 DIAGNOSIS — L57.0 ACTINIC KERATOSES: Primary | ICD-10-CM

## 2021-08-20 PROCEDURE — 17003 DESTRUCT PREMALG LES 2-14: CPT | Performed by: PHYSICIAN ASSISTANT

## 2021-08-20 PROCEDURE — 17000 DESTRUCT PREMALG LESION: CPT | Performed by: PHYSICIAN ASSISTANT

## 2021-08-20 NOTE — PROGRESS NOTES
Actinic Keratoses destruction procedure note:  8/20/2021  Amber Trujillo  1940    /76   Pulse 54   Ht 5' 7\" (1.702 m)   Wt 247 lb (112 kg)   SpO2 94%   BMI 38.69 kg/m²   ALL VITALS REVIEWED    Allergies   Allergen Reactions    Feldene [Piroxicam]     Flagyl [Metronidazole]     Naprosyn [Naproxen]     Penicillins     Sulfa Antibiotics     Sulfasalazine     Lisinopril      Other reaction(s): Dizziness       Chief Complaint   Patient presents with    Procedure     shave bx          Location:  Left temple/forehead and right ear and temple. Histofreeze applied with applicator. Numberlesions treated: 8    Physical Exam  HENT:      Head:        Comments: AKs treated        Patient tolerated procedure well. Diagnosis Orders   1. Actinic keratoses         Follow Up: Wound care discussed. Keep well moisturized. Watch for signs of infection which would include:  Redness, swelling, fever. If signs appear, please return to office. Return in about 6 months (around 2/20/2022) for recheck AKs.     Electronically signed by Emiliana Bay PA-C on 8/20/2021 at 9:36 AM

## 2021-08-20 NOTE — PROGRESS NOTES
Skin Biopsy Procedure Note  8/20/2021  Ryanne Batch  1940    There were no vitals taken for this visit. VITALS REVIEWED    Allergies   Allergen Reactions    Feldene [Piroxicam]     Flagyl [Metronidazole]     Naprosyn [Naproxen]     Penicillins     Sulfa Antibiotics     Sulfasalazine     Lisinopril      Other reaction(s): Dizziness       No chief complaint on file. Dr. Aisha Baltazar patient  Lesion:  · 1. Left temple  · 2. ***  · 3. ***      Indication for Biopsy 1: non healing lesion   Indication for Biopsy 2: ***  Indication for Biopsy 3:  ***    Procedure: The lesion(s) was cleansed with Alcohol.  2% lidocaine with epinephrine was used for local anaesthesia. A 1 cm  shave was used to obtain a specimen. The specimen(s) was    preserved and sent for pathological examination. The biopsy site(s) was  cleansed and hemostasis using ACl  and a pressure dressing(s) was achieved with good results. The patient was instructed on wound care. No complications occurred and the patient tolerated the procedure well. Diagnosis Orders   1. Neoplasm of uncertain behavior of skin  Derm biopsy       Follow Up: Wound care discussed. Keep well moisturized. Watch for signs of infection which would include:  Redness, swelling, fever. If signs appear, please return to office. No follow-ups on file.        Electronically signed by Stacia Ulloa PA-C on 8/20/2021 at 9:16 AM

## 2021-09-15 ENCOUNTER — HOSPITAL ENCOUNTER (OUTPATIENT)
Age: 81
Setting detail: SPECIMEN
Discharge: HOME | End: 2021-09-15
Payer: MEDICARE

## 2021-09-15 DIAGNOSIS — E11.8 TYPE 2 DIABETES MELLITUS WITH COMPLICATION (HCC): ICD-10-CM

## 2021-09-15 DIAGNOSIS — I25.10 CORONARY ARTERY DISEASE, UNSPECIFIED VESSEL OR LESION TYPE, UNSPECIFIED WHETHER ANGINA PRESENT, UNSPECIFIED WHETHER NATIVE OR TRANSPLANTED HEART: ICD-10-CM

## 2021-09-15 DIAGNOSIS — I10 ESSENTIAL HYPERTENSION: ICD-10-CM

## 2021-09-15 LAB
ALBUMIN SERPL-MCNC: 4 G/DL (ref 3.5–5.2)
ALBUMIN/GLOBULIN RATIO: 1.5 (ref 1–2.5)
ALP BLD-CCNC: 86 U/L (ref 40–129)
ALT SERPL-CCNC: 28 U/L (ref 5–41)
ANION GAP SERPL CALCULATED.3IONS-SCNC: 14 MMOL/L (ref 9–17)
AST SERPL-CCNC: 21 U/L
BILIRUB SERPL-MCNC: 0.57 MG/DL (ref 0.3–1.2)
BUN BLDV-MCNC: 16 MG/DL (ref 8–23)
BUN/CREAT BLD: ABNORMAL (ref 9–20)
CALCIUM SERPL-MCNC: 9.3 MG/DL (ref 8.6–10.4)
CHLORIDE BLD-SCNC: 101 MMOL/L (ref 98–107)
CHOLESTEROL/HDL RATIO: 4.5
CHOLESTEROL: 165 MG/DL
CO2: 24 MMOL/L (ref 20–31)
CREAT SERPL-MCNC: 1.04 MG/DL (ref 0.7–1.2)
GFR AFRICAN AMERICAN: >60 ML/MIN
GFR NON-AFRICAN AMERICAN: >60 ML/MIN
GFR SERPL CREATININE-BSD FRML MDRD: ABNORMAL ML/MIN/{1.73_M2}
GFR SERPL CREATININE-BSD FRML MDRD: ABNORMAL ML/MIN/{1.73_M2}
GLUCOSE FASTING: 117 MG/DL (ref 70–99)
HDLC SERPL-MCNC: 37 MG/DL
LDL CHOLESTEROL: 96 MG/DL (ref 0–130)
POTASSIUM SERPL-SCNC: 4.7 MMOL/L (ref 3.7–5.3)
SODIUM BLD-SCNC: 139 MMOL/L (ref 135–144)
T3 FREE: 2.84 PG/ML (ref 2.02–4.43)
THYROXINE, FREE: 1.17 NG/DL (ref 0.93–1.7)
TOTAL PROTEIN: 6.6 G/DL (ref 6.4–8.3)
TRIGL SERPL-MCNC: 158 MG/DL
TSH SERPL DL<=0.05 MIU/L-ACNC: 2.37 MIU/L (ref 0.3–5)
VLDLC SERPL CALC-MCNC: ABNORMAL MG/DL (ref 1–30)

## 2021-09-16 LAB
ESTIMATED AVERAGE GLUCOSE: 143 MG/DL
HBA1C MFR BLD: 6.6 % (ref 4–6)

## 2021-11-07 ENCOUNTER — PATIENT MESSAGE (OUTPATIENT)
Dept: PRIMARY CARE CLINIC | Age: 81
End: 2021-11-07

## 2021-11-07 DIAGNOSIS — R09.81 CONGESTION OF NASAL SINUS: Primary | ICD-10-CM

## 2021-11-08 ENCOUNTER — TELEPHONE (OUTPATIENT)
Dept: PRIMARY CARE CLINIC | Age: 81
End: 2021-11-08

## 2021-11-08 RX ORDER — AZITHROMYCIN 250 MG/1
TABLET, FILM COATED ORAL
Qty: 1 PACKET | Refills: 0 | Status: SHIPPED | OUTPATIENT
Start: 2021-11-08 | End: 2021-11-18

## 2021-11-08 NOTE — TELEPHONE ENCOUNTER
Has a bad cough, and lots of thick dark green mucous asking for Zpac to be sent. Pharmacy [facility].

## 2021-11-19 ENCOUNTER — OFFICE VISIT (OUTPATIENT)
Dept: PRIMARY CARE CLINIC | Age: 81
End: 2021-11-19
Payer: MEDICARE

## 2021-11-19 VITALS
HEART RATE: 60 BPM | OXYGEN SATURATION: 95 % | BODY MASS INDEX: 38.52 KG/M2 | DIASTOLIC BLOOD PRESSURE: 66 MMHG | WEIGHT: 245.4 LBS | HEIGHT: 67 IN | SYSTOLIC BLOOD PRESSURE: 108 MMHG

## 2021-11-19 DIAGNOSIS — Z23 NEED FOR VACCINATION: ICD-10-CM

## 2021-11-19 DIAGNOSIS — I10 ESSENTIAL HYPERTENSION: ICD-10-CM

## 2021-11-19 DIAGNOSIS — E78.49 OTHER HYPERLIPIDEMIA: ICD-10-CM

## 2021-11-19 DIAGNOSIS — E11.8 TYPE 2 DIABETES MELLITUS WITH COMPLICATION (HCC): Primary | ICD-10-CM

## 2021-11-19 PROCEDURE — 1036F TOBACCO NON-USER: CPT | Performed by: FAMILY MEDICINE

## 2021-11-19 PROCEDURE — 90694 VACC AIIV4 NO PRSRV 0.5ML IM: CPT | Performed by: FAMILY MEDICINE

## 2021-11-19 PROCEDURE — G8417 CALC BMI ABV UP PARAM F/U: HCPCS | Performed by: FAMILY MEDICINE

## 2021-11-19 PROCEDURE — G0008 ADMIN INFLUENZA VIRUS VAC: HCPCS | Performed by: FAMILY MEDICINE

## 2021-11-19 PROCEDURE — 99214 OFFICE O/P EST MOD 30 MIN: CPT | Performed by: FAMILY MEDICINE

## 2021-11-19 PROCEDURE — 1123F ACP DISCUSS/DSCN MKR DOCD: CPT | Performed by: FAMILY MEDICINE

## 2021-11-19 PROCEDURE — 4040F PNEUMOC VAC/ADMIN/RCVD: CPT | Performed by: FAMILY MEDICINE

## 2021-11-19 PROCEDURE — G8484 FLU IMMUNIZE NO ADMIN: HCPCS | Performed by: FAMILY MEDICINE

## 2021-11-19 PROCEDURE — G8427 DOCREV CUR MEDS BY ELIG CLIN: HCPCS | Performed by: FAMILY MEDICINE

## 2021-11-19 ASSESSMENT — ENCOUNTER SYMPTOMS: BACK PAIN: 1

## 2021-11-19 NOTE — PROGRESS NOTES
[address] [facility] PRIMARY CARE  [address] [facility] [place] [address]  Dept: [phone]    Lisa Castillo is a 80 y.o. male Established patient, who presents today for his medical conditions/complaintsas noted below. Chief Complaint   Patient presents with    Diabetes     1 month too early for a1c, last a1c 6.6    Flu Vaccine       HPI:     HPI  Home sugars : 149, 137, 137, 126, 150, 150, 123, 141, 127, 131, 136, 142, 158 am sugars  No low reactions. Had SE weak and  fatigue and cough with the covid booster. Reviewed prior notes Cardiology  Reviewed previous Labs and echo and Holter     LDL Cholesterol (mg/dL)   Date Value   09/15/2021 96     LDL Calculated (mg/dL)   Date Value   2020 115   07/10/2019 98   2018 77       (goal LDL is <100)   AST (U/L)   Date Value   09/15/2021 21     ALT (U/L)   Date Value   09/15/2021 28     BUN (mg/dL)   Date Value   09/15/2021 16     Hemoglobin A1C (%)   Date Value   09/15/2021 6.6 (H)     TSH (mIU/L)   Date Value   09/15/2021 2.37     BP Readings from Last 3 Encounters:   21 108/66   21 120/76   21 118/68          (goal 120/80)    Past Medical History:   Diagnosis Date    Diabetes ([address]     Diverticulitis     History of allergy     Hyperlipidemia     Hypertension     Osteoarthritis       Past Surgical History:   Procedure Laterality Date    BACK SURGERY  2017    Catha Hallmark  Dr Tonya Steve, lumbar fusion   [facility]  2015    partial colectomy due to diveritc    COLONOSCOPY      TOTAL KNEE ARTHROPLASTY Right        History reviewed. No pertinent family history.     Social History     Tobacco Use    Smoking status: Former Smoker     Packs/day: 1.50     Years: 40.00     Pack years: 60.00     Quit date: 1991     Years since quittin.9    Smokeless tobacco: Never Used   Substance Use Topics    Alcohol use: Not Currently     Alcohol/week: 1.0 standard drink     Types: 1 Standard drinks or equivalent per week     Comment: social      Current Outpatient Medications   Medication Sig Dispense Refill    metFORMIN (GLUCOPHAGE) 500 MG tablet Take 2 tablets by mouth 2 times daily (with meals) 360 tablet 3    Dulaglutide (TRULICITY) 3.19 MB/7.7FU SOPN INJECT 0.5ML (=0.75 MG)    SUBCUTANEOUSLY ONCE WEEKLY 12 pen 3    omeprazole (PRILOSEC) 40 MG delayed release capsule TAKE 1 CAPSULE DAILY 90 capsule 3    Omega-3 Fatty Acids (OMEGA III EPA+DHA) 1000 MG CAPS Take 1 capsule by mouth 2 times daily      hydroCHLOROthiazide (HYDRODIURIL) 25 MG tablet TAKE 1 TABLET DAILY 90 tablet 3    docusate sodium (COLACE, DULCOLAX) 100 MG CAPS Take 100 mg by mouth 2 times daily as needed for Constipation 60 capsule 0    atorvastatin (LIPITOR) 20 MG tablet Take 1 tablet by mouth nightly 90 tablet 3    meloxicam (MOBIC) 15 MG tablet Dr. Spencer Valle      metoprolol succinate (TOPROL XL) 25 MG extended release tablet Take 0.5 tablets by mouth daily (Patient taking differently: Take 25 mg by mouth daily Dr. Clyde Fam) 90 tablet 0    aspirin EC 81 MG EC tablet Take 81 mg by mouth daily      Multiple Vitamin (MULTI-VITAMIN DAILY) TABS Take 1 tablet by mouth daily      glucose (GLUTOSE) 40 % GEL Take 37.5 mLs by mouth as needed (low sugar) (Patient not taking: Reported on 11/19/2021) 45 g 1     No current facility-administered medications for this visit.      Allergies   Allergen Reactions    Feldene [Piroxicam]     Flagyl [Metronidazole]     Naprosyn [Naproxen]     Penicillins     Sulfa Antibiotics     Sulfasalazine     Lisinopril      Other reaction(s): Dizziness       Health Maintenance   Topic Date Due    Shingles Vaccine (1 of 2) Never done    A1C test (Diabetic or Prediabetic)  03/15/2022    Diabetic retinal exam  05/25/2022    Diabetic foot exam  07/19/2022    Lipid screen  09/15/2022    Potassium monitoring  09/15/2022    Creatinine monitoring  09/15/2022    DTaP/Tdap/Td vaccine (2 - Td or Tdap) 07/13/2030    Flu vaccine  Completed    Pneumococcal 65+ years Vaccine  Completed    COVID-19 Vaccine  Completed    Hepatitis A vaccine  Aged Out    Hib vaccine  Aged Out    Meningococcal (ACWY) vaccine  Aged Out       Subjective:      Review of Systems   Constitutional: Negative. Musculoskeletal: Positive for arthralgias and back pain. Objective:     /66   Pulse 60   Ht 5' 7\" (1.702 m)   Wt 245 lb 6.4 oz (111.3 kg)   SpO2 95%   BMI 38.44 kg/m²   Physical Exam  Vitals and nursing note reviewed. Constitutional:       General: He is not in acute distress. Appearance: He is well-developed. He is not ill-appearing. HENT:      Head: Normocephalic and atraumatic. Right Ear: External ear normal.      Left Ear: External ear normal.   Eyes:      General: No scleral icterus. Right eye: No discharge. Left eye: No discharge. Conjunctiva/sclera: Conjunctivae normal.      Pupils: Pupils are equal, round, and reactive to light. Neck:      Thyroid: No thyromegaly. Trachea: No tracheal deviation. Cardiovascular:      Rate and Rhythm: Normal rate. Rhythm irregular. Frequent extrasystoles are present. Heart sounds: Normal heart sounds. Pulmonary:      Effort: Pulmonary effort is normal. No respiratory distress. Breath sounds: Normal breath sounds. No wheezing. Lymphadenopathy:      Cervical: No cervical adenopathy. Skin:     General: Skin is warm. Findings: No rash. Neurological:      Mental Status: He is alert and oriented to person, place, and time. Psychiatric:         Mood and Affect: Mood normal.         Behavior: Behavior normal.         Thought Content: Thought content normal.         Assessment:       Diagnosis Orders   1. 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 Need for vaccination  INFLUENZA, QUADV, ADJUVANTED, 65 YRS =, IM, PF, PREFILL SYR, 0.5ML (FLUAD)   3. Other hyperlipidemia     4. Essential hypertension          Plan:      Return in about 4 months (around 3/19/2022) for diabetes mellitus, hypertension. Orders Placed This Encounter   Procedures    INFLUENZA, QUADV, ADJUVANTED, 72 YRS =, IM, PF, PREFILL SYR, 0.5ML (FLUAD)     No orders of the defined types were placed in this encounter. Patient given educationalmaterials - see patient instructions. Discussed use, benefit, and side effectsof prescribed medications. All patient questions answered. Pt voiced understanding. Reviewed health maintenance. Instructed to continue current medications, diet andexercise. Patient agreed with treatment plan. Follow up as directed.      Electronicallysigned by Thad Foster MD on 11/19/2021 at 10:47 AM

## 2021-11-19 NOTE — PATIENT INSTRUCTIONS
Patient Education        Influenza (Flu) Vaccine (Inactivated or Recombinant): What You Need to Know  Why get vaccinated? Influenza vaccine can prevent influenza (flu). Flu is a contagious disease that spreads around the [place] every year, usually between October and May. Anyone can get the flu, but it is more dangerous for some people. Infants and young children, people 72years of age and older, pregnant women, and people with certain health conditions or a weakened immune system are at greatest risk of flu complications. Pneumonia, bronchitis, sinus infections and ear infections are examples of flu-related complications. If you have a medical condition, such as heart disease, cancer or diabetes, flu can make it worse. Flu can cause fever and chills, sore throat, muscle aches, fatigue, cough, headache, and runny or stuffy nose. Some people may have vomiting and diarrhea, though this is more common in children than adults. Each year, thousands of people in the [facility] die from flu, and many more are hospitalized. Flu vaccine prevents millions of illnesses and flu-related visits to the doctor each year. Influenza vaccine  CDC recommends everyone 10months of age and older get vaccinated every flu season. Children 6 months through 6years of age may need 2 doses during a single flu season. Everyone else needs only 1 dose each flu season. It takes about 2 weeks for protection to develop after vaccination. There are many flu viruses, and they are always changing. Each year a new flu vaccine is made to protect against three or four viruses that are likely to cause disease in the upcoming flu season. Even when the vaccine doesn't exactly match these viruses, it may still provide some protection. Influenza vaccine does not cause flu. Influenza vaccine may be given at the same time as other vaccines.   Talk with your health care provider  Tell your vaccine provider if the person getting the vaccine:  · Has had an allergic reaction after a previous dose of influenza vaccine, or has any severe, life-threatening allergies. · Has ever had Guillain-Barré Syndrome (also called GBS). In some cases, your health care provider may decide to postpone influenza vaccination to a future visit. People with minor illnesses, such as a cold, may be vaccinated. People who are moderately or severely ill should usually wait until they recover before getting influenza vaccine. Your health care provider can give you more information. Risks of a vaccine reaction  · Soreness, redness, and swelling where shot is given, fever, muscle aches, and headache can happen after influenza vaccine. · There may be a very small increased risk of Guillain-Barré Syndrome (GBS) after inactivated influenza vaccine (the flu shot). Turner Segal children who get the flu shot along with pneumococcal vaccine (PCV13), and/or DTaP vaccine at the same time might be slightly more likely to have a seizure caused by fever. Tell your health care provider if a child who is getting flu vaccine has ever had a seizure. People sometimes faint after medical procedures, including vaccination. Tell your provider if you feel dizzy or have vision changes or ringing in the ears. As with any medicine, there is a very remote chance of a vaccine causing a severe allergic reaction, other serious injury, or death. What if there is a serious problem? An allergic reaction could occur after the vaccinated person leaves the clinic. If you see signs of a severe allergic reaction (hives, swelling of the face and throat, difficulty breathing, a fast heartbeat, dizziness, or weakness), call 9-1-1 and get the person to the nearest hospital.  For other signs that concern you, call your health care provider. Adverse reactions should be reported to the Vaccine Adverse Event Reporting System (VAERS).  Your health care provider will usually file this report, or you can do it yourself. Visit the VAERS website at www.vaers. hhs.gov or call 7-572.786.4562. VAERS is only for reporting reactions, and VAERS staff do not give medical advice. The National Vaccine Injury Compensation Program  The National Vaccine Injury Compensation Program (VICP) is a federal program that was created to compensate people who may have been injured by certain vaccines. Visit the VICP website at www.[facility].gov/vaccinecompensation or call 8-877.628.8497 to learn about the program and about filing a claim. There is a time limit to file a claim for compensation. How can I learn more? · Ask your healthcare provider. · Call your local or state health department. · Contact the Centers for Disease Control and Prevention (CDC):  ? Call 7-561.714.3538 (1-800-CDC-INFO) or  ? Visit CDC's website at www.cdc.gov/flu  Vaccine Information Statement (Interim)  Inactivated Influenza Vaccine  8/15/2019  [address] [place] 478[place]-27  Department of Health and Human Services  Centers for Disease Control and Prevention  Many Vaccine Information Statements are available in [place] and other languages. See www.immunize.org/vis. Muchas hojas de información sobre vacunas están disponibles en español y en otros idiomas. Visite www.immunize.org/vis. Care instructions adapted under license by [facility] ([facility]). If you have questions about a medical condition or this instruction, always ask your healthcare professional. [place] [address] any warranty or liability for your use of this information.

## 2021-12-13 RX ORDER — ATORVASTATIN CALCIUM 20 MG/1
TABLET, FILM COATED ORAL
Qty: 90 TABLET | Refills: 3 | Status: SHIPPED | OUTPATIENT
Start: 2021-12-13 | End: 2022-03-21 | Stop reason: SDUPTHER

## 2022-01-12 ENCOUNTER — TELEPHONE (OUTPATIENT)
Dept: PRIMARY CARE CLINIC | Age: 82
End: 2022-01-12

## 2022-01-12 NOTE — TELEPHONE ENCOUNTER
----- Message from Milad Joshua sent at 1/12/2022  8:38 AM EST -----  Subject: Message to Provider    QUESTIONS  Information for Provider? Patient's wife called in for patient stating   that he has a cough and cough and would like to have a z pack called in. I   called clinical and spoke to Maureen Dangelo who stated that I would need to send a   message in.   ---------------------------------------------------------------------------  --------------  CALL BACK INFO  What is the best way for the office to contact you? OK to leave message on   voicemail  Preferred Call Back Phone Number? [phone]  ---------------------------------------------------------------------------  --------------  SCRIPT ANSWERS  Relationship to Patient? Third Party  Representative Name?  Wife

## 2022-01-13 ENCOUNTER — HOSPITAL ENCOUNTER (OUTPATIENT)
Age: 82
Setting detail: SPECIMEN
Discharge: HOME | End: 2022-01-13

## 2022-01-13 ENCOUNTER — OFFICE VISIT (OUTPATIENT)
Dept: PRIMARY CARE CLINIC | Age: 82
End: 2022-01-13
Payer: COMMERCIAL

## 2022-01-13 VITALS
HEART RATE: 67 BPM | OXYGEN SATURATION: 98 % | TEMPERATURE: 97.6 F | SYSTOLIC BLOOD PRESSURE: 122 MMHG | DIASTOLIC BLOOD PRESSURE: 64 MMHG

## 2022-01-13 DIAGNOSIS — J40 BRONCHITIS: ICD-10-CM

## 2022-01-13 DIAGNOSIS — E66.01 SEVERE OBESITY (BMI 35.0-35.9 WITH COMORBIDITY) (HCC): ICD-10-CM

## 2022-01-13 DIAGNOSIS — E11.8 TYPE 2 DIABETES MELLITUS WITH COMPLICATION (HCC): ICD-10-CM

## 2022-01-13 DIAGNOSIS — R05.9 COUGH: ICD-10-CM

## 2022-01-13 DIAGNOSIS — R05.9 COUGH: Primary | ICD-10-CM

## 2022-01-13 DIAGNOSIS — K59.00 CONSTIPATION, UNSPECIFIED CONSTIPATION TYPE: ICD-10-CM

## 2022-01-13 PROCEDURE — 99214 OFFICE O/P EST MOD 30 MIN: CPT | Performed by: NURSE PRACTITIONER

## 2022-01-13 RX ORDER — GUAIFENESIN AND CODEINE PHOSPHATE 100; 10 MG/5ML; MG/5ML
5 SOLUTION ORAL 4 TIMES DAILY PRN
Qty: 240 ML | Refills: 0 | Status: SHIPPED | OUTPATIENT
Start: 2022-01-13 | End: 2022-01-25

## 2022-01-13 RX ORDER — BENZONATATE 200 MG/1
200 CAPSULE ORAL PRN
COMMUNITY
Start: 2022-01-09 | End: 2022-01-25 | Stop reason: SDUPTHER

## 2022-01-13 RX ORDER — AZITHROMYCIN 250 MG/1
250 TABLET, FILM COATED ORAL SEE ADMIN INSTRUCTIONS
Qty: 6 TABLET | Refills: 0 | Status: SHIPPED | OUTPATIENT
Start: 2022-01-13 | End: 2022-01-18

## 2022-01-13 ASSESSMENT — ENCOUNTER SYMPTOMS
COUGH: 1
NAUSEA: 0
SINUS PAIN: 0
SHORTNESS OF BREATH: 1
DIARRHEA: 0
BACK PAIN: 1
SINUS PRESSURE: 0
CONSTIPATION: 1

## 2022-01-17 LAB — SARS-COV-2, NAA: NOT DETECTED

## 2022-01-25 ENCOUNTER — OFFICE VISIT (OUTPATIENT)
Dept: PRIMARY CARE CLINIC | Age: 82
End: 2022-01-25
Payer: COMMERCIAL

## 2022-01-25 VITALS
SYSTOLIC BLOOD PRESSURE: 122 MMHG | HEART RATE: 72 BPM | WEIGHT: 240.4 LBS | TEMPERATURE: 97.2 F | OXYGEN SATURATION: 98 % | BODY MASS INDEX: 37.73 KG/M2 | DIASTOLIC BLOOD PRESSURE: 78 MMHG | HEIGHT: 67 IN

## 2022-01-25 DIAGNOSIS — R05.3 PERSISTENT COUGH: Primary | ICD-10-CM

## 2022-01-25 PROCEDURE — 99213 OFFICE O/P EST LOW 20 MIN: CPT | Performed by: PHYSICIAN ASSISTANT

## 2022-01-25 RX ORDER — AZITHROMYCIN 250 MG/1
TABLET, FILM COATED ORAL
Qty: 1 PACKET | Refills: 0 | Status: SHIPPED | OUTPATIENT
Start: 2022-01-25 | End: 2022-02-04

## 2022-01-25 RX ORDER — BENZONATATE 100 MG/1
100-200 CAPSULE ORAL 3 TIMES DAILY PRN
Qty: 60 CAPSULE | Refills: 0 | Status: SHIPPED | OUTPATIENT
Start: 2022-01-25 | End: 2022-02-01

## 2022-01-25 ASSESSMENT — ENCOUNTER SYMPTOMS
DIARRHEA: 0
COUGH: 1
WHEEZING: 0
SHORTNESS OF BREATH: 1
NAUSEA: 0
SORE THROAT: 0
ABDOMINAL PAIN: 0
VOMITING: 0

## 2022-01-25 NOTE — PROGRESS NOTES
[facility]  [address]  Phone: [phone]  Fax: [phone]    Madison Sánchez is a 80 y.o. male who presents today for his medical conditions/complaintsas noted below. Chief Complaint   Patient presents with    Cough     patient is here today for cough ongoing 3 weeks. Patient seen Christopher Cross 1/13/22 for red visit. patient states cough still ongoing         HPI:     HPI  Did start getting better with Zpak but cough is coming back. Can come in jags and produces a sticky yellow phlegm. They are aware that he needs to have a Ct of chest ordered by Dr. Leeanne Pretty    Current Outpatient Medications   Medication Sig Dispense Refill    azithromycin (ZITHROMAX) 250 MG tablet 2 tablets now then 1 daily until gone. 1 packet 0    benzonatate (TESSALON) 100 MG capsule Take 1-2 capsules by mouth 3 times daily as needed for Cough 60 capsule 0    guaiFENesin-codeine (TUSSI-ORGANIDIN NR) 100-10 MG/5ML syrup Take 5 mLs by mouth 4 times daily as needed for Cough for up to 12 days.  240 mL 0    atorvastatin (LIPITOR) 20 MG tablet TAKE ONE TABLET BY MOUTH ONCE NIGHTLY 90 tablet 3    Dulaglutide (TRULICITY) 3.36 BH/7.1VW SOPN INJECT 0.5ML (=0.75 MG)    SUBCUTANEOUSLY ONCE WEEKLY 12 pen 3    omeprazole (PRILOSEC) 40 MG delayed release capsule TAKE 1 CAPSULE DAILY 90 capsule 3    Omega-3 Fatty Acids (OMEGA III EPA+DHA) 1000 MG CAPS Take 1 capsule by mouth 2 times daily      hydroCHLOROthiazide (HYDRODIURIL) 25 MG tablet TAKE 1 TABLET DAILY 90 tablet 3    glucose (GLUTOSE) 40 % GEL Take 37.5 mLs by mouth as needed (low sugar) 45 g 1    docusate sodium (COLACE, DULCOLAX) 100 MG CAPS Take 100 mg by mouth 2 times daily as needed for Constipation 60 capsule 0    meloxicam (MOBIC) 15 MG tablet Dr. Cherry Good metoprolol succinate (TOPROL XL) 25 MG extended release tablet Take 0.5 tablets by mouth daily (Patient taking differently: Take 25 mg by mouth daily  Charmaine) 90 tablet 0    aspirin EC 81 MG EC tablet Take 81 mg by mouth daily      Multiple Vitamin (MULTI-VITAMIN DAILY) TABS Take 1 tablet by mouth daily      magnesium citrate solution Take 296 mLs by mouth once for 1 dose 296 mL 1    metFORMIN (GLUCOPHAGE) 500 MG tablet Take 2 tablets by mouth 2 times daily (with meals) 360 tablet 3     No current facility-administered medications for this visit. Allergies   Allergen Reactions    Feldene [Piroxicam]     Flagyl [Metronidazole]     Naprosyn [Naproxen]     Penicillins     Sulfa Antibiotics     Sulfasalazine     Lisinopril      Other reaction(s): Dizziness       Subjective:      Review of Systems   Constitutional: Negative for chills, diaphoresis and fever. HENT: Negative for sore throat. Respiratory: Positive for cough (Productive) and shortness of breath. Negative for wheezing. Cardiovascular: Negative for chest pain, palpitations and leg swelling. Gastrointestinal: Negative for abdominal pain, diarrhea, nausea and vomiting. Objective:     /78   Pulse 72   Temp 97.2 °F (36.2 °C) (Temporal)   Ht 5' 7\" (1.702 m)   Wt 240 lb 6.4 oz (109 kg)   SpO2 98%   BMI 37.65 kg/m²   Physical Exam  Vitals and nursing note reviewed. Constitutional:       Appearance: Normal appearance. He is not ill-appearing. HENT:      Right Ear: Tympanic membrane, ear canal and external ear normal.      Left Ear: Tympanic membrane, ear canal and external ear normal.      Nose: Nose normal.      Mouth/Throat:      Pharynx: Oropharyngeal exudate (PND) present. No posterior oropharyngeal erythema. Eyes:      General:         Right eye: No discharge. Left eye: No discharge. Conjunctiva/sclera: Conjunctivae normal.      Pupils: Pupils are equal, round, and reactive to light. Cardiovascular:      Rate and Rhythm: Normal rate. Heart sounds: Normal heart sounds.    Pulmonary:      Effort: Pulmonary effort is normal.      Breath sounds: Normal breath sounds. No wheezing, rhonchi or rales. Musculoskeletal:      Right lower leg: No edema. Left lower leg: No edema. Neurological:      Mental Status: He is alert and oriented to person, place, and time. Psychiatric:         Mood and Affect: Mood normal.         Assessment:       Diagnosis Orders   1. Persistent cough  Brain Natriuretic Peptide    azithromycin (ZITHROMAX) 250 MG tablet    benzonatate (TESSALON) 100 MG capsule    XR CHEST (2 VW)    CBC Auto Differential        Plan:    Zpak  Tesslaon perles  If not improving, CXR and BW  Also, schedule the CT of chest.    No follow-ups on file. Orders Placed This Encounter   Procedures    XR CHEST (2 VW)     Standing Status:   Future     Standing Expiration Date:   2023    Brain Natriuretic Peptide     Standing Status:   Future     Standing Expiration Date:   2023    CBC Auto Differential     Standing Status:   Future     Standing Expiration Date:   2023     Orders Placed This Encounter   Medications    azithromycin (ZITHROMAX) 250 MG tablet     Si tablets now then 1 daily until gone.      Dispense:  1 packet     Refill:  0    benzonatate (TESSALON) 100 MG capsule     Sig: Take 1-2 capsules by mouth 3 times daily as needed for Cough     Dispense:  60 capsule     Refill:  0           Electronically signed by Afsaneh Peters 2022 at 2:36 PM

## 2022-02-18 DIAGNOSIS — E11.8 TYPE 2 DIABETES MELLITUS WITH COMPLICATION (HCC): ICD-10-CM

## 2022-02-21 ENCOUNTER — OFFICE VISIT (OUTPATIENT)
Dept: PRIMARY CARE CLINIC | Age: 82
End: 2022-02-21
Payer: MEDICARE

## 2022-02-21 VITALS
WEIGHT: 244.2 LBS | HEIGHT: 67 IN | BODY MASS INDEX: 38.33 KG/M2 | HEART RATE: 51 BPM | SYSTOLIC BLOOD PRESSURE: 118 MMHG | DIASTOLIC BLOOD PRESSURE: 68 MMHG | OXYGEN SATURATION: 98 %

## 2022-02-21 DIAGNOSIS — L57.0 ACTINIC KERATOSES: ICD-10-CM

## 2022-02-21 DIAGNOSIS — I10 ESSENTIAL HYPERTENSION: ICD-10-CM

## 2022-02-21 DIAGNOSIS — E11.8 TYPE 2 DIABETES MELLITUS WITH COMPLICATION (HCC): Primary | ICD-10-CM

## 2022-02-21 PROCEDURE — 17003 DESTRUCT PREMALG LES 2-14: CPT | Performed by: PHYSICIAN ASSISTANT

## 2022-02-21 PROCEDURE — 17000 DESTRUCT PREMALG LESION: CPT | Performed by: PHYSICIAN ASSISTANT

## 2022-02-21 SDOH — ECONOMIC STABILITY: FOOD INSECURITY: WITHIN THE PAST 12 MONTHS, YOU WORRIED THAT YOUR FOOD WOULD RUN OUT BEFORE YOU GOT MONEY TO BUY MORE.: NEVER TRUE

## 2022-02-21 SDOH — ECONOMIC STABILITY: FOOD INSECURITY: WITHIN THE PAST 12 MONTHS, THE FOOD YOU BOUGHT JUST DIDN'T LAST AND YOU DIDN'T HAVE MONEY TO GET MORE.: NEVER TRUE

## 2022-02-21 ASSESSMENT — SOCIAL DETERMINANTS OF HEALTH (SDOH): HOW HARD IS IT FOR YOU TO PAY FOR THE VERY BASICS LIKE FOOD, HOUSING, MEDICAL CARE, AND HEATING?: NOT HARD AT ALL

## 2022-02-21 NOTE — PROGRESS NOTES
[facility] Keratoses destruction procedure note:  2/21/2022  Jr Perea  1940    /68   Pulse 51   Ht 5' 7\" (1.702 m)   Wt 244 lb 3.2 oz (110.8 kg)   SpO2 98%   BMI 38.25 kg/m²   ALL VITALS REVIEWED    Allergies   Allergen Reactions    Feldene [Piroxicam]     Flagyl [Metronidazole]     Naprosyn [Naproxen]     Penicillins     Sulfa Antibiotics     Sulfasalazine     Lisinopril      Other reaction(s): Dizziness       Chief Complaint   Patient presents with    Lesion(s)     Pt here today for lesion above left eyebrow. Written consent was obtained. Lesion(s) cleansed with Alcohol. Location:  face    Histofreeze applied with applicator. Numberlesions treated: 5    Physical Exam  HENT:      Head:        Comments: AKs treated        Patient tolerated procedure well. Diagnosis Orders   1. 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 Essential hypertension         Follow Up: Wound care discussed. Keep well moisturized. Watch for signs of infection which would include:  Redness, swelling, fever. If signs appear, please return to office. Return in about 6 months (around 8/21/2022) for AK f/u .     Electronically signed by Aden Zamorano PA-C on 2/21/2022 at 9:49 AM

## 2022-03-18 ENCOUNTER — OFFICE VISIT (OUTPATIENT)
Dept: PRIMARY CARE CLINIC | Age: 82
End: 2022-03-18
Payer: MEDICARE

## 2022-03-18 VITALS
WEIGHT: 242.2 LBS | DIASTOLIC BLOOD PRESSURE: 62 MMHG | HEART RATE: 70 BPM | SYSTOLIC BLOOD PRESSURE: 110 MMHG | BODY MASS INDEX: 38.01 KG/M2 | OXYGEN SATURATION: 96 % | HEIGHT: 67 IN

## 2022-03-18 DIAGNOSIS — M19.90 ARTHRITIS: ICD-10-CM

## 2022-03-18 DIAGNOSIS — E11.8 TYPE 2 DIABETES MELLITUS WITH COMPLICATION (HCC): Primary | ICD-10-CM

## 2022-03-18 DIAGNOSIS — Z00.00 MEDICARE ANNUAL WELLNESS VISIT, SUBSEQUENT: ICD-10-CM

## 2022-03-18 LAB — HBA1C MFR BLD: 6.5 %

## 2022-03-18 PROCEDURE — G8484 FLU IMMUNIZE NO ADMIN: HCPCS | Performed by: FAMILY MEDICINE

## 2022-03-18 PROCEDURE — 83036 HEMOGLOBIN GLYCOSYLATED A1C: CPT | Performed by: FAMILY MEDICINE

## 2022-03-18 PROCEDURE — G0439 PPPS, SUBSEQ VISIT: HCPCS | Performed by: FAMILY MEDICINE

## 2022-03-18 PROCEDURE — 1123F ACP DISCUSS/DSCN MKR DOCD: CPT | Performed by: FAMILY MEDICINE

## 2022-03-18 PROCEDURE — 4040F PNEUMOC VAC/ADMIN/RCVD: CPT | Performed by: FAMILY MEDICINE

## 2022-03-18 PROCEDURE — 3044F HG A1C LEVEL LT 7.0%: CPT | Performed by: FAMILY MEDICINE

## 2022-03-18 ASSESSMENT — PATIENT HEALTH QUESTIONNAIRE - PHQ9
2. FEELING DOWN, DEPRESSED OR HOPELESS: 0
SUM OF ALL RESPONSES TO PHQ QUESTIONS 1-9: 0
SUM OF ALL RESPONSES TO PHQ9 QUESTIONS 1 & 2: 0
SUM OF ALL RESPONSES TO PHQ QUESTIONS 1-9: 0
SUM OF ALL RESPONSES TO PHQ QUESTIONS 1-9: 0
1. LITTLE INTEREST OR PLEASURE IN DOING THINGS: 0
SUM OF ALL RESPONSES TO PHQ QUESTIONS 1-9: 0

## 2022-03-18 ASSESSMENT — LIFESTYLE VARIABLES
HOW OFTEN DO YOU HAVE A DRINK CONTAINING ALCOHOL: MONTHLY OR LESS
HOW MANY STANDARD DRINKS CONTAINING ALCOHOL DO YOU HAVE ON A TYPICAL DAY: 1 OR 2

## 2022-03-18 NOTE — PROGRESS NOTES
Medicare Annual Wellness Visit    Madison Sánchez is here for Medicare AWV and Diabetes (Pt here today for diabetic f/u, last a1c 6.6)    Assessment & Plan      Medicare annual wellness visit, subsequent    Type 2 diabetes mellitus with complication ([facility] [address]  G2Y     Home Sugars: 136, 149, 158, 128, 136, 191, 125, 129, 119  No low sugars  Try off trulicity     Recommendations for Preventive Services Due: see orders and patient instructions/AVS.  Recommended screening schedule for the next 5-10 years is provided to the patient in written form: see Patient Instructions/AVS.     Return for Medicare Annual Wellness Visit in 1 year. Subjective   The following acute and/or chronic problems were also addressed today:  DM    Patient's complete Health Risk Assessment and screening values have been reviewed and are found in Flowsheets. The following problems were reviewed today and where indicated follow up appointments were made and/or referrals ordered. Positive Risk Factor Screenings with Interventions:    Fall Risk:  Do you feel unsteady or are you worried about falling? : (!) yes  2 or more falls in past year?: no  Fall with injury in past year?: no     Fall Risk Interventions:    · Home safety tips provided   · Discussed exercises  · He and spouse are moving in with their daughter.                General Health and ACP:  General  In general, how would you say your health is?: Good  In the past 7 days, have you experienced any of the following: New or Increased Pain, New or Increased Fatigue, Loneliness, Social Isolation, Stress or Anger?: No  Do you get the social and emotional support that you need?: Yes  Do you have a Living Will?: (!) No    Advance Directives     Power of  Living Will ACP-Advance Directive ACP-Power of     Not on File Not on File Not on File Not on File      General Health Risk Interventions:  First designate : Spouse    2nd designate Daughter Evaristo Schumacher    · No Living Will: LIving will discussed and info given    Health Habits/Nutrition:     Physical Activity: Inactive    Days of Exercise per Week: 0 days    Minutes of Exercise per Session: 0 min     Have you lost any weight without trying in the past 3 months?: No  Body mass index: (!) 37.93  Have you seen the dentist within the past year?: Yes    Health Habits/Nutrition Interventions:  · Inadequate physical activity:  try to increase exercise. Hearing/Vision:  Do you or your family notice any trouble with your hearing that hasn't been managed with hearing aids?: (!) Yes  Do you have difficulty driving, watching TV, or doing any of your daily activities because of your eyesight?: No  Have you had an eye exam within the past year?: Yes  No exam data present    Hearing/Vision Interventions:  · Hearing concerns:  still has trouble hearing, bought otc one and helps a little. Objective   Vitals:    03/18/22 0944   BP: 110/62   Pulse: 70   SpO2: 96%   Weight: 242 lb 3.2 oz (109.9 kg)   Height: 5' 7\" (1.702 m)      Body mass index is 37.93 kg/m². Physical Exam  Vitals and nursing note reviewed. Constitutional:       General: He is not in acute distress. Appearance: He is well-developed. He is not ill-appearing. HENT:      Head: Normocephalic and atraumatic. Right Ear: External ear normal.      Left Ear: External ear normal.   Eyes:      General: No scleral icterus. Right eye: No discharge. Left eye: No discharge. Conjunctiva/sclera: Conjunctivae normal.      Pupils: Pupils are equal, round, and reactive to light. Neck:      Thyroid: No thyromegaly. Trachea: No tracheal deviation. Cardiovascular:      Rate and Rhythm: Normal rate and regular rhythm. Heart sounds: Normal heart sounds. Pulmonary:      Effort: Pulmonary effort is normal. No respiratory distress. Breath sounds: Normal breath sounds. No wheezing. Musculoskeletal:      Right lower leg: No edema.       Left lower leg: No edema. Comments: Uses a cane   Lymphadenopathy:      Cervical: No cervical adenopathy. Skin:     General: Skin is warm. Findings: No rash. Neurological:      Mental Status: He is alert and oriented to person, place, and time. Psychiatric:         Mood and Affect: Mood normal.         Behavior: Behavior normal.         Thought Content: Thought content normal.                Allergies   Allergen Reactions    Feldene [Piroxicam]     Flagyl [Metronidazole]     Naprosyn [Naproxen]     Penicillins     Sulfa Antibiotics     Sulfasalazine     Lisinopril      Other reaction(s): Dizziness     Prior to Visit Medications    Medication Sig Taking? Authorizing Provider   Handerica Hardwickard MISC by Does not apply route Cannot walk 200 Feet due to orthopedic disease  Expires 3/18/2027 Yes Alan Adams MD   blood glucose test strips (ASCENSIA AUTODISC VI;ONE TOUCH ULTRA TEST VI) strip Use with associated glucose meter. Use daily.  Type 2 DM not on insulin Yes Alan Adams MD   magnesium citrate solution Take 296 mLs by mouth once for 1 dose Yes LILLI Cohen CNP   atorvastatin (LIPITOR) 20 MG tablet TAKE ONE TABLET BY MOUTH ONCE NIGHTLY Yes Alan Adams MD   metFORMIN (GLUCOPHAGE) 500 MG tablet Take 2 tablets by mouth 2 times daily (with meals) Yes Alan Adams MD   omeprazole (PRILOSEC) 40 MG delayed release capsule TAKE 1 CAPSULE DAILY Yes Alan Adams MD   Omega-3 Fatty Acids (OMEGA III EPA+DHA) 1000 MG CAPS Take 1 capsule by mouth 2 times daily Yes Historical Provider, MD   hydroCHLOROthiazide (HYDRODIURIL) 25 MG tablet TAKE 1 TABLET DAILY Yes Alan Adams MD   glucose (GLUTOSE) 40 % GEL Take 37.5 mLs by mouth as needed (low sugar) Yes Alan Adams MD   docusate sodium (COLACE, DULCOLAX) 100 MG CAPS Take 100 mg by mouth 2 times daily as needed for Constipation Yes Alan Adams MD   meloxicam (MOBIC) 15 MG tablet 7.5 mg Dr. Isaac Zimmerman Yes Historical Provider, MD   metoprolol succinate (TOPROL XL) 25 MG extended release tablet Take 0.5 tablets by mouth daily  Patient taking differently: Take 25 mg by mouth daily Dr. Esteban Taylor Yes Linda Samson MD   aspirin EC 81 MG EC tablet Take 81 mg by mouth daily Yes Linda Samson MD   Multiple Vitamin (MULTI-VITAMIN DAILY) TABS Take 1 tablet by mouth daily Yes Linda Samson MD       CareTeam (Including outside providers/suppliers regularly involved in providing care):   Patient Care Team:  Linda Samson MD as PCP - General (Family Medicine)  Linda Samson MD as PCP - REHABILITATION [facility] Empaneled Provider    Reviewed and updated this visit:  Tobacco  Allergies  Meds  Problems  Med Hx  Surg Hx  Soc Hx  Fam Hx        Do home exercises to improve balance: minisquats, standing with toe raises behind you,   Leg raises while sitting

## 2022-03-18 NOTE — PATIENT INSTRUCTIONS
Personalized Preventive Plan for Troy Mclaughlin - 3/18/2022  Medicare offers a range of preventive health benefits. Some of the tests and screenings are paid in full while other may be subject to a deductible, co-insurance, and/or copay. Some of these benefits include a comprehensive review of your medical history including lifestyle, illnesses that may run in your family, and various assessments and screenings as appropriate. After reviewing your medical record and screening and assessments performed today your provider may have ordered immunizations, labs, imaging, and/or referrals for you. A list of these orders (if applicable) as well as your Preventive Care list are included within your After Visit Summary for your review. Other Preventive Recommendations:    · A preventive eye exam performed by an eye specialist is recommended every 1-2 years to screen for glaucoma; cataracts, macular degeneration, and other eye disorders. · A preventive dental visit is recommended every 6 months. · Try to get at least 150 minutes of exercise per week or 10,000 steps per day on a pedometer . · Order or download the FREE \"Exercise & Physical Activity: Your Everyday Guide\" from [employer]. Call 8-915.988.7927 or search [employer] online. · You need 3846-7281 mg of calcium and 6163-6674 IU of vitamin D per day. It is possible to meet your calcium requirement with diet alone, but a vitamin D supplement is usually necessary to meet this goal.  · When exposed to the sun, use a sunscreen that protects against both UVA and UVB radiation with an SPF of 30 or greater. Reapply every 2 to 3 hours or after sweating, drying off with a towel, or swimming. · Always wear a seat belt when traveling in a car. Always wear a helmet when riding a bicycle or motorcycle. Patient Education        Well Visit, Over 72: Care Instructions  Overview     Well visits can help you stay healthy.  Your doctor has checked your overall health and may have suggested ways to take good care of yourself. Your doctor also may have recommended tests. At home, you can help prevent illness with healthy eating, regular exercise, and other steps. Follow-up care is a key part of your treatment and safety. Be sure to make and go to all appointments, and call your doctor if you are having problems. It's also a good idea to know your test results and keep a list of the medicines you take. How can you care for yourself at home? Get screening tests that you and your doctor decide on. Screening helps find diseases before any symptoms appear. Eat healthy foods. Choose fruits, vegetables, whole grains, protein, and low-fat dairy foods. Limit fat, especially saturated fat. Reduce salt in your diet. Limit alcohol. If you are a man, have no more than 2 drinks a day or 14 drinks a week. If you are a woman, have no more than 1 drink a day or 7 drinks a week. Since alcohol affects older adults differently, you may want to limit alcohol even more. Or you may not want to drink at all. Get at least 30 minutes of exercise on most days of the week. Walking is a good choice. You also may want to do other activities, such as running, swimming, cycling, or playing tennis or team sports. Reach and stay at a healthy weight. This will lower your risk for many problems, such as obesity, diabetes, heart disease, and high blood pressure. Do not smoke. Smoking can make health problems worse. If you need help quitting, talk to your doctor about stop-smoking programs and medicines. These can increase your chances of quitting for good. Care for your mental health. It is easy to get weighed down by worry and stress. Learn strategies to manage stress, like deep breathing and mindfulness, and stay connected with your family and community. If you find you often feel sad or hopeless, talk with your doctor. Treatment can help.   Talk to your doctor about whether you have any risk factors for sexually transmitted infections (STIs). You can help prevent STIs if you wait to have sex with a new partner (or partners) until you've each been tested for STIs. It also helps if you use condoms (male or female condoms) and if you limit your sex partners to one person who only has sex with you. Vaccines are available for some STIs. If you think you may have a problem with alcohol or drug use, talk to your doctor. This includes prescription medicines (such as amphetamines and opioids) and illegal drugs (such as cocaine and methamphetamine). Your doctor can help you figure out what type of treatment is best for you. Protect your skin from too much sun. When you're outdoors from 10 a.m. to 4 p.m., stay in the shade or cover up with clothing and a hat with a wide brim. Wear sunglasses that block UV rays. Even when it's cloudy, put broad-spectrum sunscreen (SPF 30 or higher) on any exposed skin. See a dentist one or two times a year for checkups and to have your teeth cleaned. Wear a seat belt in the car. When should you call for help? Watch closely for changes in your health, and be sure to contact your doctor if you have any problems or symptoms that concern you. Where can you learn more? Go to https://SendUsbabakeb.healthBandPagepartners. org and sign in to your K[employer] account. Enter F020 in the [facility] box to learn more about \"Well Visit, Over 65: Care Instructions. \"     If you do not have an account, please click on the \"Sign Up Now\" link. Current as of: October 6, 2021               Content Version: 13.1  © 8378-3683 Healthwise, [employer]. Care instructions adapted under license by [facility] ([facility]). If you have questions about a medical condition or this instruction, always ask your healthcare professional. [address] any warranty or liability for your use of this information.          Patient Education        Preventing Falls: Care Instructions  Your Care Instructions     Getting around your home safely can be a challenge if you have injuries or health problems that make it easy for you to fall. Loose rugs and furniture in walkways are among the dangers for many older people who have problems walking or who have poor eyesight. People who have conditions such as arthritis, osteoporosis, or dementia also have to be careful not to fall. You can make your home safer with a few simple measures. Follow-up care is a key part of your treatment and safety. Be sure to make and go to all appointments, and call your doctor if you are having problems. It's also a good idea to know your test results and keep a list of the medicines you take. How can you care for yourself at home? Taking care of yourself  You may get dizzy if you do not drink enough water. To prevent dehydration, drink plenty of fluids. Choose water and other clear liquids. If you have kidney, heart, or liver disease and have to limit fluids, talk with your doctor before you increase the amount of fluids you drink. Exercise regularly to improve your strength, muscle tone, and balance. Walk if you can. Swimming may be a good choice if you cannot walk easily. Have your vision and hearing checked each year or any time you notice a change. If you have trouble seeing and hearing, you might not be able to avoid objects and could lose your balance. Know the side effects of the medicines you take. Ask your doctor or pharmacist whether the medicines you take can affect your balance. Sleeping pills or sedatives can affect your balance. Limit the amount of alcohol you drink. Alcohol can impair your balance and other senses. Ask your doctor whether calluses or corns on your feet need to be removed. If you wear loose-fitting shoes because of calluses or corns, you can lose your balance and fall. Talk to your doctor if you have numbness in your feet.   Preventing falls at home  Remove raised doorway thresholds, throw rugs, and clutter. Repair loose carpet or raised areas in the floor. Move furniture and electrical cords to keep them out of walking paths. Use nonskid floor wax, and wipe up spills right away, especially on ceramic tile floors. If you use a walker or cane, put rubber tips on it. If you use crutches, clean the bottoms of them regularly with an abrasive pad, such as steel wool. Keep your house well lit, especially stairways, porches, and outside walkways. Use night-lights in areas such as hallways and bathrooms. Add extra light switches or use remote switches (such as switches that go on or off when you clap your hands) to make it easier to turn lights on if you have to get up during the night. Install sturdy handrails on stairways. Move items in your cabinets so that the things you use a lot are on the lower shelves (about waist level). Keep a cordless phone and a flashlight with new batteries by your bed. If possible, put a phone in each of the main rooms of your house, or carry a cell phone in case you fall and cannot reach a phone. Or, you can wear a device around your neck or wrist. You push a button that sends a signal for help. Wear low-heeled shoes that fit well and give your feet good support. Use footwear with nonskid soles. Check the heels and soles of your shoes for wear. Repair or replace worn heels or soles. Do not wear socks without shoes on wood floors. Walk on the grass when the sidewalks are slippery. If you live in an area that gets snow and ice in the winter, sprinkle salt on slippery steps and sidewalks. Preventing falls in the bath  Install grab bars and nonskid mats inside and outside your shower or tub and near the toilet and sinks. Use shower chairs and bath benches. Use a hand-held shower head that will allow you to sit while showering.   Get into a tub or shower by putting the weaker leg in first. Get out of a tub or shower with your strong side first.  Repair loose toilet seats and consider installing a raised toilet seat to make getting on and off the toilet easier. Keep your bathroom door unlocked while you are in the shower. Where can you learn more? Go to https://chpejorge.[employer]. org and sign in to your [employer] account. Enter [id number] [id number] [phone] in the [facility] box to learn more about \"Preventing Falls: Care Instructions. \"     If you do not have an account, please click on the \"Sign Up Now\" link. Current as of: September 8, 2021               Content Version: 13.1  © 0600-1635 Healthwise, [employer]. Care instructions adapted under license by [facility] ([facility]). If you have questions about a medical condition or this instruction, always ask your healthcare professional. [address] any warranty or liability for your use of this information. Patient Education        Learning About Living Perroy  What is a living will? A living will, also called a declaration, is a legal form. It tells your family and your doctor your wishes when you can't speak for yourself. It's used by the health professionals who will treat you as you near the end of your life or if you get seriously hurt or ill. If you put your wishes in writing, your loved ones and others will know what kind of care you want. They won't need to guess. This can ease your mind and be helpful to others. And you can change or cancel your living will at any time. A living will is not the same as an estate or property will. An estate will explains what you want to happen with your money and property after you die. How do you use it? A living will is used to describe the kinds of treatment or life support you want as you near the end of your life or if you get seriously hurt or ill. Keep these facts in mind about living bertrand. Your living will is used only if you can't speak or make decisions for yourself.  Most often, one or more doctors must certify are some questions to ask yourself as you make your living will:  Do you know enough about life support methods that might be used? If not, talk to your doctor so you know what might be done if you can't breathe on your own, your heart stops, or you can't swallow. What things would you still want to be able to do after you receive life-support methods? Would you want to be able to walk? To speak? To eat on your own? To live without the help of machines? Do you want certain Ep[employer] practices performed if you become very ill? If you have a choice, where do you want to be cared for? In your home? At a hospital or nursing home? If you have a choice at the end of your life, where would you prefer to die? At home? In a hospital or nursing home? Somewhere else? Would you prefer to be buried or cremated? Do you want your organs to be donated after you die? What should you do with your living will? Make sure that your family members and your health care agent have copies of your living will (also called a declaration). Give your doctor a copy of your living will. Ask him or her to keep it as part of your medical record. If you have more than one doctor, make sure that each one has a copy. Put a copy of your living will where it can be easily found. For example, some people may put a copy on their refrigerator door. If you are using a digital copy, be sure your doctor, family members, and health care agent know how to find and access it. Where can you learn more? Go to https://chbrigid.Planandoo. org and sign in to your ViaBill account. Enter A084 in the [employer] box to learn more about \"Learning About Living Sobia Ramos. \"     If you do not have an account, please click on the \"Sign Up Now\" link. Current as of: March 17, 2021               Content Version: 13.1  © 9844-6096 Healthwise, [employer]. Care instructions adapted under license by [address]  If you have questions about a medical condition or this instruction, always ask your healthcare professional. [place] [address] any warranty or liability for your use of this information.

## 2022-03-21 RX ORDER — ATORVASTATIN CALCIUM 20 MG/1
TABLET, FILM COATED ORAL
Qty: 90 TABLET | Refills: 3 | Status: SHIPPED | OUTPATIENT
Start: 2022-03-21 | End: 2022-07-29 | Stop reason: SDUPTHER

## 2022-04-04 RX ORDER — OMEPRAZOLE 40 MG/1
CAPSULE, DELAYED RELEASE ORAL
Qty: 90 CAPSULE | Refills: 3 | Status: SHIPPED | OUTPATIENT
Start: 2022-04-04

## 2022-06-06 RX ORDER — HYDROCHLOROTHIAZIDE 25 MG/1
TABLET ORAL
Qty: 90 TABLET | Refills: 3 | Status: SHIPPED | OUTPATIENT
Start: 2022-06-06

## 2022-06-29 ENCOUNTER — OFFICE VISIT (OUTPATIENT)
Dept: PRIMARY CARE CLINIC | Age: 82
End: 2022-06-29
Payer: MEDICARE

## 2022-06-29 VITALS
SYSTOLIC BLOOD PRESSURE: 126 MMHG | DIASTOLIC BLOOD PRESSURE: 82 MMHG | HEART RATE: 64 BPM | WEIGHT: 243 LBS | BODY MASS INDEX: 38.06 KG/M2 | OXYGEN SATURATION: 96 %

## 2022-06-29 DIAGNOSIS — I10 ESSENTIAL HYPERTENSION: ICD-10-CM

## 2022-06-29 DIAGNOSIS — E11.8 TYPE 2 DIABETES MELLITUS WITH COMPLICATION (HCC): Primary | ICD-10-CM

## 2022-06-29 DIAGNOSIS — L57.0 ACTINIC KERATOSES: ICD-10-CM

## 2022-06-29 LAB — HBA1C MFR BLD: 6.6 %

## 2022-06-29 PROCEDURE — 1123F ACP DISCUSS/DSCN MKR DOCD: CPT | Performed by: FAMILY MEDICINE

## 2022-06-29 PROCEDURE — 1036F TOBACCO NON-USER: CPT | Performed by: FAMILY MEDICINE

## 2022-06-29 PROCEDURE — 99214 OFFICE O/P EST MOD 30 MIN: CPT | Performed by: FAMILY MEDICINE

## 2022-06-29 PROCEDURE — 3044F HG A1C LEVEL LT 7.0%: CPT | Performed by: FAMILY MEDICINE

## 2022-06-29 PROCEDURE — 83036 HEMOGLOBIN GLYCOSYLATED A1C: CPT | Performed by: FAMILY MEDICINE

## 2022-06-29 PROCEDURE — G8417 CALC BMI ABV UP PARAM F/U: HCPCS | Performed by: FAMILY MEDICINE

## 2022-06-29 PROCEDURE — G8427 DOCREV CUR MEDS BY ELIG CLIN: HCPCS | Performed by: FAMILY MEDICINE

## 2022-06-29 RX ORDER — FLUOROURACIL 50 MG/G
CREAM TOPICAL
Qty: 40 G | Refills: 0 | Status: SHIPPED | OUTPATIENT
Start: 2022-06-29

## 2022-06-29 RX ORDER — DULAGLUTIDE 0.75 MG/.5ML
INJECTION, SOLUTION SUBCUTANEOUS
Qty: 12 PEN | Refills: 3
Start: 2022-06-29 | End: 2022-10-25

## 2022-06-29 RX ORDER — MELOXICAM 7.5 MG/1
TABLET ORAL
COMMUNITY
Start: 2022-04-29

## 2022-06-29 NOTE — PATIENT INSTRUCTIONS
Patient Education        A Healthy Lifestyle: Care Instructions  Your Care Instructions     A healthy lifestyle can help you feel good, stay at a healthy weight, and have plenty of energy for both work and play. A healthy lifestyle is something youcan share with your whole family. A healthy lifestyle also can lower your risk for serious health problems, suchas high blood pressure, heart disease, and diabetes. You can follow a few steps listed below to improve your health and the healthof your family. Follow-up care is a key part of your treatment and safety. Be sure to make and go to all appointments, and call your doctor if you are having problems. It's also a good idea to know your test results and keep alist of the medicines you take. How can you care for yourself at home?  Do not eat too much sugar, fat, or fast foods. You can still have dessert and treats now and then. The goal is moderation.  Start small to improve your eating habits. Pay attention to portion sizes, drink less juice and soda pop, and eat more fruits and vegetables. ? Eat a healthy amount of food. A 3-ounce serving of meat, for example, is about the size of a deck of cards. Fill the rest of your plate with vegetables and whole grains. ? Limit the amount of soda and sports drinks you have every day. Drink more water when you are thirsty. ? Eat plenty of fruits and vegetables every day. Have an apple or some carrot sticks as an afternoon snack instead of a candy bar. Try to have fruits and/or vegetables at every meal.   Make exercise part of your daily routine. You may want to start with simple activities, such as walking, bicycling, or slow swimming. Try to be active 30 to 60 minutes every day. You do not need to do all 30 to 60 minutes all at once. For example, you can exercise 3 times a day for 10 or 20 minutes.  Moderate exercise is safe for most people, but it is always a good idea to talk to your doctor before starting an exercise program.   Keep moving. Osmin Deems the lawn, work in the garden, or [place]. Take the stairs instead of the elevator at work.  If you smoke, quit. People who smoke have an increased risk for heart attack, stroke, cancer, and other lung illnesses. Quitting is hard, but there are ways to boost your chance of quitting tobacco for good. ? Use nicotine gum, patches, or lozenges. ? Ask your doctor about stop-smoking programs and medicines. ? Keep trying. In addition to reducing your risk of diseases in the future, you will notice some benefits soon after you stop using tobacco. If you have shortness of breath or asthma symptoms, they will likely get better within a few weeks after you quit.  Limit how much alcohol you drink. Moderate amounts of alcohol (up to 2 drinks a day for men, 1 drink a day for women) are okay. But drinking too much can lead to liver problems, high blood pressure, and other health problems. Family health  If you have a family, there are many things you can do together to improve yourhealth.  Eat meals together as a family as often as possible.  Eat healthy foods. This includes fruits, vegetables, lean meats and dairy, and whole grains.  Include your family in your fitness plan. Most people think of activities such as jogging or tennis as the way to fitness, but there are many ways you and your family can be more active. Anything that makes you breathe hard and gets your heart pumping is exercise. Here are some tips:  ? Walk to do errands or to take your child to school or the bus.  ? Go for a family bike ride after dinner instead of watching TV. Where can you learn more? Go to https://AdRollbrigid.L[employer] Light Engines. org and sign in to your [employer] account. Enter V777 in the Wowan365.com box to learn more about \"A Healthy Lifestyle: Care Instructions. \"     If you do not have an account, please click on the \"Sign Up Now\" link.   Current as of: February 9, 4396               SCARLETT Version: 13.3  © 5347-0450 Healthwise, Incorporated. Care instructions adapted under license by [facility] ([facility]). If you have questions about a medical condition or this instruction, always ask your healthcare professional. [address] any warranty or liability for your use of this information.

## 2022-06-29 NOTE — PROGRESS NOTES
[address] [facility] PRIMARY CARE  [address] [facility] [address]  Dept: [phone]    Domitila Goodman is a 80 y.o. male Established patient, who presents today for his medical conditions/complaintsas noted below. Chief Complaint   Patient presents with    Diabetes       HPI:     HPI   Home sugars: 132, 172, 140, 142, 149, 155, 133, 128, 207, 160, 118, 148  Fasting readings  Prior to going back on trulicity: 121, 301, 537,   No low sugars  restarted trulicity 3 weeks ago due to higher sugars. He can't afford the trulicity now  His wife is looking into bydureon and rybelsus cost     Left lateral forehead lesion getting bigger, even though it was frozen  Getting cataract surgery tomorrow    Reviewed prior notes Cardiology  Reviewed previous Labs    LDL Cholesterol (mg/dL)   Date Value   09/15/2021 96     LDL Calculated (mg/dL)   Date Value   09/04/2020 115   07/10/2019 98   06/21/2018 77       (goal LDL is <100)   AST (U/L)   Date Value   09/15/2021 21     ALT (U/L)   Date Value   09/15/2021 28     BUN (mg/dL)   Date Value   09/15/2021 16     Hemoglobin A1C (%)   Date Value   03/18/2022 6.5     TSH (mIU/L)   Date Value   09/15/2021 2.37     BP Readings from Last 3 Encounters:   06/29/22 126/82   03/18/22 110/62   02/21/22 118/68          (goal 120/80)    Past Medical History:   Diagnosis Date    Diabetes ([address]     Diverticulitis     History of allergy     Hyperlipidemia     Hypertension     Osteoarthritis       Past Surgical History:   Procedure Laterality Date    BACK SURGERY  09/03/2017    Everardo oM, lumbar fusion   [place]  2015    partial colectomy due to diveritc    COLONOSCOPY      TOTAL KNEE ARTHROPLASTY Right        History reviewed. No pertinent family history.     Social History     Tobacco Use    Smoking status: Former Smoker     Packs/day: 1.50     Years: 40.00     Pack years: 60.00     Quit date: 1991     Years since quittin.5    Smokeless tobacco: Never Used   Substance Use Topics    Alcohol use: Not Currently     Alcohol/week: 1.0 standard drink     Types: 1 Standard drinks or equivalent per week     Comment: social      Current Outpatient Medications   Medication Sig Dispense Refill    meloxicam (MOBIC) 7.5 MG tablet       Loratadine (CLARITIN PO) Take by mouth daily as needed      fluorouracil (EFUDEX) 5 % cream Apply topically 2 times daily. 40 g 0    Dulaglutide (TRULICITY) 1.12 WQ/0.2NP SOPN INJECT 0.5ML (=0.75 MG)    SUBCUTANEOUSLY ONCE WEEKLY 12 pen 3    hydroCHLOROthiazide (HYDRODIURIL) 25 MG tablet TAKE 1 TABLET DAILY 90 tablet 3    omeprazole (PRILOSEC) 40 MG delayed release capsule TAKE 1 CAPSULE DAILY 90 capsule 3    atorvastatin (LIPITOR) 20 MG tablet TAKE ONE TABLET BY MOUTH ONCE NIGHTLY 90 tablet 3    Handicap Placard MISC by Does not apply route Cannot walk 200 Feet due to orthopedic disease  Expires 3/18/2027 1 each 0    blood glucose test strips (ASCENSIA AUTODISC VI;ONE TOUCH ULTRA TEST VI) strip Use with associated glucose meter. Use daily. Type 2 DM not on insulin 100 strip 3    metFORMIN (GLUCOPHAGE) 500 MG tablet Take 2 tablets by mouth 2 times daily (with meals) 360 tablet 3    Omega-3 Fatty Acids (OMEGA III EPA+DHA) 1000 MG CAPS Take 1 capsule by mouth 2 times daily      glucose (GLUTOSE) 40 % GEL Take 37.5 mLs by mouth as needed (low sugar) 45 g 1    docusate sodium (COLACE, DULCOLAX) 100 MG CAPS Take 100 mg by mouth 2 times daily as needed for Constipation 60 capsule 0    metoprolol succinate (TOPROL XL) 25 MG extended release tablet Take 0.5 tablets by mouth daily (Patient taking differently: Take 25 mg by mouth daily Dr. Isabelle Venegas) 90 tablet 0    aspirin EC 81 MG EC tablet Take 81 mg by mouth daily      Multiple Vitamin (MULTI-VITAMIN DAILY) TABS Take 1 tablet by mouth daily       No current facility-administered medications for this visit. Allergies   Allergen Reactions    Feldene [Piroxicam]     Flagyl [Metronidazole]     Lisinopril Dizziness or Vertigo    Naprosyn [Naproxen]     Penicillins     Sulfa Antibiotics     Sulfasalazine        Health Maintenance   Topic Date Due    Shingles vaccine (1 of 2) Never done    Diabetic retinal exam  05/25/2022    Diabetic foot exam  07/19/2022    Lipids  09/15/2022    A1C test (Diabetic or Prediabetic)  09/18/2022    Depression Screen  03/18/2023    Annual Wellness Visit (AWV)  03/19/2023    DTaP/Tdap/Td vaccine (2 - Td or Tdap) 07/13/2030    Flu vaccine  Completed    Pneumococcal 65+ years Vaccine  Completed    COVID-19 Vaccine  Completed    Hepatitis A vaccine  Aged Out    Hib vaccine  Aged Out    Meningococcal (ACWY) vaccine  Aged Out       Subjective:      Review of Systems   Constitutional: Negative. Objective:     /82   Pulse 64   Wt 243 lb (110.2 kg)   SpO2 96%   BMI 38.06 kg/m²   Physical Exam  Vitals and nursing note reviewed. Constitutional:       General: He is not in acute distress. Appearance: He is well-developed. He is not ill-appearing. HENT:      Head: Normocephalic and atraumatic. Right Ear: External ear normal.      Left Ear: External ear normal.   Eyes:      General: No scleral icterus. Right eye: No discharge. Left eye: No discharge. Conjunctiva/sclera: Conjunctivae normal.   Neck:      Thyroid: No thyromegaly. Trachea: No tracheal deviation. Cardiovascular:      Rate and Rhythm: Normal rate and regular rhythm. Heart sounds: Normal heart sounds. Pulmonary:      Effort: Pulmonary effort is normal. No respiratory distress. Breath sounds: Normal breath sounds. No wheezing. Lymphadenopathy:      Cervical: No cervical adenopathy. Skin:     General: Skin is warm. Findings: Lesion present. No rash. Comments: Left lateral forehead/temple the lesion dry, pink, edges are retracted.   Another smaller lesion right below it. Picture in the media folder   Neurological:      Mental Status: He is alert and oriented to person, place, and time. Psychiatric:         Mood and Affect: Mood normal.         Behavior: Behavior normal.         Thought Content: Thought content normal.         Assessment:       Diagnosis Orders   1. Type 2 diabetes mellitus with complication (HCC)  Comprehensive Metabolic Panel, Fasting    Lipid Panel    POCT glycosylated hemoglobin (Hb A1C)    Dulaglutide (TRULICITY) 5.83 PE/9.7AP SOPN   2. Essential hypertension  Comprehensive Metabolic Panel, Fasting   3. Actinic keratoses  fluorouracil (EFUDEX) 5 % cream        Plan:      No follow-ups on file. Picture of left temple lesion in the media folder  See Erik Langley, use efudex on the 2 lesions until seen. Orders Placed This Encounter   Procedures    Comprehensive Metabolic Panel, Fasting     Standing Status:   Future     Standing Expiration Date:   12/29/2022    Lipid Panel     Standing Status:   Future     Standing Expiration Date:   6/29/2023     Order Specific Question:   Is Patient Fasting?/# of Hours     Answer:   yes    POCT glycosylated hemoglobin (Hb A1C)     Orders Placed This Encounter   Medications    fluorouracil (EFUDEX) 5 % cream     Sig: Apply topically 2 times daily. Dispense:  40 g     Refill:  0    Dulaglutide (TRULICITY) 5.68 BD/1.3TM SOPN     Sig: INJECT 0.5ML (=0.75 MG)    SUBCUTANEOUSLY ONCE WEEKLY     Dispense:  12 pen     Refill:  3       Patient given educationalmaterials - see patient instructions. Discussed use, benefit, and side effectsof prescribed medications. All patient questions answered. Pt voiced understanding. Reviewed health maintenance. Instructed to continue current medications, diet andexercise. Patient agreed with treatment plan. Follow up as directed.      Electronicallysigned by Kristofer Garcia MD on 6/29/2022 at 10:28 AM

## 2022-07-28 DIAGNOSIS — E11.8 TYPE 2 DIABETES MELLITUS WITH COMPLICATION (HCC): ICD-10-CM

## 2022-07-28 DIAGNOSIS — I10 ESSENTIAL HYPERTENSION: ICD-10-CM

## 2022-07-28 LAB
ALBUMIN SERPL-MCNC: 4.3 G/DL (ref 3.5–5.2)
ALBUMIN/GLOBULIN RATIO: 1.5 (ref 1–2.5)
ALP BLD-CCNC: 80 U/L (ref 40–129)
ALT SERPL-CCNC: 30 U/L (ref 5–41)
ANION GAP SERPL CALCULATED.3IONS-SCNC: 15 MMOL/L (ref 9–17)
AST SERPL-CCNC: 21 U/L
BILIRUB SERPL-MCNC: 0.93 MG/DL (ref 0.3–1.2)
BUN BLDV-MCNC: 16 MG/DL (ref 8–23)
CALCIUM SERPL-MCNC: 9.7 MG/DL (ref 8.6–10.4)
CHLORIDE BLD-SCNC: 99 MMOL/L (ref 98–107)
CHOLESTEROL/HDL RATIO: 4.8
CHOLESTEROL: 182 MG/DL
CO2: 24 MMOL/L (ref 20–31)
CREAT SERPL-MCNC: 1.15 MG/DL (ref 0.7–1.2)
GFR AFRICAN AMERICAN: >60 ML/MIN
GFR NON-AFRICAN AMERICAN: >60 ML/MIN
GFR SERPL CREATININE-BSD FRML MDRD: ABNORMAL ML/MIN/{1.73_M2}
GLUCOSE FASTING: 147 MG/DL (ref 70–99)
HDLC SERPL-MCNC: 38 MG/DL
LDL CHOLESTEROL: 119 MG/DL (ref 0–130)
POTASSIUM SERPL-SCNC: 4.1 MMOL/L (ref 3.7–5.3)
SODIUM BLD-SCNC: 138 MMOL/L (ref 135–144)
TOTAL PROTEIN: 7.1 G/DL (ref 6.4–8.3)
TRIGL SERPL-MCNC: 123 MG/DL

## 2022-07-29 RX ORDER — ATORVASTATIN CALCIUM 40 MG/1
40 TABLET, FILM COATED ORAL NIGHTLY
Qty: 90 TABLET | Refills: 3 | Status: SHIPPED | OUTPATIENT
Start: 2022-07-29

## 2022-08-26 ENCOUNTER — OFFICE VISIT (OUTPATIENT)
Dept: PRIMARY CARE CLINIC | Age: 82
End: 2022-08-26
Payer: MEDICARE

## 2022-08-26 VITALS
OXYGEN SATURATION: 95 % | DIASTOLIC BLOOD PRESSURE: 80 MMHG | BODY MASS INDEX: 38.3 KG/M2 | HEART RATE: 63 BPM | SYSTOLIC BLOOD PRESSURE: 124 MMHG | WEIGHT: 244 LBS | HEIGHT: 67 IN

## 2022-08-26 DIAGNOSIS — T14.8XXA OPEN WOUND: ICD-10-CM

## 2022-08-26 DIAGNOSIS — L57.0 ACTINIC KERATOSES: Primary | ICD-10-CM

## 2022-08-26 PROCEDURE — 99212 OFFICE O/P EST SF 10 MIN: CPT | Performed by: PHYSICIAN ASSISTANT

## 2022-08-26 PROCEDURE — 1036F TOBACCO NON-USER: CPT | Performed by: PHYSICIAN ASSISTANT

## 2022-08-26 PROCEDURE — 17003 DESTRUCT PREMALG LES 2-14: CPT | Performed by: PHYSICIAN ASSISTANT

## 2022-08-26 PROCEDURE — G8417 CALC BMI ABV UP PARAM F/U: HCPCS | Performed by: PHYSICIAN ASSISTANT

## 2022-08-26 PROCEDURE — 17000 DESTRUCT PREMALG LESION: CPT | Performed by: PHYSICIAN ASSISTANT

## 2022-08-26 PROCEDURE — 1123F ACP DISCUSS/DSCN MKR DOCD: CPT | Performed by: PHYSICIAN ASSISTANT

## 2022-08-26 PROCEDURE — G8427 DOCREV CUR MEDS BY ELIG CLIN: HCPCS | Performed by: PHYSICIAN ASSISTANT

## 2022-08-26 NOTE — PROGRESS NOTES
Actinic Keratoses destruction procedure note:  8/26/2022  Elenita Go  1940    /80   Pulse 63   Ht 5' 7\" (1.702 m)   Wt 244 lb (110.7 kg)   SpO2 95%   BMI 38.22 kg/m²   ALL VITALS REVIEWED    Allergies   Allergen Reactions    Feldene [Piroxicam]     Flagyl [Metronidazole]     Lisinopril Dizziness or Vertigo    Naprosyn [Naproxen]     Penicillins     Sulfa Antibiotics     Sulfasalazine        Chief Complaint   Patient presents with    Skin Lesion     6 month SK re check on the face      Written consent was obtained. Lesion(s) cleansed with Alcohol. Location:  face    Histofreeze applied with applicator. 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 Diagnosis Orders   1. Actinic keratoses  WV DESTRUC PREMALIGNANT, FIRST LESION    WV DESTRUC PREMALIGNANT,2-14 LESIONS      2. Open wound            Follow Up: Wound care discussed. Keep well moisturized. Murpirocin for sore and relieve pressure on area. Watch for signs of infection which would include:  Redness, swelling, fever. If signs appear, please return to office. Return in about 1 year (around 8/26/2023), or if symptoms worsen or fail to improve, for AK tx.     Electronically signed by Femi Head PA-C on 8/26/2022 at 10:13 AM

## 2022-10-25 ENCOUNTER — IMMUNIZATION (OUTPATIENT)
Dept: PRIMARY CARE CLINIC | Age: 82
End: 2022-10-25
Payer: MEDICARE

## 2022-10-25 ENCOUNTER — OFFICE VISIT (OUTPATIENT)
Dept: PRIMARY CARE CLINIC | Age: 82
End: 2022-10-25
Payer: MEDICARE

## 2022-10-25 VITALS
HEART RATE: 65 BPM | WEIGHT: 249.4 LBS | DIASTOLIC BLOOD PRESSURE: 76 MMHG | BODY MASS INDEX: 39.15 KG/M2 | HEIGHT: 67 IN | SYSTOLIC BLOOD PRESSURE: 124 MMHG | OXYGEN SATURATION: 92 %

## 2022-10-25 DIAGNOSIS — Z23 NEED FOR VACCINATION: Primary | ICD-10-CM

## 2022-10-25 DIAGNOSIS — I10 ESSENTIAL HYPERTENSION: ICD-10-CM

## 2022-10-25 DIAGNOSIS — E11.8 TYPE 2 DIABETES MELLITUS WITH COMPLICATION (HCC): Primary | ICD-10-CM

## 2022-10-25 LAB — HBA1C MFR BLD: 6.9 %

## 2022-10-25 PROCEDURE — 91312 COVID-19, PFIZER BIVALENT BOOSTER, (AGE 12Y+), IM, 30 MCG/0.3 ML: CPT | Performed by: FAMILY MEDICINE

## 2022-10-25 PROCEDURE — G8484 FLU IMMUNIZE NO ADMIN: HCPCS | Performed by: FAMILY MEDICINE

## 2022-10-25 PROCEDURE — 1123F ACP DISCUSS/DSCN MKR DOCD: CPT | Performed by: FAMILY MEDICINE

## 2022-10-25 PROCEDURE — 99214 OFFICE O/P EST MOD 30 MIN: CPT | Performed by: FAMILY MEDICINE

## 2022-10-25 PROCEDURE — 0124A COVID-19, PFIZER BIVALENT BOOSTER, (AGE 12Y+), IM, 30 MCG/0.3 ML: CPT | Performed by: FAMILY MEDICINE

## 2022-10-25 PROCEDURE — 83036 HEMOGLOBIN GLYCOSYLATED A1C: CPT | Performed by: FAMILY MEDICINE

## 2022-10-25 PROCEDURE — G8417 CALC BMI ABV UP PARAM F/U: HCPCS | Performed by: FAMILY MEDICINE

## 2022-10-25 PROCEDURE — 90694 VACC AIIV4 NO PRSRV 0.5ML IM: CPT | Performed by: FAMILY MEDICINE

## 2022-10-25 PROCEDURE — 1036F TOBACCO NON-USER: CPT | Performed by: FAMILY MEDICINE

## 2022-10-25 PROCEDURE — 3044F HG A1C LEVEL LT 7.0%: CPT | Performed by: FAMILY MEDICINE

## 2022-10-25 PROCEDURE — G0008 ADMIN INFLUENZA VIRUS VAC: HCPCS | Performed by: FAMILY MEDICINE

## 2022-10-25 PROCEDURE — G8427 DOCREV CUR MEDS BY ELIG CLIN: HCPCS | Performed by: FAMILY MEDICINE

## 2022-10-25 ASSESSMENT — PATIENT HEALTH QUESTIONNAIRE - PHQ9
1. LITTLE INTEREST OR PLEASURE IN DOING THINGS: 0
2. FEELING DOWN, DEPRESSED OR HOPELESS: 0
SUM OF ALL RESPONSES TO PHQ QUESTIONS 1-9: 0
SUM OF ALL RESPONSES TO PHQ9 QUESTIONS 1 & 2: 0
SUM OF ALL RESPONSES TO PHQ QUESTIONS 1-9: 0

## 2022-10-25 NOTE — PROGRESS NOTES
After obtaining consent, and per orders of Dr. Efe Hawkins, injection of pfizer  given in Left deltoid by Yanet Salas MA. Patient instructed to remain in clinic for 20 minutes afterwards, and to report any adverse reaction to me immediately.

## 2022-10-25 NOTE — PATIENT INSTRUCTIONS
Patient Education        A Healthy Lifestyle: Care Instructions  A healthy lifestyle can help you feel good, have more energy, and stay at a weight that's healthy for you. You can share a healthy lifestyle with your friends and family. And you can do it on your own. Eat meals with your friends or family. You could try cooking together. Plan activities with other people. Go for a walk with a friend, try a free online fitness class, or join a sports league. Eat a variety of healthy foods. These include fruits, vegetables, whole grains, low-fat dairy, and lean protein. Choose healthy portions of food. You can use the Nutrition Facts label on food packages as a guide. Eat more fruits and vegetables. You could add vegetables to sandwiches or add fruit to cereal.  Drink water when you are thirsty. Limit soda, juice, and sports drinks. Try to exercise most days. Aim for at least 2½ hours of exercise each week. Keep moving. Work in the garden or take your dog on a walk. Use the stairs instead of the elevator. If you use tobacco or nicotine, try to quit. Ask your doctor about programs and medicines to help you quit. Limit alcohol. Men should have no more than 2 drinks a day. Women should have no more than 1. For some people, no alcohol is the best choice. Follow-up care is a key part of your treatment and safety. Be sure to make and go to all appointments, and call your doctor if you are having problems. It's also a good idea to know your test results and keep a list of the medicines you take. Where can you learn more? Go to https://marcie.Moki - formerly MokiMobility. org and sign in to your [employer] account. Enter G487 in the [facility] box to learn more about \"A Healthy Lifestyle: Care Instructions. \"     If you do not have an account, please click on the \"Sign Up Now\" link. Current as of: March 9, 2022               Content Version: 13.4  © 1209-4971 Healthwise, Incorporated.    Care instructions adapted under license by [facility] ([facility]). If you have questions about a medical condition or this instruction, always ask your healthcare professional. [address] any warranty or liability for your use of this information. Patient Education        Learning About Eating More Fruits and Vegetables  What are some quick tips for eating more fruits and vegetables? We're all encouraged to eat more fruits and vegetables. Yet it can seem like one more chore on the daily to-do list. But you can add color and crunch to your meals--and lots of nutrition--with these quick tips. Brighten up your breakfast.  Add sliced fruit or frozen berries to your yogurt, pancakes, or cereal.  Blend fresh or frozen fruit, veggies, and yogurt with a little fruit juice, and you've got a tasty smoothie. Make your scrambled eggs a gourmet treat by adding onions, celery, and bell peppers. Bake up some bran muffins with grated carrots added into the mix. Make a livelier lunch. Jazz up tuna or chicken salad with apple chunks, celery, or grapes--or all of them! Add cucumbers, avocado slices, tomatoes, and lettuce to your sandwiches. Kick up the flavor of grilled cheese sandwiches with spinach and tomatoes. Puree some cooked carrots or squash to add to tomato soup. Add delicious veggies to dinner. Give more color and taste to salads. Stir in red cabbage, carrots, and bell peppers. Top salads with dried cranberries or raisins. \"Frost\" your salad with orange sections or strawberries. Keep a bag or two of frozen vegetables ready to pull out of the freezer for a side dish. Spice up spaghetti and meatballs with mushrooms and bell peppers. Roast vegetables like cauliflower or squash in the oven with olive oil to bring out their flavor. Season your veggie dish with herbs like basil and deanne and a splash of lemon juice and olive oil. Grab some healthy snacks on the go.   Scoop up an apple, banana, or plum for a quick snack.  Cut up raw fruits and veggies to keep in your fridge. Grapes, oranges, carrots, and celery are great choices. They'll be ready for a quick snack or an after-school treat. Dip raw vegetables in hummus or peanut butter. Keep dried fruit on hand for an easy \"take with you\" snack. Make something sweet--and healthy. Try baked apples or pears topped with cinnamon and honey for a delicious dessert. Make chocolate chip cookies even better with grated carrots added to the mix. Where can you learn more? Go to https://WannadopeMeitu.SmartRx. org and sign in to your [employer] account. Enter F050 in the [employer] box to learn more about \"Learning About Eating More Fruits and Vegetables. \"     If you do not have an account, please click on the \"Sign Up Now\" link. Current as of: May 9, 2022               Content Version: 13.4  © 2006-2022 Healthwise, Incorporated. Care instructions adapted under license by [facility] ([facility]). If you have questions about a medical condition or this instruction, always ask your healthcare professional. [place] [address] any warranty or liability for your use of this information.

## 2022-10-25 NOTE — PROGRESS NOTES
[address] [facility] PRIMARY CARE  [address] [address]  [facility] [address]  Dept: [phone]    Rosi Clark is a 80 y.o. male Established patient, who presents today for his medical conditions/complaintsas noted below. Chief Complaint   Patient presents with    Diabetes     Last a1c 6.6       HPI:     Diabetes    Out of trulicity x 3 months too expensive  Home sugars 176, 163, 157, 166, 160, 152, 166, 157, 167, 147, 143, 170, 196, 142, 168. No low sugar reactions  Has been trying to watch diet, except sweets, likes cookies, likes chips and dip. Had cataracts removed. Reviewed prior notes None  Reviewed previous Labs    LDL Cholesterol (mg/dL)   Date Value   2022 119   09/15/2021 96     LDL Calculated (mg/dL)   Date Value   2020 115   07/10/2019 98   2018 77       (goal LDL is <100)   AST (U/L)   Date Value   2022 21     ALT (U/L)   Date Value   2022 30     BUN (mg/dL)   Date Value   2022 16     Hemoglobin A1C (%)   Date Value   2022 6.6     TSH (mIU/L)   Date Value   09/15/2021 2.37     BP Readings from Last 3 Encounters:   10/25/22 124/76   22 124/80   22 126/82          (goal 120/80)    Past Medical History:   Diagnosis Date    Diabetes ([address]     Diverticulitis     History of allergy     Hyperlipidemia     Hypertension     Osteoarthritis       Past Surgical History:   Procedure Laterality Date    BACK SURGERY  2017    Quan Pisano, lumbar fusion    CARDIAC CATHETERIZATION      COLECTOMY  2015    partial colectomy due to diveritc    COLONOSCOPY      TOTAL KNEE ARTHROPLASTY Right        No family history on file.     Social History     Tobacco Use    Smoking status: Former     Packs/day: 1.50     Years: 40.00     Pack years: 60.00     Types: Cigarettes     Quit date: 1991     Years since quittin.8    Smokeless tobacco: Never   Substance Use Topics    Alcohol use: Not Currently Alcohol/week: 1.0 standard drink     Types: 1 Standard drinks or equivalent per week     Comment: social      Current Outpatient Medications   Medication Sig Dispense Refill    mupirocin (BACTROBAN) 2 % ointment Apply topically 3 times daily. 22 g 0    atorvastatin (LIPITOR) 40 MG tablet Take 1 tablet by mouth at bedtime 90 tablet 3    metFORMIN (GLUCOPHAGE) 500 MG tablet Take 2 tablets by mouth 2 times daily (with meals) 360 tablet 3    meloxicam (MOBIC) 7.5 MG tablet       Loratadine (CLARITIN PO) Take by mouth daily as needed      fluorouracil (EFUDEX) 5 % cream Apply topically 2 times daily. 40 g 0    hydroCHLOROthiazide (HYDRODIURIL) 25 MG tablet TAKE 1 TABLET DAILY 90 tablet 3    omeprazole (PRILOSEC) 40 MG delayed release capsule TAKE 1 CAPSULE DAILY 90 capsule 3    Handicap Placard MISC by Does not apply route Cannot walk 200 Feet due to orthopedic disease  Expires 3/18/2027 1 each 0    blood glucose test strips (ASCENSIA AUTODISC VI;ONE TOUCH ULTRA TEST VI) strip Use with associated glucose meter. Use daily. Type 2 DM not on insulin 100 strip 3    Omega-3 Fatty Acids (OMEGA III EPA+DHA) 1000 MG CAPS Take 1 capsule by mouth 2 times daily      glucose (GLUTOSE) 40 % GEL Take 37.5 mLs by mouth as needed (low sugar) 45 g 1    docusate sodium (COLACE, DULCOLAX) 100 MG CAPS Take 100 mg by mouth 2 times daily as needed for Constipation 60 capsule 0    metoprolol succinate (TOPROL XL) 25 MG extended release tablet Take 0.5 tablets by mouth daily (Patient taking differently: Take 25 mg by mouth daily Dr. Berenice Rea) 90 tablet 0    aspirin EC 81 MG EC tablet Take 81 mg by mouth daily      Multiple Vitamin (MULTI-VITAMIN DAILY) TABS Take 1 tablet by mouth daily       No current facility-administered medications for this visit.      Allergies   Allergen Reactions    Feldene [Piroxicam]     Flagyl [Metronidazole]     Lisinopril Dizziness or Vertigo    Naprosyn [Naproxen]     Penicillins     Sulfa Antibiotics Sulfasalazine        Health Maintenance   Topic Date Due    Shingles vaccine (1 of 2) Never done    COVID-19 Vaccine (5 - Booster for Moderna series) 05/27/2022    Flu vaccine (1) 08/01/2022    A1C test (Diabetic or Prediabetic)  12/29/2022    Depression Screen  03/18/2023    Annual Wellness Visit (AWV)  03/19/2023    Diabetic retinal exam  06/13/2023    Lipids  07/28/2023    Diabetic foot exam  10/25/2023    DTaP/Tdap/Td vaccine (2 - Td or Tdap) 07/13/2030    Pneumococcal 65+ years Vaccine  Completed    Hepatitis A vaccine  Aged Out    Hib vaccine  Aged Out    Meningococcal (ACWY) vaccine  Aged Out       Subjective:      Review of Systems   Constitutional:  Negative for fever. Musculoskeletal:  Positive for arthralgias. Objective:     /76   Pulse 65   Ht 5' 7\" (1.702 m)   Wt 249 lb 6.4 oz (113.1 kg)   SpO2 92%   BMI 39.06 kg/m²   Physical Exam  Vitals and nursing note reviewed. Constitutional:       General: He is not in acute distress. Appearance: He is well-developed. He is not ill-appearing. HENT:      Head: Normocephalic and atraumatic. Right Ear: External ear normal.      Left Ear: External ear normal.   Eyes:      General: No scleral icterus. Right eye: No discharge. Left eye: No discharge. Conjunctiva/sclera: Conjunctivae normal.   Neck:      Thyroid: No thyromegaly. Trachea: No tracheal deviation. Cardiovascular:      Rate and Rhythm: Normal rate and regular rhythm. Heart sounds: Normal heart sounds. Pulmonary:      Effort: Pulmonary effort is normal. No respiratory distress. Breath sounds: Normal breath sounds. No wheezing. Musculoskeletal:      Right lower leg: Edema present. Left lower leg: No edema. Comments: Brown skin changes lower legs     Lymphadenopathy:      Cervical: No cervical adenopathy. Skin:     General: Skin is warm. Findings: No rash.    Neurological:      Mental Status: He is alert and oriented to person, place, and time. Psychiatric:         Mood and Affect: Mood normal.         Behavior: Behavior normal.         Thought Content: Thought content normal.       Assessment:       Diagnosis Orders   1. Type 2 diabetes mellitus with complication (HCC)  POCT glycosylated hemoglobin (Hb A1C)     DIABETES FOOT EXAM             Plan:      Return in about 4 months (around 2/25/2023) for diabetes mellitus. A1C is ok, continue current meds and stay off trulicity. Watch diet better ( cookes and chips need to decrease ) or make home made cookies. Orders Placed This Encounter   Procedures    Influenza, FLUAD, (age 72 y+), IM, Preservative Free, 0.5 mL    POCT glycosylated hemoglobin (Hb A1C)     DIABETES FOOT EXAM     No orders of the defined types were placed in this encounter. Patient given educationalmaterials - see patient instructions. Discussed use, benefit, and side effectsof prescribed medications. All patient questions answered. Pt voiced understanding. Reviewed health maintenance. Instructed to continue current medications, diet andexercise. Patient agreed with treatment plan. Follow up as directed.      Electronicallysigned by Riddhi Gross MD on 10/25/2022 at 10:35 AM

## 2023-01-03 ENCOUNTER — OFFICE VISIT (OUTPATIENT)
Dept: PRIMARY CARE CLINIC | Age: 83
End: 2023-01-03
Payer: MEDICARE

## 2023-01-03 VITALS
BODY MASS INDEX: 38.94 KG/M2 | WEIGHT: 248.6 LBS | TEMPERATURE: 98.2 F | SYSTOLIC BLOOD PRESSURE: 116 MMHG | OXYGEN SATURATION: 93 % | HEART RATE: 87 BPM | DIASTOLIC BLOOD PRESSURE: 80 MMHG

## 2023-01-03 DIAGNOSIS — J06.9 VIRAL UPPER RESPIRATORY TRACT INFECTION: Primary | ICD-10-CM

## 2023-01-03 PROCEDURE — G8427 DOCREV CUR MEDS BY ELIG CLIN: HCPCS | Performed by: PHYSICIAN ASSISTANT

## 2023-01-03 PROCEDURE — 99213 OFFICE O/P EST LOW 20 MIN: CPT | Performed by: PHYSICIAN ASSISTANT

## 2023-01-03 PROCEDURE — 1123F ACP DISCUSS/DSCN MKR DOCD: CPT | Performed by: PHYSICIAN ASSISTANT

## 2023-01-03 PROCEDURE — 3074F SYST BP LT 130 MM HG: CPT | Performed by: PHYSICIAN ASSISTANT

## 2023-01-03 PROCEDURE — 1036F TOBACCO NON-USER: CPT | Performed by: PHYSICIAN ASSISTANT

## 2023-01-03 PROCEDURE — 3079F DIAST BP 80-89 MM HG: CPT | Performed by: PHYSICIAN ASSISTANT

## 2023-01-03 PROCEDURE — G8417 CALC BMI ABV UP PARAM F/U: HCPCS | Performed by: PHYSICIAN ASSISTANT

## 2023-01-03 PROCEDURE — G8484 FLU IMMUNIZE NO ADMIN: HCPCS | Performed by: PHYSICIAN ASSISTANT

## 2023-01-03 ASSESSMENT — ENCOUNTER SYMPTOMS
RHINORRHEA: 1
SINUS PRESSURE: 1
COUGH: 1
SHORTNESS OF BREATH: 0
DIARRHEA: 1
ABDOMINAL PAIN: 1
VOMITING: 0
SORE THROAT: 1
CONSTIPATION: 1
NAUSEA: 1

## 2023-01-03 ASSESSMENT — PATIENT HEALTH QUESTIONNAIRE - PHQ9
1. LITTLE INTEREST OR PLEASURE IN DOING THINGS: 0
2. FEELING DOWN, DEPRESSED OR HOPELESS: 0
SUM OF ALL RESPONSES TO PHQ QUESTIONS 1-9: 0
SUM OF ALL RESPONSES TO PHQ9 QUESTIONS 1 & 2: 0

## 2023-01-03 NOTE — PROGRESS NOTES
[facility]  [address]  Phone: [phone]  Fax: [phone]    Yesica Winslow is a 80 y.o. male who presents today for his medical conditions/complaintsas noted below. Chief Complaint   Patient presents with    Nasal Congestion     Pt c/o congestion  sx started x1 day ago. Headache         HPI:     Headache    Wife had a cold recently   Current Outpatient Medications   Medication Sig Dispense Refill    atorvastatin (LIPITOR) 40 MG tablet Take 1 tablet by mouth at bedtime 90 tablet 3    metFORMIN (GLUCOPHAGE) 500 MG tablet Take 2 tablets by mouth 2 times daily (with meals) 360 tablet 3    meloxicam (MOBIC) 7.5 MG tablet       Loratadine (CLARITIN PO) Take by mouth daily as needed      hydroCHLOROthiazide (HYDRODIURIL) 25 MG tablet TAKE 1 TABLET DAILY 90 tablet 3    omeprazole (PRILOSEC) 40 MG delayed release capsule TAKE 1 CAPSULE DAILY 90 capsule 3    Handicap Placard MISC by Does not apply route Cannot walk 200 Feet due to orthopedic disease  Expires 3/18/2027 1 each 0    blood glucose test strips (ASCENSIA AUTODISC VI;ONE TOUCH ULTRA TEST VI) strip Use with associated glucose meter. Use daily. Type 2 DM not on insulin 100 strip 3    Omega-3 Fatty Acids (OMEGA III EPA+DHA) 1000 MG CAPS Take 1 capsule by mouth 2 times daily      glucose (GLUTOSE) 40 % GEL Take 37.5 mLs by mouth as needed (low sugar) 45 g 1    docusate sodium (COLACE, DULCOLAX) 100 MG CAPS Take 100 mg by mouth 2 times daily as needed for Constipation 60 capsule 0    metoprolol succinate (TOPROL XL) 25 MG extended release tablet Take 0.5 tablets by mouth daily (Patient taking differently: Take 25 mg by mouth daily Dr. Sanjeev Brenner) 90 tablet 0    aspirin EC 81 MG EC tablet Take 81 mg by mouth daily      Multiple Vitamin (MULTI-VITAMIN DAILY) TABS Take 1 tablet by mouth daily      mupirocin (BACTROBAN) 2 % ointment Apply topically 3 times daily.  (Patient not taking: Reported on 1/3/2023) 22 g 0    fluorouracil (EFUDEX) 5 % cream Apply topically 2 times daily. (Patient not taking: Reported on 1/3/2023) 40 g 0     No current facility-administered medications for this visit. Allergies   Allergen Reactions    Feldene [Piroxicam]     Flagyl [Metronidazole]     Lisinopril Dizziness or Vertigo    Naprosyn [Naproxen]     Penicillins     Sulfa Antibiotics     Sulfasalazine        Subjective:      Review of Systems   Constitutional:  Negative for chills, diaphoresis and fever. HENT:  Positive for congestion, rhinorrhea, sinus pressure, sneezing and sore throat. Eyes:  Negative for visual disturbance. Respiratory:  Positive for cough. Negative for shortness of breath. Cardiovascular:  Negative for chest pain. Gastrointestinal:  Positive for abdominal pain, constipation, diarrhea and nausea. Negative for vomiting. Musculoskeletal:  Negative for arthralgias and myalgias. Neurological:  Positive for dizziness (\" little bit\") and headaches. Objective:     /80   Pulse 87   Temp 98.2 °F (36.8 °C)   Wt 248 lb 9.6 oz (112.8 kg)   SpO2 93%   BMI 38.94 kg/m²   Physical Exam  Vitals and nursing note reviewed. Constitutional:       Appearance: Normal appearance. HENT:      Right Ear: Tympanic membrane, ear canal and external ear normal.      Left Ear: Tympanic membrane, ear canal and external ear normal.      Nose: Congestion present. Mouth/Throat:      Mouth: Mucous membranes are moist.      Pharynx: No oropharyngeal exudate or posterior oropharyngeal erythema. Eyes:      General:         Right eye: No discharge. Left eye: No discharge. Conjunctiva/sclera: Conjunctivae normal.      Pupils: Pupils are equal, round, and reactive to light. Cardiovascular:      Rate and Rhythm: Normal rate and regular rhythm. Heart sounds: Normal heart sounds. Pulmonary:      Effort: Pulmonary effort is normal.      Breath sounds: Normal breath sounds.   Musculoskeletal:      Cervical back: Normal range of motion.   Skin:     Findings: No rash.   Neurological:      Mental Status: He is alert and oriented to person, place, and time.   Psychiatric:         Mood and Affect: Mood normal.       Assessment:       Diagnosis Orders   1. Viral upper respiratory tract infection             Plan:    Dayquil  Exedrine for HA  Flonase for sinus pressure.     Return if symptoms worsen or fail to improve.    No orders of the defined types were placed in this encounter.    No orders of the defined types were placed in this encounter.          Electronically signed by Afsaneh Chin 1/3/2023 at 1:31 PM

## 2023-01-26 RX ORDER — OMEPRAZOLE 40 MG/1
CAPSULE, DELAYED RELEASE ORAL
Qty: 90 CAPSULE | Refills: 3 | Status: SHIPPED | OUTPATIENT
Start: 2023-01-26

## 2023-02-24 SDOH — ECONOMIC STABILITY: INCOME INSECURITY: HOW HARD IS IT FOR YOU TO PAY FOR THE VERY BASICS LIKE FOOD, HOUSING, MEDICAL CARE, AND HEATING?: NOT HARD AT ALL

## 2023-02-24 SDOH — ECONOMIC STABILITY: TRANSPORTATION INSECURITY
IN THE PAST 12 MONTHS, HAS LACK OF TRANSPORTATION KEPT YOU FROM MEETINGS, WORK, OR FROM GETTING THINGS NEEDED FOR DAILY LIVING?: NO

## 2023-02-24 SDOH — ECONOMIC STABILITY: FOOD INSECURITY: WITHIN THE PAST 12 MONTHS, THE FOOD YOU BOUGHT JUST DIDN'T LAST AND YOU DIDN'T HAVE MONEY TO GET MORE.: NEVER TRUE

## 2023-02-24 SDOH — ECONOMIC STABILITY: FOOD INSECURITY: WITHIN THE PAST 12 MONTHS, YOU WORRIED THAT YOUR FOOD WOULD RUN OUT BEFORE YOU GOT MONEY TO BUY MORE.: NEVER TRUE

## 2023-02-27 ENCOUNTER — OFFICE VISIT (OUTPATIENT)
Dept: PRIMARY CARE CLINIC | Age: 83
End: 2023-02-27
Payer: MEDICARE

## 2023-02-27 VITALS
OXYGEN SATURATION: 94 % | WEIGHT: 249.2 LBS | DIASTOLIC BLOOD PRESSURE: 74 MMHG | SYSTOLIC BLOOD PRESSURE: 122 MMHG | BODY MASS INDEX: 39.11 KG/M2 | HEIGHT: 67 IN | HEART RATE: 64 BPM

## 2023-02-27 DIAGNOSIS — E11.8 TYPE 2 DIABETES MELLITUS WITH COMPLICATION (HCC): Primary | ICD-10-CM

## 2023-02-27 DIAGNOSIS — I10 ESSENTIAL HYPERTENSION: ICD-10-CM

## 2023-02-27 DIAGNOSIS — E66.01 SEVERE OBESITY (BMI 35.0-39.9) WITH COMORBIDITY (HCC): ICD-10-CM

## 2023-02-27 LAB — HBA1C MFR BLD: 7.2 %

## 2023-02-27 PROCEDURE — 3078F DIAST BP <80 MM HG: CPT | Performed by: FAMILY MEDICINE

## 2023-02-27 PROCEDURE — 1123F ACP DISCUSS/DSCN MKR DOCD: CPT | Performed by: FAMILY MEDICINE

## 2023-02-27 PROCEDURE — G8484 FLU IMMUNIZE NO ADMIN: HCPCS | Performed by: FAMILY MEDICINE

## 2023-02-27 PROCEDURE — 3074F SYST BP LT 130 MM HG: CPT | Performed by: FAMILY MEDICINE

## 2023-02-27 PROCEDURE — G8427 DOCREV CUR MEDS BY ELIG CLIN: HCPCS | Performed by: FAMILY MEDICINE

## 2023-02-27 PROCEDURE — G8417 CALC BMI ABV UP PARAM F/U: HCPCS | Performed by: FAMILY MEDICINE

## 2023-02-27 PROCEDURE — 1036F TOBACCO NON-USER: CPT | Performed by: FAMILY MEDICINE

## 2023-02-27 PROCEDURE — 83036 HEMOGLOBIN GLYCOSYLATED A1C: CPT | Performed by: FAMILY MEDICINE

## 2023-02-27 PROCEDURE — 3051F HG A1C>EQUAL 7.0%<8.0%: CPT | Performed by: FAMILY MEDICINE

## 2023-02-27 PROCEDURE — 99214 OFFICE O/P EST MOD 30 MIN: CPT | Performed by: FAMILY MEDICINE

## 2023-02-27 RX ORDER — FUROSEMIDE 20 MG/1
20 TABLET ORAL EVERY OTHER DAY
COMMUNITY
Start: 2023-02-13

## 2023-02-27 RX ORDER — NITROGLYCERIN 0.4 MG/1
TABLET SUBLINGUAL
COMMUNITY
Start: 2023-02-13 | End: 2023-02-27

## 2023-02-27 ASSESSMENT — PATIENT HEALTH QUESTIONNAIRE - PHQ9
1. LITTLE INTEREST OR PLEASURE IN DOING THINGS: 0
SUM OF ALL RESPONSES TO PHQ QUESTIONS 1-9: 0
SUM OF ALL RESPONSES TO PHQ9 QUESTIONS 1 & 2: 0
2. FEELING DOWN, DEPRESSED OR HOPELESS: 0
SUM OF ALL RESPONSES TO PHQ QUESTIONS 1-9: 0

## 2023-02-27 ASSESSMENT — ENCOUNTER SYMPTOMS: COUGH: 0

## 2023-02-27 NOTE — PROGRESS NOTES
[address] [place] [address] PRIMARY CARE  [address] [facility] [address]  Dept: [phone]    Keith Christianson is a 80 y.o. male Established patient, who presents today for his medical conditions/complaintsas noted below. Chief Complaint   Patient presents with    Diabetes     4 month f/u    Immunizations     Shingrix declined       HPI:     HPI  Home sugars : 166, 124, 170, 175, 157, 162, 169, 157, 170, 170, 161, 192, 148, 176    Breakfast burrito  Lunch: bologna sandwich or stouffers dinners  Supper: 1/2 steak, 1/2 potato and salad. Eats a lot of fruit. Eats a lot of pickles. Had cortisone shot in shoulder Feb 9th    Sugars have been up since then. Reviewed prior notes Cardiology  Reviewed previous Labs    LDL Cholesterol (mg/dL)   Date Value   07/28/2022 119   09/15/2021 96     LDL Calculated (mg/dL)   Date Value   09/04/2020 115   07/10/2019 98   06/21/2018 77       (goal LDL is <100)   AST (U/L)   Date Value   07/28/2022 21     ALT (U/L)   Date Value   07/28/2022 30     BUN (mg/dL)   Date Value   07/28/2022 16     Hemoglobin A1C (%)   Date Value   02/27/2023 7.2     TSH (mIU/L)   Date Value   09/15/2021 2.37     BP Readings from Last 3 Encounters:   02/27/23 122/74   01/03/23 116/80   10/25/22 124/76          (goal 120/80)    Past Medical History:   Diagnosis Date    Diabetes ([address]     Diverticulitis     History of allergy     Hyperlipidemia     Hypertension     Osteoarthritis       Past Surgical History:   Procedure Laterality Date    BACK SURGERY  09/03/2017    Roma Hugehs, lumbar fusion    CARDIAC CATHETERIZATION      COLECTOMY  2015    partial colectomy due to diveritc    COLONOSCOPY      TOTAL KNEE ARTHROPLASTY Right        History reviewed. No pertinent family history.     Social History     Tobacco Use    Smoking status: Former     Packs/day: 1.50     Years: 40.00     Pack years: 60.00     Types: Cigarettes     Quit date: 1/1/1991     Years since quittin.1    Smokeless tobacco: Never   Substance Use Topics    Alcohol use: Not Currently     Alcohol/week: 1.0 standard drink     Types: 1 Standard drinks or equivalent per week     Comment: social      Current Outpatient Medications   Medication Sig Dispense Refill    furosemide (LASIX) 20 MG tablet Take 20 mg by mouth every other day From cardiology, Dr Nicole Ku      omeprazole (PRILOSEC) 40 MG delayed release capsule TAKE 1 CAPSULE EVERY DAY 90 capsule 3    atorvastatin (LIPITOR) 40 MG tablet Take 1 tablet by mouth at bedtime 90 tablet 3    metFORMIN (GLUCOPHAGE) 500 MG tablet Take 2 tablets by mouth 2 times daily (with meals) 360 tablet 3    meloxicam (MOBIC) 7.5 MG tablet       Loratadine (CLARITIN PO) Take by mouth daily as needed      hydroCHLOROthiazide (HYDRODIURIL) 25 MG tablet TAKE 1 TABLET DAILY 90 tablet 3    Handicap Placard MISC by Does not apply route Cannot walk 200 Feet due to orthopedic disease  Expires 3/18/2027 1 each 0    blood glucose test strips (ASCENSIA AUTODISC VI;ONE TOUCH ULTRA TEST VI) strip Use with associated glucose meter. Use daily. Type 2 DM not on insulin 100 strip 3    Omega-3 Fatty Acids (OMEGA III EPA+DHA) 1000 MG CAPS Take 1 capsule by mouth 2 times daily      glucose (GLUTOSE) 40 % GEL Take 37.5 mLs by mouth as needed (low sugar) 45 g 1    docusate sodium (COLACE, DULCOLAX) 100 MG CAPS Take 100 mg by mouth 2 times daily as needed for Constipation 60 capsule 0    metoprolol succinate (TOPROL XL) 25 MG extended release tablet Take 0.5 tablets by mouth daily (Patient taking differently: Take 25 mg by mouth daily Dr. Tye Mercedes) 90 tablet 0    aspirin EC 81 MG EC tablet Take 81 mg by mouth daily      Multiple Vitamin (MULTI-VITAMIN DAILY) TABS Take 1 tablet by mouth daily       No current facility-administered medications for this visit.      Allergies   Allergen Reactions    Feldene [Piroxicam]     Flagyl [Metronidazole]     Lisinopril Dizziness or Vertigo    Naprosyn [Naproxen]     Penicillins     Sulfa Antibiotics     Sulfasalazine        Health Maintenance   Topic Date Due    Shingles vaccine (1 of 2) Never done    Annual Wellness Visit (AWV)  03/19/2023    Diabetic retinal exam  06/13/2023    Lipids  07/28/2023    A1C test (Diabetic or Prediabetic)  08/27/2023    Diabetic foot exam  10/25/2023    Depression Screen  02/27/2024    DTaP/Tdap/Td vaccine (2 - Td or Tdap) 07/13/2030    Flu vaccine  Completed    Pneumococcal 65+ years Vaccine  Completed    COVID-19 Vaccine  Completed    Hepatitis A vaccine  Aged Out    Hib vaccine  Aged Out    Meningococcal (ACWY) vaccine  Aged Out       Subjective:      Review of Systems   Respiratory:  Negative for cough. Cardiovascular: Negative. Musculoskeletal:  Positive for arthralgias. Neurological:  Negative for dizziness and light-headedness. Objective:     /74   Pulse 64   Ht 5' 7\" (1.702 m)   Wt 249 lb 3.2 oz (113 kg)   SpO2 94%   BMI 39.03 kg/m²   Physical Exam  Vitals and nursing note reviewed. Constitutional:       General: He is not in acute distress. Appearance: He is well-developed. He is obese. He is not ill-appearing. HENT:      Head: Normocephalic and atraumatic. Right Ear: External ear normal.      Left Ear: External ear normal.   Eyes:      General: No scleral icterus. Right eye: No discharge. Left eye: No discharge. Conjunctiva/sclera: Conjunctivae normal.   Neck:      Thyroid: No thyromegaly. Trachea: No tracheal deviation. Cardiovascular:      Rate and Rhythm: Normal rate and regular rhythm. Pulses:           Dorsalis pedis pulses are 2+ on the right side and 2+ on the left side. Heart sounds: Normal heart sounds. Pulmonary:      Effort: Pulmonary effort is normal. No respiratory distress. Breath sounds: Normal breath sounds. No wheezing. Musculoskeletal:      Right lower leg: Edema present. Left lower leg: Edema present.       Comments: Mild distal lower leg edema   Lymphadenopathy:      Cervical: No cervical adenopathy. Skin:     General: Skin is warm. Findings: No rash. Neurological:      Mental Status: He is alert and oriented to person, place, and time. Psychiatric:         Mood and Affect: Mood normal.         Behavior: Behavior normal.         Thought Content: Thought content normal.       Assessment:       Diagnosis Orders   1. Type 2 diabetes mellitus with complication (HCC)  POCT glycosylated hemoglobin (Hb A1C)      2. Essential hypertension        3. Severe obesity (BMI 35.0-39. 9) with comorbidity ([facility] [address]               Plan:      Return in about 4 months (around 6/27/2023) for diabetes mellitus, hypertension. Cut back on fruits and carbs and increase veggies. Orders Placed This Encounter   Procedures    POCT glycosylated hemoglobin (Hb A1C)     No orders of the defined types were placed in this encounter. Patient given educationalmaterials - see patient instructions. Discussed use, benefit, and side effectsof prescribed medications. All patient questions answered. Pt voiced understanding. Reviewed health maintenance. Instructed to continue current medications, diet andexercise. Patient agreed with treatment plan. Follow up as directed.      Electronicallysigned by Jacklyn Harris MD on 2/27/2023 at 9:59 AM

## 2023-02-27 NOTE — PATIENT INSTRUCTIONS
Patient Education        A Healthy Lifestyle: Care Instructions  A healthy lifestyle can help you feel good, have more energy, and stay at a weight that's healthy for you. You can share a healthy lifestyle with your friends and family. And you can do it on your own. Eat meals with your friends or family. You could try cooking together. Plan activities with other people. Go for a walk with a friend, try a free online fitness class, or join a sports league. Eat a variety of healthy foods. These include fruits, vegetables, whole grains, low-fat dairy, and lean protein. Choose healthy portions of food. You can use the Nutrition Facts label on food packages as a guide. Eat more fruits and vegetables. You could add vegetables to sandwiches or add fruit to cereal.   Drink water when you are thirsty. Limit soda, juice, and sports drinks. Try to exercise most days. Aim for at least 2½ hours of exercise each week. Keep moving. Work in the garden or take your dog on a walk. Use the stairs instead of the elevator. If you use tobacco or nicotine, try to quit. Ask your doctor about programs and medicines to help you quit. Limit alcohol. Men should have no more than 2 drinks a day. Women should have no more than 1. For some people, no alcohol is the best choice. Follow-up care is a key part of your treatment and safety. Be sure to make and go to all appointments, and call your doctor if you are having problems. It's also a good idea to know your test results and keep a list of the medicines you take. Where can you learn more? Go to http://www.arce.com/ and enter U807 to learn more about \"A Healthy Lifestyle: Care Instructions. \"  Current as of: March 9, 2022               Content Version: 13.5  © 8199-6777 Healthwise, [employer]. Care instructions adapted under license by [facility] ([facility]).  If you have questions about a medical condition or this instruction, always ask your healthcare professional. [address] any warranty or liability for your use of this information.

## 2023-03-27 RX ORDER — HYDROCHLOROTHIAZIDE 25 MG/1
TABLET ORAL
Qty: 90 TABLET | Refills: 3 | Status: SHIPPED | OUTPATIENT
Start: 2023-03-27

## 2023-04-26 NOTE — TELEPHONE ENCOUNTER
LAST VISIT:   2/27/2023     Future Appointments   Date Time Provider Nisreen Duran   6/27/2023  9:20 AM Judit Dover MD [facility] [address] [facility]

## 2023-05-17 RX ORDER — ATORVASTATIN CALCIUM 40 MG/1
TABLET, FILM COATED ORAL
Qty: 90 TABLET | Refills: 3 | Status: SHIPPED | OUTPATIENT
Start: 2023-05-17

## 2023-05-17 NOTE — TELEPHONE ENCOUNTER
LAST VISIT:   2/27/2023     Future Appointments   Date Time Provider Nisreen Duran   6/27/2023  9:20 AM MD COLBY Jurado

## 2023-06-27 ENCOUNTER — OFFICE VISIT (OUTPATIENT)
Dept: PRIMARY CARE CLINIC | Age: 83
End: 2023-06-27
Payer: MEDICARE

## 2023-06-27 VITALS
WEIGHT: 244.8 LBS | DIASTOLIC BLOOD PRESSURE: 70 MMHG | OXYGEN SATURATION: 95 % | BODY MASS INDEX: 38.42 KG/M2 | HEIGHT: 67 IN | HEART RATE: 49 BPM | SYSTOLIC BLOOD PRESSURE: 112 MMHG

## 2023-06-27 DIAGNOSIS — I10 ESSENTIAL HYPERTENSION: ICD-10-CM

## 2023-06-27 DIAGNOSIS — M48.061 SPINAL STENOSIS OF LUMBAR REGION, UNSPECIFIED WHETHER NEUROGENIC CLAUDICATION PRESENT: ICD-10-CM

## 2023-06-27 DIAGNOSIS — G89.4 CHRONIC PAIN SYNDROME: ICD-10-CM

## 2023-06-27 DIAGNOSIS — E11.8 TYPE 2 DIABETES MELLITUS WITH COMPLICATION (HCC): ICD-10-CM

## 2023-06-27 DIAGNOSIS — Z00.00 MEDICARE ANNUAL WELLNESS VISIT, SUBSEQUENT: Primary | ICD-10-CM

## 2023-06-27 DIAGNOSIS — M19.90 ARTHRITIS: ICD-10-CM

## 2023-06-27 LAB
ALCOHOL URINE: NORMAL
AMPHETAMINE SCREEN, URINE: NEGATIVE
BARBITURATE SCREEN, URINE: NEGATIVE
BENZODIAZEPINE SCREEN, URINE: NEGATIVE
BUPRENORPHINE URINE: NEGATIVE
COCAINE METABOLITE SCREEN URINE: NEGATIVE
FENTANYL SCREEN, URINE: NORMAL
GABAPENTIN SCREEN, URINE: NORMAL
HBA1C MFR BLD: 7.1 %
MDMA URINE: NEGATIVE
METHADONE SCREEN, URINE: NEGATIVE
METHAMPHETAMINE, URINE: NEGATIVE
OPIATE SCREEN URINE: NEGATIVE
OXYCODONE SCREEN URINE: NEGATIVE
PHENCYCLIDINE SCREEN URINE: NEGATIVE
PROPOXYPHENE SCREEN, URINE: NEGATIVE
SYNTHETIC CANNABINOIDS(K2) SCREEN, URINE: NORMAL
THC SCREEN, URINE: NEGATIVE
TRAMADOL SCREEN URINE: NORMAL
TRICYCLIC ANTIDEPRESSANTS, UR: NEGATIVE

## 2023-06-27 PROCEDURE — 3051F HG A1C>EQUAL 7.0%<8.0%: CPT | Performed by: FAMILY MEDICINE

## 2023-06-27 PROCEDURE — 3074F SYST BP LT 130 MM HG: CPT | Performed by: FAMILY MEDICINE

## 2023-06-27 PROCEDURE — 80305 DRUG TEST PRSMV DIR OPT OBS: CPT | Performed by: FAMILY MEDICINE

## 2023-06-27 PROCEDURE — 1123F ACP DISCUSS/DSCN MKR DOCD: CPT | Performed by: FAMILY MEDICINE

## 2023-06-27 PROCEDURE — G0439 PPPS, SUBSEQ VISIT: HCPCS | Performed by: FAMILY MEDICINE

## 2023-06-27 PROCEDURE — 83037 HB GLYCOSYLATED A1C HOME DEV: CPT | Performed by: FAMILY MEDICINE

## 2023-06-27 PROCEDURE — 3078F DIAST BP <80 MM HG: CPT | Performed by: FAMILY MEDICINE

## 2023-06-27 RX ORDER — LANOLIN ALCOHOL/MO/W.PET/CERES
400 CREAM (GRAM) TOPICAL DAILY
COMMUNITY
Start: 2023-06-22

## 2023-06-27 RX ORDER — POTASSIUM CHLORIDE 20 MEQ/1
20 TABLET, EXTENDED RELEASE ORAL EVERY MORNING
COMMUNITY
Start: 2023-06-22

## 2023-06-27 RX ORDER — TRAMADOL HYDROCHLORIDE 50 MG/1
50 TABLET ORAL 2 TIMES DAILY PRN
Qty: 14 TABLET | Refills: 0 | Status: SHIPPED | OUTPATIENT
Start: 2023-06-27 | End: 2023-07-04

## 2023-06-27 ASSESSMENT — PATIENT HEALTH QUESTIONNAIRE - PHQ9
SUM OF ALL RESPONSES TO PHQ9 QUESTIONS 1 & 2: 0
1. LITTLE INTEREST OR PLEASURE IN DOING THINGS: 0
SUM OF ALL RESPONSES TO PHQ QUESTIONS 1-9: 0
2. FEELING DOWN, DEPRESSED OR HOPELESS: 0
SUM OF ALL RESPONSES TO PHQ QUESTIONS 1-9: 0

## 2023-06-27 ASSESSMENT — LIFESTYLE VARIABLES
HOW MANY STANDARD DRINKS CONTAINING ALCOHOL DO YOU HAVE ON A TYPICAL DAY: PATIENT DOES NOT DRINK
HOW OFTEN DO YOU HAVE A DRINK CONTAINING ALCOHOL: NEVER

## 2023-10-04 ENCOUNTER — OFFICE VISIT (OUTPATIENT)
Dept: PRIMARY CARE CLINIC | Age: 83
End: 2023-10-04
Payer: MEDICARE

## 2023-10-04 VITALS
DIASTOLIC BLOOD PRESSURE: 68 MMHG | OXYGEN SATURATION: 93 % | BODY MASS INDEX: 36.03 KG/M2 | SYSTOLIC BLOOD PRESSURE: 130 MMHG | HEIGHT: 67 IN | WEIGHT: 229.6 LBS | HEART RATE: 52 BPM

## 2023-10-04 DIAGNOSIS — E11.8 TYPE 2 DIABETES MELLITUS WITH COMPLICATION (HCC): Primary | ICD-10-CM

## 2023-10-04 DIAGNOSIS — I10 ESSENTIAL HYPERTENSION: ICD-10-CM

## 2023-10-04 DIAGNOSIS — Z12.5 PROSTATE CANCER SCREENING: ICD-10-CM

## 2023-10-04 DIAGNOSIS — E11.8 TYPE 2 DIABETES MELLITUS WITH COMPLICATION (HCC): ICD-10-CM

## 2023-10-04 DIAGNOSIS — R06.02 SHORTNESS OF BREATH: ICD-10-CM

## 2023-10-04 DIAGNOSIS — Z23 NEEDS FLU SHOT: ICD-10-CM

## 2023-10-04 DIAGNOSIS — G89.4 CHRONIC PAIN SYNDROME: ICD-10-CM

## 2023-10-04 PROBLEM — Z95.5 HISTORY OF PLACEMENT OF STENT IN LAD CORONARY ARTERY: Status: ACTIVE | Noted: 2021-08-12

## 2023-10-04 PROBLEM — J96.01 ACUTE RESPIRATORY FAILURE WITH HYPOXIA (HCC): Status: RESOLVED | Noted: 2021-01-11 | Resolved: 2023-10-04

## 2023-10-04 PROBLEM — I44.1 AV BLOCK, MOBITZ 1: Status: ACTIVE | Noted: 2022-02-08

## 2023-10-04 PROBLEM — L89.311 PRESSURE INJURY OF RIGHT BUTTOCK, STAGE 1: Status: RESOLVED | Noted: 2021-01-28 | Resolved: 2023-10-04

## 2023-10-04 LAB
CREATININE URINE: 136.7 MG/DL (ref 39–259)
HBA1C MFR BLD: 7.3 %
MICROALBUMIN/CREAT 24H UR: <12 MG/L
MICROALBUMIN/CREAT UR-RTO: NORMAL MCG/MG CREAT
PROSTATE SPECIFIC ANTIGEN: 1.35 NG/ML

## 2023-10-04 PROCEDURE — 83036 HEMOGLOBIN GLYCOSYLATED A1C: CPT | Performed by: FAMILY MEDICINE

## 2023-10-04 PROCEDURE — 3051F HG A1C>EQUAL 7.0%<8.0%: CPT | Performed by: FAMILY MEDICINE

## 2023-10-04 PROCEDURE — 90694 VACC AIIV4 NO PRSRV 0.5ML IM: CPT | Performed by: FAMILY MEDICINE

## 2023-10-04 PROCEDURE — 1036F TOBACCO NON-USER: CPT | Performed by: FAMILY MEDICINE

## 2023-10-04 PROCEDURE — G8427 DOCREV CUR MEDS BY ELIG CLIN: HCPCS | Performed by: FAMILY MEDICINE

## 2023-10-04 PROCEDURE — 99214 OFFICE O/P EST MOD 30 MIN: CPT | Performed by: FAMILY MEDICINE

## 2023-10-04 PROCEDURE — G8484 FLU IMMUNIZE NO ADMIN: HCPCS | Performed by: FAMILY MEDICINE

## 2023-10-04 PROCEDURE — G8417 CALC BMI ABV UP PARAM F/U: HCPCS | Performed by: FAMILY MEDICINE

## 2023-10-04 PROCEDURE — G0008 ADMIN INFLUENZA VIRUS VAC: HCPCS | Performed by: FAMILY MEDICINE

## 2023-10-04 PROCEDURE — 1123F ACP DISCUSS/DSCN MKR DOCD: CPT | Performed by: FAMILY MEDICINE

## 2023-10-04 PROCEDURE — 3075F SYST BP GE 130 - 139MM HG: CPT | Performed by: FAMILY MEDICINE

## 2023-10-04 PROCEDURE — 3078F DIAST BP <80 MM HG: CPT | Performed by: FAMILY MEDICINE

## 2023-10-04 NOTE — PROGRESS NOTES
[address] PRIMARY CARE  [address] [facility] [address]  Dept: [phone]    Fan Love is a 80 y.o. male Established patient, who presents today for his medical conditions/complaintsas noted below. Chief Complaint   Patient presents with    Diabetes     Pt is here for a f/u.as lt a1c was 7.1. HPI:     HPI  A1C 7.4 today  Home sugars : 120-130  No low sugars  Reviewed prior notes Cardiology  Reviewed previous Labs cxr  He has decreased portion size, has lost weight  Decreased apettite    LDL Cholesterol (mg/dL)   Date Value   2022 119   09/15/2021 96     LDL Calculated (mg/dL)   Date Value   2020 115   07/10/2019 98   2018 77       (goal LDL is <100)   AST (U/L)   Date Value   2022 21     ALT (U/L)   Date Value   2022 30     BUN (mg/dL)   Date Value   2022 16     Hemoglobin A1C (%)   Date Value   10/04/2023 7.3     TSH (mIU/L)   Date Value   09/15/2021 2.37     BP Readings from Last 3 Encounters:   10/04/23 130/68   23 112/70   23 122/74          (goal 120/80)    Past Medical History:   Diagnosis Date    Diabetes ([address])     Diverticulitis     History of allergy     Hyperlipidemia     Hypertension     Osteoarthritis       Past Surgical History:   Procedure Laterality Date    BACK SURGERY  2017    Ivonne Stapleton, lumbar fusion    CARDIAC CATHETERIZATION      COLECTOMY      partial colectomy due to diveritc    COLONOSCOPY      TOTAL KNEE ARTHROPLASTY Right        History reviewed. No pertinent family history.     Social History     Tobacco Use    Smoking status: Former     Packs/day: 1.50     Years: 40.00     Additional pack years: 0.00     Total pack years: 60.00     Types: Cigarettes     Quit date: 1991     Years since quittin.7    Smokeless tobacco: Never   Substance Use Topics    Alcohol use: Not Currently     Alcohol/week: 1.0 standard drink of alcohol     Types: 1 Standard

## 2023-10-10 ENCOUNTER — HOSPITAL ENCOUNTER (OUTPATIENT)
Dept: PULMONOLOGY | Age: 83
Discharge: HOME | End: 2023-10-10
Payer: MEDICARE

## 2023-10-10 DIAGNOSIS — R06.02 SHORTNESS OF BREATH: ICD-10-CM

## 2023-10-10 LAB
DLCO %PRED: NORMAL
DLCO PRED: NORMAL
DLCO/VA %PRED: NORMAL
DLCO/VA PRED: NORMAL
DLCO/VA: NORMAL
DLCO: NORMAL
EXPIRATORY TIME: NORMAL
FEF 25-75% %PRED-PRE: NORMAL
FEF 25-75% PRED: NORMAL
FEF 25-75%-PRE: NORMAL
FEV1 %PRED-PRE: NORMAL
FEV1 PRED: NORMAL
FEV1/FVC %PRED-PRE: NORMAL
FEV1/FVC PRED: NORMAL
FEV1/FVC: NORMAL
FEV1: NORMAL
FVC %PRED-PRE: NORMAL
FVC PRED: NORMAL
FVC: NORMAL
GAW %PRED: NORMAL
GAW PRED: NORMAL
GAW: NORMAL
IC %PRED: NORMAL
IC PRED: NORMAL
IC: NORMAL
MVV %PRED-PRE: NORMAL
MVV PRED: NORMAL
MVV-PRE: NORMAL
PEF %PRED-PRE: NORMAL
PEF PRED: NORMAL
PEF-PRE: NORMAL
RAW %PRED: NORMAL
RAW PRED: NORMAL
RAW: NORMAL
RV %PRED: NORMAL
RV PRED: NORMAL
RV: NORMAL
SVC %PRED: NORMAL
SVC PRED: NORMAL
SVC: NORMAL
TLC %PRED: NORMAL
TLC PRED: NORMAL
TLC: NORMAL
VA %PRED: NORMAL
VA PRED: NORMAL
VA: NORMAL
VTG %PRED: NORMAL
VTG PRED: NORMAL
VTG: NORMAL

## 2023-10-10 PROCEDURE — 94726 PLETHYSMOGRAPHY LUNG VOLUMES: CPT

## 2023-10-10 PROCEDURE — 94664 DEMO&/EVAL PT USE INHALER: CPT

## 2023-10-10 PROCEDURE — 94729 DIFFUSING CAPACITY: CPT

## 2023-10-10 PROCEDURE — 6370000000 HC RX 637 (ALT 250 FOR IP): Performed by: FAMILY MEDICINE

## 2023-10-10 PROCEDURE — 94060 EVALUATION OF WHEEZING: CPT

## 2023-10-10 RX ORDER — ALBUTEROL SULFATE 90 UG/1
2 AEROSOL, METERED RESPIRATORY (INHALATION) ONCE
Status: COMPLETED | OUTPATIENT
Start: 2023-10-10 | End: 2023-10-10

## 2023-10-10 RX ADMIN — ALBUTEROL SULFATE 2 PUFF: 90 AEROSOL, METERED RESPIRATORY (INHALATION) at 09:23

## 2023-10-17 ENCOUNTER — TELEPHONE (OUTPATIENT)
Dept: PRIMARY CARE CLINIC | Age: 83
End: 2023-10-17

## 2023-10-18 NOTE — TELEPHONE ENCOUNTER
Danii Mclean is also recently , remove spouse from first contact. His PFT shows reduced lung capacity, probably  obstructive pulmonary disease. Halley Gomes However there is NOT an official reading from the pulmonary doctor yet. Please contact that department about that . Consider spiriva inhaler.  But I would like to have the final report first.

## 2023-10-19 ENCOUNTER — TELEPHONE (OUTPATIENT)
Dept: PRIMARY CARE CLINIC | Age: 83
End: 2023-10-19

## 2023-10-19 DIAGNOSIS — J98.4 RESTRICTIVE LUNG DISEASE: Primary | ICD-10-CM

## 2023-10-19 NOTE — TELEPHONE ENCOUNTER
I need to final report on the PFT  See previous message, I am waiting on prescribing inhaler until final report ( pulmonary interpretation)  Please call that department about needing final report.

## 2023-10-19 NOTE — TELEPHONE ENCOUNTER
Pt's daughter Josias Juarez called asking if you will be speaking with the Pulmonologist to get directions on what the follow-up care should be for her dad.      Please advise

## 2023-10-24 NOTE — TELEPHONE ENCOUNTER
I spoke with the respiratory department at [facility], They stated this is the final report Dr Mic Lima already signed it and interpreted it. He showed me where to look for result and this is what it stated    \"Results: The FVC is decreased, the FEV1 is decreased, and the FEV1 to FVC ratio is normal.  There is no response to bronchodilators. Lung volumes show decreased total lung capacity. Diffusing capacity is decreased but normalizes when corrected for alveolar ventilation airways resistance is normal.       Impression:  Moderate to severe restriction with a diffusing capacity that normalizes when corrected for alveolar ventilation.   Clinical correlation advised\"

## 2023-10-25 RX ORDER — FLUTICASONE FUROATE, UMECLIDINIUM BROMIDE AND VILANTEROL TRIFENATATE 200; 62.5; 25 UG/1; UG/1; UG/1
1 POWDER RESPIRATORY (INHALATION) DAILY
Qty: 1 EACH | Refills: 0 | Status: SHIPPED | OUTPATIENT
Start: 2023-10-25

## 2023-10-25 NOTE — TELEPHONE ENCOUNTER
Return call received, talked with patient and Home Aide, information relayed and they expressed understanding.

## 2023-10-25 NOTE — TELEPHONE ENCOUNTER
Let patient and daughter know it shows moderate to severe restriction in lung capacity. He should see a pulmonary doctor. This could be from a variety of issues including being overweight or other lung issues. He can try trelegy inhaler, it looks like it's covered. I sent to [facility] unless he wants me to send to mail order.    Check a 2 view cxr and may need a CT scan of the chest.

## 2024-01-23 DIAGNOSIS — E11.8 TYPE 2 DIABETES MELLITUS WITH COMPLICATION (HCC): ICD-10-CM

## 2024-01-23 DIAGNOSIS — J98.4 RESTRICTIVE LUNG DISEASE: ICD-10-CM

## 2024-01-23 RX ORDER — FLUTICASONE FUROATE, UMECLIDINIUM BROMIDE AND VILANTEROL TRIFENATATE 200; 62.5; 25 UG/1; UG/1; UG/1
1 POWDER RESPIRATORY (INHALATION) DAILY
Qty: 1 EACH | Refills: 0 | Status: SHIPPED | OUTPATIENT
Start: 2024-01-23

## 2024-01-29 RX ORDER — OMEPRAZOLE 40 MG/1
CAPSULE, DELAYED RELEASE ORAL
Qty: 90 CAPSULE | Refills: 3 | Status: SHIPPED | OUTPATIENT
Start: 2024-01-29

## 2024-01-30 DIAGNOSIS — E11.8 TYPE 2 DIABETES MELLITUS WITH COMPLICATION (HCC): ICD-10-CM

## 2024-01-30 DIAGNOSIS — E11.69 TYPE 2 DIABETES MELLITUS WITH OTHER SPECIFIED COMPLICATION, UNSPECIFIED WHETHER LONG TERM INSULIN USE (HCC): Primary | ICD-10-CM

## 2024-01-30 NOTE — TELEPHONE ENCOUNTER
LAST VISIT:   10/4/2023     Future Appointments   Date Time Provider Department Center   2/7/2024 10:00 AM [facility]

## 2024-01-31 RX ORDER — LANCETS 30 GAUGE
1 EACH MISCELLANEOUS DAILY
Qty: 100 EACH | Refills: 3 | Status: SHIPPED | OUTPATIENT
Start: 2024-01-31

## 2024-01-31 RX ORDER — DIPHENHYDRAMINE HCL 25 MG
TABLET ORAL
Qty: 1 KIT | Refills: 0 | Status: SHIPPED | OUTPATIENT
Start: 2024-01-31

## 2024-02-07 ENCOUNTER — OFFICE VISIT (OUTPATIENT)
Dept: PRIMARY CARE CLINIC | Age: 84
End: 2024-02-07
Payer: MEDICARE

## 2024-02-07 VITALS
HEART RATE: 76 BPM | SYSTOLIC BLOOD PRESSURE: 130 MMHG | DIASTOLIC BLOOD PRESSURE: 70 MMHG | BODY MASS INDEX: 33.78 KG/M2 | OXYGEN SATURATION: 97 % | HEIGHT: 67 IN | WEIGHT: 215.2 LBS

## 2024-02-07 DIAGNOSIS — E11.69 TYPE 2 DIABETES MELLITUS WITH OTHER SPECIFIED COMPLICATION, UNSPECIFIED WHETHER LONG TERM INSULIN USE (HCC): Primary | ICD-10-CM

## 2024-02-07 DIAGNOSIS — G89.4 CHRONIC PAIN SYNDROME: ICD-10-CM

## 2024-02-07 DIAGNOSIS — M54.16 LEFT LUMBAR RADICULOPATHY: ICD-10-CM

## 2024-02-07 DIAGNOSIS — M48.061 SPINAL STENOSIS OF LUMBAR REGION, UNSPECIFIED WHETHER NEUROGENIC CLAUDICATION PRESENT: ICD-10-CM

## 2024-02-07 DIAGNOSIS — I10 ESSENTIAL HYPERTENSION: ICD-10-CM

## 2024-02-07 LAB — HBA1C MFR BLD: 6.9 %

## 2024-02-07 PROCEDURE — 99214 OFFICE O/P EST MOD 30 MIN: CPT | Performed by: FAMILY MEDICINE

## 2024-02-07 PROCEDURE — G8427 DOCREV CUR MEDS BY ELIG CLIN: HCPCS | Performed by: FAMILY MEDICINE

## 2024-02-07 PROCEDURE — G8484 FLU IMMUNIZE NO ADMIN: HCPCS | Performed by: FAMILY MEDICINE

## 2024-02-07 PROCEDURE — 1123F ACP DISCUSS/DSCN MKR DOCD: CPT | Performed by: FAMILY MEDICINE

## 2024-02-07 PROCEDURE — 3078F DIAST BP <80 MM HG: CPT | Performed by: FAMILY MEDICINE

## 2024-02-07 PROCEDURE — 1036F TOBACCO NON-USER: CPT | Performed by: FAMILY MEDICINE

## 2024-02-07 PROCEDURE — 83036 HEMOGLOBIN GLYCOSYLATED A1C: CPT | Performed by: FAMILY MEDICINE

## 2024-02-07 PROCEDURE — G8417 CALC BMI ABV UP PARAM F/U: HCPCS | Performed by: FAMILY MEDICINE

## 2024-02-07 PROCEDURE — 3075F SYST BP GE 130 - 139MM HG: CPT | Performed by: FAMILY MEDICINE

## 2024-02-07 RX ORDER — PREGABALIN 100 MG/1
100 CAPSULE ORAL
Qty: 30 CAPSULE | Refills: 2 | Status: SHIPPED | OUTPATIENT
Start: 2024-02-07 | End: 2024-05-07

## 2024-02-07 RX ORDER — NITROGLYCERIN 0.4 MG/1
TABLET SUBLINGUAL
COMMUNITY
Start: 2024-02-06

## 2024-02-07 ASSESSMENT — PATIENT HEALTH QUESTIONNAIRE - PHQ9
SUM OF ALL RESPONSES TO PHQ QUESTIONS 1-9: 0
SUM OF ALL RESPONSES TO PHQ QUESTIONS 1-9: 0
SUM OF ALL RESPONSES TO PHQ9 QUESTIONS 1 & 2: 0
SUM OF ALL RESPONSES TO PHQ QUESTIONS 1-9: 0
2. FEELING DOWN, DEPRESSED OR HOPELESS: 0
SUM OF ALL RESPONSES TO PHQ QUESTIONS 1-9: 0
1. LITTLE INTEREST OR PLEASURE IN DOING THINGS: 0

## 2024-02-07 ASSESSMENT — ENCOUNTER SYMPTOMS
SHORTNESS OF BREATH: 0
BACK PAIN: 1

## 2024-02-07 NOTE — PATIENT INSTRUCTIONS

## 2024-02-07 NOTE — PROGRESS NOTES
Normocephalic and atraumatic.      Right Ear: External ear normal.      Left Ear: External ear normal.   Eyes:      General: No scleral icterus.        Right eye: No discharge.         Left eye: No discharge.      Conjunctiva/sclera: Conjunctivae normal.   Neck:      Thyroid: No thyromegaly.      Trachea: No tracheal deviation.   Cardiovascular:      Rate and Rhythm: Normal rate and regular rhythm.      Pulses:           Dorsalis pedis pulses are 2+ on the right side and 2+ on the left side.      Heart sounds: Normal heart sounds.   Pulmonary:      Effort: Pulmonary effort is normal. No respiratory distress.      Breath sounds: Normal breath sounds. No wheezing.   Musculoskeletal:      Comments: Distal lower legs lymphedema and ankles lymphedema  Uses a cane    Lymphadenopathy:      Cervical: No cervical adenopathy.   Skin:     General: Skin is warm.      Findings: No rash.   Neurological:      Mental Status: He is alert and oriented to person, place, and time.   Psychiatric:         Mood and Affect: Mood normal.         Behavior: Behavior normal.         Thought Content: Thought content normal.         Assessment:       Diagnosis Orders   1. Type 2 diabetes mellitus with other specified complication, unspecified whether long term insulin use (HCC)  POCT glycosylated hemoglobin (Hb A1C)      2. Spinal stenosis of lumbar region, unspecified whether neurogenic claudication present  pregabalin (LYRICA) 100 MG capsule      3. Essential hypertension        4. Chronic pain syndrome  pregabalin (LYRICA) 100 MG capsule      5. Left lumbar radiculopathy  pregabalin (LYRICA) 100 MG capsule           Plan:      Return in about 4 months (around 6/7/2024) for diabetes mellitus.  Discussed lyrica, and SE. Call with progress, can increase or decrease the dose  Orders Placed This Encounter   Procedures    POCT glycosylated hemoglobin (Hb A1C)     Orders Placed This Encounter   Medications    pregabalin (LYRICA) 100 MG capsule

## 2024-03-25 RX ORDER — HYDROCHLOROTHIAZIDE 25 MG/1
TABLET ORAL
Qty: 90 TABLET | Refills: 3 | Status: SHIPPED | OUTPATIENT
Start: 2024-03-25

## 2024-04-26 ENCOUNTER — OFFICE VISIT (OUTPATIENT)
Dept: PRIMARY CARE CLINIC | Age: 84
End: 2024-04-26
Payer: MEDICARE

## 2024-04-26 VITALS
HEART RATE: 79 BPM | OXYGEN SATURATION: 93 % | SYSTOLIC BLOOD PRESSURE: 110 MMHG | BODY MASS INDEX: 31.58 KG/M2 | WEIGHT: 201.2 LBS | HEIGHT: 67 IN | DIASTOLIC BLOOD PRESSURE: 76 MMHG

## 2024-04-26 DIAGNOSIS — R11.2 NAUSEA AND VOMITING, UNSPECIFIED VOMITING TYPE: ICD-10-CM

## 2024-04-26 DIAGNOSIS — R63.4 WEIGHT LOSS, NON-INTENTIONAL: ICD-10-CM

## 2024-04-26 DIAGNOSIS — R10.13 EPIGASTRIC ABDOMINAL PAIN: Primary | ICD-10-CM

## 2024-04-26 PROBLEM — U07.1 PNEUMONIA DUE TO COVID-19 VIRUS: Status: RESOLVED | Noted: 2020-12-29 | Resolved: 2024-04-26

## 2024-04-26 PROBLEM — M43.16 SPONDYLOLISTHESIS OF LUMBAR REGION: Status: ACTIVE | Noted: 2020-06-29

## 2024-04-26 PROBLEM — T14.8XXA OPEN WOUND: Status: RESOLVED | Noted: 2022-08-26 | Resolved: 2024-04-26

## 2024-04-26 PROBLEM — I27.20 PULMONARY HYPERTENSION (HCC): Status: ACTIVE | Noted: 2024-04-26

## 2024-04-26 PROBLEM — J12.82 PNEUMONIA DUE TO COVID-19 VIRUS: Status: RESOLVED | Noted: 2020-12-29 | Resolved: 2024-04-26

## 2024-04-26 PROBLEM — I34.0 MILD MITRAL VALVE REGURGITATION: Status: ACTIVE | Noted: 2022-02-08

## 2024-04-26 PROBLEM — I50.32 CHRONIC HEART FAILURE WITH PRESERVED EJECTION FRACTION (HFPEF) (HCC): Status: ACTIVE | Noted: 2023-07-27

## 2024-04-26 PROCEDURE — G8427 DOCREV CUR MEDS BY ELIG CLIN: HCPCS | Performed by: FAMILY MEDICINE

## 2024-04-26 PROCEDURE — 3078F DIAST BP <80 MM HG: CPT | Performed by: FAMILY MEDICINE

## 2024-04-26 PROCEDURE — 99214 OFFICE O/P EST MOD 30 MIN: CPT | Performed by: FAMILY MEDICINE

## 2024-04-26 PROCEDURE — G8417 CALC BMI ABV UP PARAM F/U: HCPCS | Performed by: FAMILY MEDICINE

## 2024-04-26 PROCEDURE — 3074F SYST BP LT 130 MM HG: CPT | Performed by: FAMILY MEDICINE

## 2024-04-26 PROCEDURE — 1123F ACP DISCUSS/DSCN MKR DOCD: CPT | Performed by: FAMILY MEDICINE

## 2024-04-26 PROCEDURE — 1036F TOBACCO NON-USER: CPT | Performed by: FAMILY MEDICINE

## 2024-04-26 RX ORDER — ONDANSETRON 4 MG/1
4 TABLET, FILM COATED ORAL 3 TIMES DAILY PRN
Qty: 90 TABLET | Refills: 0 | Status: SHIPPED | OUTPATIENT
Start: 2024-04-26 | End: 2024-05-26

## 2024-04-26 SDOH — ECONOMIC STABILITY: FOOD INSECURITY: WITHIN THE PAST 12 MONTHS, THE FOOD YOU BOUGHT JUST DIDN'T LAST AND YOU DIDN'T HAVE MONEY TO GET MORE.: NEVER TRUE

## 2024-04-26 SDOH — ECONOMIC STABILITY: INCOME INSECURITY: HOW HARD IS IT FOR YOU TO PAY FOR THE VERY BASICS LIKE FOOD, HOUSING, MEDICAL CARE, AND HEATING?: NOT HARD AT ALL

## 2024-04-26 SDOH — ECONOMIC STABILITY: FOOD INSECURITY: WITHIN THE PAST 12 MONTHS, YOU WORRIED THAT YOUR FOOD WOULD RUN OUT BEFORE YOU GOT MONEY TO BUY MORE.: NEVER TRUE

## 2024-04-26 ASSESSMENT — PATIENT HEALTH QUESTIONNAIRE - PHQ9
SUM OF ALL RESPONSES TO PHQ QUESTIONS 1-9: 0
SUM OF ALL RESPONSES TO PHQ QUESTIONS 1-9: 0
SUM OF ALL RESPONSES TO PHQ9 QUESTIONS 1 & 2: 0
SUM OF ALL RESPONSES TO PHQ QUESTIONS 1-9: 0
1. LITTLE INTEREST OR PLEASURE IN DOING THINGS: NOT AT ALL
SUM OF ALL RESPONSES TO PHQ QUESTIONS 1-9: 0
2. FEELING DOWN, DEPRESSED OR HOPELESS: NOT AT ALL

## 2024-04-26 NOTE — PROGRESS NOTES
[facility]  [facility] PRIMARY CARE  [address] [address]  [facility] [address]  Dept: [phone]    Anuj Jovel is a 83 y.o. male Established patient, who presents today for his medical conditions/complaintsas noted below.      Chief Complaint   Patient presents with    Nausea & Vomiting     Pt is c/o sx states happens after taking meds.    Gastroesophageal Reflux    Abdominal Pain       HPI:     HPI  Dry heaves in am gets very nauseated  Soups ok at lunch or dinner, no stomach issues  No apettite  Oatmeal stays down if he waits until lunch time.  Small portion size of meat will stay down and tried ginger ale   Food feels like it just stis in his stomach  Still take omeprazole daily but he states it doesn't work well    Lives with daughter. She's present    Reviewed prior notes Cardiology  Reviewed previous Labs and Imaging    LDL Cholesterol (mg/dL)   Date Value   07/28/2022 119   09/15/2021 96     LDL Calculated (mg/dL)   Date Value   09/04/2020 115   07/10/2019 98   06/21/2018 77       (goal LDL is <100)   AST (U/L)   Date Value   07/28/2022 21     ALT (U/L)   Date Value   07/28/2022 30     BUN (mg/dL)   Date Value   07/28/2022 16     Hemoglobin A1C (%)   Date Value   02/07/2024 6.9     TSH (mIU/L)   Date Value   09/15/2021 2.37     BP Readings from Last 3 Encounters:   04/26/24 110/76   02/07/24 130/70   10/04/23 130/68          (goal 120/80)    Past Medical History:   Diagnosis Date    Diabetes (HCC)     Diverticulitis     History of allergy     Hyperlipidemia     Hypertension     Osteoarthritis     Pneumonia due to COVID-19 virus 12/29/2020      Past Surgical History:   Procedure Laterality Date    BACK SURGERY  09/03/2017    [facility], lumbar fusion    CARDIAC CATHETERIZATION      COLECTOMY  2015    partial colectomy due to diveritc    COLONOSCOPY      TOTAL KNEE ARTHROPLASTY Right        History reviewed. No pertinent family history.    Social History     Tobacco

## 2024-05-01 NOTE — TELEPHONE ENCOUNTER
LAST VISIT:   4/26/2024     Future Appointments   Date Time Provider Department Center   5/7/2024  9:30 AM [facility] [place] ROOM 1 [facility] RAD [facility] Radiolog   6/13/2024  1:00 PM Jeanne Simpson MD [facility]

## 2024-05-05 ENCOUNTER — HOSPITAL ENCOUNTER (EMERGENCY)
Age: 84
Discharge: HOME | End: 2024-05-05
Attending: EMERGENCY MEDICINE
Payer: MEDICARE

## 2024-05-05 ENCOUNTER — APPOINTMENT (OUTPATIENT)
Dept: CT IMAGING | Age: 84
End: 2024-05-05
Payer: MEDICARE

## 2024-05-05 VITALS
HEART RATE: 86 BPM | DIASTOLIC BLOOD PRESSURE: 73 MMHG | OXYGEN SATURATION: 95 % | RESPIRATION RATE: 18 BRPM | SYSTOLIC BLOOD PRESSURE: 113 MMHG | WEIGHT: 200 LBS | HEIGHT: 67 IN | TEMPERATURE: 98.2 F | BODY MASS INDEX: 31.39 KG/M2

## 2024-05-05 DIAGNOSIS — K59.00 CONSTIPATION, UNSPECIFIED CONSTIPATION TYPE: Primary | ICD-10-CM

## 2024-05-05 LAB
ALBUMIN SERPL-MCNC: 4.1 G/DL (ref 3.5–5.2)
ALP SERPL-CCNC: 147 U/L (ref 40–129)
ALT SERPL-CCNC: 39 U/L (ref 5–41)
ANION GAP SERPL CALCULATED.3IONS-SCNC: 14 MMOL/L (ref 9–17)
AST SERPL-CCNC: 33 U/L
BASOPHILS # BLD: 0.1 K/UL (ref 0–0.2)
BASOPHILS NFR BLD: 1 % (ref 0–2)
BILIRUB SERPL-MCNC: 0.6 MG/DL (ref 0.3–1.2)
BILIRUB UR QL STRIP: NEGATIVE
BUN SERPL-MCNC: 19 MG/DL (ref 8–23)
CALCIUM SERPL-MCNC: 9.8 MG/DL (ref 8.6–10.4)
CHLORIDE SERPL-SCNC: 95 MMOL/L (ref 98–107)
CLARITY UR: CLEAR
CO2 SERPL-SCNC: 27 MMOL/L (ref 20–31)
COLOR UR: YELLOW
COMMENT: NORMAL
CREAT SERPL-MCNC: 1.2 MG/DL (ref 0.7–1.2)
EOSINOPHIL # BLD: 0.2 K/UL (ref 0–0.4)
EOSINOPHILS RELATIVE PERCENT: 2 % (ref 0–4)
ERYTHROCYTE [DISTWIDTH] IN BLOOD BY AUTOMATED COUNT: 13.1 % (ref 11.5–14.9)
GFR, ESTIMATED: 60 ML/MIN/1.73M2
GLUCOSE SERPL-MCNC: 158 MG/DL (ref 70–99)
GLUCOSE UR STRIP-MCNC: NEGATIVE MG/DL
HCT VFR BLD AUTO: 40.5 % (ref 41–53)
HGB BLD-MCNC: 13.9 G/DL (ref 13.5–17.5)
HGB UR QL STRIP.AUTO: NEGATIVE
KETONES UR STRIP-MCNC: NEGATIVE MG/DL
LEUKOCYTE ESTERASE UR QL STRIP: NEGATIVE
LIPASE SERPL-CCNC: 22 U/L (ref 13–60)
LYMPHOCYTES NFR BLD: 1.3 K/UL (ref 1–4.8)
LYMPHOCYTES RELATIVE PERCENT: 15 % (ref 24–44)
MCH RBC QN AUTO: 32 PG (ref 26–34)
MCHC RBC AUTO-ENTMCNC: 34.2 G/DL (ref 31–37)
MCV RBC AUTO: 93.4 FL (ref 80–100)
MONOCYTES NFR BLD: 0.7 K/UL (ref 0.1–1.3)
MONOCYTES NFR BLD: 8 % (ref 1–7)
NEUTROPHILS NFR BLD: 74 % (ref 36–66)
NEUTS SEG NFR BLD: 6.7 K/UL (ref 1.3–9.1)
NITRITE UR QL STRIP: NEGATIVE
PH UR STRIP: 5.5 [PH] (ref 5–8)
PLATELET # BLD AUTO: 269 K/UL (ref 150–450)
PMV BLD AUTO: 7.2 FL (ref 6–12)
POTASSIUM SERPL-SCNC: 3.5 MMOL/L (ref 3.7–5.3)
PROT SERPL-MCNC: 7 G/DL (ref 6.4–8.3)
PROT UR STRIP-MCNC: NEGATIVE MG/DL
RBC # BLD AUTO: 4.34 M/UL (ref 4.5–5.9)
SODIUM SERPL-SCNC: 136 MMOL/L (ref 135–144)
SP GR UR STRIP: 1.01 (ref 1–1.03)
UROBILINOGEN UR STRIP-ACNC: NORMAL EU/DL (ref 0–1)
WBC OTHER # BLD: 9 K/UL (ref 3.5–11)

## 2024-05-05 PROCEDURE — 99285 EMERGENCY DEPT VISIT HI MDM: CPT

## 2024-05-05 PROCEDURE — 83690 ASSAY OF LIPASE: CPT

## 2024-05-05 PROCEDURE — 85025 COMPLETE CBC W/AUTO DIFF WBC: CPT

## 2024-05-05 PROCEDURE — 6370000000 HC RX 637 (ALT 250 FOR IP): Performed by: EMERGENCY MEDICINE

## 2024-05-05 PROCEDURE — 2580000003 HC RX 258: Performed by: EMERGENCY MEDICINE

## 2024-05-05 PROCEDURE — 96374 THER/PROPH/DIAG INJ IV PUSH: CPT

## 2024-05-05 PROCEDURE — 96361 HYDRATE IV INFUSION ADD-ON: CPT

## 2024-05-05 PROCEDURE — 74177 CT ABD & PELVIS W/CONTRAST: CPT

## 2024-05-05 PROCEDURE — 36415 COLL VENOUS BLD VENIPUNCTURE: CPT

## 2024-05-05 PROCEDURE — 80053 COMPREHEN METABOLIC PANEL: CPT

## 2024-05-05 PROCEDURE — 6360000004 HC RX CONTRAST MEDICATION: Performed by: EMERGENCY MEDICINE

## 2024-05-05 PROCEDURE — 81003 URINALYSIS AUTO W/O SCOPE: CPT

## 2024-05-05 PROCEDURE — 6360000002 HC RX W HCPCS: Performed by: EMERGENCY MEDICINE

## 2024-05-05 RX ORDER — POTASSIUM CHLORIDE 20 MEQ/1
40 TABLET, EXTENDED RELEASE ORAL ONCE
Status: COMPLETED | OUTPATIENT
Start: 2024-05-05 | End: 2024-05-05

## 2024-05-05 RX ORDER — ENEMA 19; 7 G/133ML; G/133ML
1 ENEMA RECTAL
Status: DISCONTINUED | OUTPATIENT
Start: 2024-05-05 | End: 2024-05-05

## 2024-05-05 RX ORDER — SODIUM CHLORIDE 0.9 % (FLUSH) 0.9 %
10 SYRINGE (ML) INJECTION PRN
Status: DISCONTINUED | OUTPATIENT
Start: 2024-05-05 | End: 2024-05-05 | Stop reason: HOSPADM

## 2024-05-05 RX ORDER — POLYETHYLENE GLYCOL 3350 17 G/17G
17 POWDER, FOR SOLUTION ORAL ONCE
Status: COMPLETED | OUTPATIENT
Start: 2024-05-05 | End: 2024-05-05

## 2024-05-05 RX ORDER — MORPHINE SULFATE 2 MG/ML
2 INJECTION, SOLUTION INTRAMUSCULAR; INTRAVENOUS ONCE
Status: COMPLETED | OUTPATIENT
Start: 2024-05-05 | End: 2024-05-05

## 2024-05-05 RX ORDER — 0.9 % SODIUM CHLORIDE 0.9 %
100 INTRAVENOUS SOLUTION INTRAVENOUS ONCE
Status: COMPLETED | OUTPATIENT
Start: 2024-05-05 | End: 2024-05-05

## 2024-05-05 RX ORDER — POLYETHYLENE GLYCOL 3350 17 G/17G
17 POWDER, FOR SOLUTION ORAL DAILY PRN
Qty: 30 PACKET | Refills: 0 | Status: SHIPPED | OUTPATIENT
Start: 2024-05-05 | End: 2024-06-04

## 2024-05-05 RX ORDER — DOCUSATE SODIUM 100 MG/1
100 CAPSULE, LIQUID FILLED ORAL 2 TIMES DAILY PRN
Qty: 30 CAPSULE | Refills: 0 | Status: SHIPPED | OUTPATIENT
Start: 2024-05-05

## 2024-05-05 RX ADMIN — SODIUM CHLORIDE 100 ML: 9 INJECTION, SOLUTION INTRAVENOUS at 10:12

## 2024-05-05 RX ADMIN — POTASSIUM CHLORIDE 40 MEQ: 1500 TABLET, EXTENDED RELEASE ORAL at 10:57

## 2024-05-05 RX ADMIN — SODIUM CHLORIDE, PRESERVATIVE FREE 10 ML: 5 INJECTION INTRAVENOUS at 10:13

## 2024-05-05 RX ADMIN — IOPAMIDOL 75 ML: 755 INJECTION, SOLUTION INTRAVENOUS at 10:13

## 2024-05-05 RX ADMIN — MORPHINE SULFATE 2 MG: 2 INJECTION, SOLUTION INTRAMUSCULAR; INTRAVENOUS at 09:04

## 2024-05-05 RX ADMIN — POLYETHYLENE GLYCOL 3350 17 G: 17 POWDER, FOR SOLUTION ORAL at 11:07

## 2024-05-05 RX ADMIN — Medication 240 ML: at 11:28

## 2024-05-05 ASSESSMENT — LIFESTYLE VARIABLES
HOW MANY STANDARD DRINKS CONTAINING ALCOHOL DO YOU HAVE ON A TYPICAL DAY: 1 OR 2
HOW OFTEN DO YOU HAVE A DRINK CONTAINING ALCOHOL: MONTHLY OR LESS

## 2024-05-05 ASSESSMENT — PAIN - FUNCTIONAL ASSESSMENT: PAIN_FUNCTIONAL_ASSESSMENT: 0-10

## 2024-05-05 ASSESSMENT — PAIN SCALES - GENERAL
PAINLEVEL_OUTOF10: 8
PAINLEVEL_OUTOF10: 9

## 2024-05-05 NOTE — ED PROVIDER NOTES
glycol (MIRALAX) 17 g packet Take 1 packet by mouth daily as needed for Constipation, Disp-30 packet, R-0Normal      docusate sodium (COLACE) 100 MG capsule Take 1 capsule by mouth 2 times daily as needed for Constipation, Disp-30 capsule, R-0Normal           PHYSICIAN CONSULTS ORDERED THIS ENCOUNTER:  None  FINAL IMPRESSION      1. Constipation, unspecified constipation type          DISPOSITION/PLAN   DISPOSITION Decision To Discharge 05/05/2024 12:08:19 PM      OUTPATIENT FOLLOW UP THE PATIENT:  Jeanne Simpson MD  [address] [facility] [address]  [place] [id number]  [phone]    Schedule an appointment as soon as possible for a visit       [facility]  [address] [place] [address]  [phone]  Go to   If symptoms worsen, As needed      DO Perico Jesus Mansour Mohamed I, DO  05/05/24 2801

## 2024-05-05 NOTE — ED TRIAGE NOTES
Mode of arrival (squad #, walk in, police, etc) : Walk in        Chief complaint(s): Abdominal pain, constipation, urinary frequency        Arrival Note (brief scenario, treatment PTA, etc).: Patient reports his last bowel movement was approxiamtely 3 days ago. Patient reports urianry frequency as well that started yesterday.

## 2024-05-07 ENCOUNTER — OFFICE VISIT (OUTPATIENT)
Dept: PRIMARY CARE CLINIC | Age: 84
End: 2024-05-07
Payer: MEDICARE

## 2024-05-07 ENCOUNTER — HOSPITAL ENCOUNTER (OUTPATIENT)
Dept: GENERAL RADIOLOGY | Age: 84
Discharge: HOME | End: 2024-05-09
Payer: MEDICARE

## 2024-05-07 VITALS
OXYGEN SATURATION: 95 % | DIASTOLIC BLOOD PRESSURE: 68 MMHG | SYSTOLIC BLOOD PRESSURE: 114 MMHG | BODY MASS INDEX: 32.21 KG/M2 | WEIGHT: 205.2 LBS | HEIGHT: 67 IN | HEART RATE: 85 BPM

## 2024-05-07 DIAGNOSIS — N39.490 OVERFLOW INCONTINENCE OF URINE: ICD-10-CM

## 2024-05-07 DIAGNOSIS — R10.13 EPIGASTRIC ABDOMINAL PAIN: ICD-10-CM

## 2024-05-07 DIAGNOSIS — K59.00 CONSTIPATION, UNSPECIFIED CONSTIPATION TYPE: Primary | ICD-10-CM

## 2024-05-07 PROCEDURE — G8417 CALC BMI ABV UP PARAM F/U: HCPCS | Performed by: STUDENT IN AN ORGANIZED HEALTH CARE EDUCATION/TRAINING PROGRAM

## 2024-05-07 PROCEDURE — 3074F SYST BP LT 130 MM HG: CPT | Performed by: STUDENT IN AN ORGANIZED HEALTH CARE EDUCATION/TRAINING PROGRAM

## 2024-05-07 PROCEDURE — 1123F ACP DISCUSS/DSCN MKR DOCD: CPT | Performed by: STUDENT IN AN ORGANIZED HEALTH CARE EDUCATION/TRAINING PROGRAM

## 2024-05-07 PROCEDURE — 1036F TOBACCO NON-USER: CPT | Performed by: STUDENT IN AN ORGANIZED HEALTH CARE EDUCATION/TRAINING PROGRAM

## 2024-05-07 PROCEDURE — 99214 OFFICE O/P EST MOD 30 MIN: CPT | Performed by: STUDENT IN AN ORGANIZED HEALTH CARE EDUCATION/TRAINING PROGRAM

## 2024-05-07 PROCEDURE — 3078F DIAST BP <80 MM HG: CPT | Performed by: STUDENT IN AN ORGANIZED HEALTH CARE EDUCATION/TRAINING PROGRAM

## 2024-05-07 PROCEDURE — 2500000003 HC RX 250 WO HCPCS: Performed by: FAMILY MEDICINE

## 2024-05-07 PROCEDURE — G8427 DOCREV CUR MEDS BY ELIG CLIN: HCPCS | Performed by: STUDENT IN AN ORGANIZED HEALTH CARE EDUCATION/TRAINING PROGRAM

## 2024-05-07 PROCEDURE — 74246 X-RAY XM UPR GI TRC 2CNTRST: CPT

## 2024-05-07 RX ORDER — NITROGLYCERIN 0.4 MG/1
TABLET SUBLINGUAL
COMMUNITY
Start: 2024-04-30

## 2024-05-07 RX ORDER — TAMSULOSIN HYDROCHLORIDE 0.4 MG/1
0.4 CAPSULE ORAL DAILY
Qty: 30 CAPSULE | Refills: 0 | Status: SHIPPED | OUTPATIENT
Start: 2024-05-07

## 2024-05-07 RX ADMIN — BARIUM SULFATE 140 ML: 980 POWDER, FOR SUSPENSION ORAL at 10:04

## 2024-05-07 RX ADMIN — BARIUM SULFATE 160 ML: 960 POWDER, FOR SUSPENSION ORAL at 10:04

## 2024-05-07 ASSESSMENT — ENCOUNTER SYMPTOMS: SHORTNESS OF BREATH: 0

## 2024-05-07 NOTE — PROGRESS NOTES
Movements: Extraocular movements intact.      Pupils: Pupils are equal, round, and reactive to light.   Cardiovascular:      Rate and Rhythm: Normal rate and regular rhythm.      Heart sounds: No murmur heard.     No friction rub. No gallop.   Pulmonary:      Effort: Pulmonary effort is normal. No respiratory distress.      Breath sounds: Normal breath sounds. No wheezing, rhonchi or rales.   Musculoskeletal:      Right lower leg: Edema (1+ pitting) present.      Left lower leg: Edema (1+ pitting) present.   Neurological:      Mental Status: He is alert.      Gait: Gait abnormal (Use of walker).   Psychiatric:         Mood and Affect: Mood normal.         Behavior: Behavior normal.         Thought Content: Thought content normal.         Judgment: Judgment normal.         Assessment:       Diagnosis Orders   1. Constipation, unspecified constipation type        2. Overflow incontinence of urine             CT obtained in ED showed markedly distended bladder, prostate glad was unremarkable. Likely overflow incontinence. Can trial Flomax. Patient is noted to have allergy to sulfa antibiotics. If he develops hives from Flomax, will have to consider finasteride.     Constipation appears to be resolved with use of MiraLAX, enema, Colace.  Plan:   Trial Flomax daily for incontinence  Can limit Miralax use since constipation is resolving.    Return maintain follow-up.    No orders of the defined types were placed in this encounter.    No orders of the defined types were placed in this encounter.          Discussed risks and benefits of above plan. All patient questions were answered prior to patient leaving the office, today. Reviewed health maintenance. Instructed regarding current medications, diet, and exercise. Patient agreed with treatment plan.    Electronically signed by Stella Chew DO on 5/7/2024 at 11:16 AM

## 2024-05-13 ENCOUNTER — TELEPHONE (OUTPATIENT)
Dept: PRIMARY CARE CLINIC | Age: 84
End: 2024-05-13

## 2024-05-13 NOTE — TELEPHONE ENCOUNTER
Spoke with pts daughter Sushma (on HIPAA) She stated that pt is taking the pregabalin and is still having a lot of pain to where he is having to use his walker a lot more. She stated pt is wondering if he can up the dosage of that medication or try something else to ease the pain.    Please advise.

## 2024-05-20 DIAGNOSIS — M54.16 LEFT LUMBAR RADICULOPATHY: ICD-10-CM

## 2024-05-20 DIAGNOSIS — G89.4 CHRONIC PAIN SYNDROME: ICD-10-CM

## 2024-05-20 DIAGNOSIS — M48.061 SPINAL STENOSIS OF LUMBAR REGION, UNSPECIFIED WHETHER NEUROGENIC CLAUDICATION PRESENT: ICD-10-CM

## 2024-05-20 RX ORDER — ATORVASTATIN CALCIUM 40 MG/1
TABLET, FILM COATED ORAL
Qty: 90 TABLET | Refills: 3 | Status: SHIPPED | OUTPATIENT
Start: 2024-05-20

## 2024-05-20 RX ORDER — PREGABALIN 100 MG/1
100 CAPSULE ORAL 2 TIMES DAILY
Qty: 60 CAPSULE | Refills: 2 | Status: SHIPPED | OUTPATIENT
Start: 2024-05-20 | End: 2024-08-18

## 2024-05-21 ENCOUNTER — TELEPHONE (OUTPATIENT)
Dept: PRIMARY CARE CLINIC | Age: 84
End: 2024-05-21

## 2024-05-21 NOTE — TELEPHONE ENCOUNTER
[facility] HOSPITALIST PROGRESS NOTE     Patient Identification:  Name:  Martinez Mckenna  Age:  49 y.o.  Sex:  male  :  1972  MRN:  [id number]  Visit Number:  [id number]  ROOM: [address]     Primary Care Provider:  Rafal Casillas MD    Length of stay in inpatient status:  21    Subjective     Chief Compliant:    Chief Complaint   Patient presents with   • Respiratory Distress       History of Presenting Illness:    Patient remains intubated, sedated. Oxygenation improving. Had 700 cc of urine output yesterday. Patient was receiving dialysis this morning with goal of 2.5L ultrafiltrate, pressors was started after dialysis initiation, up to 0.12 of levophed.     ROS:  Otherwise 10 point ROS negative other than documented above in HPI.     Objective     Current Hospital Meds:artificial tears, , Both Eyes, Q4H  aspirin, 81 mg, Oral, Daily  atorvastatin, 20 mg, Oral, Nightly  castor oil-balsam peru, 1 application, Topical, Q12H  cefTRIAXone, 2 g, Intravenous, Q24H  chlorhexidine, 15 mL, Mouth/Throat, Q12H  clindamycin, 600 mg, Intravenous, Q8H  dexamethasone, 6 mg, Intravenous, Q24H  insulin aspart, 0-14 Units, Subcutaneous, Q6H  insulin detemir, 7 Units, Subcutaneous, Q12H  nystatin, , Topical, Q12H  pantoprazole, 40 mg, Intravenous, Q12H  polyethylene glycol, 17 g, Oral, Daily  senna-docusate sodium, 1 tablet, Oral, BID  sodium bicarbonate, 100 mEq, Intravenous, Once  sodium bicarbonate, 1,300 mg, Oral, TID  sodium chloride, 1,000 mL, Intravenous, Once in Dialysis  sodium chloride, 10 mL, Intravenous, Q12H  sodium chloride, 10 mL, Intravenous, Q12H  sodium chloride, 10 mL, Intravenous, Q12H  sodium chloride, 10 mL, Intravenous, Q12H  voriconazole, 200 mg, Per G Tube, Q12H    dexmedetomidine, 0.2-1.5 mcg/kg/hr, Last Rate: Stopped (10/06/21 0108)  fentaNYL Citrate,   heparin (porcine), 9.8 Units/kg/hr, Last Rate: 15.686 Units/kg/hr (10/06/21 0931)  Midazolam 1 mg/mL 100mL NS, 1-10 mg/hr, Last Rate: 4  Pt's daughter Sushma calling. (On HIPAA). Asking if you could review pt's XR Lumbar spine results scanned in under Media. [facility] said that enrrique is to follow up on this. Not sure if this can wait until Dr. Simpson is in.     Can let Sushma know @ [phone]  If no call back to Sushma today, she will wait until thurs when Dr. Simpson is back in the office before calling back.   mg/hr (10/06/21 0613)  norepinephrine, 0.02-0.3 mcg/kg/min, Last Rate: 0.12 mcg/kg/min (10/06/21 0934)  propofol, 5-50 mcg/kg/min, Last Rate: 50 mcg/kg/min (10/06/21 0931)  vecuronium (NORCURON) infusion, 0.6-1.2 mcg/kg/min, Last Rate: 1.2 mcg/kg/min (10/05/21 2050)        Current Antimicrobial Therapy:  Anti-Infectives (From admission, onward)    Ordered     Dose/Rate Route Frequency Start Stop    10/04/21 0910  cefTRIAXone (ROCEPHIN) 2 g in sodium chloride 0.9 % 100 mL IVPB-VTB     Ordering Provider: Chloe Garvey MD    2 g  200 mL/hr over 30 Minutes Intravenous Every 24 Hours 10/04/21 1200 10/14/21 1159    10/04/21 0910  clindamycin (CLEOCIN) 600 mg in dextrose 5% 50 mL IVPB (premix)     Ordering Provider: Chloe Garvey MD    600 mg  50 mL/hr over 60 Minutes Intravenous Every 8 Hours 10/04/21 1100 10/14/21 1059    09/30/21 1426  voriconazole (VFEND) tablet 200 mg     Mariana Macias APRN reviewed the order on 10/02/21 1325.   Ordering Provider: Chloe Garvey MD    200 mg Per G Tube Every 12 Hours 10/01/21 1800 10/21/21 1759    10/01/21 1709  vancomycin 1 g/250 mL 0.9% NS (vial-mate)     Ordering Provider: Chloe Garvey MD    1,000 mg  over 60 Minutes Intravenous Once 10/01/21 1800 10/01/21 1850    09/30/21 1426  voriconazole (VFEND) 500 mg in dextrose (D5W) 5 % 100 mL IVPB     Ordering Provider: Chloe Garvey MD    6 mg/kg × 83.2 kg (Adjusted)  over 60 Minutes Intravenous Every 12 Hours 09/30/21 1600 10/01/21 0520    09/29/21 1501  vancomycin 1 g/250 mL 0.9% NS (vial-mate)     Ordering Provider: Kieran Rene MD    1,000 mg  over 60 Minutes Intravenous Once 09/29/21 1600 09/29/21 1859    09/27/21 1300  vancomycin 1 g/250 mL 0.9% NS (vial-mate)     Ordering Provider: Kieran Rene MD    1,000 mg  over 60 Minutes Intravenous Once 09/27/21 1500 09/27/21 1813    09/26/21 1426  Vancomycin Pharmacy Intermittent Dosing     Ordering Provider: Jaqui Tapia PA     Does not apply  Daily 09/26/21 1515 10/01/21 0859    09/26/21 1408  meropenem (MERREM) 500 mg in sodium chloride 0.9 % 100 mL IVPB-VTB     Ordering Provider: Jaqui Tapia PA    500 mg  over 3 Hours Intravenous Every 24 Hours 09/26/21 1500 09/30/21 1739    09/23/21 1302  vancomycin 1 g/250 mL 0.9% NS (vial-mate)     Ordering Provider: Chloe Garvey MD    1,000 mg  over 60 Minutes Intravenous Once 09/23/21 1400 09/23/21 1551    09/21/21 1259  meropenem (MERREM) 500 mg in sodium chloride 0.9 % 100 mL IVPB-VTB     Ordering Provider: Chloe Garvey MD    500 mg  over 3 Hours Intravenous Every 24 Hours 09/22/21 1400 09/26/21 1707    09/21/21 1257  vancomycin 1 g/250 mL 0.9% NS (vial-mate)     Ordering Provider: Chloe Garvey MD    1,000 mg  over 60 Minutes Intravenous Once 09/21/21 1400 09/21/21 1735    09/19/21 0947  Vancomycin Pharmacy Intermittent Dosing     Ordering Provider: Chloe Garvey MD     Does not apply Daily 09/20/21 0900 09/26/21 0859    09/19/21 0947  vancomycin 1750 mg/500 mL 0.9% NS IVPB ([facility])     Ordering Provider: Chloe Garvey MD    20 mg/kg × 90.1 kg  over 120 Minutes Intravenous Once 09/19/21 1200 09/19/21 1454    09/19/21 0947  meropenem (MERREM) 500 mg in sodium chloride 0.9 % 100 mL IVPB-VTB     Ordering Provider: Chloe Garvey MD    500 mg  over 30 Minutes Intravenous Once 09/19/21 1200 09/19/21 1325    09/15/21 0443  remdesivir 100 mg in 270 mL NS     Ordering Provider: Adama Brown MD    100 mg  over 60 Minutes Intravenous Every 24 Hours 09/16/21 0600 09/19/21 1013    09/15/21 0443  remdesivir 200 mg in 290 mL NS     Ordering Provider: Adama Brown MD    200 mg  over 60 Minutes Intravenous Every 24 Hours 09/15/21 0600 09/15/21 0742    09/14/21 1912  vancomycin 2000 mg/500 mL 0.9% NS IVPB ([facility])     Ordering Provider: Gilmer Avitia MD    20 mg/kg × 95.3 kg  over 120 Minutes Intravenous Once 09/14/21 2000 09/14/21 2240    09/14/21  1950  piperacillin-tazobactam (ZOSYN) IVPB 4.5 g in 100 mL NS VTB     Ordering Provider: Gilmer Avitia MD    4.5 g  over 30 Minutes Intravenous Once 09/14/21 1952 09/14/21 2054 09/14/21 1101  piperacillin-tazobactam (ZOSYN) IVPB 3.375 g in 100 mL NS VTB     Ordering Provider: James Interiano MD    3.375 g  over 30 Minutes Intravenous Once 09/14/21 1103 09/14/21 1202        Current Diuretic Therapy:  Diuretics (From admission, onward)    Ordered     Dose/Rate Route Frequency Start Stop    09/17/21 1314  furosemide (LASIX) injection 80 mg     Ordering Provider: Sb Sullivan MD    80 mg Intravenous Once 09/17/21 1400 09/17/21 1343        ----------------------------------------------------------------------------------------------------------------------  Vital Signs:  Temp:  [97.7 °F (36.5 °C)-98.3 °F (36.8 °C)] 98.1 °F (36.7 °C)  Heart Rate:  [] 107  Resp:  [32] 32  BP: ()/(49-84) 105/64  FiO2 (%):  [60 %-70 %] 60 %  SpO2:  [87 %-100 %] 95 %  on   ;   Device (Oxygen Therapy): ventilator  Body mass index is 33.76 kg/m².    Wt Readings from Last 3 Encounters:   10/06/21 104 kg (228 lb 11.2 oz)   08/04/21 95.3 kg (210 lb)     Intake & Output (last 3 days)       10/03 0701 - 10/04 0700 10/04 0701 - 10/05 0700 10/05 0701 - 10/06 0700 10/06 0701 - 10/07 0700    I.V. (mL/kg) 2448 (23.8) 2020.3 (19.4) 1539.6 (14.8)     Other 60 0 0     NG/ 1045 390     IV Piggyback 100 400 300     Total Intake(mL/kg) 3284 (31.9) 3465.3 (33.3) 2229.6 (21.4)     Urine (mL/kg/hr) 750 (0.3) 700 (0.3) 975 (0.4)     Stool 0 0 0     Total Output 750 700 975     Net +2534 +2765.3 +1254.6             Stool Unmeasured Occurrence 1 x 1 x          NPO Diet  ----------------------------------------------------------------------------------------------------------------------  Physical exam:  GENERAL:  Patient intubated and sedated.   SKIN:  Warm, dry without rashes, purpura or petechiae.  EYES:  Pupils equal, round and  reactive to light.  EOMs intact.  Conjunctivae normal.  HEAD:  Normocephalic. Atraumatic.   EARS/NOSE/MOUTH/THROAT:  Hearing intact. Septum midline.  No excoriations or nasal discharge. No stomatitis or ulcers.  Lips normal. Oropharynx without lesions or exudates.  NECK:  Supple with good range of motion; no thyromegaly or masses  LYMPHATICS:  No cervical, supraclavicular, axillary adenopathy.  RESP:  Lungs clear to auscultation. Good airflow. Intubated receiving mechanical ventilation.   CARDIAC:  Regular rate and rhythm without murmurs, rubs or gallops. Normal S1,S2. No edema  GI:  Soft, nontender, no hepatosplenomegaly, normal bowel sounds  MSK:  No clubbing or cyanosis, No joint swelling noted in hands  NEUROLOGICAL:  No focal deficit. Pupils equal and reactive.   ----------------------------------------------------------------------------------------------------------------------  Tele:    ----------------------------------------------------------------------------------------------------------------------  Results from last 7 days   Lab Units 10/05/21  2233 10/05/21  0207 10/04/21  0140   CRP mg/dL 7.74* 4.74* 2.78*   WBC 10*3/mm3 9.11 8.83 7.64   HEMOGLOBIN g/dL 8.5* 8.3* 8.9*   HEMATOCRIT % 25.8* 25.6* 27.3*   MCV fL 93.5 92.4 91.9   MCHC g/dL 32.9 32.4 32.6   PLATELETS 10*3/mm3 156 130* 125*     Results from last 7 days   Lab Units 10/06/21  0432   PH, ARTERIAL pH units 7.305*   PO2 ART mm Hg 97.6   PCO2, ARTERIAL mm Hg 53.0*   HCO3 ART mmol/L 26.4*     Results from last 7 days   Lab Units 10/05/21  2233 10/05/21  0207 10/04/21  0140   SODIUM mmol/L 127* 127* 127*   POTASSIUM mmol/L 4.7 4.0 4.3   CHLORIDE mmol/L 86* 87* 89*   CO2 mmol/L 23.7 23.2 20.6*   BUN mg/dL 63* 48* 76*   CREATININE mg/dL 2.89* 2.77* 3.92*   EGFR IF NONAFRICN AM mL/min/1.73 23* 25* 16*   CALCIUM mg/dL 8.5* 8.0* 8.0*   GLUCOSE mg/dL 245* 167* 203*   ALBUMIN g/dL 2.88* 2.76* 2.49*   BILIRUBIN mg/dL 0.3 0.4 0.3   ALK PHOS U/L 84 80 85    AST (SGOT) U/L 9 10 8   ALT (SGPT) U/L 6 6 6   Estimated Creatinine Clearance: 36.7 mL/min (A) (by C-G formula based on SCr of 2.89 mg/dL (H)).  No results found for: AMMONIA  Results from last 7 days   Lab Units 10/02/21  0749 10/01/21  0825   TROPONIN T ng/mL 0.056* 0.045*             Glucose   Date/Time Value Ref Range Status   10/06/2021 0635 232 (H) 70 - 130 mg/dL Final     Comment:     Meter: [id number] : [id number] Jaqui Santamaria   10/05/2021 2358 277 (H) 70 - 130 mg/dL Final     Comment:     Meter: [id number] : [id number] Jaqui Mackch   10/05/2021 1654 307 (H) 70 - 130 mg/dL Final     Comment:     Meter: [id number] : [id number] Savannah serna   10/05/2021 1240 328 (H) 70 - 130 mg/dL Final     Comment:     Meter: [id number] : [id number] Hernando GUTIERREZ   10/05/2021 0513 221 (H) 70 - 130 mg/dL Final     Comment:     Meter: [id number] : [id number] VALENTINE COPPOCK   10/04/2021 2325 148 (H) 70 - 130 mg/dL Final     Comment:     Meter: [id number] : [id number] VALENTINE COPPOCK   10/04/2021 1746 229 (H) 70 - 130 mg/dL Final     Comment:     Meter: [id number] : [id number] Savannah serna   10/04/2021 1122 224 (H) 70 - 130 mg/dL Final     Comment:     Meter: [id number] : [id number] Betsy Kurtz     Lab Results   Component Value Date    TSH 0.761 09/14/2021    FREET4 1.08 09/14/2021     No results found for: PREGTESTUR, PREGSERUM, HCG, HCGQUANT  Pain Management Panel    There is no flowsheet data to display.       Brief Urine Lab Results  (Last result in the past 365 days)      Color   Clarity   Blood   Leuk Est   Nitrite   Protein   CREAT   Urine HCG        09/14/21 1108 Yellow Clear Small (1+) Negative Negative 100 mg/dL (2+)             No results found for: BLOODCX  No results found for: URINECX  No results found for: WOUNDCX  No results found for: STOOLCX  No results found for: RESPCX  No results found for: AFBCX  Results from last 7 days   Lab Units 10/05/21  0020  "10/05/21  0207 10/04/21  0140 10/03/21  0349 10/02/21  0100 10/01/21  0432 09/30/21  0539   PROCALCITONIN ng/mL  --  0.85*  --  0.93*  --  1.40*  --    CRP mg/dL 7.74* 4.74* 2.78* 4.65* 10.92* 10.11* 10.58*       I have personally looked at the labs and they are summarized above.  ----------------------------------------------------------------------------------------------------------------------  Detailed radiology reports for the last 24 hours:    Imaging Results (Last 24 Hours)     Procedure Component Value Units Date/Time    XR Chest 1 View [[id number]] Resulted: 10/06/21 0815     Updated: 10/06/21 1032        Assessment & Plan    49M PMH DM Type II, reportedly tested positive for Covid 19 1 week prior, brought in unresponsive per EMS.     #Severe Sepsis/Septic Shock, Acute Metabolic Encephalopathy & Acute Hypoxic Respiratory Failure requiring intubation and mechanical ventilation 2/2 PNA, Viral, treating for Covid 19 & RSV c/b ARDS and pneumomediastinum but also covering for PNA, Bacterial, treating for MDR Organisms, also treating for PNA, Fungal w/ Candida sp. But now w/ c/f opportunistic infection w/ development of cavitary lesion RUL  - Patient presented w/ to ER via EMS and unresponsive at home, reportedly diagnosed w/ Covid 19 1 week prior, Glc read as \"high\" in the field, intubated upon arrival to ER 9/14.  HR > 90, RR > 20, WBC count > 12K, hypotensive requiring IVFs and pressors, covid +, PNA confirmed on imaging.  Has been admitted and intubated for prolonged period of time, since 9/14, has been on broad spectrum Abx and antifungal therapy but w/ c/f worsening infection, imaging now w/ c/f RUL cavitary lesion.   - Admission labs showed WBC count 16K, CRP 2.12, lactate 6.1, procal 0.35->1.37, Covid 19 +, RSV +, ABG 6.9/23/46, Resp Cx growing candida sp, MRSA negative, Bcx's NGTD, repeat Bcx's 9/27 NGTD  - AFB Cx's pending, Resp culture growing yeast not candida sp  - Echo showed LVEF 56-60%, diastolic " dysfunction, otherwise unremarkable   - B/l Venous Duplex negative for DVT  - CT Head showed no acute intracranial abnormality, atrophy and CSVID  - CT Chest w/o contrast showed B/l patchy GGO's typical for Covid 19, moderate disease, dense consolidation b/l LL's; repeat CTPE 9/26 showed no PE, pneumomediastinum, significant b/l GGO's w/ LL consolidation and new cavitary process w/ filling defect in RUL   - VQ scan (perfusion only) showed low suspicion for PE  - ID consulted; Following, s/p Remdesivir  - Pulm consulted; Following, rec'd 3 weeks Abx, repeat imaging 6-8 weeks   - Continue Vanc, Meropenem and have extended dosing today for 3 weeks total since cavitary lesion noted on 9/26, continue Voriconazole as per ID given elevated Aspergillus galactomannan Ag  - Continue extended course of Dexamethasone now daily  - Continue Heparin gtt given d-dimer > 20 though no definitive thrombus identified, covering for microvascular thormbi as well, caution given underlying cavitary lung lesion  - Continue Albuterol Inhaler PRN  - Continue IV pressors to keep MAPs > 65 or SBP > 90, levophed restarted with dialysis this AM. Currently 0.12.   - Continue pain and sedation gtt's as indicated  - Continue IV PPI for stress ulcer PPx  - Reviewed AM ABG and increased Fi02 due to mildly low P02, otherwise stable, repeat CXR pending today.   - Trend Covid 19 progression labs w/ CBC, CMP, CK, CRP, Ferritin daily and LDH, D-dimer, Fibrinogen q48hrs.  - Monitor in CCU, enhanced droplet/contact isolation precautions, continue 02 but wean as able, SAT/SBT when appropriate, palliative care following for GOC conversations  - ABG this AM 7.305/53/98 on 70%, PEEP of 14. FiO2 weaned down to 60% and we are weaning PEEP also. Oxygenation improving.   - CTPE negative on 9/26, D-dimer has trended down, patient not tachycardic, and patient has been on heparin gtt. Will hold repeat CTPE for now.      #RUL cavitary lesion  - Galactomanan antibody  positive, quantiferon gold indeterminate   - Pulmonology following. ID following.   - Continue antifungal coverage as above   - Will order AFBX3 from secretions obtained from ET tube. Yield probably less than BAL but does not appear bronchoscopy recommended. AFB culture NGTD.   - Repeat CT chest around 10/11.   - Discussed with ID, will leave in isolation.      #Elevated Troponins  - Labs showed trop 0.045, previously NML on admission, up 10/2 at 0.056  - EKG showed some c/f nonspecific ST/T wave abnormality overnight  - Echo previously unremarkable; Repeating limited echo  - Cards consulted; Following, rec'd heparin, ASA, plavix, statin  - On Heparin as per above, have started on ASA 81, holding on plavix for now given cavitary lung lesion and would be on triple therapy w/ increased likelihood to bleed, have started on low dose statin due to receiving Voriconizole which can increase concentrations, trending LFTs  - Continue to monitor on tele, trend trops, trend HR and BP     #Nonoliguric -> Oliguric JACKY 2/2 Sepsis/DKA w/ suspected ATN now on intermittent HD  - B/l Cr 0.7, was 2.8 on admission, trended up to 7.0 on 9/20. Renal US showed unremarkable kidney's, no obstruction noted  - Nephrology consulted; Following for iHD needs, some improved UOP      #Acute Pancreatitis, unclear etiology  - Admission labs showed WBC count 16K, CRP 2.12, Lipase > 3000, lactate 6.1, AST mild elevation, Tbili NML, TG's 136, YUDELKA negative, RF NML, Anti-Smith Ab negative, Anti-DS DNA NML, SSA/SSB NML, C3 mildly low, C4 NML, complement borderline low, IGG4 NML, Bcx's NGTD.  CT showed inflammation surrounding the pancreas as well as gastric antrum and first portion of duodenum favoring acute pancreatitis w/ secondary inflammation of GI tract though also considered gastroduodenitis. GB US showed no evidence of cholelithiasis.  Given serum lipase > 3x ULN, characteristic findings on imaging, patient meets criteria for acute pancreatitis  diagnosis.  Patient does have ongoing organ failure which is multifactorial, local complication of secondary GI tract inflammation and could be considered severe disease but truly is multifactorial at this point.  Etiology remains unclear, repeat CTPE 9/26 still showing marked abnormalities upper abdomen suspicious for pancreatitis.      #DKA c/b Acute Encephalopathy/DM Coma, Severe Systemic Acidosis in setting of NIDDM Type II, uncontrolled, now IDDM Type II - DKA Resolved  - Hgb A1c = 11.2%, no home oral agents or SQ insulin per meds rec, given severe encephalopathy/coma on presentation required intubation for airway protection. Admission labs showed Glc up to 1100, AG 39, Bicarb 2.8, Acetone moderate, ABG 6.9/23/46, K 4.5, lactate 6.1; Continue FSBG and SSI, continue Levemir 7U qhs and titrate as indicated     #Electrolyte Abnormalities  - Acute Mild Hyperkalemia - Likely related to renal failure, K up to 5.5, Resolved  - Borderline Hypomagnesemia - Mg down to 1.6, Replaced per protocol, Resolved   - Acute Mild Hypovolemic Hypernatremia (POA & Not POA) - Na up to 152 on admission, resolved then became hyponatremic, today Na improved at 128 corrected, dialysate per nephrology   - Acute Mild/Mod Hypocalcemia -  Replacing, on protocol. Corrected calcium 8.3.   - Acute Mild Hypophosphatemia - Phos as low as 2.0, Replaced per protocol, Resolved     #Anxiety/Depression  - Holding home Venlafaxine while intubated     F: TFs  A: Fentanyl  S: Propofol, versed, precedex  T: Heparin gtt  H: HOB elevated   U: Pantoprazole  G: SSI, basal  S: Not ready  B: Doc-senna and miralax  I: RIJ 9/14, Hemodialysis catheter 9/21  D: Vanc, meropenem, voriconazole     Code status: DNR/DNI. Grim Prognosis. SOFA score currently 13. Palliative care consulted. Family considering comfort measures. They do not think patient would want dialysis or other artifical life support that would negatively affect his quality of life long term. Patient's  oxygenation improving but he is still critically ill. Palliative care to discuss again with family today.      Dispo: Continue CCU level care      I have spent 40 minutes of critical care time  with > 50% of time spent in direct patient care, evaluating the patient at bedside, reviewing all labs and images, reviewing ABG and adjusting vent settings, reviewing pain and sedation gtt's, reviewing pressor requirement, communicating plan of care w/ nursing staff, utilizing high complexity medical decision making to assess and treat vital organ system failure in an individual who has impairment of one or more vital organ systems such that there is a high probability of imminent or life threatening deterioration in the patient’s condition. Failure to initiate the above interventions on an urgent basis would likely result in sudden, clinically significant or life threatening deterioration in the patient's condition.      VTE Prophylaxis:   Mechanical Order History:     None      Pharmalogical Order History:      Ordered     Dose Route Frequency Stop    09/28/21 1239  heparin (porcine) 5000 UNIT/ML injection 5,000 Units      5,000 Units IV Once 09/28/21 1439    09/28/21 1239  heparin 12897 units/250 mL (100 units/mL) in 0.45 % NaCl infusion  9.99 mL/hr      9.8 Units/kg/hr IV Titrated --    09/28/21 1239  heparin (porcine) 5000 UNIT/ML injection 5,000 Units      5,000 Units IV As Needed --    09/28/21 1239  heparin (porcine) 5000 UNIT/ML injection 2,500 Units      2,500 Units IV As Needed --    09/23/21 1512  heparin (porcine) 5000 UNIT/ML injection 5,000 Units  Status:  Discontinued      5,000 Units SC Every 8 Hours Scheduled 09/28/21 1239    09/15/21 0440  heparin (porcine) 5000 UNIT/ML injection 5,000 Units      5,000 Units IV Once 09/15/21 0621    09/15/21 0440  heparin 80253 units/250 mL (100 units/mL) in 0.45 % NaCl infusion  8.73 mL/hr,   Status:  Discontinued      10 Units/kg/hr IV Titrated 09/23/21 1512    09/15/21  0440  heparin (porcine) 5000 UNIT/ML injection 5,000 Units  Status:  Discontinued      5,000 Units IV As Needed 09/23/21 1512    09/15/21 0440  heparin (porcine) 5000 UNIT/ML injection 2,500 Units  Status:  Discontinued      2,500 Units IV As Needed 09/23/21 1512                Keegan Monte MD  [facility]  10/06/21  11:07 EDT

## 2024-06-02 DIAGNOSIS — N39.490 OVERFLOW INCONTINENCE OF URINE: ICD-10-CM

## 2024-06-03 RX ORDER — TAMSULOSIN HYDROCHLORIDE 0.4 MG/1
0.4 CAPSULE ORAL DAILY
Qty: 90 CAPSULE | Refills: 0 | Status: ON HOLD | OUTPATIENT
Start: 2024-06-03

## 2024-06-07 ENCOUNTER — APPOINTMENT (OUTPATIENT)
Dept: CT IMAGING | Age: 84
End: 2024-06-07
Attending: EMERGENCY MEDICINE
Payer: MEDICARE

## 2024-06-07 ENCOUNTER — HOSPITAL ENCOUNTER (INPATIENT)
Age: 84
LOS: 1 days | Discharge: ANOTHER HOSPITAL | End: 2024-06-08
Attending: EMERGENCY MEDICINE | Admitting: INTERNAL MEDICINE
Payer: MEDICARE

## 2024-06-07 ENCOUNTER — APPOINTMENT (OUTPATIENT)
Dept: CT IMAGING | Age: 84
End: 2024-06-07
Payer: MEDICARE

## 2024-06-07 ENCOUNTER — APPOINTMENT (OUTPATIENT)
Dept: GENERAL RADIOLOGY | Age: 84
End: 2024-06-07
Attending: EMERGENCY MEDICINE
Payer: MEDICARE

## 2024-06-07 ENCOUNTER — APPOINTMENT (OUTPATIENT)
Dept: VASCULAR LAB | Age: 84
End: 2024-06-07
Attending: INTERNAL MEDICINE
Payer: MEDICARE

## 2024-06-07 DIAGNOSIS — E86.0 DEHYDRATION: ICD-10-CM

## 2024-06-07 DIAGNOSIS — I50.20 SYSTOLIC CONGESTIVE HEART FAILURE, UNSPECIFIED HF CHRONICITY (HCC): ICD-10-CM

## 2024-06-07 DIAGNOSIS — R53.1 GENERAL WEAKNESS: ICD-10-CM

## 2024-06-07 DIAGNOSIS — R00.1 BRADYCARDIA: ICD-10-CM

## 2024-06-07 DIAGNOSIS — R09.02 HYPOXEMIA: ICD-10-CM

## 2024-06-07 DIAGNOSIS — U07.1 COVID: ICD-10-CM

## 2024-06-07 DIAGNOSIS — I44.1 SECOND DEGREE AV BLOCK, MOBITZ TYPE I: Primary | ICD-10-CM

## 2024-06-07 DIAGNOSIS — M79.89 LEFT LEG SWELLING: ICD-10-CM

## 2024-06-07 DIAGNOSIS — R06.02 SHORTNESS OF BREATH: ICD-10-CM

## 2024-06-07 LAB
ALBUMIN SERPL-MCNC: 3.7 G/DL (ref 3.5–5.2)
ALP SERPL-CCNC: 101 U/L (ref 40–129)
ALT SERPL-CCNC: 16 U/L (ref 5–41)
ANION GAP SERPL CALCULATED.3IONS-SCNC: 11 MMOL/L (ref 9–17)
AST SERPL-CCNC: 17 U/L
BASOPHILS # BLD: 0.1 K/UL (ref 0–0.2)
BASOPHILS NFR BLD: 1 % (ref 0–2)
BILIRUB SERPL-MCNC: 0.7 MG/DL (ref 0.3–1.2)
BILIRUB UR QL STRIP: NEGATIVE
BNP SERPL-MCNC: 289 PG/ML
BUN SERPL-MCNC: 21 MG/DL (ref 8–23)
CALCIUM SERPL-MCNC: 8.9 MG/DL (ref 8.6–10.4)
CHLORIDE SERPL-SCNC: 102 MMOL/L (ref 98–107)
CK SERPL-CCNC: 35 U/L (ref 39–308)
CLARITY UR: CLEAR
CO2 SERPL-SCNC: 24 MMOL/L (ref 20–31)
COLOR UR: YELLOW
COMMENT: NORMAL
CREAT SERPL-MCNC: 0.9 MG/DL (ref 0.7–1.2)
CRP SERPL HS-MCNC: 6.7 MG/L (ref 0–5)
D DIMER PPP FEU-MCNC: 1.01 UG/ML FEU (ref 0–0.59)
ECHO BSA: 2.07 M2
EOSINOPHIL # BLD: 0 K/UL (ref 0–0.4)
EOSINOPHILS RELATIVE PERCENT: 0 % (ref 0–4)
ERYTHROCYTE [DISTWIDTH] IN BLOOD BY AUTOMATED COUNT: 14.1 % (ref 11.5–14.9)
FERRITIN SERPL-MCNC: 106 NG/ML (ref 30–400)
FLUAV RNA RESP QL NAA+PROBE: NOT DETECTED
FLUBV RNA RESP QL NAA+PROBE: NOT DETECTED
GFR, ESTIMATED: 85 ML/MIN/1.73M2
GLUCOSE BLD-MCNC: 157 MG/DL (ref 75–110)
GLUCOSE BLD-MCNC: 216 MG/DL (ref 75–110)
GLUCOSE SERPL-MCNC: 154 MG/DL (ref 70–99)
GLUCOSE UR STRIP-MCNC: NEGATIVE MG/DL
HCT VFR BLD AUTO: 38.1 % (ref 41–53)
HGB BLD-MCNC: 12.9 G/DL (ref 13.5–17.5)
HGB UR QL STRIP.AUTO: NEGATIVE
KETONES UR STRIP-MCNC: NEGATIVE MG/DL
LACTATE BLDV-SCNC: 1.7 MMOL/L (ref 0.5–1.9)
LDH SERPL-CCNC: 160 U/L (ref 135–225)
LEUKOCYTE ESTERASE UR QL STRIP: NEGATIVE
LYMPHOCYTES NFR BLD: 0.7 K/UL (ref 1–4.8)
LYMPHOCYTES RELATIVE PERCENT: 8 % (ref 24–44)
MAGNESIUM SERPL-MCNC: 1.9 MG/DL (ref 1.6–2.6)
MCH RBC QN AUTO: 32.5 PG (ref 26–34)
MCHC RBC AUTO-ENTMCNC: 33.8 G/DL (ref 31–37)
MCV RBC AUTO: 96.3 FL (ref 80–100)
MONOCYTES NFR BLD: 0.5 K/UL (ref 0.1–1.3)
MONOCYTES NFR BLD: 6 % (ref 1–7)
NEUTROPHILS NFR BLD: 85 % (ref 36–66)
NEUTS SEG NFR BLD: 7.8 K/UL (ref 1.3–9.1)
NITRITE UR QL STRIP: NEGATIVE
PH UR STRIP: 6 [PH] (ref 5–8)
PLATELET # BLD AUTO: 192 K/UL (ref 150–450)
PMV BLD AUTO: 7.6 FL (ref 6–12)
POTASSIUM SERPL-SCNC: 3.9 MMOL/L (ref 3.7–5.3)
PROT SERPL-MCNC: 6.3 G/DL (ref 6.4–8.3)
PROT UR STRIP-MCNC: NEGATIVE MG/DL
RBC # BLD AUTO: 3.96 M/UL (ref 4.5–5.9)
SARS-COV-2 RNA RESP QL NAA+PROBE: DETECTED
SODIUM SERPL-SCNC: 137 MMOL/L (ref 135–144)
SOURCE: ABNORMAL
SP GR UR STRIP: 1.01 (ref 1–1.03)
SPECIMEN DESCRIPTION: ABNORMAL
TROPONIN I SERPL HS-MCNC: 27 NG/L (ref 0–22)
TROPONIN I SERPL HS-MCNC: 36 NG/L (ref 0–22)
UROBILINOGEN UR STRIP-ACNC: NORMAL EU/DL (ref 0–1)
WBC OTHER # BLD: 9.1 K/UL (ref 3.5–11)

## 2024-06-07 PROCEDURE — 99223 1ST HOSP IP/OBS HIGH 75: CPT | Performed by: INTERNAL MEDICINE

## 2024-06-07 PROCEDURE — 2580000003 HC RX 258: Performed by: INTERNAL MEDICINE

## 2024-06-07 PROCEDURE — 6360000002 HC RX W HCPCS: Performed by: INTERNAL MEDICINE

## 2024-06-07 PROCEDURE — 6370000000 HC RX 637 (ALT 250 FOR IP)

## 2024-06-07 PROCEDURE — 36415 COLL VENOUS BLD VENIPUNCTURE: CPT

## 2024-06-07 PROCEDURE — 93971 EXTREMITY STUDY: CPT

## 2024-06-07 PROCEDURE — 70450 CT HEAD/BRAIN W/O DYE: CPT

## 2024-06-07 PROCEDURE — 81003 URINALYSIS AUTO W/O SCOPE: CPT

## 2024-06-07 PROCEDURE — 2580000003 HC RX 258

## 2024-06-07 PROCEDURE — C9113 INJ PANTOPRAZOLE SODIUM, VIA: HCPCS

## 2024-06-07 PROCEDURE — 83880 ASSAY OF NATRIURETIC PEPTIDE: CPT

## 2024-06-07 PROCEDURE — 2060000000 HC ICU INTERMEDIATE R&B

## 2024-06-07 PROCEDURE — 87040 BLOOD CULTURE FOR BACTERIA: CPT

## 2024-06-07 PROCEDURE — 87086 URINE CULTURE/COLONY COUNT: CPT

## 2024-06-07 PROCEDURE — A4216 STERILE WATER/SALINE, 10 ML: HCPCS

## 2024-06-07 PROCEDURE — 80053 COMPREHEN METABOLIC PANEL: CPT

## 2024-06-07 PROCEDURE — 82728 ASSAY OF FERRITIN: CPT

## 2024-06-07 PROCEDURE — 6360000004 HC RX CONTRAST MEDICATION: Performed by: INTERNAL MEDICINE

## 2024-06-07 PROCEDURE — 96365 THER/PROPH/DIAG IV INF INIT: CPT

## 2024-06-07 PROCEDURE — 83615 LACTATE (LD) (LDH) ENZYME: CPT

## 2024-06-07 PROCEDURE — 2000000000 HC ICU R&B

## 2024-06-07 PROCEDURE — 2580000003 HC RX 258: Performed by: EMERGENCY MEDICINE

## 2024-06-07 PROCEDURE — 99285 EMERGENCY DEPT VISIT HI MDM: CPT

## 2024-06-07 PROCEDURE — 6370000000 HC RX 637 (ALT 250 FOR IP): Performed by: INTERNAL MEDICINE

## 2024-06-07 PROCEDURE — 87636 SARSCOV2 & INF A&B AMP PRB: CPT

## 2024-06-07 PROCEDURE — 82550 ASSAY OF CK (CPK): CPT

## 2024-06-07 PROCEDURE — 82947 ASSAY GLUCOSE BLOOD QUANT: CPT

## 2024-06-07 PROCEDURE — 85379 FIBRIN DEGRADATION QUANT: CPT

## 2024-06-07 PROCEDURE — 86140 C-REACTIVE PROTEIN: CPT

## 2024-06-07 PROCEDURE — 96361 HYDRATE IV INFUSION ADD-ON: CPT

## 2024-06-07 PROCEDURE — 6360000002 HC RX W HCPCS

## 2024-06-07 PROCEDURE — 85025 COMPLETE CBC W/AUTO DIFF WBC: CPT

## 2024-06-07 PROCEDURE — 6370000000 HC RX 637 (ALT 250 FOR IP): Performed by: EMERGENCY MEDICINE

## 2024-06-07 PROCEDURE — 84484 ASSAY OF TROPONIN QUANT: CPT

## 2024-06-07 PROCEDURE — 71260 CT THORAX DX C+: CPT

## 2024-06-07 PROCEDURE — 71045 X-RAY EXAM CHEST 1 VIEW: CPT

## 2024-06-07 PROCEDURE — 6360000002 HC RX W HCPCS: Performed by: EMERGENCY MEDICINE

## 2024-06-07 PROCEDURE — 83735 ASSAY OF MAGNESIUM: CPT

## 2024-06-07 PROCEDURE — 92610 EVALUATE SWALLOWING FUNCTION: CPT

## 2024-06-07 PROCEDURE — 83605 ASSAY OF LACTIC ACID: CPT

## 2024-06-07 RX ORDER — INSULIN LISPRO 100 [IU]/ML
0-4 INJECTION, SOLUTION INTRAVENOUS; SUBCUTANEOUS NIGHTLY
Status: DISCONTINUED | OUTPATIENT
Start: 2024-06-07 | End: 2024-06-08

## 2024-06-07 RX ORDER — ALBUTEROL SULFATE 2.5 MG/3ML
2.5 SOLUTION RESPIRATORY (INHALATION) EVERY 4 HOURS PRN
Status: DISCONTINUED | OUTPATIENT
Start: 2024-06-07 | End: 2024-06-08 | Stop reason: HOSPADM

## 2024-06-07 RX ORDER — POTASSIUM CHLORIDE 20 MEQ/1
40 TABLET, EXTENDED RELEASE ORAL PRN
Status: DISCONTINUED | OUTPATIENT
Start: 2024-06-07 | End: 2024-06-08 | Stop reason: HOSPADM

## 2024-06-07 RX ORDER — ASPIRIN 81 MG/1
81 TABLET ORAL DAILY
Status: DISCONTINUED | OUTPATIENT
Start: 2024-06-07 | End: 2024-06-08 | Stop reason: HOSPADM

## 2024-06-07 RX ORDER — FUROSEMIDE 20 MG/1
20 TABLET ORAL EVERY OTHER DAY
Status: DISCONTINUED | OUTPATIENT
Start: 2024-06-07 | End: 2024-06-08 | Stop reason: HOSPADM

## 2024-06-07 RX ORDER — INSULIN LISPRO 100 [IU]/ML
0-4 INJECTION, SOLUTION INTRAVENOUS; SUBCUTANEOUS
Status: DISCONTINUED | OUTPATIENT
Start: 2024-06-07 | End: 2024-06-08

## 2024-06-07 RX ORDER — DEXAMETHASONE 6 MG/1
6 TABLET ORAL ONCE
Status: COMPLETED | OUTPATIENT
Start: 2024-06-07 | End: 2024-06-07

## 2024-06-07 RX ORDER — ACETAMINOPHEN 650 MG/1
650 SUPPOSITORY RECTAL EVERY 6 HOURS PRN
Status: DISCONTINUED | OUTPATIENT
Start: 2024-06-07 | End: 2024-06-08 | Stop reason: HOSPADM

## 2024-06-07 RX ORDER — DOPAMINE HYDROCHLORIDE 160 MG/100ML
1-20 INJECTION, SOLUTION INTRAVENOUS CONTINUOUS
Status: CANCELLED | OUTPATIENT
Start: 2024-06-07

## 2024-06-07 RX ORDER — 0.9 % SODIUM CHLORIDE 0.9 %
1000 INTRAVENOUS SOLUTION INTRAVENOUS ONCE
Status: COMPLETED | OUTPATIENT
Start: 2024-06-07 | End: 2024-06-07

## 2024-06-07 RX ORDER — SODIUM CHLORIDE 0.9 % (FLUSH) 0.9 %
10 SYRINGE (ML) INJECTION PRN
Status: DISCONTINUED | OUTPATIENT
Start: 2024-06-07 | End: 2024-06-08 | Stop reason: HOSPADM

## 2024-06-07 RX ORDER — DOPAMINE HYDROCHLORIDE 160 MG/100ML
3-5 INJECTION, SOLUTION INTRAVENOUS CONTINUOUS
Status: DISCONTINUED | OUTPATIENT
Start: 2024-06-07 | End: 2024-06-08 | Stop reason: HOSPADM

## 2024-06-07 RX ORDER — ENOXAPARIN SODIUM 100 MG/ML
40 INJECTION SUBCUTANEOUS DAILY
Status: DISCONTINUED | OUTPATIENT
Start: 2024-06-07 | End: 2024-06-07

## 2024-06-07 RX ORDER — SODIUM CHLORIDE 0.9 % (FLUSH) 0.9 %
5-40 SYRINGE (ML) INJECTION EVERY 12 HOURS SCHEDULED
Status: DISCONTINUED | OUTPATIENT
Start: 2024-06-07 | End: 2024-06-08 | Stop reason: HOSPADM

## 2024-06-07 RX ORDER — DOPAMINE HYDROCHLORIDE 160 MG/100ML
1-20 INJECTION, SOLUTION INTRAVENOUS CONTINUOUS
Status: DISCONTINUED | OUTPATIENT
Start: 2024-06-07 | End: 2024-06-07

## 2024-06-07 RX ORDER — POTASSIUM CHLORIDE 7.45 MG/ML
10 INJECTION INTRAVENOUS PRN
Status: DISCONTINUED | OUTPATIENT
Start: 2024-06-07 | End: 2024-06-08 | Stop reason: HOSPADM

## 2024-06-07 RX ORDER — HYDROCHLOROTHIAZIDE 25 MG/1
25 TABLET ORAL DAILY
Status: DISCONTINUED | OUTPATIENT
Start: 2024-06-07 | End: 2024-06-08 | Stop reason: HOSPADM

## 2024-06-07 RX ORDER — PANTOPRAZOLE SODIUM 40 MG/1
40 TABLET, DELAYED RELEASE ORAL
Status: DISCONTINUED | OUTPATIENT
Start: 2024-06-08 | End: 2024-06-07

## 2024-06-07 RX ORDER — 0.9 % SODIUM CHLORIDE 0.9 %
100 INTRAVENOUS SOLUTION INTRAVENOUS ONCE
Status: COMPLETED | OUTPATIENT
Start: 2024-06-07 | End: 2024-06-07

## 2024-06-07 RX ORDER — SODIUM CHLORIDE, SODIUM LACTATE, POTASSIUM CHLORIDE, CALCIUM CHLORIDE 600; 310; 30; 20 MG/100ML; MG/100ML; MG/100ML; MG/100ML
INJECTION, SOLUTION INTRAVENOUS CONTINUOUS
Status: DISCONTINUED | OUTPATIENT
Start: 2024-06-07 | End: 2024-06-08

## 2024-06-07 RX ORDER — ACETAMINOPHEN 325 MG/1
650 TABLET ORAL EVERY 6 HOURS PRN
Status: DISCONTINUED | OUTPATIENT
Start: 2024-06-07 | End: 2024-06-08 | Stop reason: HOSPADM

## 2024-06-07 RX ORDER — ONDANSETRON 4 MG/1
4 TABLET, ORALLY DISINTEGRATING ORAL EVERY 8 HOURS PRN
Status: DISCONTINUED | OUTPATIENT
Start: 2024-06-07 | End: 2024-06-08 | Stop reason: HOSPADM

## 2024-06-07 RX ORDER — DEXAMETHASONE SODIUM PHOSPHATE 10 MG/ML
6 INJECTION, SOLUTION INTRAMUSCULAR; INTRAVENOUS DAILY
Status: DISCONTINUED | OUTPATIENT
Start: 2024-06-08 | End: 2024-06-08 | Stop reason: HOSPADM

## 2024-06-07 RX ORDER — SODIUM CHLORIDE 9 MG/ML
INJECTION, SOLUTION INTRAVENOUS PRN
Status: DISCONTINUED | OUTPATIENT
Start: 2024-06-07 | End: 2024-06-08 | Stop reason: HOSPADM

## 2024-06-07 RX ORDER — ENOXAPARIN SODIUM 100 MG/ML
40 INJECTION SUBCUTANEOUS DAILY
Status: DISCONTINUED | OUTPATIENT
Start: 2024-06-07 | End: 2024-06-08 | Stop reason: HOSPADM

## 2024-06-07 RX ORDER — ONDANSETRON 2 MG/ML
4 INJECTION INTRAMUSCULAR; INTRAVENOUS EVERY 6 HOURS PRN
Status: DISCONTINUED | OUTPATIENT
Start: 2024-06-07 | End: 2024-06-08 | Stop reason: HOSPADM

## 2024-06-07 RX ORDER — MAGNESIUM SULFATE HEPTAHYDRATE 40 MG/ML
2000 INJECTION, SOLUTION INTRAVENOUS PRN
Status: DISCONTINUED | OUTPATIENT
Start: 2024-06-07 | End: 2024-06-08 | Stop reason: HOSPADM

## 2024-06-07 RX ORDER — SODIUM CHLORIDE 9 MG/ML
INJECTION, SOLUTION INTRAVENOUS CONTINUOUS
Status: CANCELLED | OUTPATIENT
Start: 2024-06-07 | End: 2024-06-09

## 2024-06-07 RX ORDER — SODIUM CHLORIDE 0.9 % (FLUSH) 0.9 %
5-40 SYRINGE (ML) INJECTION PRN
Status: DISCONTINUED | OUTPATIENT
Start: 2024-06-07 | End: 2024-06-08 | Stop reason: HOSPADM

## 2024-06-07 RX ORDER — POLYETHYLENE GLYCOL 3350 17 G/17G
17 POWDER, FOR SOLUTION ORAL DAILY PRN
Status: DISCONTINUED | OUTPATIENT
Start: 2024-06-07 | End: 2024-06-08 | Stop reason: HOSPADM

## 2024-06-07 RX ORDER — TAMSULOSIN HYDROCHLORIDE 0.4 MG/1
0.4 CAPSULE ORAL DAILY
Status: DISCONTINUED | OUTPATIENT
Start: 2024-06-07 | End: 2024-06-08 | Stop reason: HOSPADM

## 2024-06-07 RX ORDER — FUROSEMIDE 10 MG/ML
40 INJECTION INTRAMUSCULAR; INTRAVENOUS 2 TIMES DAILY
Status: DISCONTINUED | OUTPATIENT
Start: 2024-06-07 | End: 2024-06-07

## 2024-06-07 RX ORDER — ATORVASTATIN CALCIUM 40 MG/1
40 TABLET, FILM COATED ORAL NIGHTLY
Status: DISCONTINUED | OUTPATIENT
Start: 2024-06-07 | End: 2024-06-08 | Stop reason: HOSPADM

## 2024-06-07 RX ORDER — ALBUTEROL SULFATE 90 UG/1
2 AEROSOL, METERED RESPIRATORY (INHALATION) EVERY 4 HOURS PRN
Status: DISCONTINUED | OUTPATIENT
Start: 2024-06-07 | End: 2024-06-07

## 2024-06-07 RX ADMIN — BARICITINIB 4 MG: 2 TABLET, FILM COATED ORAL at 13:43

## 2024-06-07 RX ADMIN — SODIUM CHLORIDE, PRESERVATIVE FREE 10 ML: 5 INJECTION INTRAVENOUS at 20:10

## 2024-06-07 RX ADMIN — CEFTRIAXONE SODIUM 1000 MG: 1 INJECTION, POWDER, FOR SOLUTION INTRAMUSCULAR; INTRAVENOUS at 09:34

## 2024-06-07 RX ADMIN — SODIUM CHLORIDE 1000 ML: 9 INJECTION, SOLUTION INTRAVENOUS at 09:01

## 2024-06-07 RX ADMIN — ASPIRIN 81 MG: 81 TABLET, COATED ORAL at 16:30

## 2024-06-07 RX ADMIN — SODIUM CHLORIDE 1000 ML: 9 INJECTION, SOLUTION INTRAVENOUS at 11:26

## 2024-06-07 RX ADMIN — SODIUM CHLORIDE, PRESERVATIVE FREE 40 MG: 5 INJECTION INTRAVENOUS at 16:30

## 2024-06-07 RX ADMIN — DOPAMINE HYDROCHLORIDE IN DEXTROSE 3 MCG/KG/MIN: 1.6 INJECTION, SOLUTION INTRAVENOUS at 16:30

## 2024-06-07 RX ADMIN — SODIUM CHLORIDE, PRESERVATIVE FREE 10 ML: 5 INJECTION INTRAVENOUS at 13:32

## 2024-06-07 RX ADMIN — TAMSULOSIN HYDROCHLORIDE 0.4 MG: 0.4 CAPSULE ORAL at 16:30

## 2024-06-07 RX ADMIN — SODIUM CHLORIDE, POTASSIUM CHLORIDE, SODIUM LACTATE AND CALCIUM CHLORIDE: 600; 310; 30; 20 INJECTION, SOLUTION INTRAVENOUS at 15:00

## 2024-06-07 RX ADMIN — DEXAMETHASONE 6 MG: 6 TABLET ORAL at 12:00

## 2024-06-07 RX ADMIN — SODIUM CHLORIDE 100 ML: 9 INJECTION, SOLUTION INTRAVENOUS at 13:33

## 2024-06-07 RX ADMIN — ATORVASTATIN CALCIUM 40 MG: 40 TABLET, FILM COATED ORAL at 20:10

## 2024-06-07 RX ADMIN — IOPAMIDOL 75 ML: 755 INJECTION, SOLUTION INTRAVENOUS at 13:32

## 2024-06-07 ASSESSMENT — ENCOUNTER SYMPTOMS
DIARRHEA: 0
COUGH: 0
NAUSEA: 0
VOMITING: 0
SHORTNESS OF BREATH: 1

## 2024-06-07 NOTE — ED NOTES
Report given to AARON Rivera from ED.   Report method in person   The following was reviewed with receiving RN:   Current vital signs:  /86   Pulse 57   Temp 97.6 °F (36.4 °C) (Oral)   Resp 19   Ht 1.702 m (5' 7\")   Wt 90.7 kg (200 lb)   SpO2 96%   BMI 31.32 kg/m²                      Any medication or safety alerts were reviewed. Any pending diagnostics and notifications were also reviewed, as well as any safety concerns or issues, abnormal labs, abnormal imaging, and abnormal assessment findings. Questions were answered.

## 2024-06-07 NOTE — PROGRESS NOTES
SPEECH LANGUAGE PATHOLOGY  Facility/Department: [facility] [[email or web address] ICU   CLINICAL BEDSIDE SWALLOW EVALUATION    NAME: Anuj Jovel  : 1940  MRN: [id number]    ADMISSION DATE: 2024  ADMITTING DIAGNOSIS: has Acquired deviated nasal septum; Actinic keratoses; Arthritis; Benign prostatic hyperplasia; Bloating; Chronic serous otitis media; Claustrophobia; Coronary artery disease; Esophageal reflux; Flatus; Hearing loss; Hemorrhoids; History of allergy; Hyperlipidemia; Essential hypertension; Leg swelling; Lumbar radiculopathy; Lumbar stenosis; Skin neoplasm; Tinea corporis; Ventral hernia; Weight gain; Type 2 diabetes mellitus with complication (HCC); Obesity; Other osteoporosis without current pathological fracture; Presence of coronary angioplasty implant and graft; Elevated C-reactive protein (CRP); KAREY (obstructive sleep apnea); Hypoxia; Chronic pain syndrome; AV block, Mobitz 1; History of placement of stent in LAD coronary artery; Chronic heart failure with preserved ejection fraction (HFpEF) ([facility]); Mild mitral valve regurgitation; Pulmonary hypertension ([facility]); Spondylolisthesis of lumbar region; and Second degree AV block, Mobitz type I on their problem list.    Recent CT of Chest: 2024  IMPRESSION:  Motion degraded exam.  No evidence for central pulmonary embolism.     Nonspecific mosaic appearance of the lung parenchyma, interstitial and  ground-glass appearance.  While some of these findings may be due to motion  artifact and atelectasis, atypical infection should also be considered.    Date of Eval: 2024  Evaluating Therapist: TONY Jonas    Current Diet level:  Current Diet : NPO  Current Liquid Diet : NPO    Primary Complaint  83-year-old male with KAREY, BPH, HTN, hyperlipidemia, type II DM, CAD s/p stent in , HFpEF, and mild mitral valve regurgitation presents to the ED via EMS this morning with daughter due to bilateral lower extremity weakness and dyspnea.      Daughter said that patient  discontinue PO and complete video swallow study.  Dysphagia Outcome Severity Scale: Level 5: Mild dysphagia- Distant supervision. May need one diet consistency restricted     Treatment Plan  Requires SLP Intervention: Yes     D/C Recommendations: Ongoing speech therapy is recommended during this hospitalization;Ongoing speech therapy is recommended at next level of care       Recommended Diet and Intervention  Diet Solids Recommendation: Soft & Bite Sized  Liquid Consistency Recommendation: Thin  Recommended Form of Meds: Meds in puree  Recommendations: Dysphagia treatment  Therapeutic Interventions: Diet tolerance monitoring;Bolus control exercises;Patient/Family education;Oral motor exercises    Compensatory Swallowing Strategies  Compensatory Swallowing Strategies : Alternate solids and liquids;Small bites/sips;Lingual sweep;Eat/Feed slowly;Swallow 2 times per bite/sip;Remain upright for 30-45 minutes after meals;Upright as possible for all oral intake    Treatment/Goals  Dysphagia Goals: The patient will tolerate recommended diet without observed clinical signs of aspiration;The patient will improve oral motor function via therapeutic oral motor exercises to 5/5 each trial.;The patient/caregiver will demonstrate understanding of compensatory strategies for improved swallowing safety.    General  Chart Reviewed: Yes  Comments: Pt referred for skilled ST bedside swallowing evaluation due to \"failed\" nursing swallow screen in ED.  Behavior/Cognition: Alert;Cooperative  Respiratory Status: O2 via nasual cannula  Communication Observation: Functional  Follows Directions: Simple  Dentition: Adequate  Patient Positioning: Upright in bed  Baseline Vocal Quality: Normal  Volitional Cough: Strong  Prior Dysphagia History: Pt reports \"trouble swallowing\" and indicated food feels stuck in throat with occasional odynophagia.      Oral Motor Deficits  Labial: No impairment  Dentition: Intact  Lingual: Decreased strength

## 2024-06-07 NOTE — CARE COORDINATION
Case Management Assessment  Initial Evaluation    Date/Time of Evaluation: 6/7/2024 4:14 PM  Assessment Completed by: Isis Petit RN    If patient is discharged prior to next notation, then this note serves as note for discharge by case management.    Patient Name: Anuj Jovel                   YOB: 1940  Diagnosis: Dehydration [E86.0]  Hypoxemia [R09.02]  Bradycardia [R00.1]  Second degree AV block, Mobitz type I [I44.1]  Second degree AV block, Mobitz type II [I44.1]  General weakness [R53.1]  COVID [U07.1]                   Date / Time: 6/7/2024  8:03 AM    Patient Admission Status: Inpatient   Readmission Risk (Low < 19, Mod (19-27), High > 27): Readmission Risk Score: 14.1    Current PCP: Jeanne Simpson MD  PCP verified by CM? Yes    Chart Reviewed: Yes      History Provided by: Child/Family  Patient Orientation: Other (see comment) (Confused per RN)    Patient Cognition: (S) Other (see comment) (confused)    Hospitalization in the last 30 days (Readmission):  No    If yes, Readmission Assessment in CM Navigator will be completed.    Advance Directives:      Code Status: Full Code   Patient's Primary Decision Maker is:      Secondary Decision Maker: DAVIN TINAJERO - Child - [phone]    Discharge Planning:    Patient lives with: Family Members Type of Home: House  Primary Care Giver: Self  Patient Support Systems include: Family Members, Children   Current Financial resources: Medicare  Current community resources: None  Current services prior to admission: Durable Medical Equipment            Current DME: Walker, Glucometer, Other (Comment) (power wheelchair)            Type of Home Care services:  None    ADLS  Prior functional level: Independent in ADLs/IADLs, Assistance with the following:, Shopping, Housework  Current functional level:      PT AM-PAC:   /24  OT AM-PAC:   /24    Family can provide assistance at DC: Yes  Would you like Case Management to discuss the discharge  plan with any other family members/significant others, and if so, who? (S) Yes (daughters)  Plans to Return to Present Housing: Unknown at present  Other Identified Issues/Barriers to RETURNING to current housing: weakness, confusion  Potential Assistance needed at discharge: Home Care, Durable Medical Equipment, Skilled Nursing Facility            Potential DME: Oxygen Therapy (Comment)  Patient expects to discharge to: House  Plan for transportation at discharge: Family    Financial    Payor: [employer] MEDICARE / Plan: [employer] GOLD PLUS HMO / Product Type: *No Product type* /     Does insurance require precert for SNF: Yes    Potential assistance Purchasing Medications: No  Meds-to-Beds request:  no      [id number] - [place], [place] - [address] - P [phone] - F [phone]  [address]  [place] [address]  Phone: [phone] Fax: [phone]    [facility] [address] - P [phone] - F [phone]  [address] [address]  Phone: [phone] Fax: [phone]      Notes:    Factors facilitating achievement of predicted outcomes: Family support, Good insight into deficits, Has needed Durable Medical Equipment at home, and Knowledge about rehab    Barriers to discharge: Confusion, Decreased endurance, Lower extremity weakness, Long standing deficits, and Medical complications    Additional Case Management Notes: 6/7/2024 (+COVID, 95% on 4L)  [employer] Medicare; from home w/ dtr/carlos/grandson, independent, does not drive; DME rollator, glucometer, shower chair, power wc, compression stockings; VNS Elara past, none present; Pulm consult, IV decadron, oral olumiant, venous scan pending, cardiology consult-2nd degree block/christina; PT/OT/SLP    The Plan for Transition of Care is related to the following treatment goals of Dehydration [E86.0]  Hypoxemia [R09.02]  Bradycardia [R00.1]  Second degree AV block, Mobitz type I

## 2024-06-07 NOTE — H&P
[facility]  IN-PATIENT SERVICE  [facility]  IN-PATIENT SERVICE   [facility]     HISTORY AND PHYSICAL EXAMINATION            Date:   6/7/2024  Patient name:  Anuj Jovel  Date of admission:  6/7/2024  8:03 AM  MRN:   [id number]  Account:  [id number]  YOB: 1940  PCP:    Jeanne Simpson MD  Room:   2012/2012-01  Code Status:    Prior    Chief Complaint:     Chief Complaint   Patient presents with    Extremity Weakness     Weakness in both legs       History Obtained From:     Patient and daughter    History of Present Illness:       83-year-old male with KAREY, BPH, HTN, hyperlipidemia, type II DM, CAD s/p stent in 1997, HFpEF, and mild mitral valve regurgitation presents to the ED via EMS this morning with daughter due to bilateral lower extremity weakness and dyspnea.     Daughter said that patient was doing okay until this morning around 4:30 AM her son found him on the bathroom floor and he mentioned that he slid down the wall.  Patient states that he did not lose consciousness and did not hit his head.  Daughter states that patient started to yell for help however she did not hear that.  His daughter states that patient was feeling more weak and lethargic in the past few hours.  According to daughter, patient did not complain of any palpitations or chest pain.  Patient does endorse feeling lightheaded and short of breath.     Patient is a former smoker 30 years back (1pack/day).  He is an occasional drinker.    In the ED, patient was hypoxic SpO2 of 87 bradycardic heart rate 48 and low 40s.  Patient was placed on 4 L nasal cannula.  CBC and CMP unremarkable.  Troponin 36 repeat 27.  D-dimer 1.01.  CT chest PE negative.  CRP 6.7.  UA unremarkable.  Lactate 1.7.  Chest x-ray unremarkable.  CT head without contrast unremarkable  Patient tested positive for COVID 19.  Past Medical History:     Past  cannula  COVID-positive  D-dimer elevated-CT PE negative for pulmonary embolism  Also complaining of left leg swelling-check leg vein Dopplers  Started on Decadron, baricitinib  Pulmonology consulted  Will follow inflammatory markers, attempt to wean down oxygen  Admitted to intermediate ICU    Electronically signed by Lynda Contreras MD

## 2024-06-07 NOTE — PROGRESS NOTES
Patient admitted to ICU 2012. Attached to monitor, oriented to room, call light within reach, standard safety measures in place

## 2024-06-07 NOTE — PROGRESS NOTES
Pharmacy Medication History Note      List of current medications patient is taking is complete.    Source of information: Sure Patrick, [place]    Changes made to medication list:  Medications removed (include reason, ex. therapy complete or physician discontinued, noncompliance):  None     Medications flagged for provider review:  None     Medications added/doses adjusted:  None     Other notes (ex. Recent course of antibiotics, Coumadin dosing):  Per [place]ARRS, last fill of pregabalin was on 5/21/24 with quantity 60 for 30 days.       Current Home Medication List at Time of Admission:  Prior to Admission medications    Medication Sig   tamsulosin (FLOMAX) 0.4 MG capsule Take 1 capsule by mouth daily   atorvastatin (LIPITOR) 40 MG tablet TAKE 1 TABLET AT BEDTIME   pregabalin (LYRICA) 100 MG capsule Take 1 capsule by mouth 2 times daily for 90 days. Max Daily Amount: 200 mg   nitroGLYCERIN (NITROSTAT) 0.4 MG SL tablet Place 1 tablet under the tongue every 5 minutes as needed for Chest pain   docusate sodium (COLACE) 100 MG capsule Take 1 capsule by mouth 2 times daily as needed for Constipation   metFORMIN (GLUCOPHAGE) 500 MG tablet TAKE 2 TABLETS TWICE A DAY WITH MEALS   hydroCHLOROthiazide (HYDRODIURIL) 25 MG tablet TAKE 1 TABLET EVERY DAY   Blood Glucose Monitoring Suppl (TRUE METRIX AIR GLUCOSE METER) w/Device KIT Type 2 DM, test daily   Lancets MISC 1 each by Does not apply route daily   omeprazole (PRILOSEC) 40 MG delayed release capsule TAKE 1 CAPSULE EVERY DAY   blood glucose test strips (ASCENSIA AUTODISC VI;ONE TOUCH ULTRA TEST VI) strip Use with associated glucose meter. Use daily. Type 2 DM not on insulin   fluticasone-umeclidin-vilant (TRELEGY ELLIPTA) 200-62.5-25 MCG/ACT AEPB inhaler Inhale 1 puff into the lungs daily   magnesium oxide (MAG-OX) 400 (240 Mg) MG tablet Take 1 tablet by mouth daily   potassium chloride (KLOR-CON M) 20 MEQ extended release tablet Take 1 tablet by mouth every morning    furosemide (LASIX) 20 MG tablet Take 1 tablet by mouth every other day From cardiology, Dr Valdez   loratadine (CLARITIN) 10 MG tablet Take 1 tablet by mouth daily as needed   Handicap Placard MISC by Does not apply route Cannot walk 200 Feet due to orthopedic disease  Expires 3/18/2027   Omega-3 Fatty Acids (OMEGA III EPA+DHA) 1000 MG CAPS Take 1 capsule by mouth 2 times daily   glucose (GLUTOSE) 40 % GEL Take 37.5 mLs by mouth as needed (low sugar)   aspirin EC 81 MG EC tablet Take 1 tablet by mouth daily   Multiple Vitamin (MULTI-VITAMIN DAILY) TABS Take 1 tablet by mouth daily         Please let me know if you have any questions about this encounter. Thank you!    Electronically signed by Laura Ravi RPH on 6/7/2024 at 1:45 PM

## 2024-06-07 NOTE — ACP (ADVANCE CARE PLANNING)
Advance Care Planning     Advance Care Planning Activator (Inpatient)  Conversation Note      Date of ACP Conversation: 6/7/2024     Conversation Conducted with: Legal next of kin    ACP Activator: Isis Petit RN      Health Care Decision Maker:     Current Designated Health Care Decision Maker:     Primary Decision Maker: DAVIN TINAJERO - Child - [phone]    Primary Decision Maker: POLLY CORTEZ - Child - [phone]    Today we documented Decision Maker(s) consistent with Legal Next of Kin hierarchy.    Care Preferences    Ventilation:  \"If you were in your present state of health and suddenly became very ill and were unable to breathe on your own, what would your preference be about the use of a ventilator (breathing machine) if it were available to you?\"      Would the patient desire the use of ventilator (breathing machine)?: no    \"If your health worsens and it becomes clear that your chance of recovery is unlikely, what would your preference be about the use of a ventilator (breathing machine) if it were available to you?\"     Would the patient desire the use of ventilator (breathing machine)?: No      Resuscitation  \"CPR works best to restart the heart when there is a sudden event, like a heart attack, in someone who is otherwise healthy. Unfortunately, CPR does not typically restart the heart for people who have serious health conditions or who are very sick.\"    \"In the event your heart stopped as a result of an underlying serious health condition, would you want attempts to be made to restart your heart (answer \"yes\" for attempt to resuscitate) or would you prefer a natural death (answer \"no\" for do not attempt to resuscitate)?\" no       [] Yes   [] No   Educated Patient / Decision Maker regarding differences between Advance Directives and portable DNR orders.    Length of ACP Conversation in minutes:      Conversation Outcomes:  ACP discussion completed    Follow-up plan:    [] Schedule

## 2024-06-07 NOTE — PROGRESS NOTES
Dr. Calvin at bedside. Only wants dopamine gtt started if HR is persistently below 30 ad BP drops.

## 2024-06-07 NOTE — ED PROVIDER NOTES
ear normal.      Nose: Nose normal. No congestion.      Mouth/Throat:      Mouth: Mucous membranes are dry.      Pharynx: Oropharynx is clear.   Eyes:      General:         Right eye: No discharge.         Left eye: No discharge.      Conjunctiva/sclera: Conjunctivae normal.      Pupils: Pupils are equal, round, and reactive to light.   Neck:      Trachea: No tracheal deviation.   Cardiovascular:      Rate and Rhythm: Bradycardia present. Rhythm irregular.      Pulses: Normal pulses.      Heart sounds: Normal heart sounds.   Pulmonary:      Effort: Pulmonary effort is normal. No respiratory distress.      Breath sounds: Normal breath sounds. No stridor. No wheezing or rales.   Abdominal:      Palpations: Abdomen is soft.      Tenderness: There is no abdominal tenderness. There is no guarding or rebound.   Musculoskeletal:         General: No tenderness or deformity. Normal range of motion.      Cervical back: Normal range of motion and neck supple.      Right lower leg: Edema present.      Left lower leg: Edema present.      Comments: Mild edema both legs.   Skin:     General: Skin is warm and dry.      Capillary Refill: Capillary refill takes less than 2 seconds.      Findings: No erythema or rash.      Comments: Stage I sacral decubitus ulcer present.   Neurological:      General: No focal deficit present.      Mental Status: He is alert and oriented to person, place, and time.      Coordination: Coordination normal.   Psychiatric:         Mood and Affect: Mood normal.         Behavior: Behavior normal.         Thought Content: Thought content normal.         Judgment: Judgment normal.         MEDICAL DECISION MAKING / ED COURSE:         1)  Number and Complexity of Problems Addressed at this Encounter  Problem List This Visit: Generalized fatigue malaise weakness.  Bradycardia.  Low blood pressure.  Low pulse ox.    Differential Diagnosis: Dehydration, metabolic derangement, he has a known history of  following components:    D-Dimer, Quant 1.01 (*)     All other components within normal limits   CULTURE, BLOOD 1   CULTURE, BLOOD 1   CULTURE, URINE   LACTATE, SEPSIS   MAGNESIUM   URINALYSIS WITH REFLEX TO CULTURE   LACTATE DEHYDROGENASE   FERRITIN   C-REACTIVE PROTEIN       Vitals Reviewed:    Vitals:    06/07/24 1102 06/07/24 1125 06/07/24 1130 06/07/24 1200   BP: (!) 76/60 (!) 108/57 119/63 (!) 127/98   Pulse: 50 (!) 44 (!) 46 58   Resp: 18 17 22 22   Temp:       TempSrc:       SpO2:  95% 96%    Weight:       Height:         MEDICATIONS GIVEN TO PATIENT THIS ENCOUNTER:  Orders Placed This Encounter   Medications    sodium chloride 0.9 % bolus 1,000 mL    cefTRIAXone (ROCEPHIN) 1,000 mg in sodium chloride 0.9 % 50 mL IVPB (mini-bag)     Order Specific Question:   Antimicrobial Indications     Answer:   Sepsis of Unknown Etiology     Order Specific Question:   Sepsis duration of therapy     Answer:   7 days    sodium chloride 0.9 % bolus 1,000 mL    dexAMETHasone (DECADRON) tablet 6 mg     DISCHARGE PRESCRIPTIONS:  New Prescriptions    No medications on file     PHYSICIAN CONSULTS ORDERED THIS ENCOUNTER:  IP CONSULT TO HOSPITALIST  IP CONSULT TO CARDIOLOGY  IP CONSULT TO PULMONOLOGY  FINAL IMPRESSION      1. Second degree AV block, Mobitz type I    2. Bradycardia    3. COVID    4. General weakness    5. Dehydration    6. Hypoxemia          DISPOSITION/PLAN   DISPOSITION Admitted 06/07/2024 11:39:35 AM      OUTPATIENT FOLLOW UP THE PATIENT:  No follow-up provider specified.    MD José Miguel Guy Wesley D, MD  06/07/24 1211

## 2024-06-07 NOTE — CONSULTS
[facility]   CONSULT NOTE:      DATE OF CONSULT 6/7/2024    REASON FOR CONSULTATION:  Pulmonary and critical care management      PCP Jeanne Simpson MD     CHIEF COMPLAINT: Dyspnea, weakness, COVID-positive    HISTORY OF PRESENT ILLNESS:     Anuj is a 83-year-old male who was in his usual state of health up until early this morning.  According to his daughter who he lives with, yesterday he was fine eating burgers at [employer] without any issues.  He came home and went to bed.  Apparently her son who wakes up for work around 4:30 in the morning found him on the ground by the back door.  He helped him up and got him into his walker.  The patient was alert but stated that he had fallen.  It is unclear if he lost consciousness.  He was alert when his grandson found him.  He thinks that he originally fell around 3 AM and was found around 5:30 AM.  The patient was then brought to the emergency room.  His room air saturation when he came to the was 87% he was then placed on 4 L.  His daughters.  Who are in the room with him say that he has become progressively more weak and lethargic over the last several hours.  His initial blood pressure was low and he was given approximately 2 L of IV fluids.  The patient also complains of headache and overwhelming weakness.  In the ER he was found to be bradycardic with a heart rate in the 40s to 60s.  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    Initially when I saw him he was sleeping but arouses and answers questions appropriately.  He is very hard of hearing.  He usually wears hearing aids but his daughters did not bring them.  His daughter also said that he has recently been treated for UTI.    He was vaccinated and boosted for COVID most recently in September 2023.    He is a former smoker about a pack a day for 35 years but he quit 30 years ago.  He has no occupational exposure.    His daughter states that he probably has sleep apnea and  snores but he is \"too claustrophobic\" and has refused sleep studies in the past    PFTs done in October of last year showed a restrictive not obstructive pattern.  However his daughter says he was prescribed an inhaler which he does not use.        ALLERGIES:  Allergies   Allergen Reactions    Feldene [Piroxicam]     Flagyl [Metronidazole]     Lisinopril Dizziness or Vertigo    Naprosyn [Naproxen]     Penicillins     Sulfa Antibiotics     Sulfasalazine        HOME MEDICATIONS:  Not in a hospital admission.      PAST MEDICAL HISTORY:  Past Medical History:   Diagnosis Date    Diabetes (HCC)     Diverticulitis     History of allergy     Hyperlipidemia     Hypertension     Osteoarthritis     Pneumonia due to COVID-19 virus 2020       PAST SURGICAL HISTORY:  Past Surgical History:   Procedure Laterality Date    BACK SURGERY  2017    [facility], lumbar fusion    CARDIAC CATHETERIZATION      COLECTOMY  2015    partial colectomy due to diveritc    COLONOSCOPY      TOTAL KNEE ARTHROPLASTY Right           SOCIAL HISTORY:  Social History     Socioeconomic History    Marital status:      Spouse name: Not on file    Number of children: Not on file    Years of education: Not on file    Highest education level: Not on file   Occupational History     Comment: retired   Tobacco Use    Smoking status: Former     Current packs/day: 0.00     Average packs/day: 1.5 packs/day for 40.0 years (60.0 ttl pk-yrs)     Types: Cigarettes     Start date: 1951     Quit date: 1991     Years since quittin.4    Smokeless tobacco: Never   Vaping Use    Vaping Use: Never used   Substance and Sexual Activity    Alcohol use: Not Currently     Alcohol/week: 1.0 standard drink of alcohol     Types: 1 Standard drinks or equivalent per week     Comment: social    Drug use: No    Sexual activity: Not on file   Other Topics Concern    Not on file   Social History Narrative    Not on file     Social Determinants of

## 2024-06-07 NOTE — ED TRIAGE NOTES
Mode of arrival (squad #, walk in, police, etc) : EMS        Chief complaint(s): Bilateral leg weakness        Arrival Note (brief scenario, treatment PTA, etc).:   Pt. Woke up around 0300 and felt weak in both legs. Pt. Slide down the wall and sat there until 0600. Pt. States they fell asleep on the floor. Pt. Arrived on RA at 87%, 4L of O2 per NC and O2 is now 95%. Pt. Denies hx of chronic respiratory illness or SOB      C= \"Have you ever felt that you should Cut down on your drinking?\"  No  A= \"Have people Annoyed you by criticizing your drinking?\"  No  G= \"Have you ever felt bad or Guilty about your drinking?\"  No  E= \"Have you ever had a drink as an Eye-opener first thing in the morning to steady your nerves or to help a hangover?\"  No      Deferred []      Reason for deferring: N/A    *If yes to two or more: probable alcohol abuse.*  
History/Exam/Medical Decision Making

## 2024-06-07 NOTE — CONSULTS
Date:   6/7/2024  Patient name: Anuj Jovel  Date of admission:  6/7/2024  8:03 AM  MRN:   [id number]  YOB: 1940  PCP: Jeanne Simpson MD    Reason for Admission: Dehydration [E86.0]  Hypoxemia [R09.02]  Bradycardia [R00.1]  Second degree AV block, Mobitz type I [I44.1]  Second degree AV block, Mobitz type II [I44.1]  General weakness [R53.1]  COVID [U07.1]    Cardiology consult: Bradycardia type I second-degree heart block       Referring physician Dr. Tacho Dexter    Impression    Admission 6/7/2024 acute hypoxic respiratory failure COVID-19 multifocal pneumonia  , generalized weakness unable to walk, fall, CT brain no acute process, no focal neurodeficit  Elevated D-dimer CTA chest no pulmonary embolism  Bradycardia, Mobitz type I heart block  History of CAD, LAD stent placement  Congestive heart failure patient has been on diuretic  Normal LV systolic function 2D echo 2/20/2023 ejection fraction more than 55%  Overweight  Diabetes mellitus  History of hypertension    Past surgical history bilateral knee replacement, partial colectomy, back surgery 2017, cardiac catheterization      Investigation work    ECG 6/7/2024  Sinus rhythm, first-degree heart block, Mobitz type I block, heart rate 47, low voltage, left axis, poor R wave progression    CTA chest 6/7/2024  No pulmonary embolism  Groundglass opacities    Nuclear stress test 6/29/2023  Small apical perfusion defect noted with the valve plane artifact identified  LV function normal ejection fraction 69%    2D echo 2/20/2023  Normal LV size, mild LVH, ejection fraction 55 to 60%  RVSP 40 to 45 mmHg  Normal size LA and RA  No significant valvular abnormality    History of present illness  83-year-old male with a past medical history of CAD, stent placement in LAD, type I second-degree heart block, Mobitz type I, hypertension, hyperlipidemia, osteoarthritis who lives with his daughter got hospitalized 6/7/2024 with marked generalized weakness

## 2024-06-08 ENCOUNTER — HOSPITAL ENCOUNTER (INPATIENT)
Age: 84
LOS: 13 days | Discharge: NURSING, REHAB OR LONG-TERM CARE | End: 2024-06-21
Attending: HOSPITALIST | Admitting: INTERNAL MEDICINE
Payer: MEDICARE

## 2024-06-08 ENCOUNTER — APPOINTMENT (OUTPATIENT)
Age: 84
End: 2024-06-08
Attending: HOSPITALIST
Payer: MEDICARE

## 2024-06-08 ENCOUNTER — APPOINTMENT (OUTPATIENT)
Dept: GENERAL RADIOLOGY | Age: 84
End: 2024-06-08
Attending: HOSPITALIST
Payer: MEDICARE

## 2024-06-08 VITALS
WEIGHT: 200 LBS | RESPIRATION RATE: 21 BRPM | OXYGEN SATURATION: 92 % | BODY MASS INDEX: 31.39 KG/M2 | TEMPERATURE: 97.9 F | SYSTOLIC BLOOD PRESSURE: 153 MMHG | HEART RATE: 58 BPM | DIASTOLIC BLOOD PRESSURE: 52 MMHG | HEIGHT: 67 IN

## 2024-06-08 DIAGNOSIS — R00.1 SYMPTOMATIC BRADYCARDIA: ICD-10-CM

## 2024-06-08 DIAGNOSIS — I50.20 SYSTOLIC CONGESTIVE HEART FAILURE, UNSPECIFIED HF CHRONICITY (HCC): ICD-10-CM

## 2024-06-08 DIAGNOSIS — R06.02 SHORTNESS OF BREATH: ICD-10-CM

## 2024-06-08 DIAGNOSIS — I44.1 AV BLOCK, MOBITZ 1: Primary | ICD-10-CM

## 2024-06-08 DIAGNOSIS — I44.2 COMPLETE HEART BLOCK (HCC): ICD-10-CM

## 2024-06-08 DIAGNOSIS — M79.604 PAIN OF RIGHT LOWER EXTREMITY: ICD-10-CM

## 2024-06-08 LAB
ALLEN TEST: ABNORMAL
ANION GAP SERPL CALCULATED.3IONS-SCNC: 11 MMOL/L (ref 9–17)
ANION GAP SERPL CALCULATED.3IONS-SCNC: 14 MMOL/L (ref 9–16)
BASOPHILS # BLD: 0 K/UL (ref 0–0.2)
BASOPHILS # BLD: <0.03 K/UL (ref 0–0.2)
BASOPHILS NFR BLD: 0 % (ref 0–2)
BASOPHILS NFR BLD: 0 % (ref 0–2)
BNP SERPL-MCNC: 1211 PG/ML
BUN SERPL-MCNC: 19 MG/DL (ref 8–23)
BUN SERPL-MCNC: 21 MG/DL (ref 8–23)
CA-I BLD-SCNC: 1.15 MMOL/L (ref 1.13–1.33)
CALCIUM SERPL-MCNC: 8.9 MG/DL (ref 8.6–10.4)
CALCIUM SERPL-MCNC: 9.2 MG/DL (ref 8.6–10.4)
CHLORIDE SERPL-SCNC: 103 MMOL/L (ref 98–107)
CHLORIDE SERPL-SCNC: 103 MMOL/L (ref 98–107)
CO2 SERPL-SCNC: 18 MMOL/L (ref 20–31)
CO2 SERPL-SCNC: 22 MMOL/L (ref 20–31)
CREAT SERPL-MCNC: 0.9 MG/DL (ref 0.7–1.2)
CREAT SERPL-MCNC: 1.2 MG/DL (ref 0.7–1.2)
CRP SERPL HS-MCNC: 32.5 MG/L (ref 0–5)
D DIMER PPP FEU-MCNC: 0.8 UG/ML FEU (ref 0–0.59)
EOSINOPHIL # BLD: 0 K/UL (ref 0–0.4)
EOSINOPHIL # BLD: <0.03 K/UL (ref 0–0.44)
EOSINOPHILS RELATIVE PERCENT: 0 % (ref 0–4)
EOSINOPHILS RELATIVE PERCENT: 0 % (ref 1–4)
ERYTHROCYTE [DISTWIDTH] IN BLOOD BY AUTOMATED COUNT: 13.1 % (ref 11.8–14.4)
ERYTHROCYTE [DISTWIDTH] IN BLOOD BY AUTOMATED COUNT: 13.6 % (ref 11.5–14.9)
FERRITIN SERPL-MCNC: 178 NG/ML (ref 30–400)
FIO2: 3
GFR, ESTIMATED: 63 ML/MIN/1.73M2
GFR, ESTIMATED: 85 ML/MIN/1.73M2
GLUCOSE BLD-MCNC: 192 MG/DL (ref 75–110)
GLUCOSE BLD-MCNC: 225 MG/DL (ref 75–110)
GLUCOSE BLD-MCNC: 257 MG/DL (ref 74–100)
GLUCOSE BLD-MCNC: 309 MG/DL (ref 75–110)
GLUCOSE BLD-MCNC: 312 MG/DL (ref 75–110)
GLUCOSE SERPL-MCNC: 266 MG/DL (ref 74–99)
GLUCOSE SERPL-MCNC: 277 MG/DL (ref 70–99)
HCT VFR BLD AUTO: 37.4 % (ref 41–53)
HCT VFR BLD AUTO: 43.2 % (ref 40.7–50.3)
HGB BLD-MCNC: 12.6 G/DL (ref 13.5–17.5)
HGB BLD-MCNC: 14.2 G/DL (ref 13–17)
IMM GRANULOCYTES # BLD AUTO: 0.04 K/UL (ref 0–0.3)
IMM GRANULOCYTES NFR BLD: 1 %
LDH SERPL-CCNC: 204 U/L (ref 135–225)
LYMPHOCYTES NFR BLD: 0.7 K/UL (ref 1–4.8)
LYMPHOCYTES NFR BLD: 0.82 K/UL (ref 1.1–3.7)
LYMPHOCYTES RELATIVE PERCENT: 10 % (ref 24–43)
LYMPHOCYTES RELATIVE PERCENT: 11 % (ref 24–44)
MAGNESIUM SERPL-MCNC: 2.5 MG/DL (ref 1.6–2.4)
MCH RBC QN AUTO: 32.1 PG (ref 26–34)
MCH RBC QN AUTO: 32.3 PG (ref 25.2–33.5)
MCHC RBC AUTO-ENTMCNC: 32.9 G/DL (ref 28.4–34.8)
MCHC RBC AUTO-ENTMCNC: 33.7 G/DL (ref 31–37)
MCV RBC AUTO: 95.2 FL (ref 80–100)
MCV RBC AUTO: 98.4 FL (ref 82.6–102.9)
MICROORGANISM SPEC CULT: NO GROWTH
MONOCYTES NFR BLD: 0.3 K/UL (ref 0.1–1.3)
MONOCYTES NFR BLD: 0.74 K/UL (ref 0.1–1.2)
MONOCYTES NFR BLD: 5 % (ref 1–7)
MONOCYTES NFR BLD: 9 % (ref 3–12)
NEUTROPHILS NFR BLD: 80 % (ref 36–65)
NEUTROPHILS NFR BLD: 84 % (ref 36–66)
NEUTS SEG NFR BLD: 5.3 K/UL (ref 1.3–9.1)
NEUTS SEG NFR BLD: 6.67 K/UL (ref 1.5–8.1)
NRBC BLD-RTO: 0 PER 100 WBC
O2 DELIVERY DEVICE: ABNORMAL
PHOSPHATE SERPL-MCNC: 2.8 MG/DL (ref 2.5–4.5)
PLATELET # BLD AUTO: 186 K/UL (ref 150–450)
PLATELET # BLD AUTO: 202 K/UL (ref 138–453)
PMV BLD AUTO: 7.5 FL (ref 6–12)
PMV BLD AUTO: 9.5 FL (ref 8.1–13.5)
POC HCO3: 23.5 MMOL/L (ref 21–28)
POC LACTIC ACID: 1.8 MMOL/L (ref 0.56–1.39)
POC O2 SATURATION: 97.5 % (ref 94–98)
POC PCO2: 32.8 MM HG (ref 35–48)
POC PH: 7.46 (ref 7.35–7.45)
POC PO2: 90.1 MM HG (ref 83–108)
POSITIVE BASE EXCESS, ART: 0.4 MMOL/L (ref 0–3)
POTASSIUM SERPL-SCNC: 4 MMOL/L (ref 3.7–5.3)
POTASSIUM SERPL-SCNC: 4.2 MMOL/L (ref 3.7–5.3)
RBC # BLD AUTO: 3.93 M/UL (ref 4.5–5.9)
RBC # BLD AUTO: 4.39 M/UL (ref 4.21–5.77)
SAMPLE SITE: ABNORMAL
SODIUM SERPL-SCNC: 135 MMOL/L (ref 136–145)
SODIUM SERPL-SCNC: 136 MMOL/L (ref 135–144)
SPECIMEN DESCRIPTION: NORMAL
T4 FREE SERPL-MCNC: 1.2 NG/DL (ref 0.92–1.68)
TSH SERPL DL<=0.05 MIU/L-ACNC: 0.22 UIU/ML (ref 0.27–4.2)
WBC OTHER # BLD: 6.3 K/UL (ref 3.5–11)
WBC OTHER # BLD: 8.3 K/UL (ref 3.5–11.3)

## 2024-06-08 PROCEDURE — 6360000002 HC RX W HCPCS: Performed by: STUDENT IN AN ORGANIZED HEALTH CARE EDUCATION/TRAINING PROGRAM

## 2024-06-08 PROCEDURE — 80048 BASIC METABOLIC PNL TOTAL CA: CPT

## 2024-06-08 PROCEDURE — 93005 ELECTROCARDIOGRAM TRACING: CPT | Performed by: HOSPITALIST

## 2024-06-08 PROCEDURE — 6370000000 HC RX 637 (ALT 250 FOR IP)

## 2024-06-08 PROCEDURE — 6360000002 HC RX W HCPCS: Performed by: INTERNAL MEDICINE

## 2024-06-08 PROCEDURE — 86901 BLOOD TYPING SEROLOGIC RH(D): CPT

## 2024-06-08 PROCEDURE — C9113 INJ PANTOPRAZOLE SODIUM, VIA: HCPCS

## 2024-06-08 PROCEDURE — 82803 BLOOD GASES ANY COMBINATION: CPT

## 2024-06-08 PROCEDURE — 2580000003 HC RX 258

## 2024-06-08 PROCEDURE — 94761 N-INVAS EAR/PLS OXIMETRY MLT: CPT

## 2024-06-08 PROCEDURE — 83615 LACTATE (LD) (LDH) ENZYME: CPT

## 2024-06-08 PROCEDURE — 82947 ASSAY GLUCOSE BLOOD QUANT: CPT

## 2024-06-08 PROCEDURE — 6360000002 HC RX W HCPCS

## 2024-06-08 PROCEDURE — 2580000003 HC RX 258: Performed by: INTERNAL MEDICINE

## 2024-06-08 PROCEDURE — 86140 C-REACTIVE PROTEIN: CPT

## 2024-06-08 PROCEDURE — 2500000003 HC RX 250 WO HCPCS

## 2024-06-08 PROCEDURE — 99233 SBSQ HOSP IP/OBS HIGH 50: CPT | Performed by: INTERNAL MEDICINE

## 2024-06-08 PROCEDURE — 85379 FIBRIN DEGRADATION QUANT: CPT

## 2024-06-08 PROCEDURE — 2000000000 HC ICU R&B

## 2024-06-08 PROCEDURE — 37799 UNLISTED PX VASCULAR SURGERY: CPT

## 2024-06-08 PROCEDURE — 36620 INSERTION CATHETER ARTERY: CPT

## 2024-06-08 PROCEDURE — 84443 ASSAY THYROID STIM HORMONE: CPT

## 2024-06-08 PROCEDURE — 6370000000 HC RX 637 (ALT 250 FOR IP): Performed by: INTERNAL MEDICINE

## 2024-06-08 PROCEDURE — 83735 ASSAY OF MAGNESIUM: CPT

## 2024-06-08 PROCEDURE — 85025 COMPLETE CBC W/AUTO DIFF WBC: CPT

## 2024-06-08 PROCEDURE — 82728 ASSAY OF FERRITIN: CPT

## 2024-06-08 PROCEDURE — 36415 COLL VENOUS BLD VENIPUNCTURE: CPT

## 2024-06-08 PROCEDURE — 70450 CT HEAD/BRAIN W/O DYE: CPT

## 2024-06-08 PROCEDURE — 2580000003 HC RX 258: Performed by: STUDENT IN AN ORGANIZED HEALTH CARE EDUCATION/TRAINING PROGRAM

## 2024-06-08 PROCEDURE — 86900 BLOOD TYPING SEROLOGIC ABO: CPT

## 2024-06-08 PROCEDURE — 84100 ASSAY OF PHOSPHORUS: CPT

## 2024-06-08 PROCEDURE — 83605 ASSAY OF LACTIC ACID: CPT

## 2024-06-08 PROCEDURE — 82330 ASSAY OF CALCIUM: CPT

## 2024-06-08 PROCEDURE — 83880 ASSAY OF NATRIURETIC PEPTIDE: CPT

## 2024-06-08 PROCEDURE — 83036 HEMOGLOBIN GLYCOSYLATED A1C: CPT

## 2024-06-08 PROCEDURE — 71045 X-RAY EXAM CHEST 1 VIEW: CPT

## 2024-06-08 PROCEDURE — 84439 ASSAY OF FREE THYROXINE: CPT

## 2024-06-08 PROCEDURE — 81003 URINALYSIS AUTO W/O SCOPE: CPT

## 2024-06-08 RX ORDER — POTASSIUM CHLORIDE 7.45 MG/ML
10 INJECTION INTRAVENOUS PRN
Status: DISCONTINUED | OUTPATIENT
Start: 2024-06-08 | End: 2024-06-21 | Stop reason: HOSPADM

## 2024-06-08 RX ORDER — DEXAMETHASONE SODIUM PHOSPHATE 10 MG/ML
6 INJECTION, SOLUTION INTRAMUSCULAR; INTRAVENOUS DAILY
Status: CANCELLED | OUTPATIENT
Start: 2024-06-09

## 2024-06-08 RX ORDER — SODIUM CHLORIDE 0.9 % (FLUSH) 0.9 %
5-40 SYRINGE (ML) INJECTION EVERY 12 HOURS SCHEDULED
Status: DISCONTINUED | OUTPATIENT
Start: 2024-06-08 | End: 2024-06-14

## 2024-06-08 RX ORDER — BUMETANIDE 0.25 MG/ML
0.5 INJECTION INTRAMUSCULAR; INTRAVENOUS ONCE
Status: DISCONTINUED | OUTPATIENT
Start: 2024-06-08 | End: 2024-06-08

## 2024-06-08 RX ORDER — BUMETANIDE 0.25 MG/ML
1 INJECTION INTRAMUSCULAR; INTRAVENOUS 2 TIMES DAILY
Status: DISCONTINUED | OUTPATIENT
Start: 2024-06-08 | End: 2024-06-10

## 2024-06-08 RX ORDER — ACETAMINOPHEN 650 MG/1
650 SUPPOSITORY RECTAL EVERY 6 HOURS PRN
Status: CANCELLED | OUTPATIENT
Start: 2024-06-08

## 2024-06-08 RX ORDER — DEXTROSE MONOHYDRATE 100 MG/ML
INJECTION, SOLUTION INTRAVENOUS CONTINUOUS PRN
Status: DISCONTINUED | OUTPATIENT
Start: 2024-06-08 | End: 2024-06-10 | Stop reason: SDUPTHER

## 2024-06-08 RX ORDER — POTASSIUM CHLORIDE 20 MEQ/1
40 TABLET, EXTENDED RELEASE ORAL PRN
Status: CANCELLED | OUTPATIENT
Start: 2024-06-08

## 2024-06-08 RX ORDER — ATORVASTATIN CALCIUM 40 MG/1
40 TABLET, FILM COATED ORAL NIGHTLY
Status: DISCONTINUED | OUTPATIENT
Start: 2024-06-08 | End: 2024-06-21 | Stop reason: HOSPADM

## 2024-06-08 RX ORDER — SODIUM CHLORIDE 0.9 % (FLUSH) 0.9 %
5-40 SYRINGE (ML) INJECTION EVERY 12 HOURS SCHEDULED
Status: CANCELLED | OUTPATIENT
Start: 2024-06-08

## 2024-06-08 RX ORDER — ATORVASTATIN CALCIUM 40 MG/1
40 TABLET, FILM COATED ORAL NIGHTLY
Status: CANCELLED | OUTPATIENT
Start: 2024-06-08

## 2024-06-08 RX ORDER — DOPAMINE HYDROCHLORIDE 160 MG/100ML
3-5 INJECTION, SOLUTION INTRAVENOUS CONTINUOUS
Status: DISCONTINUED | OUTPATIENT
Start: 2024-06-08 | End: 2024-06-08

## 2024-06-08 RX ORDER — ASPIRIN 81 MG/1
81 TABLET ORAL DAILY
Status: CANCELLED | OUTPATIENT
Start: 2024-06-09

## 2024-06-08 RX ORDER — GLUCAGON 1 MG/ML
1 KIT INJECTION PRN
Status: DISCONTINUED | OUTPATIENT
Start: 2024-06-08 | End: 2024-06-21 | Stop reason: HOSPADM

## 2024-06-08 RX ORDER — ONDANSETRON 2 MG/ML
4 INJECTION INTRAMUSCULAR; INTRAVENOUS EVERY 6 HOURS PRN
Status: DISCONTINUED | OUTPATIENT
Start: 2024-06-08 | End: 2024-06-21 | Stop reason: HOSPADM

## 2024-06-08 RX ORDER — ASPIRIN 81 MG/1
81 TABLET ORAL DAILY
Status: DISCONTINUED | OUTPATIENT
Start: 2024-06-09 | End: 2024-06-21 | Stop reason: HOSPADM

## 2024-06-08 RX ORDER — DEXTROSE MONOHYDRATE 100 MG/ML
INJECTION, SOLUTION INTRAVENOUS CONTINUOUS PRN
Status: DISCONTINUED | OUTPATIENT
Start: 2024-06-08 | End: 2024-06-21 | Stop reason: HOSPADM

## 2024-06-08 RX ORDER — HYDROCHLOROTHIAZIDE 25 MG/1
25 TABLET ORAL DAILY
Status: CANCELLED | OUTPATIENT
Start: 2024-06-09

## 2024-06-08 RX ORDER — FUROSEMIDE 20 MG/1
20 TABLET ORAL EVERY OTHER DAY
Status: CANCELLED | OUTPATIENT
Start: 2024-06-09

## 2024-06-08 RX ORDER — DEXAMETHASONE SODIUM PHOSPHATE 4 MG/ML
6 INJECTION, SOLUTION INTRA-ARTICULAR; INTRALESIONAL; INTRAMUSCULAR; INTRAVENOUS; SOFT TISSUE DAILY
Status: DISCONTINUED | OUTPATIENT
Start: 2024-06-09 | End: 2024-06-11

## 2024-06-08 RX ORDER — ACETAMINOPHEN 650 MG/1
650 SUPPOSITORY RECTAL EVERY 6 HOURS PRN
Status: DISCONTINUED | OUTPATIENT
Start: 2024-06-08 | End: 2024-06-21 | Stop reason: HOSPADM

## 2024-06-08 RX ORDER — ENOXAPARIN SODIUM 100 MG/ML
40 INJECTION SUBCUTANEOUS DAILY
Status: DISCONTINUED | OUTPATIENT
Start: 2024-06-09 | End: 2024-06-21 | Stop reason: HOSPADM

## 2024-06-08 RX ORDER — HYDROCHLOROTHIAZIDE 25 MG/1
25 TABLET ORAL DAILY
Status: DISCONTINUED | OUTPATIENT
Start: 2024-06-09 | End: 2024-06-10

## 2024-06-08 RX ORDER — INSULIN LISPRO 100 [IU]/ML
0-4 INJECTION, SOLUTION INTRAVENOUS; SUBCUTANEOUS NIGHTLY
Status: DISCONTINUED | OUTPATIENT
Start: 2024-06-08 | End: 2024-06-21 | Stop reason: HOSPADM

## 2024-06-08 RX ORDER — SODIUM CHLORIDE 0.9 % (FLUSH) 0.9 %
10 SYRINGE (ML) INJECTION PRN
Status: CANCELLED | OUTPATIENT
Start: 2024-06-08

## 2024-06-08 RX ORDER — TAMSULOSIN HYDROCHLORIDE 0.4 MG/1
0.4 CAPSULE ORAL DAILY
Status: CANCELLED | OUTPATIENT
Start: 2024-06-09

## 2024-06-08 RX ORDER — INSULIN LISPRO 100 [IU]/ML
0-4 INJECTION, SOLUTION INTRAVENOUS; SUBCUTANEOUS NIGHTLY
Status: CANCELLED | OUTPATIENT
Start: 2024-06-08

## 2024-06-08 RX ORDER — MAGNESIUM SULFATE HEPTAHYDRATE 40 MG/ML
2000 INJECTION, SOLUTION INTRAVENOUS ONCE
Status: COMPLETED | OUTPATIENT
Start: 2024-06-08 | End: 2024-06-08

## 2024-06-08 RX ORDER — ACETAMINOPHEN 325 MG/1
650 TABLET ORAL EVERY 6 HOURS PRN
Status: DISCONTINUED | OUTPATIENT
Start: 2024-06-08 | End: 2024-06-21 | Stop reason: HOSPADM

## 2024-06-08 RX ORDER — MAGNESIUM SULFATE HEPTAHYDRATE 40 MG/ML
2000 INJECTION, SOLUTION INTRAVENOUS PRN
Status: CANCELLED | OUTPATIENT
Start: 2024-06-08

## 2024-06-08 RX ORDER — FUROSEMIDE 40 MG/1
20 TABLET ORAL EVERY OTHER DAY
Status: DISCONTINUED | OUTPATIENT
Start: 2024-06-09 | End: 2024-06-08

## 2024-06-08 RX ORDER — ONDANSETRON 2 MG/ML
4 INJECTION INTRAMUSCULAR; INTRAVENOUS EVERY 6 HOURS PRN
Status: CANCELLED | OUTPATIENT
Start: 2024-06-08

## 2024-06-08 RX ORDER — SODIUM CHLORIDE 0.9 % (FLUSH) 0.9 %
5-40 SYRINGE (ML) INJECTION PRN
Status: DISCONTINUED | OUTPATIENT
Start: 2024-06-08 | End: 2024-06-21 | Stop reason: HOSPADM

## 2024-06-08 RX ORDER — TAMSULOSIN HYDROCHLORIDE 0.4 MG/1
0.4 CAPSULE ORAL DAILY
Status: DISCONTINUED | OUTPATIENT
Start: 2024-06-09 | End: 2024-06-21 | Stop reason: HOSPADM

## 2024-06-08 RX ORDER — ENOXAPARIN SODIUM 100 MG/ML
40 INJECTION SUBCUTANEOUS DAILY
Status: CANCELLED | OUTPATIENT
Start: 2024-06-09

## 2024-06-08 RX ORDER — DEXTROSE MONOHYDRATE 100 MG/ML
INJECTION, SOLUTION INTRAVENOUS CONTINUOUS PRN
Status: CANCELLED | OUTPATIENT
Start: 2024-06-08

## 2024-06-08 RX ORDER — ACETAMINOPHEN 325 MG/1
650 TABLET ORAL EVERY 6 HOURS PRN
Status: CANCELLED | OUTPATIENT
Start: 2024-06-08

## 2024-06-08 RX ORDER — POTASSIUM CHLORIDE 20 MEQ/1
40 TABLET, EXTENDED RELEASE ORAL PRN
Status: DISCONTINUED | OUTPATIENT
Start: 2024-06-08 | End: 2024-06-21 | Stop reason: HOSPADM

## 2024-06-08 RX ORDER — ALBUTEROL SULFATE 2.5 MG/3ML
2.5 SOLUTION RESPIRATORY (INHALATION) EVERY 4 HOURS PRN
Status: CANCELLED | OUTPATIENT
Start: 2024-06-08

## 2024-06-08 RX ORDER — ONDANSETRON 4 MG/1
4 TABLET, ORALLY DISINTEGRATING ORAL EVERY 8 HOURS PRN
Status: CANCELLED | OUTPATIENT
Start: 2024-06-08

## 2024-06-08 RX ORDER — MAGNESIUM SULFATE IN WATER 40 MG/ML
2000 INJECTION, SOLUTION INTRAVENOUS PRN
Status: DISCONTINUED | OUTPATIENT
Start: 2024-06-08 | End: 2024-06-21 | Stop reason: HOSPADM

## 2024-06-08 RX ORDER — GLUCAGON 1 MG/ML
1 KIT INJECTION PRN
Status: DISCONTINUED | OUTPATIENT
Start: 2024-06-08 | End: 2024-06-10 | Stop reason: SDUPTHER

## 2024-06-08 RX ORDER — POLYETHYLENE GLYCOL 3350 17 G/17G
17 POWDER, FOR SOLUTION ORAL DAILY PRN
Status: CANCELLED | OUTPATIENT
Start: 2024-06-08

## 2024-06-08 RX ORDER — DOPAMINE HYDROCHLORIDE 160 MG/100ML
3-5 INJECTION, SOLUTION INTRAVENOUS CONTINUOUS
Status: CANCELLED | OUTPATIENT
Start: 2024-06-08

## 2024-06-08 RX ORDER — ALBUTEROL SULFATE 2.5 MG/3ML
2.5 SOLUTION RESPIRATORY (INHALATION) EVERY 4 HOURS PRN
Status: DISCONTINUED | OUTPATIENT
Start: 2024-06-08 | End: 2024-06-21 | Stop reason: HOSPADM

## 2024-06-08 RX ORDER — POTASSIUM CHLORIDE 7.45 MG/ML
10 INJECTION INTRAVENOUS PRN
Status: CANCELLED | OUTPATIENT
Start: 2024-06-08

## 2024-06-08 RX ORDER — DOPAMINE HYDROCHLORIDE 160 MG/100ML
1-20 INJECTION, SOLUTION INTRAVENOUS CONTINUOUS
Status: DISCONTINUED | OUTPATIENT
Start: 2024-06-08 | End: 2024-06-13

## 2024-06-08 RX ORDER — POLYETHYLENE GLYCOL 3350 17 G/17G
17 POWDER, FOR SOLUTION ORAL DAILY PRN
Status: DISCONTINUED | OUTPATIENT
Start: 2024-06-08 | End: 2024-06-21 | Stop reason: HOSPADM

## 2024-06-08 RX ORDER — INSULIN LISPRO 100 [IU]/ML
0-8 INJECTION, SOLUTION INTRAVENOUS; SUBCUTANEOUS
Status: CANCELLED | OUTPATIENT
Start: 2024-06-08

## 2024-06-08 RX ORDER — INSULIN LISPRO 100 [IU]/ML
5 INJECTION, SOLUTION INTRAVENOUS; SUBCUTANEOUS ONCE
Status: COMPLETED | OUTPATIENT
Start: 2024-06-08 | End: 2024-06-08

## 2024-06-08 RX ORDER — SODIUM CHLORIDE 0.9 % (FLUSH) 0.9 %
5-40 SYRINGE (ML) INJECTION PRN
Status: CANCELLED | OUTPATIENT
Start: 2024-06-08

## 2024-06-08 RX ORDER — INSULIN LISPRO 100 [IU]/ML
0-8 INJECTION, SOLUTION INTRAVENOUS; SUBCUTANEOUS
Status: DISCONTINUED | OUTPATIENT
Start: 2024-06-08 | End: 2024-06-08 | Stop reason: HOSPADM

## 2024-06-08 RX ORDER — ONDANSETRON 4 MG/1
4 TABLET, ORALLY DISINTEGRATING ORAL EVERY 8 HOURS PRN
Status: DISCONTINUED | OUTPATIENT
Start: 2024-06-08 | End: 2024-06-21 | Stop reason: HOSPADM

## 2024-06-08 RX ORDER — SODIUM CHLORIDE 0.9 % (FLUSH) 0.9 %
10 SYRINGE (ML) INJECTION PRN
Status: DISCONTINUED | OUTPATIENT
Start: 2024-06-08 | End: 2024-06-21 | Stop reason: HOSPADM

## 2024-06-08 RX ORDER — INSULIN LISPRO 100 [IU]/ML
0-4 INJECTION, SOLUTION INTRAVENOUS; SUBCUTANEOUS NIGHTLY
Status: DISCONTINUED | OUTPATIENT
Start: 2024-06-08 | End: 2024-06-08 | Stop reason: HOSPADM

## 2024-06-08 RX ORDER — INSULIN LISPRO 100 [IU]/ML
0-8 INJECTION, SOLUTION INTRAVENOUS; SUBCUTANEOUS
Status: DISCONTINUED | OUTPATIENT
Start: 2024-06-09 | End: 2024-06-09

## 2024-06-08 RX ORDER — DEXTROSE MONOHYDRATE 100 MG/ML
INJECTION, SOLUTION INTRAVENOUS CONTINUOUS PRN
Status: DISCONTINUED | OUTPATIENT
Start: 2024-06-08 | End: 2024-06-08 | Stop reason: HOSPADM

## 2024-06-08 RX ADMIN — ATORVASTATIN CALCIUM 40 MG: 40 TABLET, FILM COATED ORAL at 23:07

## 2024-06-08 RX ADMIN — DOPAMINE HYDROCHLORIDE IN DEXTROSE 5 MCG/KG/MIN: 1.6 INJECTION, SOLUTION INTRAVENOUS at 08:54

## 2024-06-08 RX ADMIN — INSULIN LISPRO 6 UNITS: 100 INJECTION, SOLUTION INTRAVENOUS; SUBCUTANEOUS at 17:03

## 2024-06-08 RX ADMIN — INSULIN LISPRO 5 UNITS: 100 INJECTION, SOLUTION INTRAVENOUS; SUBCUTANEOUS at 21:24

## 2024-06-08 RX ADMIN — SODIUM CHLORIDE, PRESERVATIVE FREE 10 ML: 5 INJECTION INTRAVENOUS at 08:56

## 2024-06-08 RX ADMIN — TAMSULOSIN HYDROCHLORIDE 0.4 MG: 0.4 CAPSULE ORAL at 08:55

## 2024-06-08 RX ADMIN — DOPAMINE HYDROCHLORIDE 15 MCG/KG/MIN: 160 INJECTION, SOLUTION INTRAVENOUS at 22:23

## 2024-06-08 RX ADMIN — DEXAMETHASONE SODIUM PHOSPHATE 6 MG: 10 INJECTION INTRAMUSCULAR; INTRAVENOUS at 08:56

## 2024-06-08 RX ADMIN — ACETAMINOPHEN 650 MG: 325 TABLET ORAL at 16:53

## 2024-06-08 RX ADMIN — BARICITINIB 4 MG: 2 TABLET, FILM COATED ORAL at 08:55

## 2024-06-08 RX ADMIN — INSULIN LISPRO 6 UNITS: 100 INJECTION, SOLUTION INTRAVENOUS; SUBCUTANEOUS at 12:48

## 2024-06-08 RX ADMIN — BUMETANIDE 1 MG: 0.25 INJECTION INTRAMUSCULAR; INTRAVENOUS at 23:03

## 2024-06-08 RX ADMIN — ASPIRIN 81 MG: 81 TABLET, COATED ORAL at 08:55

## 2024-06-08 RX ADMIN — SODIUM CHLORIDE, PRESERVATIVE FREE 40 MG: 5 INJECTION INTRAVENOUS at 08:55

## 2024-06-08 RX ADMIN — SODIUM CHLORIDE, POTASSIUM CHLORIDE, SODIUM LACTATE AND CALCIUM CHLORIDE: 600; 310; 30; 20 INJECTION, SOLUTION INTRAVENOUS at 02:53

## 2024-06-08 RX ADMIN — MAGNESIUM SULFATE HEPTAHYDRATE 2000 MG: 40 INJECTION, SOLUTION INTRAVENOUS at 12:32

## 2024-06-08 RX ADMIN — ACETAMINOPHEN 650 MG: 325 TABLET ORAL at 06:29

## 2024-06-08 RX ADMIN — WATER 5 MG: 1 INJECTION INTRAMUSCULAR; INTRAVENOUS; SUBCUTANEOUS at 23:53

## 2024-06-08 RX ADMIN — ENOXAPARIN SODIUM 40 MG: 100 INJECTION SUBCUTANEOUS at 08:55

## 2024-06-08 RX ADMIN — SODIUM CHLORIDE, PRESERVATIVE FREE 10 ML: 5 INJECTION INTRAVENOUS at 23:07

## 2024-06-08 ASSESSMENT — PAIN DESCRIPTION - DESCRIPTORS: DESCRIPTORS: ACHING

## 2024-06-08 ASSESSMENT — PAIN DESCRIPTION - LOCATION: LOCATION: HEAD

## 2024-06-08 ASSESSMENT — ENCOUNTER SYMPTOMS
RECTAL PAIN: 0
SHORTNESS OF BREATH: 1
WHEEZING: 0
ABDOMINAL PAIN: 0
NAUSEA: 0

## 2024-06-08 ASSESSMENT — PAIN SCALES - GENERAL: PAINLEVEL_OUTOF10: 6

## 2024-06-08 ASSESSMENT — PAIN DESCRIPTION - ORIENTATION: ORIENTATION: MID

## 2024-06-08 NOTE — CARE COORDINATION
ONGOING DISCHARGE PLAN:    Writer reviewed Chart notes.     COVID +. Currently sating 99% on 4LNC. Remains Afebrile. Pulmonary following.     Pt. Is from Home w/ Family.    Has Had [facility] in the past.     Remains on IV Decadron.    Cardio on board, Remains on Dopamine Gtt. Currently on Oral Lasix.     PT/OT/ST on board. Will follow for rec/needs.     Will continue to follow for additional discharge needs.    If patient is discharged prior to next notation, then this note serves as note for discharge by case management.    Electronically signed by Alina Wright RN on 6/8/2024 at 3:50 PM

## 2024-06-08 NOTE — PROGRESS NOTES
This RN sent a perfect serve to Dr. Calvin regarding pt's heart rate in the high 30's, this RN waiting for response.

## 2024-06-08 NOTE — PROGRESS NOTES
06/08/24 1028   Encounter Summary   Encounter Overview/Reason Spiritual/Emotional Needs   Service Provided For Patient   Referral/Consult From Rounding   Complexity of Encounter Low   Spiritual/Emotional needs   Type Spiritual Support   Assessment/Intervention/Outcome   Assessment Unable to assess  (covid isolation)   Intervention Prayer (assurance of)/[place]

## 2024-06-08 NOTE — PLAN OF CARE
Problem: Discharge Planning  Goal: Discharge to home or other facility with appropriate resources  6/8/2024 0215 by Duyen Benjamin RN  Outcome: Progressing  Flowsheets  Taken 6/8/2024 0000  Discharge to home or other facility with appropriate resources: Identify barriers to discharge with patient and caregiver  Taken 6/7/2024 2000  Discharge to home or other facility with appropriate resources: Identify barriers to discharge with patient and caregiver     Problem: Safety - Adult  Goal: Free from fall injury  6/8/2024 0215 by Duyen Benjamin RN  Outcome: Progressing  Flowsheets (Taken 6/7/2024 2036)  Free From Fall Injury: Instruct family/caregiver on patient safety     Problem: Skin/Tissue Integrity  Goal: Absence of new skin breakdown  Description: 1.  Monitor for areas of redness and/or skin breakdown  2.  Assess vascular access sites hourly  3.  Every 4-6 hours minimum:  Change oxygen saturation probe site  4.  Every 4-6 hours:  If on nasal continuous positive airway pressure, respiratory therapy assess nares and determine need for appliance change or resting period.  6/8/2024 0215 by Duyen Benjamin RN  Outcome: Progressing     Problem: ABCDS Injury Assessment  Goal: Absence of physical injury  6/8/2024 0215 by Duyen Benjamin RN  Outcome: Progressing  Flowsheets (Taken 6/7/2024 2036)  Absence of Physical Injury: Implement safety measures based on patient assessment     Problem: Chronic Conditions and Co-morbidities  Goal: Patient's chronic conditions and co-morbidity symptoms are monitored and maintained or improved  Outcome: Progressing  Flowsheets  Taken 6/8/2024 0000  Care Plan - Patient's Chronic Conditions and Co-Morbidity Symptoms are Monitored and Maintained or Improved:   Monitor and assess patient's chronic conditions and comorbid symptoms for stability, deterioration, or improvement   Collaborate with multidisciplinary team to address chronic and comorbid conditions and prevent exacerbation or

## 2024-06-08 NOTE — PROGRESS NOTES
This RN spoke with Dr. Calvin and hr stated to not go over 5 of dopamine and he will be in soon to see patient and possible transfer to [facility] for pacer. Rn will continue to monitor at this time.

## 2024-06-08 NOTE — PROGRESS NOTES
Date:   6/8/2024  Patient name: Anuj oJvel  Date of admission:  6/7/2024  8:03 AM  MRN:   [id number]  YOB: 1940  PCP: Jeanne Simpson MD    Reason for Admission: Dehydration [E86.0]  Hypoxemia [R09.02]  Bradycardia [R00.1]  Second degree AV block, Mobitz type I [I44.1]  Second degree AV block, Mobitz type II [I44.1]  General weakness [R53.1]  COVID [U07.1]    Cardiology consult: Bradycardia type I second-degree heart block        Referring physician Dr. Tacho Dexter     Impression     Admission 6/7/2024 acute hypoxic respiratory failure COVID-19 multifocal pneumonia,  generalized weakness unable to walk, fall, CT brain no acute process, no focal neurodeficit, significant improvement since admission  Elevated D-dimer CTA chest no pulmonary embolism  Bradycardia, Mobitz type I heart block intermittent 2-1 heart  Exertional fatigue most likely secondary to bradycardia  History of CAD, LAD stent placement  Congestive heart failure patient has been on diuretic  Normal LV systolic function 2D echo 2/20/2023 ejection fraction more than 55%  Overweight  Diabetes mellitus  History of hypertension     Past surgical history   Bilateral knee replacement, partial colectomy, back surgery 2017, cardiac catheterization        Investigation work     ECG 6/7/2024  Sinus rhythm, first-degree heart block, Mobitz type I block, heart rate 47, low voltage, left axis, poor R wave progression     CTA chest 6/7/2024  No pulmonary embolism  Groundglass opacities     Nuclear stress test 6/29/2023  Small apical perfusion defect noted with the valve plane artifact identified  LV function normal ejection fraction 69%     2D echo 2/20/2023  Normal LV size, mild LVH, ejection fraction 55 to 60%  RVSP 40 to 45 mmHg  Normal size LA and RA  No significant valvular abnormality    History of present illness  83-year-old male with a past medical history of CAD, stent placement in LAD, type I second-degree heart block, Mobitz type I,  lumbar region     Second degree AV block, Mobitz type I      Plan of Treatment:   Medications reviewed  1: Bradycardia, Mobitz type I heart block, intermittent 2-1 blood continue dopamine infusion   2: Tiredness, exertional fatigue most likely secondary to bradycardia  3: COVID-19 infection started on Decadron and baricitinib  4: Diastolic congestive heart failure start Bumex IV 2 mg daily  5: Diabetes mellitus on insulin  Check BMP and magnesium daily    Arranged to transfer patient to [facility] patient need permanent pacemaker    Electronically signed by Casie Calvin MD on 6/8/2024 at 10:33 AM

## 2024-06-08 NOTE — DISCHARGE SUMMARY
[facility]   IN-PATIENT SERVICE   [facility]    Discharge Summary     Patient ID: Anuj Jovel  :  1940   MRN: [id number]     ACCOUNT:  [id number]   Patient's PCP: Jeanne Simpson MD  Admit Date: 2024   Discharge Date: 2024    Length of Stay: 1  Code Status:  Full Code  Admitting Physician: Lynda Contreras MD  Discharge Physician: Jhon Brown MD     Active Discharge Diagnoses:       Primary Problem  Second degree AV block, Mobitz type I      Hospital Problems  Active Hospital Problems    Diagnosis Date Noted    Second degree AV block, Mobitz type I [I44.1] 2024       Admission Condition:  poor     Discharged Condition: poor    Hospital Stay:       Hospital Course:      3-year-old male with KAREY, BPH, HTN, hyperlipidemia, type II DM, CAD s/p stent in , HFpEF, and mild mitral valve regurgitation presents to the ED via EMS this morning with daughter due to bilateral lower extremity weakness and dyspnea. His daughter states that patient was feeling more weak and lethargic in the past few hours. According to daughter, patient did not complain of any palpitations or chest pain. Patient does endorse feeling lightheaded and short of breath. In the ED, patient was hypoxic SpO2 of 87 bradycardic heart rate 48 and low 40s. Patient was placed on 4 L nasal cannula.D-dimer 1.01.  CT chest PE negative.  CRP 6.7.  UA unremarkable.  Lactate 1.7.  Chest x-ray unremarkable.  CT head without contrast unremarkable  Patient tested positive for COVID 19.  CT PE was negative for PE but showed interstitial groundglass opacity.  Patient was started on Decadron and Baricitinib.      EKG was suggestive of Bradycardia, Mobitz type I heart block, intermittent 2-1 .  Cardiology was consulted and started on dopamine drip.  Patient heart rate overnight he stayed between  30 to 50 despite being on maximum dose of dopamine 5 mcg/kg per min.  Cardiology recommended the patient to be  appearance.  While some of these findings may be due to motion artifact and atelectasis, atypical infection should also be considered.     CT HEAD WO CONTRAST    Result Date: 6/7/2024  EXAMINATION: CT OF THE HEAD WITHOUT CONTRAST  6/7/2024 9:42 am TECHNIQUE: CT of the head was performed without the administration of intravenous contrast. Automated exposure control, iterative reconstruction, and/or weight based adjustment of the mA/kV was utilized to reduce the radiation dose to as low as reasonably achievable. COMPARISON: None. HISTORY: ORDERING SYSTEM PROVIDED HISTORY: headache TECHNOLOGIST PROVIDED HISTORY: headache Decision Support Exception - unselect if not a suspected or confirmed emergency medical condition->Emergency Medical Condition (MA) Reason for Exam: headache, ams FINDINGS: BRAIN/VENTRICLES: There is no acute intracranial hemorrhage, mass effect or midline shift.  No abnormal extra-axial fluid collection.  The gray-white differentiation is maintained without evidence of an acute infarct.  There is no evidence of hydrocephalus. Cerebral atrophy. ORBITS: The visualized portion of the orbits demonstrate no acute abnormality. SINUSES: The visualized paranasal sinuses and mastoid air cells demonstrate no acute abnormality. SOFT TISSUES/SKULL:  No acute abnormality of the visualized skull or soft tissues.     No acute intracranial abnormality. Cerebral atrophy.     XR CHEST PORTABLE    Result Date: 6/7/2024  EXAMINATION: ONE XRAY VIEW OF THE CHEST 6/7/2024 8:50 am COMPARISON: None. HISTORY: ORDERING SYSTEM PROVIDED HISTORY: dyspnea TECHNOLOGIST PROVIDED HISTORY: dyspnea FINDINGS: Lines and tubes: None The lungs are clear.  Heart size is normal.     No acute cardiopulmonary process.         Consultations:    Consults:     Final Specialist Recommendations/Findings:   IP CONSULT TO HOSPITALIST  IP CONSULT TO CARDIOLOGY  IP CONSULT TO PULMONOLOGY  IP CONSULT TO SOCIAL WORK  IP CONSULT TO HOSPITALIST  IP CONSULT

## 2024-06-08 NOTE — PROGRESS NOTES
[facility]  IN-PATIENT SERVICE  [facility]    PROGRESS NOTE             6/8/2024    7:29 AM    Name:   Anuj Jovel  MRN:     [id number]     Acct:      [id number]   Room:   2012/2012-01   Day:  1  Admit Date:  6/7/2024  8:03 AM    PCP:  Jeanne Simpson MD  Code Status:  Full Code    Subjective:     C/C:   Chief Complaint   Patient presents with    Extremity Weakness     Weakness in both legs     Interval History Status: not changed.    Patient seen and examined at bedside  Patient heart rate overnight and today morning between 30s to 40s but patient is not complaining of any symptoms, patient currently on dopamine drip of 5 mcg/kg per min  Leg venous Doppler negative for DVT  Will follow cardiology recommendations if the patient needs to be transferred to [facility]      Brief History:     3-year-old male with KAREY, BPH, HTN, hyperlipidemia, type II DM, CAD s/p stent in 1997, HFpEF, and mild mitral valve regurgitation presents to the ED via EMS this morning with daughter due to bilateral lower extremity weakness and dyspnea.      Daughter said that patient was doing okay until this morning around 4:30 AM her son found him on the bathroom floor and he mentioned that he slid down the wall.  Patient states that he did not lose consciousness and did not hit his head.  Daughter states that patient started to yell for help however she did not hear that.  His daughter states that patient was feeling more weak and lethargic in the past few hours.  According to daughter, patient did not complain of any palpitations or chest pain.  Patient does endorse feeling lightheaded and short of breath.      Patient is a former smoker 30 years back (1pack/day).  He is an occasional drinker.     In the ED, patient was hypoxic SpO2 of 87 bradycardic heart rate 48 and low 40s.  Patient was placed on 4 L nasal cannula.  CBC and CMP unremarkable.  Troponin 36 repeat 27.  D-dimer 1.01.  CT    Neurological:      General: No focal deficit present.      Mental Status: He is alert and oriented to person, place, and time. Mental status is at baseline.   Psychiatric:         Mood and Affect: Mood normal.         Behavior: Behavior normal.         Assessment:        Primary Problem  Second degree AV block, Mobitz type I    Active Hospital Problems    Diagnosis Date Noted    Second degree AV block, Mobitz type I [I44.1] 06/07/2024       Plan:        atient status Admit as inpatient in the Intermediate ICU     # Acute hypoxic respiratory failure 2/2 COVID  -Patient was hypoxic saturating 87 on room air.  Was placed on 4 L nasal cannula  -2 boluses of normal saline  -D-dimer 1.01 CT chest PE negative  -CRP elevated 6.7>32.5, Lactate normal  -Troponin 36 repeat 27  -Chest x-ray unremarkable  -Barcitinib 4mg oral daily for 14 doses  -Decadron 6 mg IV daily  -Albuterol 2 puffs inhaler every 4 hours as needed  -Incentive spirometry   -Pulmonology on board, will follow recommendations    2nd degree mobitz type I block  -EKG suggestive of Mobitz type I block  -Currently on dopamine drip of 5 mcg/kg per min  -Cardiology on board, will follow the recommendation for the need of pacemaker    # DM type II  -Last A1c 6.9 4 months ago  -medium dose sliding scale  -Hypoglycemia protocol in place  -POCT x4     # CAD's s/p stent (1997)  -Continue aspirin 81 mg oral daily     # Hyperlipidemia  -Continue home dose Lipitor 40 mg oral nightly     # BPH  -Continue home dose Flomax 0.4 mg oral daily  -Monitor I/Os     # CHFpEF  -Not in exacerbation  -Lasix and HCTZ on hold for soft blood pressure      DVT prophylaxis: Lovenox 40 mg subcu daily  GI prophylaxis: Continue home dose Protonix 40 mg oral daily  Diet: Regular 5 carb  Disposition: TBD  Isolation: Contact and droplet     OT/PT/SW all consulted     Code Status:Full code     Consultations:   IP CONSULT TO HOSPITALIST  IP CONSULT TO CARDIOLOGY  IP CONSULT TO PULMONOLOGY  IP

## 2024-06-08 NOTE — PROGRESS NOTES
Pt's daughter brought pt's hearing aids, within the first 30 minutes he reports he has lost one of them. Write entered room and looked all over floor, under bed, tables and chairs. He states he might have placed it on the lunch tray, which had been collected already. Nurse called dietary dept and asked them to look at the trays to see if the hearing aid was on it, they will get back to us.  Writer shook out bed linens without success. Pt does have his other hearing aid in his ear but has placed the battery portion inside his ear canal instead of the amplifier. Writer corrected the present hearing aid and notified the primary nurse about the missing one.

## 2024-06-08 NOTE — PROGRESS NOTES
Report called to AARON Wilburn at [place] [facility]. All questions, comments, and concerns answered and addressed at this time.  from [facility] expected 1900. Care ongoing.

## 2024-06-08 NOTE — DISCHARGE INSTR - COC
Continuity of Care Form    Patient Name: Anuj Jovel   :  1940  MRN:  [id number]    Admit date:  2024  Discharge date:  24    Code Status Order: Full Code   Advance Directives:     Admitting Physician:  Lynda Contreras MD  PCP: Jeanne Simpson MD    Discharging Nurse: AARON Wilkins  Discharging Hospital Unit/Room#:   Discharging Unit Phone Number: [phone]    Emergency Contact:   Extended Emergency Contact Information  Primary Emergency Contact: TANVIDAVIN  Address: [address] [place] [address] [facility]  Home Phone: [phone]  Relation: Child  Secondary Emergency Contact: POLLY CORTEZ  Address: [address] [place] [address] [place] [address] [facility]  Home Phone: [phone]  Relation: Child    Past Surgical History:  Past Surgical History:   Procedure Laterality Date    BACK SURGERY  2017    [facility]  Dr Cueva, lumbar fusion    CARDIAC CATHETERIZATION      COLECTOMY  2015    partial colectomy due to diveritc    COLONOSCOPY      TOTAL KNEE ARTHROPLASTY Right        Immunization History:   Immunization History   Administered Date(s) Administered    COVID-19, MODERNA BLUE border, Primary or Immunocompromised, (age 12y+), IM, 100 mcg/0.5mL 2021, 2021, 2021, 2022    COVID-19, PFIZER Bivalent, DO NOT Dilute, (age 12y+), IM, 30 mcg/0.3 mL 10/25/2022    Influenza Virus Vaccine 2010, 10/02/2014, 10/12/2015    Influenza, FLUAD, (age 65 y+), Adjuvanted, 0.5mL 2020, 2021, 10/25/2022, 10/04/2023    Influenza, High Dose (Fluzone 65 yrs and older) 10/12/2016, 2017, 2018    Influenza, Triv, inactivated, subunit, adjuvanted, IM (Fluad 65 yrs and older) 2019    Pneumococcal, PCV-13, PREVNAR 13, (age 6w+), IM, 0.5mL 2017    Pneumococcal, PPSV23, PNEUMOVAX 23, (age 2y+), SC/IM, 0.5mL 2014    TDaP, ADACEL (age 10y-64y), BOOSTRIX (age 10y+), IM,  90.7 kg (200 lb)   SpO2 99%   BMI 31.32 kg/m²     Last documented pain score (0-10 scale): Pain Level: 6  Last Weight:   Wt Readings from Last 1 Encounters:   06/07/24 90.7 kg (200 lb)     Mental Status:  Alert and oriented at times, labile orientation at other times    IV Access:  - Peripheral IV - site  IV L FA & IV R FA, insertion date: 6/7/24    Nursing Mobility/ADLs:  Walking   Assisted  Transfer  Assisted  Bathing  Assisted  Dressing  Assisted  Toileting  Assisted  Feeding  Independent  Med Admin  Independent  Med Delivery   whole    Wound Care Documentation and Therapy:        Elimination:  Continence:   Bowel: No  Bladder: No  Urinary Catheter: None   Colostomy/Ileostomy/Ileal Conduit: No       Date of Last BM: 6/6/24    Intake/Output Summary (Last 24 hours) at 6/8/2024 1436  Last data filed at 6/8/2024 1200  Gross per 24 hour   Intake --   Output 3180 ml   Net -3180 ml     I/O last 3 completed shifts:  In: -   Out: 1430 [Urine:1430]    Safety Concerns:     At Risk for Falls    Impairments/Disabilities:      Hearing    Nutrition Therapy:  Current Nutrition Therapy:   - Oral Diet:  Dysphagia - Soft and Bite Sized    Routes of Feeding: Oral  Liquids: Thin Liquids  Daily Fluid Restriction: no  Last Modified Barium Swallow with Video (Video Swallowing Test): not done    Treatments at the Time of Hospital Discharge:   Respiratory Treatments: Inhaler and Nebulizer  Oxygen Therapy:  is on oxygen at 2-4 L/min per nasal cannula.  Ventilator:    - No ventilator support    Rehab Therapies: Physical Therapy, Occupational Therapy, and Speech/Language Therapy  Weight Bearing Status/Restrictions: No weight bearing restrictions  Other Medical Equipment (for information only, NOT a DME order):  Need PT/OT eval  Other Treatments: N/A    Patient's personal belongings (please select all that are sent with patient):  BL Hearing aides, one missing per pt 6/8/24    RN SIGNATURE:  Electronically signed by Claudette Acuna RN on

## 2024-06-08 NOTE — PROGRESS NOTES
Pt maxxed at 5mcg. Pt is not within the parameters of the dopamine gtt. RN reached out to Dr. Calvin to inform. MD said okay with dropping below parameters, just no long pauses.

## 2024-06-08 NOTE — PROGRESS NOTES
ICU Progress Note (Non-Vent)  [facility]    Patient - Anuj Jovel,  Age - 83 y.o.    - 1940      Room Number -    MRN -  [id number]   [facility] # - [id number]  Date of Admission -  2024  8:03 AM    Events of Past 24 Hours   Continues to have episodes of profound bradycardia, but does not seem that it is affecting blood pressure  Remains on dopamine at 5 mics per minute blood pressure 126/76 but heart rate 38    Vitals    height is 1.702 m (5' 7\") and weight is 90.7 kg (200 lb). His axillary temperature is 98.2 °F (36.8 °C). His blood pressure is 134/65 and his pulse is 39 (abnormal). His respiration is 21 and oxygen saturation is 96%.       Temperature Range: Temp: 98.2 °F (36.8 °C) Temp  Av.1 °F (36.7 °C)  Min: 97.6 °F (36.4 °C)  Max: 98.3 °F (36.8 °C)  BP Range:  Systolic (24hrs), Av , Min:76 , Max:153     Diastolic (24hrs), Av, Min:31, Max:129    Pulse Range: Pulse  Av.7  Min: 36  Max: 90  Respiration Range: Resp  Av  Min: 13  Max: 29  Current Pulse Ox::  SpO2: 96 %  24HR Pulse Ox Range:  SpO2  Av %  Min: 87 %  Max: 99 %  Oxygen Amount and Delivery: O2 Flow Rate (L/min): 4 L/min    Wt Readings from Last 3 Encounters:   24 90.7 kg (200 lb)   24 93.1 kg (205 lb 3.2 oz)   24 90.7 kg (200 lb)     I/O     Intake/Output Summary (Last 24 hours) at 2024 0709  Last data filed at 2024 0547  Gross per 24 hour   Intake --   Output 1430 ml   Net -1430 ml     DRAIN/TUBE OUTPUT       Invasive Lines   ICP PRESSURE RANGE  No data recorded  CVP PRESSURE RANGE  No data recorded      Medications      baricitinib  4 mg Oral Daily    dexAMETHasone  6 mg IntraVENous Daily    enoxaparin  40 mg SubCUTAneous Daily    sodium chloride flush  5-40 mL IntraVENous 2 times per day    aspirin EC  81 mg Oral Daily    atorvastatin  40 mg Oral Nightly    [Held by  provider] hydroCHLOROthiazide  25 mg Oral Daily    tamsulosin  0.4 mg Oral Daily    [Held by provider] furosemide  20 mg Oral Every Other Day    insulin lispro  0-4 Units SubCUTAneous TID WC    insulin lispro  0-4 Units SubCUTAneous Nightly    pantoprazole (PROTONIX) 40 mg in sodium chloride (PF) 0.9 % 10 mL injection  40 mg IntraVENous Daily     sodium chloride flush, sodium chloride flush, sodium chloride, potassium chloride **OR** potassium alternative oral replacement **OR** potassium chloride, magnesium sulfate, ondansetron **OR** ondansetron, polyethylene glycol, acetaminophen **OR** acetaminophen, albuterol  IV Drips/Infusions   lactated ringers IV soln 75 mL/hr at 06/08/24 0253    sodium chloride      DOPamine 5 mcg/kg/min (06/07/24 2236)       Diet/Nutrition   ADULT DIET; Dysphagia - Soft and Bite Sized    Exam      Constitutional - Alert, arousable  General Appearance obese  HEENT -normocephalic, atraumatic. PERRLA macroglossia low overhanging soft palate  Lungs - Chest expands equally, no wheezes, rales or rhonchi.  Cardiovascular - Heart sounds are normal.  Bradycardic rate and rhythm regular, no murmur, gallop or rub.  Abdomen - soft, nontender, nondistended, no masses or organomegaly  Neurologic - CN II-XII are grossly intact. There are no focal motor deficits  Skin - no bruising or bleeding  Extremities - no cyanosis, clubbing; trace edema    Lab Results   CBC     Lab Results   Component Value Date/Time    WBC 6.3 06/08/2024 03:48 AM    RBC 3.93 06/08/2024 03:48 AM    HGB 12.6 06/08/2024 03:48 AM    HCT 37.4 06/08/2024 03:48 AM     06/08/2024 03:48 AM    MCV 95.2 06/08/2024 03:48 AM    MCH 32.1 06/08/2024 03:48 AM    MCHC 33.7 06/08/2024 03:48 AM    RDW 13.6 06/08/2024 03:48 AM    LYMPHOPCT 11 06/08/2024 03:48 AM    MONOPCT 5 06/08/2024 03:48 AM    EOSPCT 0 06/08/2024 03:48 AM    BASOPCT 0 06/08/2024 03:48 AM    MONOSABS 0.30 06/08/2024 03:48 AM    LYMPHSABS 1.50 01/12/2021 05:06 AM    EOSABS

## 2024-06-09 PROBLEM — U07.1 COVID: Status: ACTIVE | Noted: 2024-06-09

## 2024-06-09 PROBLEM — R54 ADVANCED AGE: Status: ACTIVE | Noted: 2024-06-09

## 2024-06-09 PROBLEM — J96.01 ACUTE HYPOXIC RESPIRATORY FAILURE (HCC): Status: ACTIVE | Noted: 2024-06-09

## 2024-06-09 PROBLEM — I50.20 SYSTOLIC CONGESTIVE HEART FAILURE (HCC): Status: ACTIVE | Noted: 2024-06-09

## 2024-06-09 LAB
ABO + RH BLD: NORMAL
ANION GAP SERPL CALCULATED.3IONS-SCNC: 13 MMOL/L (ref 9–16)
BASOPHILS # BLD: <0.03 K/UL (ref 0–0.2)
BASOPHILS NFR BLD: 0 % (ref 0–2)
BILIRUB UR QL STRIP: NEGATIVE
BLOOD BANK COMMENT: NORMAL
BUN SERPL-MCNC: 20 MG/DL (ref 8–23)
CALCIUM SERPL-MCNC: 9.3 MG/DL (ref 8.6–10.4)
CHLORIDE SERPL-SCNC: 102 MMOL/L (ref 98–107)
CLARITY UR: CLEAR
CO2 SERPL-SCNC: 24 MMOL/L (ref 20–31)
COLOR UR: YELLOW
COMMENT: NORMAL
CREAT SERPL-MCNC: 1 MG/DL (ref 0.7–1.2)
CRP SERPL HS-MCNC: 20.3 MG/L (ref 0–5)
EOSINOPHIL # BLD: <0.03 K/UL (ref 0–0.44)
EOSINOPHILS RELATIVE PERCENT: 0 % (ref 1–4)
ERYTHROCYTE [DISTWIDTH] IN BLOOD BY AUTOMATED COUNT: 13.2 % (ref 11.8–14.4)
EST. AVERAGE GLUCOSE BLD GHB EST-MCNC: 137 MG/DL
GFR, ESTIMATED: 72 ML/MIN/1.73M2
GLUCOSE BLD-MCNC: 220 MG/DL (ref 75–110)
GLUCOSE BLD-MCNC: 280 MG/DL (ref 75–110)
GLUCOSE BLD-MCNC: 302 MG/DL (ref 75–110)
GLUCOSE BLD-MCNC: 310 MG/DL (ref 75–110)
GLUCOSE BLD-MCNC: 342 MG/DL (ref 75–110)
GLUCOSE SERPL-MCNC: 270 MG/DL (ref 74–99)
GLUCOSE UR STRIP-MCNC: NEGATIVE MG/DL
HBA1C MFR BLD: 6.4 % (ref 4–6)
HCT VFR BLD AUTO: 39.5 % (ref 40.7–50.3)
HGB BLD-MCNC: 13.9 G/DL (ref 13–17)
HGB UR QL STRIP.AUTO: NEGATIVE
IMM GRANULOCYTES # BLD AUTO: 0.07 K/UL (ref 0–0.3)
IMM GRANULOCYTES NFR BLD: 1 %
KETONES UR STRIP-MCNC: NEGATIVE MG/DL
LEUKOCYTE ESTERASE UR QL STRIP: NEGATIVE
LYMPHOCYTES NFR BLD: 0.99 K/UL (ref 1.1–3.7)
LYMPHOCYTES RELATIVE PERCENT: 8 % (ref 24–43)
MCH RBC QN AUTO: 32 PG (ref 25.2–33.5)
MCHC RBC AUTO-ENTMCNC: 35.2 G/DL (ref 28.4–34.8)
MCV RBC AUTO: 90.8 FL (ref 82.6–102.9)
MICROORGANISM SPEC CULT: NORMAL
MICROORGANISM SPEC CULT: NORMAL
MONOCYTES NFR BLD: 1.02 K/UL (ref 0.1–1.2)
MONOCYTES NFR BLD: 8 % (ref 3–12)
NEUTROPHILS NFR BLD: 83 % (ref 36–65)
NEUTS SEG NFR BLD: 10.44 K/UL (ref 1.5–8.1)
NITRITE UR QL STRIP: NEGATIVE
NRBC BLD-RTO: 0 PER 100 WBC
PH UR STRIP: 5.5 [PH] (ref 5–8)
PLATELET # BLD AUTO: 216 K/UL (ref 138–453)
PMV BLD AUTO: 9.2 FL (ref 8.1–13.5)
POTASSIUM SERPL-SCNC: 3.7 MMOL/L (ref 3.7–5.3)
PROT UR STRIP-MCNC: NEGATIVE MG/DL
RBC # BLD AUTO: 4.35 M/UL (ref 4.21–5.77)
SERVICE CMNT-IMP: NORMAL
SERVICE CMNT-IMP: NORMAL
SODIUM SERPL-SCNC: 139 MMOL/L (ref 136–145)
SP GR UR STRIP: 1.01 (ref 1–1.03)
SPECIMEN DESCRIPTION: NORMAL
SPECIMEN DESCRIPTION: NORMAL
UROBILINOGEN UR STRIP-ACNC: NORMAL EU/DL (ref 0–1)
WBC OTHER # BLD: 12.6 K/UL (ref 3.5–11.3)

## 2024-06-09 PROCEDURE — 99223 1ST HOSP IP/OBS HIGH 75: CPT | Performed by: STUDENT IN AN ORGANIZED HEALTH CARE EDUCATION/TRAINING PROGRAM

## 2024-06-09 PROCEDURE — 80048 BASIC METABOLIC PNL TOTAL CA: CPT

## 2024-06-09 PROCEDURE — XW0DXM6 INTRODUCTION OF BARICITINIB INTO MOUTH AND PHARYNX, EXTERNAL APPROACH, NEW TECHNOLOGY GROUP 6: ICD-10-PCS | Performed by: INTERNAL MEDICINE

## 2024-06-09 PROCEDURE — 82947 ASSAY GLUCOSE BLOOD QUANT: CPT

## 2024-06-09 PROCEDURE — 92610 EVALUATE SWALLOWING FUNCTION: CPT

## 2024-06-09 PROCEDURE — 2580000003 HC RX 258

## 2024-06-09 PROCEDURE — 6370000000 HC RX 637 (ALT 250 FOR IP): Performed by: HOSPITALIST

## 2024-06-09 PROCEDURE — 2580000003 HC RX 258: Performed by: INTERNAL MEDICINE

## 2024-06-09 PROCEDURE — 97167 OT EVAL HIGH COMPLEX 60 MIN: CPT

## 2024-06-09 PROCEDURE — 2000000000 HC ICU R&B

## 2024-06-09 PROCEDURE — 97535 SELF CARE MNGMENT TRAINING: CPT

## 2024-06-09 PROCEDURE — 6370000000 HC RX 637 (ALT 250 FOR IP)

## 2024-06-09 PROCEDURE — 85025 COMPLETE CBC W/AUTO DIFF WBC: CPT

## 2024-06-09 PROCEDURE — 6360000002 HC RX W HCPCS: Performed by: STUDENT IN AN ORGANIZED HEALTH CARE EDUCATION/TRAINING PROGRAM

## 2024-06-09 PROCEDURE — 6360000002 HC RX W HCPCS: Performed by: INTERNAL MEDICINE

## 2024-06-09 PROCEDURE — 6360000002 HC RX W HCPCS

## 2024-06-09 PROCEDURE — XW033E5 INTRODUCTION OF REMDESIVIR ANTI-INFECTIVE INTO PERIPHERAL VEIN, PERCUTANEOUS APPROACH, NEW TECHNOLOGY GROUP 5: ICD-10-PCS | Performed by: INTERNAL MEDICINE

## 2024-06-09 PROCEDURE — 99223 1ST HOSP IP/OBS HIGH 75: CPT | Performed by: INTERNAL MEDICINE

## 2024-06-09 PROCEDURE — C9113 INJ PANTOPRAZOLE SODIUM, VIA: HCPCS

## 2024-06-09 PROCEDURE — 86140 C-REACTIVE PROTEIN: CPT

## 2024-06-09 PROCEDURE — A4216 STERILE WATER/SALINE, 10 ML: HCPCS

## 2024-06-09 PROCEDURE — 97530 THERAPEUTIC ACTIVITIES: CPT

## 2024-06-09 PROCEDURE — 97162 PT EVAL MOD COMPLEX 30 MIN: CPT

## 2024-06-09 PROCEDURE — 3E033XZ INTRODUCTION OF VASOPRESSOR INTO PERIPHERAL VEIN, PERCUTANEOUS APPROACH: ICD-10-PCS | Performed by: INTERNAL MEDICINE

## 2024-06-09 RX ORDER — SODIUM CHLORIDE 0.9 % (FLUSH) 0.9 %
5-40 SYRINGE (ML) INJECTION EVERY 12 HOURS SCHEDULED
Status: DISCONTINUED | OUTPATIENT
Start: 2024-06-09 | End: 2024-06-21 | Stop reason: HOSPADM

## 2024-06-09 RX ORDER — HYDRALAZINE HYDROCHLORIDE 20 MG/ML
10 INJECTION INTRAMUSCULAR; INTRAVENOUS ONCE
Status: COMPLETED | OUTPATIENT
Start: 2024-06-09 | End: 2024-06-09

## 2024-06-09 RX ORDER — LIDOCAINE HYDROCHLORIDE 10 MG/ML
5 INJECTION, SOLUTION INFILTRATION; PERINEURAL ONCE
Status: DISCONTINUED | OUTPATIENT
Start: 2024-06-09 | End: 2024-06-21 | Stop reason: HOSPADM

## 2024-06-09 RX ORDER — INSULIN LISPRO 100 [IU]/ML
0-16 INJECTION, SOLUTION INTRAVENOUS; SUBCUTANEOUS
Status: DISCONTINUED | OUTPATIENT
Start: 2024-06-09 | End: 2024-06-21 | Stop reason: HOSPADM

## 2024-06-09 RX ORDER — SODIUM CHLORIDE 0.9 % (FLUSH) 0.9 %
5-40 SYRINGE (ML) INJECTION PRN
Status: DISCONTINUED | OUTPATIENT
Start: 2024-06-09 | End: 2024-06-11

## 2024-06-09 RX ORDER — SODIUM CHLORIDE 9 MG/ML
25 INJECTION, SOLUTION INTRAVENOUS PRN
Status: DISCONTINUED | OUTPATIENT
Start: 2024-06-09 | End: 2024-06-21 | Stop reason: HOSPADM

## 2024-06-09 RX ORDER — HYDRALAZINE HYDROCHLORIDE 20 MG/ML
INJECTION INTRAMUSCULAR; INTRAVENOUS
Status: COMPLETED
Start: 2024-06-09 | End: 2024-06-09

## 2024-06-09 RX ORDER — 0.9 % SODIUM CHLORIDE 0.9 %
30 INTRAVENOUS SOLUTION INTRAVENOUS PRN
Status: DISCONTINUED | OUTPATIENT
Start: 2024-06-09 | End: 2024-06-21 | Stop reason: HOSPADM

## 2024-06-09 RX ORDER — HYDRALAZINE HYDROCHLORIDE 20 MG/ML
10 INJECTION INTRAMUSCULAR; INTRAVENOUS EVERY 6 HOURS PRN
Status: DISCONTINUED | OUTPATIENT
Start: 2024-06-09 | End: 2024-06-10

## 2024-06-09 RX ORDER — INSULIN LISPRO 100 [IU]/ML
0-4 INJECTION, SOLUTION INTRAVENOUS; SUBCUTANEOUS NIGHTLY
Status: DISCONTINUED | OUTPATIENT
Start: 2024-06-09 | End: 2024-06-09

## 2024-06-09 RX ORDER — DEXTROSE MONOHYDRATE 100 MG/ML
INJECTION, SOLUTION INTRAVENOUS CONTINUOUS PRN
Status: DISCONTINUED | OUTPATIENT
Start: 2024-06-09 | End: 2024-06-10 | Stop reason: SDUPTHER

## 2024-06-09 RX ORDER — GLUCAGON 1 MG/ML
1 KIT INJECTION PRN
Status: DISCONTINUED | OUTPATIENT
Start: 2024-06-09 | End: 2024-06-10 | Stop reason: SDUPTHER

## 2024-06-09 RX ORDER — BUTALBITAL, ACETAMINOPHEN AND CAFFEINE 50; 325; 40 MG/1; MG/1; MG/1
1 TABLET ORAL EVERY 4 HOURS PRN
Status: DISCONTINUED | OUTPATIENT
Start: 2024-06-09 | End: 2024-06-21 | Stop reason: HOSPADM

## 2024-06-09 RX ADMIN — INSULIN LISPRO 2 UNITS: 100 INJECTION, SOLUTION INTRAVENOUS; SUBCUTANEOUS at 09:07

## 2024-06-09 RX ADMIN — DOPAMINE HYDROCHLORIDE 15 MCG/KG/MIN: 160 INJECTION, SOLUTION INTRAVENOUS at 08:51

## 2024-06-09 RX ADMIN — SODIUM CHLORIDE 30 ML: 9 INJECTION, SOLUTION INTRAVENOUS at 16:07

## 2024-06-09 RX ADMIN — INSULIN LISPRO 4 UNITS: 100 INJECTION, SOLUTION INTRAVENOUS; SUBCUTANEOUS at 20:58

## 2024-06-09 RX ADMIN — HYDROCHLOROTHIAZIDE 25 MG: 25 TABLET ORAL at 08:47

## 2024-06-09 RX ADMIN — HYDRALAZINE HYDROCHLORIDE 10 MG: 20 INJECTION INTRAMUSCULAR; INTRAVENOUS at 00:59

## 2024-06-09 RX ADMIN — BUMETANIDE 1 MG: 0.25 INJECTION INTRAMUSCULAR; INTRAVENOUS at 19:11

## 2024-06-09 RX ADMIN — BUMETANIDE 1 MG: 0.25 INJECTION INTRAMUSCULAR; INTRAVENOUS at 08:56

## 2024-06-09 RX ADMIN — ATORVASTATIN CALCIUM 40 MG: 40 TABLET, FILM COATED ORAL at 20:51

## 2024-06-09 RX ADMIN — INSULIN LISPRO 6 UNITS: 100 INJECTION, SOLUTION INTRAVENOUS; SUBCUTANEOUS at 16:59

## 2024-06-09 RX ADMIN — DOPAMINE HYDROCHLORIDE 15 MCG/KG/MIN: 160 INJECTION, SOLUTION INTRAVENOUS at 20:53

## 2024-06-09 RX ADMIN — PANTOPRAZOLE SODIUM 40 MG: 40 INJECTION, POWDER, FOR SOLUTION INTRAVENOUS at 08:57

## 2024-06-09 RX ADMIN — DEXAMETHASONE SODIUM PHOSPHATE 6 MG: 4 INJECTION INTRA-ARTICULAR; INTRALESIONAL; INTRAMUSCULAR; INTRAVENOUS; SOFT TISSUE at 08:54

## 2024-06-09 RX ADMIN — BARICITINIB 4 MG: 2 TABLET, FILM COATED ORAL at 11:57

## 2024-06-09 RX ADMIN — ASPIRIN 81 MG: 81 TABLET, COATED ORAL at 08:42

## 2024-06-09 RX ADMIN — INSULIN LISPRO 12 UNITS: 100 INJECTION, SOLUTION INTRAVENOUS; SUBCUTANEOUS at 19:11

## 2024-06-09 RX ADMIN — TAMSULOSIN HYDROCHLORIDE 0.4 MG: 0.4 CAPSULE ORAL at 08:47

## 2024-06-09 RX ADMIN — HYDRALAZINE HYDROCHLORIDE 10 MG: 20 INJECTION INTRAMUSCULAR; INTRAVENOUS at 06:02

## 2024-06-09 RX ADMIN — REMDESIVIR 200 MG: 100 INJECTION, POWDER, LYOPHILIZED, FOR SOLUTION INTRAVENOUS at 16:07

## 2024-06-09 RX ADMIN — HYDRALAZINE HYDROCHLORIDE 10 MG: 20 INJECTION INTRAMUSCULAR; INTRAVENOUS at 21:04

## 2024-06-09 RX ADMIN — DOPAMINE HYDROCHLORIDE 12.5 MCG/KG/MIN: 160 INJECTION, SOLUTION INTRAVENOUS at 02:37

## 2024-06-09 RX ADMIN — DOPAMINE HYDROCHLORIDE 12.5 MCG/KG/MIN: 160 INJECTION, SOLUTION INTRAVENOUS at 14:43

## 2024-06-09 RX ADMIN — ENOXAPARIN SODIUM 40 MG: 100 INJECTION SUBCUTANEOUS at 08:57

## 2024-06-09 ASSESSMENT — PAIN DESCRIPTION - LOCATION
LOCATION: HEAD;HIP;BACK
LOCATION: HEAD;BACK;HIP
LOCATION: HEAD;HIP;BACK

## 2024-06-09 ASSESSMENT — PAIN SCALES - GENERAL
PAINLEVEL_OUTOF10: 4
PAINLEVEL_OUTOF10: 4
PAINLEVEL_OUTOF10: 5

## 2024-06-09 ASSESSMENT — PAIN DESCRIPTION - DESCRIPTORS
DESCRIPTORS: ACHING

## 2024-06-09 ASSESSMENT — PAIN DESCRIPTION - ORIENTATION
ORIENTATION: MID;POSTERIOR
ORIENTATION: MID
ORIENTATION: MID;POSTERIOR

## 2024-06-09 NOTE — PROGRESS NOTES
Sitter at bedside for increased agitation and pulling at lines.     Electronically signed by Maggie Nugent RN on 6/9/2024 at 12:44 AM

## 2024-06-09 NOTE — PROGRESS NOTES
Rapid was called on patient to evaluate mental status and heart rate and rhythm. Internal med and critical care resident bedside. CT head was ordered and completed.    Electronically signed by Maggie Nugent RN on 6/8/2024 at 10:17 PM

## 2024-06-09 NOTE — CONSULTS
Pulmonary/Critical Care consultation    Patient's name:  Anuj Jovel  Medical Record Number: [id number]  Patient's account/billing number: [id number]  Patient's YOB: 1940  Age: 83 y.o.  Date of Admission: 6/8/2024  8:29 PM  Date of Consult: 6/9/2024      Primary Care Physician: Jeanne Simpson MD      Code Status: Full Code    Reason for consult: COVID-19 infection with pneumonia      HISTORY OF PRESENT ILLNESS:   History was obtained from chart review and the patient.  Anuj Jovel is a 83 y.o. white gentleman was transferred from an outside facility due to heart block needing permanent pacemaker placement.cardiology evaluation is in progress.  Patient found to have COVID-19 infection with CT scan of the chest demonstrating bilateral infiltrates.  Patient on 4 L of oxygen by nasal cannula.  ABG without any CO2 retention.  Patient started on Decadron and baricitinib.  Patient has leukocytosis and hyperglycemia, both of which could be related to corticosteroid use  Patient has some dry cough  No sick contacts  No recent travel            Past Medical History:        Diagnosis Date    Diabetes (HCC)     Diverticulitis     History of allergy     Hyperlipidemia     Hypertension     Osteoarthritis     Pneumonia due to COVID-19 virus 12/29/2020       Past Surgical History:        Procedure Laterality Date    BACK SURGERY  09/03/2017    [facility]  Dr Cueva, lumbar fusion    CARDIAC CATHETERIZATION      COLECTOMY  2015    partial colectomy due to diveritc    COLONOSCOPY      TOTAL KNEE ARTHROPLASTY Right        Allergies:    Allergies   Allergen Reactions    Feldene [Piroxicam]     Flagyl [Metronidazole]     Lisinopril Dizziness or Vertigo    Naprosyn [Naproxen]     Penicillins     Sulfa Antibiotics     Sulfasalazine          Home Meds:   Prior to Admission medications    Medication Sig Start Date End Date Taking? Authorizing Provider   tamsulosin          Microbiology:    Cultures during this admission:     Blood cultures:      [x] None drawn      [] Negative             []  Positive (Details:  )  Urine Culture:        [x] None drawn      [] Negative             []  Positive (Details:  )  Sputum Culture:   [x] None drawn       [] Negative             []  Positive (Details:  )     Pulmonary function tests: restrictive ventilatory defect in October 2023    Radiological reports:    CT HEAD WO CONTRAST    Result Date: 6/9/2024  No acute intracranial abnormality.     XR CHEST PORTABLE    Result Date: 6/9/2024  1. Cardiomegaly with pulmonary vascular congestion and pulmonary edema. 2. Ill-defined ground-glass opacity in the right upper lobe may represent superimposed pneumonia.     CT CHEST PULMONARY EMBOLISM W CONTRAST    Result Date: 6/7/2024  Motion degraded exam.  No evidence for central pulmonary embolism. Nonspecific mosaic appearance of the lung parenchyma, interstitial and ground-glass appearance.  While some of these findings may be due to motion artifact and atelectasis, atypical infection should also be considered.     CT HEAD WO CONTRAST    Result Date: 6/7/2024  No acute intracranial abnormality. Cerebral atrophy.     XR CHEST PORTABLE    Result Date: 6/7/2024  No acute cardiopulmonary process.           Assessment and Plan       IMPRESSION:   1. AV block, Mobitz 1    2. Systolic congestive heart failure, unspecified HF chronicity ([facility])    3. Shortness of breath        Principal Problem:    Mobitz I  Active Problems:    Benign prostatic hyperplasia    Coronary artery disease    Hyperlipidemia    Essential hypertension    Lumbar stenosis    Type 2 diabetes mellitus with complication ([facility])    Presence of coronary angioplasty implant and graft    KAREY (obstructive sleep apnea)    Hypoxia    History of placement of stent in LAD coronary artery    Chronic heart failure with preserved ejection fraction (HFpEF) ([facility])    Mild mitral valve regurgitation

## 2024-06-09 NOTE — PROGRESS NOTES
06/08/24 2307   Arterial Line 06/08/24 Right Radial   Placement Date/Time: 06/08/24 2257   Present on Admission/Arrival: No  Inserted by: Lisa Leyva RRT  Insertion attempts: 2  Allens Test: Yes  Size: 18 G  Orientation: Right  Location: Radial  Hand hygiene completed: Yes  Site prep: Chlorhexidine  L...   $ Arterial line insertion $ Yes   Site Assessment Clean, dry & intact   Line Status Arterial fluids per protocol;Intact and in place;Blood return noted   Art Line Interventions Zeroed and calibrated;Leveled;Connections checked and tightened;Flushed per protocol   Color/Movement/Sensation Capillary refill less than 3 sec;Cool fingers/toes   Dressing Type Transparent w/CHG gel   Dressing Status New dressing applied;Clean, dry & intact   Dressing Intervention New       Insert Arterial Line  Date/Time:  06/08/24, 11:08 PM  Performed by: Lisa Leyva RCP    Patient identity confirmed: arm band and provided demographic data   Time out: Immediately prior to procedure a \"time out\" was called to verify the correct patient, procedure, equipment, support staff.    Preparation: Patient was prepped and draped in the usual sterile fashion.    Location:right radial    Dieudonne's test normal: yes  Needle gauge: 18     Number of attempts: 2  Post-procedure: transparent dressing applied and line secured    Patient tolerance: well

## 2024-06-09 NOTE — CARE COORDINATION
Case Management Assessment  Initial Evaluation    Date/Time of Evaluation: 6/9/2024 2:06 PM  Assessment Completed by: CRISTINA DUMONT    If patient is discharged prior to next notation, then this note serves as note for discharge by case management.    Patient Name: Anuj Jovel                   YOB: 1940  Diagnosis: Rudy DURAN [I44.1]                   Date / Time: 6/8/2024  8:29 PM    Patient Admission Status: Inpatient   Readmission Risk (Low < 19, Mod (19-27), High > 27): Readmission Risk Score: 14.4    Current PCP: Jeanne Simpson MD  PCP verified by CM? (P) Yes    Chart Reviewed: Yes      History Provided by: (P) Child/Family (daughter Davin)  Patient Orientation: (P) Alert and Oriented, Person, Self, Other (see comment) (confused at times, has sitter)    Patient Cognition:      Hospitalization in the last 30 days (Readmission):  Yes    If yes, Readmission Assessment in  Navigator will be completed.    Advance Directives:      Code Status: Full Code   Patient's Primary Decision Maker is: (P) Legal Next of Kin    Primary Decision Maker: DAVIN TINAJERO - Child - [phone]    Primary Decision Maker: POLLY CORTEZ - Child - [phone]    Discharge Planning:    Patient lives with: (P) Children, Family Members Type of Home: (P) House  Primary Care Giver: (P) Family  Patient Support Systems include: (P) Children, Family Members   Current Financial resources: (P) Medicare  Current community resources: (P) None  Current services prior to admission: (P) Durable Medical Equipment            Current DME: (P) Cane, Shower Chair, Other (Comment), Walker (reclining lift chair)            Type of Home Care services:  (P) None    ADLS  Prior functional level: (P) Assistance with the following:, Cooking, Housework, Shopping  Current functional level: (P) Assistance with the following:, Bathing, Dressing, Toileting, Cooking, Housework, Shopping, Mobility    PT AM-PAC:   /24  OT AM-PAC: 18  Freedom of choice list with basic dialogue that supports the patient's individualized plan of care/goals and shares the quality data associated with the providers was provided to: (P) Patient Representative   Patient Representative Name: (P) daughter Sushma     The Patient and/or Patient Representative Agree with the Discharge Plan? (P) Yes    CRISTINA DUMONT  Case Management Department  Ph: [phone]

## 2024-06-09 NOTE — H&P
[facility]  Office: [phone]  Anderson Juarez DO, Colin Dominguez DO, Glenn Shepherd DO, Alexsander Vang DO, Cortney Marks MD, Diane Hays MD, Kei Redmond MD, Kierra Jean MD,  Arnold Garza MD, Andrew Devine MD, Floyd Harper DO, Hetal Morrell MD,  Adele Shaver DO, Raimundo Dela Cruz MD, Ash Viveros MD, Lyndon Juarez DO, Lea Nieves MD, Jimmie Gee MD, Isrrael Grewal DO, Dorys Diallo MD, Kerline Flores MD, Aisha Judd MD, Anselmo Daley MD,  Moo So DO, Lucina Sung MD,  Shirley Waterhouse, CNP,  Mary Jimenez, CNP, Nandini Barraza, CNP, Kartik Mcwilliams, CNP,  Sara Solorzano, [facility], Marlyn Valdez, CNP, Alyssia Delgadillo, CNP, Marilia Yeager, CNP, Duyen Garibay, CNP, Janice Mendez, CNP, KAILEY Flynn-AVTAR, Angelica Granados, CNS, Cassandra Herrera, CNP, Lexy Canela, CNP         [facility]   IN-PATIENT SERVICE   [facility]    HISTORY AND PHYSICAL EXAMINATION            Date:   6/8/2024  Patient name:  Anuj Jovel  Date of admission:  6/8/2024  8:29 PM  MRN:   [id number]  Account:  [id number]  YOB: 1940  PCP:    Jeanne Simpson MD  Room:   [address] [place] [address]  Code Status:    Full Code    Chief Complaint:     Transferred from outside facility due to heart block need cardiology evaluation and likely permanent pacemaker placement.    History Obtained From:     patient    History of Present Illness:     Anuj Jovel is a 83 y.o. Non- / non  male who presents with lower extremity weakness, dyspnea, lethargic   and is admitted to the hospital for the management of Mobitz I.    83-year-old male with past medical history of KAREY, hypertension, hyperlipidemia, diabetes mellitus type 2, BPH, CAD stent 1997, SSS with Mobitz type I block, HFpEF presented to ED at outside hospital on the morning of 6/7 due to weakness in lower extremities, dyspnea feeling tired and lethargic.  No chest pain or palpitations.  In ED patient was  tablet by mouth daily as needed    ProviderSher MD   Handicap Placard MISC by Does not apply route Cannot walk 200 Feet due to orthopedic disease  Expires 3/18/2027 3/18/22   Jeanne Simpson MD   Omega-3 Fatty Acids (OMEGA III EPA+DHA) 1000 MG CAPS Take 1 capsule by mouth 2 times daily    ProviderSher MD   glucose (GLUTOSE) 40 % GEL Take 37.5 mLs by mouth as needed (low sugar) 1/5/21   Jeanne Simpson MD   aspirin EC 81 MG EC tablet Take 1 tablet by mouth daily    Jeanne Simpson MD   Multiple Vitamin (MULTI-VITAMIN DAILY) TABS Take 1 tablet by mouth daily    Jeanne Simpson MD        Allergies:     Feldene [piroxicam], Flagyl [metronidazole], Lisinopril, Naprosyn [naproxen], Penicillins, Sulfa antibiotics, and Sulfasalazine    Social History:     Tobacco:    reports that he quit smoking about 33 years ago. His smoking use included cigarettes. He started smoking about 73 years ago. He has a 60.0 pack-year smoking history. He has never used smokeless tobacco.  Alcohol:      reports that he does not currently use alcohol after a past usage of about 1.0 standard drink of alcohol per week.  Drug Use:  reports no history of drug use.    Family History:     No family history on file.    Review of Systems:     Positive and Negative as described in HPI.    CONSTITUTIONAL:  negative for fevers, chills, sweats, fatigue, weight loss  HEENT:  negative for vision, hearing changes, runny nose, throat pain  RESPIRATORY:  negative for shortness of breath, cough, congestion, wheezing  CARDIOVASCULAR:  negative for chest pain, palpitations  GASTROINTESTINAL:  negative for nausea, vomiting, diarrhea, constipation, change in bowel habits, abdominal pain   GENITOURINARY:  negative for difficulty of urination, burning with urination, frequency   INTEGUMENT:  negative for rash, skin lesions, easy bruising   HEMATOLOGIC/LYMPHATIC:  negative for swelling/edema   ALLERGIC/IMMUNOLOGIC:  negative for urticaria ,

## 2024-06-09 NOTE — CONSULTS
[facility]  Inpatient Cardiology Consult             Date:   6/8/2024  Patient name: Anuj Jovel  Date of admission:  6/8/2024  8:29 PM  MRN:   [id number]  YOB: 1940      Reason for consultation:  Second degree AV block    CHIEF COMPLAINT:  Weakness; COVID-19 pneumonia     History Obtained From:  Medical record    HISTORY OF PRESENT ILLNESS:      The patient is a 83 y.o gentleman with h/o DM, HTN, CAD, PCI and SSS with Mobitz I block, admitted yesterday at [place] for acute COVID-19 infection with bilateral pneumonia, weakness and acute hypoxic respiratory failure.  He has demonstrated periods of bradycardia wtih Mobitz I block and brief 2:1 AVB with normal QRS.  On IV dopamine, HR has ranged from 39-60 bpm with stable SBP's 120-150 mmHg.  SpO2 96% on 4 L NC.      Past Medical History:   has a past medical history of Diabetes (HCC), Diverticulitis, History of allergy, Hyperlipidemia, Hypertension, Osteoarthritis, and Pneumonia due to COVID-19 virus.    Past Surgical History:   has a past surgical history that includes Cardiac catheterization; Colonoscopy; Total knee arthroplasty (Right); colectomy (2015); and back surgery (09/03/2017).     Home Medications:    Prior to Admission medications    Medication Sig Start Date End Date Taking? Authorizing Provider   tamsulosin (FLOMAX) 0.4 MG capsule Take 1 capsule by mouth daily 6/3/24   Marla Galvez PA-C   atorvastatin (LIPITOR) 40 MG tablet TAKE 1 TABLET AT BEDTIME 5/20/24   Stella Chew DO   pregabalin (LYRICA) 100 MG capsule Take 1 capsule by mouth 2 times daily for 90 days. Max Daily Amount: 200 mg 5/20/24 8/18/24  Mony Catalan MD   nitroGLYCERIN (NITROSTAT) 0.4 MG SL tablet Place 1 tablet under the tongue every 5 minutes as needed for Chest pain 4/30/24   Provider, MD Sher   docusate sodium (COLACE) 100 MG capsule Take 1 capsule by mouth 2 times daily as needed for Constipation 5/5/24   [place]

## 2024-06-09 NOTE — PLAN OF CARE
Problem: Safety - Adult  Goal: Free from fall injury  6/9/2024 0945 by Caio Hill RN  Outcome: Progressing  6/9/2024 0116 by Maggie Nugent RN  Outcome: Progressing     Problem: Chronic Conditions and Co-morbidities  Goal: Patient's chronic conditions and co-morbidity symptoms are monitored and maintained or improved  6/9/2024 0945 by Caio Hill RN  Outcome: Progressing  6/9/2024 0116 by Maggie Nugent RN  Outcome: Progressing     Problem: Discharge Planning  Goal: Discharge to home or other facility with appropriate resources  6/9/2024 0945 by Caio Hill RN  Outcome: Progressing  6/9/2024 0116 by Maggie Nugent RN  Outcome: Progressing

## 2024-06-09 NOTE — PROGRESS NOTES
RN called daughter Sushma and gave her updates on the patient.     Electronically signed by Maggie Nugent RN on 6/9/2024 at 6:25 AM

## 2024-06-09 NOTE — PROGRESS NOTES
Patient arrived to the floor from [place] disoriented x4. Increased dopamine drip due to HR in 30s-40s.     Electronically signed by Maggie Nugent RN on 6/8/2024 at 8:46 PM

## 2024-06-09 NOTE — PROGRESS NOTES
Physical Therapy  Facility/Department: [facility] CAR 1- SICU  Physical Therapy Initial Assessment    Name: Anuj Jovel  : 1940  MRN: [id number]  Date of Service: 2024    No chief complaint on file.      Discharge Recommendations:    Further therapy recommended at discharge.The patient should be able to tolerate at least 3 hours of therapy per day over 5 days or 15 hours over 7 days.   This patient may benefit from a Physical Medicine and Rehab consult.       PT Equipment Recommendations  Other: CTA      Patient Diagnosis(es): The primary encounter diagnosis was AV block, Mobitz 1. Diagnoses of Systolic congestive heart failure, unspecified HF chronicity (HCC) and Shortness of breath were also pertinent to this visit.  Past Medical History:  has a past medical history of Diabetes (HCC), Diverticulitis, History of allergy, Hyperlipidemia, Hypertension, Osteoarthritis, and Pneumonia due to COVID-19 virus.  Past Surgical History:  has a past surgical history that includes Cardiac catheterization; Colonoscopy; Total knee arthroplasty (Right); colectomy (); and back surgery (2017).    Assessment   Body Structures, Functions, Activity Limitations Requiring Skilled Therapeutic Intervention: Decreased functional mobility ;Decreased strength;Decreased endurance;Decreased balance;Decreased cognition;Decreased safe awareness  Assessment: PT Denise to CGA, amb 3' RW Denise L lateral. Pt would benefit from continued acute PT to address deficits.  Therapy Prognosis: Good  Decision Making: Medium Complexity  Requires PT Follow-Up: Yes  Activity Tolerance  Activity Tolerance: Patient tolerated treatment well;Treatment limited secondary to decreased cognition;Patient limited by fatigue;Patient limited by endurance     Plan   Physical Therapy Plan  General Plan:  (5-6x/wk)  Current Treatment Recommendations: Strengthening, Balance training, Gait training, Functional mobility training, Stair training, Transfer training,  R lateral, 2' L lateral.  Comments: Pt with noted difficulty following direction to stand static for BP to take.     Balance  Posture: Fair  Sitting - Static: Fair;+  Sitting - Dynamic: Fair  Standing - Static: Fair  Standing - Dynamic: Fair;-  Comments: RW used while assessing standing balance  Exercise Treatment: EOB ~20min CGA to SBA, frequent redirection        AM-PAC - Mobility    AM-PAC Basic Mobility - Inpatient   How much help is needed turning from your back to your side while in a flat bed without using bedrails?: A Little  How much help is needed moving from lying on your back to sitting on the side of a flat bed without using bedrails?: A Little  How much help is needed moving to and from a bed to a chair?: A Little  How much help is needed standing up from a chair using your arms?: A Little  How much help is needed walking in hospital room?: A Little  How much help is needed climbing 3-5 steps with a railing?: A Lot  AM-PAC Inpatient Mobility Raw Score : 17  AM-PAC Inpatient T-Scale Score : 42.13  Mobility Inpatient CMS 0-100% Score: 50.57  Mobility Inpatient CMS G-Code Modifier : CK       Goals  Short Term Goals  Time Frame for Short Term Goals: 14 visits  Short Term Goal 1: Pt will be Davy bed mobility  Short Term Goal 2: Pt will be Davy transfers  Short Term Goal 3: Pt will be Davy amb 250' RW or least restrictive AD  Short Term Goal 4: Pt will navigate 5 steps Davy L rail.       Education  Patient Education  Education Given To: Patient  Education Provided: Role of Therapy;Plan of Care  Education Method: Demonstration;Verbal  Barriers to Learning: Cognition  Education Outcome: Verbalized understanding;Demonstrated understanding;Continued education needed      Therapy Time   Individual Concurrent Group Co-treatment   Time In 0948         Time Out 1032         Minutes 44         Timed Code Treatment Minutes: 23 Minutes       Rhett Mejia, PT

## 2024-06-09 NOTE — PROGRESS NOTES
SLP ALL NOTES  Facility/Department: [facility] CAR 1- SICU   CLINICAL BEDSIDE SWALLOW EVALUATION    NAME: Anuj Jovel  : 1940  MRN: [id number]    ADMISSION DATE: 2024  ADMITTING DIAGNOSIS: has Acquired deviated nasal septum; Actinic keratoses; Arthritis; Benign prostatic hyperplasia; Bloating; Chronic serous otitis media; Claustrophobia; Coronary artery disease; Esophageal reflux; Flatus; Hearing loss; Hemorrhoids; History of allergy; Hyperlipidemia; Essential hypertension; Leg swelling; Lumbar radiculopathy; Lumbar stenosis; Skin neoplasm; Tinea corporis; Ventral hernia; Weight gain; Type 2 diabetes mellitus with complication (HCC); Obesity; Other osteoporosis without current pathological fracture; Presence of coronary angioplasty implant and graft; Elevated C-reactive protein (CRP); KAREY (obstructive sleep apnea); Hypoxia; Chronic pain syndrome; AV block, Mobitz 1; History of placement of stent in LAD coronary artery; Chronic heart failure with preserved ejection fraction (HFpEF) (HCC); Mild mitral valve regurgitation; Pulmonary hypertension (HCC); Spondylolisthesis of lumbar region; Second degree AV block, Mobitz type I; Mobitz I; and Systolic congestive heart failure (HCC) on their problem list.    Recent Chest Xray/CT of Chest: 24  IMPRESSION:  1. Cardiomegaly with pulmonary vascular congestion and pulmonary edema.  2. Ill-defined ground-glass opacity in the right upper lobe may represent  superimposed pneumonia.    Date of Eval: 2024  Evaluating Therapist: TONY Grace    Current Diet level:  Current Diet : Regular  Current Liquid Diet : Thin    Primary Complaint   Anuj Jovel is a 83 y.o. Non- / non  male who presents with lower extremity weakness, dyspnea, lethargic   and is admitted to the hospital for the management of Mobitz I.     83-year-old male with past medical history of KAREY, hypertension, hyperlipidemia, diabetes mellitus type 2, BPH, CAD stent , SSS with  RN;Patient    Education  Patient Education: yes  Patient Education Response: Verbalizes understanding             Therapy Time   0912-0920      8 mins      Brenda Tan, SLP  6/9/2024 11:36 AM

## 2024-06-09 NOTE — PROGRESS NOTES
[facility]  PROGRESS NOTE    Shift date: 06/08/2024  Shift day: Saturday   Shift # 2    Room # 1003/1003-01   Name: Anuj Jovel                Jainism:    Place of Congregation:     Referral: Rapid Response    Admit Date & Time: 6/8/2024  8:29 PM    Assessment:  Anuj Jovel is a 83 y.o. male in the hospital. Writer responded to rapid response to 1003. Patient appeared coping and calm. Patient appeared to be interacting with medical staff.  outside room due to contact precautions.      Intervention:   provided a ministry of presence to patient and medical staff.    Outcome:  Patient continues receiving care from medical staff.    Plan:  Chaplains will remain available to offer spiritual and emotional support as needed.      Electronically signed by Chaplain Luis E, on 6/8/2024 at 11:49 PM.  [facility]  [phone]

## 2024-06-09 NOTE — PROGRESS NOTES
[facility]  Office: [phone]  Anderson Juarez DO, Colin Dominguez DO, Glenn Shepherd DO, Alexsander Vang DO, Cortney Marks MD, Diane Hays MD, Kei Redmond MD, Kierra Jean MD,  Arnold Garza MD, Andrew Devine MD, Hetal Morrell MD,  Adele Shaver DO, Raimundo Dela Cruz MD, Ash Viveros MD, Lyndon Juarez DO, Lea Nieves MD,  Isrrael Grewal DO, Dorys Diallo MD, Kerline Flores MD, Aisha Judd MD, Anselmo Daley MD,  Levar Lehman MD, Delbert Fu MD, Keshia Sims MD, Michelle Alvarez MD, Troy Brizuela MD, Katarina Emerson MD, Moo So DO, Floyd Harper DO, Lucina Sung MD,  Jimmie Gee MD, Shirley Waterhouse, CNP,  Mary Jimenez CNP, Kartik Mcwilliams, CNP,  Sara Solorzano, DNP, Marlyn Valdez, CNP, Alyssia Delgadillo, CNP, Duyen Garibay, CNP, Janice Mendez, CNP, Tessie Cardona, PA-C, Martina Harris PA-C, Dena Álvarez, CNP, Jeanette Cordero, CNP, Jerome Duran, CNP, Nandini Barraza, CNP, Marilia Yeager, CNP, Angelica Granados, CNS, Cassandra Herrera, CNP, Lexy Canela CNP, Tracy Schwab, CNP         [facility]   IN-PATIENT SERVICE   [facility]    Second Visit Note  For more detailed information please refer to the progress note of the day      6/9/2024    9:50 AM    Name:   Anuj Jovel  MRN:     [id number]     Acct:      [id number]   Room:   1003/1003-01  IP Day:  1  Admit Date:  6/8/2024  8:29 PM    PCP:   Jeanne Simpson MD  Code Status:  Full Code      Pt vitals were reviewed   New labs were reviewed   Patient was seen    Chart reviewed, entries noted.  Patient seen and examined and multiple questions answered.  Cardiology/pulmonology input noted and appreciated.  Electrophysiology has been cleared for pacemaker placement.  Given the patient's COVID/pneumonia will consult infectious disease regarding clearance.  This is all been discussed with the patient denies any questions or concerns.  Overall he is feeling reasonably well.  His only  complaint is that of a headache.  I am going to trial him on Fioricet and monitor his clinical response.    At present time the patient is being treated with Decadron, baricitinib for his COVID.  He is undergoing diuresis for fluid overload.  He denies any questions or concerns at this point in time.    Updated plan :     Add Fioricet for headache, consult infectious disease        Lyndon Juarez,   6/9/2024  9:50 AM

## 2024-06-09 NOTE — PROGRESS NOTES
Pt's daughter, Sushma Patterson, took pt's one hearing aid, hearing aid , underwear and two rings home with her at this time per pt request. Belongings chart has been updated with changes.    .Electronically signed by Aurora Montes RN on 6/9/2024 at 5:31 PM

## 2024-06-09 NOTE — CONSULTS
Pulmonary Arteries: Pulmonary arteries are adequately opacified for evaluation.  Motion artifact is noted, degrading evaluation of the segmental branches.  No evidence for central pulmonary embolism.  Main pulmonary artery is mildly enlarged measuring 36 mm. Mediastinum: No evidence of mediastinal lymphadenopathy.  The heart and pericardium demonstrate no acute abnormality.  Calcified coronary atheromatous plaque.  There is no acute abnormality of the thoracic aorta. Lungs/pleura: Respiratory motion artifact.  Nonspecific subpleural reticulation, ground-glass and mosaic appearance of the lung parenchyma. Some of these findings may be related to atelectasis and motion artifact.  No focal area of consolidation otherwise appreciated.  No effusion.  The central airway is patent. Upper Abdomen: No acute findings appreciated within the limitations of motion artifact.  The gallbladder is well distended but without stone or wall thickening appreciated. Soft Tissues/Bones: No acute bone or soft tissue abnormality.  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     Motion degraded exam.  No evidence for central pulmonary embolism. Nonspecific mosaic appearance of the lung parenchyma, interstitial and ground-glass appearance.  While some of these findings may be due to motion artifact and atelectasis, atypical infection should also be considered.     CT HEAD WO CONTRAST    Result Date: 6/7/2024  EXAMINATION: CT OF THE HEAD WITHOUT CONTRAST  6/7/2024 9:42 am TECHNIQUE: CT of the head was performed without the administration of intravenous contrast. Automated exposure control, iterative reconstruction, and/or weight based adjustment of the mA/kV was utilized to reduce the radiation dose to as low as reasonably achievable. COMPARISON: None. HISTORY: ORDERING SYSTEM PROVIDED HISTORY: headache TECHNOLOGIST PROVIDED HISTORY: headache Decision Support Exception - unselect if not a suspected or  correlation with patient history and other diagnostic information is necessary to   determine patient infection status.  Positive results do not rule out bacterial infection or   co-infection with other pathogens.        Fact sheet for Patients: https://www.fda.gov/media/172918/download  Fact sheet for Healthcare Providers: https://www.fda.gov/media/144226/download        Results reported to the appropriate Health Department          Influenza A Not Detected     Comment: Note:  Influenza A and Influenza B negative results should be considered presumptive in   samples that have a Detected SARS-CoV-2 result.  Consider re-testing with an alternate   FDA-approved test if clinically indicated.          Influenza B Not Detected     Comment: Note:  Influenza A and Influenza B negative results should be considered presumptive in   samples that have a Detected SARS-CoV-2 result.  Consider re-testing with an alternate   FDA-approved test if clinically indicated.         Culture, Blood 1 [[id number]] Collected: 06/07/24 0925    Order Status: Completed Specimen: Blood Updated: 06/08/24 1410     Specimen Description .BLOOD LT HAND     Special Requests          Culture NO GROWTH 1 DAY    Culture, Blood 1 [[id number]] Collected: 06/07/24 0920    Order Status: Completed Specimen: Blood Updated: 06/08/24 1410     Specimen Description .BLOOD RT HAND     Special Requests          Culture NO GROWTH 1 DAY    Culture, Urine [[id number]] Collected: 06/07/24 0850    Order Status: Completed Specimen: Urine, clean catch Updated: 06/08/24 0759     Specimen Description .CLEAN CATCH URINE     Culture NO GROWTH              Medical Decision Making-Other:     Note:      Theodore Bernard MD  Office: [phone]    Daily Elements of Decision Making provided by Consulting Physician:  Note: I have independently performed the steps listed below as part of the medical decision making and evaluation.    Review of current Problems:  COVID 19 Confirmed

## 2024-06-09 NOTE — PROGRESS NOTES
Patient  via [facility] transport, all belongings with patient he is still currently missing 1 hearing aid .

## 2024-06-09 NOTE — CONSULTS
[facility]    Consult / H&P               Today's Date: 6/9/2024  Patient Name: Anuj Jovel  Date of admission: 6/8/2024  8:29 PM  Patient's age: 83 y.o., 1940  Admission Dx: Mobitz I [I44.1]    Requesting Physician: Lyndon Juarez DO    Cardiac Evaluation Reason: Bradycardia    History Obtained From: patient/chart review     History of Present Illness:    This patient 83 y.o. years old male with past medical history as below.     Patient presented to [facility] with weakness, shortness of breath and was found to have COVID-19 infection/pneumonia.  Patient was noted to have bradycardia at [facility] with Mobitz type I block as well as 2-1 AV block.  Patient was subsequently transferred to [facility] for further management of bradycardia.  Patient arrived to [facility] overnight.  He was on dopamine drip.  Telemetry with evidence of Mobitz type I.  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  Currently he is on dopamine drip at 15 mcg.  He is on nasal cannula oxygen.  He is oriented x 1 at present.    He has past medical history of CAD with previous stenting to LAD per documentation, HFpEF.      Past Medical History:   has a past medical history of Diabetes (HCC), Diverticulitis, History of allergy, Hyperlipidemia, Hypertension, Osteoarthritis, and Pneumonia due to COVID-19 virus.    Past Surgical History:   has a past surgical history that includes Cardiac catheterization; Colonoscopy; Total knee arthroplasty (Right); colectomy (2015); and back surgery (09/03/2017).     Home Medications:    Prior to Admission medications    Medication Sig Start Date End Date Taking? Authorizing Provider   tamsulosin (FLOMAX) 0.4 MG capsule Take 1 capsule by mouth daily 6/3/24   Marla Galvez PA-C   atorvastatin (LIPITOR) 40 MG tablet TAKE 1 TABLET AT BEDTIME 5/20/24   Stella Chew DO   pregabalin (LYRICA) 100 MG capsule Take 1 capsule by mouth 2 times daily

## 2024-06-10 ENCOUNTER — APPOINTMENT (OUTPATIENT)
Age: 84
End: 2024-06-10
Attending: HOSPITALIST
Payer: MEDICARE

## 2024-06-10 LAB
ANION GAP SERPL CALCULATED.3IONS-SCNC: 14 MMOL/L (ref 9–16)
BASOPHILS # BLD: <0.03 K/UL (ref 0–0.2)
BASOPHILS NFR BLD: 0 % (ref 0–2)
BUN SERPL-MCNC: 19 MG/DL (ref 8–23)
CALCIUM SERPL-MCNC: 9 MG/DL (ref 8.6–10.4)
CHLORIDE SERPL-SCNC: 99 MMOL/L (ref 98–107)
CO2 SERPL-SCNC: 24 MMOL/L (ref 20–31)
CREAT SERPL-MCNC: 1 MG/DL (ref 0.7–1.2)
EKG ATRIAL RATE: 47 BPM
EKG ATRIAL RATE: 75 BPM
EKG P AXIS: 31 DEGREES
EKG Q-T INTERVAL: 412 MS
EKG Q-T INTERVAL: 446 MS
EKG QRS DURATION: 102 MS
EKG QRS DURATION: 94 MS
EKG QTC CALCULATION (BAZETT): 394 MS
EKG QTC CALCULATION (BAZETT): 418 MS
EKG R AXIS: -35 DEGREES
EKG R AXIS: -36 DEGREES
EKG T AXIS: 0 DEGREES
EKG T AXIS: 25 DEGREES
EKG VENTRICULAR RATE: 47 BPM
EKG VENTRICULAR RATE: 62 BPM
EOSINOPHIL # BLD: 0.04 K/UL (ref 0–0.44)
EOSINOPHILS RELATIVE PERCENT: 0 % (ref 1–4)
ERYTHROCYTE [DISTWIDTH] IN BLOOD BY AUTOMATED COUNT: 13.2 % (ref 11.8–14.4)
GFR, ESTIMATED: 77 ML/MIN/1.73M2
GLUCOSE BLD-MCNC: 180 MG/DL (ref 75–110)
GLUCOSE BLD-MCNC: 206 MG/DL (ref 75–110)
GLUCOSE BLD-MCNC: 248 MG/DL (ref 75–110)
GLUCOSE BLD-MCNC: 278 MG/DL (ref 75–110)
GLUCOSE BLD-MCNC: 297 MG/DL (ref 75–110)
GLUCOSE BLD-MCNC: 380 MG/DL (ref 75–110)
GLUCOSE SERPL-MCNC: 215 MG/DL (ref 74–99)
HCT VFR BLD AUTO: 43 % (ref 40.7–50.3)
HGB BLD-MCNC: 15.2 G/DL (ref 13–17)
IMM GRANULOCYTES # BLD AUTO: 0.06 K/UL (ref 0–0.3)
IMM GRANULOCYTES NFR BLD: 1 %
LYMPHOCYTES NFR BLD: 1.61 K/UL (ref 1.1–3.7)
LYMPHOCYTES RELATIVE PERCENT: 12 % (ref 24–43)
MCH RBC QN AUTO: 32.3 PG (ref 25.2–33.5)
MCHC RBC AUTO-ENTMCNC: 35.3 G/DL (ref 28.4–34.8)
MCV RBC AUTO: 91.5 FL (ref 82.6–102.9)
MONOCYTES NFR BLD: 1.31 K/UL (ref 0.1–1.2)
MONOCYTES NFR BLD: 10 % (ref 3–12)
NEUTROPHILS NFR BLD: 77 % (ref 36–65)
NEUTS SEG NFR BLD: 9.99 K/UL (ref 1.5–8.1)
NRBC BLD-RTO: 0 PER 100 WBC
PLATELET # BLD AUTO: 229 K/UL (ref 138–453)
PMV BLD AUTO: 9.1 FL (ref 8.1–13.5)
POTASSIUM SERPL-SCNC: 3.5 MMOL/L (ref 3.7–5.3)
RBC # BLD AUTO: 4.7 M/UL (ref 4.21–5.77)
SODIUM SERPL-SCNC: 137 MMOL/L (ref 136–145)
WBC OTHER # BLD: 13 K/UL (ref 3.5–11.3)

## 2024-06-10 PROCEDURE — 6360000002 HC RX W HCPCS: Performed by: STUDENT IN AN ORGANIZED HEALTH CARE EDUCATION/TRAINING PROGRAM

## 2024-06-10 PROCEDURE — 6360000002 HC RX W HCPCS: Performed by: NURSE PRACTITIONER

## 2024-06-10 PROCEDURE — 6360000002 HC RX W HCPCS

## 2024-06-10 PROCEDURE — 99232 SBSQ HOSP IP/OBS MODERATE 35: CPT | Performed by: STUDENT IN AN ORGANIZED HEALTH CARE EDUCATION/TRAINING PROGRAM

## 2024-06-10 PROCEDURE — 97530 THERAPEUTIC ACTIVITIES: CPT

## 2024-06-10 PROCEDURE — 6360000002 HC RX W HCPCS: Performed by: INTERNAL MEDICINE

## 2024-06-10 PROCEDURE — 02HV33Z INSERTION OF INFUSION DEVICE INTO SUPERIOR VENA CAVA, PERCUTANEOUS APPROACH: ICD-10-PCS | Performed by: INTERNAL MEDICINE

## 2024-06-10 PROCEDURE — 36569 INSJ PICC 5 YR+ W/O IMAGING: CPT

## 2024-06-10 PROCEDURE — 2580000003 HC RX 258: Performed by: INTERNAL MEDICINE

## 2024-06-10 PROCEDURE — 85025 COMPLETE CBC W/AUTO DIFF WBC: CPT

## 2024-06-10 PROCEDURE — 2580000003 HC RX 258

## 2024-06-10 PROCEDURE — 99232 SBSQ HOSP IP/OBS MODERATE 35: CPT | Performed by: INTERNAL MEDICINE

## 2024-06-10 PROCEDURE — C9113 INJ PANTOPRAZOLE SODIUM, VIA: HCPCS

## 2024-06-10 PROCEDURE — C1751 CATH, INF, PER/CENT/MIDLINE: HCPCS

## 2024-06-10 PROCEDURE — 99233 SBSQ HOSP IP/OBS HIGH 50: CPT | Performed by: INTERNAL MEDICINE

## 2024-06-10 PROCEDURE — A4216 STERILE WATER/SALINE, 10 ML: HCPCS

## 2024-06-10 PROCEDURE — 82947 ASSAY GLUCOSE BLOOD QUANT: CPT

## 2024-06-10 PROCEDURE — 2709999900 HC NON-CHARGEABLE SUPPLY

## 2024-06-10 PROCEDURE — 80048 BASIC METABOLIC PNL TOTAL CA: CPT

## 2024-06-10 PROCEDURE — 97535 SELF CARE MNGMENT TRAINING: CPT

## 2024-06-10 PROCEDURE — 6370000000 HC RX 637 (ALT 250 FOR IP)

## 2024-06-10 PROCEDURE — 76937 US GUIDE VASCULAR ACCESS: CPT

## 2024-06-10 PROCEDURE — 6370000000 HC RX 637 (ALT 250 FOR IP): Performed by: HOSPITALIST

## 2024-06-10 PROCEDURE — 3E043XZ INTRODUCTION OF VASOPRESSOR INTO CENTRAL VEIN, PERCUTANEOUS APPROACH: ICD-10-PCS | Performed by: INTERNAL MEDICINE

## 2024-06-10 PROCEDURE — 2000000000 HC ICU R&B

## 2024-06-10 RX ORDER — BUMETANIDE 0.25 MG/ML
1 INJECTION INTRAMUSCULAR; INTRAVENOUS DAILY
Status: DISCONTINUED | OUTPATIENT
Start: 2024-06-10 | End: 2024-06-19

## 2024-06-10 RX ORDER — POTASSIUM CHLORIDE 20 MEQ/1
40 TABLET, EXTENDED RELEASE ORAL ONCE
Status: DISCONTINUED | OUTPATIENT
Start: 2024-06-10 | End: 2024-06-10

## 2024-06-10 RX ORDER — AMLODIPINE BESYLATE 5 MG/1
5 TABLET ORAL DAILY
Status: DISCONTINUED | OUTPATIENT
Start: 2024-06-10 | End: 2024-06-10

## 2024-06-10 RX ORDER — HYDRALAZINE HYDROCHLORIDE 20 MG/ML
10 INJECTION INTRAMUSCULAR; INTRAVENOUS ONCE
Status: COMPLETED | OUTPATIENT
Start: 2024-06-10 | End: 2024-06-10

## 2024-06-10 RX ADMIN — DOPAMINE HYDROCHLORIDE 12.5 MCG/KG/MIN: 160 INJECTION, SOLUTION INTRAVENOUS at 17:30

## 2024-06-10 RX ADMIN — INSULIN LISPRO 4 UNITS: 100 INJECTION, SOLUTION INTRAVENOUS; SUBCUTANEOUS at 19:49

## 2024-06-10 RX ADMIN — POTASSIUM CHLORIDE 40 MEQ: 1500 TABLET, EXTENDED RELEASE ORAL at 08:40

## 2024-06-10 RX ADMIN — DOPAMINE HYDROCHLORIDE 15 MCG/KG/MIN: 160 INJECTION, SOLUTION INTRAVENOUS at 13:40

## 2024-06-10 RX ADMIN — DEXAMETHASONE SODIUM PHOSPHATE 6 MG: 4 INJECTION INTRA-ARTICULAR; INTRALESIONAL; INTRAMUSCULAR; INTRAVENOUS; SOFT TISSUE at 08:42

## 2024-06-10 RX ADMIN — ENOXAPARIN SODIUM 40 MG: 100 INJECTION SUBCUTANEOUS at 08:45

## 2024-06-10 RX ADMIN — PANTOPRAZOLE SODIUM 40 MG: 40 INJECTION, POWDER, FOR SOLUTION INTRAVENOUS at 08:42

## 2024-06-10 RX ADMIN — INSULIN LISPRO 4 UNITS: 100 INJECTION, SOLUTION INTRAVENOUS; SUBCUTANEOUS at 08:45

## 2024-06-10 RX ADMIN — DOPAMINE HYDROCHLORIDE 12.5 MCG/KG/MIN: 160 INJECTION, SOLUTION INTRAVENOUS at 23:00

## 2024-06-10 RX ADMIN — INSULIN LISPRO 8 UNITS: 100 INJECTION, SOLUTION INTRAVENOUS; SUBCUTANEOUS at 12:18

## 2024-06-10 RX ADMIN — TAMSULOSIN HYDROCHLORIDE 0.4 MG: 0.4 CAPSULE ORAL at 08:40

## 2024-06-10 RX ADMIN — ATORVASTATIN CALCIUM 40 MG: 40 TABLET, FILM COATED ORAL at 19:47

## 2024-06-10 RX ADMIN — REMDESIVIR 100 MG: 100 INJECTION, POWDER, LYOPHILIZED, FOR SOLUTION INTRAVENOUS at 14:32

## 2024-06-10 RX ADMIN — DOPAMINE HYDROCHLORIDE 17.5 MCG/KG/MIN: 160 INJECTION, SOLUTION INTRAVENOUS at 02:12

## 2024-06-10 RX ADMIN — HYDRALAZINE HYDROCHLORIDE 10 MG: 20 INJECTION INTRAMUSCULAR; INTRAVENOUS at 04:42

## 2024-06-10 RX ADMIN — HYDRALAZINE HYDROCHLORIDE 10 MG: 20 INJECTION INTRAMUSCULAR; INTRAVENOUS at 19:47

## 2024-06-10 RX ADMIN — DOPAMINE HYDROCHLORIDE 15 MCG/KG/MIN: 160 INJECTION, SOLUTION INTRAVENOUS at 07:19

## 2024-06-10 RX ADMIN — SODIUM CHLORIDE, PRESERVATIVE FREE 10 ML: 5 INJECTION INTRAVENOUS at 08:46

## 2024-06-10 RX ADMIN — INSULIN LISPRO 8 UNITS: 100 INJECTION, SOLUTION INTRAVENOUS; SUBCUTANEOUS at 16:33

## 2024-06-10 RX ADMIN — ASPIRIN 81 MG: 81 TABLET, COATED ORAL at 08:40

## 2024-06-10 RX ADMIN — BUMETANIDE 1 MG: 0.25 INJECTION INTRAMUSCULAR; INTRAVENOUS at 08:42

## 2024-06-10 ASSESSMENT — PAIN SCALES - GENERAL
PAINLEVEL_OUTOF10: 0
PAINLEVEL_OUTOF10: 0

## 2024-06-10 NOTE — PROGRESS NOTES
[facility]  Office: [phone]  Anderson Juarez DO, Colin Dominguez DO, Glenn Shepherd DO, Alexsander Vang DO, Cortney Marks MD, Diane Hays MD, Kei Redmond MD, Kierra Jean MD,  Arnold Garza MD, Andrew Devine MD, Hetal Morrell MD,  Adele Shaver DO, Raimundo Dela Cruz MD, Ash Viveros MD, Lyndon Juarez DO, Lea Nieves MD,  Isrrael Grewal DO, Dorys Diallo MD, Kerline Flores MD, Aisha Judd MD, Anselmo Daley MD,  Levar Lehman MD, Delbert Fu MD, Keshia Sims MD, Michelle Alvarez MD, Troy Brizuela MD, Katarina Emerson MD, Moo So DO, Floyd Harper DO, Lucina Sung MD,  Jimmie Gee MD, Shirley Waterhouse, CNP,  Mary Jimenez CNP, Kartik Mcwilliams, CNP,  Sara Solorzano, DNP, Marlyn Valdez, CNP, Alyssia Delgadillo, CNP, Duyen Garibay, CNP, Janice Mendez, CNP, Tessie Cardona, PA-C, Martina Hraris PA-C, Dena Álvarez, CNP, Jeanette Cordero, CNP, Jerome Duran, CNP, Nandini Barraza, CNP, Marilia Yeager, CNP, Angelica Granados, CNS, Cassandra Herrera, CNP, Lexy Canela CNP, Tracy Schwab, CNP         [facility]   IN-PATIENT SERVICE   [facility]    Progress Note    6/10/2024    10:29 AM    Name:   Anuj Jovel  MRN:     [id number]     Acct:      [id number]   Room:   1003/1003-01   Day:  2  Admit Date:  6/8/2024  8:29 PM    PCP:   Jeanne Simpson MD  Code Status:  Full Code    Subjective:     C/C:Transferred from outside facility due to heart block need cardiology evaluation and likely permanent pacemaker placement.    Interval History Status: not changed.     Patient has cough, some phlegm.  He appears relaxed this morning.  No chest pain or shortness of breath reported.    No leg swelling.  Patient remains on nasal cannula oxygen.  Overall he feels that he is slightly improved.  Labs and vitals reviewed  Patient remains on dopamine, heart rate around 79.  Blood pressure is stable.  Potassium 3.5, glucose 206, CRP 20.3, WBC 13.    Brief

## 2024-06-10 NOTE — PROCEDURES
VAT consulted to place PICC for continuous dopamine infusion in ICU.  4Fr dual lumen PICC inserted into left brachial vein and threaded around shoulder.  PICC unable to be advanced downward to SVC.  All troubleshooting and repositioning techniques exhausted without success.  Primary RN notified.  VAT will return to assess right arm.  Homar Montez RN

## 2024-06-10 NOTE — PLAN OF CARE
Problem: Safety - Adult  Goal: Free from fall injury  6/10/2024 1900 by Mere Cooney RN  Outcome: Progressing  6/10/2024 1708 by Chasity Arreaga RN  Outcome: Progressing  6/10/2024 1707 by Chasity Arreaga RN  Outcome: Progressing     Problem: Chronic Conditions and Co-morbidities  Goal: Patient's chronic conditions and co-morbidity symptoms are monitored and maintained or improved  6/10/2024 1900 by Mere Cooney RN  Outcome: Progressing  6/10/2024 1708 by Chasity Arreaga RN  Outcome: Progressing  6/10/2024 1707 by Chasity Arreaga RN  Outcome: Progressing     Problem: Discharge Planning  Goal: Discharge to home or other facility with appropriate resources  6/10/2024 1900 by Mere Cooney RN  Outcome: Progressing  6/10/2024 1708 by Chasity Arreaga RN  Outcome: Progressing  6/10/2024 1707 by Chasity Arreaga RN  Outcome: Progressing     Problem: Skin/Tissue Integrity  Goal: Absence of new skin breakdown  Description: 1.  Monitor for areas of redness and/or skin breakdown  2.  Assess vascular access sites hourly  3.  Every 4-6 hours minimum:  Change oxygen saturation probe site  4.  Every 4-6 hours:  If on nasal continuous positive airway pressure, respiratory therapy assess nares and determine need for appliance change or resting period.  6/10/2024 1900 by Mere Cooney RN  Outcome: Progressing  6/10/2024 1708 by Chasity Arreaga RN  Outcome: Progressing  6/10/2024 1707 by Chasity Arreaga RN  Outcome: Progressing     Problem: ABCDS Injury Assessment  Goal: Absence of physical injury  6/10/2024 1900 by Mere Cooney RN  Outcome: Progressing  6/10/2024 1708 by Chasity Arreaga RN  Outcome: Progressing  6/10/2024 1707 by Chasity Arreaga RN  Outcome: Progressing     Problem: Pain  Goal: Verbalizes/displays adequate comfort level or baseline comfort level  6/10/2024 1900 by Mere Cooney RN  Outcome: Progressing  6/10/2024 1708 by Chasity Arreaga RN  Outcome: Progressing  6/10/2024 1707 by  Chasity Arreaga, RN  Outcome: Progressing

## 2024-06-10 NOTE — PLAN OF CARE
Problem: Safety - Adult  Goal: Free from fall injury  Outcome: Progressing     Problem: Chronic Conditions and Co-morbidities  Goal: Patient's chronic conditions and co-morbidity symptoms are monitored and maintained or improved  Outcome: Progressing     Problem: Discharge Planning  Goal: Discharge to home or other facility with appropriate resources  Outcome: Progressing     Problem: Skin/Tissue Integrity  Goal: Absence of new skin breakdown  Description: 1.  Monitor for areas of redness and/or skin breakdown  2.  Assess vascular access sites hourly  3.  Every 4-6 hours minimum:  Change oxygen saturation probe site  4.  Every 4-6 hours:  If on nasal continuous positive airway pressure, respiratory therapy assess nares and determine need for appliance change or resting period.  Outcome: Progressing     Problem: ABCDS Injury Assessment  Goal: Absence of physical injury  Outcome: Progressing     Problem: Pain  Goal: Verbalizes/displays adequate comfort level or baseline comfort level  Outcome: Progressing

## 2024-06-10 NOTE — PROCEDURES
signing Informed Procedural Consent.  Risks and Benefits along with reason for procedure were discussed and teaching was reinforced with an education handout on PICC insertion. [facility] FAQ Catheter Associated Blood Stream Infections and [facility] [address] REV. 7/13 Nursing and Booklet left at bedside or in chart. Patient (Family or POA) acknowledged understanding of information taught and agreed to procedure.

## 2024-06-10 NOTE — CONSULTS
Pulmonary/Critical Care consultation    Patient's name:  Anuj Jovel  Medical Record Number: [id number]  Patient's account/billing number: [id number]  Patient's YOB: 1940  Age: 83 y.o.  Date of Admission: 6/8/2024  8:29 PM  Date of Consult: 6/10/2024      Primary Care Physician: Jeanne Simpson MD      Code Status: Full Code    Reason for consult: COVID-19 infection with pneumonia      HISTORY OF PRESENT ILLNESS:   History was obtained from chart review and the patient.  Anuj Jovel is a 83 y.o. white gentleman was transferred from an outside facility due to heart block needing permanent pacemaker placement.cardiology evaluation is in progress.  Patient found to have COVID-19 infection with CT scan of the chest demonstrating bilateral infiltrates.  Patient on 4 L of oxygen by nasal cannula.  ABG without any CO2 retention.  Patient started on Decadron and baricitinib.  Patient has leukocytosis and hyperglycemia, both of which could be related to corticosteroid use  Patient has some dry cough  No sick contacts  No recent travel    Interval history:  6/10/2024    Patient is on 3 L of oxygen by nasal cannula saturating 98%  Afebrile and hemodynamic stable  Cough is improving  It is mostly dry  No hemoptysis  Not much shortness of breath or wheezing  No chest pain or pressure  No orthopnea or PND  Fluid balance -6.8 L            Past Medical History:        Diagnosis Date    Diabetes (HCC)     Diverticulitis     History of allergy     Hyperlipidemia     Hypertension     Osteoarthritis     Pneumonia due to COVID-19 virus 12/29/2020       Past Surgical History:        Procedure Laterality Date    BACK SURGERY  09/03/2017    [facility], lumbar fusion    CARDIAC CATHETERIZATION      COLECTOMY  2015    partial colectomy due to diveritc    COLONOSCOPY      TOTAL KNEE ARTHROPLASTY Right        Allergies:    Allergies   Allergen Reactions     Problems:    Benign prostatic hyperplasia    Coronary artery disease    Hyperlipidemia    Essential hypertension    Lumbar stenosis    Type 2 diabetes mellitus with complication (HCC)    Presence of coronary angioplasty implant and graft    KAREY (obstructive sleep apnea)    Hypoxia    History of placement of stent in LAD coronary artery    Chronic heart failure with preserved ejection fraction (HFpEF) (HCC)    Mild mitral valve regurgitation    Pulmonary hypertension (HCC)    Second degree AV block, Mobitz type I    Systolic congestive heart failure (HCC)    Advanced age    Acute hypoxic respiratory failure (HCC)    COVID  Resolved Problems:    Hyperglycemia    Bradycardia  COVID-19 pneumonia causing acute hypoxemic respiratory failure  Obesity  Hypokalemia  Hyperglycemia, could be related to corticosteroid use or from the diabetes  Has mild leukocytosis secondary to corticosteroid use    PLAN:       Decadron and remdesivir  On Lovenox  Cardiology evaluation is in progress  Recommend CPAP therapy for sleep apnea  continue smoking cessation.  Anuj Jovel was instructed on smoking cessation. I discussed with this patient today the risks and benefits of various tobacco cessation strategies  Recommend pulmonary rehabilitation.  Patient was explained importance of pulmonary rehabilitation with regards to decreasing the hospitalization risk and also help with his dyspnea  Patient was educated on how to use the respiratory medications.  All the questions that the patient has had were answered to   his satisfaction.  Home O2 evaluation was done.  Supplemental oxygen was  to be continued  Patient was informed of the need for using oxygen.  Patient was educated on the importance of compliance and the benefits of oxygen use.  Complications of oxygen use, including dryness of the nostrils, epistaxis and also the fire hazard were explained to the patient.  Patient willingly accepts to use  the oxygen as recommended.  After

## 2024-06-10 NOTE — PROGRESS NOTES
Occupational Therapy  Facility/Department: [facility] CAR 1- [facility]  Occupational Therapy Daily Treatment Note    Name: Anuj Jovel  : 1940  MRN: [id number]  Date of Service: 6/10/2024    Discharge Recommendations:  Further therapy recommended at discharge.The patient should be able to tolerate at least 3 hours of therapy per day over 5 days or 15 hours over 7 days.   This patient may benefit from a Physical Medicine and Rehab consult.          Patient Diagnosis(es): The primary encounter diagnosis was AV block, Mobitz 1. Diagnoses of Systolic congestive heart failure, unspecified HF chronicity (HCC) and Shortness of breath were also pertinent to this visit.  Past Medical History:  has a past medical history of Diabetes (HCC), Diverticulitis, History of allergy, Hyperlipidemia, Hypertension, Osteoarthritis, and Pneumonia due to COVID-19 virus.  Past Surgical History:  has a past surgical history that includes Cardiac catheterization; Colonoscopy; Total knee arthroplasty (Right); colectomy (2015); and back surgery (2017).           Assessment   Performance deficits / Impairments: Decreased functional mobility ;Decreased ADL status;Decreased safe awareness;Decreased cognition;Decreased endurance;Decreased balance;Decreased high-level IADLs;Decreased strength  Prognosis: Good  REQUIRES OT FOLLOW-UP: Yes  Activity Tolerance  Activity Tolerance: Treatment limited secondary to decreased cognition;Patient limited by fatigue;Treatment limited secondary to medical complications (free text)  Activity Tolerance Comments: elevated HR and hypotension        Plan   Occupational Therapy Plan  Times Per Week: 4-6 x/wk  Current Treatment Recommendations: Balance training, Functional mobility training, Pain management, Safety education & training, Patient/Caregiver education & training, Equipment evaluation, education, & procurement, Self-Care / ADL, Home management training, Strengthening, Cognitive reorientation, Endurance  decreased endurance. Further ADLs not attempted        Bed mobility  Supine to Sit: Moderate assistance (requiring assist with trunk)  Scooting: Moderate assistance  Bed Mobility Comments: HOB elevated, utilizing bed rails  Transfers  Stand Pivot Transfers: Minimal assistance  Sit to stand: Moderate assistance  Stand to sit: Moderate assistance  Transfer Comments: utilizing RW demo P hand placement     Cognition  Overall Cognitive Status: Exceptions  Arousal/Alertness: Appropriate responses to stimuli  Following Commands: Follows multistep commands with repitition;Follows multistep commands with increased time;Inconsistently follows commands  Attention Span: Attends with cues to redirect  Memory: Decreased short term memory  Safety Judgement: Decreased awareness of need for assistance;Decreased awareness of need for safety  Problem Solving: Assistance required to correct errors made;Assistance required to identify errors made;Decreased awareness of errors  Insights: Decreased awareness of deficits  Initiation: Requires cues for some  Sequencing: Requires cues for some  Orientation  Overall Orientation Status: Impaired  Orientation Level: Oriented to person;Disoriented to time;Oriented to place;Oriented to situation                  Education Given To: Patient  Education Provided: Role of Therapy;Transfer Training;ADL Adaptive Strategies;Precautions  Education Provided Comments: proper hand and foot placement; balance maintence; safety precautions-F/P carry over  Education Method: Verbal  Barriers to Learning: Cognition;Hearing  Education Outcome: Continued education needed                                 AM-PAC - ADL  AM-PAC Daily Activity - Inpatient   How much help is needed for putting on and taking off regular lower body clothing?: A Lot  How much help is needed for bathing (which includes washing, rinsing, drying)?: A Lot  How much help is needed for toileting (which includes using toilet, bedpan, or urinal)?:

## 2024-06-10 NOTE — PROGRESS NOTES
[facility]   Progress Note                   Date:   6/10/2024  Patient name: Anuj Jovel  Date of admission:  6/8/2024  8:29 PM  MRN:   [id number]  YOB: 1940  PCP: Jeanne Simpson MD    Reason for consult:Symptomatic bradycardia    Subjective:       Patient seen and examined at the bed side: No acute issues overnight, currently on dopamine 15 mcg/kg/min, heart rate is around 70-80, on oxygen through nasal cannula.    Medications:   Scheduled Meds:   remdesivir 100 mg in sodium chloride 0.9 % 250 mL IVPB  100 mg IntraVENous Q24H    lidocaine 1 % injection  5 mL IntraDERmal Once    sodium chloride flush  5-40 mL IntraVENous 2 times per day    insulin lispro  0-16 Units SubCUTAneous TID WC    dexAMETHasone  6 mg IntraVENous Daily    enoxaparin  40 mg SubCUTAneous Daily    sodium chloride flush  5-40 mL IntraVENous 2 times per day    aspirin EC  81 mg Oral Daily    atorvastatin  40 mg Oral Nightly    hydroCHLOROthiazide  25 mg Oral Daily    tamsulosin  0.4 mg Oral Daily    pantoprazole (PROTONIX) 40 mg in sodium chloride (PF) 0.9 % 10 mL injection  40 mg IntraVENous Daily    insulin lispro  0-4 Units SubCUTAneous Nightly    bumetanide  1 mg IntraVENous BID     Continuous Infusions:   sodium chloride      dextrose      dextrose      dextrose      DOPamine 15 mcg/kg/min (06/10/24 0719)     CBC:   Recent Labs     06/08/24 2124 06/09/24  0451 06/10/24  0556   WBC 8.3 12.6* 13.0*   HGB 14.2 13.9 15.2    216 229     BMP:    Recent Labs     06/08/24 2124 06/09/24  0451 06/10/24  0556   * 139 137   K 4.2 3.7 3.5*    102 99   CO2 18* 24 24   BUN 21 20 19   CREATININE 1.2 1.0 1.0   GLUCOSE 266* 270* 215*     Hepatic:   Recent Labs     06/07/24  0810   AST 17   ALT 16   BILITOT 0.7   ALKPHOS 101     Troponin: No results for input(s): \"TROPONINI\" in the last 72 hours.  BNP: No results for input(s): \"BNP\" in the last 72 hours.  Lipids: No results for input(s): \"CHOL\", \"HDL\"

## 2024-06-10 NOTE — PROGRESS NOTES
Past Surgical History:   Procedure Laterality Date    BACK SURGERY  2017    [facility], lumbar fusion    CARDIAC CATHETERIZATION      COLECTOMY  2015    partial colectomy due to diveritc    COLONOSCOPY      TOTAL KNEE ARTHROPLASTY Right        Medications:      bumetanide  1 mg IntraVENous Daily    remdesivir 100 mg in sodium chloride 0.9 % 250 mL IVPB  100 mg IntraVENous Q24H    lidocaine 1 % injection  5 mL IntraDERmal Once    sodium chloride flush  5-40 mL IntraVENous 2 times per day    insulin lispro  0-16 Units SubCUTAneous TID WC    dexAMETHasone  6 mg IntraVENous Daily    enoxaparin  40 mg SubCUTAneous Daily    sodium chloride flush  5-40 mL IntraVENous 2 times per day    aspirin EC  81 mg Oral Daily    atorvastatin  40 mg Oral Nightly    tamsulosin  0.4 mg Oral Daily    pantoprazole (PROTONIX) 40 mg in sodium chloride (PF) 0.9 % 10 mL injection  40 mg IntraVENous Daily    insulin lispro  0-4 Units SubCUTAneous Nightly       Social History:     Social History     Socioeconomic History    Marital status:      Spouse name: Not on file    Number of children: Not on file    Years of education: Not on file    Highest education level: Not on file   Occupational History     Comment: retired   Tobacco Use    Smoking status: Former     Current packs/day: 0.00     Average packs/day: 1.5 packs/day for 40.0 years (60.0 ttl pk-yrs)     Types: Cigarettes     Start date: 1951     Quit date: 1991     Years since quittin.4    Smokeless tobacco: Never   Vaping Use    Vaping Use: Never used   Substance and Sexual Activity    Alcohol use: Not Currently     Alcohol/week: 1.0 standard drink of alcohol     Types: 1 Standard drinks or equivalent per week     Comment: social    Drug use: No    Sexual activity: Not on file   Other Topics Concern    Not on file   Social History Narrative    Not on file     Social Determinants of Health     Financial Resource Strain: Low Risk  (2024)       Prognosis:  Guarded  Review of Discharge Planning:  Please see daily details in Coordination of Outpatient Care section  Review of need for outpatient follow up.      Theodore Bernard MD 6/10/2024

## 2024-06-10 NOTE — PLAN OF CARE
Problem: Safety - Adult  Goal: Free from fall injury  6/10/2024 1708 by Chasity Arreaga RN  Outcome: Progressing  6/10/2024 1707 by Chasity Arreaga RN  Outcome: Progressing     Problem: Chronic Conditions and Co-morbidities  Goal: Patient's chronic conditions and co-morbidity symptoms are monitored and maintained or improved  6/10/2024 1708 by Chasity Arreaga RN  Outcome: Progressing  6/10/2024 1707 by Chasity Arreaga RN  Outcome: Progressing     Problem: Discharge Planning  Goal: Discharge to home or other facility with appropriate resources  6/10/2024 1708 by Chasity Arreaga RN  Outcome: Progressing  6/10/2024 1707 by Chasity Arreaga RN  Outcome: Progressing     Problem: Skin/Tissue Integrity  Goal: Absence of new skin breakdown  Description: 1.  Monitor for areas of redness and/or skin breakdown  2.  Assess vascular access sites hourly  3.  Every 4-6 hours minimum:  Change oxygen saturation probe site  4.  Every 4-6 hours:  If on nasal continuous positive airway pressure, respiratory therapy assess nares and determine need for appliance change or resting period.  6/10/2024 1708 by Chasity Arreaga RN  Outcome: Progressing  6/10/2024 1707 by Chasity Arreaga RN  Outcome: Progressing     Problem: ABCDS Injury Assessment  Goal: Absence of physical injury  6/10/2024 1708 by Chasity Arreaga RN  Outcome: Progressing  6/10/2024 1707 by Chasity Arreaga RN  Outcome: Progressing

## 2024-06-11 ENCOUNTER — APPOINTMENT (OUTPATIENT)
Age: 84
End: 2024-06-11
Attending: HOSPITALIST
Payer: MEDICARE

## 2024-06-11 PROBLEM — R06.02 SHORTNESS OF BREATH: Status: ACTIVE | Noted: 2024-06-11

## 2024-06-11 LAB
ANION GAP SERPL CALCULATED.3IONS-SCNC: 12 MMOL/L (ref 9–16)
BUN SERPL-MCNC: 23 MG/DL (ref 8–23)
CALCIUM SERPL-MCNC: 9.5 MG/DL (ref 8.6–10.4)
CHLORIDE SERPL-SCNC: 98 MMOL/L (ref 98–107)
CO2 SERPL-SCNC: 24 MMOL/L (ref 20–31)
CREAT SERPL-MCNC: 1 MG/DL (ref 0.7–1.2)
CRP SERPL HS-MCNC: 22.1 MG/L (ref 0–5)
GFR, ESTIMATED: 77 ML/MIN/1.73M2
GLUCOSE BLD-MCNC: 217 MG/DL (ref 75–110)
GLUCOSE BLD-MCNC: 240 MG/DL (ref 75–110)
GLUCOSE BLD-MCNC: 296 MG/DL (ref 75–110)
GLUCOSE BLD-MCNC: 322 MG/DL (ref 75–110)
GLUCOSE BLD-MCNC: 390 MG/DL (ref 75–110)
GLUCOSE SERPL-MCNC: 249 MG/DL (ref 74–99)
POTASSIUM SERPL-SCNC: 4 MMOL/L (ref 3.7–5.3)
SODIUM SERPL-SCNC: 134 MMOL/L (ref 136–145)

## 2024-06-11 PROCEDURE — 97110 THERAPEUTIC EXERCISES: CPT

## 2024-06-11 PROCEDURE — 6360000002 HC RX W HCPCS: Performed by: STUDENT IN AN ORGANIZED HEALTH CARE EDUCATION/TRAINING PROGRAM

## 2024-06-11 PROCEDURE — 2580000003 HC RX 258

## 2024-06-11 PROCEDURE — 82947 ASSAY GLUCOSE BLOOD QUANT: CPT

## 2024-06-11 PROCEDURE — 93005 ELECTROCARDIOGRAM TRACING: CPT | Performed by: STUDENT IN AN ORGANIZED HEALTH CARE EDUCATION/TRAINING PROGRAM

## 2024-06-11 PROCEDURE — 80048 BASIC METABOLIC PNL TOTAL CA: CPT

## 2024-06-11 PROCEDURE — 93306 TTE W/DOPPLER COMPLETE: CPT | Performed by: INTERNAL MEDICINE

## 2024-06-11 PROCEDURE — 2000000000 HC ICU R&B

## 2024-06-11 PROCEDURE — 2580000003 HC RX 258: Performed by: INTERNAL MEDICINE

## 2024-06-11 PROCEDURE — 6360000002 HC RX W HCPCS: Performed by: NURSE PRACTITIONER

## 2024-06-11 PROCEDURE — 97530 THERAPEUTIC ACTIVITIES: CPT

## 2024-06-11 PROCEDURE — 2580000003 HC RX 258: Performed by: HOSPITALIST

## 2024-06-11 PROCEDURE — C9113 INJ PANTOPRAZOLE SODIUM, VIA: HCPCS

## 2024-06-11 PROCEDURE — 94761 N-INVAS EAR/PLS OXIMETRY MLT: CPT

## 2024-06-11 PROCEDURE — 6360000002 HC RX W HCPCS

## 2024-06-11 PROCEDURE — 6370000000 HC RX 637 (ALT 250 FOR IP)

## 2024-06-11 PROCEDURE — 6360000002 HC RX W HCPCS: Performed by: INTERNAL MEDICINE

## 2024-06-11 PROCEDURE — 99233 SBSQ HOSP IP/OBS HIGH 50: CPT | Performed by: INTERNAL MEDICINE

## 2024-06-11 PROCEDURE — 93306 TTE W/DOPPLER COMPLETE: CPT

## 2024-06-11 PROCEDURE — 6370000000 HC RX 637 (ALT 250 FOR IP): Performed by: STUDENT IN AN ORGANIZED HEALTH CARE EDUCATION/TRAINING PROGRAM

## 2024-06-11 PROCEDURE — 6370000000 HC RX 637 (ALT 250 FOR IP): Performed by: HOSPITALIST

## 2024-06-11 PROCEDURE — 99233 SBSQ HOSP IP/OBS HIGH 50: CPT | Performed by: STUDENT IN AN ORGANIZED HEALTH CARE EDUCATION/TRAINING PROGRAM

## 2024-06-11 PROCEDURE — 86140 C-REACTIVE PROTEIN: CPT

## 2024-06-11 RX ORDER — PANTOPRAZOLE SODIUM 40 MG/1
40 TABLET, DELAYED RELEASE ORAL
Status: DISCONTINUED | OUTPATIENT
Start: 2024-06-12 | End: 2024-06-21 | Stop reason: HOSPADM

## 2024-06-11 RX ORDER — INSULIN GLARGINE 100 [IU]/ML
10 INJECTION, SOLUTION SUBCUTANEOUS DAILY
Status: DISCONTINUED | OUTPATIENT
Start: 2024-06-11 | End: 2024-06-12

## 2024-06-11 RX ADMIN — REMDESIVIR 100 MG: 100 INJECTION, POWDER, LYOPHILIZED, FOR SOLUTION INTRAVENOUS at 16:04

## 2024-06-11 RX ADMIN — ENOXAPARIN SODIUM 40 MG: 100 INJECTION SUBCUTANEOUS at 09:13

## 2024-06-11 RX ADMIN — INSULIN GLARGINE 10 UNITS: 100 INJECTION, SOLUTION SUBCUTANEOUS at 16:45

## 2024-06-11 RX ADMIN — INSULIN LISPRO 12 UNITS: 100 INJECTION, SOLUTION INTRAVENOUS; SUBCUTANEOUS at 15:58

## 2024-06-11 RX ADMIN — PANTOPRAZOLE SODIUM 40 MG: 40 INJECTION, POWDER, FOR SOLUTION INTRAVENOUS at 09:12

## 2024-06-11 RX ADMIN — DOPAMINE HYDROCHLORIDE 10 MCG/KG/MIN: 160 INJECTION, SOLUTION INTRAVENOUS at 13:38

## 2024-06-11 RX ADMIN — INSULIN LISPRO 4 UNITS: 100 INJECTION, SOLUTION INTRAVENOUS; SUBCUTANEOUS at 09:13

## 2024-06-11 RX ADMIN — INSULIN LISPRO 8 UNITS: 100 INJECTION, SOLUTION INTRAVENOUS; SUBCUTANEOUS at 12:00

## 2024-06-11 RX ADMIN — SODIUM CHLORIDE, PRESERVATIVE FREE 10 ML: 5 INJECTION INTRAVENOUS at 09:14

## 2024-06-11 RX ADMIN — DOPAMINE HYDROCHLORIDE 12.5 MCG/KG/MIN: 160 INJECTION, SOLUTION INTRAVENOUS at 21:18

## 2024-06-11 RX ADMIN — BUMETANIDE 1 MG: 0.25 INJECTION INTRAMUSCULAR; INTRAVENOUS at 09:11

## 2024-06-11 RX ADMIN — ASPIRIN 81 MG: 81 TABLET, COATED ORAL at 09:11

## 2024-06-11 RX ADMIN — DOPAMINE HYDROCHLORIDE 10 MCG/KG/MIN: 160 INJECTION, SOLUTION INTRAVENOUS at 06:01

## 2024-06-11 RX ADMIN — TAMSULOSIN HYDROCHLORIDE 0.4 MG: 0.4 CAPSULE ORAL at 09:12

## 2024-06-11 RX ADMIN — DEXAMETHASONE SODIUM PHOSPHATE 6 MG: 4 INJECTION INTRA-ARTICULAR; INTRALESIONAL; INTRAMUSCULAR; INTRAVENOUS; SOFT TISSUE at 09:12

## 2024-06-11 RX ADMIN — ATORVASTATIN CALCIUM 40 MG: 40 TABLET, FILM COATED ORAL at 21:04

## 2024-06-11 RX ADMIN — SODIUM CHLORIDE, PRESERVATIVE FREE 10 ML: 5 INJECTION INTRAVENOUS at 21:07

## 2024-06-11 RX ADMIN — INSULIN LISPRO 4 UNITS: 100 INJECTION, SOLUTION INTRAVENOUS; SUBCUTANEOUS at 21:06

## 2024-06-11 NOTE — CARE COORDINATION
Pt sleeping. No family at bedside. Will return later as time allows.     1100 Pt sleeping. No family at bedside. Will return later as time allows.     1400 Pt sleeping. No family at bedside. Will return later as time allows.     1445 Call to pt's daughter. States will be at the hospital in about 15 minutes.     1510 Spoke with pt's daughter concerning pt's weakness. She will speak with her family and consider SNF for rehab.     Post Acute Facility/Agency List     Provided child with the following list, the list includes the overall star ratings obtained from CMS per the Medicare Web site (www.Medicare.gov):     [] Long Term Acute Care Facilities  [] Acute Inpatient Rehabilitation Facilities  [x] Skilled Nursing Facilities  [] Home Care  [] Patient's Insurance specific coverage list for SNF    Provided verbal instructions on how to utilize the QR Code to obtain additional detailed star ratings from www.Medicare.gov     offered to print and provide the detailed list:    [x]Accepted   []Declined

## 2024-06-11 NOTE — PROGRESS NOTES
[facility]  Office: [phone]  Anderson Juarez DO, Colin Dominguez DO, Glenn Shepherd DO, Alexsander Vang DO, Cortney Marks MD, Diane Hays MD, Kei Redmond MD, Kierra Jean MD,  Arnold Garza MD, Andrew Devine MD, Hetal Morrell MD,  Adele Shaver DO, Raimundo Dela Cruz MD, Ash Viveros MD, Lyndon Juarez DO, Lea iNeves MD,  Isrrael Grewal DO, Dorys Diallo MD, Kerline Flores MD, Aisha Judd MD, Anselmo Daley MD,  Levar Lehman MD, Delbert Fu MD, Keshia Sims MD, Michelle Alvarez MD, Troy Brizuela MD, Katarina Emerson MD, Moo So DO, Flody Harper DO, Lucina Sung MD,  Jimmie Gee MD, Shirley Waterhouse, CNP,  Mary Jimenez CNP, Kartik Mcwilliams, CNP,  Sara Solorzano, DNP, Marlyn Valdez, CNP, Alyssia Delgadillo, CNP, Duyen Garibay CNP, Janice Mendez, CNP, Tessie Cardona, PA-C, Martina Harris PA-C, Dena Álvarez, CNP, Jeanette Cordero, CNP, Jeorme Duran, CNP, Nandini Barraza, CNP, Marilia Yeager, CNP, Angelica Granados, CNS, Cassandra Herrera, CNP, Lexy Canela CNP, Tracy Schwab, CNP         [facility]   IN-PATIENT SERVICE   [facility]    Progress Note    6/11/2024    9:26 AM    Name:   Anuj Jovel  MRN:     [id number]     Acct:      [id number]   Room:   [facility] [place]/1003-01   Day:  3  Admit Date:  6/8/2024  8:29 PM    PCP:   Jeanne Simpson MD  Code Status:  Full Code    Subjective:     C/C: No chief complaint on file.    Interval History Status: not changed.     Patient seen examined at bedside.  Overall patient continues to require dopamine drip when titrated off patient becomes in pronounced Mobitz type I.  When on dopamine drip heart rate improves however blood pressure continues to drop and become symptomatic hypotensive.  He is able to eat.  Remains on oxygen however saturating well.    Brief History:     83-year-old male past medical history of KAREY, BPH, hypertension, diabetes type 2, coronary artery disease, mitral valve

## 2024-06-11 NOTE — PROGRESS NOTES
Physical Therapy  Facility/Department: [facility] CAR 1- SICU  Physical Therapy Treatment Note    Name: Anuj Jovel  : 1940  MRN: [id number]  Date of Service: 2024    Discharge Recommendations:  Patient would benefit from continued therapy after discharge   PT Equipment Recommendations  Equipment Needed: No    Further therapy recommended at discharge.The patient should be able to tolerate at least 3 hours of therapy per day over 5 days or 15 hours over 7 days.   This patient may benefit from a Physical Medicine and Rehab consult.     Patient Diagnosis(es): The primary encounter diagnosis was AV block, Mobitz 1. Diagnoses of Systolic congestive heart failure, unspecified HF chronicity (HCC) and Shortness of breath were also pertinent to this visit.  Past Medical History:  has a past medical history of Diabetes (HCC), Diverticulitis, History of allergy, Hyperlipidemia, Hypertension, Osteoarthritis, and Pneumonia due to COVID-19 virus.  Past Surgical History:  has a past surgical history that includes Cardiac catheterization; Colonoscopy; Total knee arthroplasty (Right); colectomy (); and back surgery (2017).    Assessment   Body Structures, Functions, Activity Limitations Requiring Skilled Therapeutic Intervention: Decreased functional mobility ;Decreased strength;Decreased endurance;Decreased balance;Decreased cognition;Decreased safe awareness  Assessment: Pt required modA for supine<>sit. Pt completes sit<>stands with RW CGA. Pt is currently unsafe to return to home d/t being a fall risk. Pt would benefit from therapy following session to improve balance, strength, and tolerance to mobility.  Therapy Prognosis: Good  Activity Tolerance  Activity Tolerance: Patient limited by fatigue;Patient limited by endurance;Treatment limited secondary to medical complications  Activity Tolerance Comments: Symptomatic hypotension. 70/56     Plan   Physical Therapy Plan  General Plan:  (5-6x/wk)  Current  deficits  Initiation: Requires cues for some  Sequencing: Requires cues for some     Objective   Bed mobility  Supine to Sit: Moderate assistance (Trunk progression)  Sit to Supine: Moderate assistance (B LE progression)  Scooting: Minimal assistance  Bed Mobility Comments: HOB elevated to ~30 degrees.  Transfers  Sit to Stand: Contact guard assistance  Stand to Sit: Contact guard assistance  Comment: Assessed with RW, pt required verbal and tactile cues for UE hand placement with good return. Pt reporting extreme dizziness retired self to EOB. BP obtained LUE 70/56. Pt did not complete anymore transfers.  Ambulation  Comments: hypotensive with symptoms.     Balance  Posture: Fair  Sitting - Static: Good  Sitting - Dynamic: Fair;+  Comments: Assessed EOB.  Exercise Treatment: Seated LE exercise program: Long Arc Quads, HS curls with min manual resistance hip abduction/adduction, heel/toe raises, and marches. Reps: x 10    OutComes Score    AM-PAC - Mobility    AM-PAC Basic Mobility - Inpatient   How much help is needed turning from your back to your side while in a flat bed without using bedrails?: A Little  How much help is needed moving from lying on your back to sitting on the side of a flat bed without using bedrails?: A Little  How much help is needed moving to and from a bed to a chair?: A Little  How much help is needed standing up from a chair using your arms?: A Little  How much help is needed walking in hospital room?: A Lot  How much help is needed climbing 3-5 steps with a railing?: Total  AM-PAC Inpatient Mobility Raw Score : 15  AM-PAC Inpatient T-Scale Score : 39.45  Mobility Inpatient CMS 0-100% Score: 57.7  Mobility Inpatient CMS G-Code Modifier : CK    Goals  Short Term Goals  Time Frame for Short Term Goals: 14 visits  Short Term Goal 1: Pt will be Davy bed mobility  Short Term Goal 2: Pt will be Davy transfers  Short Term Goal 3: Pt will be Davy amb 250' RW or least restrictive AD  Short Term

## 2024-06-11 NOTE — PROGRESS NOTES
bradycardia with EKG reading showing second degree heart block, Mobitz type I.    His SpO2 was 87% on room air and HR was in the 40s-60s.  A dopamine gtt was initiated    The patient also tested positive for Covid.   He has received 4 Covid vaccines, with the last in September 2023.    Lab-work was significant for CRP of 32.5 and  of BNP 1,211.    A CT chest showed concern for pulmonary vascular congestion with  interstitial and ground-glass appearance.    He was initiated on IV Decadron and Baricitinib.  He was also initiated on diuretics.    He was transferred to [facility] for EP evaluation.  The plan is for PPM once Covid has resolved.    ID was consulted for concerns of Covid 19    Imaging:    CT HEAD WO CONTRAST     Result Date: 6/9/2024  No acute intracranial abnormality.      XR CHEST PORTABLE     Result Date: 6/9/2024  1. Cardiomegaly with pulmonary vascular congestion and pulmonary edema. 2. Ill-defined ground-glass opacity in the right upper lobe may represent superimposed pneumonia.      CT CHEST PULMONARY EMBOLISM W CONTRAST     Result Date: 6/7/2024  Motion degraded exam.  No evidence for central pulmonary embolism. Nonspecific mosaic appearance of the lung parenchyma, interstitial and ground-glass appearance.  While some of these findings may be due to motion artifact and atelectasis, atypical infection should also be considered.      CT HEAD WO CONTRAST     Result Date: 6/7/2024  No acute intracranial abnormality. Cerebral atrophy.      XR CHEST PORTABLE     Result Date: 6/7/2024  No acute cardiopulmonary process.       CURRENT EVALUATION- INTERVAL CHANGES : 6/11/2024  /71   Pulse 60   Temp 98.2 °F (36.8 °C) (Bladder)   Resp 22   Wt 98.1 kg (216 lb 4.3 oz)   SpO2 92%   BMI 33.87 kg/m²       Patient evaluated and examined in the ICU.     Afebrile  VS stable  Dopamine gtt for bradycardia    The patient has developed confusion and agitation    CRP is on a down-trend    Medications  Consider re-testing with an alternate   FDA-approved test if clinically indicated.         Culture, Blood 1 [[id number]] Collected: 06/07/24 0925    Order Status: Completed Specimen: Blood Updated: 06/10/24 1409     Specimen Description .BLOOD LT HAND     Special Requests          Culture NO GROWTH 3 DAYS    Culture, Blood 1 [[id number]] Collected: 06/07/24 0920    Order Status: Completed Specimen: Blood Updated: 06/10/24 1410     Specimen Description .BLOOD RT HAND     Special Requests          Culture NO GROWTH 3 DAYS    Culture, Urine [[id number]] Collected: 06/07/24 0850    Order Status: Completed Specimen: Urine, clean catch Updated: 06/08/24 0759     Specimen Description .CLEAN CATCH URINE     Culture NO GROWTH              Medical Decision Making-Other:     Note:      Theodore Bernard MD  Office: [phone]    Daily Elements of Decision Making provided by Consulting Physician:  Note: I have independently performed the steps listed below as part of the medical decision making and evaluation.    Review of current Problems:  COVID 19 Confirmed Infection  Covid tests:  Positive Covid Test Date: 6/7/24  Vaccination Status:  Received Covid 19 vaccines on 1/22/21, 2/19/21, 11/2/21 and 9/1/23  Bradycardia  Second degree heart block, Mobitz type I  Systolic heart failure  Acute hypoxic respiratory distress  CRP elevation  Altered mentation  CAD  Pulmonary HTN  Essential HTN  Diabetes mellitus  HLD  BPH  PCN, flagyl and sulfa allergy  Evaluation of Patient:  Patient evaluated and examined in the ICU.   Evaluated because of Covid  Please see daily details in Interval Changes Section  Changes in physical exam:  Bradycardia  Second degree heart block, Mobitz type I  Systolic heart failure  Acute hypoxic respiratory distress  Please see daily details in Physical Exam section  Changes in ROS:  Unchanged  Please see daily details in Review of Symptoms section  Discussion with Referring Physician or Service

## 2024-06-11 NOTE — PROGRESS NOTES
Physician Progress Note      PATIENT:               MARIN LIRIANO  CSN #:                  [id number]  :                       1940  ADMIT DATE:       2024 8:29 PM  DISCH DATE:  RESPONDING  PROVIDER #:        Hetal Leone MD          QUERY TEXT:    Pt admitted with COVID-19 pneumonia and noted to have WBC- 8.3, 12.6, 13.0, HR   - 40's- 110, CRP- 32.5, 20.3.  If possible, please document in progress notes   and discharge summary if you are evaluating and/or treating:    The medical record reflects the following:  Risk Factors: COVID pneumonia, SSS, AV block, Acute on chronic preserved CHF,   HTN  Clinical Indicators: PT having bradycardia with AV block of 2 and 3 degree   block. transferred to [facility] for evaluation with possible PPP insertion. CRP-   32.5, 20.3; D-DIMER - 0.80; WBC - 8.3, 12.6, 13.0; ABG - POC - 7.464 /32.8   /90.1 / 23.5; - COVID positive testing at [facility] on 2023. Lungs -   diminished with BLE - 1+ pitting edema, with no change since admission.    BNP-([facility]).  - 289,  -1211.  Treatment: ID consulted with IV Remdesivir started on , IVF - bolus, 30   ml/kg/hr bolus,  IV Bumex -. Oxygen therapy @  /NC.    Thank you, please contact me for any questions.  Lyndon Adkins RN, [facility]  ecai- [phone]  office hours - [phone]  Options provided:  -- Sepsis present on admission due to COVID-19 pneumonia  -- Covid-19 pneumonia without sepsis  -- Other - I will add my own diagnosis  -- Disagree - Not applicable / Not valid  -- Disagree - Clinically unable to determine / Unknown  -- Refer to Clinical Documentation Reviewer    PROVIDER RESPONSE TEXT:    This patient has sepsis which was present on admission due to COVID-19   pneumonia.    Query created by: Lyndon Adkins on 2024 11:12 AM      Electronically signed by:  Hetal Leone MD 2024 4:17   PM

## 2024-06-11 NOTE — PROGRESS NOTES
Pt confused, pulled ART line out. Internal med team notified, okay for respiratory to place new line. Radial site assessed, no hematoma present, bandage applied.

## 2024-06-11 NOTE — PROGRESS NOTES
[facility]   Progress Note                   Date:   6/11/2024  Patient name: Anuj Jovel  Date of admission:  6/8/2024  8:29 PM  MRN:   [id number]  YOB: 1940  PCP: Jeanne Simpson MD    Reason for consult:Symptomatic bradycardia    Subjective:       Patient seen and examined at the bed side: No acute issues overnight, currently on dopamine @ 10 mcg/kg/min and heart rate stays around mid 50's.    Medications:   Scheduled Meds:   bumetanide  1 mg IntraVENous Daily    remdesivir 100 mg in sodium chloride 0.9 % 250 mL IVPB  100 mg IntraVENous Q24H    lidocaine 1 % injection  5 mL IntraDERmal Once    sodium chloride flush  5-40 mL IntraVENous 2 times per day    insulin lispro  0-16 Units SubCUTAneous TID WC    dexAMETHasone  6 mg IntraVENous Daily    enoxaparin  40 mg SubCUTAneous Daily    sodium chloride flush  5-40 mL IntraVENous 2 times per day    aspirin EC  81 mg Oral Daily    atorvastatin  40 mg Oral Nightly    tamsulosin  0.4 mg Oral Daily    pantoprazole (PROTONIX) 40 mg in sodium chloride (PF) 0.9 % 10 mL injection  40 mg IntraVENous Daily    insulin lispro  0-4 Units SubCUTAneous Nightly     Continuous Infusions:   sodium chloride      dextrose      DOPamine 10 mcg/kg/min (06/11/24 0440)     CBC:   Recent Labs     06/08/24 2124 06/09/24 0451 06/10/24  0556   WBC 8.3 12.6* 13.0*   HGB 14.2 13.9 15.2    216 229       BMP:    Recent Labs     06/09/24 0451 06/10/24  0556 06/11/24  0423    137 134*   K 3.7 3.5* 4.0    99 98   CO2 24 24 24   BUN 20 19 23   CREATININE 1.0 1.0 1.0   GLUCOSE 270* 215* 249*       Hepatic:   No results for input(s): \"AST\", \"ALT\", \"BILITOT\", \"ALKPHOS\" in the last 72 hours.    Invalid input(s): \"ALB\"    Troponin: No results for input(s): \"TROPONINI\" in the last 72 hours.  BNP: No results for input(s): \"BNP\" in the last 72 hours.  Lipids: No results for input(s): \"CHOL\", \"HDL\" in the last 72 hours.    Invalid input(s):

## 2024-06-11 NOTE — CONSULTS
Pulmonary/Critical Care consultation    Patient's name:  Anuj Jovel  Medical Record Number: [id number]  Patient's account/billing number: [id number]  Patient's YOB: 1940  Age: 83 y.o.  Date of Admission: 6/8/2024  8:29 PM  Date of Consult: 6/11/2024      Primary Care Physician: Jeanne Simpson MD      Code Status: Full Code    Reason for consult: COVID-19 infection with pneumonia      HISTORY OF PRESENT ILLNESS:   History was obtained from chart review and the patient.  Anuj Jovel is a 83 y.o. white gentleman was transferred from an outside facility due to heart block needing permanent pacemaker placement.cardiology evaluation is in progress.  Patient found to have COVID-19 infection with CT scan of the chest demonstrating bilateral infiltrates.  Patient on 4 L of oxygen by nasal cannula.  ABG without any CO2 retention.  Patient started on Decadron and baricitinib.  Patient has leukocytosis and hyperglycemia, both of which could be related to corticosteroid use  Patient has some dry cough  No sick contacts  No recent travel    Interval history:  6/11/2024    Patient is on 3 L of oxygen by nasal cannula saturating 95 %  Afebrile and hemodynamic stable  Cough is improving  It is mostly dry  No hemoptysis  Not much shortness of breath or wheezing  No chest pain or pressure  No orthopnea or PND  Fluid balance - 7.2 L            Past Medical History:        Diagnosis Date    Diabetes (HCC)     Diverticulitis     History of allergy     Hyperlipidemia     Hypertension     Osteoarthritis     Pneumonia due to COVID-19 virus 12/29/2020       Past Surgical History:        Procedure Laterality Date    BACK SURGERY  09/03/2017    [facility], lumbar fusion    CARDIAC CATHETERIZATION      COLECTOMY  2015    partial colectomy due to diveritc    COLONOSCOPY      TOTAL KNEE ARTHROPLASTY Right        Allergies:    Allergies   Allergen Reactions     artery disease    Hyperlipidemia    Essential hypertension    Lumbar stenosis    Type 2 diabetes mellitus with complication (HCC)    Presence of coronary angioplasty implant and graft    KAREY (obstructive sleep apnea)    Hypoxia    History of placement of stent in LAD coronary artery    Chronic heart failure with preserved ejection fraction (HFpEF) (HCC)    Mild mitral valve regurgitation    Pulmonary hypertension (HCC)    Second degree AV block, Mobitz type I    Systolic congestive heart failure (HCC)    Advanced age    Acute hypoxic respiratory failure (HCC)    COVID  Resolved Problems:    Hyperglycemia    Bradycardia, improved  COVID-19 pneumonia causing acute hypoxemic respiratory failure  Obesity  Hypokalemia  Hyperglycemia, could be related to corticosteroid use and the diabetes  Has mild leukocytosis secondary to corticosteroid use  Mild hyponatremia    PLAN:       Decadron and remdesivir  Patient is on dopamine infusion  Follow-up on echocardiogram  On Lovenox  Cardiology evaluation is in progress  Recommend CPAP therapy for sleep apnea  continue smoking cessation.  Anuj Jovel was instructed on smoking cessation. I discussed with this patient today the risks and benefits of various tobacco cessation strategies  Recommend pulmonary rehabilitation.  Patient was explained importance of pulmonary rehabilitation with regards to decreasing the hospitalization risk and also help with his dyspnea  Patient was educated on how to use the respiratory medications.  All the questions that the patient has had were answered to   his satisfaction.  Home O2 evaluation was done.  Supplemental oxygen was  to be continued  Patient was informed of the need for using oxygen.  Patient was educated on the importance of compliance and the benefits of oxygen use.  Complications of oxygen use, including dryness of the nostrils, epistaxis and also the fire hazard were explained to the patient.  Patient willingly accepts to use  the  Monthly or less

## 2024-06-12 LAB
CRP SERPL HS-MCNC: 9.8 MG/L (ref 0–5)
ECHO AO ROOT DIAM: 2.9 CM
ECHO AO ROOT INDEX: 1.39 CM/M2
ECHO AV AREA PEAK VELOCITY: 2.5 CM2
ECHO AV AREA VTI: 2.9 CM2
ECHO AV AREA/BSA PEAK VELOCITY: 1.2 CM2/M2
ECHO AV AREA/BSA VTI: 1.4 CM2/M2
ECHO AV MEAN GRADIENT: 4 MMHG
ECHO AV MEAN VELOCITY: 0.9 M/S
ECHO AV PEAK GRADIENT: 9 MMHG
ECHO AV PEAK VELOCITY: 1.5 M/S
ECHO AV VELOCITY RATIO: 0.67
ECHO AV VTI: 25.4 CM
ECHO BSA: 2.15 M2
ECHO EST RA PRESSURE: 8 MMHG
ECHO LA AREA 2C: 19.4 CM2
ECHO LA AREA 4C: 22.7 CM2
ECHO LA DIAMETER INDEX: 1.63 CM/M2
ECHO LA DIAMETER: 3.4 CM
ECHO LA MAJOR AXIS: 6.8 CM
ECHO LA MINOR AXIS: 5.5 CM
ECHO LA TO AORTIC ROOT RATIO: 1.17
ECHO LA VOL BP: 65 ML (ref 18–58)
ECHO LA VOL MOD A2C: 56 ML (ref 18–58)
ECHO LA VOL MOD A4C: 61 ML (ref 18–58)
ECHO LA VOL/BSA BIPLANE: 31 ML/M2 (ref 16–34)
ECHO LA VOLUME INDEX MOD A2C: 27 ML/M2 (ref 16–34)
ECHO LA VOLUME INDEX MOD A4C: 29 ML/M2 (ref 16–34)
ECHO LV EDV A2C: 44 ML
ECHO LV EDV A4C: 56 ML
ECHO LV EDV INDEX A4C: 27 ML/M2
ECHO LV EDV NDEX A2C: 21 ML/M2
ECHO LV EJECTION FRACTION A2C: 53 %
ECHO LV EJECTION FRACTION A4C: 66 %
ECHO LV EJECTION FRACTION BIPLANE: 58 % (ref 55–100)
ECHO LV ESV A2C: 21 ML
ECHO LV ESV A4C: 19 ML
ECHO LV ESV INDEX A2C: 10 ML/M2
ECHO LV ESV INDEX A4C: 9 ML/M2
ECHO LV FRACTIONAL SHORTENING: 30 % (ref 28–44)
ECHO LV INTERNAL DIMENSION DIASTOLE INDEX: 2.2 CM/M2
ECHO LV INTERNAL DIMENSION DIASTOLIC: 4.6 CM (ref 4.2–5.9)
ECHO LV INTERNAL DIMENSION SYSTOLIC INDEX: 1.53 CM/M2
ECHO LV INTERNAL DIMENSION SYSTOLIC: 3.2 CM
ECHO LV IVSD: 1.1 CM (ref 0.6–1)
ECHO LV MASS 2D: 181.2 G (ref 88–224)
ECHO LV MASS INDEX 2D: 86.7 G/M2 (ref 49–115)
ECHO LV POSTERIOR WALL DIASTOLIC: 1.1 CM (ref 0.6–1)
ECHO LV RELATIVE WALL THICKNESS RATIO: 0.48
ECHO LVOT AREA: 3.5 CM2
ECHO LVOT AV VTI INDEX: 0.84
ECHO LVOT DIAM: 2.1 CM
ECHO LVOT MEAN GRADIENT: 2 MMHG
ECHO LVOT PEAK GRADIENT: 4 MMHG
ECHO LVOT PEAK VELOCITY: 1 M/S
ECHO LVOT STROKE VOLUME INDEX: 35.3 ML/M2
ECHO LVOT SV: 73.7 ML
ECHO LVOT VTI: 21.3 CM
ECHO MV E DECELERATION TIME (DT): 141 MS
ECHO MV E VELOCITY: 1.38 M/S
ECHO PV MAX VELOCITY: 1.3 M/S
ECHO PV PEAK GRADIENT: 7 MMHG
ECHO RA AREA 4C: 19 CM2
ECHO RA END SYSTOLIC VOLUME APICAL 4 CHAMBER INDEX BSA: 22 ML/M2
ECHO RA VOLUME: 45 ML
ECHO RIGHT VENTRICULAR SYSTOLIC PRESSURE (RVSP): 44 MMHG
ECHO RV BASAL DIMENSION: 4.1 CM
ECHO RV FREE WALL PEAK S': 24 CM/S
ECHO RV TAPSE: 2.8 CM (ref 1.7–?)
ECHO TV REGURGITANT MAX VELOCITY: 3.01 M/S
ECHO TV REGURGITANT PEAK GRADIENT: 36 MMHG
EKG ATRIAL RATE: 80 BPM
EKG P AXIS: 60 DEGREES
EKG Q-T INTERVAL: 396 MS
EKG QRS DURATION: 100 MS
EKG QTC CALCULATION (BAZETT): 421 MS
EKG R AXIS: -42 DEGREES
EKG T AXIS: 20 DEGREES
EKG VENTRICULAR RATE: 68 BPM
GLUCOSE BLD-MCNC: 270 MG/DL (ref 75–110)
GLUCOSE BLD-MCNC: 299 MG/DL (ref 75–110)
GLUCOSE BLD-MCNC: 304 MG/DL (ref 75–110)
GLUCOSE BLD-MCNC: 318 MG/DL (ref 75–110)
MICROORGANISM SPEC CULT: NORMAL
MICROORGANISM SPEC CULT: NORMAL
SERVICE CMNT-IMP: NORMAL
SERVICE CMNT-IMP: NORMAL
SPECIMEN DESCRIPTION: NORMAL
SPECIMEN DESCRIPTION: NORMAL

## 2024-06-12 PROCEDURE — 99233 SBSQ HOSP IP/OBS HIGH 50: CPT | Performed by: INTERNAL MEDICINE

## 2024-06-12 PROCEDURE — 6370000000 HC RX 637 (ALT 250 FOR IP)

## 2024-06-12 PROCEDURE — 6370000000 HC RX 637 (ALT 250 FOR IP): Performed by: HOSPITALIST

## 2024-06-12 PROCEDURE — 99233 SBSQ HOSP IP/OBS HIGH 50: CPT | Performed by: STUDENT IN AN ORGANIZED HEALTH CARE EDUCATION/TRAINING PROGRAM

## 2024-06-12 PROCEDURE — 6360000002 HC RX W HCPCS: Performed by: STUDENT IN AN ORGANIZED HEALTH CARE EDUCATION/TRAINING PROGRAM

## 2024-06-12 PROCEDURE — 2580000003 HC RX 258

## 2024-06-12 PROCEDURE — 6370000000 HC RX 637 (ALT 250 FOR IP): Performed by: STUDENT IN AN ORGANIZED HEALTH CARE EDUCATION/TRAINING PROGRAM

## 2024-06-12 PROCEDURE — 97530 THERAPEUTIC ACTIVITIES: CPT

## 2024-06-12 PROCEDURE — 2580000003 HC RX 258: Performed by: HOSPITALIST

## 2024-06-12 PROCEDURE — 82947 ASSAY GLUCOSE BLOOD QUANT: CPT

## 2024-06-12 PROCEDURE — 2000000000 HC ICU R&B

## 2024-06-12 PROCEDURE — 6360000002 HC RX W HCPCS: Performed by: INTERNAL MEDICINE

## 2024-06-12 PROCEDURE — 2580000003 HC RX 258: Performed by: INTERNAL MEDICINE

## 2024-06-12 PROCEDURE — 36415 COLL VENOUS BLD VENIPUNCTURE: CPT

## 2024-06-12 PROCEDURE — 86140 C-REACTIVE PROTEIN: CPT

## 2024-06-12 PROCEDURE — 6360000002 HC RX W HCPCS

## 2024-06-12 RX ORDER — INSULIN GLARGINE 100 [IU]/ML
15 INJECTION, SOLUTION SUBCUTANEOUS ONCE
Status: COMPLETED | OUTPATIENT
Start: 2024-06-12 | End: 2024-06-12

## 2024-06-12 RX ORDER — MIDODRINE HYDROCHLORIDE 5 MG/1
5 TABLET ORAL ONCE
Status: COMPLETED | OUTPATIENT
Start: 2024-06-12 | End: 2024-06-12

## 2024-06-12 RX ORDER — HYDRALAZINE HYDROCHLORIDE 20 MG/ML
10 INJECTION INTRAMUSCULAR; INTRAVENOUS EVERY 6 HOURS PRN
Status: DISCONTINUED | OUTPATIENT
Start: 2024-06-12 | End: 2024-06-21 | Stop reason: HOSPADM

## 2024-06-12 RX ORDER — INSULIN GLARGINE 100 [IU]/ML
10 INJECTION, SOLUTION SUBCUTANEOUS 2 TIMES DAILY
Status: DISCONTINUED | OUTPATIENT
Start: 2024-06-12 | End: 2024-06-14

## 2024-06-12 RX ORDER — AMLODIPINE BESYLATE 10 MG/1
10 TABLET ORAL DAILY
Status: DISCONTINUED | OUTPATIENT
Start: 2024-06-12 | End: 2024-06-19

## 2024-06-12 RX ADMIN — SODIUM CHLORIDE, PRESERVATIVE FREE 10 ML: 5 INJECTION INTRAVENOUS at 09:23

## 2024-06-12 RX ADMIN — SODIUM CHLORIDE, PRESERVATIVE FREE 10 ML: 5 INJECTION INTRAVENOUS at 20:09

## 2024-06-12 RX ADMIN — INSULIN GLARGINE 10 UNITS: 100 INJECTION, SOLUTION SUBCUTANEOUS at 09:08

## 2024-06-12 RX ADMIN — TAMSULOSIN HYDROCHLORIDE 0.4 MG: 0.4 CAPSULE ORAL at 09:00

## 2024-06-12 RX ADMIN — SODIUM CHLORIDE, PRESERVATIVE FREE 10 ML: 5 INJECTION INTRAVENOUS at 09:26

## 2024-06-12 RX ADMIN — ATORVASTATIN CALCIUM 40 MG: 40 TABLET, FILM COATED ORAL at 20:09

## 2024-06-12 RX ADMIN — DOPAMINE HYDROCHLORIDE 17.5 MCG/KG/MIN: 160 INJECTION, SOLUTION INTRAVENOUS at 06:30

## 2024-06-12 RX ADMIN — AMLODIPINE BESYLATE 10 MG: 10 TABLET ORAL at 09:04

## 2024-06-12 RX ADMIN — INSULIN LISPRO 4 UNITS: 100 INJECTION, SOLUTION INTRAVENOUS; SUBCUTANEOUS at 20:09

## 2024-06-12 RX ADMIN — DOPAMINE HYDROCHLORIDE 17.5 MCG/KG/MIN: 160 INJECTION, SOLUTION INTRAVENOUS at 02:36

## 2024-06-12 RX ADMIN — DOPAMINE HYDROCHLORIDE 13.5 MCG/KG/MIN: 160 INJECTION, SOLUTION INTRAVENOUS at 15:47

## 2024-06-12 RX ADMIN — ASPIRIN 81 MG: 81 TABLET, COATED ORAL at 09:00

## 2024-06-12 RX ADMIN — INSULIN GLARGINE 5 UNITS: 100 INJECTION, SOLUTION SUBCUTANEOUS at 09:24

## 2024-06-12 RX ADMIN — INSULIN LISPRO 8 UNITS: 100 INJECTION, SOLUTION INTRAVENOUS; SUBCUTANEOUS at 09:10

## 2024-06-12 RX ADMIN — INSULIN LISPRO 12 UNITS: 100 INJECTION, SOLUTION INTRAVENOUS; SUBCUTANEOUS at 13:43

## 2024-06-12 RX ADMIN — ACETAMINOPHEN 650 MG: 325 TABLET ORAL at 20:08

## 2024-06-12 RX ADMIN — REMDESIVIR 100 MG: 100 INJECTION, POWDER, LYOPHILIZED, FOR SOLUTION INTRAVENOUS at 16:00

## 2024-06-12 RX ADMIN — PANTOPRAZOLE SODIUM 40 MG: 40 TABLET, DELAYED RELEASE ORAL at 06:31

## 2024-06-12 RX ADMIN — DOPAMINE HYDROCHLORIDE 15.5 MCG/KG/MIN: 160 INJECTION, SOLUTION INTRAVENOUS at 10:44

## 2024-06-12 RX ADMIN — ENOXAPARIN SODIUM 40 MG: 100 INJECTION SUBCUTANEOUS at 08:59

## 2024-06-12 RX ADMIN — INSULIN LISPRO 8 UNITS: 100 INJECTION, SOLUTION INTRAVENOUS; SUBCUTANEOUS at 18:45

## 2024-06-12 RX ADMIN — MIDODRINE HYDROCHLORIDE 5 MG: 5 TABLET ORAL at 18:45

## 2024-06-12 RX ADMIN — BUMETANIDE 1 MG: 0.25 INJECTION INTRAMUSCULAR; INTRAVENOUS at 09:00

## 2024-06-12 RX ADMIN — DEXAMETHASONE 6 MG: 4 TABLET ORAL at 09:22

## 2024-06-12 RX ADMIN — INSULIN GLARGINE 10 UNITS: 100 INJECTION, SOLUTION SUBCUTANEOUS at 20:09

## 2024-06-12 ASSESSMENT — PAIN SCALES - GENERAL
PAINLEVEL_OUTOF10: 3
PAINLEVEL_OUTOF10: 3

## 2024-06-12 ASSESSMENT — PAIN DESCRIPTION - DESCRIPTORS: DESCRIPTORS: ACHING

## 2024-06-12 ASSESSMENT — PAIN DESCRIPTION - LOCATION: LOCATION: BACK

## 2024-06-12 ASSESSMENT — PAIN DESCRIPTION - ORIENTATION: ORIENTATION: MID

## 2024-06-12 NOTE — PROGRESS NOTES
2230: Notified both [facility] and Cardiology of SBP is 160s d/t increased dopamine gtt since beginning of shift. Dopamine is at 15.     Monitor for now per Cardiology.

## 2024-06-12 NOTE — CONSULTS
Pulmonary/Critical Care consultation    Patient's name:  Anuj Jovel  Medical Record Number: [id number]  Patient's account/billing number: [id number]  Patient's YOB: 1940  Age: 83 y.o.  Date of Admission: 6/8/2024  8:29 PM  Date of Consult: 6/12/2024      Primary Care Physician: Jeanne Simpson MD      Code Status: Full Code    Reason for consult: COVID-19 infection with pneumonia      HISTORY OF PRESENT ILLNESS:   History was obtained from chart review and the patient.  Anuj Jovel is a 83 y.o. white gentleman was transferred from an outside facility due to heart block needing permanent pacemaker placement.cardiology evaluation is in progress.  Patient found to have COVID-19 infection with CT scan of the chest demonstrating bilateral infiltrates.  Patient on 4 L of oxygen by nasal cannula.  ABG without any CO2 retention.  Patient started on Decadron and baricitinib.  Patient has leukocytosis and hyperglycemia, both of which could be related to corticosteroid use  Patient has some dry cough  No sick contacts  No recent travel    Interval history:  6/12/2024    Patient is on 2 L of oxygen by nasal cannula saturating 95 %  Afebrile and hemodynamic stable  Cough is improving  It is mostly dry  No hemoptysis  Not much shortness of breath or wheezing  No chest pain or pressure  No orthopnea or PND  Fluid balance -9.7 L            Past Medical History:        Diagnosis Date    Diabetes (HCC)     Diverticulitis     History of allergy     Hyperlipidemia     Hypertension     Osteoarthritis     Pneumonia due to COVID-19 virus 12/29/2020       Past Surgical History:        Procedure Laterality Date    BACK SURGERY  09/03/2017    [facility], lumbar fusion    CARDIAC CATHETERIZATION      COLECTOMY  2015    partial colectomy due to diveritc    COLONOSCOPY      TOTAL KNEE ARTHROPLASTY Right        Allergies:    Allergies   Allergen Reactions

## 2024-06-12 NOTE — PROGRESS NOTES
Occupational Therapy  Facility/Department: [facility] CAR 1- SICU  Occupational Therapy Daily Progress Note    Name: Anuj Jovel  : 1940  MRN: [id number]  Date of Service: 2024    Discharge Recommendations:Further therapy recommended at discharge.   Patient would benefit from continued therapy after discharge  OT Equipment Recommendations  Equipment Needed: Yes  ADL Assistive Devices: Toileting - 3-in-1 Commode;Transfer Tub Bench       Patient Diagnosis(es): The primary encounter diagnosis was AV block, Mobitz 1. Diagnoses of Systolic congestive heart failure, unspecified HF chronicity (HCC) and Shortness of breath were also pertinent to this visit.  Past Medical History:  has a past medical history of Diabetes (HCC), Diverticulitis, History of allergy, Hyperlipidemia, Hypertension, Osteoarthritis, and Pneumonia due to COVID-19 virus.  Past Surgical History:  has a past surgical history that includes Cardiac catheterization; Colonoscopy; Total knee arthroplasty (Right); colectomy (2015); and back surgery (2017).           Assessment   Performance deficits / Impairments: Decreased functional mobility ;Decreased ADL status;Decreased safe awareness;Decreased cognition;Decreased endurance;Decreased balance;Decreased high-level IADLs;Decreased strength  Prognosis: Good  Decision Making: High Complexity  REQUIRES OT FOLLOW-UP: Yes  Activity Tolerance  Activity Tolerance: Patient limited by fatigue;Treatment limited secondary to medical complications (free text);Patient Tolerated treatment well  Activity Tolerance Comments: elevated HR and hypotension        Plan   Occupational Therapy Plan  Times Per Week: 4-6 x/wk  Current Treatment Recommendations: Balance training, Functional mobility training, Pain management, Safety education & training, Patient/Caregiver education & training, Equipment evaluation, education, & procurement, Self-Care / ADL, Home management training, Strengthening, Cognitive    ADL  Additional Comments: Pt. focusing on strengthening and standing this date, ADL not completed.                 Cognition  Overall Cognitive Status: Exceptions  Arousal/Alertness: Appropriate responses to stimuli  Following Commands: Follows multistep commands with repitition;Follows multistep commands with increased time  Attention Span: Attends with cues to redirect  Memory: Appears intact  Safety Judgement: Decreased awareness of need for assistance;Decreased awareness of need for safety  Problem Solving: Assistance required to correct errors made;Assistance required to identify errors made;Decreased awareness of errors  Insights: Decreased awareness of deficits  Initiation: Requires cues for some  Sequencing: Requires cues for some  Orientation  Overall Orientation Status: Within Normal Limits  Orientation Level: Oriented to person;Oriented to place;Oriented to situation;Oriented to time                  Education Given To: Patient  Education Provided: Role of Therapy;Transfer Training;ADL Adaptive Strategies;Precautions  Education Provided Comments: proper hand and foot placement; balance maintence; safety precautions-F/P carry over  Education Method: Verbal  Barriers to Learning: Cognition;Hearing  Education Outcome: Continued education needed                            AM-PAC - ADL  AM-PAC Daily Activity - Inpatient   How much help is needed for putting on and taking off regular lower body clothing?: A Lot  How much help is needed for bathing (which includes washing, rinsing, drying)?: A Lot  How much help is needed for toileting (which includes using toilet, bedpan, or urinal)?: A Lot  How much help is needed for putting on and taking off regular upper body clothing?: A Lot  How much help is needed for taking care of personal grooming?: A Little  How much help for eating meals?: None  AM-[facility] Inpatient Daily Activity Raw Score: 15  AM-PAC Inpatient ADL T-Scale Score : 34.69  ADL Inpatient CMS 0-100%

## 2024-06-12 NOTE — PLAN OF CARE
Problem: Safety - Adult  Goal: Free from fall injury  Outcome: Progressing     Problem: Chronic Conditions and Co-morbidities  Goal: Patient's chronic conditions and co-morbidity symptoms are monitored and maintained or improved  Outcome: Progressing  Flowsheets (Taken 6/11/2024 2000)  Care Plan - Patient's Chronic Conditions and Co-Morbidity Symptoms are Monitored and Maintained or Improved: Monitor and assess patient's chronic conditions and comorbid symptoms for stability, deterioration, or improvement     Problem: Discharge Planning  Goal: Discharge to home or other facility with appropriate resources  Outcome: Progressing  Flowsheets (Taken 6/11/2024 2000)  Discharge to home or other facility with appropriate resources: Identify barriers to discharge with patient and caregiver     Problem: Skin/Tissue Integrity  Goal: Absence of new skin breakdown  Description: 1.  Monitor for areas of redness and/or skin breakdown  2.  Assess vascular access sites hourly  3.  Every 4-6 hours minimum:  Change oxygen saturation probe site  4.  Every 4-6 hours:  If on nasal continuous positive airway pressure, respiratory therapy assess nares and determine need for appliance change or resting period.  Outcome: Progressing     Problem: ABCDS Injury Assessment  Goal: Absence of physical injury  Outcome: Progressing     Problem: Pain  Goal: Verbalizes/displays adequate comfort level or baseline comfort level  Outcome: Progressing

## 2024-06-12 NOTE — PROGRESS NOTES
[facility]  Office: [phone]  Anderson Juarez DO, Colin Dominguez DO, Glenn Shepherd DO, Alexsander Vang DO, Cortney Marks MD, Diane Hays MD, Kei Redmond MD, Kierra Jean MD,  Arnold Garza MD, Andrew Devine MD, Hetal Morrell MD,  Adele Shaver DO, Raimundo Dela Cruz MD, Ash Viveros MD, Lyndon Juarez DO, Lea Nieves MD,  Isrrael Grewal DO, Dorys Diallo MD, Kerline Flores MD, Aisha Judd MD, Anselmo Daley MD,  Levar Lehman MD, Delbert Fu MD, Keshia Sims MD, Michelle Alvarez MD, Troy Brizuela MD, Katarina Emerson MD, Moo So DO, Floyd Harper DO, Lucina Sung MD,  Jimmie Gee MD, Shirley Waterhouse, CNP,  Mary Jimenez CNP, Kartik Mcwilliams, CNP,  Sara Solorzano, DNP, Marlyn Valdez, CNP, Alyssia Delgadillo, CNP, Duyen Garibay CNP, Janice Mendez, CNP, Tessie Cardona, PA-C, Martina Harris PA-C, Dena Álvarez, CNP, Jeanette Cordero, CNP, Jerome Duran, CNP, Nandini Barraza, CNP, Marilia Yeager, CNP, Angelica Granados, CNS, Cassandra Herrera, CNP, Lexy Canela CNP, Tracy Schwab, CNP         [facility]   IN-PATIENT SERVICE   [facility]    Progress Note    6/12/2024    9:11 AM    Name:   Anuj Jovel  MRN:     [id number]     Acct:      [id number]   Room:   [facility]3/1003-01   Day:  4  Admit Date:  6/8/2024  8:29 PM    PCP:   Jeanne Simpson MD  Code Status:  Full Code    Subjective:     C/C: No chief complaint on file.    Interval History Status: not changed.     Patient seen examined at bedside. Still on dopamine. Heart rate and BP stable. Still getting light headed when working with therapy. Glucose elevate. Worsened by steroids.     Brief History:     83-year-old male past medical history of KAREY, BPH, hypertension, diabetes type 2, coronary artery disease, mitral valve regurgitation presented to outlying facility due to shortness of breath lower extremity weakness found to be positive for COVID-19.  Patient was transferred to [facility]

## 2024-06-12 NOTE — PROGRESS NOTES
[facility]  Office: [phone]  Anderson Juarez DO, Colin Dominguez, DO, Glenn Shepherd DO, Alexsander Vang, DO, Cortney Marks MD, Diane Hays MD, Kei Redmond MD, Kierra Jean MD,  Arnold Garza MD, Andrew Devine MD, Hetal Morrell MD,  Adele Shaver DO, Raimundo Dela Cruz MD, Ash Viveros MD, Lyndon Juarez DO, Lea Nieves MD,  Isrrael Grewal DO, Dorys Diallo MD, Kerline Flores MD, Aisha Judd MD, Anselmo Daley MD,  Levar Lehman MD, Delbert Fu MD, Keshia Sims MD, Michelle Alvarez MD, Troy Brizuela MD, Katarina Emerson MD, Moo So DO, Floyd Harper DO, Lucina Sung MD,  Jimmie Gee MD, Shirley Waterhouse, CNP,  Mary Jimenez CNP, Kartik Mcwilliams, CNP,  Sara Solorzano, DNP, Marlyn Valdez, CNP, Alyssia Delgadillo, CNP, Duyen Garibay, CNP, Janice Mendez, CNP, Tessie Cardona, PA-C, Martina Harris PA-C, Dena Álvarez, CNP, Jeanette Cordero, CNP, Jerome Duran, CNP, Nandini Barraza, CNP, Marilia Yeager, CNP, Angelica Granados, CNS, Cassandra Herrera, CNP, Lexy Canela CNP, Tracy Schwab, CNP         [facility]   IN-PATIENT SERVICE   [facility]    Second Visit Note  For more detailed information please refer to the progress note of the day      6/12/2024    5:38 PM    Name:   Anuj Jovel  MRN:     [id number]     Acct:      [id number]   Room:   1003/1003-01  IP Day:  4  Admit Date:  6/8/2024  8:29 PM    PCP:   Jeanne Simpson MD  Code Status:  Full Code      Pt vitals were reviewed   New labs were reviewed   Patient was seen    Updated plan :     Discussed with patient. Still has some questions on pacemaker. Asking for second opinion. Will respect patient's wishes sent message to Dr Jojo Morrell MD  6/12/2024  5:38 PM

## 2024-06-12 NOTE — PROGRESS NOTES
[facility] - Progress Note    COVID 19 Patient  Today's Date and Time: 6/12/2024, 6:59 AM    Impression :     COVID 19 Confirmed Infection  Covid tests:  Positive Covid Test Date: 6/7/24  Vaccination Status:  Received Covid 19 vaccines on 1/22/21, 2/19/21, 11/2/21 and 9/1/23  Bradycardia  Second degree heart block, Mobitz type I  Systolic heart failure  Acute hypoxic respiratory distress  CRP elevation  Altered mentation  CAD  Pulmonary HTN  Essential HTN  Diabetes mellitus  HLD  BPH  PCN, flagyl and sulfa allergy  Recommendations:   Antibiotic treatment:  Monitor off antibiotics  Covid Rx:    Remdesivir: Requested 6-9-24. Stop date 6-13-24.  Decadron: 6 mg daily x 6 days initiated 6/7/24  Baricitinib: Initiated 6/7/24 . Will D/C  Paxlovid: Likely out of window  Monitor CRP: 6.7-->20.3    OK for PPM at the discretion of Cardiology    Medical Decision Making/Summary/Discussion:6/12/2024     Patient admitted with COVID 19 infection    Infection Control Recommendations   [place] Precautions  Droplet Plus Isolation. Stop date 6/17/24    Antimicrobial Stewardship Recommendations     Simplification of therapy  Targeted therapy    Coordination of Outpatient Care:   Estimated Length of IV antimicrobials:TBD  Patient will need Midline Catheter Insertion: TBD  Patient will need PICC line Insertion: No  Patient will need: Home IV , Infusion Center,  SNF,  LTAC:TBD  Patient will need outpatient wound care:No    Chief complaint/reason for consultation:   Concern for COVID infection      History of Present Illness:   Anuj Jovel is a 83 y.o.-year-old  male who was initially admitted on 6/8/2024. Patient seen at the request of Dr. Juarez    INITIAL HISTORY:    Patient, with a hx of [place], CAD w/stenting, CHF, DM, HTN and BPH, presented through [facility] on 6/7/24 with complaints of generalized malaise and shortness of breath x one day.    In the ED, he was found to have  Studies with personal review of radiologic studies  Laboratory  Radiology  Microbiology  Covid + on 6-7-24  Please see Details in daily Interval Changes Section devoted to Lab, Micro and Radiology   Review of Infection Control and Prevention measures:  Droplet plus isolation  Please see daily details in Infection Control Recommendations section  Administer medications as ordered:  Decadron  Remdesivir  Review of Antimicrobial Stewardship Measures:  Targeted therapy  PK dosing  Please see daily details in Antimicrobial Stewardship Recommendations section   Prognosis:  Guarded  Review of Discharge Planning:  Please see daily details in Coordination of Outpatient Care section  Review of need for outpatient follow up.      Theodore Bernard MD 6/12/2024

## 2024-06-12 NOTE — PROGRESS NOTES
°C) (Oral)   Resp 26   Ht 1.702 m (5' 7\")   Wt 98 kg (216 lb)   SpO2 94%   BMI 33.83 kg/m²   General appearance: alert and cooperative with exam  HEENT: Normocephalic, atraumatic   Neck: no carotid bruit, no JVD, trachea midline and thyroid not enlarged  Lungs: clear to auscultation bilaterally  Heart: regular rate and rhythm, S1, S2 normal, no murmur  Abdomen: soft, bowel sounds normal  Extremities: no edema or swelling     EKG:             Results for orders placed or performed during the hospital encounter of 12/29/20   EKG 12 Lead   Result Value Ref Range     Ventricular Rate 34 BPM     Atrial Rate 67 BPM     P-R Interval 182 ms     QRS Duration 108 ms     Q-T Interval 520 ms     QTc Calculation (Bazett) 390 ms     P Axis 29 degrees     R Axis -33 degrees     T Axis 23 degrees     Narrative     Sinus rhythm with 2nd degree A-V block with 2:1 A-V conduction  Left axis deviation  ST & T wave abnormality, consider anterior ischemia  Abnormal ECG  When compared with ECG of 31-DEC-2020 15:00,  Sinus rhythm is now with 2nd degree A-V block  Vent. rate has decreased BY  33 BPM  T wave inversion now evident in Anterior leads         Echo: 06/09/2024  eft Ventricle: Normal left ventricular systolic function with a visually estimated EF of 55 - 60%. Left ventricle size is normal. Normal wall thickness. Normal wall motion.    Aortic Valve: Trileaflet valve.    Mitral Valve: Mild regurgitation.    Tricuspid Valve: Mildly elevated RVSP, consistent with mild pulmonary hypertension. The estimated RVSP is 44 mmHg.    Image quality is adequate.       Assessment:   Symptomatic bradycardia - Mobitz type I.   Chronic Mobitz type I with intermittent 2-1 AV block  COVID-19 infection/pneumonia  History of CAD with previous PCI to LAD  HFpEF  Diabetes mellitus  Hypertension    Plan:   Continue with IV dopamine to target HR>50  Start amlodipine 10 mg daily for better control of blood pressure and IV hydralazine as needed  Avoid AV

## 2024-06-12 NOTE — PLAN OF CARE
Problem: Safety - Adult  Goal: Free from fall injury  6/12/2024 1450 by Kim Voss RN  Outcome: Progressing  6/12/2024 0408 by Sofie Ferreira RN  Outcome: Progressing     Problem: Chronic Conditions and Co-morbidities  Goal: Patient's chronic conditions and co-morbidity symptoms are monitored and maintained or improved  6/12/2024 1450 by Kim Voss RN  Outcome: Progressing  Flowsheets (Taken 6/12/2024 0800 by Isrrael Griggs RN)  Care Plan - Patient's Chronic Conditions and Co-Morbidity Symptoms are Monitored and Maintained or Improved:   Monitor and assess patient's chronic conditions and comorbid symptoms for stability, deterioration, or improvement   Collaborate with multidisciplinary team to address chronic and comorbid conditions and prevent exacerbation or deterioration   Update acute care plan with appropriate goals if chronic or comorbid symptoms are exacerbated and prevent overall improvement and discharge  6/12/2024 0408 by Sofie Ferreira RN  Outcome: Progressing  Flowsheets (Taken 6/11/2024 2000)  Care Plan - Patient's Chronic Conditions and Co-Morbidity Symptoms are Monitored and Maintained or Improved: Monitor and assess patient's chronic conditions and comorbid symptoms for stability, deterioration, or improvement     Problem: Discharge Planning  Goal: Discharge to home or other facility with appropriate resources  6/12/2024 1450 by Kim Voss RN  Outcome: Progressing  Flowsheets (Taken 6/12/2024 0800 by Isrrael Griggs RN)  Discharge to home or other facility with appropriate resources:   Identify barriers to discharge with patient and caregiver   Arrange for needed discharge resources and transportation as appropriate   Identify discharge learning needs (meds, wound care, etc)   Refer to discharge planning if patient needs post-hospital services based on physician order or complex needs related to functional status, cognitive ability or social support system  6/12/2024 0408 by

## 2024-06-13 PROBLEM — R00.1 SYMPTOMATIC BRADYCARDIA: Status: ACTIVE | Noted: 2024-06-08

## 2024-06-13 LAB
ANION GAP SERPL CALCULATED.3IONS-SCNC: 15 MMOL/L (ref 9–16)
BASOPHILS # BLD: 0.03 K/UL (ref 0–0.2)
BASOPHILS NFR BLD: 0 % (ref 0–2)
BUN SERPL-MCNC: 33 MG/DL (ref 8–23)
CALCIUM SERPL-MCNC: 8.9 MG/DL (ref 8.6–10.4)
CHLORIDE SERPL-SCNC: 98 MMOL/L (ref 98–107)
CO2 SERPL-SCNC: 21 MMOL/L (ref 20–31)
CREAT SERPL-MCNC: 1.2 MG/DL (ref 0.7–1.2)
CRP SERPL HS-MCNC: 7.2 MG/L (ref 0–5)
EOSINOPHIL # BLD: <0.03 K/UL (ref 0–0.44)
EOSINOPHILS RELATIVE PERCENT: 0 % (ref 1–4)
ERYTHROCYTE [DISTWIDTH] IN BLOOD BY AUTOMATED COUNT: 12.6 % (ref 11.8–14.4)
GFR, ESTIMATED: 61 ML/MIN/1.73M2
GLUCOSE BLD-MCNC: 284 MG/DL (ref 75–110)
GLUCOSE BLD-MCNC: 302 MG/DL (ref 75–110)
GLUCOSE BLD-MCNC: 306 MG/DL (ref 75–110)
GLUCOSE BLD-MCNC: 365 MG/DL (ref 75–110)
GLUCOSE SERPL-MCNC: 237 MG/DL (ref 74–99)
HCT VFR BLD AUTO: 47.5 % (ref 40.7–50.3)
HGB BLD-MCNC: 16.2 G/DL (ref 13–17)
IMM GRANULOCYTES # BLD AUTO: 0.1 K/UL (ref 0–0.3)
IMM GRANULOCYTES NFR BLD: 1 %
INTERVENTION: NORMAL
LYMPHOCYTES NFR BLD: 1.07 K/UL (ref 1.1–3.7)
LYMPHOCYTES RELATIVE PERCENT: 7 % (ref 24–43)
MCH RBC QN AUTO: 31.4 PG (ref 25.2–33.5)
MCHC RBC AUTO-ENTMCNC: 34.1 G/DL (ref 28.4–34.8)
MCV RBC AUTO: 92.1 FL (ref 82.6–102.9)
MONOCYTES NFR BLD: 1 K/UL (ref 0.1–1.2)
MONOCYTES NFR BLD: 6 % (ref 3–12)
MRSA, DNA, NASAL: NEGATIVE
NEUTROPHILS NFR BLD: 86 % (ref 36–65)
NEUTS SEG NFR BLD: 14.37 K/UL (ref 1.5–8.1)
NRBC BLD-RTO: 0 PER 100 WBC
PLATELET # BLD AUTO: 224 K/UL (ref 138–453)
PMV BLD AUTO: 9.1 FL (ref 8.1–13.5)
POTASSIUM SERPL-SCNC: 4.1 MMOL/L (ref 3.7–5.3)
RBC # BLD AUTO: 5.16 M/UL (ref 4.21–5.77)
SODIUM SERPL-SCNC: 134 MMOL/L (ref 136–145)
SPECIMEN DESCRIPTION: NORMAL
WBC OTHER # BLD: 16.6 K/UL (ref 3.5–11.3)

## 2024-06-13 PROCEDURE — 6360000002 HC RX W HCPCS: Performed by: STUDENT IN AN ORGANIZED HEALTH CARE EDUCATION/TRAINING PROGRAM

## 2024-06-13 PROCEDURE — 2580000003 HC RX 258

## 2024-06-13 PROCEDURE — 2000000000 HC ICU R&B

## 2024-06-13 PROCEDURE — 99233 SBSQ HOSP IP/OBS HIGH 50: CPT | Performed by: INTERNAL MEDICINE

## 2024-06-13 PROCEDURE — 6370000000 HC RX 637 (ALT 250 FOR IP): Performed by: STUDENT IN AN ORGANIZED HEALTH CARE EDUCATION/TRAINING PROGRAM

## 2024-06-13 PROCEDURE — 85025 COMPLETE CBC W/AUTO DIFF WBC: CPT

## 2024-06-13 PROCEDURE — 99233 SBSQ HOSP IP/OBS HIGH 50: CPT | Performed by: SPECIALIST

## 2024-06-13 PROCEDURE — 2580000003 HC RX 258: Performed by: HOSPITALIST

## 2024-06-13 PROCEDURE — 87641 MR-STAPH DNA AMP PROBE: CPT

## 2024-06-13 PROCEDURE — 6360000002 HC RX W HCPCS

## 2024-06-13 PROCEDURE — 94761 N-INVAS EAR/PLS OXIMETRY MLT: CPT

## 2024-06-13 PROCEDURE — 82947 ASSAY GLUCOSE BLOOD QUANT: CPT

## 2024-06-13 PROCEDURE — 6370000000 HC RX 637 (ALT 250 FOR IP): Performed by: HOSPITALIST

## 2024-06-13 PROCEDURE — 6360000002 HC RX W HCPCS: Performed by: INTERNAL MEDICINE

## 2024-06-13 PROCEDURE — 80048 BASIC METABOLIC PNL TOTAL CA: CPT

## 2024-06-13 PROCEDURE — 6370000000 HC RX 637 (ALT 250 FOR IP)

## 2024-06-13 PROCEDURE — 2580000003 HC RX 258: Performed by: INTERNAL MEDICINE

## 2024-06-13 PROCEDURE — 2700000000 HC OXYGEN THERAPY PER DAY

## 2024-06-13 PROCEDURE — 99291 CRITICAL CARE FIRST HOUR: CPT | Performed by: INTERNAL MEDICINE

## 2024-06-13 PROCEDURE — 86140 C-REACTIVE PROTEIN: CPT

## 2024-06-13 PROCEDURE — 36415 COLL VENOUS BLD VENIPUNCTURE: CPT

## 2024-06-13 RX ORDER — INSULIN GLARGINE 100 [IU]/ML
15 INJECTION, SOLUTION SUBCUTANEOUS ONCE
Status: COMPLETED | OUTPATIENT
Start: 2024-06-13 | End: 2024-06-13

## 2024-06-13 RX ORDER — INSULIN GLARGINE 100 [IU]/ML
10 INJECTION, SOLUTION SUBCUTANEOUS ONCE
Status: COMPLETED | OUTPATIENT
Start: 2024-06-13 | End: 2024-06-13

## 2024-06-13 RX ORDER — DOPAMINE HYDROCHLORIDE 160 MG/100ML
1-20 INJECTION, SOLUTION INTRAVENOUS CONTINUOUS
Status: DISCONTINUED | OUTPATIENT
Start: 2024-06-13 | End: 2024-06-17

## 2024-06-13 RX ADMIN — HYDRALAZINE HYDROCHLORIDE 10 MG: 20 INJECTION INTRAMUSCULAR; INTRAVENOUS at 06:40

## 2024-06-13 RX ADMIN — INSULIN GLARGINE 10 UNITS: 100 INJECTION, SOLUTION SUBCUTANEOUS at 17:47

## 2024-06-13 RX ADMIN — ATORVASTATIN CALCIUM 40 MG: 40 TABLET, FILM COATED ORAL at 20:21

## 2024-06-13 RX ADMIN — DEXAMETHASONE 6 MG: 4 TABLET ORAL at 09:34

## 2024-06-13 RX ADMIN — INSULIN GLARGINE 15 UNITS: 100 INJECTION, SOLUTION SUBCUTANEOUS at 14:06

## 2024-06-13 RX ADMIN — ENOXAPARIN SODIUM 40 MG: 100 INJECTION SUBCUTANEOUS at 09:23

## 2024-06-13 RX ADMIN — SODIUM CHLORIDE, PRESERVATIVE FREE 10 ML: 5 INJECTION INTRAVENOUS at 09:26

## 2024-06-13 RX ADMIN — DOPAMINE HYDROCHLORIDE 12.5 MCG/KG/MIN: 160 INJECTION, SOLUTION INTRAVENOUS at 09:26

## 2024-06-13 RX ADMIN — SODIUM CHLORIDE, PRESERVATIVE FREE 10 ML: 5 INJECTION INTRAVENOUS at 20:22

## 2024-06-13 RX ADMIN — INSULIN GLARGINE 10 UNITS: 100 INJECTION, SOLUTION SUBCUTANEOUS at 20:30

## 2024-06-13 RX ADMIN — ASPIRIN 81 MG: 81 TABLET, COATED ORAL at 09:26

## 2024-06-13 RX ADMIN — INSULIN LISPRO 16 UNITS: 100 INJECTION, SOLUTION INTRAVENOUS; SUBCUTANEOUS at 17:46

## 2024-06-13 RX ADMIN — PANTOPRAZOLE SODIUM 40 MG: 40 TABLET, DELAYED RELEASE ORAL at 06:43

## 2024-06-13 RX ADMIN — ACETAMINOPHEN 650 MG: 325 TABLET ORAL at 09:44

## 2024-06-13 RX ADMIN — INSULIN GLARGINE 10 UNITS: 100 INJECTION, SOLUTION SUBCUTANEOUS at 09:21

## 2024-06-13 RX ADMIN — DOPAMINE HYDROCHLORIDE 20 MCG/KG/MIN: 160 INJECTION, SOLUTION INTRAVENOUS at 05:54

## 2024-06-13 RX ADMIN — INSULIN LISPRO 12 UNITS: 100 INJECTION, SOLUTION INTRAVENOUS; SUBCUTANEOUS at 11:42

## 2024-06-13 RX ADMIN — INSULIN LISPRO 8 UNITS: 100 INJECTION, SOLUTION INTRAVENOUS; SUBCUTANEOUS at 09:21

## 2024-06-13 RX ADMIN — ACETAMINOPHEN 650 MG: 325 TABLET ORAL at 17:46

## 2024-06-13 RX ADMIN — DOPAMINE HYDROCHLORIDE 10 MCG/KG/MIN: 160 INJECTION, SOLUTION INTRAVENOUS at 00:45

## 2024-06-13 RX ADMIN — REMDESIVIR 100 MG: 100 INJECTION, POWDER, LYOPHILIZED, FOR SOLUTION INTRAVENOUS at 14:09

## 2024-06-13 RX ADMIN — SODIUM CHLORIDE, PRESERVATIVE FREE 10 ML: 5 INJECTION INTRAVENOUS at 20:21

## 2024-06-13 RX ADMIN — TAMSULOSIN HYDROCHLORIDE 0.4 MG: 0.4 CAPSULE ORAL at 09:26

## 2024-06-13 RX ADMIN — INSULIN LISPRO 4 UNITS: 100 INJECTION, SOLUTION INTRAVENOUS; SUBCUTANEOUS at 20:29

## 2024-06-13 RX ADMIN — DOPAMINE HYDROCHLORIDE 10 MCG/KG/MIN: 160 INJECTION, SOLUTION INTRAVENOUS at 16:33

## 2024-06-13 ASSESSMENT — PAIN SCALES - GENERAL
PAINLEVEL_OUTOF10: 3
PAINLEVEL_OUTOF10: 0
PAINLEVEL_OUTOF10: 3
PAINLEVEL_OUTOF10: 0
PAINLEVEL_OUTOF10: 3
PAINLEVEL_OUTOF10: 0

## 2024-06-13 ASSESSMENT — PAIN DESCRIPTION - LOCATION
LOCATION: HEAD

## 2024-06-13 ASSESSMENT — PAIN SCALES - WONG BAKER: WONGBAKER_NUMERICALRESPONSE: NO HURT

## 2024-06-13 NOTE — PLAN OF CARE
Problem: Safety - Adult  Goal: Free from fall injury  6/13/2024 1108 by Lorna Esparza RN  Outcome: Progressing     Problem: Chronic Conditions and Co-morbidities  Goal: Patient's chronic conditions and co-morbidity symptoms are monitored and maintained or improved  6/13/2024 1108 by Lorna Esparza RN  Outcome: Progressing     Problem: Discharge Planning  Goal: Discharge to home or other facility with appropriate resources  6/13/2024 1108 by Lorna Esparza RN  Outcome: Progressing     Problem: Skin/Tissue Integrity  Goal: Absence of new skin breakdown  Description: 1.  Monitor for areas of redness and/or skin breakdown  2.  Assess vascular access sites hourly  3.  Every 4-6 hours minimum:  Change oxygen saturation probe site  4.  Every 4-6 hours:  If on nasal continuous positive airway pressure, respiratory therapy assess nares and determine need for appliance change or resting period.  6/13/2024 1108 by Lorna Esparza RN  Outcome: Progressing     Problem: ABCDS Injury Assessment  Goal: Absence of physical injury  6/13/2024 1108 by Lorna Esparza RN  Outcome: Progressing     Problem: Pain  Goal: Verbalizes/displays adequate comfort level or baseline comfort level  6/13/2024 1108 by Lorna Esparza RN  Outcome: Progressing

## 2024-06-13 NOTE — PROGRESS NOTES
22 99 %   06/13/24 0000 (!) 122/99 97.4 °F (36.3 °C) Oral 69 16 92 %   06/12/24 2315 93/64 -- -- 71 19 92 %       General Appearance: Patient disoriented and restless  Head:  Normocephalic, no trauma  Eyes: Pupils equal, round, reactive to light; sclera anicteric; conjunctivae pink. No embolic phenomena.  ENT: Oropharynx clear, without erythema, exudate, or thrush. No tenderness of sinuses. Mouth/throat: mucosa pink and moist. No lesions. Dentition in good repair.  Neck:Supple, without lymphadenopathy. Thyroid normal, No bruits.  Pulmonary/Chest: Clear to auscultation, without wheezes, rales, or rhonchi.  No dullness to percussion.   Cardiovascular: On dopamine gtt-mobitz 2 heart block   Abdomen: Soft, non tender. Bowel sounds normal. No organomegaly  All four Extremities: No cyanosis, clubbing, edema, or effusions.  Neurologic: No gross sensory or motor deficits. Disoriented.  Skin: Warm and dry with good turgor.No signs of peripheral arterial or venous insufficiency. No ulcerations. No open wounds.    Medical Decision Making -Laboratory:   I have independently reviewed/ordered the following labs:    CBC with Differential:   Recent Labs     06/13/24  0356   WBC 16.6*   HGB 16.2   HCT 47.5      LYMPHOPCT 7*   MONOPCT 6   EOSPCT 0*       BMP:   Recent Labs     06/11/24  0423 06/13/24  0356   * 134*   K 4.0 4.1   CL 98 98   CO2 24 21   BUN 23 33*   CREATININE 1.0 1.2       Hepatic Function Panel:   No results for input(s): \"PROT\", \"LABALBU\", \"BILIDIR\", \"IBILI\", \"BILITOT\", \"ALKPHOS\", \"ALT\", \"AST\" in the last 72 hours.    No results for input(s): \"RPR\" in the last 72 hours.  No results for input(s): \"HIV\" in the last 72 hours.  No results for input(s): \"BC\" in the last 72 hours.  Lab Results   Component Value Date/Time    RBC 5.16 06/13/2024 03:56 AM    WBC 16.6 06/13/2024 03:56 AM    TURBIDITY Clear 06/08/2024 11:58 PM     Lab Results   Component Value Date/Time    CREATININE 1.2 06/13/2024 03:56 AM     in   samples that have a Detected SARS-CoV-2 result.  Consider re-testing with an alternate   FDA-approved test if clinically indicated.         Culture, Blood 1 [[id number]] Collected: 06/07/24 0925    Order Status: Completed Specimen: Blood Updated: 06/12/24 1411     Specimen Description .BLOOD LT HAND     Special Requests          Culture NO GROWTH 5 DAYS    Culture, Blood 1 [[id number]] Collected: 06/07/24 0920    Order Status: Completed Specimen: Blood Updated: 06/12/24 1415     Specimen Description .BLOOD RT HAND     Special Requests          Culture NO GROWTH 5 DAYS    Culture, Urine [[id number]] Collected: 06/07/24 0850    Order Status: Completed Specimen: Urine, clean catch Updated: 06/08/24 0759     Specimen Description .CLEAN CATCH URINE     Culture NO GROWTH              Medical Decision Making-Other:     Note:      Theodore Bernard MD  Office: [phone]    Daily Elements of Decision Making provided by Consulting Physician:  Note: I have independently performed the steps listed below as part of the medical decision making and evaluation.    Review of current Problems:  COVID 19 Confirmed Infection  Covid tests:  Positive Covid Test Date: 6/7/24  Vaccination Status:  Received Covid 19 vaccines on 1/22/21, 2/19/21, 11/2/21 and 9/1/23  Bradycardia  Second degree heart block, Mobitz type I  Systolic heart failure  Acute hypoxic respiratory distress  CRP elevation  Altered mentation  CAD  Pulmonary HTN  Essential HTN  Diabetes mellitus  HLD  BPH  PCN, flagyl and sulfa allergy  Evaluation of Patient:  Patient evaluated and examined in the ICU.   Evaluated because of Covid  Please see daily details in Interval Changes Section  Changes in physical exam:  Bradycardia  Second degree heart block, Mobitz type I  Systolic heart failure  Acute hypoxic respiratory distress  Please see daily details in Physical Exam section  Changes in ROS:  Unchanged  Please see daily details in Review of Symptoms

## 2024-06-13 NOTE — H&P
Critical Care - History and Physical Examination    Patient's name:  Anuj Jovel  Medical Record Number: [id number]  Patient's account/billing number: [id number]  Patient's YOB: 1940  Age: 83 y.o.  Date of Admission: 6/8/2024  8:29 PM  Reason of ICU admission:   Date of History and Physical Examination: 6/13/2024      Primary Care Physician: Jeanne Simpson MD  Attending Physician:    Code Status: Full Code    Chief complaint: SOB, lightheadedness    Reason for ICU admission:  bradycardia requiring dopamine drip      History Of Present Illness:   History was obtained from chart review and the patient.      Anuj Jovel is a 83 y.o. who initially presented to [facility] with weakness, shortness of breath and was found to have COVID-19 infection/pneumonia.  Patient was noted to have bradycardia at [facility] with Mobitz type I block as well as 2-1 AV block.  Patient was subsequently transferred to [place] for further management of bradycardia.  Patient arrived to [facility] overnight.  He was on dopamine drip.  Telemetry with evidence of Mobitz type I.  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  Patient was admitted to cardiac ICU.  He was being followed by cardiology and is on dopamine drip.  He is being followed by infectious diseases for COVID-19 pneumonia.  Due to limited availability of bed on cardiac ICU, patient was transferred to medical ICU.  Currently patient is awake and alert.  Currently is on dopamine drip with heart rate in 80s.  He is in normal sinus rhythm     He has past medical history of CAD with previous stenting to LAD per documentation, HFpEF.        Past Medical History:        Diagnosis Date    Diabetes (HCC)     Diverticulitis     History of allergy     Hyperlipidemia     Hypertension     Osteoarthritis     Pneumonia due to COVID-19 virus 12/29/2020       Past Surgical History:        Procedure Laterality Date    BACK SURGERY  09/03/2017  results for input(s): \"INR\" in the last 72 hours.  Hepatic functions: No results for input(s): \"ALKPHOS\", \"ALT\", \"AST\", \"PROT\", \"BILITOT\", \"BILIDIR\", \"LABALBU\" in the last 72 hours.  Pancreatic functions:No results for input(s): \"LACTA\", \"AMYLASE\" in the last 72 hours.  S. Lactic Acid: No results for input(s): \"LACTA\" in the last 72 hours.  Cardiac enzymes:No results for input(s): \"CKTOTAL\", \"CKMB\", \"CKMBINDEX\", \"TROPONINI\" in the last 72 hours.  BNP:No results for input(s): \"BNP\" in the last 72 hours.  Lipid profile: No results for input(s): \"CHOL\", \"TRIG\", \"HDL\", \"LDL\" in the last 72 hours.    Invalid input(s): \"LDLCALC\"  Blood Gases: No results found for: \"PH\", \"PCO2\", \"PO2\", \"HCO3\", \"O2SAT\"  Thyroid functions:   Lab Results   Component Value Date/Time    TSH 0.22 06/08/2024 09:24 PM        Urinalysis:     Microbiology:  Cultures during this admission:     Blood cultures:                 [] None drawn      [] Negative             []  Positive (Details:  )  Urine Culture:                   [] None drawn      [] Negative             []  Positive (Details:  )  Sputum Culture:               [] None drawn       [] Negative             []  Positive (Details:  )   Endotracheal aspirate:     [] None drawn       [] Negative             []  Positive (Details:  )         -----------------------------------------------------------------  Radiological reports:    CXR:    Electrocardiogram:     Last Echocardiogram findings:          Assessment and Plan     Patient Active Problem List   Diagnosis    Acquired deviated nasal septum    Actinic keratoses    Arthritis    Benign prostatic hyperplasia    Bloating    Chronic serous otitis media    Claustrophobia    Coronary artery disease    Esophageal reflux    Flatus    Hearing loss    Hemorrhoids    History of allergy    Hyperlipidemia    Essential hypertension    Leg swelling    Lumbar radiculopathy    Lumbar stenosis    Skin neoplasm    Tinea corporis    Ventral hernia    Weight

## 2024-06-13 NOTE — CONSULTS
I was asked to get involved with the care of this gentleman for a second opinion.  Mr. Garcia was admitted with active confirmed COVID infection and he has been in isolation and on medical treatment since then.    His monitor and EKG showed second-degree AV block type I.  Patient was a started on dopamine to improve the heart rate.    The second-degree AV block was documented for at least 2 years if not more and at 1 point it was significantly worse than that.    I do not think his AV conduction issue is related to the COVID.    Other than the COVID issue there is no correctable metabolic abnormality to explain his second-degree AV block.  His second-degree AV block is native to his conduction system has nothing to do with the COVID.    Secondary or may be secondary to dopamine drip at increased doses his high blood pressure becomes an issue and now he has to be treated for the high blood pressure.    Dr. Lancaster saw him on 06/08/2024.    I do not see in Dr. Lancaster's recommendation for permanent pacemaker but I was asked to see the patient in the note I received the patient asked for a second opinion in regard to a pacemaker.    Past medical history is significant for diabetes diverticulitis hyperlipidemia hypertension osteoarthritis and now pneumonia.    Past surgical history significant for back surgery colectomy total knee arthroplasties.    I reviewed his home medications and patient has not been on any AV dawn blocking agent.    It is clear by Dr. Lancaster that he asked to avoid SA dawn and AV dawn blocking agents.    Echo reported to show preserved left ventricular systolic function as of February 2023 no description of mitral valve calcification no description of significant mitral or tricuspid valve issues.    Lexiscan was generally acceptable.    Labs were generally acceptable.  Specially with acceptable renal function.    ECGs repeatedly showed sinus rhythm with second-degree AV block type I.  There

## 2024-06-13 NOTE — PLAN OF CARE
Problem: Safety - Adult  Goal: Free from fall injury  6/12/2024 2059 by Mickie Sy RN  Outcome: Progressing  6/12/2024 1450 by Kim Voss RN  Outcome: Progressing     Problem: Chronic Conditions and Co-morbidities  Goal: Patient's chronic conditions and co-morbidity symptoms are monitored and maintained or improved  6/12/2024 2059 by Mickie Sy RN  Outcome: Progressing  Flowsheets (Taken 6/12/2024 2000)  Care Plan - Patient's Chronic Conditions and Co-Morbidity Symptoms are Monitored and Maintained or Improved: Monitor and assess patient's chronic conditions and comorbid symptoms for stability, deterioration, or improvement  6/12/2024 1450 by Kim Voss RN  Outcome: Progressing  Flowsheets (Taken 6/12/2024 0800 by Isrrael Griggs, RN)  Care Plan - Patient's Chronic Conditions and Co-Morbidity Symptoms are Monitored and Maintained or Improved:   Monitor and assess patient's chronic conditions and comorbid symptoms for stability, deterioration, or improvement   Collaborate with multidisciplinary team to address chronic and comorbid conditions and prevent exacerbation or deterioration   Update acute care plan with appropriate goals if chronic or comorbid symptoms are exacerbated and prevent overall improvement and discharge

## 2024-06-14 LAB
ANION GAP SERPL CALCULATED.3IONS-SCNC: 12 MMOL/L (ref 9–16)
BASOPHILS # BLD: 0.04 K/UL (ref 0–0.2)
BASOPHILS NFR BLD: 0 % (ref 0–2)
BUN SERPL-MCNC: 28 MG/DL (ref 8–23)
CALCIUM SERPL-MCNC: 9 MG/DL (ref 8.6–10.4)
CHLORIDE SERPL-SCNC: 102 MMOL/L (ref 98–107)
CO2 SERPL-SCNC: 22 MMOL/L (ref 20–31)
CREAT SERPL-MCNC: 1 MG/DL (ref 0.7–1.2)
CRP SERPL HS-MCNC: 5.7 MG/L (ref 0–5)
EOSINOPHIL # BLD: 0.07 K/UL (ref 0–0.44)
EOSINOPHILS RELATIVE PERCENT: 1 % (ref 1–4)
ERYTHROCYTE [DISTWIDTH] IN BLOOD BY AUTOMATED COUNT: 12.7 % (ref 11.8–14.4)
GFR, ESTIMATED: 76 ML/MIN/1.73M2
GLUCOSE BLD-MCNC: 171 MG/DL (ref 75–110)
GLUCOSE BLD-MCNC: 246 MG/DL (ref 75–110)
GLUCOSE BLD-MCNC: 268 MG/DL (ref 75–110)
GLUCOSE BLD-MCNC: 341 MG/DL (ref 75–110)
GLUCOSE SERPL-MCNC: 157 MG/DL (ref 74–99)
HCT VFR BLD AUTO: 47.7 % (ref 40.7–50.3)
HGB BLD-MCNC: 16.2 G/DL (ref 13–17)
IMM GRANULOCYTES # BLD AUTO: 0.1 K/UL (ref 0–0.3)
IMM GRANULOCYTES NFR BLD: 1 %
LYMPHOCYTES NFR BLD: 1.68 K/UL (ref 1.1–3.7)
LYMPHOCYTES RELATIVE PERCENT: 14 % (ref 24–43)
MCH RBC QN AUTO: 31.2 PG (ref 25.2–33.5)
MCHC RBC AUTO-ENTMCNC: 34 G/DL (ref 28.4–34.8)
MCV RBC AUTO: 91.9 FL (ref 82.6–102.9)
MONOCYTES NFR BLD: 0.83 K/UL (ref 0.1–1.2)
MONOCYTES NFR BLD: 7 % (ref 3–12)
NEUTROPHILS NFR BLD: 77 % (ref 36–65)
NEUTS SEG NFR BLD: 9.54 K/UL (ref 1.5–8.1)
NRBC BLD-RTO: 0 PER 100 WBC
PLATELET # BLD AUTO: 279 K/UL (ref 138–453)
PMV BLD AUTO: 9.1 FL (ref 8.1–13.5)
POTASSIUM SERPL-SCNC: 4.1 MMOL/L (ref 3.7–5.3)
RBC # BLD AUTO: 5.19 M/UL (ref 4.21–5.77)
SODIUM SERPL-SCNC: 136 MMOL/L (ref 136–145)
WBC OTHER # BLD: 12.3 K/UL (ref 3.5–11.3)

## 2024-06-14 PROCEDURE — 80048 BASIC METABOLIC PNL TOTAL CA: CPT

## 2024-06-14 PROCEDURE — 6370000000 HC RX 637 (ALT 250 FOR IP): Performed by: STUDENT IN AN ORGANIZED HEALTH CARE EDUCATION/TRAINING PROGRAM

## 2024-06-14 PROCEDURE — 97110 THERAPEUTIC EXERCISES: CPT

## 2024-06-14 PROCEDURE — 99291 CRITICAL CARE FIRST HOUR: CPT | Performed by: INTERNAL MEDICINE

## 2024-06-14 PROCEDURE — 6360000002 HC RX W HCPCS: Performed by: STUDENT IN AN ORGANIZED HEALTH CARE EDUCATION/TRAINING PROGRAM

## 2024-06-14 PROCEDURE — 86140 C-REACTIVE PROTEIN: CPT

## 2024-06-14 PROCEDURE — 85025 COMPLETE CBC W/AUTO DIFF WBC: CPT

## 2024-06-14 PROCEDURE — 97535 SELF CARE MNGMENT TRAINING: CPT

## 2024-06-14 PROCEDURE — 97530 THERAPEUTIC ACTIVITIES: CPT

## 2024-06-14 PROCEDURE — 6360000002 HC RX W HCPCS

## 2024-06-14 PROCEDURE — 99233 SBSQ HOSP IP/OBS HIGH 50: CPT | Performed by: INTERNAL MEDICINE

## 2024-06-14 PROCEDURE — 2000000000 HC ICU R&B

## 2024-06-14 PROCEDURE — 2580000003 HC RX 258

## 2024-06-14 PROCEDURE — 2580000003 HC RX 258: Performed by: HOSPITALIST

## 2024-06-14 PROCEDURE — 6370000000 HC RX 637 (ALT 250 FOR IP)

## 2024-06-14 PROCEDURE — 82947 ASSAY GLUCOSE BLOOD QUANT: CPT

## 2024-06-14 PROCEDURE — 36415 COLL VENOUS BLD VENIPUNCTURE: CPT

## 2024-06-14 PROCEDURE — 6370000000 HC RX 637 (ALT 250 FOR IP): Performed by: HOSPITALIST

## 2024-06-14 RX ORDER — INSULIN GLARGINE 100 [IU]/ML
30 INJECTION, SOLUTION SUBCUTANEOUS ONCE
Status: DISCONTINUED | OUTPATIENT
Start: 2024-06-14 | End: 2024-06-14

## 2024-06-14 RX ORDER — SODIUM CHLORIDE 9 MG/ML
INJECTION, SOLUTION INTRAVENOUS CONTINUOUS
Status: DISCONTINUED | OUTPATIENT
Start: 2024-06-14 | End: 2024-06-19

## 2024-06-14 RX ORDER — INSULIN GLARGINE 100 [IU]/ML
20 INJECTION, SOLUTION SUBCUTANEOUS 2 TIMES DAILY
Status: DISCONTINUED | OUTPATIENT
Start: 2024-06-14 | End: 2024-06-18

## 2024-06-14 RX ORDER — INSULIN GLARGINE 100 [IU]/ML
15 INJECTION, SOLUTION SUBCUTANEOUS 2 TIMES DAILY
Status: DISCONTINUED | OUTPATIENT
Start: 2024-06-14 | End: 2024-06-14

## 2024-06-14 RX ORDER — INSULIN GLARGINE 100 [IU]/ML
10 INJECTION, SOLUTION SUBCUTANEOUS ONCE
Status: COMPLETED | OUTPATIENT
Start: 2024-06-14 | End: 2024-06-14

## 2024-06-14 RX ADMIN — SODIUM CHLORIDE, PRESERVATIVE FREE 10 ML: 5 INJECTION INTRAVENOUS at 20:03

## 2024-06-14 RX ADMIN — ACETAMINOPHEN 650 MG: 325 TABLET ORAL at 16:52

## 2024-06-14 RX ADMIN — ATORVASTATIN CALCIUM 40 MG: 40 TABLET, FILM COATED ORAL at 20:02

## 2024-06-14 RX ADMIN — DEXAMETHASONE 6 MG: 4 TABLET ORAL at 10:31

## 2024-06-14 RX ADMIN — DOPAMINE HYDROCHLORIDE 15 MCG/KG/MIN: 160 INJECTION, SOLUTION INTRAVENOUS at 05:50

## 2024-06-14 RX ADMIN — DOPAMINE HYDROCHLORIDE 15 MCG/KG/MIN: 160 INJECTION, SOLUTION INTRAVENOUS at 22:29

## 2024-06-14 RX ADMIN — DOPAMINE HYDROCHLORIDE 10 MCG/KG/MIN: 160 INJECTION, SOLUTION INTRAVENOUS at 00:36

## 2024-06-14 RX ADMIN — TAMSULOSIN HYDROCHLORIDE 0.4 MG: 0.4 CAPSULE ORAL at 08:20

## 2024-06-14 RX ADMIN — SODIUM CHLORIDE, PRESERVATIVE FREE 10 ML: 5 INJECTION INTRAVENOUS at 08:20

## 2024-06-14 RX ADMIN — DOPAMINE HYDROCHLORIDE 15 MCG/KG/MIN: 160 INJECTION, SOLUTION INTRAVENOUS at 17:27

## 2024-06-14 RX ADMIN — INSULIN GLARGINE 10 UNITS: 100 INJECTION, SOLUTION SUBCUTANEOUS at 08:20

## 2024-06-14 RX ADMIN — INSULIN LISPRO 4 UNITS: 100 INJECTION, SOLUTION INTRAVENOUS; SUBCUTANEOUS at 16:51

## 2024-06-14 RX ADMIN — ENOXAPARIN SODIUM 40 MG: 100 INJECTION SUBCUTANEOUS at 08:20

## 2024-06-14 RX ADMIN — INSULIN GLARGINE 10 UNITS: 100 INJECTION, SOLUTION SUBCUTANEOUS at 12:05

## 2024-06-14 RX ADMIN — INSULIN GLARGINE 20 UNITS: 100 INJECTION, SOLUTION SUBCUTANEOUS at 20:44

## 2024-06-14 RX ADMIN — DOPAMINE HYDROCHLORIDE 12.5 MCG/KG/MIN: 160 INJECTION, SOLUTION INTRAVENOUS at 10:33

## 2024-06-14 RX ADMIN — ASPIRIN 81 MG: 81 TABLET, COATED ORAL at 08:20

## 2024-06-14 RX ADMIN — PANTOPRAZOLE SODIUM 40 MG: 40 TABLET, DELAYED RELEASE ORAL at 07:07

## 2024-06-14 RX ADMIN — SODIUM CHLORIDE: 9 INJECTION, SOLUTION INTRAVENOUS at 22:03

## 2024-06-14 RX ADMIN — INSULIN LISPRO 12 UNITS: 100 INJECTION, SOLUTION INTRAVENOUS; SUBCUTANEOUS at 12:04

## 2024-06-14 RX ADMIN — BUTALBITAL, ACETAMINOPHEN, AND CAFFEINE 1 TABLET: 50; 325; 40 TABLET ORAL at 04:46

## 2024-06-14 ASSESSMENT — PAIN DESCRIPTION - LOCATION
LOCATION: HEAD
LOCATION: HEAD

## 2024-06-14 ASSESSMENT — PAIN SCALES - GENERAL
PAINLEVEL_OUTOF10: 4
PAINLEVEL_OUTOF10: 9
PAINLEVEL_OUTOF10: 1
PAINLEVEL_OUTOF10: 0

## 2024-06-14 ASSESSMENT — PAIN SCALES - WONG BAKER: WONGBAKER_NUMERICALRESPONSE: NO HURT

## 2024-06-14 NOTE — CARE COORDINATION
Transitional planning. 2L NC, Awaiting EP decision for PPM- PT/OT ordered.     Spoke to pt's daughter, Sushma. Pt lives with her and her  and son.   Goal is home w/ HC, [facility] is SNF choice is needed.     She asked that CM email her HC and ARU lists  Marlon[employer] emailed lists as requested.

## 2024-06-14 NOTE — PROGRESS NOTES
Occupational Therapy  Facility/Department: [facility] 3- MICU  Occupational Therapy Daily Treatment Note    Name: Anuj Jovel  : 1940  MRN: [id number]  Date of Service: 2024    Discharge Recommendations:  Patient would benefit from continued therapy after discharge     Patient Diagnosis(es): The primary encounter diagnosis was AV block, Mobitz 1. Diagnoses of Systolic congestive heart failure, unspecified HF chronicity (HCC), Shortness of breath, and Symptomatic bradycardia were also pertinent to this visit.  Past Medical History:  has a past medical history of Diabetes (HCC), Diverticulitis, History of allergy, Hyperlipidemia, Hypertension, Osteoarthritis, and Pneumonia due to COVID-19 virus.  Past Surgical History:  has a past surgical history that includes Cardiac catheterization; Colonoscopy; Total knee arthroplasty (Right); colectomy (); and back surgery (2017).       Assessment   Performance deficits / Impairments: Decreased functional mobility ;Decreased ADL status;Decreased safe awareness;Decreased cognition;Decreased endurance;Decreased balance;Decreased high-level IADLs;Decreased strength  Assessment: Pt will benefit from continued OT services to address deficits in strength, cognition, balance, endurance, and safety awareness through use of skilled therapeutic interventions to increase functional independence and safety with ADLs/IADLs and functional transfers/mobility.  Prognosis: Good  Activity Tolerance  Activity Tolerance: Treatment limited secondary to medical complications (free text)  Activity Tolerance Comments: decrease in BP with all mobility        Plan   Occupational Therapy Plan  Times Per Week: 4-6 x/wk  Current Treatment Recommendations: Balance training, Functional mobility training, Pain management, Safety education & training, Patient/Caregiver education & training, Equipment evaluation, education, & procurement, Self-Care / ADL, Home management training,

## 2024-06-14 NOTE — PROGRESS NOTES
INTENSIVE CARE UNIT  Resident Physician Progress Note    Patient - Anuj Jovel  Date of Admission -  6/8/2024  8:29 PM  Date of Evaluation -  6/14/2024  Room and Bed Number -  3011/3011-01   Hospital Day - 6      SUBJECTIVE:     OVERNIGHT EVENTS:      Bradycardic overnight, HR in the 40s, dopamine drip increased to 15 with improvement. Otherwise, not hypotensive. Very hyperglycemic, BS 300s. On high dose sliding scale, not controlling.    TODAY:    BS improving this am, likely secondary to decadron use, continue to monitor now that decadron is finished  Remain in ICU while requiring dopamine  Will likely require pacemaker, cannot be completed until out of COVID precautions on 6/17     Brief History:   Anuj Jovel is a 83 y.o. who initially presented to [facility] with weakness, shortness of breath and was found to have COVID-19 infection/pneumonia.  Patient was noted to have bradycardia at [facility] with Mobitz type I block as well as 2-1 AV block.  Patient was subsequently transferred to [place] for further management of bradycardia.  Patient arrived to [facility] overnight.  He was on dopamine drip.  Telemetry with evidence of Mobitz type I.  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  Patient was admitted to cardiac ICU.  He was being followed by cardiology and is on dopamine drip.  He is being followed by infectious diseases for COVID-19 pneumonia.  Due to limited availability of bed on cardiac ICU, patient was transferred to medical ICU.      He has past medical history of CAD with previous stenting to LAD per documentation, HFpEF.     AWAKE & FOLLOWING COMMANDS:  [] No   [x] Yes    VASOPRESSORS:  [] No    [] Yes  [] Levophed [x] Dopamine [] Vasopressin  [] Dobutamine [] Phenylephrine [] Epinephrine    OBJECTIVE:     VITAL SIGNS:  BP (!) 154/80   Pulse 68   Temp 97.9 °F (36.6 °C) (Oral)   Resp 18   Ht 1.702 m (5' 7\")   Wt 94.6 kg (208 lb 8.9 oz)   SpO2 93%   BMI  32.66 kg/m².        PHYSICAL EXAM:  GEN:  awake, alert, cooperative, no apparent distress, and appears stated age  LUNGS:  No increased work of breathing, good air exchange, clear to auscultation bilaterally, no crackles or wheezing  CV:    Normal apical impulse, regular rate and rhythm, normal S1 and S2, no S3 or S4, and no murmur noted  ABDOMEN:   soft, non-distended, and non-tender  MSK:    there is no redness, warmth, or swelling of the joints  NEURO::   awake  alert  oriented to name, place and time  SKIN:   Normal skin color, texture, turgor  EXTREMITIES:  No pedal or leg edema, no calf tenderness/swelling, no erythema, distal pulses intact       MEDICATIONS:  Scheduled Meds:   [Held by provider] amLODIPine  10 mg Oral Daily    insulin glargine  10 Units SubCUTAneous BID    pantoprazole  40 mg Oral QAM AC    dexAMETHasone  6 mg Oral Daily    [Held by provider] bumetanide  1 mg IntraVENous Daily    lidocaine 1 % injection  5 mL IntraDERmal Once    sodium chloride flush  5-40 mL IntraVENous 2 times per day    insulin lispro  0-16 Units SubCUTAneous TID WC    enoxaparin  40 mg SubCUTAneous Daily    sodium chloride flush  5-40 mL IntraVENous 2 times per day    aspirin EC  81 mg Oral Daily    atorvastatin  40 mg Oral Nightly    tamsulosin  0.4 mg Oral Daily    insulin lispro  0-4 Units SubCUTAneous Nightly     Continuous Infusions:   DOPamine 15 mcg/kg/min (06/14/24 0550)    sodium chloride      dextrose       PRN Meds:   hydrALAZINE, 10 mg, Q6H PRN  butalbital-acetaminophen-caffeine, 1 tablet, Q4H PRN  sodium chloride, 30 mL, PRN  sodium chloride, 25 mL, PRN  sodium chloride flush, 10 mL, PRN  sodium chloride flush, 5-40 mL, PRN  potassium chloride, 40 mEq, PRN   Or  potassium alternative oral replacement, 40 mEq, PRN   Or  potassium chloride, 10 mEq, PRN  magnesium sulfate, 2,000 mg, PRN  ondansetron, 4 mg, Q8H PRN   Or  ondansetron, 4 mg, Q6H PRN  polyethylene glycol, 17 g, Daily PRN  acetaminophen, 650 mg, Q6H

## 2024-06-14 NOTE — PROGRESS NOTES
[facility] - Progress Note    COVID 19 Patient  Today's Date and Time: 6/14/2024, 7:16 AM    Impression :     COVID 19 Confirmed Infection  Covid tests:  Positive Covid Test Date: 6/7/24  Vaccination Status:  Received Covid 19 vaccines on 1/22/21, 2/19/21, 11/2/21 and 9/1/23  Bradycardia  Second degree heart block, Mobitz type I  Systolic heart failure  Acute hypoxic respiratory distress  CRP elevation  Altered mentation  CAD  Pulmonary HTN  Essential HTN  Diabetes mellitus  HLD  BPH  PCN, flagyl and sulfa allergy  Recommendations:   Antibiotic treatment:  Monitor off antibiotics  Covid Rx:    Completed Remdesivir: Requested 6-9-24. Stop date 6-13-24.  Decadron: 6 mg daily x 6 days initiated 6/7/24. Completed on 6-14-24.  Baricitinib: Initiated 6/7/24 . Will D/C  Paxlovid: Likely out of window  Monitor CRP: 6.7-->20.3    OK for PPM at the discretion of Cardiology    Medical Decision Making/Summary/Discussion:6/14/2024     Patient admitted with COVID 19 infection    Infection Control Recommendations   [place] Precautions  Droplet Plus Isolation. Stop date 6/17/24    Antimicrobial Stewardship Recommendations     Simplification of therapy  Targeted therapy    Coordination of Outpatient Care:   Estimated Length of IV antimicrobials:TBD  Patient will need Midline Catheter Insertion: TBD  Patient will need PICC line Insertion: No  Patient will need: Home IV , Infusion Center,  SNF,  LTAC:TBD  Patient will need outpatient wound care:No    Chief complaint/reason for consultation:   Concern for COVID infection      History of Present Illness:   Anuj Jovel is a 83 y.o.-year-old  male who was initially admitted on 6/8/2024. Patient seen at the request of Dr. Juarez    INITIAL HISTORY:    Patient, with a hx of Wenckebach, CAD w/stenting, CHF, DM, HTN and BPH, presented through [facility] on 6/7/24 with complaints of generalized malaise and shortness of breath x one day.    In  06/14/2024 05:50 AM       Medical Decision Making-Imaging:   CT HEAD WO CONTRAST    Result Date: 6/9/2024  EXAMINATION: CT OF THE HEAD WITHOUT CONTRAST  6/8/2024 9:45 pm TECHNIQUE: CT of the head was performed without the administration of intravenous contrast. Automated exposure control, iterative reconstruction, and/or weight based adjustment of the mA/kV was utilized to reduce the radiation dose to as low as reasonably achievable. COMPARISON: 06/07/2024 HISTORY: ORDERING SYSTEM PROVIDED HISTORY: encephalopathy TECHNOLOGIST PROVIDED HISTORY: encephalopathy FINDINGS: BRAIN/VENTRICLES: There is no acute intracranial hemorrhage, mass effect or midline shift.  No abnormal extra-axial fluid collection.  The gray-white differentiation is maintained without evidence of an acute infarct.  There is no evidence of hydrocephalus. Moderate diffuse cerebral atrophy and mild chronic white matter ischemic change. ORBITS: The visualized portion of the orbits demonstrate no acute abnormality. SINUSES: The visualized paranasal sinuses and mastoid air cells demonstrate no acute abnormality. SOFT TISSUES/SKULL:  No acute abnormality of the visualized skull or soft tissues.     No acute intracranial abnormality.     XR CHEST PORTABLE    Result Date: 6/9/2024  EXAMINATION: ONE XRAY VIEW OF THE CHEST 6/8/2024 8:23 pm COMPARISON: None. HISTORY: ORDERING SYSTEM PROVIDED HISTORY: arrythmias, verbal per Eva TECHNOLOGIST PROVIDED HISTORY: arrythmias, verbal per Eva FINDINGS: The mediastinum is unremarkable. The cardiac silhouette is moderately enlarged.  There is pulmonary vascular congestion.  Increased perihilar interstitial markings suggesting pulmonary edema.  There is ill-defined ground-glass opacity in the right upper lobe may represent superimposed pneumonia.     1. Cardiomegaly with pulmonary vascular congestion and pulmonary edema. 2. Ill-defined ground-glass opacity in the right upper lobe may represent superimposed

## 2024-06-14 NOTE — PLAN OF CARE
Problem: Safety - Adult  Goal: Free from fall injury  6/14/2024 0936 by Lorna Esparza RN  Outcome: Progressing     Problem: Chronic Conditions and Co-morbidities  Goal: Patient's chronic conditions and co-morbidity symptoms are monitored and maintained or improved  6/14/2024 0936 by Lorna Esparza RN  Outcome: Progressing     Problem: Discharge Planning  Goal: Discharge to home or other facility with appropriate resources  6/14/2024 0936 by Lorna Esparza RN  Outcome: Progressing     Problem: Skin/Tissue Integrity  Goal: Absence of new skin breakdown  Description: 1.  Monitor for areas of redness and/or skin breakdown  2.  Assess vascular access sites hourly  3.  Every 4-6 hours minimum:  Change oxygen saturation probe site  4.  Every 4-6 hours:  If on nasal continuous positive airway pressure, respiratory therapy assess nares and determine need for appliance change or resting period.  6/14/2024 0936 by Lorna Esparza RN  Outcome: Progressing     Problem: ABCDS Injury Assessment  Goal: Absence of physical injury  6/14/2024 0936 by Lorna Esparza RN  Outcome: Progressing     Problem: Pain  Goal: Verbalizes/displays adequate comfort level or baseline comfort level  6/14/2024 0936 by Lorna Esparza RN  Outcome: Progressing

## 2024-06-14 NOTE — PROGRESS NOTES
Comprehensive Nutrition Assessment    Type and Reason for Visit:  RD Nutrition Re-Screen/LOS, Initial    Nutrition Recommendations/Plan:   Continue current diet as ordered   Continue Diabetic Oral Nutrition Supplement   Monitor ONS, intake, weight, labs, GI status, and meal time behavior.      Malnutrition Assessment:  Malnutrition Status:  Insufficient data (06/14/24 1512)      Nutrition Assessment:    Pt seen for LOS. Adm bilateral lower extremity weakness and dyspnea. PMH of HTN, hyperlipidemia, T2DM, CAD. Unable to speak with patient at this time due to isolation. Per RN and chart review, pt ate most of breakfast this morning. RN unsure of ONS intake at breakfast. At lunch, pt ate a few bites of potatoes (0-25%) and 100% of his Diabetic ONS. Recent weight fluctuations noted. LBM 6/13. Meds: Humalog. POC Gluc 341 mg/dL. Gluc 157 mg/dL. BUN 28 mg/dL.    Nutrition Related Findings:    Edema: +1 non-pitting RLE, LLE Wound Type: None       Current Nutrition Intake & Therapies:    Average Meal Intake: 1-25%, 51-75%, %  Average Supplements Intake: %  ADULT DIET; Regular; 4 carb choices (60 gm/meal)  ADULT ORAL NUTRITION SUPPLEMENT; Breakfast, Lunch, Dinner; Diabetic Oral Supplement    Anthropometric Measures:  Height: 170.2 cm (5' 7\")  Ideal Body Weight (IBW): 148 lbs (67 kg)    Current Body Weight: 94.6 kg (208 lb 8.9 oz),   IBW. Weight Source: Bed Scale  Current BMI (kg/m2): 32.7  Weight Adjustment For: No Adjustment  BMI Categories: Obese Class 1 (BMI 30.0-34.9)    Estimated Daily Nutrient Needs:  Energy Requirements Based On: Formula     Energy (kcal/day): 8045-1844 kcals/day  Weight Used for Protein Requirements: Ideal  Protein (g/day):  g/day  Method Used for Fluid Requirements: Other (Comment)  Fluid (ml/day): per MD.    Nutrition Diagnosis:   Inadequate oral intake related to  (decreased appetite) as evidenced by intake 0-25%, intake 51-75%    Nutrition Interventions:   Food and/or Nutrient

## 2024-06-14 NOTE — PROGRESS NOTES
Physical Therapy  Facility/Department: [facility] 3- MICU  Physical Therapy Treatment Note    Name: Anuj Jovel  : 1940  MRN: [id number]  Date of Service: 2024    Discharge Recommendations:  Patient would benefit from continued therapy after discharge        Further therapy recommended at discharge.The patient should be able to tolerate at least 3 hours of therapy per day over 5 days or 15 hours over 7 days.   This patient may benefit from a Physical Medicine and Rehab consult.     Patient Diagnosis(es): The primary encounter diagnosis was AV block, Mobitz 1. Diagnoses of Systolic congestive heart failure, unspecified HF chronicity (HCC), Shortness of breath, and Symptomatic bradycardia were also pertinent to this visit.  Past Medical History:  has a past medical history of Diabetes (HCC), Diverticulitis, History of allergy, Hyperlipidemia, Hypertension, Osteoarthritis, and Pneumonia due to COVID-19 virus.  Past Surgical History:  has a past surgical history that includes Cardiac catheterization; Colonoscopy; Total knee arthroplasty (Right); colectomy (); and back surgery (2017).    Assessment   Body Structures, Functions, Activity Limitations Requiring Skilled Therapeutic Intervention: Decreased functional mobility ;Decreased strength;Decreased endurance;Decreased balance;Decreased cognition;Decreased safe awareness  Assessment: Pt ambulated a total of 50ft with x 3 rest breaks and RW CGA. Pt was CGA for functional transfers this date. Pt displays general unsteadiness t/o. Pt is most limited by endurance deficits. Pt would benefit from therapy following d/c to address deficits in balance, strength, and tolerance to mobility.  Therapy Prognosis: Good  Activity Tolerance  Activity Tolerance: Patient limited by fatigue;Patient limited by endurance     Plan   Physical Therapy Plan  General Plan:  (5-6x/wk)  Current Treatment Recommendations: Strengthening, Balance training, Gait training,  Functional mobility training, Stair training, Transfer training, Endurance training, Home exercise program, Safety education & training, Patient/Caregiver education & training, Equipment evaluation, education, & procurement, Therapeutic activities  Safety Devices  Type of Devices: Gait belt, Patient at risk for falls, Call light within reach, Nurse notified, Left in chair, Chair alarm in place  Restraints  Restraints Initially in Place: No     Restrictions  Restrictions/Precautions  Restrictions/Precautions: Contact Precautions, Isolation, Up as Tolerated  Required Braces or Orthoses?: No  Position Activity Restriction  Other position/activity restrictions: up with assistance: 3L O2 via NC     Subjective   General  Chart Reviewed: No  Patient assessed for rehabilitation services?: Yes  Response To Previous Treatment: Patient with no complaints from previous session.  Family / Caregiver Present: Yes (Daughter)  Follows Commands: Within Functional Limits  General Comment  Comments: Pt retired to recliner with call light and RN notified.  Subjective  Subjective: Pt and RN agreeable to tx session, Pt supine in bed denying any pain, pleasant and cooperative t/o session.    Vision/Hearing       Cognition   Orientation  Overall Orientation Status: Within Normal Limits  Orientation Level: Oriented to person;Oriented to place;Oriented to situation;Oriented to time  Cognition  Overall Cognitive Status: Exceptions  Arousal/Alertness: Appropriate responses to stimuli  Following Commands: Follows multistep commands with repitition;Follows multistep commands with increased time  Attention Span: Attends with cues to redirect  Memory: Appears intact  Safety Judgement: Decreased awareness of need for assistance;Decreased awareness of need for safety  Problem Solving: Assistance required to correct errors made;Assistance required to identify errors made;Decreased awareness of errors  Insights: Decreased awareness of

## 2024-06-15 LAB
ANION GAP SERPL CALCULATED.3IONS-SCNC: 14 MMOL/L (ref 9–16)
BASOPHILS # BLD: 0.07 K/UL (ref 0–0.2)
BASOPHILS NFR BLD: 1 % (ref 0–2)
BUN SERPL-MCNC: 24 MG/DL (ref 8–23)
CALCIUM SERPL-MCNC: 8.8 MG/DL (ref 8.6–10.4)
CHLORIDE SERPL-SCNC: 99 MMOL/L (ref 98–107)
CO2 SERPL-SCNC: 19 MMOL/L (ref 20–31)
CREAT SERPL-MCNC: 0.9 MG/DL (ref 0.7–1.2)
EOSINOPHIL # BLD: 0.05 K/UL (ref 0–0.44)
EOSINOPHILS RELATIVE PERCENT: 0 % (ref 1–4)
ERYTHROCYTE [DISTWIDTH] IN BLOOD BY AUTOMATED COUNT: 12.6 % (ref 11.8–14.4)
GFR, ESTIMATED: 81 ML/MIN/1.73M2
GLUCOSE BLD-MCNC: 200 MG/DL (ref 75–110)
GLUCOSE BLD-MCNC: 219 MG/DL (ref 75–110)
GLUCOSE BLD-MCNC: 221 MG/DL (ref 75–110)
GLUCOSE BLD-MCNC: 349 MG/DL (ref 75–110)
GLUCOSE SERPL-MCNC: 244 MG/DL (ref 74–99)
HCT VFR BLD AUTO: 50.9 % (ref 40.7–50.3)
HGB BLD-MCNC: 16.3 G/DL (ref 13–17)
IMM GRANULOCYTES # BLD AUTO: 0.11 K/UL (ref 0–0.3)
IMM GRANULOCYTES NFR BLD: 1 %
LYMPHOCYTES NFR BLD: 2.01 K/UL (ref 1.1–3.7)
LYMPHOCYTES RELATIVE PERCENT: 16 % (ref 24–43)
MCH RBC QN AUTO: 31.7 PG (ref 25.2–33.5)
MCHC RBC AUTO-ENTMCNC: 32 G/DL (ref 28.4–34.8)
MCV RBC AUTO: 99 FL (ref 82.6–102.9)
MONOCYTES NFR BLD: 1.08 K/UL (ref 0.1–1.2)
MONOCYTES NFR BLD: 8 % (ref 3–12)
NEUTROPHILS NFR BLD: 74 % (ref 36–65)
NEUTS SEG NFR BLD: 9.48 K/UL (ref 1.5–8.1)
NRBC BLD-RTO: 0 PER 100 WBC
PLATELET # BLD AUTO: 241 K/UL (ref 138–453)
PMV BLD AUTO: 9.3 FL (ref 8.1–13.5)
POTASSIUM SERPL-SCNC: 4.5 MMOL/L (ref 3.7–5.3)
RBC # BLD AUTO: 5.14 M/UL (ref 4.21–5.77)
SODIUM SERPL-SCNC: 132 MMOL/L (ref 136–145)
WBC OTHER # BLD: 12.8 K/UL (ref 3.5–11.3)

## 2024-06-15 PROCEDURE — 2580000003 HC RX 258: Performed by: HOSPITALIST

## 2024-06-15 PROCEDURE — 85025 COMPLETE CBC W/AUTO DIFF WBC: CPT

## 2024-06-15 PROCEDURE — 6370000000 HC RX 637 (ALT 250 FOR IP): Performed by: STUDENT IN AN ORGANIZED HEALTH CARE EDUCATION/TRAINING PROGRAM

## 2024-06-15 PROCEDURE — 6370000000 HC RX 637 (ALT 250 FOR IP): Performed by: HOSPITALIST

## 2024-06-15 PROCEDURE — 36415 COLL VENOUS BLD VENIPUNCTURE: CPT

## 2024-06-15 PROCEDURE — 2000000000 HC ICU R&B

## 2024-06-15 PROCEDURE — 82947 ASSAY GLUCOSE BLOOD QUANT: CPT

## 2024-06-15 PROCEDURE — 6370000000 HC RX 637 (ALT 250 FOR IP)

## 2024-06-15 PROCEDURE — 6360000002 HC RX W HCPCS

## 2024-06-15 PROCEDURE — 99291 CRITICAL CARE FIRST HOUR: CPT | Performed by: INTERNAL MEDICINE

## 2024-06-15 PROCEDURE — 80048 BASIC METABOLIC PNL TOTAL CA: CPT

## 2024-06-15 RX ADMIN — ENOXAPARIN SODIUM 40 MG: 100 INJECTION SUBCUTANEOUS at 08:45

## 2024-06-15 RX ADMIN — ASPIRIN 81 MG: 81 TABLET, COATED ORAL at 08:45

## 2024-06-15 RX ADMIN — POLYETHYLENE GLYCOL 3350 17 G: 17 POWDER, FOR SOLUTION ORAL at 12:14

## 2024-06-15 RX ADMIN — INSULIN LISPRO 4 UNITS: 100 INJECTION, SOLUTION INTRAVENOUS; SUBCUTANEOUS at 17:02

## 2024-06-15 RX ADMIN — TAMSULOSIN HYDROCHLORIDE 0.4 MG: 0.4 CAPSULE ORAL at 08:45

## 2024-06-15 RX ADMIN — INSULIN GLARGINE 20 UNITS: 100 INJECTION, SOLUTION SUBCUTANEOUS at 20:39

## 2024-06-15 RX ADMIN — DOPAMINE HYDROCHLORIDE 15 MCG/KG/MIN: 160 INJECTION, SOLUTION INTRAVENOUS at 03:33

## 2024-06-15 RX ADMIN — SODIUM CHLORIDE, PRESERVATIVE FREE 20 ML: 5 INJECTION INTRAVENOUS at 08:50

## 2024-06-15 RX ADMIN — INSULIN LISPRO 12 UNITS: 100 INJECTION, SOLUTION INTRAVENOUS; SUBCUTANEOUS at 11:54

## 2024-06-15 RX ADMIN — INSULIN LISPRO 4 UNITS: 100 INJECTION, SOLUTION INTRAVENOUS; SUBCUTANEOUS at 08:45

## 2024-06-15 RX ADMIN — PANTOPRAZOLE SODIUM 40 MG: 40 TABLET, DELAYED RELEASE ORAL at 06:33

## 2024-06-15 RX ADMIN — INSULIN GLARGINE 20 UNITS: 100 INJECTION, SOLUTION SUBCUTANEOUS at 08:45

## 2024-06-15 RX ADMIN — DOPAMINE HYDROCHLORIDE 15 MCG/KG/MIN: 160 INJECTION, SOLUTION INTRAVENOUS at 09:40

## 2024-06-15 RX ADMIN — DOPAMINE HYDROCHLORIDE 15 MCG/KG/MIN: 160 INJECTION, SOLUTION INTRAVENOUS at 14:31

## 2024-06-15 RX ADMIN — BUTALBITAL, ACETAMINOPHEN, AND CAFFEINE 1 TABLET: 50; 325; 40 TABLET ORAL at 08:45

## 2024-06-15 RX ADMIN — DOPAMINE HYDROCHLORIDE 13 MCG/KG/MIN: 160 INJECTION, SOLUTION INTRAVENOUS at 19:43

## 2024-06-15 RX ADMIN — SODIUM CHLORIDE, PRESERVATIVE FREE 10 ML: 5 INJECTION INTRAVENOUS at 20:39

## 2024-06-15 RX ADMIN — ATORVASTATIN CALCIUM 40 MG: 40 TABLET, FILM COATED ORAL at 20:37

## 2024-06-15 RX ADMIN — BUTALBITAL, ACETAMINOPHEN, AND CAFFEINE 1 TABLET: 50; 325; 40 TABLET ORAL at 13:36

## 2024-06-15 ASSESSMENT — PAIN SCALES - GENERAL
PAINLEVEL_OUTOF10: 7
PAINLEVEL_OUTOF10: 5
PAINLEVEL_OUTOF10: 6
PAINLEVEL_OUTOF10: 0
PAINLEVEL_OUTOF10: 0

## 2024-06-15 ASSESSMENT — PAIN DESCRIPTION - LOCATION
LOCATION: HEAD
LOCATION: HEAD

## 2024-06-15 ASSESSMENT — PAIN DESCRIPTION - DESCRIPTORS: DESCRIPTORS: ACHING

## 2024-06-15 NOTE — PLAN OF CARE
Problem: Safety - Adult  Goal: Free from fall injury  6/15/2024 1007 by Tata Bueno RN  Outcome: Progressing     Problem: Chronic Conditions and Co-morbidities  Goal: Patient's chronic conditions and co-morbidity symptoms are monitored and maintained or improved  6/15/2024 1007 by Tata Bueno RN  Outcome: Progressing     Problem: Discharge Planning  Goal: Discharge to home or other facility with appropriate resources  6/15/2024 1007 by Tata Bueno RN  Outcome: Progressing     Problem: Skin/Tissue Integrity  Goal: Absence of new skin breakdown  Description: 1.  Monitor for areas of redness and/or skin breakdown  2.  Assess vascular access sites hourly  3.  Every 4-6 hours minimum:  Change oxygen saturation probe site  4.  Every 4-6 hours:  If on nasal continuous positive airway pressure, respiratory therapy assess nares and determine need for appliance change or resting period.  6/15/2024 1007 by Tata Bueno RN  Outcome: Progressing     Problem: ABCDS Injury Assessment  Goal: Absence of physical injury  6/15/2024 1007 by Tata Bueno RN  Outcome: Progressing     Problem: Pain  Goal: Verbalizes/displays adequate comfort level or baseline comfort level  6/15/2024 1007 by Tata Bueno RN  Outcome: Progressing     Problem: Nutrition Deficit:  Goal: Optimize nutritional status  6/15/2024 1007 by Tata Bueno RN  Outcome: Progressing

## 2024-06-15 NOTE — PROGRESS NOTES
INTENSIVE CARE UNIT  Resident Physician Progress Note    Patient - Anuj Jovel  Date of Admission -  2024  8:29 PM  Date of Evaluation -  6/15/2024  Room and Bed Number -  3011/3011-01   Hospital Day - 7      SUBJECTIVE:     OVERNIGHT EVENTS:      Continues to be bradycardic in the 40s, on dopamine 15. No acute events    TODAY:    Remain in ICU while requiring dopamine  Will likely require pacemaker, cannot be completed until out of COVID precautions on      Brief History:   Anuj Jovel is a 83 y.o. who initially presented to [facility] with weakness, shortness of breath and was found to have COVID-19 infection/pneumonia.  Patient was noted to have bradycardia at [facility] with Mobitz type I block as well as 2-1 AV block.  Patient was subsequently transferred to [facility] for further management of bradycardia.  Patient arrived to [facility] overnight.  He was on dopamine drip.  Telemetry with evidence of Mobitz type I.  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  Patient was admitted to cardiac ICU.  He was being followed by cardiology and is on dopamine drip.  He is being followed by infectious diseases for COVID-19 pneumonia.  Due to limited availability of bed on cardiac ICU, patient was transferred to medical ICU.      He has past medical history of CAD with previous stenting to LAD per documentation, HFpEF.     AWAKE & FOLLOWING COMMANDS:  [] No   [x] Yes    VASOPRESSORS:  [] No    [] Yes  [] Levophed [x] Dopamine [] Vasopressin  [] Dobutamine [] Phenylephrine [] Epinephrine    OBJECTIVE:     VITAL SIGNS:  BP (!) 159/65   Pulse (!) 43   Temp 97.9 °F (36.6 °C) (Oral)   Resp 24   Ht 1.702 m (5' 7\")   Wt 94.6 kg (208 lb 8.9 oz)   SpO2 92%   BMI 32.66 kg/m²   Tmax over 24 hours:  Temp (24hrs), Av.9 °F (36.6 °C), Min:97.7 °F (36.5 °C), Max:98 °F (36.7 °C)      Patient Vitals for the past 8 hrs:   BP Temp Temp src Pulse Resp SpO2   06/15/24 0645 (!) 159/65 --

## 2024-06-15 NOTE — PLAN OF CARE
labs, and treatment plans   Recommend appropriate diets, oral nutritional supplements, and vitamin/mineral supplements

## 2024-06-15 NOTE — PLAN OF CARE
Problem: Safety - Adult  Goal: Free from fall injury  6/14/2024 2233 by Jennifer Araya RN  Outcome: Progressing     Problem: Chronic Conditions and Co-morbidities  Goal: Patient's chronic conditions and co-morbidity symptoms are monitored and maintained or improved  6/14/2024 2233 by Jennifer Araya RN  Outcome: Progressing     Problem: Discharge Planning  Goal: Discharge to home or other facility with appropriate resources  6/14/2024 2233 by Jennifer Araya RN  Outcome: Progressing     Problem: Skin/Tissue Integrity  Goal: Absence of new skin breakdown  Description: 1.  Monitor for areas of redness and/or skin breakdown  2.  Assess vascular access sites hourly  3.  Every 4-6 hours minimum:  Change oxygen saturation probe site  4.  Every 4-6 hours:  If on nasal continuous positive airway pressure, respiratory therapy assess nares and determine need for appliance change or resting period.  6/14/2024 2233 by Jennifer Araya RN  Outcome: Progressing     Problem: ABCDS Injury Assessment  Goal: Absence of physical injury  6/14/2024 2233 by Jennifer Araya RN  Outcome: Progressing     Problem: Pain  Goal: Verbalizes/displays adequate comfort level or baseline comfort level  6/14/2024 2233 by Jennifer Araya RN  Outcome: Progressing     Problem: Nutrition Deficit:  Goal: Optimize nutritional status  6/14/2024 2233 by Jennifer Araya RN  Outcome: Progressing

## 2024-06-15 NOTE — CARE COORDINATION
Transitional planning. Probable plans for PPM on Monday when pt is out of COVID isolation. Spoke to pt and pt's daughter. Pt lives w/ daughter and has DME. Pt refusing SNF, wants to go home w/ HC. PT/OT ordered. Referral faxed to [facility] (1st choice) 2nd HC choice is [facility]     Have [facility] call Sushma, pt's daugther to schedule visits [phone] Tessie w/ [facility] called, informed her of probable plans for PPM on 6/17, they can accept pt for HC. Provided her w/ Sushma's number - pt's daughter, to schedule visits.

## 2024-06-15 NOTE — CARE COORDINATION
06/15/24 1137   Readmission Assessment   Number of Days since last admission? 1-7 days  (Not a RAC, [place] from [facility])   Previous Disposition Other (comment)  (Not a RAC, [place] from [facility])   Who is being Interviewed Unable to Complete  (Not a RAC, [place] from [facility])   What was the patient's/caregiver's perception as to why they think they needed to return back to the hospital? Other (Comment)  (Not a RAC, [place] from [facility])   Did you visit your Primary Care Physician after you left the hospital, before you returned this time? No  (Not a RAC, [place] from [facility])   Why weren't you able to visit your PCP? Other (Comment)  (Not a RAC, [place] from [facility])   Did you see a specialist, such as Cardiac, Pulmonary, Orthopedic Physician, etc. after you left the hospital? Other (Comment)  (Not a RAC, [place] from [facility])   Who advised the patient to return to the hospital? Other (Comment)  (Not a RAC, [place] from [facility])   Does the patient report anything that got in the way of taking their medications? No  (Not a RAC, [place] from [facility])   In our efforts to provide the best possible care to you and others like you, can you think of anything that we could have done to help you after you left the hospital the first time, so that you might not have needed to return so soon? Other (Comment)  (Not a RAC, [place] from [facility])

## 2024-06-16 LAB
ANION GAP SERPL CALCULATED.3IONS-SCNC: 12 MMOL/L (ref 9–16)
BUN SERPL-MCNC: 26 MG/DL (ref 8–23)
CALCIUM SERPL-MCNC: 8.4 MG/DL (ref 8.6–10.4)
CHLORIDE SERPL-SCNC: 100 MMOL/L (ref 98–107)
CO2 SERPL-SCNC: 19 MMOL/L (ref 20–31)
CREAT SERPL-MCNC: 0.9 MG/DL (ref 0.7–1.2)
ERYTHROCYTE [DISTWIDTH] IN BLOOD BY AUTOMATED COUNT: 12.7 % (ref 11.8–14.4)
GFR, ESTIMATED: 81 ML/MIN/1.73M2
GLUCOSE BLD-MCNC: 149 MG/DL (ref 75–110)
GLUCOSE BLD-MCNC: 173 MG/DL (ref 75–110)
GLUCOSE BLD-MCNC: 227 MG/DL (ref 75–110)
GLUCOSE BLD-MCNC: 240 MG/DL (ref 75–110)
GLUCOSE SERPL-MCNC: 132 MG/DL (ref 74–99)
HCT VFR BLD AUTO: 43.2 % (ref 40.7–50.3)
HGB BLD-MCNC: 14.3 G/DL (ref 13–17)
MCH RBC QN AUTO: 31.5 PG (ref 25.2–33.5)
MCHC RBC AUTO-ENTMCNC: 33.1 G/DL (ref 28.4–34.8)
MCV RBC AUTO: 95.2 FL (ref 82.6–102.9)
NRBC BLD-RTO: 0 PER 100 WBC
PLATELET # BLD AUTO: 241 K/UL (ref 138–453)
PMV BLD AUTO: 9.2 FL (ref 8.1–13.5)
POTASSIUM SERPL-SCNC: 4.4 MMOL/L (ref 3.7–5.3)
RBC # BLD AUTO: 4.54 M/UL (ref 4.21–5.77)
SODIUM SERPL-SCNC: 131 MMOL/L (ref 136–145)
WBC OTHER # BLD: 12 K/UL (ref 3.5–11.3)

## 2024-06-16 PROCEDURE — 36415 COLL VENOUS BLD VENIPUNCTURE: CPT

## 2024-06-16 PROCEDURE — 80048 BASIC METABOLIC PNL TOTAL CA: CPT

## 2024-06-16 PROCEDURE — 6360000002 HC RX W HCPCS

## 2024-06-16 PROCEDURE — 85027 COMPLETE CBC AUTOMATED: CPT

## 2024-06-16 PROCEDURE — 99291 CRITICAL CARE FIRST HOUR: CPT | Performed by: INTERNAL MEDICINE

## 2024-06-16 PROCEDURE — 2580000003 HC RX 258

## 2024-06-16 PROCEDURE — 2000000000 HC ICU R&B

## 2024-06-16 PROCEDURE — 6370000000 HC RX 637 (ALT 250 FOR IP)

## 2024-06-16 PROCEDURE — 6370000000 HC RX 637 (ALT 250 FOR IP): Performed by: HOSPITALIST

## 2024-06-16 PROCEDURE — 2580000003 HC RX 258: Performed by: HOSPITALIST

## 2024-06-16 PROCEDURE — 82947 ASSAY GLUCOSE BLOOD QUANT: CPT

## 2024-06-16 PROCEDURE — 6370000000 HC RX 637 (ALT 250 FOR IP): Performed by: STUDENT IN AN ORGANIZED HEALTH CARE EDUCATION/TRAINING PROGRAM

## 2024-06-16 PROCEDURE — 6360000002 HC RX W HCPCS: Performed by: STUDENT IN AN ORGANIZED HEALTH CARE EDUCATION/TRAINING PROGRAM

## 2024-06-16 RX ADMIN — INSULIN GLARGINE 20 UNITS: 100 INJECTION, SOLUTION SUBCUTANEOUS at 20:58

## 2024-06-16 RX ADMIN — ATORVASTATIN CALCIUM 40 MG: 40 TABLET, FILM COATED ORAL at 20:58

## 2024-06-16 RX ADMIN — SODIUM CHLORIDE, PRESERVATIVE FREE 10 ML: 5 INJECTION INTRAVENOUS at 20:59

## 2024-06-16 RX ADMIN — INSULIN GLARGINE 20 UNITS: 100 INJECTION, SOLUTION SUBCUTANEOUS at 09:00

## 2024-06-16 RX ADMIN — DOPAMINE HYDROCHLORIDE 11 MCG/KG/MIN: 160 INJECTION, SOLUTION INTRAVENOUS at 21:55

## 2024-06-16 RX ADMIN — ALTEPLASE 2 MG: 2.2 INJECTION, POWDER, LYOPHILIZED, FOR SOLUTION INTRAVENOUS at 21:52

## 2024-06-16 RX ADMIN — ENOXAPARIN SODIUM 40 MG: 100 INJECTION SUBCUTANEOUS at 09:41

## 2024-06-16 RX ADMIN — DOPAMINE HYDROCHLORIDE 12 MCG/KG/MIN: 160 INJECTION, SOLUTION INTRAVENOUS at 06:37

## 2024-06-16 RX ADMIN — DOPAMINE HYDROCHLORIDE 12 MCG/KG/MIN: 160 INJECTION, SOLUTION INTRAVENOUS at 09:29

## 2024-06-16 RX ADMIN — TAMSULOSIN HYDROCHLORIDE 0.4 MG: 0.4 CAPSULE ORAL at 09:41

## 2024-06-16 RX ADMIN — DOPAMINE HYDROCHLORIDE 12 MCG/KG/MIN: 160 INJECTION, SOLUTION INTRAVENOUS at 17:08

## 2024-06-16 RX ADMIN — SODIUM CHLORIDE: 9 INJECTION, SOLUTION INTRAVENOUS at 12:30

## 2024-06-16 RX ADMIN — ACETAMINOPHEN 650 MG: 325 TABLET ORAL at 23:35

## 2024-06-16 RX ADMIN — ASPIRIN 81 MG: 81 TABLET, COATED ORAL at 09:41

## 2024-06-16 RX ADMIN — SODIUM CHLORIDE, PRESERVATIVE FREE 10 ML: 5 INJECTION INTRAVENOUS at 09:33

## 2024-06-16 RX ADMIN — DOPAMINE HYDROCHLORIDE 12 MCG/KG/MIN: 160 INJECTION, SOLUTION INTRAVENOUS at 01:56

## 2024-06-16 RX ADMIN — INSULIN LISPRO 4 UNITS: 100 INJECTION, SOLUTION INTRAVENOUS; SUBCUTANEOUS at 12:00

## 2024-06-16 RX ADMIN — PANTOPRAZOLE SODIUM 40 MG: 40 TABLET, DELAYED RELEASE ORAL at 06:49

## 2024-06-16 RX ADMIN — DOPAMINE HYDROCHLORIDE 12 MCG/KG/MIN: 160 INJECTION, SOLUTION INTRAVENOUS at 15:11

## 2024-06-16 ASSESSMENT — PAIN SCALES - GENERAL: PAINLEVEL_OUTOF10: 8

## 2024-06-16 ASSESSMENT — PAIN DESCRIPTION - LOCATION: LOCATION: HEAD

## 2024-06-16 ASSESSMENT — PAIN DESCRIPTION - DESCRIPTORS: DESCRIPTORS: ACHING

## 2024-06-16 NOTE — PROGRESS NOTES
----- Message from Amanda Branch sent at 3/30/2021  9:09 AM EDT -----  PT IS HAVING ISSUE WITH PA FOR lubiprostone (Amitiza) 8 MCG capsule [[id number]] (Order [id number]) AND WANTED TO KNOW IF WE HAVE GOTTEN EVERYTHING FOR IT AND WERE ABLE TO SEND IT OVER TO THE PHARMACY. [phone]     INTENSIVE CARE UNIT  Resident Physician Progress Note    Patient - Anuj Jovel  Date of Admission -  2024  8:29 PM  Date of Evaluation -  2024  Room and Bed Number -  3011/3011-01   Hospital Day - 8      SUBJECTIVE:     OVERNIGHT EVENTS:      Continues to be bradycardic in the 40s, on dopamine 12. No acute events    TODAY:    Remain in ICU while requiring dopamine  Will likely require pacemaker, cannot be completed until out of COVID precautions on      Brief History:   Anuj Jovel is a 83 y.o. who initially presented to [facility] with weakness, shortness of breath and was found to have COVID-19 infection/pneumonia.  Patient was noted to have bradycardia at [facility] with Mobitz type I block as well as 2-1 AV block.  Patient was subsequently transferred to [facility] for further management of bradycardia.  Patient arrived to [facility] overnight.  He was on dopamine drip.  Telemetry with evidence of Mobitz type I.  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  Patient was admitted to cardiac ICU.  He was being followed by cardiology and is on dopamine drip.  He is being followed by infectious diseases for COVID-19 pneumonia.  Due to limited availability of bed on cardiac ICU, patient was transferred to medical ICU.      He has past medical history of CAD with previous stenting to LAD per documentation, HFpEF.     AWAKE & FOLLOWING COMMANDS:  [] No   [x] Yes    VASOPRESSORS:  [] No    [] Yes  [] Levophed [x] Dopamine [] Vasopressin  [] Dobutamine [] Phenylephrine [] Epinephrine    OBJECTIVE:     VITAL SIGNS:  BP (!) 103/50   Pulse (!) 43   Temp 98.5 °F (36.9 °C) (Oral)   Resp 18   Ht 1.702 m (5' 7\")   Wt 94.6 kg (208 lb 8.9 oz)   SpO2 94%   BMI 32.66 kg/m²   Tmax over 24 hours:  Temp (24hrs), Av.1 °F (36.7 °C), Min:97.6 °F (36.4 °C), Max:98.5 °F (36.9 °C)      Patient Vitals for the past 8 hrs:   BP Temp Temp src Pulse Resp SpO2   24 0415 (!) 103/50 --

## 2024-06-16 NOTE — PLAN OF CARE
Problem: Safety - Adult  Goal: Free from fall injury  6/15/2024 2247 by Beatris Goldsmith RN  Outcome: Progressing  6/15/2024 1007 by Tata Bueno RN  Outcome: Progressing     Problem: Chronic Conditions and Co-morbidities  Goal: Patient's chronic conditions and co-morbidity symptoms are monitored and maintained or improved  6/15/2024 2247 by Beatris Goldsmith RN  Outcome: Progressing  6/15/2024 1007 by Tata Bueno RN  Outcome: Progressing     Problem: Discharge Planning  Goal: Discharge to home or other facility with appropriate resources  6/15/2024 2247 by Beatris Goldsmith RN  Outcome: Progressing  6/15/2024 1007 by Tata Bueno RN  Outcome: Progressing     Problem: Skin/Tissue Integrity  Goal: Absence of new skin breakdown  Description: 1.  Monitor for areas of redness and/or skin breakdown  2.  Assess vascular access sites hourly  3.  Every 4-6 hours minimum:  Change oxygen saturation probe site  4.  Every 4-6 hours:  If on nasal continuous positive airway pressure, respiratory therapy assess nares and determine need for appliance change or resting period.  6/15/2024 2247 by Beatris Goldsmith RN  Outcome: Progressing  6/15/2024 1007 by Tata Bueno RN  Outcome: Progressing     Problem: ABCDS Injury Assessment  Goal: Absence of physical injury  6/15/2024 2247 by Beatris Goldsmith RN  Outcome: Progressing  6/15/2024 1007 by Tata Bueno RN  Outcome: Progressing     Problem: Pain  Goal: Verbalizes/displays adequate comfort level or baseline comfort level  6/15/2024 2247 by Beatris Goldsmith RN  Outcome: Progressing  6/15/2024 1007 by Tata Bueno RN  Outcome: Progressing     Problem: Nutrition Deficit:  Goal: Optimize nutritional status  6/15/2024 2247 by Beatris Goldsmith RN  Outcome: Progressing  6/15/2024 1007 by Tata Bueno RN  Outcome: Progressing

## 2024-06-16 NOTE — PLAN OF CARE

## 2024-06-17 ENCOUNTER — APPOINTMENT (OUTPATIENT)
Dept: GENERAL RADIOLOGY | Age: 84
End: 2024-06-17
Attending: HOSPITALIST
Payer: MEDICARE

## 2024-06-17 PROBLEM — I44.2 COMPLETE HEART BLOCK (HCC): Status: ACTIVE | Noted: 2024-06-08

## 2024-06-17 LAB
ANION GAP SERPL CALCULATED.3IONS-SCNC: 10 MMOL/L (ref 9–16)
BASOPHILS # BLD: 0.03 K/UL (ref 0–0.2)
BASOPHILS NFR BLD: 0 % (ref 0–2)
BUN SERPL-MCNC: 22 MG/DL (ref 8–23)
CALCIUM SERPL-MCNC: 8.2 MG/DL (ref 8.6–10.4)
CHLORIDE SERPL-SCNC: 103 MMOL/L (ref 98–107)
CO2 SERPL-SCNC: 23 MMOL/L (ref 20–31)
CREAT SERPL-MCNC: 0.9 MG/DL (ref 0.7–1.2)
ECHO BSA: 2.15 M2
EOSINOPHIL # BLD: 0.54 K/UL (ref 0–0.44)
EOSINOPHILS RELATIVE PERCENT: 4 % (ref 1–4)
ERYTHROCYTE [DISTWIDTH] IN BLOOD BY AUTOMATED COUNT: 12.8 % (ref 11.8–14.4)
GFR, ESTIMATED: 86 ML/MIN/1.73M2
GLUCOSE BLD-MCNC: 133 MG/DL (ref 75–110)
GLUCOSE BLD-MCNC: 155 MG/DL (ref 75–110)
GLUCOSE BLD-MCNC: 189 MG/DL (ref 75–110)
GLUCOSE BLD-MCNC: 193 MG/DL (ref 75–110)
GLUCOSE SERPL-MCNC: 126 MG/DL (ref 74–99)
HCT VFR BLD AUTO: 40.5 % (ref 40.7–50.3)
HGB BLD-MCNC: 14.3 G/DL (ref 13–17)
IMM GRANULOCYTES # BLD AUTO: 0.08 K/UL (ref 0–0.3)
IMM GRANULOCYTES NFR BLD: 1 %
LYMPHOCYTES NFR BLD: 2.49 K/UL (ref 1.1–3.7)
LYMPHOCYTES RELATIVE PERCENT: 20 % (ref 24–43)
MCH RBC QN AUTO: 31.5 PG (ref 25.2–33.5)
MCHC RBC AUTO-ENTMCNC: 35.3 G/DL (ref 28.4–34.8)
MCV RBC AUTO: 89.2 FL (ref 82.6–102.9)
MONOCYTES NFR BLD: 1.11 K/UL (ref 0.1–1.2)
MONOCYTES NFR BLD: 9 % (ref 3–12)
NEUTROPHILS NFR BLD: 66 % (ref 36–65)
NEUTS SEG NFR BLD: 8.19 K/UL (ref 1.5–8.1)
NRBC BLD-RTO: 0 PER 100 WBC
PLATELET # BLD AUTO: 237 K/UL (ref 138–453)
PMV BLD AUTO: 9.3 FL (ref 8.1–13.5)
POTASSIUM SERPL-SCNC: 4.1 MMOL/L (ref 3.7–5.3)
RBC # BLD AUTO: 4.54 M/UL (ref 4.21–5.77)
SODIUM SERPL-SCNC: 136 MMOL/L (ref 136–145)
WBC OTHER # BLD: 12.4 K/UL (ref 3.5–11.3)

## 2024-06-17 PROCEDURE — C1785 PMKR, DUAL, RATE-RESP: HCPCS | Performed by: INTERNAL MEDICINE

## 2024-06-17 PROCEDURE — 85025 COMPLETE CBC W/AUTO DIFF WBC: CPT

## 2024-06-17 PROCEDURE — 02H63JZ INSERTION OF PACEMAKER LEAD INTO RIGHT ATRIUM, PERCUTANEOUS APPROACH: ICD-10-PCS | Performed by: INTERNAL MEDICINE

## 2024-06-17 PROCEDURE — 82947 ASSAY GLUCOSE BLOOD QUANT: CPT

## 2024-06-17 PROCEDURE — 71045 X-RAY EXAM CHEST 1 VIEW: CPT

## 2024-06-17 PROCEDURE — 99233 SBSQ HOSP IP/OBS HIGH 50: CPT | Performed by: INTERNAL MEDICINE

## 2024-06-17 PROCEDURE — 2709999900 HC NON-CHARGEABLE SUPPLY: Performed by: INTERNAL MEDICINE

## 2024-06-17 PROCEDURE — 0JH606Z INSERTION OF PACEMAKER, DUAL CHAMBER INTO CHEST SUBCUTANEOUS TISSUE AND FASCIA, OPEN APPROACH: ICD-10-PCS | Performed by: INTERNAL MEDICINE

## 2024-06-17 PROCEDURE — 2580000003 HC RX 258: Performed by: HOSPITALIST

## 2024-06-17 PROCEDURE — 2580000003 HC RX 258: Performed by: INTERNAL MEDICINE

## 2024-06-17 PROCEDURE — C1894 INTRO/SHEATH, NON-LASER: HCPCS | Performed by: INTERNAL MEDICINE

## 2024-06-17 PROCEDURE — C1892 INTRO/SHEATH,FIXED,PEEL-AWAY: HCPCS | Performed by: INTERNAL MEDICINE

## 2024-06-17 PROCEDURE — 33208 INSRT HEART PM ATRIAL & VENT: CPT | Performed by: INTERNAL MEDICINE

## 2024-06-17 PROCEDURE — 6360000002 HC RX W HCPCS

## 2024-06-17 PROCEDURE — 6370000000 HC RX 637 (ALT 250 FOR IP): Performed by: INTERNAL MEDICINE

## 2024-06-17 PROCEDURE — 6360000002 HC RX W HCPCS: Performed by: INTERNAL MEDICINE

## 2024-06-17 PROCEDURE — 99291 CRITICAL CARE FIRST HOUR: CPT | Performed by: INTERNAL MEDICINE

## 2024-06-17 PROCEDURE — 2580000003 HC RX 258

## 2024-06-17 PROCEDURE — 94761 N-INVAS EAR/PLS OXIMETRY MLT: CPT

## 2024-06-17 PROCEDURE — 2000000000 HC ICU R&B

## 2024-06-17 PROCEDURE — C1898 LEAD, PMKR, OTHER THAN TRANS: HCPCS | Performed by: INTERNAL MEDICINE

## 2024-06-17 PROCEDURE — 93005 ELECTROCARDIOGRAM TRACING: CPT | Performed by: INTERNAL MEDICINE

## 2024-06-17 PROCEDURE — 02HK3JZ INSERTION OF PACEMAKER LEAD INTO RIGHT VENTRICLE, PERCUTANEOUS APPROACH: ICD-10-PCS | Performed by: INTERNAL MEDICINE

## 2024-06-17 PROCEDURE — 2700000000 HC OXYGEN THERAPY PER DAY

## 2024-06-17 PROCEDURE — 6370000000 HC RX 637 (ALT 250 FOR IP): Performed by: STUDENT IN AN ORGANIZED HEALTH CARE EDUCATION/TRAINING PROGRAM

## 2024-06-17 PROCEDURE — 2500000003 HC RX 250 WO HCPCS: Performed by: INTERNAL MEDICINE

## 2024-06-17 PROCEDURE — 36415 COLL VENOUS BLD VENIPUNCTURE: CPT

## 2024-06-17 PROCEDURE — 6370000000 HC RX 637 (ALT 250 FOR IP)

## 2024-06-17 PROCEDURE — 80048 BASIC METABOLIC PNL TOTAL CA: CPT

## 2024-06-17 RX ORDER — DOXYCYCLINE HYCLATE 100 MG
100 TABLET ORAL EVERY 12 HOURS SCHEDULED
Status: DISCONTINUED | OUTPATIENT
Start: 2024-06-17 | End: 2024-06-21 | Stop reason: HOSPADM

## 2024-06-17 RX ORDER — NOREPINEPHRINE BITARTRATE 0.06 MG/ML
1-100 INJECTION, SOLUTION INTRAVENOUS CONTINUOUS
Status: DISCONTINUED | OUTPATIENT
Start: 2024-06-17 | End: 2024-06-19

## 2024-06-17 RX ORDER — FENTANYL CITRATE 50 UG/ML
INJECTION, SOLUTION INTRAMUSCULAR; INTRAVENOUS PRN
Status: DISCONTINUED | OUTPATIENT
Start: 2024-06-17 | End: 2024-06-17 | Stop reason: HOSPADM

## 2024-06-17 RX ORDER — MIDAZOLAM HYDROCHLORIDE 1 MG/ML
INJECTION INTRAMUSCULAR; INTRAVENOUS PRN
Status: DISCONTINUED | OUTPATIENT
Start: 2024-06-17 | End: 2024-06-17 | Stop reason: HOSPADM

## 2024-06-17 RX ADMIN — SODIUM CHLORIDE, PRESERVATIVE FREE 10 ML: 5 INJECTION INTRAVENOUS at 09:29

## 2024-06-17 RX ADMIN — TAMSULOSIN HYDROCHLORIDE 0.4 MG: 0.4 CAPSULE ORAL at 09:28

## 2024-06-17 RX ADMIN — ATORVASTATIN CALCIUM 40 MG: 40 TABLET, FILM COATED ORAL at 20:40

## 2024-06-17 RX ADMIN — DOXYCYCLINE HYCLATE 100 MG: 100 TABLET, COATED ORAL at 20:40

## 2024-06-17 RX ADMIN — DOPAMINE HYDROCHLORIDE 13 MCG/KG/MIN: 160 INJECTION, SOLUTION INTRAVENOUS at 09:46

## 2024-06-17 RX ADMIN — INSULIN GLARGINE 20 UNITS: 100 INJECTION, SOLUTION SUBCUTANEOUS at 20:40

## 2024-06-17 RX ADMIN — DOPAMINE HYDROCHLORIDE 12 MCG/KG/MIN: 160 INJECTION, SOLUTION INTRAVENOUS at 03:35

## 2024-06-17 RX ADMIN — INSULIN GLARGINE 20 UNITS: 100 INJECTION, SOLUTION SUBCUTANEOUS at 09:28

## 2024-06-17 RX ADMIN — ENOXAPARIN SODIUM 40 MG: 100 INJECTION SUBCUTANEOUS at 09:28

## 2024-06-17 RX ADMIN — POLYETHYLENE GLYCOL 3350 17 G: 17 POWDER, FOR SOLUTION ORAL at 09:33

## 2024-06-17 RX ADMIN — PANTOPRAZOLE SODIUM 40 MG: 40 TABLET, DELAYED RELEASE ORAL at 09:30

## 2024-06-17 RX ADMIN — ACETAMINOPHEN 650 MG: 325 TABLET ORAL at 23:25

## 2024-06-17 RX ADMIN — SODIUM CHLORIDE, PRESERVATIVE FREE 10 ML: 5 INJECTION INTRAVENOUS at 20:40

## 2024-06-17 RX ADMIN — ASPIRIN 81 MG: 81 TABLET, COATED ORAL at 09:28

## 2024-06-17 RX ADMIN — SODIUM CHLORIDE 1500 MG: 9 INJECTION, SOLUTION INTRAVENOUS at 12:42

## 2024-06-17 RX ADMIN — SODIUM CHLORIDE: 9 INJECTION, SOLUTION INTRAVENOUS at 12:37

## 2024-06-17 ASSESSMENT — PAIN SCALES - GENERAL
PAINLEVEL_OUTOF10: 6
PAINLEVEL_OUTOF10: 8

## 2024-06-17 ASSESSMENT — PAIN DESCRIPTION - DESCRIPTORS
DESCRIPTORS: ACHING
DESCRIPTORS: DISCOMFORT

## 2024-06-17 ASSESSMENT — PAIN DESCRIPTION - LOCATION
LOCATION: HEAD
LOCATION: ARM

## 2024-06-17 ASSESSMENT — PAIN DESCRIPTION - ORIENTATION: ORIENTATION: LEFT

## 2024-06-17 ASSESSMENT — PAIN DESCRIPTION - PAIN TYPE: TYPE: ACUTE PAIN

## 2024-06-17 NOTE — PROGRESS NOTES
Continue with IV dopamine to target HR>40  Continue to hold anti-HTN meds   Avoid AV dawn blocking medications.    Continue aspirin and statin.  Echo reviewed, EF preserved      Please wait for final attestation from rounding attending     Isrrael Yan MD  Internal Medicine Resident   [facility], [place], [place].       Thank you for allowing me to participate in the care of this patient, please do not hesitate to call if you have any questions.    Santosh Kaba DO, [facility]  Board Certified Cardiologist  Fellow of the American College of Cardiology    Obesity & Weight Loss Medicine   of Medicine [facility]   of Medicine [facility]  [facility]  [phone]

## 2024-06-17 NOTE — PROGRESS NOTES
Physical Therapy        Physical Therapy Cancel Note      DATE: 2024    NAME: Anuj Jovel  MRN: [id number]   : 1940      Patient not seen this date for Physical Therapy due to:    Patient is not appropriate for PT evaluation/treatment at this time d/t RN deferred, pt getting ready to head down for pace maker. Will continue to pursue as able.       Electronically signed by Evelyn Vega PTA on 2024 at 2:26 PM

## 2024-06-17 NOTE — PROGRESS NOTES
Patient back from cath lab to room 3011. Site assessed. Dressing clean dry and intact. Arm in sling. VS as charted. Call light within reach. Family at bedside. Critical Care aware.  Will continue to monitor.

## 2024-06-17 NOTE — PROGRESS NOTES
[facility] - Progress Note    COVID 19 Patient  Today's Date and Time: 6/17/2024, 7:12 AM    Impression :     COVID 19 Confirmed Infection  Covid tests:  Positive Covid Test Date: 6/7/24  Vaccination Status:  Received Covid 19 vaccines on 1/22/21, 2/19/21, 11/2/21 and 9/1/23  Bradycardia  Second degree heart block, Mobitz type I  Systolic heart failure  Acute hypoxic respiratory distress  CRP elevation  Altered mentation  CAD  Pulmonary HTN  Essential HTN  Diabetes mellitus  HLD  BPH  PCN, flagyl and sulfa allergy  Recommendations:   Antibiotic treatment:  Monitor off antibiotics  Covid Rx:    Completed Remdesivir: Requested 6-9-24. Stop date 6-13-24.  Decadron: 6 mg daily x 6 days initiated 6/7/24. Completed on 6-14-24.  Baricitinib: Initiated 6/7/24 . Will D/C  Paxlovid: Likely out of window  Monitor CRP: 6.7-->20.3    OK for PPM at the discretion of Cardiology  Patient off Covid isolation    Medical Decision Making/Summary/Discussion:6/17/2024     Patient admitted with COVID 19 infection    Infection Control Recommendations   Universal Precautions  Completed Droplet Plus Isolation. Stop date 6/17/24    Antimicrobial Stewardship Recommendations     Simplification of therapy  Targeted therapy    Coordination of Outpatient Care:   Estimated Length of IV antimicrobials:TBD  Patient will need Midline Catheter Insertion: TBD  Patient will need PICC line Insertion: No  Patient will need: Home IV , Infusion Center,  SNF,  LTAC:TBD  Patient will need outpatient wound care:No    Chief complaint/reason for consultation:   Concern for COVID infection      History of Present Illness:   Anuj Jovel is a 83 y.o.-year-old  male who was initially admitted on 6/8/2024. Patient seen at the request of Dr. Juarez    INITIAL HISTORY:    Patient, with a hx of Wenckebach, CAD w/stenting, CHF, DM, HTN and BPH, presented through [facility] on 6/7/24 with complaints of generalized malaise and  Narrative    Not on file     Social Determinants of Health     Financial Resource Strain: Low Risk  (4/26/2024)    Overall Financial Resource Strain (CARDIA)     Difficulty of Paying Living Expenses: Not hard at all   Food Insecurity: No Food Insecurity (6/14/2024)    Hunger Vital Sign     Worried About Running Out of Food in the Last Year: Never true     Ran Out of Food in the Last Year: Never true   Transportation Needs: No Transportation Needs (6/14/2024)    PRAPARE - Transportation     Lack of Transportation (Medical): No     Lack of Transportation (Non-Medical): No   Physical Activity: Inactive (6/27/2023)    Exercise Vital Sign     Days of Exercise per Week: 0 days     Minutes of Exercise per Session: 0 min   Stress: No Stress Concern Present (2/16/2021)    [employer] - Occupational Stress Questionnaire     Feeling of Stress : Only a little   Social Connections: Moderately Integrated (2/16/2021)    Social Connection and Isolation Panel [NHANES]     Frequency of Communication with Friends and Family: Twice a week     Frequency of Social Gatherings with Friends and Family: Once a week     Attends [employer] Services: 1 to 4 times per year     Active Member of Clubs or Organizations: No     Attends Club or Organization Meetings: Never     Marital Status:    Intimate Partner Violence: Not on file   Housing Stability: Low Risk  (6/14/2024)    Housing Stability Vital Sign     Unable to Pay for Housing in the Last Year: No     Number of Places Lived in the Last Year: 1     Unstable Housing in the Last Year: No       Family History:   No family history on file.     Allergies:   Feldene [piroxicam], Flagyl [metronidazole], Lisinopril, Naprosyn [naproxen], Penicillins, Sulfa antibiotics, and Sulfasalazine     Review of Systems:   Unable to assess-Patient disoriented  Constitutional: No fevers or chills. No systemic complaints  Head: No headaches  Eyes: No double vision or blurry

## 2024-06-17 NOTE — PLAN OF CARE
Problem: Safety - Adult  Goal: Free from fall injury  6/17/2024 0137 by Estefany Ayala RN  Outcome: Progressing  6/16/2024 1814 by Elaina Hunt RN  Outcome: Progressing  Flowsheets (Taken 6/16/2024 0800)  Free From Fall Injury: Instruct family/caregiver on patient safety     Problem: Chronic Conditions and Co-morbidities  Goal: Patient's chronic conditions and co-morbidity symptoms are monitored and maintained or improved  6/17/2024 0137 by Estefany Ayala RN  Outcome: Progressing  6/16/2024 1814 by Elaina Hunt RN  Outcome: Progressing     Problem: Discharge Planning  Goal: Discharge to home or other facility with appropriate resources  6/17/2024 0137 by Estefany Ayala RN  Outcome: Progressing  6/16/2024 1814 by Elaina Hunt RN  Outcome: Progressing     Problem: Skin/Tissue Integrity  Goal: Absence of new skin breakdown  Description: 1.  Monitor for areas of redness and/or skin breakdown  2.  Assess vascular access sites hourly  3.  Every 4-6 hours minimum:  Change oxygen saturation probe site  4.  Every 4-6 hours:  If on nasal continuous positive airway pressure, respiratory therapy assess nares and determine need for appliance change or resting period.  6/17/2024 0137 by Estefany Ayala RN  Outcome: Progressing  6/16/2024 1814 by Elaina Hunt RN  Outcome: Progressing     Problem: ABCDS Injury Assessment  Goal: Absence of physical injury  6/17/2024 0137 by Estefany Ayala RN  Outcome: Progressing  6/16/2024 1814 by Elaina Hunt RN  Outcome: Progressing  Flowsheets (Taken 6/16/2024 0800)  Absence of Physical Injury: Implement safety measures based on patient assessment     Problem: Pain  Goal: Verbalizes/displays adequate comfort level or baseline comfort level  6/17/2024 0137 by Estefany Ayala RN  Outcome: Progressing  6/16/2024 1814 by Elaina Hunt RN  Outcome: Progressing     Problem: Nutrition Deficit:  Goal: Optimize nutritional status  6/17/2024 0137 by Estefany Ayala RN  Outcome: Progressing  6/16/2024 1814 by

## 2024-06-17 NOTE — PLAN OF CARE
Problem: Chronic Conditions and Co-morbidities  Goal: Patient's chronic conditions and co-morbidity symptoms are monitored and maintained or improved  6/17/2024 1052 by Caitlin Chauhan RN  Outcome: Progressing  6/17/2024 0137 by Estefany Ayala RN  Outcome: Progressing     Problem: Safety - Adult  Goal: Free from fall injury  6/17/2024 1052 by Caitlin Chauhan RN  Outcome: Progressing  6/17/2024 0137 by Estefany Ayala RN  Outcome: Progressing     Problem: Discharge Planning  Goal: Discharge to home or other facility with appropriate resources  6/17/2024 1052 by Caitlin Chauhan RN  Outcome: Progressing  6/17/2024 0137 by Estefany Ayala RN  Outcome: Progressing     Problem: Skin/Tissue Integrity  Goal: Absence of new skin breakdown  Description: 1.  Monitor for areas of redness and/or skin breakdown  2.  Assess vascular access sites hourly  3.  Every 4-6 hours minimum:  Change oxygen saturation probe site  4.  Every 4-6 hours:  If on nasal continuous positive airway pressure, respiratory therapy assess nares and determine need for appliance change or resting period.  6/17/2024 1052 by Caitlin Chauhan RN  Outcome: Progressing  6/17/2024 0137 by Estefany Ayala RN  Outcome: Progressing     Problem: ABCDS Injury Assessment  Goal: Absence of physical injury  6/17/2024 1052 by Caitlin Chauhan RN  Outcome: Progressing  6/17/2024 0137 by Estefany Ayala RN  Outcome: Progressing     Problem: Pain  Goal: Verbalizes/displays adequate comfort level or baseline comfort level  6/17/2024 1052 by Caitlin Chauhan RN  Outcome: Progressing  6/17/2024 0137 by Estefany Ayala RN  Outcome: Progressing     Problem: Nutrition Deficit:  Goal: Optimize nutritional status  6/17/2024 1052 by Caitlin Chauhan RN  Outcome: Progressing  6/17/2024 0137 by Estefany Ayala RN  Outcome: Progressing

## 2024-06-17 NOTE — PROGRESS NOTES
Occupational Therapy    [facility]  Occupational Therapy Not Seen Note    DATE: 2024    NAME: Anuj Jovel  MRN: [id number]   : 1940      Patient not seen this date for Occupational Therapy due to:    Surgery/Procedure: pacemaker    Next Scheduled Treatment:     Electronically signed by DOMINGA Mcneal on 2024 at 4:24 PM

## 2024-06-17 NOTE — PROGRESS NOTES
INTENSIVE CARE UNIT  Resident Physician Progress Note    Patient - Anuj Jovel  Date of Admission -  2024  8:29 PM  Date of Evaluation -  2024  Room and Bed Number -  3011/3011-01   Hospital Day - 9      SUBJECTIVE:     OVERNIGHT EVENTS:      Continues to be bradycardic in the 40s, on dopamine 13. No acute events. Now out of covid precautions    TODAY:    Remain in ICU while requiring dopamine  Plan for pacemaker placement today    Brief History:   Anuj Jovel is a 83 y.o. who initially presented to [facility] with weakness, shortness of breath and was found to have COVID-19 infection/pneumonia.  Patient was noted to have bradycardia at [facility] with Mobitz type I block as well as 2-1 AV block.  Patient was subsequently transferred to [place] for further management of bradycardia.  Patient arrived to [facility] overnight.  He was on dopamine drip.  Telemetry with evidence of Mobitz type I.  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  Patient was admitted to cardiac ICU.  He was being followed by cardiology and is on dopamine drip.  He is being followed by infectious diseases for COVID-19 pneumonia.  Due to limited availability of bed on cardiac ICU, patient was transferred to medical ICU.      He has past medical history of CAD with previous stenting to LAD per documentation, HFpEF.     AWAKE & FOLLOWING COMMANDS:  [] No   [x] Yes    VASOPRESSORS:  [] No    [] Yes  [] Levophed [x] Dopamine [] Vasopressin  [] Dobutamine [] Phenylephrine [] Epinephrine    OBJECTIVE:     VITAL SIGNS:  BP (!) 121/58   Pulse 71   Temp 97.9 °F (36.6 °C) (Oral)   Resp 23   Ht 1.702 m (5' 7\")   Wt 94.6 kg (208 lb 8.9 oz)   SpO2 96%   BMI 32.66 kg/m²   Tmax over 24 hours:  Temp (24hrs), Av.2 °F (36.8 °C), Min:97.7 °F (36.5 °C), Max:98.5 °F (36.9 °C)      Patient Vitals for the past 8 hrs:   BP Temp Temp src Pulse Resp SpO2   24 0645 (!) 121/58 -- -- 71 23 96 %   24 0630  01/11/2016    Lumbar stenosis 01/11/2016    Skin neoplasm 01/11/2016    Tinea corporis 01/11/2016    Ventral hernia 01/11/2016    Weight gain 01/11/2016          PLAN:     WEAN PER PROTOCOL:  [] No   [] Yes  [x] N/A    ICU PROPHYLAXIS:  Stress ulcer:  [x] PPI Agent  [] [id number] [] Sucralfate  [] Other:  VTE:   [x] Enoxaparin  [] Unfract. Heparin Subcut  [] EPC Cuffs    NUTRITION:  [] NPO [] Tube Feeding (Specify: ) [] TPN  [x] PO    HOME MEDS RECONCILED: [] No  [x] Yes    CONSULTATION NEEDED:  [] No  [x] Yes    FAMILY UPDATED:    [] No  [x] Yes    TRANSFER OUT OF ICU:   [x] No  [] Yes        Plan:    CV:  Mobitz type I, intermittent 2-1 AV block  Dopamine drip to target HR >40  Cardiology following  Awaiting clearance from COVID infection to place pacemaker  Avoid AV dawn blocking medications    GI/Nutrition:  Tolerating regular 4 carb diet  Having BMs    /Fluids/Electrolytes:  External urinary catheter  Monitor I/Os  UOP 1.2cc/kg/hr  -16.5L since admission  Bumex held  Electrolytes WNL    Heme:  Hgb stable, 14.3  Plt 237    ID:  COVID 19 infection  Afebrile, WBC 12.4  Resp:  2L NC  Maintaining sats    Endo:  BS improving  On sliding scale, 20 lantus BID    Prophylaxis:  DVT: Lovenox  GI: Protonix    Dispo:  Remain in ICU          Alvaro Ferguson MD  EmergencyMedicine PGY2  6/17/2024 7:25 AM

## 2024-06-18 ENCOUNTER — APPOINTMENT (OUTPATIENT)
Dept: CT IMAGING | Age: 84
End: 2024-06-18
Attending: HOSPITALIST
Payer: MEDICARE

## 2024-06-18 PROBLEM — Z95.0 CARDIAC PACEMAKER IN SITU: Status: ACTIVE | Noted: 2024-06-18

## 2024-06-18 LAB
ANION GAP SERPL CALCULATED.3IONS-SCNC: 11 MMOL/L (ref 9–16)
BASOPHILS # BLD: 0.05 K/UL (ref 0–0.2)
BASOPHILS NFR BLD: 0 % (ref 0–2)
BUN SERPL-MCNC: 24 MG/DL (ref 8–23)
CALCIUM SERPL-MCNC: 8.4 MG/DL (ref 8.6–10.4)
CHLORIDE SERPL-SCNC: 101 MMOL/L (ref 98–107)
CO2 SERPL-SCNC: 23 MMOL/L (ref 20–31)
CREAT SERPL-MCNC: 1 MG/DL (ref 0.7–1.2)
EKG ATRIAL RATE: 78 BPM
EKG ATRIAL RATE: 83 BPM
EKG P AXIS: 23 DEGREES
EKG Q-T INTERVAL: 446 MS
EKG Q-T INTERVAL: 474 MS
EKG QRS DURATION: 98 MS
EKG QRS DURATION: 98 MS
EKG QTC CALCULATION (BAZETT): 406 MS
EKG QTC CALCULATION (BAZETT): 489 MS
EKG R AXIS: -42 DEGREES
EKG R AXIS: -45 DEGREES
EKG T AXIS: 4 DEGREES
EKG T AXIS: 6 DEGREES
EKG VENTRICULAR RATE: 50 BPM
EKG VENTRICULAR RATE: 64 BPM
EOSINOPHIL # BLD: 0.2 K/UL (ref 0–0.44)
EOSINOPHILS RELATIVE PERCENT: 2 % (ref 1–4)
ERYTHROCYTE [DISTWIDTH] IN BLOOD BY AUTOMATED COUNT: 12.9 % (ref 11.8–14.4)
GFR, ESTIMATED: 78 ML/MIN/1.73M2
GLUCOSE BLD-MCNC: 108 MG/DL (ref 75–110)
GLUCOSE BLD-MCNC: 146 MG/DL (ref 75–110)
GLUCOSE BLD-MCNC: 183 MG/DL (ref 75–110)
GLUCOSE BLD-MCNC: 85 MG/DL (ref 75–110)
GLUCOSE SERPL-MCNC: 92 MG/DL (ref 74–99)
HCT VFR BLD AUTO: 41.7 % (ref 40.7–50.3)
HGB BLD-MCNC: 13.5 G/DL (ref 13–17)
IMM GRANULOCYTES # BLD AUTO: 0.06 K/UL (ref 0–0.3)
IMM GRANULOCYTES NFR BLD: 1 %
LYMPHOCYTES NFR BLD: 1.82 K/UL (ref 1.1–3.7)
LYMPHOCYTES RELATIVE PERCENT: 16 % (ref 24–43)
MCH RBC QN AUTO: 31.7 PG (ref 25.2–33.5)
MCHC RBC AUTO-ENTMCNC: 32.4 G/DL (ref 28.4–34.8)
MCV RBC AUTO: 97.9 FL (ref 82.6–102.9)
MONOCYTES NFR BLD: 0.91 K/UL (ref 0.1–1.2)
MONOCYTES NFR BLD: 8 % (ref 3–12)
NEUTROPHILS NFR BLD: 74 % (ref 36–65)
NEUTS SEG NFR BLD: 8.5 K/UL (ref 1.5–8.1)
NRBC BLD-RTO: 0 PER 100 WBC
PLATELET # BLD AUTO: 181 K/UL (ref 138–453)
PMV BLD AUTO: 9.9 FL (ref 8.1–13.5)
POTASSIUM SERPL-SCNC: 4.2 MMOL/L (ref 3.7–5.3)
RBC # BLD AUTO: 4.26 M/UL (ref 4.21–5.77)
SODIUM SERPL-SCNC: 135 MMOL/L (ref 136–145)
WBC OTHER # BLD: 11.5 K/UL (ref 3.5–11.3)

## 2024-06-18 PROCEDURE — 70498 CT ANGIOGRAPHY NECK: CPT

## 2024-06-18 PROCEDURE — 6370000000 HC RX 637 (ALT 250 FOR IP)

## 2024-06-18 PROCEDURE — 70450 CT HEAD/BRAIN W/O DYE: CPT

## 2024-06-18 PROCEDURE — 80048 BASIC METABOLIC PNL TOTAL CA: CPT

## 2024-06-18 PROCEDURE — 99233 SBSQ HOSP IP/OBS HIGH 50: CPT | Performed by: INTERNAL MEDICINE

## 2024-06-18 PROCEDURE — 36415 COLL VENOUS BLD VENIPUNCTURE: CPT

## 2024-06-18 PROCEDURE — 85025 COMPLETE CBC W/AUTO DIFF WBC: CPT

## 2024-06-18 PROCEDURE — 6370000000 HC RX 637 (ALT 250 FOR IP): Performed by: INTERNAL MEDICINE

## 2024-06-18 PROCEDURE — 97530 THERAPEUTIC ACTIVITIES: CPT

## 2024-06-18 PROCEDURE — 6370000000 HC RX 637 (ALT 250 FOR IP): Performed by: STUDENT IN AN ORGANIZED HEALTH CARE EDUCATION/TRAINING PROGRAM

## 2024-06-18 PROCEDURE — 6370000000 HC RX 637 (ALT 250 FOR IP): Performed by: HOSPITALIST

## 2024-06-18 PROCEDURE — 2580000003 HC RX 258: Performed by: HOSPITALIST

## 2024-06-18 PROCEDURE — 97110 THERAPEUTIC EXERCISES: CPT

## 2024-06-18 PROCEDURE — 1200000000 HC SEMI PRIVATE

## 2024-06-18 PROCEDURE — 6360000002 HC RX W HCPCS

## 2024-06-18 PROCEDURE — 6360000004 HC RX CONTRAST MEDICATION: Performed by: INTERNAL MEDICINE

## 2024-06-18 PROCEDURE — 97535 SELF CARE MNGMENT TRAINING: CPT

## 2024-06-18 PROCEDURE — 82947 ASSAY GLUCOSE BLOOD QUANT: CPT

## 2024-06-18 RX ORDER — INSULIN GLARGINE 100 [IU]/ML
10 INJECTION, SOLUTION SUBCUTANEOUS NIGHTLY
Status: DISCONTINUED | OUTPATIENT
Start: 2024-06-18 | End: 2024-06-21 | Stop reason: HOSPADM

## 2024-06-18 RX ADMIN — ACETAMINOPHEN 650 MG: 325 TABLET ORAL at 08:07

## 2024-06-18 RX ADMIN — BUTALBITAL, ACETAMINOPHEN, AND CAFFEINE 1 TABLET: 50; 325; 40 TABLET ORAL at 18:11

## 2024-06-18 RX ADMIN — TAMSULOSIN HYDROCHLORIDE 0.4 MG: 0.4 CAPSULE ORAL at 08:07

## 2024-06-18 RX ADMIN — ASPIRIN 81 MG: 81 TABLET, COATED ORAL at 08:07

## 2024-06-18 RX ADMIN — DOXYCYCLINE HYCLATE 100 MG: 100 TABLET, COATED ORAL at 08:09

## 2024-06-18 RX ADMIN — SODIUM CHLORIDE, PRESERVATIVE FREE 10 ML: 5 INJECTION INTRAVENOUS at 21:36

## 2024-06-18 RX ADMIN — PANTOPRAZOLE SODIUM 40 MG: 40 TABLET, DELAYED RELEASE ORAL at 08:07

## 2024-06-18 RX ADMIN — DOXYCYCLINE HYCLATE 100 MG: 100 TABLET, COATED ORAL at 21:35

## 2024-06-18 RX ADMIN — ENOXAPARIN SODIUM 40 MG: 100 INJECTION SUBCUTANEOUS at 08:08

## 2024-06-18 RX ADMIN — ATORVASTATIN CALCIUM 40 MG: 40 TABLET, FILM COATED ORAL at 21:35

## 2024-06-18 RX ADMIN — SODIUM CHLORIDE, PRESERVATIVE FREE 10 ML: 5 INJECTION INTRAVENOUS at 08:09

## 2024-06-18 RX ADMIN — IOPAMIDOL 90 ML: 755 INJECTION, SOLUTION INTRAVENOUS at 18:28

## 2024-06-18 RX ADMIN — INSULIN GLARGINE 10 UNITS: 100 INJECTION, SOLUTION SUBCUTANEOUS at 21:36

## 2024-06-18 ASSESSMENT — PAIN SCALES - GENERAL
PAINLEVEL_OUTOF10: 5
PAINLEVEL_OUTOF10: 0
PAINLEVEL_OUTOF10: 6
PAINLEVEL_OUTOF10: 8
PAINLEVEL_OUTOF10: 0

## 2024-06-18 ASSESSMENT — PAIN DESCRIPTION - ORIENTATION
ORIENTATION: LEFT
ORIENTATION: LEFT

## 2024-06-18 ASSESSMENT — PULMONARY FUNCTION TESTS: PIF_VALUE: 0

## 2024-06-18 ASSESSMENT — PAIN DESCRIPTION - PAIN TYPE: TYPE: ACUTE PAIN

## 2024-06-18 ASSESSMENT — PAIN DESCRIPTION - DESCRIPTORS
DESCRIPTORS: ACHING
DESCRIPTORS: ACHING

## 2024-06-18 ASSESSMENT — PAIN DESCRIPTION - LOCATION
LOCATION: HEAD
LOCATION: ARM
LOCATION: ARM

## 2024-06-18 NOTE — PROGRESS NOTES
On dopamine gtt-mobitz 2 heart block   Abdomen: Soft, non tender. Bowel sounds normal. No organomegaly  All four Extremities: No cyanosis, clubbing, edema, or effusions.  Neurologic: No gross sensory or motor deficits. Disoriented.  Skin: Warm and dry with good turgor.No signs of peripheral arterial or venous insufficiency. No ulcerations. No open wounds.    Medical Decision Making -Laboratory:   I have independently reviewed/ordered the following labs:    CBC with Differential:   Recent Labs     06/15/24  0912 06/16/24 1954 06/17/24  0357   WBC 12.8* 12.0* 12.4*   HGB 16.3 14.3 14.3   HCT 50.9* 43.2 40.5*    241 237   LYMPHOPCT 16*  --  20*   MONOPCT 8  --  9   EOSPCT 0*  --  4       BMP:   Recent Labs     06/16/24 1954 06/17/24  0643   * 136   K 4.4 4.1    103   CO2 19* 23   BUN 26* 22   CREATININE 0.9 0.9       Hepatic Function Panel:   No results for input(s): \"PROT\", \"LABALBU\", \"BILIDIR\", \"IBILI\", \"BILITOT\", \"ALKPHOS\", \"ALT\", \"AST\" in the last 72 hours.    No results for input(s): \"RPR\" in the last 72 hours.  No results for input(s): \"HIV\" in the last 72 hours.  No results for input(s): \"BC\" in the last 72 hours.  Lab Results   Component Value Date/Time    RBC 4.54 06/17/2024 03:57 AM    WBC 12.4 06/17/2024 03:57 AM    TURBIDITY Clear 06/08/2024 11:58 PM     Lab Results   Component Value Date/Time    CREATININE 0.9 06/17/2024 06:43 AM    GLUCOSE 126 06/17/2024 06:43 AM       Medical Decision Making-Imaging:   CT HEAD WO CONTRAST    Result Date: 6/9/2024  EXAMINATION: CT OF THE HEAD WITHOUT CONTRAST  6/8/2024 9:45 pm TECHNIQUE: CT of the head was performed without the administration of intravenous contrast. Automated exposure control, iterative reconstruction, and/or weight based adjustment of the mA/kV was utilized to reduce the radiation dose to as low as reasonably achievable. COMPARISON: 06/07/2024 HISTORY: ORDERING SYSTEM PROVIDED HISTORY: encephalopathy TECHNOLOGIST PROVIDED HISTORY:  in Physical Exam section  Changes in ROS:  Unchanged  Please see daily details in Review of Symptoms section  Discussion with Referring Physician or Service  Dr. Patel  Laboratory and Radiologic Studies with personal review of radiologic studies  Laboratory  Radiology  Microbiology  Covid + on 6-7-24  Please see Details in daily Interval Changes Section devoted to Lab, Micro and Radiology   Review of Infection Control and Prevention measures:  Universal precautions   Please see daily details in Infection Control Recommendations section  Administer medications as ordered:  Decadron completed  Remdesivir completed  Review of Antimicrobial Stewardship Measures:  Targeted therapy  PK dosing  Please see daily details in Antimicrobial Stewardship Recommendations section   Prognosis:  Guarded  Review of Discharge Planning:  Please see daily details in Coordination of Outpatient Care section  Review of need for outpatient follow up.      Theodore Bernard MD 6/18/2024

## 2024-06-18 NOTE — PROGRESS NOTES
INTENSIVE CARE UNIT  Resident Physician Progress Note    Patient - Anuj Jovel  Date of Admission -  2024  8:29 PM  Date of Evaluation -  2024  Room and Bed Number -  3011/3011-01   Hospital Day - 10    Cc bradycardia   SUBJECTIVE:     OVERNIGHT EVENTS:      Pacemaker placed yesterday. Improved hemodynamics, off of pressors     TODAY:    Transfer out of ICU    Brief History:   Anuj Jovel is a 83 y.o. who initially presented to [facility] with weakness, shortness of breath and was found to have COVID-19 infection/pneumonia.  Patient was noted to have bradycardia at [facility] with Mobitz type I block as well as 2-1 AV block.  Patient was subsequently transferred to [place] for further management of bradycardia.  Patient arrived to [facility] overnight.  He was on dopamine drip.  Telemetry with evidence of Mobitz type I.  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  Patient was admitted to cardiac ICU.  He was being followed by cardiology and is on dopamine drip.  He is being followed by infectious diseases for COVID-19 pneumonia.  Due to limited availability of bed on cardiac ICU, patient was transferred to medical ICU.      He has past medical history of CAD with previous stenting to LAD per documentation, HFpEF.     AWAKE & FOLLOWING COMMANDS:  [] No   [x] Yes    VASOPRESSORS:  [] No    [] Yes  [] Levophed [x] Dopamine [] Vasopressin  [] Dobutamine [] Phenylephrine [] Epinephrine    OBJECTIVE:     VITAL SIGNS:  BP (!) 119/51   Pulse 69   Temp 97.5 °F (36.4 °C) (Oral)   Resp 21   Ht 1.702 m (5' 7\")   Wt 94.6 kg (208 lb 8.9 oz)   SpO2 94%   BMI 32.66 kg/m²   Tmax over 24 hours:  Temp (24hrs), Av.7 °F (36.5 °C), Min:97.3 °F (36.3 °C), Max:98.1 °F (36.7 °C)      Patient Vitals for the past 8 hrs:   BP Temp Temp src Pulse Resp SpO2   24 0700 (!) 119/51 -- -- 69 21 94 %   24 0600 (!) 133/53 -- -- 67 19 95 %   24 0500 (!) 114/51 -- -- 64 17 94 %

## 2024-06-18 NOTE — CARE COORDINATION
TRANSITIONAL CARE/DISCHARGE PLANNING ONGOING EVALUATION      Reason for Admission: Rudy DURAN [I44.1]         Patient goals/Transitional Plan:    Spoke with patient and daughter about transition and discharge planning, plan remains discharge to home with daughter and [facility], accepted,  Daughter states pt has all needed DME- and transportation home. No other needs at this time

## 2024-06-18 NOTE — PROGRESS NOTES
[facility]   Progress Note                   Date:   6/18/2024  Patient name: Anuj Jovel  Date of admission:  6/8/2024  8:29 PM  MRN:   [id number]  YOB: 1940  PCP: Jeanne Simpson MD    Reason for consult:Symptomatic bradycardia    Subjective:       Patient seen and examined at the bed side: No acute issues overnight. Dual chamber pacemaker placed yesterday. CXR conducted which did not show any pneumothorax. Device interrogation shows show appropriate function of device.     Medications:   Scheduled Meds:   doxycycline hyclate  100 mg Oral 2 times per day    insulin glargine  20 Units SubCUTAneous BID    [Held by provider] amLODIPine  10 mg Oral Daily    pantoprazole  40 mg Oral QAM AC    [Held by provider] bumetanide  1 mg IntraVENous Daily    lidocaine 1 % injection  5 mL IntraDERmal Once    sodium chloride flush  5-40 mL IntraVENous 2 times per day    insulin lispro  0-16 Units SubCUTAneous TID WC    enoxaparin  40 mg SubCUTAneous Daily    aspirin EC  81 mg Oral Daily    atorvastatin  40 mg Oral Nightly    tamsulosin  0.4 mg Oral Daily    insulin lispro  0-4 Units SubCUTAneous Nightly     Continuous Infusions:   norepinephrine Stopped (06/17/24 1733)    sodium chloride 10 mL/hr at 06/18/24 0618    sodium chloride      dextrose       CBC:   Recent Labs     06/16/24 1954 06/17/24  0357 06/18/24  0808   WBC 12.0* 12.4* 11.5*   HGB 14.3 14.3 13.5    237 181     BMP:    Recent Labs     06/16/24 1954 06/17/24  0643 06/18/24  0808   * 136 135*   K 4.4 4.1 4.2    103 101   CO2 19* 23 23   BUN 26* 22 24*   CREATININE 0.9 0.9 1.0   GLUCOSE 132* 126* 92     Hepatic:   No results for input(s): \"AST\", \"ALT\", \"BILITOT\", \"ALKPHOS\" in the last 72 hours.    Invalid input(s): \"ALB\"    Troponin: No results for input(s): \"TROPONINI\" in the last 72 hours.  BNP: No results for input(s): \"BNP\" in the last 72 hours.  Lipids: No results for input(s): \"CHOL\", \"HDL\" in the last 72  6/17/24  [employer]. Model #W1DR01  Serial #[id number].  DDDR mode   COVID-19 infection/pneumonia  History of CAD with previous PCI to LAD  HFpEF  Diabetes mellitus  Hypertension    Plan  - patient is ok for stepdown.   - Cxr doesn't show pneumo  - dual chamber PPM interrogated and is appropriately working  - dopamine drip dc'd   - Continue to hold anti-HTN meds   - Continue aspirin and statin.  - follow up with cardiology outpatient       Please wait for final attestation from rounding attending     Isrrael Yan MD  Internal Medicine Resident   [place], [place].     Attending Cardiologist Addendum: I have reviewed and performed the history, physical, subjective, objective, assessment, and plan with the student/resident/fellow/APN and agree with the note. I performed the history and physical personally. I have done the MDM. I have made changes to the note above as needed.    No longer in isolation  S/p Dual chamber PPM for CHB confirmed on ECG yesterday  Site well healing  Pacer interrogation normal  CXR no PTX  Okay for stepdown  Possible okay home tomorrow    Discussed with patient in detail. All questions answered. Agrees with plan as outlined above.     Thank you for allowing me to participate in the care of this patient, please do not hesitate to call if you have any questions.    Mark Kaba DO, FACC, RPVI, FLORIDALMA BARBOZA  [facility]  [facility]  [phone]

## 2024-06-18 NOTE — PLAN OF CARE
Problem: Safety - Adult  Goal: Free from fall injury  6/17/2024 2104 by Estefany Ayala RN  Outcome: Progressing  6/17/2024 1052 by Caitlin Chauhan RN  Outcome: Progressing     Problem: Chronic Conditions and Co-morbidities  Goal: Patient's chronic conditions and co-morbidity symptoms are monitored and maintained or improved  6/17/2024 2104 by Estefany Ayala RN  Outcome: Progressing  6/17/2024 1052 by Caitlin Chauhan RN  Outcome: Progressing     Problem: Discharge Planning  Goal: Discharge to home or other facility with appropriate resources  6/17/2024 2104 by Estefany Ayala RN  Outcome: Progressing  6/17/2024 1052 by Caitlin Chauhan RN  Outcome: Progressing     Problem: Skin/Tissue Integrity  Goal: Absence of new skin breakdown  Description: 1.  Monitor for areas of redness and/or skin breakdown  2.  Assess vascular access sites hourly  3.  Every 4-6 hours minimum:  Change oxygen saturation probe site  4.  Every 4-6 hours:  If on nasal continuous positive airway pressure, respiratory therapy assess nares and determine need for appliance change or resting period.  6/17/2024 2104 by Estefany Ayala RN  Outcome: Progressing  6/17/2024 1052 by Caitlin Chauhan RN  Outcome: Progressing     Problem: ABCDS Injury Assessment  Goal: Absence of physical injury  6/17/2024 2104 by Estefany Ayala RN  Outcome: Progressing  6/17/2024 1052 by Caitlin Chauhan RN  Outcome: Progressing     Problem: Pain  Goal: Verbalizes/displays adequate comfort level or baseline comfort level  6/17/2024 2104 by Estefany Ayala RN  Outcome: Progressing  6/17/2024 1052 by Caitlin Chauhan RN  Outcome: Progressing     Problem: Nutrition Deficit:  Goal: Optimize nutritional status  6/17/2024 2104 by Estefany Ayala RN  Outcome: Progressing  6/17/2024 1052 by Caitlin Chauhan RN  Outcome: Progressing

## 2024-06-18 NOTE — TRANSITION OF CARE
Critical care team - Resident sign-out to medicine service      Date and time: 6/18/2024 1:32 PM  Patient's name:  Anuj Jovel  Medical Record Number: [id number]  Patient's account/billing number: [id number]  Patient's YOB: 1940  Age: 83 y.o.  Date of Admission: 6/8/2024  8:29 PM  Length of stay during current admission: 10    Primary Care Physician: Jeanne Simpson MD    Code Status: Full Code    Mode of physician to physician communication:        [x] Via telephone   [] In person     Date and time of sign-out: 6/18/2024 1:32 PM    Accepting Internal Medicine resident: Dr. Raimundo Dela Cruz    Accepting Medicine team: IM Team Intermed    Accepting team's attending:      Patient's current ICU Bed:  3011     Patient's assigned bed on floor:          [x] Med-Surg Monitored [] Step-down       [] Psychiatry ICU       [] Psych floor     Reason for ICU admission:     Bradycardia requiring dopamine    ICU course summary:     Anuj Jovel is a 83 y.o. who initially presented to [facility] with weakness, shortness of breath and was found to have COVID-19 infection/pneumonia. Patient was noted to have bradycardia at [facility] with Mobitz type I block as well as 2-1 AV block. Patient was subsequently transferred to [place] for further management of bradycardia. Patient arrived to [facility] overnight. He was on dopamine drip. Telemetry with evidence of Mobitz type I. 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 Patient was admitted to cardiac ICU. He was being followed by cardiology and is on dopamine drip. He is being followed by infectious diseases for COVID-19 pneumonia. Due to limited availability of bed on cardiac ICU, patient was transferred to medical ICU.  Patient is planned for the next several days in the ICU while awaiting clearance COVID so that he could have pacemaker placed.  Patient remained on dopamine while in this period.  On 6/17/2024 patient was taken to  without current pathological fracture 02/13/2018    Obesity 03/13/2017    Type 2 diabetes mellitus with complication (HCC) 07/18/2016    Acquired deviated nasal septum 01/11/2016    Actinic keratoses 01/11/2016    Arthritis 01/11/2016    Benign prostatic hyperplasia 01/11/2016    Bloating 01/11/2016    Chronic serous otitis media 01/11/2016    Claustrophobia 01/11/2016    Coronary artery disease 01/11/2016    Esophageal reflux 01/11/2016    Flatus 01/11/2016    Hearing loss 01/11/2016    Hemorrhoids 01/11/2016    History of allergy 01/11/2016    Hyperlipidemia 01/11/2016    Essential hypertension 01/11/2016    Leg swelling 01/11/2016    Lumbar radiculopathy 01/11/2016    Lumbar stenosis 01/11/2016    Skin neoplasm 01/11/2016    Tinea corporis 01/11/2016    Ventral hernia 01/11/2016    Weight gain 01/11/2016       Recommended Follow-up:     FU CT head   Monitor HR  Cardio recs        Above mentioned assessment and plan was discussed by me with the admitting medicine resident. The medicine team assigned to the patient by medicine admitting resident will be following up the patient from now onwards on the floor.       Alvaro Ferguson MD  Emergency Medicine Resident  Critical Care Service  [facility]  6/18/2024, 1:32 PM EDT

## 2024-06-18 NOTE — PROGRESS NOTES
Physical Therapy  Facility/Department: [facility] 3- MICU  Physical Therapy Treatment Note    Name: Anuj Jovel  : 1940  MRN: [id number]  Date of Service: 2024    Discharge Recommendations:  Patient would benefit from continued therapy after discharge   PT Equipment Recommendations  Equipment Needed: No      Patient Diagnosis(es): The primary encounter diagnosis was AV block, Mobitz 1. Diagnoses of Systolic congestive heart failure, unspecified HF chronicity (HCC), Shortness of breath, Symptomatic bradycardia, and Complete heart block (HCC) were also pertinent to this visit.  Past Medical History:  has a past medical history of Diabetes (HCC), Diverticulitis, History of allergy, Hyperlipidemia, Hypertension, Osteoarthritis, and Pneumonia due to COVID-19 virus.  Past Surgical History:  has a past surgical history that includes Cardiac catheterization; Colonoscopy; Total knee arthroplasty (Right); colectomy (2015); and back surgery (2017).    Assessment   Body Structures, Functions, Activity Limitations Requiring Skilled Therapeutic Intervention: Decreased functional mobility ;Decreased strength;Decreased endurance;Decreased balance;Decreased cognition;Decreased safe awareness  Assessment: Pt ambulated 10 ft x 2 with HW. Pt attempted x 4 sit<>Stands x 3 completed with varying assistance levels ranging from modA x 2-Denise. Pt was most limited to weakness and pain this date. Pt would benefit from therapy following d/c to address deficits in balance, strength, and tolerance to mobility.  Therapy Prognosis: Good  Activity Tolerance  Activity Tolerance: Patient limited by fatigue;Patient limited by endurance     Plan   Physical Therapy Plan  General Plan:  (5-6x/wk)  Current Treatment Recommendations: Strengthening, Balance training, Gait training, Functional mobility training, Stair training, Transfer training, Endurance training, Home exercise program, Safety education & training, Patient/Caregiver  mobility  Supine to Sit: Unable to assess  Sit to Supine: Unable to assess  Scooting: Moderate assistance  Bed Mobility Comments: Pt began and concluded session in recliner, Pt required assistance to scoot retro in recliner  Transfers  Sit to Stand: Maximum Assistance;Moderate Assistance;2 Person Assistance;Minimal Assistance  Stand to Sit: Minimal Assistance  Comment: Assessed with HW, Pt attempted x 4 sit<>Stands completed x 3. First trial pt required maxA unable to achieve full upright posture, 2nd and 3rd trials required modA x 2, 4th trial pt required Denise  Ambulation  Surface: Level tile  Device: [place]  Assistance: Minimal assistance  Quality of Gait: decreased step length, unsteady no true LOB,  Gait Deviations: Slow Erin;Decreased step length;Decreased step height  Distance: 10ft x 2  Comments: Pt required verbal cues for sequencing with good return. Pt required Denise for balance, decreased weight shift, and LLE difficulty advancing d/t pain in RLE.  More Ambulation?: No  Stairs/Curb  Stairs?: No     Balance  Posture: Fair  Sitting - Static: Good  Sitting - Dynamic: Fair;+  Standing - Static: Fair  Standing - Dynamic: Fair;-  Comments: Assessed EOB and with RW  Exercise Treatment: Long sitting recliner ankle pumps x 10 reps, Seated B LE exercise: heel toe raises x 10 reps, Standing B LE marching x 5 reps x 2 pt required modA x 2 to perform static marching intially reducing to Denise      OutComes Score     AM-PAC - Mobility    AM-PAC Basic Mobility - Inpatient   How much help is needed turning from your back to your side while in a flat bed without using bedrails?: A Little  How much help is needed moving from lying on your back to sitting on the side of a flat bed without using bedrails?: A Lot  How much help is needed moving to and from a bed to a chair?: A Lot  How much help is needed standing up from a chair using your arms?: A Lot  How much help is needed walking in hospital room?: A Little  How

## 2024-06-18 NOTE — PROGRESS NOTES
Comprehensive Nutrition Assessment    Type and Reason for Visit:  Reassess    Nutrition Recommendations/Plan:   Recommend continuing 4 CHO Choices diet as ordered.  Recommend continuing Diabetic ONS TID.  To monitor nutrition intake/tolerance, labs, weight, and plan of care, as appropriate.     Malnutrition Assessment:  Malnutrition Status:  Insufficient data (06/14/24 1512)    Context:  Acute Illness     Findings of the 6 clinical characteristics of malnutrition:  Energy Intake:  Mild decrease in energy intake (Comment)  Weight Loss:  No significant weight loss     Body Fat Loss:        Muscle Mass Loss:       Fluid Accumulation:        Strength:       Nutrition Assessment:    Patient resting on recliner at visit, no visitors present. Patient reported his appetite remains poor, consuming only up to 50% of meals, however consuming 100% of ONS. Patient is currently ordered a diabetic diet and glucerna shake TID. No new documented weight since 6/13. Per chart, no nausea/emesis, last BM 6/17. Labs include: POC -193 mg/dL (past 36 hrs). Meds include: lantus, humalog.    Nutrition Related Findings:    +1/non-pitting edema to extremities per chart review. Wound Type: None       Current Nutrition Intake & Therapies:    Average Meal Intake: 26-50%  Average Supplements Intake: %  ADULT ORAL NUTRITION SUPPLEMENT; Breakfast, Lunch, Dinner; Diabetic Oral Supplement  ADULT DIET; Regular; 4 carb choices (60 gm/meal)    Anthropometric Measures:  Height: 170.2 cm (5' 7.01\")  Ideal Body Weight (IBW): 148 lbs (67 kg)       Current Body Weight: 94.6 kg (208 lb 8.9 oz), 140.9 % IBW. Weight Source: Bed Scale  Current BMI (kg/m2): 32.7        Weight Adjustment For: No Adjustment                 BMI Categories: Obese Class 1 (BMI 30.0-34.9)    Estimated Daily Nutrient Needs:  Energy Requirements Based On: Formula     Energy (kcal/day): 1451-8810 kcals/day  Weight Used for Protein Requirements: Ideal  Protein (g/day):   g/day  Method Used for Fluid Requirements: Other (Comment)  Fluid (ml/day): per MD.    Nutrition Diagnosis:   Inadequate oral intake related to  (decreased appetite) as evidenced by intake 0-25%, intake 51-75%    Nutrition Interventions:   Food and/or Nutrient Delivery: Continue Current Diet, Continue Oral Nutrition Supplement  Nutrition Education/Counseling: No recommendation at this time  Coordination of Nutrition Care: Continue to monitor while inpatient  Plan of Care discussed with: Patient    Goals:  Previous Goal Met: Progressing toward Goal(s)  Goals: Meet at least 75% of estimated needs, prior to discharge       Nutrition Monitoring and Evaluation:   Behavioral-Environmental Outcomes: None Identified  Food/Nutrient Intake Outcomes: Food and Nutrient Intake, Supplement Intake  Physical Signs/Symptoms Outcomes: Biochemical Data, GI Status, Fluid Status or Edema, Weight    Discharge Planning:    Continue Oral Nutrition Supplement, Continue current diet     Nicole Pantoja RD  Contact: [phone]

## 2024-06-19 PROBLEM — G31.84 MCI (MILD COGNITIVE IMPAIRMENT): Status: ACTIVE | Noted: 2024-06-19

## 2024-06-19 PROBLEM — G93.40 ENCEPHALOPATHY ACUTE: Status: ACTIVE | Noted: 2024-06-19

## 2024-06-19 LAB
ANION GAP SERPL CALCULATED.3IONS-SCNC: 10 MMOL/L (ref 9–16)
BASOPHILS # BLD: 0.03 K/UL (ref 0–0.2)
BASOPHILS NFR BLD: 0 % (ref 0–2)
BUN SERPL-MCNC: 17 MG/DL (ref 8–23)
CALCIUM SERPL-MCNC: 8.2 MG/DL (ref 8.6–10.4)
CHLORIDE SERPL-SCNC: 102 MMOL/L (ref 98–107)
CO2 SERPL-SCNC: 21 MMOL/L (ref 20–31)
CREAT SERPL-MCNC: 0.8 MG/DL (ref 0.7–1.2)
EKG ATRIAL RATE: 47 BPM
EKG Q-T INTERVAL: 446 MS
EKG QRS DURATION: 94 MS
EKG QTC CALCULATION (BAZETT): 394 MS
EKG R AXIS: -35 DEGREES
EKG T AXIS: 0 DEGREES
EKG VENTRICULAR RATE: 47 BPM
EOSINOPHIL # BLD: 0.1 K/UL (ref 0–0.44)
EOSINOPHILS RELATIVE PERCENT: 1 % (ref 1–4)
ERYTHROCYTE [DISTWIDTH] IN BLOOD BY AUTOMATED COUNT: 13.1 % (ref 11.8–14.4)
GFR, ESTIMATED: 87 ML/MIN/1.73M2
GLUCOSE BLD-MCNC: 157 MG/DL (ref 75–110)
GLUCOSE BLD-MCNC: 180 MG/DL (ref 75–110)
GLUCOSE BLD-MCNC: 195 MG/DL (ref 75–110)
GLUCOSE BLD-MCNC: 86 MG/DL (ref 75–110)
GLUCOSE SERPL-MCNC: 85 MG/DL (ref 74–99)
HCT VFR BLD AUTO: 35.7 % (ref 40.7–50.3)
HGB BLD-MCNC: 11.9 G/DL (ref 13–17)
IMM GRANULOCYTES # BLD AUTO: 0.07 K/UL (ref 0–0.3)
IMM GRANULOCYTES NFR BLD: 1 %
LYMPHOCYTES NFR BLD: 1.45 K/UL (ref 1.1–3.7)
LYMPHOCYTES RELATIVE PERCENT: 12 % (ref 24–43)
MCH RBC QN AUTO: 31.8 PG (ref 25.2–33.5)
MCHC RBC AUTO-ENTMCNC: 33.3 G/DL (ref 28.4–34.8)
MCV RBC AUTO: 95.5 FL (ref 82.6–102.9)
MONOCYTES NFR BLD: 1.11 K/UL (ref 0.1–1.2)
MONOCYTES NFR BLD: 9 % (ref 3–12)
NEUTROPHILS NFR BLD: 77 % (ref 36–65)
NEUTS SEG NFR BLD: 9.72 K/UL (ref 1.5–8.1)
NRBC BLD-RTO: 0 PER 100 WBC
PLATELET # BLD AUTO: 174 K/UL (ref 138–453)
PMV BLD AUTO: 9.4 FL (ref 8.1–13.5)
POTASSIUM SERPL-SCNC: 3.8 MMOL/L (ref 3.7–5.3)
RBC # BLD AUTO: 3.74 M/UL (ref 4.21–5.77)
SODIUM SERPL-SCNC: 133 MMOL/L (ref 136–145)
WBC OTHER # BLD: 12.5 K/UL (ref 3.5–11.3)

## 2024-06-19 PROCEDURE — 99223 1ST HOSP IP/OBS HIGH 75: CPT | Performed by: PSYCHIATRY & NEUROLOGY

## 2024-06-19 PROCEDURE — 6370000000 HC RX 637 (ALT 250 FOR IP): Performed by: INTERNAL MEDICINE

## 2024-06-19 PROCEDURE — 97535 SELF CARE MNGMENT TRAINING: CPT

## 2024-06-19 PROCEDURE — 97530 THERAPEUTIC ACTIVITIES: CPT

## 2024-06-19 PROCEDURE — 99232 SBSQ HOSP IP/OBS MODERATE 35: CPT | Performed by: INTERNAL MEDICINE

## 2024-06-19 PROCEDURE — 80048 BASIC METABOLIC PNL TOTAL CA: CPT

## 2024-06-19 PROCEDURE — 97110 THERAPEUTIC EXERCISES: CPT

## 2024-06-19 PROCEDURE — 6370000000 HC RX 637 (ALT 250 FOR IP)

## 2024-06-19 PROCEDURE — 82947 ASSAY GLUCOSE BLOOD QUANT: CPT

## 2024-06-19 PROCEDURE — 6370000000 HC RX 637 (ALT 250 FOR IP): Performed by: NURSE PRACTITIONER

## 2024-06-19 PROCEDURE — 85025 COMPLETE CBC W/AUTO DIFF WBC: CPT

## 2024-06-19 PROCEDURE — 6370000000 HC RX 637 (ALT 250 FOR IP): Performed by: STUDENT IN AN ORGANIZED HEALTH CARE EDUCATION/TRAINING PROGRAM

## 2024-06-19 PROCEDURE — 2580000003 HC RX 258: Performed by: HOSPITALIST

## 2024-06-19 PROCEDURE — 1200000000 HC SEMI PRIVATE

## 2024-06-19 PROCEDURE — 36415 COLL VENOUS BLD VENIPUNCTURE: CPT

## 2024-06-19 PROCEDURE — 6360000002 HC RX W HCPCS

## 2024-06-19 PROCEDURE — 95819 EEG AWAKE AND ASLEEP: CPT

## 2024-06-19 PROCEDURE — 99233 SBSQ HOSP IP/OBS HIGH 50: CPT | Performed by: NURSE PRACTITIONER

## 2024-06-19 RX ORDER — FUROSEMIDE 20 MG/1
20 TABLET ORAL DAILY
Status: DISCONTINUED | OUTPATIENT
Start: 2024-06-19 | End: 2024-06-21 | Stop reason: HOSPADM

## 2024-06-19 RX ORDER — UREA 10 %
5 LOTION (ML) TOPICAL NIGHTLY PRN
Status: DISCONTINUED | OUTPATIENT
Start: 2024-06-19 | End: 2024-06-21 | Stop reason: HOSPADM

## 2024-06-19 RX ORDER — AMLODIPINE BESYLATE 5 MG/1
5 TABLET ORAL DAILY
Status: DISCONTINUED | OUTPATIENT
Start: 2024-06-19 | End: 2024-06-21 | Stop reason: HOSPADM

## 2024-06-19 RX ORDER — PREGABALIN 50 MG/1
100 CAPSULE ORAL 2 TIMES DAILY
Status: DISCONTINUED | OUTPATIENT
Start: 2024-06-19 | End: 2024-06-20

## 2024-06-19 RX ADMIN — METFORMIN HYDROCHLORIDE 1000 MG: 500 TABLET ORAL at 12:20

## 2024-06-19 RX ADMIN — TAMSULOSIN HYDROCHLORIDE 0.4 MG: 0.4 CAPSULE ORAL at 07:59

## 2024-06-19 RX ADMIN — DOXYCYCLINE HYCLATE 100 MG: 100 TABLET, COATED ORAL at 21:48

## 2024-06-19 RX ADMIN — INSULIN GLARGINE 10 UNITS: 100 INJECTION, SOLUTION SUBCUTANEOUS at 21:44

## 2024-06-19 RX ADMIN — DOXYCYCLINE HYCLATE 100 MG: 100 TABLET, COATED ORAL at 07:59

## 2024-06-19 RX ADMIN — SODIUM CHLORIDE, PRESERVATIVE FREE 10 ML: 5 INJECTION INTRAVENOUS at 08:07

## 2024-06-19 RX ADMIN — PREGABALIN 100 MG: 50 CAPSULE ORAL at 12:20

## 2024-06-19 RX ADMIN — ENOXAPARIN SODIUM 40 MG: 100 INJECTION SUBCUTANEOUS at 07:58

## 2024-06-19 RX ADMIN — ATORVASTATIN CALCIUM 40 MG: 40 TABLET, FILM COATED ORAL at 21:44

## 2024-06-19 RX ADMIN — ACETAMINOPHEN 650 MG: 325 TABLET ORAL at 07:58

## 2024-06-19 RX ADMIN — SODIUM CHLORIDE, PRESERVATIVE FREE 10 ML: 5 INJECTION INTRAVENOUS at 21:49

## 2024-06-19 RX ADMIN — AMLODIPINE BESYLATE 5 MG: 5 TABLET ORAL at 12:24

## 2024-06-19 RX ADMIN — PANTOPRAZOLE SODIUM 40 MG: 40 TABLET, DELAYED RELEASE ORAL at 07:59

## 2024-06-19 RX ADMIN — FUROSEMIDE 20 MG: 20 TABLET ORAL at 12:24

## 2024-06-19 RX ADMIN — ASPIRIN 81 MG: 81 TABLET, COATED ORAL at 07:58

## 2024-06-19 RX ADMIN — PREGABALIN 100 MG: 50 CAPSULE ORAL at 21:44

## 2024-06-19 ASSESSMENT — PAIN DESCRIPTION - PAIN TYPE: TYPE: ACUTE PAIN

## 2024-06-19 ASSESSMENT — PAIN SCALES - GENERAL
PAINLEVEL_OUTOF10: 9
PAINLEVEL_OUTOF10: 8
PAINLEVEL_OUTOF10: 2
PAINLEVEL_OUTOF10: 9

## 2024-06-19 ASSESSMENT — ENCOUNTER SYMPTOMS
TROUBLE SWALLOWING: 0
ABDOMINAL DISTENTION: 0
SHORTNESS OF BREATH: 0
EYE DISCHARGE: 0
CONSTIPATION: 0
EYE REDNESS: 0
COUGH: 0
BACK PAIN: 0
EYE ITCHING: 0
DIARRHEA: 0
CHOKING: 0
NAUSEA: 0
RHINORRHEA: 0
WHEEZING: 0
VOICE CHANGE: 0
PHOTOPHOBIA: 0
SORE THROAT: 0
CHEST TIGHTNESS: 0
EYE PAIN: 0
ABDOMINAL PAIN: 0

## 2024-06-19 ASSESSMENT — PAIN - FUNCTIONAL ASSESSMENT
PAIN_FUNCTIONAL_ASSESSMENT: PREVENTS OR INTERFERES SOME ACTIVE ACTIVITIES AND ADLS
PAIN_FUNCTIONAL_ASSESSMENT: PREVENTS OR INTERFERES WITH MANY ACTIVE NOT PASSIVE ACTIVITIES

## 2024-06-19 ASSESSMENT — PAIN DESCRIPTION - LOCATION
LOCATION: ANKLE
LOCATION: ANKLE

## 2024-06-19 ASSESSMENT — PAIN DESCRIPTION - DESCRIPTORS
DESCRIPTORS: ACHING
DESCRIPTORS: ACHING

## 2024-06-19 ASSESSMENT — PAIN DESCRIPTION - ORIENTATION
ORIENTATION: LEFT
ORIENTATION: LEFT

## 2024-06-19 NOTE — PROGRESS NOTES
Use: Never used   Substance and Sexual Activity    Alcohol use: Not Currently     Alcohol/week: 1.0 standard drink of alcohol     Types: 1 Standard drinks or equivalent per week     Comment: social    Drug use: No    Sexual activity: Not on file   Other Topics Concern    Not on file   Social History Narrative    Not on file     Social Determinants of Health     Financial Resource Strain: Low Risk  (4/26/2024)    Overall Financial Resource Strain (CARDIA)     Difficulty of Paying Living Expenses: Not hard at all   Food Insecurity: No Food Insecurity (6/14/2024)    Hunger Vital Sign     Worried About Running Out of Food in the Last Year: Never true     Ran Out of Food in the Last Year: Never true   Transportation Needs: No Transportation Needs (6/14/2024)    PRAPARE - Transportation     Lack of Transportation (Medical): No     Lack of Transportation (Non-Medical): No   Physical Activity: Inactive (6/27/2023)    Exercise Vital Sign     Days of Exercise per Week: 0 days     Minutes of Exercise per Session: 0 min   Stress: No Stress Concern Present (2/16/2021)    [employer] [place] [employer] - Occupational Stress Questionnaire     Feeling of Stress : Only a little   Social Connections: Moderately Integrated (2/16/2021)    Social Connection and Isolation Panel [NHANES]     Frequency of Communication with Friends and Family: Twice a week     Frequency of Social Gatherings with Friends and Family: Once a week     Attends Druze Services: 1 to 4 times per year     Active Member of Clubs or Organizations: No     Attends Club or Organization Meetings: Never     Marital Status:    Intimate Partner Violence: Not on file   Housing Stability: Low Risk  (6/14/2024)    Housing Stability Vital Sign     Unable to Pay for Housing in the Last Year: No     Number of Places Lived in the Last Year: 1     Unstable Housing in the Last Year: No       Family History:   No family history on file.     Allergies:   Feldene  [[id number]] Collected: 06/07/24 0925    Order Status: Completed Specimen: Blood Updated: 06/12/24 1411     Specimen Description .BLOOD LT HAND     Special Requests          Culture NO GROWTH 5 DAYS    Culture, Blood 1 [[id number]] Collected: 06/07/24 0920    Order Status: Completed Specimen: Blood Updated: 06/12/24 1415     Specimen Description .BLOOD RT HAND     Special Requests          Culture NO GROWTH 5 DAYS    Culture, Urine [[id number]] Collected: 06/07/24 0850    Order Status: Completed Specimen: Urine, clean catch Updated: 06/08/24 0759     Specimen Description .CLEAN CATCH URINE     Culture NO GROWTH              Medical Decision Making-Other:     Note:      Theodore Bernard MD  Office: [phone]    Daily Elements of Decision Making provided by Consulting Physician:  Note: I have independently performed the steps listed below as part of the medical decision making and evaluation.    Review of current Problems:  COVID 19 Confirmed Infection  Covid tests:  Positive Covid Test Date: 6/7/24  Vaccination Status:  Received Covid 19 vaccines on 1/22/21, 2/19/21, 11/2/21 and 9/1/23  Bradycardia  Second degree heart block, Mobitz type I  Systolic heart failure  Acute hypoxic respiratory distress  CRP elevation  Altered mentation  CAD  Pulmonary HTN  Essential HTN  Diabetes mellitus  HLD  BPH  PCN, flagyl and sulfa allergy  Evaluation of Patient:  Patient evaluated and examined in the ICU.   Evaluated because of Covid  Please see daily details in Interval Changes Section  Changes in physical exam:  Bradycardia  Second degree heart block, Mobitz type I  Systolic heart failure  Acute hypoxic respiratory distress  S/P pacemaker insertion 6-17-24  Please see daily details in Physical Exam section  Changes in ROS:  Unchanged  Please see daily details in Review of Symptoms section  Discussion with Referring Physician or Service  Dr. Patel  Laboratory and Radiologic Studies with personal review of radiologic

## 2024-06-19 NOTE — CARE COORDINATION
Met with patient and daughter to discuss transitional planning. Patient is agreeable to go to rehab. First choice is [facility], backup choice is [facility]. Referrals sent. Notified Dr Fountain of new PM&R consult

## 2024-06-19 NOTE — PROGRESS NOTES
3.74*   HGB 14.3 13.5 11.9*   HCT 40.5* 41.7 35.7*   MCV 89.2 97.9 95.5   MCH 31.5 31.7 31.8   MCHC 35.3* 32.4 33.3   RDW 12.8 12.9 13.1    181 174   MPV 9.3 9.9 9.4     Chemistry:  Recent Labs     06/17/24  0643 06/18/24  0808 06/19/24  0802    135* 133*   K 4.1 4.2 3.8    101 102   CO2 23 23 21   GLUCOSE 126* 92 85   BUN 22 24* 17   CREATININE 0.9 1.0 0.8   ANIONGAP 10 11 10   LABGLOM 86 78 87   CALCIUM 8.2* 8.4* 8.2*     Recent Labs     06/18/24  0749 06/18/24  1135 06/18/24  1606 06/18/24  2049 06/19/24  0749 06/19/24  1045   POCGLU 85 183* 146* 108 86 180*     ABG:  Lab Results   Component Value Date/Time    POCPH 7.464 06/08/2024 11:06 PM    POCPCO2 32.8 06/08/2024 11:06 PM    POCPO2 90.1 06/08/2024 11:06 PM    POCHCO3 23.5 06/08/2024 11:06 PM    PBEA 0.4 06/08/2024 11:06 PM    SYGA8FSU 97.5 06/08/2024 11:06 PM    FIO2 3.0 06/08/2024 11:06 PM     No results found for: \"SPECIAL\"  Lab Results   Component Value Date/Time    CULTURE NO GROWTH 5 DAYS 06/07/2024 09:25 AM       Radiology:  CT HEAD WO CONTRAST    Result Date: 6/18/2024  No acute intracranial disease.  Moderate generalized cerebral volume loss and mild chronic microvascular ischemic changes. Moderate mucosal thickening and air-fluid levels are noted within the bilateral anterior ethmoid air cells as well as maxillary sinuses, concerning for acute sinusitis. Multiple locules of soft tissue air at the left sternoclavicular joint extending to the left chest wall pacemaker battery pack as well as cranially along the sternocleidomastoid muscle.  Correlated with recent surgery otherwise, infectious process should be considered. No hemodynamically significant stenosis, occlusion, aneurysm or AVM of the major arteries of the head and neck.     CTA HEAD NECK W CONTRAST    Result Date: 6/18/2024  No acute intracranial disease.  Moderate generalized cerebral volume loss and mild chronic microvascular ischemic changes. Moderate mucosal  placement of stent in LAD coronary artery 6/8/2024 Yes    Chronic heart failure with preserved ejection fraction (HFpEF) (HCC) 6/8/2024 Yes    Mild mitral valve regurgitation 6/8/2024 Yes    Pulmonary hypertension (HCC) 6/8/2024 Yes    Second degree AV block, Mobitz type I 6/8/2024 Yes    Systolic congestive heart failure (HCC) 6/9/2024 Yes    Advanced age 6/9/2024 Yes    Acute hypoxic respiratory failure (HCC) 6/9/2024 Yes    COVID 6/9/2024 Yes    Shortness of breath 6/11/2024 Yes    Symptomatic bradycardia 6/13/2024 Unknown    Encephalopathy acute 6/19/2024 Yes    MCI (mild cognitive impairment) 6/19/2024 Yes        Plan:     Third-degree heart block status post pacemaker placement  Status post dual-chamber pacemaker on 6/17/2024  Stable for discharge from cardiology perspective.  Needs outpatient follow-up  On antibiotics for prophylaxis per cardiology  Debility  Consult PMR- PTOT recommending [facility]  .Acute hypoxic respiratory failure likely due to pneumonia due to COVID-19  Completed remdesivir, Decadron, and barictinib   Off of covid isolation  Hyperglycemia due to medication effect  Monitor  ISS as needed. Stop lantus if glucose stable.   Delirium  Resolved, maintain delirium precautions.  Primary hypertension  Leg pain  Start home  gabapentin.   BPH  GERD  PTOT    Medical Decision Making: Medium    Dispo: plan for discharge to SNF vs [facility].  Adele Shaver DO  6/19/2024  3:58 PM

## 2024-06-19 NOTE — PROGRESS NOTES
Pulmonary/Critical Care consultation    Patient's name:  Anuj Jovel  Medical Record Number: [id number]  Patient's account/billing number: [id number]  Patient's YOB: 1940  Age: 83 y.o.  Date of Admission: 6/8/2024  8:29 PM  Date of Consult: 6/19/2024      Primary Care Physician: Jeanne Simpson MD      Code Status: Full Code    Reason for consult: COVID-19 infection with pneumonia      HISTORY OF PRESENT ILLNESS:   History was obtained from chart review and the patient.  Anuj Jovel is a 83 y.o. white gentleman was transferred from an outside facility due to heart block needing permanent pacemaker placement.cardiology evaluation is in progress.  Patient found to have COVID-19 infection with CT scan of the chest demonstrating bilateral infiltrates.  Patient on 4 L of oxygen by nasal cannula.  ABG without any CO2 retention.  Patient started on Decadron and baricitinib.  Patient has leukocytosis and hyperglycemia, both of which could be related to corticosteroid use  Patient has some dry cough  No sick contacts  No recent travel    Interval history:  6/19/2024    Patient is on room air and saturating 93%  Afebrile and hemodynamic stable  Cough has resolved  No hemoptysis  Not much shortness of breath or wheezing  No chest pain or pressure  No orthopnea or PND  Fluid balance -19.0 L            Past Medical History:        Diagnosis Date    Diabetes (HCC)     Diverticulitis     History of allergy     Hyperlipidemia     Hypertension     Osteoarthritis     Pneumonia due to COVID-19 virus 12/29/2020       Past Surgical History:        Procedure Laterality Date    BACK SURGERY  09/03/2017    [facility]  Dr Cueva, lumbar fusion    CARDIAC CATHETERIZATION      COLECTOMY  2015    partial colectomy due to diveritc    COLONOSCOPY      TOTAL KNEE ARTHROPLASTY Right        Allergies:    Allergies   Allergen Reactions    Feldene [Piroxicam]     Flagyl  and except as mentioned above were negative  Respiratory ROS:  Completed and except as mentioned above were negative  Cardiovascular ROS:  Completed and except as mentioned above were negative  Gastrointestinal ROS: Completed and except as mentioned above were negative  Genito-Urinary ROS:  Completed and except as mentioned above were negative  Musculoskeletal ROS:  Completed and except as mentioned above were negative  Neurological ROS:  Completed and except as mentioned above were negative  Dermatological ROS:  Completed and except as mentioned above were negative          Physical Exam:    Vitals: BP (!) 144/55   Pulse 72   Temp 98.9 °F (37.2 °C) (Oral)   Resp 23   Ht 1.702 m (5' 7.01\")   Wt 94.6 kg (208 lb 8.9 oz)   SpO2 96%   BMI 32.66 kg/m²     Last Body weight:   Wt Readings from Last 3 Encounters:   06/13/24 94.6 kg (208 lb 8.9 oz)   06/07/24 90.7 kg (200 lb)   05/07/24 93.1 kg (205 lb 3.2 oz)       Body Mass Index : Body mass index is 32.66 kg/m².      Intake and Output summary:   Intake/Output Summary (Last 24 hours) at 6/19/2024 0802  Last data filed at 6/19/2024 0521  Gross per 24 hour   Intake --   Output 1400 ml   Net -1400 ml         Physical Examination:   PHYSICAL EXAMINATION:  Vitals:    06/18/24 1900 06/18/24 1950 06/18/24 2340 06/19/24 0425   BP: (!) 144/66 (!) 142/68 (!) 120/55 (!) 144/55   Pulse: 84 77 79 72   Resp: 17 26 23 23   Temp:  98.1 °F (36.7 °C) 97.9 °F (36.6 °C) 98.9 °F (37.2 °C)   TempSrc:  Oral Oral Oral   SpO2: 98% 95% 95% 96%   Weight:       Height:         Constitutional: This is a well developed, well nourished, 30-34.9 - Obesity Grade I 83 y.o. year old male who is alert, oriented, cooperative and in no apparent distress.    Head:normocephalic and atraumatic.    EENT:   ESTER.  No conjunctival injections.    Septum was midline, mucosa was without erythema, exudates or cobblestoning.  No thrush was noted. Mallampati III (soft palate, base of uvula visible)  Neck: Supple

## 2024-06-19 NOTE — PROGRESS NOTES
Physical Therapy  Facility/Department: [facility] CAR 2- STEPDOWN  Physical Therapy Treatment Note    Name: Anuj Jovel  : 1940  MRN: [id number]  Date of Service: 2024    Discharge Recommendations:  Patient would benefit from continued therapy after discharge   PT Equipment Recommendations  Equipment Needed: Yes  Mobility Devices: Walker  Walker: Rolling    Further therapy recommended at discharge.The patient should be able to tolerate at least 3 hours of therapy per day over 5 days or 15 hours over 7 days.   This patient may benefit from a Physical Medicine and Rehab consult.     Patient Diagnosis(es): The primary encounter diagnosis was AV block, Mobitz 1. Diagnoses of Systolic congestive heart failure, unspecified HF chronicity (HCC), Shortness of breath, Symptomatic bradycardia, and Complete heart block (HCC) were also pertinent to this visit.  Past Medical History:  has a past medical history of Diabetes (HCC), Diverticulitis, History of allergy, Hyperlipidemia, Hypertension, Osteoarthritis, and Pneumonia due to COVID-19 virus.  Past Surgical History:  has a past surgical history that includes Cardiac catheterization; Colonoscopy; Total knee arthroplasty (Right); colectomy (); and back surgery (2017).    Assessment   Body Structures, Functions, Activity Limitations Requiring Skilled Therapeutic Intervention: Decreased functional mobility ;Decreased strength;Decreased endurance;Decreased balance;Decreased cognition;Decreased safe awareness  Assessment: Pt completed x 3 stairs in room with RW and modA-Denise, Pt ambulated 20ft x 2 with RW and Denise. Pt completed x 2 sit<>stands ranging from Denise-modA. Pt was most limited to weakness and pain this date. Pt would benefit from therapy following d/c to address deficits in balance, strength, and tolerance to mobility.  Therapy Prognosis: Good  Activity Tolerance  Activity Tolerance: Patient limited by fatigue;Patient limited by endurance     Plan  assistance;Decreased awareness of need for safety  Problem Solving: Assistance required to identify errors made;Assistance required to correct errors made;Decreased awareness of errors  Insights: Decreased awareness of deficits  Initiation: Requires cues for some  Sequencing: Requires cues for some  Cognition Comment: Pt follows all commands w/o difficulty, increased time.     Objective   Bed mobility  Supine to Sit: Unable to assess  Sit to Supine: Unable to assess  Scooting: Contact guard assistance  Bed Mobility Comments: Pt began and concluded session in recliner. Scooting assessed in recliner.  Transfers  Sit to Stand: Minimal Assistance;Moderate Assistance  Stand to Sit: Minimal Assistance  Comment: Assessed with RW, Pt completed x 2 sit<>stands, 1st trial modA, 2nd trial Denise. Pt increased time and effort to complete d/t weakness. Pt required verbal cues for UE hand placement with good return.  Ambulation  Surface: Level tile  Device: Rolling Walker  Assistance: Minimal assistance  Quality of Gait: decreased step length, unsteady no true LOB,  Gait Deviations: Slow Erin;Decreased step length;Decreased step height  Distance: 5ft, stairs, seated rest break, 20ft x 2  Comments: Pt required verbal cues to maintain forward gaze and upright posture with poor return.  More Ambulation?: No  Stairs/Curb  Stairs?: Yes  Stairs  # Steps : 3  Stairs Height: 6\"  Rails: Bilateral (RW)  Device: Rolling walker  Assistance: Moderate assistance;Minimal assistance  Comment: Pts 1st stair was modA improving to Denise for remainder. Pt audibly SOB upon completiong seated rest break required.     Balance  Posture: Fair  Sitting - Static: Good  Sitting - Dynamic: Fair;+  Standing - Static: Fair  Standing - Dynamic: Fair;-  Comments: Assessed EOB and with RW  Exercise Treatment: .sit HS curls  Dynamic Sitting Balance Exercises: Sit ups from back rest of recliner x 10 reps      OutComes Score    AM-PAC - Mobility    AM-PAC Basic

## 2024-06-19 NOTE — PROGRESS NOTES
[facility]   Progress Note                   Date:   6/19/2024  Patient name: Anuj Jovel  Date of admission:  6/8/2024  8:29 PM  MRN:   [id number]  YOB: 1940  PCP: Jeanne Simpson MD    Reason for consult:Symptomatic bradycardia    Subjective:       Patient seen and examined at the bed side: No acute issues overnight. Dual chamber pacemaker placed.  CXR conducted which did not show any pneumothorax. Device interrogation shows show appropriate function of device.  Family in room.  Pt denies any pain.  Dressing removed from device.      Medications:   Scheduled Meds:   pregabalin  100 mg Oral BID    metFORMIN  1,000 mg Oral BID WC    insulin glargine  10 Units SubCUTAneous Nightly    doxycycline hyclate  100 mg Oral 2 times per day    [Held by provider] amLODIPine  10 mg Oral Daily    pantoprazole  40 mg Oral QAM AC    [Held by provider] bumetanide  1 mg IntraVENous Daily    lidocaine 1 % injection  5 mL IntraDERmal Once    sodium chloride flush  5-40 mL IntraVENous 2 times per day    insulin lispro  0-16 Units SubCUTAneous TID WC    enoxaparin  40 mg SubCUTAneous Daily    aspirin EC  81 mg Oral Daily    atorvastatin  40 mg Oral Nightly    tamsulosin  0.4 mg Oral Daily    insulin lispro  0-4 Units SubCUTAneous Nightly     Continuous Infusions:   norepinephrine Stopped (06/17/24 1733)    sodium chloride 10 mL/hr at 06/18/24 0618    sodium chloride      dextrose       CBC:   Recent Labs     06/17/24  0357 06/18/24  0808 06/19/24  0802   WBC 12.4* 11.5* 12.5*   HGB 14.3 13.5 11.9*    181 174       BMP:    Recent Labs     06/17/24  0643 06/18/24  0808 06/19/24  0802    135* 133*   K 4.1 4.2 3.8    101 102   CO2 23 23 21   BUN 22 24* 17   CREATININE 0.9 1.0 0.8   GLUCOSE 126* 92 85       Hepatic:   No results for input(s): \"AST\", \"ALT\", \"BILITOT\", \"ALKPHOS\" in the last 72 hours.    Invalid input(s): \"ALB\"    Troponin: No results for input(s): \"TROPONINI\" in the last

## 2024-06-19 NOTE — PROGRESS NOTES
Patient cannot undergo an MRI exam until pacemaker is six weeks post operation. If any questions arise, please call department.

## 2024-06-19 NOTE — PLAN OF CARE
Problem: Safety - Adult  Goal: Free from fall injury  Outcome: Progressing     Problem: Chronic Conditions and Co-morbidities  Goal: Patient's chronic conditions and co-morbidity symptoms are monitored and maintained or improved  Outcome: Progressing  Flowsheets (Taken 6/19/2024 0800)  Care Plan - Patient's Chronic Conditions and Co-Morbidity Symptoms are Monitored and Maintained or Improved: Monitor and assess patient's chronic conditions and comorbid symptoms for stability, deterioration, or improvement     Problem: Discharge Planning  Goal: Discharge to home or other facility with appropriate resources  Outcome: Progressing  Flowsheets (Taken 6/19/2024 0800)  Discharge to home or other facility with appropriate resources: Identify barriers to discharge with patient and caregiver     Problem: Skin/Tissue Integrity  Goal: Absence of new skin breakdown  Description: 1.  Monitor for areas of redness and/or skin breakdown  2.  Assess vascular access sites hourly  3.  Every 4-6 hours minimum:  Change oxygen saturation probe site  4.  Every 4-6 hours:  If on nasal continuous positive airway pressure, respiratory therapy assess nares and determine need for appliance change or resting period.  Outcome: Progressing     Problem: ABCDS Injury Assessment  Goal: Absence of physical injury  Outcome: Progressing     Problem: Pain  Goal: Verbalizes/displays adequate comfort level or baseline comfort level  Outcome: Progressing  Flowsheets (Taken 6/19/2024 0800)  Verbalizes/displays adequate comfort level or baseline comfort level: Encourage patient to monitor pain and request assistance     Problem: Nutrition Deficit:  Goal: Optimize nutritional status  Outcome: Progressing

## 2024-06-19 NOTE — PROGRESS NOTES
Occupational Therapy  Facility/Department: [facility] CAR 2- STEPDOWN  Occupational Daily Treatment Note    Name: Anuj Jovel  : 1940  MRN: [id number]  Date of Service: 2024    Discharge Recommendations:  Patient would benefit from continued therapy after discharge  Patient Diagnosis(es): The primary encounter diagnosis was AV block, Mobitz 1. Diagnoses of Systolic congestive heart failure, unspecified HF chronicity (HCC), Shortness of breath, Symptomatic bradycardia, and Complete heart block (HCC) were also pertinent to this visit.  Past Medical History:  has a past medical history of Diabetes (HCC), Diverticulitis, History of allergy, Hyperlipidemia, Hypertension, Osteoarthritis, and Pneumonia due to COVID-19 virus.  Past Surgical History:  has a past surgical history that includes Cardiac catheterization; Colonoscopy; Total knee arthroplasty (Right); colectomy (); and back surgery (2017).  Assessment   Performance deficits / Impairments: Decreased functional mobility ;Decreased ADL status;Decreased safe awareness;Decreased cognition;Decreased endurance;Decreased balance;Decreased high-level IADLs;Decreased strength;Decreased posture  Prognosis: Good  Activity Tolerance  Activity Tolerance: Patient limited by fatigue;Patient limited by pain;Treatment limited secondary to decreased cognition        Plan   Occupational Therapy Plan  Times Per Week: 4-6 x/wk  Current Treatment Recommendations: Balance training, Functional mobility training, Pain management, Safety education & training, Patient/Caregiver education & training, Equipment evaluation, education, & procurement, Self-Care / ADL, Home management training, Strengthening, Cognitive reorientation, Endurance training     Restrictions  Restrictions/Precautions  Restrictions/Precautions: Up as Tolerated, Fall Risk, Surgical Protocols  Required Braces or Orthoses?: No  Implants present? : Pacemaker (sling)  Position Activity Restriction  Other  Activity Raw Score: 15  AM-PAC Inpatient ADL T-Scale Score : 34.69  ADL Inpatient CMS 0-100% Score: 56.46  ADL Inpatient CMS G-Code Modifier : CK    Goals  Short Term Goals  Time Frame for Short Term Goals: By discharge, pt will:  Short Term Goal 1: Demo SBA for functional transfers and functional mobility with use of LRAD for engagement in ADLs/IADLs  Short Term Goal 2: Demo SUP for UB ADLs with setup  Short Term Goal 3: Demo SBA for LB ADLs and toileting tasks with setup and AE use PRN  Short Term Goal 4: Engage in 5 min dynamic standing activity with SBA and unilateral UE release from support for improved balance during ADL/IADL participation  Short Term Goal 5: Follow 50% of 2-step commands with appropriate initiation and sequencing to increase functional independence with ADLs/IADLs       Therapy Time   Individual Concurrent Group Co-treatment   Time In 0934         Time Out 1029         Minutes 55         Timed Code Treatment Minutes: 55 Minutes       LISA Mcneal/SINDHU

## 2024-06-20 ENCOUNTER — TELEPHONE (OUTPATIENT)
Dept: PULMONOLOGY | Age: 84
End: 2024-06-20

## 2024-06-20 LAB
EST. AVERAGE GLUCOSE BLD GHB EST-MCNC: 151 MG/DL
GLUCOSE BLD-MCNC: 103 MG/DL (ref 75–110)
GLUCOSE BLD-MCNC: 130 MG/DL (ref 75–110)
GLUCOSE BLD-MCNC: 157 MG/DL (ref 75–110)
GLUCOSE BLD-MCNC: 254 MG/DL (ref 75–110)
HBA1C MFR BLD: 6.9 % (ref 4–6)

## 2024-06-20 PROCEDURE — 97110 THERAPEUTIC EXERCISES: CPT

## 2024-06-20 PROCEDURE — 83036 HEMOGLOBIN GLYCOSYLATED A1C: CPT

## 2024-06-20 PROCEDURE — 6370000000 HC RX 637 (ALT 250 FOR IP): Performed by: NURSE PRACTITIONER

## 2024-06-20 PROCEDURE — 92523 SPEECH SOUND LANG COMPREHEN: CPT

## 2024-06-20 PROCEDURE — 82947 ASSAY GLUCOSE BLOOD QUANT: CPT

## 2024-06-20 PROCEDURE — 6370000000 HC RX 637 (ALT 250 FOR IP): Performed by: HOSPITALIST

## 2024-06-20 PROCEDURE — 6370000000 HC RX 637 (ALT 250 FOR IP): Performed by: INTERNAL MEDICINE

## 2024-06-20 PROCEDURE — 6370000000 HC RX 637 (ALT 250 FOR IP)

## 2024-06-20 PROCEDURE — 99222 1ST HOSP IP/OBS MODERATE 55: CPT | Performed by: PHYSICAL MEDICINE & REHABILITATION

## 2024-06-20 PROCEDURE — 6370000000 HC RX 637 (ALT 250 FOR IP): Performed by: STUDENT IN AN ORGANIZED HEALTH CARE EDUCATION/TRAINING PROGRAM

## 2024-06-20 PROCEDURE — 99232 SBSQ HOSP IP/OBS MODERATE 35: CPT | Performed by: INTERNAL MEDICINE

## 2024-06-20 PROCEDURE — 97530 THERAPEUTIC ACTIVITIES: CPT

## 2024-06-20 PROCEDURE — 99232 SBSQ HOSP IP/OBS MODERATE 35: CPT | Performed by: PSYCHIATRY & NEUROLOGY

## 2024-06-20 PROCEDURE — 95819 EEG AWAKE AND ASLEEP: CPT | Performed by: PSYCHIATRY & NEUROLOGY

## 2024-06-20 PROCEDURE — 1200000000 HC SEMI PRIVATE

## 2024-06-20 PROCEDURE — 2580000003 HC RX 258: Performed by: HOSPITALIST

## 2024-06-20 PROCEDURE — 6360000002 HC RX W HCPCS

## 2024-06-20 PROCEDURE — 92526 ORAL FUNCTION THERAPY: CPT

## 2024-06-20 PROCEDURE — 36415 COLL VENOUS BLD VENIPUNCTURE: CPT

## 2024-06-20 RX ADMIN — AMLODIPINE BESYLATE 5 MG: 5 TABLET ORAL at 07:47

## 2024-06-20 RX ADMIN — PANTOPRAZOLE SODIUM 40 MG: 40 TABLET, DELAYED RELEASE ORAL at 07:47

## 2024-06-20 RX ADMIN — DOXYCYCLINE HYCLATE 100 MG: 100 TABLET, COATED ORAL at 20:50

## 2024-06-20 RX ADMIN — PREGABALIN 100 MG: 50 CAPSULE ORAL at 07:47

## 2024-06-20 RX ADMIN — FUROSEMIDE 20 MG: 20 TABLET ORAL at 07:47

## 2024-06-20 RX ADMIN — INSULIN LISPRO 8 UNITS: 100 INJECTION, SOLUTION INTRAVENOUS; SUBCUTANEOUS at 16:54

## 2024-06-20 RX ADMIN — SODIUM CHLORIDE, PRESERVATIVE FREE 10 ML: 5 INJECTION INTRAVENOUS at 07:48

## 2024-06-20 RX ADMIN — PREGABALIN 125 MG: 25 CAPSULE ORAL at 20:49

## 2024-06-20 RX ADMIN — ASPIRIN 81 MG: 81 TABLET, COATED ORAL at 07:47

## 2024-06-20 RX ADMIN — INSULIN GLARGINE 10 UNITS: 100 INJECTION, SOLUTION SUBCUTANEOUS at 20:50

## 2024-06-20 RX ADMIN — SODIUM CHLORIDE, PRESERVATIVE FREE 10 ML: 5 INJECTION INTRAVENOUS at 21:00

## 2024-06-20 RX ADMIN — ATORVASTATIN CALCIUM 40 MG: 40 TABLET, FILM COATED ORAL at 20:49

## 2024-06-20 RX ADMIN — TAMSULOSIN HYDROCHLORIDE 0.4 MG: 0.4 CAPSULE ORAL at 07:47

## 2024-06-20 RX ADMIN — METFORMIN HYDROCHLORIDE 1000 MG: 500 TABLET ORAL at 16:52

## 2024-06-20 RX ADMIN — DOXYCYCLINE HYCLATE 100 MG: 100 TABLET, COATED ORAL at 07:48

## 2024-06-20 RX ADMIN — ENOXAPARIN SODIUM 40 MG: 100 INJECTION SUBCUTANEOUS at 07:47

## 2024-06-20 RX ADMIN — METFORMIN HYDROCHLORIDE 1000 MG: 500 TABLET ORAL at 07:47

## 2024-06-20 ASSESSMENT — PAIN SCALES - GENERAL
PAINLEVEL_OUTOF10: 0

## 2024-06-20 NOTE — CARE COORDINATION
Per PM&R physiatrist Dr. Issac Fountain, patient appropriate for Acute Inpatient Rehabilitation level of care.    Eligibility & Benefits Verified - See Note    Prior authorization request for admission initiated with primary insurance [employer] via: Phone Call: Call Ref# [id number], Representative: Sushma    Pending Case Reference/Authorization # [id number]    Clinical documentation submitted via Fax to: [phone]  Initial CM: Gisela [phone]    Updated Corinne CM: Via Voicemail at this time at phone/extension: [phone]

## 2024-06-20 NOTE — CONSULTS
Physical Medicine & Rehabilitation  Consult Note      Admitting Physician: Adele Shaver DO    Primary Care Provider: Jeanne Simpson MD     Reason for Consult:  Acute Inpatient Rehabilitation    Chief Complaint: Heart block    History of Present Illness:  Referring Provider is requesting an evaluation for appropriate placement upon discharge from acute care.     Mr. Anuj Jovel is a 83 y.o.  male who was admitted to [facility] on 6/8/2024 with No chief complaint on file.    83-year-old male admitted with lower extremity weakness, dyspnea, estrogen and heart block has history of obstructive apnea, hypertension, hyperlipidemia, type 2 diabetes, BPH, coronary artery disease with stent in 1997, sick sinus syndrome with Mobitz type I block heart failure with preserved ejection fraction.  He was noted to be hypoxic and.  CT chest negative for PE chest x-ray unremarkable CT head unremarkable he was positive COVID-19 CT chest showed interstitial groundglass opacities.  Patient noted to have bradycardia into the 30s patient started on IM Zyprexa for some confusion having some sundowning with pulling IV lines soft mittens placed    Cardiology-symptomatic bradycardia, third-degree AV block status post dual chamber pacemaker 6/17/2024, COVID-19 no longer in isolation, pacer interrogation normal start low-dose Norvasc and home Lasix    Critical care 6/18 as above on 2 L nasal cannula, insulin, Lovenox, follow-up CT head?  Monitor heart rate    ID COVID-positive 6/7 has received vaccinations altered mental status resolved treated with with remdesivir, Decadron Baricitinibn, out of window for Paxlovid monitor CRP, monitor off antibiotics off COVID isolation 6/17    Internal medicine acute hypoxic respite failure likely sec to pneumonia due to COVID-19 as above delirium resolved leg pain resume home gabapentin    Neuro 6/19 transient episode of confusion inpatient mild cognitive impairment, CT head, CTA head and neck  diversion

## 2024-06-20 NOTE — PROGRESS NOTES
SLP ALL NOTES  Speech Language Pathology  [facility]    Dysphagia Treatment Note    Date: 6/20/2024  Patient’s Name: Anuj Jovel  MRN: [id number]  Diagnosis:   Patient Active Problem List   Diagnosis Code    Acquired deviated nasal septum J34.2    Actinic keratoses L57.0    Arthritis M19.90    Benign prostatic hyperplasia N40.0    Bloating R14.0    Chronic serous otitis media H65.20    Claustrophobia F40.240    Coronary artery disease I25.10    Esophageal reflux K21.9    Flatus R14.3    Hearing loss H91.90    Hemorrhoids K64.9    History of allergy Z88.9    Hyperlipidemia E78.5    Essential hypertension I10    Leg swelling M79.89    Lumbar radiculopathy M54.16    Lumbar stenosis M48.061    Skin neoplasm D49.2    Tinea corporis B35.4    Ventral hernia K43.9    Weight gain R63.5    Type 2 diabetes mellitus with complication (HCC) E11.8    Obesity E66.9    Other osteoporosis without current pathological fracture M81.8    Presence of coronary angioplasty implant and graft Z95.5    Elevated C-reactive protein (CRP) R79.82    KAREY (obstructive sleep apnea) G47.33    Hypoxia R09.02    Chronic pain syndrome G89.4    AV block, Mobitz 1 I44.1    History of placement of stent in LAD coronary artery Z95.5    Chronic heart failure with preserved ejection fraction (HFpEF) (HCC) I50.32    Mild mitral valve regurgitation I34.0    Pulmonary hypertension (HCC) I27.20    Spondylolisthesis of lumbar region M43.16    Second degree AV block, Mobitz type I I44.1    Systolic congestive heart failure (HCC) I50.20    Advanced age R54    Acute hypoxic respiratory failure (HCC) J96.01    COVID U07.1    Shortness of breath R06.02    Symptomatic bradycardia R00.1    Complete heart block (HCC) I44.2    Cardiac pacemaker in situ Z95.0    Encephalopathy acute G93.40    MCI (mild cognitive impairment) G31.84       Pain: 0/10    Dysphagia Treatment  Treatment time:4489-6112    Subjective: [x] Alert [x] Cooperative     [] Confused

## 2024-06-20 NOTE — PROGRESS NOTES
Pulmonary/Critical Care consultation    Patient's name:  Anuj Jovel  Medical Record Number: [id number]  Patient's account/billing number: [id number]  Patient's YOB: 1940  Age: 83 y.o.  Date of Admission: 6/8/2024  8:29 PM  Date of Consult: 6/20/2024      Primary Care Physician: Jeanne Simpson MD      Code Status: Full Code    Reason for consult: COVID-19 infection with pneumonia      HISTORY OF PRESENT ILLNESS:   History was obtained from chart review and the patient.  Anuj Jovel is a 83 y.o. white gentleman was transferred from an outside facility due to heart block needing permanent pacemaker placement.cardiology evaluation is in progress.  Patient found to have COVID-19 infection with CT scan of the chest demonstrating bilateral infiltrates.  Patient on 4 L of oxygen by nasal cannula.  ABG without any CO2 retention.  Patient started on Decadron and baricitinib.  Patient has leukocytosis and hyperglycemia, both of which could be related to corticosteroid use  Patient has some dry cough  No sick contacts  No recent travel    Interval history:  6/20/2024    Patient is on room air and saturating 93%  Afebrile and hemodynamic stable  Cough has resolved  No hemoptysis  Not much shortness of breath or wheezing  No chest pain or pressure  No orthopnea or PND  Fluid balance -19.6 L            Past Medical History:        Diagnosis Date    Diabetes (HCC)     Diverticulitis     History of allergy     Hyperlipidemia     Hypertension     Osteoarthritis     Pneumonia due to COVID-19 virus 12/29/2020       Past Surgical History:        Procedure Laterality Date    BACK SURGERY  09/03/2017    [facility]  Dr Cueva, lumbar fusion    CARDIAC CATHETERIZATION      COLECTOMY  2015    partial colectomy due to diveritc    COLONOSCOPY      TOTAL KNEE ARTHROPLASTY Right        Allergies:    Allergies   Allergen Reactions    Feldene [Piroxicam]     Flagyl  \"CKTOTAL\", \"CKMB\", \"CKMBINDEX\", \"TROPONINI\" in the last 72 hours.    BNP:No results for input(s): \"BNP\" in the last 72 hours.  Lipid profile: No results for input(s): \"CHOL\", \"TRIG\", \"HDL\", \"LDL\" in the last 72 hours.    Invalid input(s): \"LDLCALC\"  Blood Gases: No results found for: \"PH\", \"PCO2\", \"PO2\", \"HCO3\", \"O2SAT\"  Thyroid functions:   Lab Results   Component Value Date/Time    TSH 0.22 06/08/2024 09:24 PM            Microbiology:    Cultures during this admission:     Blood cultures:      [x] None drawn      [] Negative             []  Positive (Details:  )  Urine Culture:        [x] None drawn      [] Negative             []  Positive (Details:  )  Sputum Culture:   [x] None drawn       [] Negative             []  Positive (Details:  )     Pulmonary function tests: restrictive ventilatory defect in October 2023    Radiological reports:    CT HEAD WO CONTRAST    Result Date: 6/9/2024  No acute intracranial abnormality.     XR CHEST PORTABLE    Result Date: 6/9/2024  1. Cardiomegaly with pulmonary vascular congestion and pulmonary edema. 2. Ill-defined ground-glass opacity in the right upper lobe may represent superimposed pneumonia.     CT CHEST PULMONARY EMBOLISM W CONTRAST    Result Date: 6/7/2024  Motion degraded exam.  No evidence for central pulmonary embolism. Nonspecific mosaic appearance of the lung parenchyma, interstitial and ground-glass appearance.  While some of these findings may be due to motion artifact and atelectasis, atypical infection should also be considered.     CT HEAD WO CONTRAST    Result Date: 6/7/2024  No acute intracranial abnormality. Cerebral atrophy.     XR CHEST PORTABLE    Result Date: 6/7/2024  No acute cardiopulmonary process.           Assessment and Plan       IMPRESSION:   1. AV block, Mobitz 1    2. Systolic congestive heart failure, unspecified HF chronicity (HCC)    3. Shortness of breath    4. Symptomatic bradycardia    5. Complete heart block (HCC)        Principal

## 2024-06-20 NOTE — PLAN OF CARE
Problem: Safety - Adult  Goal: Free from fall injury  Outcome: Progressing     Problem: Chronic Conditions and Co-morbidities  Goal: Patient's chronic conditions and co-morbidity symptoms are monitored and maintained or improved  Outcome: Progressing  Flowsheets (Taken 6/19/2024 2000)  Care Plan - Patient's Chronic Conditions and Co-Morbidity Symptoms are Monitored and Maintained or Improved: Monitor and assess patient's chronic conditions and comorbid symptoms for stability, deterioration, or improvement     Problem: Discharge Planning  Goal: Discharge to home or other facility with appropriate resources  Outcome: Progressing  Flowsheets (Taken 6/19/2024 2000)  Discharge to home or other facility with appropriate resources: Identify barriers to discharge with patient and caregiver     Problem: Skin/Tissue Integrity  Goal: Absence of new skin breakdown  Description: 1.  Monitor for areas of redness and/or skin breakdown  2.  Assess vascular access sites hourly  3.  Every 4-6 hours minimum:  Change oxygen saturation probe site  4.  Every 4-6 hours:  If on nasal continuous positive airway pressure, respiratory therapy assess nares and determine need for appliance change or resting period.  Outcome: Progressing     Problem: ABCDS Injury Assessment  Goal: Absence of physical injury  Outcome: Progressing     Problem: Pain  Goal: Verbalizes/displays adequate comfort level or baseline comfort level  Outcome: Progressing     Problem: Nutrition Deficit:  Goal: Optimize nutritional status  Outcome: Progressing

## 2024-06-20 NOTE — PROGRESS NOTES
[facility] - Progress Note    COVID 19 Patient  Today's Date and Time: 6/20/2024, 7:52 AM    Impression :     COVID 19 Confirmed Infection  Covid tests:  Positive Covid Test Date: 6/7/24  Vaccination Status:  Received Covid 19 vaccines on 1/22/21, 2/19/21, 11/2/21 and 9/1/23  Bradycardia  Second degree heart block, Mobitz type I  S/P Pacemaker insertion 6-17-24  Systolic heart failure  Acute hypoxic respiratory distress  CRP elevation  Altered mentation  resolved  CAD  Pulmonary HTN  Essential HTN  Diabetes mellitus  HLD  BPH  PCN, flagyl and sulfa allergy  Recommendations:   Antibiotic treatment:  Monitor off antibiotics  Single dose Vancomycin for pacemaker insertion on 6-17-24  Covid Rx:    Completed Remdesivir: Requested 6-9-24. Stop date 6-13-24.  Completed Decadron: 6 mg daily x 6 days initiated 6/7/24. Completed on 6-14-24.  Baricitinib: Initiated 6/7/24 . Will D/C  Paxlovid: Likely out of window  Monitor CRP: 6.7-->20.3    Patient off Covid isolation    Medical Decision Making/Summary/Discussion:6/20/2024     Patient admitted with COVID 19 infection    Infection Control Recommendations   Universal Precautions  Completed Droplet Plus Isolation. Stop date 6/17/24    Antimicrobial Stewardship Recommendations     Simplification of therapy  Targeted therapy    Coordination of Outpatient Care:   Estimated Length of IV antimicrobials:TBD  Patient will need Midline Catheter Insertion: TBD  Patient will need PICC line Insertion: No  Patient will need: Home IV , Infusion Center,  SNF,  LTAC:TBD  Patient will need outpatient wound care:No    Chief complaint/reason for consultation:   Concern for COVID infection      History of Present Illness:   Anuj Jovel is a 83 y.o.-year-old  male who was initially admitted on 6/8/2024. Patient seen at the request of Dr. Juarez    INITIAL HISTORY:    Patient, with a hx of Wenckebach, CAD w/stenting, CHF, DM, HTN and BPH, presented through

## 2024-06-20 NOTE — PROGRESS NOTES
Physical Therapy  Facility/Department: [facility] CAR 2- STEPDOWN  Physical Therapy Treatment Note    Name: Anuj Jovel  : 1940  MRN: [id number]  Date of Service: 2024    Discharge Recommendations:  Patient would benefit from continued therapy after discharge   PT Equipment Recommendations  Equipment Needed: Yes  Walker: Rolling      Patient Diagnosis(es): The primary encounter diagnosis was AV block, Mobitz 1. Diagnoses of Systolic congestive heart failure, unspecified HF chronicity (HCC), Shortness of breath, Symptomatic bradycardia, and Complete heart block (HCC) were also pertinent to this visit.  Past Medical History:  has a past medical history of Diabetes (HCC), Diverticulitis, History of allergy, Hyperlipidemia, Hypertension, Osteoarthritis, and Pneumonia due to COVID-19 virus.  Past Surgical History:  has a past surgical history that includes Cardiac catheterization; Colonoscopy; Total knee arthroplasty (Right); colectomy (); and back surgery (2017).    Assessment   Body Structures, Functions, Activity Limitations Requiring Skilled Therapeutic Intervention: Decreased functional mobility ;Decreased strength;Decreased endurance;Decreased balance;Decreased cognition;Decreased safe awareness  Assessment: Pt completed x 1 stair and attempted x 2 with RW and ModA. Pt ambulated 15 ft with RW and Denise. Pt required modA-Denise for transfers. Pt was most limited by pain and weakness this date. Pt would benefit from therapy following d/c to address deficits in balance, strength, and tolerance to mobility.  Therapy Prognosis: Good  Activity Tolerance  Activity Tolerance: Patient limited by fatigue;Patient limited by endurance     Plan   Physical Therapy Plan  General Plan:  (5-6x/wk)  Current Treatment Recommendations: Strengthening, Balance training, Gait training, Functional mobility training, Stair training, Transfer training, Endurance training, Home exercise program, Safety education & training,  for some  Cognition Comment: Pt follows all commands w/o difficulty, increased time.     Objective   Bed mobility  Supine to Sit: Minimal assistance (trunk progression)  Sit to Supine: Unable to assess  Scooting: Minimal assistance (Squaring up to EOB)  Bed Mobility Comments: HOB elevated to ~ 45 degrees, Pt utilized HHA for trunk progression.  Transfers  Sit to Stand: Moderate Assistance;Minimal Assistance  Stand to Sit: Minimal Assistance  Comment: Assessed with RW, Pt completed x 4 sit<>stands, pts first 2 trials required modA, last 2 trials required Denise. Increased time and effort to complete. Pt displays a posterior lean and unsteady when coming to a stand. Pt required verbal cues to achieve terminal knee extension and to move hips anteriorly with moderate return. Once COG over GALEN pt became stedy.  Ambulation  Surface: Level tile  Device: Rolling Walker  Assistance: Minimal assistance  Quality of Gait: decreased step length, unsteady no true LOB,  Gait Deviations: Slow Erin;Decreased step length;Decreased step height  Distance: 15ft  Comments: Pt required verbal and tactile cues to maintain upright posture with good return.  More Ambulation?: No  Stairs/Curb  Stairs?: Yes  Stairs  # Steps : 2  Rails: Bilateral (RW)  Device: Rolling walker  Assistance: Moderate assistance;Maximum assistance  Comment: Pt 1 step able to complete L leading, Trialed R LE leading unable to complete.     Balance  Posture: Fair  Sitting - Static: Good  Sitting - Dynamic: Fair;+  Standing - Static: Fair  Standing - Dynamic: Fair;-  Comments: Assessed EOB and with RW  Exercise Treatment: Seated LE exercise program: Long Arc Quads, hip abduction/adduction, HS curls min manual resistance, heel/toe raises, and marches. Reps: x 15 reps  OutComes Score    AM-PAC - Mobility    AM-PAC Basic Mobility - Inpatient   How much help is needed turning from your back to your side while in a flat bed without using bedrails?: A Little  How much help

## 2024-06-20 NOTE — PROGRESS NOTES
HCT 41.7 35.7*   MCV 97.9 95.5   MCH 31.7 31.8   MCHC 32.4 33.3   RDW 12.9 13.1    174   MPV 9.9 9.4       Chemistry:  Recent Labs     06/18/24  0808 06/19/24  0802   * 133*   K 4.2 3.8    102   CO2 23 21   GLUCOSE 92 85   BUN 24* 17   CREATININE 1.0 0.8   ANIONGAP 11 10   LABGLOM 78 87   CALCIUM 8.4* 8.2*       Recent Labs     06/19/24  0749 06/19/24  1045 06/19/24  1639 06/19/24 2005 06/20/24  0718 06/20/24  1102   POCGLU 86 180* 157* 195* 103 130*       ABG:  Lab Results   Component Value Date/Time    POCPH 7.464 06/08/2024 11:06 PM    POCPCO2 32.8 06/08/2024 11:06 PM    POCPO2 90.1 06/08/2024 11:06 PM    POCHCO3 23.5 06/08/2024 11:06 PM    PBEA 0.4 06/08/2024 11:06 PM    EVZD6XUK 97.5 06/08/2024 11:06 PM    FIO2 3.0 06/08/2024 11:06 PM     No results found for: \"SPECIAL\"  Lab Results   Component Value Date/Time    CULTURE NO GROWTH 5 DAYS 06/07/2024 09:25 AM       Radiology:  CT HEAD WO CONTRAST    Result Date: 6/18/2024  No acute intracranial disease.  Moderate generalized cerebral volume loss and mild chronic microvascular ischemic changes. Moderate mucosal thickening and air-fluid levels are noted within the bilateral anterior ethmoid air cells as well as maxillary sinuses, concerning for acute sinusitis. Multiple locules of soft tissue air at the left sternoclavicular joint extending to the left chest wall pacemaker battery pack as well as cranially along the sternocleidomastoid muscle.  Correlated with recent surgery otherwise, infectious process should be considered. No hemodynamically significant stenosis, occlusion, aneurysm or AVM of the major arteries of the head and neck.     CTA HEAD NECK W CONTRAST    Result Date: 6/18/2024  No acute intracranial disease.  Moderate generalized cerebral volume loss and mild chronic microvascular ischemic changes. Moderate mucosal thickening and air-fluid levels are noted within the bilateral anterior ethmoid air cells as well as maxillary  (HFpEF) (HCC) 6/8/2024 Yes    Mild mitral valve regurgitation 6/8/2024 Yes    Pulmonary hypertension (HCC) 6/8/2024 Yes    Second degree AV block, Mobitz type I 6/8/2024 Yes    Systolic congestive heart failure (HCC) 6/9/2024 Yes    Advanced age 6/9/2024 Yes    Acute hypoxic respiratory failure (HCC) 6/9/2024 Yes    COVID 6/9/2024 Yes    Shortness of breath 6/11/2024 Yes    Symptomatic bradycardia 6/13/2024 Unknown    Encephalopathy acute 6/19/2024 Yes    MCI (mild cognitive impairment) 6/19/2024 Yes      Plan:     Third-degree heart block status post pacemaker placement  Status post dual-chamber pacemaker on 6/17/2024  Stable for discharge from cardiology perspective.  Needs outpatient follow-up  On antibiotics for prophylaxis per cardiology  Debility  PMR-  recommending AUR  .Acute hypoxic respiratory failure likely due to pneumonia due to COVID-19  Completed remdesivir, Decadron, and barictinib   Off of covid isolation  Hyperglycemia due to medication effect and diabetes  Monitor  ISS as needed. Stop lantus if glucose stable.   Metformin  Delirium  Resolved, maintain delirium precautions.  follow-up appointment in neurology clinic in 4-6 wks.   Primary hypertension  Leg pain  Increase  home  gabapentin. Pulses are palpable   BPH  GERD  PTOT    Medical Decision Making: Medium    Dispo: plan for discharge to SNF vs AUR.  Adele Shaver DO  6/20/2024  4:29 PM

## 2024-06-20 NOTE — TELEPHONE ENCOUNTER
----- Message from Jamia Mendez sent at 6/20/2024  8:53 AM EDT -----  Hattie, please see message from Dr Carrillo and call to schedule as requested. Thank you.   ----- Message -----  From: Andrade Carrillo MD  Sent: 6/20/2024   8:50 AM EDT  To: [facility] Clinical Staff     Please have the patient follow-up in our office in 8 to 10 weeks.  Thank you

## 2024-06-20 NOTE — PROGRESS NOTES
Wears glasses at all times  Hearing  Hearing: Exceptions to WFL  Hearing Exceptions: Hard of hearing/hearing concerns    Assessment:     Pt presents with mild cognitive deficits characterized by difficulties with delayed recall, deductive reasoning, task insight, thought flexibility and generative naming.  Pt does report mild memory difficulty at baseline.  Pt. Presents with no dysarthria, no O/M deficits at this time. ST to follow up and provide treatment to address noted deficits. Education provided.  ST recommends ongoing ST at this time.         Recommendations:  Recommendations  Requires SLP Intervention: Yes  Patient Education: yes  Patient Education Response: Verbalizes understanding   Frequency: 3-5x/week  D/C Recommendations: Ongoing speech therapy is recommended during this hospitalization;Ongoing speech therapy is recommended at next level of care       Plan:   Speech Therapy Prognosis  Prognosis: Good  Individuals consulted  Consulted and agree with results and recommendations: Patient    Goals:  Short Term Goals  Goal 1: Pt will recall 3-5 units with and without distraction with 90% accuracy.  Goal 2: Pt will complete word deductions with 90% accuracy.  Goal 3: Pt will complete task insight and thought flex activities with 90% accuracy.  Goal 4: Pt will name 8-10 items for a given category with 90% accuracy.   Patient/family involved in developing goals and treatment plan: yes    Subjective:   Previous level of function and limitations:      Social/Functional History  Lives With: Family  Type of Home: House  Active : No  Occupation: Retired  Vision  Vision: Impaired  Vision Exceptions: Wears glasses at all times  Hearing  Hearing: Exceptions to [facility]  Hearing Exceptions: Hard of hearing/hearing concerns           Objective:       Oral Motor   Labial: No impairment  Lingual: No impairment    Motor Speech  Intelligibility: No impairment        Expression  Primary Mode of Expression: Verbal

## 2024-06-20 NOTE — PROGRESS NOTES
NEUROLOGY INPATIENT PROGRESS NOTE    6/20/2024         Subjective: Anuj Jovel is a  83 y.o. male admitted on 6/8/2024 with Mobitz I [I44.1]    Briefly, this is a  83 y.o. male with hx of HTN, HLD, diabetes, CAD s/p stenting was admitted on 6/8/2024 with COVID-19 pneumonia and symptomatic bradycardia with third-degree heart block has had pacemaker implantation 2 days ago.  Neurology is consulted yesterday as he was having normal speech and fluctuating mentation.  Patient's daughter at bedside.  Patient and his caregivers at bedside stated that the symptoms lasted only for 15 minutes.  Patient's daughter also stated that patient had hx of transient confusion in the past \"but always got better in couple of days\" and patient never had any residual deficits.   Patient also stated that he had a headache yesterday after the confusion episode.  Presently he is not having any headaches.  \"Tylenol took care of headache\".  Patient denied any extremity weakness or numbness but also stated that he has chronic arthritic ankle pain.  Otherwise offers no new neurologic complaints.  CT head 6/18/2024: No acute intracranial abnormalities.  CTA head and neck 6/18/2024: No significant stenosis, occlusion, aneurysm or AVM of major arteries of head and neck.  6/20/2024: Chart reviewed and discussed with caregivers.  Patient has not had any recurrence of similar episodes.  Patient offers no new complaints stating \"I want to go home\".  Caregivers stated that he is waiting for PMR eval.      No current facility-administered medications on file prior to encounter.     Current Outpatient Medications on File Prior to Encounter   Medication Sig Dispense Refill    tamsulosin (FLOMAX) 0.4 MG capsule Take 1 capsule by mouth daily 90 capsule 0    atorvastatin (LIPITOR) 40 MG tablet TAKE 1 TABLET AT BEDTIME 90 tablet 3    pregabalin (LYRICA) 100 MG capsule Take 1 capsule by mouth 2 times daily for 90 days. Max Daily Amount: 200 mg 60 capsule 2  sulfasalazine.    Past Medical History:   Diagnosis Date    Diabetes (HCC)     Diverticulitis     History of allergy     Hyperlipidemia     Hypertension     Osteoarthritis     Pneumonia due to COVID-19 virus 2020       Past Surgical History:   Procedure Laterality Date    BACK SURGERY  2017    [facility], lumbar fusion    CARDIAC CATHETERIZATION      COLECTOMY      partial colectomy due to diveritc    COLONOSCOPY      TOTAL KNEE ARTHROPLASTY Right            Medications:     pregabalin  100 mg Oral BID    metFORMIN  1,000 mg Oral BID WC    amLODIPine  5 mg Oral Daily    furosemide  20 mg Oral Daily    insulin glargine  10 Units SubCUTAneous Nightly    doxycycline hyclate  100 mg Oral 2 times per day    pantoprazole  40 mg Oral QAM AC    lidocaine 1 % injection  5 mL IntraDERmal Once    sodium chloride flush  5-40 mL IntraVENous 2 times per day    insulin lispro  0-16 Units SubCUTAneous TID WC    enoxaparin  40 mg SubCUTAneous Daily    aspirin EC  81 mg Oral Daily    atorvastatin  40 mg Oral Nightly    tamsulosin  0.4 mg Oral Daily    insulin lispro  0-4 Units SubCUTAneous Nightly     PRN Meds include: melatonin, hydrALAZINE, butalbital-acetaminophen-caffeine, sodium chloride, sodium chloride, sodium chloride flush, sodium chloride flush, potassium chloride **OR** potassium alternative oral replacement **OR** potassium chloride, magnesium sulfate, ondansetron **OR** ondansetron, polyethylene glycol, acetaminophen **OR** acetaminophen, albuterol, glucose, dextrose bolus **OR** dextrose bolus, glucagon (rDNA), dextrose    Objective:   BP (!) 113/57   Pulse 74   Temp 98.6 °F (37 °C) (Oral)   Resp 26   Ht 1.702 m (5' 7.01\")   Wt 94.6 kg (208 lb 8.9 oz)   SpO2 99%   BMI 32.66 kg/m²     Blood pressure range: Systolic (24hrs), Av , Min:113 , Max:133   ; Diastolic (24hrs), Av, Min:54, Max:76        NEUROLOGIC EXAMINATION  GENERAL  Appears comfortable and in no distress   HEENT

## 2024-06-20 NOTE — CARE COORDINATION
Transitional planning    PM&R consult pending.    1108 Called [facility] [phone] and left VM with Maranda in admissions requesting call back.     1306 Called [facility] [phone] and Enrique received referral. Waiting for PM&R doctor to finish rounding.     1519 Spoke to Enrique with [facility]. He is appropriate for ARU. She will start pre cert.     1522 Spoke to jesu Ordaz at bedside and told her [facility] can accept and pre cert started.    1522 Returned phone call to Maranda [phone] with [facility]. FELICITY told her ARU is first choice and working on pre cert , if denied they are SNF choice. She anticipates acceptance if denied ARU

## 2024-06-20 NOTE — CARE COORDINATION
ACUTE INPATIENT REHABILITATION  Financial Disclosure Statement: Eligibility and Benefit Verification    Patient Name: Anuj Jovel MRN: [id number]     Disclosure Statement  [facility] Acute Inpatient Rehabilitation at [facility] provides 24 hour individualized service to patients with functional limitations due to, but not limited to stroke, brain injury, spinal cord injury, major multiple trauma, hip fracture, amputation, and neurological disorders.  Acute Inpatient Rehabilitation provides rehabilitative nursing, physician coverage, as well as physical therapy, occupational therapy, speech therapy, recreational therapy and other services as deemed necessary by our Board Certified Physical Medicine and Rehabilitation Physicians, Dr. Issac Fountain and Dr. Elizabeth Hahn and Dr. Mara Bernstein.    [facility] Acute Inpatient [facility] is fully accredited by the Commission on Accreditation of Rehabilitation Facilities (CARF) and The Joint Commission (TJC).    At a minimum, you will receive 5 days of therapy services totaling at least 15 hours per week. Your treatment program will consist of physical therapy at least 7.5 hours per week; occupational therapy 7.5 hours per week; and speech therapy 1.5 hours per week, as applicable.    Your estimated length of stay is currently:  Approximately 2 Weeks  Please Note: Estimated length of stay is based on individual condition and Acute Inpatient Rehabilitation specific needs. Length of stay may vary based upon interdisciplinary team assessment, insurance approval, and patient progression.    Your insurance coverage has been verified as follows:   Date Verified: 6/20/2024   Method:  Phone Call: Call Ref# [id number], Representative: Josh     Primary Insurance: [employer]  Member/Subscriber ID: [id number]  Coverage: Per Benefit Period  Plan Effective Date: 1/1/2023 Status: Active  Deductible: Not Applicable  Co-Pay:   Days 1-5:

## 2024-06-20 NOTE — CONSULTS
(KLOR-CON M) extended release tablet 40 mEq, 40 mEq, Oral, PRN **OR** potassium bicarb-citric acid (EFFER-K) effervescent tablet 40 mEq, 40 mEq, Oral, PRN **OR** potassium chloride 10 mEq/100 mL IVPB (Peripheral Line), 10 mEq, IntraVENous, PRN  magnesium sulfate 2000 mg in 50 mL IVPB premix, 2,000 mg, IntraVENous, PRN  ondansetron (ZOFRAN-ODT) disintegrating tablet 4 mg, 4 mg, Oral, Q8H PRN **OR** ondansetron (ZOFRAN) injection 4 mg, 4 mg, IntraVENous, Q6H PRN  polyethylene glycol (GLYCOLAX) packet 17 g, 17 g, Oral, Daily PRN  acetaminophen (TYLENOL) tablet 650 mg, 650 mg, Oral, Q6H PRN **OR** acetaminophen (TYLENOL) suppository 650 mg, 650 mg, Rectal, Q6H PRN  aspirin EC tablet 81 mg, 81 mg, Oral, Daily  atorvastatin (LIPITOR) tablet 40 mg, 40 mg, Oral, Nightly  tamsulosin (FLOMAX) capsule 0.4 mg, 0.4 mg, Oral, Daily  albuterol (PROVENTIL) (2.5 MG/3ML) 0.083% nebulizer solution 2.5 mg, 2.5 mg, Nebulization, Q4H PRN  insulin lispro (HUMALOG,ADMELOG) injection vial 0-4 Units, 0-4 Units, SubCUTAneous, Nightly  glucose chewable tablet 16 g, 4 tablet, Oral, PRN  dextrose bolus 10% 125 mL, 125 mL, IntraVENous, PRN **OR** dextrose bolus 10% 250 mL, 250 mL, IntraVENous, PRN  glucagon injection 1 mg, 1 mg, SubCUTAneous, PRN  dextrose 10 % infusion, , IntraVENous, Continuous PRN    Social History:  Social History     Socioeconomic History    Marital status:      Spouse name: Not on file    Number of children: Not on file    Years of education: Not on file    Highest education level: Not on file   Occupational History     Comment: retired   Tobacco Use    Smoking status: Former     Current packs/day: 0.00     Average packs/day: 1.5 packs/day for 40.0 years (60.0 ttl pk-yrs)     Types: Cigarettes     Start date: 1951     Quit date: 1991     Years since quittin.4    Smokeless tobacco: Never   Vaping Use    Vaping Use: Never used   Substance and Sexual Activity    Alcohol use: Not Currently     Alcohol/week:  abd-  5/5 Elbow Flex/Ext-  5/5 / 5/5 Hand -  normal   Right Shoulder-abd-   5/5 Elbow Flex/Ext-  5/5 / 5/5 Hand -  normal     Left Hip Flex-  5/5 Knee Flex/ Ext-  5/5 / 5/5 Ankle DF/PF-  5/5 / 5/5   Right Hip Flex-  5/5  Knee Flex/Ext-  5/5 / 5/5 Ankle DF/PF- 5/5 / 5/5     Reflexes:  MSR Biceps Brachiorad. Triceps Patellar   Left    2+    2+    2+    2+   Right    2+    2+    2+    2+   Plantar reflex is down going bilaterally.    Coordination: finger to nose normal bilaterally.      Diagnostics:  CBC   Lab Results   Component Value Date/Time    WBC 12.5 06/19/2024 08:02 AM    RBC 3.74 06/19/2024 08:02 AM    HGB 11.9 06/19/2024 08:02 AM    HCT 35.7 06/19/2024 08:02 AM    MCV 95.5 06/19/2024 08:02 AM    RDW 13.1 06/19/2024 08:02 AM     06/19/2024 08:02 AM     BMP    Lab Results   Component Value Date/Time     06/19/2024 08:02 AM    K 3.8 06/19/2024 08:02 AM     06/19/2024 08:02 AM    CO2 21 06/19/2024 08:02 AM    BUN 17 06/19/2024 08:02 AM     Uric Acid  No components found for: \"URIC\"  VITAMIN B12 No components found for: \"B12\"  PT/INR  No results found for: \"PTINR\"      Impression: Mr. Anuj Jovel is a 83 y.o. male with a history of Complete heart block (HCC)    Third-degree AV block symptomatic bradycardia status post dual-chamber pacemaker 6/17/2024\/sick sinus syndrome  Coronary artery disease with stent heart failure with preserved ejection fraction-aspirin, Lasix  COVID-19 6/7 positive out of isolation 6/17 off antibiotics  Acute hypoxic respite failure secondary pneumonia due to COVID-19-  Delirium  Obstructive sleep apnea-recommend CPAP  Hypertension/hyperlipidemia-Norvasc, Lipitor,  Type 2 diabetes-metformin  BPH-Flomax  Headache-Fioricet  Lower extremity pain-Lyrica  Mild leukocytosis 12.5  Mild anemia hemoglobin 11.9    Recommendations:  1. Diagnosis: Debility secondary to symptomatic bradycardia, COVID-19 with encephalopathy  2. Therapy: Has PT and OT needs reconsult speech

## 2024-06-20 NOTE — PROCEDURES
chloride infusion  25 mL IntraVENous PRN Lyndon Juarez DO        insulin lispro (HUMALOG,ADMELOG) injection vial 0-16 Units  0-16 Units SubCUTAneous TID  Lyndon Juarez DO   4 Units at 06/16/24 1200    sodium chloride flush 0.9 % injection 10 mL  10 mL IntraVENous PRN Jhon Brown MD        enoxaparin (LOVENOX) injection 40 mg  40 mg SubCUTAneous Daily Jhon Brown MD   40 mg at 06/19/24 0758    sodium chloride flush 0.9 % injection 5-40 mL  5-40 mL IntraVENous PRN Jhon Brown MD        potassium chloride (KLOR-CON M) extended release tablet 40 mEq  40 mEq Oral PRN Jhon Brown MD   40 mEq at 06/10/24 0840    Or    potassium bicarb-citric acid (EFFER-K) effervescent tablet 40 mEq  40 mEq Oral PRN Jhon Brown MD        Or    potassium chloride 10 mEq/100 mL IVPB (Peripheral Line)  10 mEq IntraVENous PRN Jhon Brown MD        magnesium sulfate 2000 mg in 50 mL IVPB premix  2,000 mg IntraVENous PRN Jhon Brown MD        ondansetron (ZOFRAN-ODT) disintegrating tablet 4 mg  4 mg Oral Q8H PRN Jhon Brown MD        Or    ondansetron (ZOFRAN) injection 4 mg  4 mg IntraVENous Q6H PRN Jhon Brown MD        polyethylene glycol (GLYCOLAX) packet 17 g  17 g Oral Daily PRN Jhon Brown MD   17 g at 06/17/24 0933    acetaminophen (TYLENOL) tablet 650 mg  650 mg Oral Q6H PRN Jhon Brown MD   650 mg at 06/19/24 0758    Or    acetaminophen (TYLENOL) suppository 650 mg  650 mg Rectal Q6H PRN Jhon Brown MD        aspirin EC tablet 81 mg  81 mg Oral Daily Jhon Brown MD   81 mg at 06/19/24 0758    atorvastatin (LIPITOR) tablet 40 mg  40 mg Oral Nightly Jhon Brown MD   40 mg at 06/19/24 2144    tamsulosin (FLOMAX) capsule 0.4 mg  0.4 mg Oral Daily Jhon Brown MD   0.4 mg at 06/19/24 0759    albuterol (PROVENTIL) (2.5 MG/3ML) 0.083% nebulizer solution 2.5 mg  2.5 mg Nebulization Q4H PRN Jhon Brown MD        insulin lispro  (HUMALOG,ADMELOG) injection vial 0-4 Units  0-4 Units SubCUTAneous Nightly Jhon Brown MD   4 Units at 06/13/24 2029    glucose chewable tablet 16 g  4 tablet Oral PRN Jhon Brown MD        dextrose bolus 10% 125 mL  125 mL IntraVENous PRN Jhon Brown MD        Or    dextrose bolus 10% 250 mL  250 mL IntraVENous PRN Jhon Brown MD        glucagon injection 1 mg  1 mg SubCUTAneous PRN Jhon Brown MD        dextrose 10 % infusion   IntraVENous Continuous PRN Jhon Brown MD           Technical Description: This is a 21 channel digital EEG recording with time-locked video. Electrodes were placed in accordance with the 10-20 International System of Electrode Placement. Single lead EKG monitoring as well as temporal electrodes were included.    The patient was not sleep deprived. This recording was obtained during wakefulness.  EEG Description: The dominant background activity during maximal recorded wakefulness consisted of 6-7 Hz of polymorphic theta activity of 25-40 uV. There was poor anterior posterior amplitude gradient.  There was no asymmetry between the two hemispheres. Sleep architecture was not seen.    Activation procedures were not performed.     The EKG channel demonstrated a normal sinus rhythm.    Interpretation  This EEG was abnormal due to disorganized and slow background in polymorphic theta frequency.      Clinical correlation  This EEG was abnormal. Disorganized and slow background suggested mild to moderate encephalopathy of non specific etiology.     TEMI MAY MD  Diplomate, American Board of Psychiatry and Neurology  Diplomate, American Board of Clinical Neurophysiology  Diplomate, American Board of Epilepsy

## 2024-06-21 ENCOUNTER — HOSPITAL ENCOUNTER (INPATIENT)
Age: 84
LOS: 13 days | Discharge: HOME HEALTH | End: 2024-07-04
Attending: STUDENT IN AN ORGANIZED HEALTH CARE EDUCATION/TRAINING PROGRAM | Admitting: STUDENT IN AN ORGANIZED HEALTH CARE EDUCATION/TRAINING PROGRAM
Payer: MEDICARE

## 2024-06-21 VITALS
BODY MASS INDEX: 32.73 KG/M2 | RESPIRATION RATE: 29 BRPM | HEIGHT: 67 IN | WEIGHT: 208.56 LBS | SYSTOLIC BLOOD PRESSURE: 136 MMHG | TEMPERATURE: 98.4 F | HEART RATE: 89 BPM | DIASTOLIC BLOOD PRESSURE: 58 MMHG | OXYGEN SATURATION: 95 %

## 2024-06-21 DIAGNOSIS — E11.40 CONTROLLED TYPE 2 DIABETES MELLITUS WITH DIABETIC NEUROPATHY, WITH LONG-TERM CURRENT USE OF INSULIN (HCC): Primary | ICD-10-CM

## 2024-06-21 DIAGNOSIS — Z79.4 CONTROLLED TYPE 2 DIABETES MELLITUS WITH DIABETIC NEUROPATHY, WITH LONG-TERM CURRENT USE OF INSULIN (HCC): Primary | ICD-10-CM

## 2024-06-21 PROBLEM — G93.40 ENCEPHALOPATHY: Status: ACTIVE | Noted: 2024-06-21

## 2024-06-21 LAB
ANION GAP SERPL CALCULATED.3IONS-SCNC: 14 MMOL/L (ref 9–16)
BUN SERPL-MCNC: 21 MG/DL (ref 8–23)
CALCIUM SERPL-MCNC: 8.7 MG/DL (ref 8.6–10.4)
CHLORIDE SERPL-SCNC: 99 MMOL/L (ref 98–107)
CO2 SERPL-SCNC: 23 MMOL/L (ref 20–31)
CREAT SERPL-MCNC: 1.1 MG/DL (ref 0.7–1.2)
GFR, ESTIMATED: 67 ML/MIN/1.73M2
GLUCOSE BLD-MCNC: 112 MG/DL (ref 75–110)
GLUCOSE BLD-MCNC: 197 MG/DL (ref 75–110)
GLUCOSE BLD-MCNC: 231 MG/DL (ref 75–110)
GLUCOSE BLD-MCNC: 237 MG/DL (ref 75–110)
GLUCOSE SERPL-MCNC: 218 MG/DL (ref 74–99)
POTASSIUM SERPL-SCNC: 4.6 MMOL/L (ref 3.7–5.3)
SODIUM SERPL-SCNC: 136 MMOL/L (ref 136–145)
URATE SERPL-MCNC: 5.9 MG/DL (ref 3.4–7)

## 2024-06-21 PROCEDURE — 82947 ASSAY GLUCOSE BLOOD QUANT: CPT

## 2024-06-21 PROCEDURE — 80048 BASIC METABOLIC PNL TOTAL CA: CPT

## 2024-06-21 PROCEDURE — 6370000000 HC RX 637 (ALT 250 FOR IP)

## 2024-06-21 PROCEDURE — 97110 THERAPEUTIC EXERCISES: CPT

## 2024-06-21 PROCEDURE — 6370000000 HC RX 637 (ALT 250 FOR IP): Performed by: INTERNAL MEDICINE

## 2024-06-21 PROCEDURE — 2580000003 HC RX 258: Performed by: HOSPITALIST

## 2024-06-21 PROCEDURE — 97530 THERAPEUTIC ACTIVITIES: CPT

## 2024-06-21 PROCEDURE — 97535 SELF CARE MNGMENT TRAINING: CPT

## 2024-06-21 PROCEDURE — 6370000000 HC RX 637 (ALT 250 FOR IP): Performed by: NURSE PRACTITIONER

## 2024-06-21 PROCEDURE — 99232 SBSQ HOSP IP/OBS MODERATE 35: CPT | Performed by: INTERNAL MEDICINE

## 2024-06-21 PROCEDURE — 84550 ASSAY OF BLOOD/URIC ACID: CPT

## 2024-06-21 PROCEDURE — 1180000000 HC REHAB R&B

## 2024-06-21 PROCEDURE — 36415 COLL VENOUS BLD VENIPUNCTURE: CPT

## 2024-06-21 PROCEDURE — 6370000000 HC RX 637 (ALT 250 FOR IP): Performed by: STUDENT IN AN ORGANIZED HEALTH CARE EDUCATION/TRAINING PROGRAM

## 2024-06-21 PROCEDURE — 99232 SBSQ HOSP IP/OBS MODERATE 35: CPT | Performed by: PHYSICAL MEDICINE & REHABILITATION

## 2024-06-21 PROCEDURE — 99239 HOSP IP/OBS DSCHRG MGMT >30: CPT | Performed by: INTERNAL MEDICINE

## 2024-06-21 PROCEDURE — 6360000002 HC RX W HCPCS

## 2024-06-21 RX ORDER — AMLODIPINE BESYLATE 5 MG/1
5 TABLET ORAL DAILY
Status: DISCONTINUED | OUTPATIENT
Start: 2024-06-22 | End: 2024-06-26

## 2024-06-21 RX ORDER — PANTOPRAZOLE SODIUM 40 MG/1
40 TABLET, DELAYED RELEASE ORAL
Status: DISCONTINUED | OUTPATIENT
Start: 2024-06-22 | End: 2024-07-04 | Stop reason: HOSPADM

## 2024-06-21 RX ORDER — SENNOSIDES A AND B 8.6 MG/1
2 TABLET, FILM COATED ORAL DAILY PRN
Status: CANCELLED | OUTPATIENT
Start: 2024-06-21

## 2024-06-21 RX ORDER — INSULIN GLARGINE 100 [IU]/ML
10 INJECTION, SOLUTION SUBCUTANEOUS NIGHTLY
Status: DISCONTINUED | OUTPATIENT
Start: 2024-06-21 | End: 2024-07-04 | Stop reason: HOSPADM

## 2024-06-21 RX ORDER — ASPIRIN 81 MG/1
81 TABLET ORAL DAILY
Status: CANCELLED | OUTPATIENT
Start: 2024-06-22

## 2024-06-21 RX ORDER — BUTALBITAL, ACETAMINOPHEN AND CAFFEINE 300; 40; 50 MG/1; MG/1; MG/1
1 CAPSULE ORAL EVERY 4 HOURS PRN
Status: DISCONTINUED | OUTPATIENT
Start: 2024-06-21 | End: 2024-07-04 | Stop reason: HOSPADM

## 2024-06-21 RX ORDER — ATORVASTATIN CALCIUM 40 MG/1
40 TABLET, FILM COATED ORAL NIGHTLY
Status: CANCELLED | OUTPATIENT
Start: 2024-06-21

## 2024-06-21 RX ORDER — TAMSULOSIN HYDROCHLORIDE 0.4 MG/1
0.4 CAPSULE ORAL DAILY
Status: CANCELLED | OUTPATIENT
Start: 2024-06-22

## 2024-06-21 RX ORDER — ENOXAPARIN SODIUM 100 MG/ML
40 INJECTION SUBCUTANEOUS DAILY
Status: CANCELLED | OUTPATIENT
Start: 2024-06-22

## 2024-06-21 RX ORDER — INSULIN GLARGINE 100 [IU]/ML
10 INJECTION, SOLUTION SUBCUTANEOUS NIGHTLY
Status: CANCELLED | OUTPATIENT
Start: 2024-06-21

## 2024-06-21 RX ORDER — POLYETHYLENE GLYCOL 3350 17 G/17G
17 POWDER, FOR SOLUTION ORAL DAILY PRN
Status: DISCONTINUED | OUTPATIENT
Start: 2024-06-21 | End: 2024-06-23

## 2024-06-21 RX ORDER — ATORVASTATIN CALCIUM 40 MG/1
40 TABLET, FILM COATED ORAL NIGHTLY
Status: DISCONTINUED | OUTPATIENT
Start: 2024-06-21 | End: 2024-07-04 | Stop reason: HOSPADM

## 2024-06-21 RX ORDER — FUROSEMIDE 20 MG/1
20 TABLET ORAL DAILY
Status: CANCELLED | OUTPATIENT
Start: 2024-06-22

## 2024-06-21 RX ORDER — INSULIN LISPRO 100 [IU]/ML
0-16 INJECTION, SOLUTION INTRAVENOUS; SUBCUTANEOUS
Status: DISCONTINUED | OUTPATIENT
Start: 2024-06-22 | End: 2024-07-04 | Stop reason: HOSPADM

## 2024-06-21 RX ORDER — ENOXAPARIN SODIUM 100 MG/ML
40 INJECTION SUBCUTANEOUS DAILY
Status: DISCONTINUED | OUTPATIENT
Start: 2024-06-22 | End: 2024-07-04 | Stop reason: HOSPADM

## 2024-06-21 RX ORDER — DOXYCYCLINE 100 MG/1
100 CAPSULE ORAL EVERY 12 HOURS SCHEDULED
Status: DISCONTINUED | OUTPATIENT
Start: 2024-06-21 | End: 2024-06-24

## 2024-06-21 RX ORDER — SENNOSIDES A AND B 8.6 MG/1
2 TABLET, FILM COATED ORAL DAILY PRN
Status: DISCONTINUED | OUTPATIENT
Start: 2024-06-21 | End: 2024-06-24

## 2024-06-21 RX ORDER — ASPIRIN 81 MG/1
81 TABLET ORAL DAILY
Status: DISCONTINUED | OUTPATIENT
Start: 2024-06-22 | End: 2024-07-04 | Stop reason: HOSPADM

## 2024-06-21 RX ORDER — UREA 10 %
5 LOTION (ML) TOPICAL NIGHTLY PRN
Status: CANCELLED | OUTPATIENT
Start: 2024-06-21

## 2024-06-21 RX ORDER — PANTOPRAZOLE SODIUM 40 MG/1
40 TABLET, DELAYED RELEASE ORAL
Status: CANCELLED | OUTPATIENT
Start: 2024-06-22

## 2024-06-21 RX ORDER — BISACODYL 10 MG
10 SUPPOSITORY, RECTAL RECTAL DAILY PRN
Status: CANCELLED | OUTPATIENT
Start: 2024-06-21

## 2024-06-21 RX ORDER — LANOLIN ALCOHOL/MO/W.PET/CERES
3 CREAM (GRAM) TOPICAL NIGHTLY PRN
Status: DISCONTINUED | OUTPATIENT
Start: 2024-06-21 | End: 2024-07-04 | Stop reason: HOSPADM

## 2024-06-21 RX ORDER — ACETAMINOPHEN 325 MG/1
650 TABLET ORAL EVERY 4 HOURS PRN
Status: DISCONTINUED | OUTPATIENT
Start: 2024-06-21 | End: 2024-07-04 | Stop reason: HOSPADM

## 2024-06-21 RX ORDER — BISACODYL 10 MG
10 SUPPOSITORY, RECTAL RECTAL DAILY PRN
Status: DISCONTINUED | OUTPATIENT
Start: 2024-06-21 | End: 2024-07-04 | Stop reason: HOSPADM

## 2024-06-21 RX ORDER — BUTALBITAL, ACETAMINOPHEN AND CAFFEINE 50; 325; 40 MG/1; MG/1; MG/1
1 TABLET ORAL EVERY 4 HOURS PRN
Status: CANCELLED | OUTPATIENT
Start: 2024-06-21

## 2024-06-21 RX ORDER — FUROSEMIDE 20 MG/1
20 TABLET ORAL DAILY
Status: DISCONTINUED | OUTPATIENT
Start: 2024-06-22 | End: 2024-06-22

## 2024-06-21 RX ORDER — POLYETHYLENE GLYCOL 3350 17 G/17G
17 POWDER, FOR SOLUTION ORAL DAILY PRN
Status: CANCELLED | OUTPATIENT
Start: 2024-06-21

## 2024-06-21 RX ORDER — INSULIN LISPRO 100 [IU]/ML
0-16 INJECTION, SOLUTION INTRAVENOUS; SUBCUTANEOUS
Status: CANCELLED | OUTPATIENT
Start: 2024-06-21

## 2024-06-21 RX ORDER — DOXYCYCLINE HYCLATE 100 MG
100 TABLET ORAL EVERY 12 HOURS SCHEDULED
Status: CANCELLED | OUTPATIENT
Start: 2024-06-21

## 2024-06-21 RX ORDER — POLYETHYLENE GLYCOL 3350 17 G/17G
17 POWDER, FOR SOLUTION ORAL DAILY
Status: CANCELLED | OUTPATIENT
Start: 2024-06-21

## 2024-06-21 RX ORDER — POLYETHYLENE GLYCOL 3350 17 G/17G
17 POWDER, FOR SOLUTION ORAL DAILY
Status: DISCONTINUED | OUTPATIENT
Start: 2024-06-21 | End: 2024-06-25

## 2024-06-21 RX ORDER — AMLODIPINE BESYLATE 5 MG/1
5 TABLET ORAL DAILY
Status: CANCELLED | OUTPATIENT
Start: 2024-06-22

## 2024-06-21 RX ORDER — ACETAMINOPHEN 325 MG/1
650 TABLET ORAL EVERY 4 HOURS PRN
Status: CANCELLED | OUTPATIENT
Start: 2024-06-21

## 2024-06-21 RX ORDER — TAMSULOSIN HYDROCHLORIDE 0.4 MG/1
0.4 CAPSULE ORAL DAILY
Status: DISCONTINUED | OUTPATIENT
Start: 2024-06-22 | End: 2024-07-04 | Stop reason: HOSPADM

## 2024-06-21 RX ADMIN — AMLODIPINE BESYLATE 5 MG: 5 TABLET ORAL at 08:37

## 2024-06-21 RX ADMIN — FUROSEMIDE 20 MG: 20 TABLET ORAL at 08:37

## 2024-06-21 RX ADMIN — INSULIN GLARGINE 10 UNITS: 100 INJECTION, SOLUTION SUBCUTANEOUS at 20:58

## 2024-06-21 RX ADMIN — TAMSULOSIN HYDROCHLORIDE 0.4 MG: 0.4 CAPSULE ORAL at 08:44

## 2024-06-21 RX ADMIN — SODIUM CHLORIDE, PRESERVATIVE FREE 10 ML: 5 INJECTION INTRAVENOUS at 08:37

## 2024-06-21 RX ADMIN — PREGABALIN 125 MG: 25 CAPSULE ORAL at 20:58

## 2024-06-21 RX ADMIN — DOXYCYCLINE 100 MG: 100 CAPSULE ORAL at 20:58

## 2024-06-21 RX ADMIN — ENOXAPARIN SODIUM 40 MG: 100 INJECTION SUBCUTANEOUS at 08:37

## 2024-06-21 RX ADMIN — DOXYCYCLINE HYCLATE 100 MG: 100 TABLET, COATED ORAL at 08:37

## 2024-06-21 RX ADMIN — PANTOPRAZOLE SODIUM 40 MG: 40 TABLET, DELAYED RELEASE ORAL at 08:37

## 2024-06-21 RX ADMIN — METFORMIN HYDROCHLORIDE 1000 MG: 500 TABLET ORAL at 08:37

## 2024-06-21 RX ADMIN — ATORVASTATIN CALCIUM 40 MG: 40 TABLET, FILM COATED ORAL at 20:58

## 2024-06-21 RX ADMIN — ASPIRIN 81 MG: 81 TABLET, COATED ORAL at 08:37

## 2024-06-21 RX ADMIN — PREGABALIN 125 MG: 25 CAPSULE ORAL at 08:44

## 2024-06-21 ASSESSMENT — PAIN DESCRIPTION - ORIENTATION: ORIENTATION: LEFT;RIGHT

## 2024-06-21 ASSESSMENT — PAIN DESCRIPTION - DESCRIPTORS: DESCRIPTORS: ACHING

## 2024-06-21 ASSESSMENT — PAIN SCALES - WONG BAKER: WONGBAKER_NUMERICALRESPONSE: HURTS A LITTLE BIT

## 2024-06-21 ASSESSMENT — PAIN DESCRIPTION - LOCATION: LOCATION: FOOT

## 2024-06-21 ASSESSMENT — PAIN SCALES - GENERAL: PAINLEVEL_OUTOF10: 6

## 2024-06-21 NOTE — PROGRESS NOTES
[facility]   Progress Note                   Date:   6/21/2024  Patient name: Anuj Jovel  Date of admission:  6/8/2024  8:29 PM  MRN:   [id number]  YOB: 1940  PCP: Jeanne Simpson MD    Reason for consult:Symptomatic bradycardia    Subjective:       Patient seen and examined at the bed side: No acute issues overnight. Dual chamber pacemaker placed.  Awaiting placement    Medications:   Scheduled Meds:   pregabalin  125 mg Oral BID    metFORMIN  1,000 mg Oral BID WC    amLODIPine  5 mg Oral Daily    furosemide  20 mg Oral Daily    insulin glargine  10 Units SubCUTAneous Nightly    doxycycline hyclate  100 mg Oral 2 times per day    pantoprazole  40 mg Oral QAM AC    lidocaine 1 % injection  5 mL IntraDERmal Once    sodium chloride flush  5-40 mL IntraVENous 2 times per day    insulin lispro  0-16 Units SubCUTAneous TID WC    enoxaparin  40 mg SubCUTAneous Daily    aspirin EC  81 mg Oral Daily    atorvastatin  40 mg Oral Nightly    tamsulosin  0.4 mg Oral Daily    insulin lispro  0-4 Units SubCUTAneous Nightly     Continuous Infusions:   sodium chloride      dextrose       CBC:   Recent Labs     06/19/24  0802   WBC 12.5*   HGB 11.9*          BMP:    Recent Labs     06/19/24  0802   *   K 3.8      CO2 21   BUN 17   CREATININE 0.8   GLUCOSE 85       Hepatic:   No results for input(s): \"AST\", \"ALT\", \"BILITOT\", \"ALKPHOS\" in the last 72 hours.    Invalid input(s): \"ALB\"    Troponin: No results for input(s): \"TROPONINI\" in the last 72 hours.  BNP: No results for input(s): \"BNP\" in the last 72 hours.  Lipids: No results for input(s): \"CHOL\", \"HDL\" in the last 72 hours.    Invalid input(s): \"LDLCALCU\"  INR: No results for input(s): \"INR\" in the last 72 hours.    Objective:   Vitals: BP (!) 113/51   Pulse 68   Temp 99.5 °F (37.5 °C) (Oral)   Resp 23   Ht 1.702 m (5' 7.01\")   Wt 94.6 kg (208 lb 8.9 oz)   SpO2 97%   BMI 32.66 kg/m²     General appearance: alert and  well healing  Pacer interrogation normal  CXR no PTX  Continue  Norvasc for HTN  Continue  lasix daily   Reviewed activity restrictions.    No further workup. Will sign off.  Follow up as outpt     Discussed with patient in detail. All questions answered. Agrees with plan as outlined above.     Thank you for allowing me to participate in the care of this patient, please do not hesitate to call if you have any questions.    ALONZO PLATA, APRN - CNP   [facility]  [facility]  [phone]

## 2024-06-21 NOTE — PROGRESS NOTES
[facility]  Office: [phone]  Anderson Juarez DO, Colin Dominguez DO, Glenn Shepherd DO, Alexsander Vang, DO, Cortney Marks MD, Diane Hays MD, Kei Redmond MD, Kierra Jean MD,  Arnold Garza MD, Andrew Devine MD, Hetal Morrell MD,  Adele Shaver DO, Raimundo Dela Cruz MD, Ash Viveros MD, Lyndon Juarez DO, Lea Nieves MD,  Isrrael Grewal DO, Dorys Diallo MD, Kerline Flores MD, Aisha Judd MD, Anselmo Daley MD,  Levar Lehman MD, Delbert Fu MD, Keshia Sims MD, Michelle Alvarez MD, Troy Brizuela MD, Katarina Emerson MD, Moo So DO, Floyd Harper DO, Lucina Sung MD,  Jimmie Gee MD, Shirley Waterhouse, CNP,  Mary Jimenez CNP, Kartik Mcwilliams, CNP,  Sara Solorzano, DNP, Marlyn Valdez, CNP, Alyssia Delgadillo, CNP, Duyen Garibay, CNP, Janice Mendez, CNP, Tessie Cardona, PA-C, Martina Harris PA-C, Dena Álvarez, CNP, Jeanette Cordero, CNP, Jerome Duran, CNP, Nandini Barraza, CNP, Marilia Yeager, CNP, Angelica Granados, CNS, Cassandra Herrera, CNP, Lexy Canela CNP, Tracy Schwab, CNP         [facility]   IN-PATIENT SERVICE   [facility]    Progress Note    6/21/2024    3:20 PM    Name:   Anuj Jovel  MRN:     [id number]     Acct:      [id number]   Room:   2027/2027-01   Day:  13  Admit Date:  6/8/2024  8:29 PM    PCP:   Jeanne Simpson MD  Code Status:  Full Code    Subjective:     No changes. Still having some pain in his legs which he says is chronic. Pain is located in left great toe. There is pain with palpation       Brief History:     This is an 83-year-old male who presented to the hospital as a transfer due to complete heart block needing permanent pacemaker placement.  Patient was found to have a COVID-19 infection and CT scan showed bibasilar infiltrates.  Patient was treated with Decadron and baricitinib and improved. Currently waiting on placement.     Medications:     Allergies:    Allergies   Allergen Reactions     preserved ejection fraction (HFpEF) (HCC) 6/8/2024 Yes    Mild mitral valve regurgitation 6/8/2024 Yes    Pulmonary hypertension (HCC) 6/8/2024 Yes    Second degree AV block, Mobitz type I 6/8/2024 Yes    Systolic congestive heart failure (HCC) 6/9/2024 Yes    Advanced age 6/9/2024 Yes    Acute hypoxic respiratory failure (HCC) 6/9/2024 Yes    COVID 6/9/2024 Yes    Shortness of breath 6/11/2024 Yes    Symptomatic bradycardia 6/13/2024 Unknown    Encephalopathy acute 6/19/2024 Yes    MCI (mild cognitive impairment) 6/19/2024 Yes      Plan:     Third-degree heart block status post pacemaker placement  Status post dual-chamber pacemaker on 6/17/2024  Stable for discharge from cardiology perspective.  Needs outpatient follow-up  On antibiotics for prophylaxis per cardiology  Debility  PMR-  recommending [facility]  .Acute hypoxic respiratory failure likely due to pneumonia due to COVID-19  Completed remdesivir, Decadron, and barictinib   Off of covid isolation  Hyperglycemia due to medication effect and diabetes  Monitor  ISS as needed. Stop lantus if glucose stable.   Metformin  Delirium  Resolved, maintain delirium precautions.  follow-up appointment in neurology clinic in 4-6 wks.   Primary hypertension  Pain ingreat toe  Increase  home  gabapentin. Pulses are palpable   Check uric acid , ? Gout   BPH  GERD  PTOT    Medical Decision Making: Medium    Dispo: plan for discharge to SNF vs [facility].  Adele Shaver DO  6/21/2024  3:20 PM

## 2024-06-21 NOTE — DISCHARGE INSTRUCTIONS
Make an appointment with your primary care physician within 1 week of discharge for evaluation of symptoms.  Follow-up with cardiology outpatient for wound recheck  Follow-up with your primary care physician for leg pain  Return to the hospital as

## 2024-06-21 NOTE — PROGRESS NOTES
[facility] - Progress Note    COVID 19 Patient  Today's Date and Time: 6/21/2024, 6:35 AM    Impression :     COVID 19 Confirmed Infection  Covid tests:  Positive Covid Test Date: 6/7/24  Vaccination Status:  Received Covid 19 vaccines on 1/22/21, 2/19/21, 11/2/21 and 9/1/23  Bradycardia  Second degree heart block, Mobitz type I  S/P Pacemaker insertion 6-17-24  Systolic heart failure  Acute hypoxic respiratory distress  CRP elevation  Altered mentation  resolved  CAD  Pulmonary HTN  Essential HTN  Diabetes mellitus  HLD  BPH  PCN, flagyl and sulfa allergy  Recommendations:   Antibiotic treatment:  Monitor off antibiotics  Single dose Vancomycin for pacemaker insertion on 6-17-24  Covid Rx:    Completed Remdesivir: Requested 6-9-24. Stop date 6-13-24.  Completed Decadron: 6 mg daily x 6 days initiated 6/7/24. Completed on 6-14-24.  Baricitinib: Initiated 6/7/24 . Will D/C  Paxlovid: Likely out of window  Monitor CRP: 6.7-->20.3    Patient off Covid isolation    Medical Decision Making/Summary/Discussion:6/21/2024     Patient admitted with COVID 19 infection    Infection Control Recommendations   Universal Precautions  Completed Droplet Plus Isolation. Stop date 6/17/24    Antimicrobial Stewardship Recommendations     Simplification of therapy  Targeted therapy    Coordination of Outpatient Care:   Estimated Length of IV antimicrobials:TBD  Patient will need Midline Catheter Insertion: TBD  Patient will need PICC line Insertion: No  Patient will need: Home IV , Infusion Center,  SNF,  LTAC:TBD  Patient will need outpatient wound care:No    Chief complaint/reason for consultation:   Concern for COVID infection      History of Present Illness:   Anuj Jovel is a 83 y.o.-year-old  male who was initially admitted on 6/8/2024. Patient seen at the request of Dr. Juarez    INITIAL HISTORY:    Patient, with a hx of Wenckebach, CAD w/stenting, CHF, DM, HTN and BPH, presented through  DNA not detected by nucleic acid amplification.                                                    Results should be used as an adjunct to nosocomial control efforts to identify patients   needing enhanced precautions.    The test is not intended to identify patients with staphylococcal infections.  Results   should not be used to guide or monitor treatment for MRSA infections.         COVID-19 & Influenza Combo [[id number]]  (Abnormal) Collected: 06/07/24 1003    Order Status: Completed Specimen: Nasopharyngeal Swab Updated: 06/07/24 1033     Specimen Description .NASOPHARYNGEAL SWAB     Source .NASOPHARYNGEAL SWAB     SARS-CoV-2 RNA, RT PCR DETECTED     Comment:       Testing was performed using [employer] SARS-CoV-2 and Influenza A/B nucleic acid assay.  This test is a multiplex Real-Time Reverse Transcriptase Polymerase Chain Reaction   (RT-PCR)-based in vitro diagnostic test intended for the qualitative detection of nucleic   acids from SARS-CoV-2,  influenza A, and influenza B in nasopharyngeal and nasal swab specimens for use under the   FDA's Emergency Use Authorization (EUA) only.        Detected results are indicative of the presence of SARS-CoV-2.  Clinical correlation with patient history and other diagnostic information is necessary to   determine patient infection status.  Positive results do not rule out bacterial infection or   co-infection with other pathogens.        Fact sheet for Patients: https://www.fda.gov/media/087733/download  Fact sheet for Healthcare Providers: https://www.fda.gov/media/607603/download        Results reported to the appropriate Health Department          Influenza A Not Detected     Comment: Note:  Influenza A and Influenza B negative results should be considered presumptive in   samples that have a Detected SARS-CoV-2 result.  Consider re-testing with an alternate   FDA-approved test if clinically indicated.          Influenza B Not Detected     Comment: Note:  Influenza

## 2024-06-21 NOTE — DISCHARGE SUMMARY
[facility]  Office: [phone]  Anderson Juarez DO, Colin Dominguez DO, Glenn Shepherd DO, Alexsander Vang DO, Cortney Marks MD, Diane Hays MD, Kei Redmond MD, Kierra Jean MD,  Arnold Garza MD, Andrew Devine MD, Hetal Morrell MD,  Adele Shaver DO, Raimundo Dela Cruz MD, Ash Viveros MD, Lyndon Juarez DO, Lea Nieves MD,  Isrrael Grewal DO, Dorys Diallo MD, Kerline Flores MD, Aisha Judd MD, Anselmo Daley MD,  Levar Lehman MD, Delbert Fu MD, Keshia Sims MD, Michelle Alvarez MD, Troy Brizuela MD, Katarina Emerson MD, Moo So DO, Floyd Harper DO, Lucina Sung MD,  Jimmie Gee MD, Shirley Waterhouse, CNP,  Mary Jimenez CNP, Kartik Mcwilliams, CNP,  Sara Solorzano, DNP, Marlyn Valdez, CNP, Alyssia Delgadillo, CNP, Duyen Garibay CNP, Janice Mendez, CNP, Tessie Cardona, PA-C, Martina Harris PA-C, Dena Álvarez, CNP, Jeanette Cordero, CNP, Jerome Duran, CNP, Nandini Barraza, CNP, Marilia Yeager, CNP, Angelica Granados, CNS, Cassandra Hererra, CNP, Lexy Canela CNP, Tracy Schwab, CNP         [facility]   IN-PATIENT SERVICE   [facility]    Discharge Summary     Patient ID: Anuj Jovel  :  1940   MRN: [id number]     ACCOUNT:  [id number]   Patient's PCP: Jeanne Simpson MD  Admit Date: 2024   Discharge Date: 2024     Length of Stay: 13  Code Status:  Full Code  Admitting Physician: Davi Patel MD  Discharge Physician: Mohammad I Mashaleh, DO     Active Discharge Diagnoses:     Hospital Problem Lists:  Principal Problem:    Complete heart block (HCC)  Active Problems:    Cardiac pacemaker in situ    Benign prostatic hyperplasia    Coronary artery disease    Hyperlipidemia    Essential hypertension    Lumbar stenosis    Type 2 diabetes mellitus with complication (HCC)    Presence of coronary angioplasty implant and graft    KAREY (obstructive sleep apnea)    Hypoxia    History of placement of stent in LAD coronary

## 2024-06-21 NOTE — CARE COORDINATION
ACUTE INPATIENT REHABILITATION  [facility]  PRE-ADMISSION ASSESSMENT    Patient Name: Anuj Jovel        MRN: [id number]    : 1940  (83 y.o.)  Gender: male   Ethnicity: Not , /a or Latvian Origin  Race: White    Admitted from:  [facility]     Type of Admission:  New Admission     Date of Onset / Admission to the Acute Hospital:  2024    Inpatient Rehabilitation Admitting Diagnosis:  Debility secondary to symptomatic bradycardia, COVID-19 with encephalopathy    Did patient have surgery/procedures? Yes.  2024 Dual Chamber Pacemaker  2024 EEG    Physicians:   Mark Kaba,   Physician  Cardiology    Santosh Kaba, DO  Physician  Cardiology    Santosh Blackman MD  Physician  Cardiology    Theodore Bernard MD  Physician  Infectious Disease    Adele Shaver, DO  Physician  Internal Medicine    Hetal Morrell MD  Physician  Internal Medicine    Raimundo Dela Cruz MD  Physician  Internal Medicine    Lyndon Juarez,   Physician  Internal Medicine    Delbert Fu MD  Physician  Internal Medicine    Palma Ley MD  Physician  Neurology    Fanny Ponce MD  Physician  Neurology    Andrade Carrillo MD  Physician  Pulmonology        Risk for Clinical Complications:  Moderate     Co-morbidities:    Third-degree AV block symptomatic bradycardia status post dual-chamber pacemaker 2024\/sick sinus syndrome  Coronary artery disease with stent heart failure with preserved ejection fraction-aspirin, Lasix  COVID-19  positive out of isolation  off antibiotics  Acute hypoxic respite failure secondary pneumonia due to COVID-19-  Delirium  Obstructive sleep apnea-recommend CPAP  Hypertension/hyperlipidemia-Norvasc, Lipitor,  Type 2 diabetes-metformin  BPH-Flomax  Headache-Fioricet  Lower extremity pain-Lyrica  Mild leukocytosis 12.5  Mild anemia hemoglobin 11.9    Financial Information  Primary insurance:  Needs  [] Other information relevant to patient's care needs:    Preferred Language: English    Does the patient need or want an  to communicate with a doctor or health care staff? No    Rehab Justification:  Needs 3 hrs therapy per day or 15 hours per week:  Yes  Identified Rehab Nursing needs: Yes  Intense Interdisciplinary need:  Yes  Need for 24 hr physician supervision:  Yes  Measurable improved quality of life:  Yes  Willingness to participate:  Yes  Medical Necessity:  Yes  Patient able to tolerate care proposed:  Yes    Expected Discharge Destination/Functional Level:  Home with assist    Expected length of time to achieve level of improvement: Approximately 2 Weeks  Please Note: Estimated length of stay is based on individual condition and Acute Inpatient Rehabilitation specific needs. Length of stay may vary based upon interdisciplinary team assessment, insurance approval, and patient progression.    Expected Post Discharge Treatments: Home with possible Home Care    Acute Inpatient Rehabilitation Disclosure Statement will be provided to patient upon admission to ARU with patient's verbalization of understanding.      I have reviewed and concur with the findings and results of the pre-admission screening assessment completed by the Inpatient Rehabilitation Admissions Coordinator.

## 2024-06-21 NOTE — PROGRESS NOTES
Occupational Therapy  Facility/Department: [facility] CAR 2- STEPDOWN  Occupational Daily Treatment Note    Name: Anuj Jovel  : 1940  MRN: [id number]  Date of Service: 2024    Discharge Recommendations:  Patient would benefit from continued therapy after discharge  Patient Diagnosis(es): The primary encounter diagnosis was AV block, Mobitz 1. Diagnoses of Systolic congestive heart failure, unspecified HF chronicity (HCC), Shortness of breath, Symptomatic bradycardia, Complete heart block (HCC), and Pain of right lower extremity were also pertinent to this visit.  Past Medical History:  has a past medical history of Diabetes (HCC), Diverticulitis, History of allergy, Hyperlipidemia, Hypertension, Osteoarthritis, and Pneumonia due to COVID-19 virus.  Past Surgical History:  has a past surgical history that includes Cardiac catheterization; Colonoscopy; Total knee arthroplasty (Right); colectomy (); and back surgery (2017).  Assessment   Performance deficits / Impairments: Decreased functional mobility ;Decreased ADL status;Decreased safe awareness;Decreased cognition;Decreased endurance;Decreased balance;Decreased high-level IADLs;Decreased strength;Decreased posture  Prognosis: Good  Activity Tolerance  Activity Tolerance: Patient Tolerated treatment well;Patient limited by fatigue;Patient limited by pain        Plan   Occupational Therapy Plan  Times Per Week: 4-6 x/wk  Current Treatment Recommendations: Balance training, Functional mobility training, Pain management, Safety education & training, Patient/Caregiver education & training, Equipment evaluation, education, & procurement, Self-Care / ADL, Home management training, Strengthening, Cognitive reorientation, Endurance training     Restrictions  Restrictions/Precautions  Restrictions/Precautions: Fall Risk, Up as Tolerated  Required Braces or Orthoses?: No  Implants present? : Pacemaker  Position Activity Restriction  Other position/activity  restrictions: up with assistance  Pain Ratin/10  Pain Location: B feet/ankles  Non-Pharmaceutical Pain Intervention: Repositioned, distraction  Subjective   General  Chart Reviewed: Yes  Patient assessed for rehabilitation services?: Yes  Response to previous treatment: Patient with no complaints from previous session  Family / Caregiver Present: No  Safety Devices  Type of Devices: Call light within reach;Chair alarm in place;Gait belt;Nurse notified;Left in chair  Balance  Sitting: Without support (Supervision seated EOB/edge of chair unsupported..)  Standing: With support (Min A static standing using RW x3, functional mobility using RW to chair; to/from bathroom, simple grooming standing at sink. Total time 5 minutes w/seated rest break needed x2. Pt leaning heavily on sink during grooming. Unable to stand independently)  Gait  Gait Training: Yes  Overall Level of Assistance: Minimum assistance;Additional time (Continuous verbal cues to tuck bottom and stand straight during ambulation with fair return. Small shuffling steps. 2 seated rest breaks.)  Assistive Device: Gait belt;Walker, rolling     ADL  Grooming: Setup;Increased time to complete;Stand by assistance;Verbal cueing (Oral care standing at sink.)  UE Bathing: Setup;Increased time to complete;Verbal cueing (Wash face/hands standing at sink.)  Additional Comments: Pt supine in bed upon therapist arrival. Pt completed supine/sit transfer to seated EOB. Sit/stand transfer using RW for static standing. Functional mobility to chair using RW. Seated rest break needed d/t pain/faitgue. Chair positioned in doorway of bathroom. Sit/stand transfer from chair using RW for static standing. Functional mobility to sink using RW. Pt completed simple grooming standing at sink. Pt leaning heavily onto sink w/B knees in flexion, unable to correct. Seated rest break needed in bathroom d/t pain/fatigue before exiting bathroom to return to seated in chair. Pt stated

## 2024-06-21 NOTE — CARE COORDINATION
Received Voicemail from payor [employer] representative Amy.     Patient authorized for admission to Acute Inpatient Rehabilitation Stay under Auth/CaseRef# [id number]     Patient approved for 7 days for Acute Inpatient Rehabilitation level of care    Authorization valid for admission for the following timeframe: Must admit by 6/25/2024    Next Review date: 6/28/2024   Continued stay clinical documentation to be submitted via: Fax: [phone]  Utilization Management : Amy , Phone: [phone]    Updated Car 2 CM: Via Voicemail at this time at phone/extension: [phone]

## 2024-06-21 NOTE — PLAN OF CARE
Problem: Safety - Adult  Goal: Free from fall injury  Outcome: Progressing     Problem: Chronic Conditions and Co-morbidities  Goal: Patient's chronic conditions and co-morbidity symptoms are monitored and maintained or improved  Outcome: Progressing  Flowsheets (Taken 6/20/2024 2000)  Care Plan - Patient's Chronic Conditions and Co-Morbidity Symptoms are Monitored and Maintained or Improved:   Monitor and assess patient's chronic conditions and comorbid symptoms for stability, deterioration, or improvement   Collaborate with multidisciplinary team to address chronic and comorbid conditions and prevent exacerbation or deterioration     Problem: Discharge Planning  Goal: Discharge to home or other facility with appropriate resources  Outcome: Progressing  Flowsheets (Taken 6/20/2024 2000)  Discharge to home or other facility with appropriate resources: Identify barriers to discharge with patient and caregiver     Problem: Skin/Tissue Integrity  Goal: Absence of new skin breakdown  Description: 1.  Monitor for areas of redness and/or skin breakdown  2.  Assess vascular access sites hourly  3.  Every 4-6 hours minimum:  Change oxygen saturation probe site  4.  Every 4-6 hours:  If on nasal continuous positive airway pressure, respiratory therapy assess nares and determine need for appliance change or resting period.  Outcome: Progressing     Problem: ABCDS Injury Assessment  Goal: Absence of physical injury  Outcome: Progressing     Problem: Pain  Goal: Verbalizes/displays adequate comfort level or baseline comfort level  Outcome: Progressing     Problem: Nutrition Deficit:  Goal: Optimize nutritional status  Outcome: Progressing

## 2024-06-21 NOTE — CARE COORDINATION
Transitional planning. Spoke to Rhett PT, he will inform Cayla OT that OT note is needed for pre-cert    1230 Message from [facility] ARU left informing CM that pt has pre-cert, informed pt's RN.     1412 PS Dr. hSaver requesting DC/Readmit order and signed and held meds.     [facility]/ [employer]  Confirmed a 4pm WC transport time.     1535 [facility] ARU called, they have uric acid results- DC/Readmit order and signed and held meds. Pt is good for admission today w/ WC transport from [facility] at 4pm. Report number [phone]    Provided transportation envelope and report number to pt's RN, Walker.

## 2024-06-21 NOTE — PROGRESS NOTES
Physical Therapy  Facility/Department: [facility] CAR 2- STEPDOWN  Physical Therapy Treatment Note    Name: Anuj Jovel  : 1940  MRN: [id number]  Date of Service: 2024    Discharge Recommendations:  Patient would benefit from continued therapy after discharge   PT Equipment Recommendations  Equipment Needed: No      Patient Diagnosis(es): The primary encounter diagnosis was AV block, Mobitz 1. Diagnoses of Systolic congestive heart failure, unspecified HF chronicity (HCC), Shortness of breath, Symptomatic bradycardia, Complete heart block (HCC), and Pain of right lower extremity were also pertinent to this visit.  Past Medical History:  has a past medical history of Diabetes (HCC), Diverticulitis, History of allergy, Hyperlipidemia, Hypertension, Osteoarthritis, and Pneumonia due to COVID-19 virus.  Past Surgical History:  has a past surgical history that includes Cardiac catheterization; Colonoscopy; Total knee arthroplasty (Right); colectomy (2015); and back surgery (2017).    Assessment   Body Structures, Functions, Activity Limitations Requiring Skilled Therapeutic Intervention: Decreased functional mobility ;Decreased strength;Decreased endurance;Decreased balance;Decreased cognition;Decreased safe awareness  Assessment: Pt presenting with mobility deficits requiring modA to complete sit<>stand. Pt demonstrated poor standing tolerance this date making it unsafe to ambulate. Pt was most limited by pain and weakness this date. Pt would benefit from therapy following d/c to address deficits in balance, strength, and tolerance to mobility.  Therapy Prognosis: Good  Activity Tolerance  Activity Tolerance: Patient limited by fatigue;Patient limited by endurance;Patient limited by pain  Activity Tolerance Comments: Pain in B feet limiting standing tolerance     Plan   Physical Therapy Plan  General Plan:  (5-6x/wk)  Current Treatment Recommendations: Strengthening, Balance training, Gait training,

## 2024-06-21 NOTE — PROGRESS NOTES
doing well from pulmonary standpoint and can be discharged home    Thank you for having us involved in the care of your patient. Please call us if you have any questions or concerns.      Andrade Carrillo MD, M.D.            6/21/2024, 9:20 AM

## 2024-06-21 NOTE — PROGRESS NOTES
Physical Medicine & Rehabilitation  Progress Note    6/21/2024 2:16 PM     CC: Ambulatory and ADL dysfunction due to third-degree AV block with symptomatic bradycardia status post pacemaker placement status post COVID-19-out of isolation    ID-off COVID/isolation monitor off antibiotic    Cardiology status post 2 chamber pacemaker site healing well interrogation normal reviewed activity restrictions    Pulmonary need follow-up with patient for labs, recommend pulmonary rehab supplemental oxygen not needed-Home O2 evaluation    Internal medicine-leg pain, pulses are palpable, increased gabapentin,      Subjective:   Leg pain.  Patient notes testing ordered-nursing notes TEENA ordered, uric acid levels ordered, daughter present    ROS:  Denies fevers, chills, sweats.  No chest pain, palpitations, lightheadedness.  Denies coughing, wheezing or shortness of breath.  Denies abdominal pain, nausea, diarrhea or constipation.  No new areas of joint pain.  Denies new areas of numbness or weakness.  Denies new anxiety or depression issues.  No new skin problems.    Rehabilitation:   PT:    Bed mobility  Supine to Sit: Minimal assistance (R handed trunk support.)  Sit to Supine: Unable to assess  Scooting: Contact guard assistance  Bed Mobility Comments: HOB elevated. Increased time needed to complete.    Transfers  Sit to Stand: Moderate Assistance, Minimal Assistance  Stand to Sit: Minimal Assistance  Comment: Assessed with RW, Pt completed x 4 sit<>stands, pts first 2 trials required modA, last 2 trials required Denise. Increased time and effort to complete. Pt displays a posterior lean and unsteady when coming to a stand. Pt required verbal cues to achieve terminal knee extension and to move hips anteriorly with moderate return. Once COG over GALEN pt became stedy.    Ambulation  Surface: Level tile  Device: Rolling Walker  Assistance: Minimal assistance  Quality of Gait: decreased step length, unsteady no true LOB,  Gait  Deviations: Slow Erin, Decreased step length, Decreased step height  Distance: 15ft  Comments: Pt required verbal and tactile cues to maintain upright posture with good return.  More Ambulation?: No    Stairs  # Steps : 2  Stairs Height: 6\"  Rails: Bilateral (RW)  Device: Rolling walker  Assistance: Moderate assistance, Maximum assistance  Comment: Pt 1 step able to complete L leading, Trialed R LE leading unable to complete.           OT:       ADL  Grooming: Setup;Increased time to complete;Stand by assistance;Verbal cueing (Oral care standing at sink.)  UE Bathing: Setup;Increased time to complete;Verbal cueing (Wash face/hands standing at sink.)  Additional Comments: Pt supine in bed upon therapist arrival. Pt completed supine/sit transfer to seated EOB. Sit/stand transfer using RW for static standing. Functional mobility to chair using RW. Seated rest break needed d/t pain/faitgue. Chair positioned in doorway of bathroom. Sit/stand transfer from chair using RW for static standing. Functional mobility to sink using RW. Pt completed simple grooming standing at sink. Pt leaning heavily onto sink w/B knees in flexion, unable to correct. Seated rest break needed in bathroom d/t pain/fatigue before exiting bathroom to return to seated in chair. Pt stated bathing prior to therapist arrival. Pt retired to seated in chair at end of session with all needs met.  Bed mobility      ST:      Subjective: [x] Alert     [x] Cooperative     [] Confused     [] Agitated    [] Lethargic     Objective/Assessment:     Pt. Seen for diet tolerance monitoring. Pt observed with regular dry solid and thin liquid with no overt s/s of aspiration. Pt reports no difficulty or concerns swallowing.     Patient presents with probable safe swallow to continue Regular diet with thin liquids as evidenced by no overt s/s of aspiration noted with consistencies tested.  Recommend small sips and bites, only feed when alert and awake and upright at

## 2024-06-21 NOTE — CARE COORDINATION
Transitional planning. Spoke to Rhett PT, he will inform Cayla OT that OT note is needed for pre-cert    1230 Message from [facility] w/ [facility] left informing CM that pt has pre-cert, informed pt's RN.     1412 PS Dr. Shaver requesting DC/Readmit order and signed and held meds.     [facility]  Confirmed a 4pm WC transport time.     [facility]- DC/Readmit order and signed and held meds. Pt is good for admission today w/ WC transport from [facility] at 4pm. Report number [phone]    Provided transportation envelope and report number to pt's RN, Walker.     1601 [facility] called,  transport will transport pt today @ 6pm. Informed Enrique with [facility]  Informed pt's RN and pt.

## 2024-06-21 NOTE — PROGRESS NOTES
Writer attempted report to [facility] rehab   No answer, writer to attempt again

## 2024-06-22 LAB
ALBUMIN SERPL-MCNC: 2.8 G/DL (ref 3.5–5.2)
ALP SERPL-CCNC: 107 U/L (ref 40–129)
ALT SERPL-CCNC: 43 U/L (ref 5–41)
ANION GAP SERPL CALCULATED.3IONS-SCNC: 7 MMOL/L (ref 9–17)
AST SERPL-CCNC: 36 U/L
BASOPHILS # BLD: 0 K/UL (ref 0–0.2)
BASOPHILS NFR BLD: 0 % (ref 0–2)
BILIRUB DIRECT SERPL-MCNC: 0.3 MG/DL
BILIRUB INDIRECT SERPL-MCNC: 0.6 MG/DL (ref 0–1)
BILIRUB SERPL-MCNC: 0.9 MG/DL (ref 0.3–1.2)
BUN SERPL-MCNC: 16 MG/DL (ref 8–23)
CALCIUM SERPL-MCNC: 8.6 MG/DL (ref 8.6–10.4)
CHLORIDE SERPL-SCNC: 102 MMOL/L (ref 98–107)
CO2 SERPL-SCNC: 27 MMOL/L (ref 20–31)
CREAT SERPL-MCNC: 0.8 MG/DL (ref 0.7–1.2)
EOSINOPHIL # BLD: 0.2 K/UL (ref 0–0.4)
EOSINOPHILS RELATIVE PERCENT: 2 % (ref 0–4)
ERYTHROCYTE [DISTWIDTH] IN BLOOD BY AUTOMATED COUNT: 13.6 % (ref 11.5–14.9)
GFR, ESTIMATED: 88 ML/MIN/1.73M2
GLUCOSE BLD-MCNC: 102 MG/DL (ref 75–110)
GLUCOSE BLD-MCNC: 165 MG/DL (ref 75–110)
GLUCOSE BLD-MCNC: 175 MG/DL (ref 75–110)
GLUCOSE BLD-MCNC: 232 MG/DL (ref 75–110)
GLUCOSE SERPL-MCNC: 114 MG/DL (ref 70–99)
HCT VFR BLD AUTO: 30.5 % (ref 41–53)
HGB BLD-MCNC: 10.4 G/DL (ref 13.5–17.5)
LYMPHOCYTES NFR BLD: 1.5 K/UL (ref 1–4.8)
LYMPHOCYTES RELATIVE PERCENT: 12 % (ref 24–44)
MCH RBC QN AUTO: 32 PG (ref 26–34)
MCHC RBC AUTO-ENTMCNC: 34 G/DL (ref 31–37)
MCV RBC AUTO: 94.2 FL (ref 80–100)
MONOCYTES NFR BLD: 1.1 K/UL (ref 0.1–1.3)
MONOCYTES NFR BLD: 9 % (ref 1–7)
NEUTROPHILS NFR BLD: 77 % (ref 36–66)
NEUTS SEG NFR BLD: 9.2 K/UL (ref 1.3–9.1)
PLATELET # BLD AUTO: 182 K/UL (ref 150–450)
PMV BLD AUTO: 7.1 FL (ref 6–12)
POTASSIUM SERPL-SCNC: 4.1 MMOL/L (ref 3.7–5.3)
PROT SERPL-MCNC: 5.7 G/DL (ref 6.4–8.3)
RBC # BLD AUTO: 3.24 M/UL (ref 4.5–5.9)
SODIUM SERPL-SCNC: 136 MMOL/L (ref 135–144)
WBC OTHER # BLD: 12 K/UL (ref 3.5–11)

## 2024-06-22 PROCEDURE — 97530 THERAPEUTIC ACTIVITIES: CPT

## 2024-06-22 PROCEDURE — 99223 1ST HOSP IP/OBS HIGH 75: CPT | Performed by: INTERNAL MEDICINE

## 2024-06-22 PROCEDURE — 92610 EVALUATE SWALLOWING FUNCTION: CPT

## 2024-06-22 PROCEDURE — 97162 PT EVAL MOD COMPLEX 30 MIN: CPT

## 2024-06-22 PROCEDURE — 36415 COLL VENOUS BLD VENIPUNCTURE: CPT

## 2024-06-22 PROCEDURE — 6370000000 HC RX 637 (ALT 250 FOR IP): Performed by: INTERNAL MEDICINE

## 2024-06-22 PROCEDURE — 97116 GAIT TRAINING THERAPY: CPT

## 2024-06-22 PROCEDURE — 85025 COMPLETE CBC W/AUTO DIFF WBC: CPT

## 2024-06-22 PROCEDURE — 97110 THERAPEUTIC EXERCISES: CPT

## 2024-06-22 PROCEDURE — 92523 SPEECH SOUND LANG COMPREHEN: CPT

## 2024-06-22 PROCEDURE — 97166 OT EVAL MOD COMPLEX 45 MIN: CPT

## 2024-06-22 PROCEDURE — 1180000000 HC REHAB R&B

## 2024-06-22 PROCEDURE — 6360000002 HC RX W HCPCS: Performed by: INTERNAL MEDICINE

## 2024-06-22 PROCEDURE — 97535 SELF CARE MNGMENT TRAINING: CPT

## 2024-06-22 PROCEDURE — 82947 ASSAY GLUCOSE BLOOD QUANT: CPT

## 2024-06-22 PROCEDURE — 6370000000 HC RX 637 (ALT 250 FOR IP): Performed by: PHYSICAL MEDICINE & REHABILITATION

## 2024-06-22 PROCEDURE — 80076 HEPATIC FUNCTION PANEL: CPT

## 2024-06-22 PROCEDURE — 99222 1ST HOSP IP/OBS MODERATE 55: CPT | Performed by: STUDENT IN AN ORGANIZED HEALTH CARE EDUCATION/TRAINING PROGRAM

## 2024-06-22 PROCEDURE — 80048 BASIC METABOLIC PNL TOTAL CA: CPT

## 2024-06-22 RX ORDER — BENZONATATE 200 MG/1
200 CAPSULE ORAL 3 TIMES DAILY PRN
Status: DISCONTINUED | OUTPATIENT
Start: 2024-06-22 | End: 2024-07-04 | Stop reason: HOSPADM

## 2024-06-22 RX ORDER — FUROSEMIDE 40 MG/1
40 TABLET ORAL DAILY
Status: DISCONTINUED | OUTPATIENT
Start: 2024-06-23 | End: 2024-06-27

## 2024-06-22 RX ADMIN — METFORMIN HYDROCHLORIDE 1000 MG: 1000 TABLET ORAL at 07:25

## 2024-06-22 RX ADMIN — PREGABALIN 125 MG: 25 CAPSULE ORAL at 07:25

## 2024-06-22 RX ADMIN — DOXYCYCLINE 100 MG: 100 CAPSULE ORAL at 20:54

## 2024-06-22 RX ADMIN — ACETAMINOPHEN 650 MG: 325 TABLET ORAL at 07:22

## 2024-06-22 RX ADMIN — PANTOPRAZOLE SODIUM 40 MG: 40 TABLET, DELAYED RELEASE ORAL at 05:11

## 2024-06-22 RX ADMIN — AMLODIPINE BESYLATE 5 MG: 5 TABLET ORAL at 07:28

## 2024-06-22 RX ADMIN — ACETAMINOPHEN 650 MG: 325 TABLET ORAL at 20:53

## 2024-06-22 RX ADMIN — PREGABALIN 125 MG: 25 CAPSULE ORAL at 20:54

## 2024-06-22 RX ADMIN — ATORVASTATIN CALCIUM 40 MG: 40 TABLET, FILM COATED ORAL at 20:54

## 2024-06-22 RX ADMIN — DOXYCYCLINE 100 MG: 100 CAPSULE ORAL at 07:26

## 2024-06-22 RX ADMIN — POLYETHYLENE GLYCOL 3350 17 G: 17 POWDER, FOR SOLUTION ORAL at 07:24

## 2024-06-22 RX ADMIN — ENOXAPARIN SODIUM 40 MG: 100 INJECTION SUBCUTANEOUS at 07:24

## 2024-06-22 RX ADMIN — METFORMIN HYDROCHLORIDE 1000 MG: 1000 TABLET ORAL at 18:22

## 2024-06-22 RX ADMIN — FUROSEMIDE 20 MG: 20 TABLET ORAL at 07:25

## 2024-06-22 RX ADMIN — ASPIRIN 81 MG: 81 TABLET, COATED ORAL at 07:26

## 2024-06-22 RX ADMIN — INSULIN GLARGINE 10 UNITS: 100 INJECTION, SOLUTION SUBCUTANEOUS at 20:53

## 2024-06-22 RX ADMIN — TAMSULOSIN HYDROCHLORIDE 0.4 MG: 0.4 CAPSULE ORAL at 07:26

## 2024-06-22 ASSESSMENT — PAIN DESCRIPTION - ORIENTATION
ORIENTATION: RIGHT;LEFT
ORIENTATION: MID;POSTERIOR

## 2024-06-22 ASSESSMENT — 9 HOLE PEG TEST
TESTTIME_SECONDS: 43
TEST_RESULT: IMPAIRED
TEST_RESULT: IMPAIRED
TESTTIME_SECONDS: 31

## 2024-06-22 ASSESSMENT — PAIN SCALES - GENERAL
PAINLEVEL_OUTOF10: 2
PAINLEVEL_OUTOF10: 4
PAINLEVEL_OUTOF10: 5
PAINLEVEL_OUTOF10: 5

## 2024-06-22 ASSESSMENT — PAIN DESCRIPTION - LOCATION
LOCATION: NECK
LOCATION: FOOT

## 2024-06-22 ASSESSMENT — PAIN DESCRIPTION - PAIN TYPE: TYPE: ACUTE PAIN

## 2024-06-22 ASSESSMENT — PAIN DESCRIPTION - DESCRIPTORS
DESCRIPTORS: ACHING
DESCRIPTORS: ACHING

## 2024-06-22 ASSESSMENT — PAIN - FUNCTIONAL ASSESSMENT: PAIN_FUNCTIONAL_ASSESSMENT: ACTIVITIES ARE NOT PREVENTED

## 2024-06-22 ASSESSMENT — PAIN DESCRIPTION - FREQUENCY: FREQUENCY: INTERMITTENT

## 2024-06-22 ASSESSMENT — PAIN DESCRIPTION - ONSET: ONSET: GRADUAL

## 2024-06-22 NOTE — PROGRESS NOTES
Completing Skin Assessment Wellington RN   Nurse 2 Completing Skin Assessment Holly WALKER     Active pressure injuries or complex wounds identified? Yes  If yes: contact provider for wound care consult order [x]       Admission Weight   Patient's Weight Upon Admission  205.2         Bowel and Bladder Functional Assessment   Prior history of bladder problems:  no problems with bladder   Number of pads used per day: Unknown    Frequency of night time voiding:  once   Fluid intake volume and pattern: Unknown    Last Bowel Movement 6/19   Prior history of bowel problems:  Yes If Yes, Check All That Apply  []Incontinence   []Frequent Diarrhea  [x]Constipation   []Hemorrhoids  [x]Diverticulitis  [x]Bowel Surgery     Pain Assessment   Over the past 5 days, how much of the time has pain made it hard for you to sleep at night?   Occasionally   Over the past 5 days, how often have you limited your participation in rehabilitation therapy sessions due to pain?   Frequently   Over the past 5 days, how often have you limited your day-to-day activities (excluding rehabilitation therapy session)?   Frequently     Special Treatments, Procedures, and Programs   Check all of the following treatments, procedures, and programs that apply on admission.    Cancer Treatments   Chemotherapy No   If Yes, Check All That Apply   []IV Chemotherapy   []Oral Chemotherapy    []Chemotherapy    Radiation No   Respiratory Therapies   Oxygen Therapy No  If Yes, Check All That Apply   []Continuous   []Intermittent   []High-Concentration    Suctioning No  If Yes, Check All That Apply   []Scheduled   []As Needed   Tracheostomy Care No   Invasive Mechanical Ventilator   (Ventilator or Respirator) No  If Yes, Check All That Apply   []Non-invasive Mechanical Ventilator   []BiPap   []CPAP   Other   IV Medications No  If Yes, Check All That Apply   []IV Vasoactive Medications   []IV Antibiotics   []IV Anticoagulation   []Other IV Medications   Transfusions No    Dialysis No  If Yes, Check All That Apply   []Hemodialysis   []Peritoneal Dialysis   IV Access Yes  If Yes, Check All That Apply   []Peripheral   []Midline   [x](PICC, tunneled, port)       Admission folder with the following documents provided to patient/responsible party:   1. \"[facility] Individualized Disclosure Statement\"  2. \"Data Collection Information Summary for Patients in Inpatient Rehabilitation Facilities\"  3. \"Privacy Act Statement - Health Care Records\"    Care plan was created with patient/responsible party input and goals were agreed upon.      Please refer to the admission navigator for further information.

## 2024-06-22 NOTE — PLAN OF CARE
Problem: Discharge Planning  Goal: Discharge to home or other facility with appropriate resources  Outcome: Progressing     Problem: Safety - Adult  Goal: Free from fall injury  Outcome: Progressing     Problem: Pain  Goal: Verbalizes/displays adequate comfort level or baseline comfort level  Outcome: Progressing     Problem: ABCDS Injury Assessment  Goal: Absence of physical injury  Outcome: Progressing     Problem: Skin/Tissue Integrity  Goal: Absence of new skin breakdown  Description: 1.  Monitor for areas of redness and/or skin breakdown  2.  Assess vascular access sites hourly  3.  Every 4-6 hours minimum:  Change oxygen saturation probe site  4.  Every 4-6 hours:  If on nasal continuous positive airway pressure, respiratory therapy assess nares and determine need for appliance change or resting period.  Outcome: Progressing     Problem: Risk for Elopement  Goal: Patient will not exit the unit/facility without proper excort  Outcome: Progressing

## 2024-06-22 NOTE — H&P
Physical Medicine & Rehabilitation History and Physical  [facility] Acute Rehabilitation Unit     Primary care provider:  Jeanne Simpson MD     Chief Complaint and Reason for Rehabilitation Admission:   ADL and mobility deficits secondary to COVID-19, third-degree AV block s/p dual chamber pacemaker placement    History of Present Illness:  Anuj Jovel is a 83 y.o. right-handed male with history of CAD s/p stent, HFpEF, HTN, HLD, type 2 diabetes, KAREY, BPH admitted to [facility] on 6/21/2024.  He was originally admitted to [place] on 6/8/24.    He initially presented to [place] with lower limb weakness and dyspnea.  He had been found on the bathroom floor by family after sliding down the wall.  Denied hitting his head and loss of consciousness.  He was noted to have bradycardia and hypoxia in the ED.  CT head showed no acute intracranial abnormality.  CT PE showed no evidence of PE.  He did test positive for COVID-19.  He was given decadron, remdesivir and baricitinib.  Telemetry showed bradycardia into the 30s, and he was placed on dopamine.  He was transferred to [place] on 6/8/24 for possible pacemaker placement.  After he completed isolation for COVID-19, he underwent dual chamber pacemaker placement on 6/17/24 for third-degree AV block.  Course was complicated by delirium.     He is currently requiring assistance for self-care activities and mobility prompting this admission.  He reports some chest pain at the site of pacemaker placement.  He also notes bilateral foot/lower limb pain related to edema.  He denies any shortness of breath and numbness/tingling.    Premorbid function:  Needing assistance with IADLs    Current Function:    Physical Therapy    Restrictions/Precautions: Fall Risk, Other (comment) (s/p pacemaker placement 6/17)  Implants present? : Pacemaker, Metal implants (pacemaker placed 6/17, bilat TKA)  Other position/activity restrictions:

## 2024-06-22 NOTE — PROGRESS NOTES
sternoclavicular joint  extending to the left chest wall pacemaker battery pack as well as cranially  along the sternocleidomastoid muscle.  Correlated with recent surgery  otherwise, infectious process should be considered.     No hemodynamically significant stenosis, occlusion, aneurysm or AVM of the  major arteries of the head and neck.    Primary Complaint:   Anuj Jovel is a 83 y.o. who initially presented to [facility] with weakness, shortness of breath and was found to have COVID-19 infection/pneumonia.  Patient was noted to have bradycardia at [facility] with Mobitz type I block as well as 2-1 AV block.  Patient was subsequently transferred to [place] for further management of bradycardia.  Patient arrived to [facility] overnight.  He was on dopamine drip.  Telemetry with evidence of Mobitz type I.  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  Patient was admitted to cardiac ICU.  He was being followed by cardiology and is on dopamine drip.  He is being followed by infectious diseases for COVID-19 pneumonia.  Due to limited availability of bed on cardiac ICU, patient was transferred to medical ICU.  Currently patient is awake and alert.  Currently is on dopamine drip with heart rate in 80s.  He is in normal sinus rhythm     He has past medical history of CAD with previous stenting to LAD per documentation, HFpEF.    Pain:  Pain Assessment  Pain Assessment: 0-10  Pain Level: 4  Garza-Baker Pain Rating: Hurts a little bit  Patient's Stated Pain Goal: 0 - No pain  Pain Location: Foot  Pain Orientation: Right, Left  Pain Descriptors: Aching  Pain Type: Acute pain  Pain Frequency: Intermittent  Pain Onset: Gradual  Non-Pharmaceutical Pain Intervention(s): Repositioned  Response to Pain Intervention: Patient satisfied  Side Effects: No reported side effects    Vision/ Hearing  Vision  Vision: Impaired  Vision Exceptions: Wears glasses for reading  Hearing  Hearing: Exceptions to  Comprehension  Comprehension: Within Functional Limits         Expression  Primary Mode of Expression: Verbal    Verbal Expression  Verbal Expression: Exceptions to functional limits  Repetition: Mild  Convergent: Mild  Divergent: Mild (10/15 concrete, 6/10 abstract)  Conversation: Mild  Interfering Components: Impaired thought organization;Attention                   Cognition:      Orientation  Overall Orientation Status: Impaired  Orientation Level: Oriented to person;Oriented to place;Disoriented to time;Oriented to situation  Attention  Attention: Exceptions to [facility]  Selective Attention: Mild  Sustained Attention: Mild  Memory  Memory: Exceptions to [facility]  Short-term Memory: Moderate (delayed 3/6, paragraph 3/5)  Problem Solving  Problem Solving: Exceptions to [facility]  Simple Functional Tasks: [facility]  Verbal Reasoning Skills: Mild (4/6)  Sequencing: Moderate (2/4)  Abstract Reasoning  Abstract Reasoning: Exceptions to WFL  Convergent Thinking: Mild  Divergent Thinking: Mild      Prognosis:  Speech Therapy Prognosis  Prognosis: Good  Prognosis Considerations: Participation Level;Severity of Impairments  Individuals consulted  Consulted and agree with results and recommendations: Patient    Education:  Patient Education: results and recommendations  Patient Education Response: Verbalizes understanding          Therapy Time:   Individual Concurrent Group Co-treatment   Time In 1312         Time Out 1334         Minutes 22                 Electronically signed by Darell Nye M.S., CCC-SLP on 6/22/2024 at 3:16 PM

## 2024-06-22 NOTE — PLAN OF CARE
Problem: Safety - Adult  Goal: Free from fall injury  6/22/2024 1035 by Yoanna Mcclure RN  Outcome: Progressing  6/22/2024 0328 by Wellington Fischer RN  Outcome: Progressing     Problem: Discharge Planning  Goal: Discharge to home or other facility with appropriate resources  6/22/2024 1035 by Yoanna Mcclure RN  Outcome: Progressing  6/22/2024 0328 by Wellington Fischer RN  Outcome: Progressing     Problem: Pain  Goal: Verbalizes/displays adequate comfort level or baseline comfort level  6/22/2024 1035 by Yoanna Mcclure RN  Outcome: Progressing  6/22/2024 0328 by Wellington Fischer RN  Outcome: Progressing     Problem: ABCDS Injury Assessment  Goal: Absence of physical injury  6/22/2024 1035 by Yoanna Mcclure RN  Outcome: Progressing  6/22/2024 0328 by Wellington Fischer RN  Outcome: Progressing     Problem: Skin/Tissue Integrity  Goal: Absence of new skin breakdown  Description: 1.  Monitor for areas of redness and/or skin breakdown  2.  Assess vascular access sites hourly  3.  Every 4-6 hours minimum:  Change oxygen saturation probe site  4.  Every 4-6 hours:  If on nasal continuous positive airway pressure, respiratory therapy assess nares and determine need for appliance change or resting period.  6/22/2024 1035 by Yoanna Mcclure RN  Outcome: Progressing  6/22/2024 0328 by Wellington Fischer RN  Outcome: Progressing

## 2024-06-22 NOTE — PROGRESS NOTES
Regular;Thin - cup    Oral/Pharyngeal Phase Dysfunction  Oral Phase  Oral Phase: WFL    Pharyngeal Phase   Pharyngeal Phase: Exceptions    PO Trials     Vocal Quality No Impairment   Consistency Presented Regular; Thin   How Presented Self-fed/presented   Bolus Acceptance No impairment   Bolus Formation/Control No impairment   Propulsion No impairment   Oral Residue None   Initiation of Swallow No impairment   Laryngeal Elevation Decreased   Aspiration Signs/Symptoms Delayed cough/throat clear   Pharyngeal Phase Characteristics Suspected pharyngeal residue       Prognosis  Prognosis: Good  Prognosis Considerations: Participation Level;Severity of Impairments  Consulted and agree with results and recommendations: Patient    Education  Patient Education: results and recommendations  Patient Education Response: Verbalizes understanding             Therapy Time  SLP Individual Minutes  Time In: 1303  Time Out: 1312  Minutes: 9          Darell Nye M.S., CCC-SLP   6/22/2024 3:02 PM

## 2024-06-22 NOTE — CONSULTS
IN-PATIENT SERVICE  [facility] Internal Medicine  [facility] Internal Medicine  Jung Sung MD; Marck Trinidad MD; Valdo Emery MD; MD Lynda Cisneros MD; Sammy Gee MD; Janina Calvin MD        CONSULTATION / HISTORY AND PHYSICAL EXAMINATION            Date:   6/22/2024  Patient name:  Anuj Jovel  Date of admission:  6/21/2024  7:14 PM  MRN:   [id number]  Account:  [id number]  YOB: 1940  PCP:    Jeanne Simpson MD  Room:   [address] [place] [address]  Code Status:    Full Code    Physician Requesting Consult: Mara Bernstein MD    Reason for Consult:  Medical management    Chief Complaint:     No chief complaint on file.  Debility    History Obtained From:     Patient, EMR, nursing staff    History of Present Illness:     83-year-old male history of CAD, BPH, HTN, T2DM, HLD, BPH initially admitted to [facility] on 6/8/2024 when presented for shortness of breath diagnosed with COVID-19 pneumonia noted to be bradycardic with a high-grade AV block and transferred to [place] for EP evaluation, transiently on dopamine infusion pacemaker was placed on 6/17/2024 while at [place].  Patient also completed treatment for COVID-19 pneumonia-baricitinib and remdesivir completed .  Was also treated for acute gout      Past Medical History:     Past Medical History:   Diagnosis Date    Diabetes (HCC)     Diverticulitis     History of allergy     Hyperlipidemia     Hypertension     Osteoarthritis     Pneumonia due to COVID-19 virus 12/29/2020        Past Surgical History:     Past Surgical History:   Procedure Laterality Date    BACK SURGERY  09/03/2017     [facility]  Dr Cueva, lumbar fusion    CARDIAC CATHETERIZATION      COLECTOMY  2015    partial colectomy due to diveritc    COLONOSCOPY      TOTAL KNEE ARTHROPLASTY Right         Medications Prior to Admission:     Prior to Admission medications    Medication Sig Start Date End Date Taking?  transcription, some errors in transcription may have occurred.

## 2024-06-22 NOTE — PROGRESS NOTES
Physical Therapy  Facility/Department: [facility] ACUTE REHAB    NAME: Anuj Jovel  : 1940 (83 y.o.)  MRN: [id number]  CODE STATUS: Full Code    Date of Service: 24      Past Medical History:   Diagnosis Date    Diabetes (HCC)     Diverticulitis     History of allergy     Hyperlipidemia     Hypertension     Osteoarthritis     Pneumonia due to COVID-19 virus 2020     Past Surgical History:   Procedure Laterality Date    BACK SURGERY  2017    [facility], lumbar fusion    CARDIAC CATHETERIZATION      COLECTOMY      partial colectomy due to diveritc    COLONOSCOPY      TOTAL KNEE ARTHROPLASTY Right        Chart Reviewed: No  Patient assessed for rehabilitation services?: Yes  Additional Pertinent Hx: Mr. Anuj Jovel is a 83 y.o.  male who was admitted to [facility] on 2024 with lower extremity weakness, dyspnea, estrogen and heart block has history of obstructive apnea, hypertension, hyperlipidemia, type 2 diabetes, BPH, coronary artery disease with stent in , sick sinus syndrome with Mobitz type I block heart failure with preserved ejection fraction.  He was noted to be hypoxic and.  CT chest negative for PE chest x-ray unremarkable CT head unremarkable he was positive COVID-19 CT chest showed interstitial groundglass opacities.  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  Monitor heart rate     ID COVID-positive  has received vaccinations altered mental status resolved treated with with remdesivir, Decadron Baricitinibn, out of window for Paxlovid monitor CRP,

## 2024-06-22 NOTE — PROGRESS NOTES
[facility]   Acute Rehabilitation Occupational Therapy Evaluation     Date: 24  Patient Name: Anuj Jovel       Room: 2632/2632-01  MRN: [id number]  Account: [id number]   : 1940  (83 y.o.) Gender: male       Referring Practitioner: Issac Fountain MD    Diagnosis: Debility secondary to symptomatic bradycardia, COVID-19 with encephalopathy    Additional Pertinent Hx: Per PMR consult: \"Mr. Anuj Jovel is a 83 y.o.  male who was admitted to [facility] on 2024 with No chief complaint on file.     83-year-old male admitted with lower extremity weakness, dyspnea, estrogen and heart block has history of obstructive apnea, hypertension, hyperlipidemia, type 2 diabetes, BPH, coronary artery disease with stent in , sick sinus syndrome with Mobitz type I block heart failure with preserved ejection fraction.  He was noted to be hypoxic and.  CT chest negative for PE chest x-ray unremarkable CT head unremarkable he was positive COVID-19 CT chest showed interstitial groundglass opacities.  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  Monitor heart rate     ID COVID-positive  has received vaccinations altered mental status resolved treated with with remdesivir, Decadron Baricitinibn, out of window for Paxlovid monitor CRP, monitor off antibiotics off COVID isolation      Internal medicine acute hypoxic respite failure likely sec to pneumonia due to COVID-19 as above delirium resolved leg pain resume home gabapentin     Neuro  transient episode of confusion inpatient  pacemaker precautions, compensatory techniques for UB/LB ADLs, safety during transfer training and LUE restrictions during transfer training, safety with RW during functional mobility, EC/WS techniques with implementation of rest breaks throughout tasks.   Education Method Demonstration;Verbal   Barriers to Learning Cognition   Education Outcome Verbalized understanding;Demonstrated understanding;Continued education needed       OT Equipment Recommendations  Other: TBD    Safety Devices  Type of Devices: Call light within reach, Chair alarm in place, Gait belt, Nurse notified, Left in chair  Restraints  Restraints Initially in Place: No    Goals     Short Term Goals  Time Frame for Short Term Goals: By 7-10 days,  Short Term Goal 1: Pt will complete UBD tasks with SBA with use of compensatory techniques in order to increase IND with BADLs.  Short Term Goal 2: Pt will complete LBD tasks with use of compensatory techniques/AE with MIN A in order to return to increase IND with BADLs.  Short Term Goal 3: Pt will complete toileting tasks with MIN A with use of AE/compensatory techniques and fair safety to increase IND with BADLs.  Short Term Goal 4: Pt will complete functional transfers with MIN A with use of LRAD and fair safety in order to decrease fall risk while maintaining good adherence to pacemaker precautions.  Short Term Goal 5: Pt will tolerate standing 8+ minutes while engaging in ADLs/functional tasks of choice with intermittent unilateral support in order to increase safety and IND with ADLs.  Additional Goals?: Yes  Short Term Goal 6: Pt will complete functional mobility with CGA with use of LRAD with fair safety in order to decrease fall risk.  Short Term Goal 7: Pt will participate in 30+ minutes of therapeutic exercises/functional activities/social participatino to increase safety and IND with daily activities and improve overall quality of life.    Long Term Goals  Time Frame for Long Term Goals : By

## 2024-06-22 NOTE — CONSULTS
Comprehensive Nutrition Assessment    Type and Reason for Visit:  Initial, Consult (Evaluate and Treat)    Nutrition Recommendations/Plan:   Continue current diet.   Continue oral nutrition supplements.      Malnutrition Assessment:  Malnutrition Status:  Insufficient data (06/22/24 1752)    Context:  Chronic Illness     Findings of the 6 clinical characteristics of malnutrition:  Energy Intake:   Unable to assess time frame for decrease.  Weight Loss:      4.6% loss x 6 months; 16.1% loss x 1 year  Body Fat Loss:  Unable to assess     Muscle Mass Loss:  Unable to assess    Fluid Accumulation:  Mild (to moderate. May not be related to nutritional status) Extremities   Strength:   Not measured    Nutrition Assessment:    Pt admitted to rehab due to ADL and mobility deficits secondary to encephalopathy. Pt resting at time of attempted visit. Nurse states pt seems to be eating well with 100% of breakfast consumed today. PO intake initially had been poor during acute hospitalization, but appears to have increased by the time of discharge.    Nutrition Related Findings:    Edema: Trace generalized; +2 pitting RLE, LLE. POC Glucose: 102-175 (6/22) Lantus, Glucophage, Lispro corrective algorithm. Other labs and meds reviewed. Wound Type: Surgical Incision       Current Nutrition Intake & Therapies:    Average Meal Intake: 51-75%, %  Average Supplements Intake: %  ADULT DIET; Regular; 4 carb choices (60 gm/meal)  ADULT ORAL NUTRITION SUPPLEMENT; Breakfast, Lunch, Dinner; Diabetic Oral Supplement    Anthropometric Measures:  Height: 170.2 cm (5' 7\")  Ideal Body Weight (IBW): 148 lbs (67 kg)    Admission Body Weight: 93.1 kg (205 lb 4 oz)  Current Body Weight: 93.1 kg (205 lb 4 oz), 138.7 % IBW. Weight Source: Bed Scale  Current BMI (kg/m2): 32.1  Usual Body Weight: 97.6 kg (215 lb 2.7 oz) (111kg (6/27/23 - 16.1% loss); 97.6kg (2/7/24 - 4.6% loss))  % Weight Change (Calculated): -4.6                    BMI

## 2024-06-22 NOTE — PROGRESS NOTES
RT checked in with pt regarding Home CPAP order. Pt states he does not use a machine at home and would not like to use a hospital unit at this time.

## 2024-06-22 NOTE — PROGRESS NOTES
Physical Therapy  Facility/Department: [facility] ACUTE REHAB  Rehabilitation Physical Therapy Initial Assessment    NAME: Anuj Jovel  : 1940 (83 y.o.)  MRN: [id number]  CODE STATUS: Full Code    Date of Service: 24      Past Medical History:   Diagnosis Date    Diabetes (HCC)     Diverticulitis     History of allergy     Hyperlipidemia     Hypertension     Osteoarthritis     Pneumonia due to COVID-19 virus 2020     Past Surgical History:   Procedure Laterality Date    [facility]    CARDIAC CATHETERIZATION      COLECTOMY      partial colectomy due to diveritc    COLONOSCOPY      TOTAL KNEE ARTHROPLASTY Right        Chart Reviewed: No  Patient assessed for rehabilitation services?: Yes  Additional Pertinent Hx: Mr. Anuj Jovel is a 83 y.o.  male who was admitted to [facility] on 2024 with lower extremity weakness, dyspnea, estrogen and heart block has history of obstructive apnea, hypertension, hyperlipidemia, type 2 diabetes, BPH, coronary artery disease with stent in , sick sinus syndrome with Mobitz type I block heart failure with preserved ejection fraction.  He was noted to be hypoxic and.  CT chest negative for PE chest x-ray unremarkable CT head unremarkable he was positive COVID-19 CT chest showed interstitial groundglass opacities.  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  Monitor heart rate     ID COVID-positive  has received vaccinations altered mental status resolved treated with with remdesivir, Decadron

## 2024-06-23 PROBLEM — R53.81 DEBILITY: Status: ACTIVE | Noted: 2024-06-23

## 2024-06-23 LAB
ERYTHROCYTE [DISTWIDTH] IN BLOOD BY AUTOMATED COUNT: 13.8 % (ref 11.5–14.9)
GLUCOSE BLD-MCNC: 105 MG/DL (ref 75–110)
GLUCOSE BLD-MCNC: 133 MG/DL (ref 75–110)
GLUCOSE BLD-MCNC: 213 MG/DL (ref 75–110)
GLUCOSE BLD-MCNC: 236 MG/DL (ref 75–110)
HCT VFR BLD AUTO: 30.8 % (ref 41–53)
HGB BLD-MCNC: 10.4 G/DL (ref 13.5–17.5)
MCH RBC QN AUTO: 31.5 PG (ref 26–34)
MCHC RBC AUTO-ENTMCNC: 33.8 G/DL (ref 31–37)
MCV RBC AUTO: 93.3 FL (ref 80–100)
PLATELET # BLD AUTO: 188 K/UL (ref 150–450)
PMV BLD AUTO: 6.7 FL (ref 6–12)
RBC # BLD AUTO: 3.3 M/UL (ref 4.5–5.9)
WBC OTHER # BLD: 11.5 K/UL (ref 3.5–11)

## 2024-06-23 PROCEDURE — 97530 THERAPEUTIC ACTIVITIES: CPT

## 2024-06-23 PROCEDURE — 36415 COLL VENOUS BLD VENIPUNCTURE: CPT

## 2024-06-23 PROCEDURE — 6370000000 HC RX 637 (ALT 250 FOR IP): Performed by: INTERNAL MEDICINE

## 2024-06-23 PROCEDURE — 97535 SELF CARE MNGMENT TRAINING: CPT

## 2024-06-23 PROCEDURE — 82947 ASSAY GLUCOSE BLOOD QUANT: CPT

## 2024-06-23 PROCEDURE — 6360000002 HC RX W HCPCS: Performed by: INTERNAL MEDICINE

## 2024-06-23 PROCEDURE — 99231 SBSQ HOSP IP/OBS SF/LOW 25: CPT | Performed by: INTERNAL MEDICINE

## 2024-06-23 PROCEDURE — 85027 COMPLETE CBC AUTOMATED: CPT

## 2024-06-23 PROCEDURE — 97110 THERAPEUTIC EXERCISES: CPT

## 2024-06-23 PROCEDURE — 1180000000 HC REHAB R&B

## 2024-06-23 PROCEDURE — 6370000000 HC RX 637 (ALT 250 FOR IP): Performed by: PHYSICAL MEDICINE & REHABILITATION

## 2024-06-23 PROCEDURE — 99232 SBSQ HOSP IP/OBS MODERATE 35: CPT | Performed by: STUDENT IN AN ORGANIZED HEALTH CARE EDUCATION/TRAINING PROGRAM

## 2024-06-23 RX ADMIN — POLYETHYLENE GLYCOL 3350 17 G: 17 POWDER, FOR SOLUTION ORAL at 08:24

## 2024-06-23 RX ADMIN — AMLODIPINE BESYLATE 5 MG: 5 TABLET ORAL at 08:25

## 2024-06-23 RX ADMIN — INSULIN GLARGINE 10 UNITS: 100 INJECTION, SOLUTION SUBCUTANEOUS at 20:38

## 2024-06-23 RX ADMIN — PREGABALIN 125 MG: 25 CAPSULE ORAL at 20:38

## 2024-06-23 RX ADMIN — FUROSEMIDE 40 MG: 40 TABLET ORAL at 08:25

## 2024-06-23 RX ADMIN — TAMSULOSIN HYDROCHLORIDE 0.4 MG: 0.4 CAPSULE ORAL at 08:25

## 2024-06-23 RX ADMIN — METFORMIN HYDROCHLORIDE 1000 MG: 1000 TABLET ORAL at 08:25

## 2024-06-23 RX ADMIN — ATORVASTATIN CALCIUM 40 MG: 40 TABLET, FILM COATED ORAL at 20:38

## 2024-06-23 RX ADMIN — METFORMIN HYDROCHLORIDE 1000 MG: 1000 TABLET ORAL at 17:52

## 2024-06-23 RX ADMIN — DOXYCYCLINE 100 MG: 100 CAPSULE ORAL at 20:38

## 2024-06-23 RX ADMIN — DOXYCYCLINE 100 MG: 100 CAPSULE ORAL at 08:25

## 2024-06-23 RX ADMIN — PREGABALIN 125 MG: 25 CAPSULE ORAL at 08:25

## 2024-06-23 RX ADMIN — ASPIRIN 81 MG: 81 TABLET, COATED ORAL at 08:25

## 2024-06-23 RX ADMIN — PANTOPRAZOLE SODIUM 40 MG: 40 TABLET, DELAYED RELEASE ORAL at 05:09

## 2024-06-23 RX ADMIN — ENOXAPARIN SODIUM 40 MG: 100 INJECTION SUBCUTANEOUS at 08:26

## 2024-06-23 ASSESSMENT — PAIN DESCRIPTION - LOCATION: LOCATION: FOOT

## 2024-06-23 ASSESSMENT — PAIN DESCRIPTION - ORIENTATION: ORIENTATION: RIGHT;LEFT

## 2024-06-23 ASSESSMENT — PAIN DESCRIPTION - PAIN TYPE: TYPE: ACUTE PAIN

## 2024-06-23 ASSESSMENT — PAIN SCALES - GENERAL: PAINLEVEL_OUTOF10: 8

## 2024-06-23 NOTE — PROGRESS NOTES
Physical Medicine & Rehabilitation  Progress Note      Subjective:      Anuj Jovel is a 83 y.o. male with encephalopathy and debility secondary to COVID-19, third-degree AV block s/p dual chamber pacemaker placement.    He had a nosebleed this morning, which resolved by the time of evaluation.  He reports using afrin regularly (about daily) at home due to congestion.  Discussed trialing saline nasal spray as needed to see if this helps with epistaxis.  He notes ongoing bilateral lower limb edema.  He denies any other acute concerns.    ROS:  Denies fevers, chills, sweats.  No chest pain, palpitations, lightheadedness.  Denies coughing, wheezing or shortness of breath.  Denies abdominal pain, nausea, diarrhea or constipation.  No new areas of joint pain.  Denies new areas of numbness or weakness.  Denies new anxiety or depression issues.  No new skin problems.    Rehabilitation:     Physical Therapy    Restrictions/Precautions: Fall Risk, Other (comment) (s/p pacemaker placement 6/17)  Implants present? : Pacemaker, Metal implants (pacemaker placed 6/17, bilat TKA)  Other position/activity restrictions: activity as tolerated, Recent pacemaker insertion on 6/17/24-Avoid pushing/lifting with lydia VILLELA lifting L UE over shoulder height.    Bed mobility  Supine to Sit: Maximum assistance  Sit to Supine: Moderate assistance (Assist with trunk and bilat. LE's)  Scooting: Moderate assistance  Bed Mobility Comments: Patient up in WC and Recliner in AM.  Returned to bed in PM.    Transfers  Sit to Stand: Maximum Assistance  Stand to Sit: Maximum Assistance  Bed to Chair: Maximum assistance  Stand Pivot Transfers: Maximum Assistance  Squat Pivot Transfers: Maximum Assistance  Comment: Assessed with RW, Pt completed x 3 sit<>stands.  Pt presenting with decreased balance d/t posterior lean .Pt required verbal cues for posture/not to push/pull wiht L UE, with moderate to poor return. Pt demonstrating poor standing tolerance  BID  tamsulosin (FLOMAX) capsule 0.4 mg, 0.4 mg, Oral, Daily    Objective:  /74   Pulse 81   Temp 98 °F (36.7 °C) (Oral)   Resp 16   Ht 1.702 m (5' 7\")   Wt 97.8 kg (215 lb 9.6 oz)   SpO2 93%   BMI 33.77 kg/m²       GEN: Well developed, well nourished, no acute distress  HEENT: Normocephalic, atraumatic.  EOM grossly intact.  Hearing grossly intact.  No epistaxis at the time of evaluation.  Mucous membranes pink and moist.  RESP: Normal breath sounds with no wheezing, rales, or rhonchi. Respirations WNL and unlabored.  CV: Regular rate and rhythm. No murmurs, rubs, or gallops.  ABD: Soft, non-distended, bowel sounds present and equal.  NEURO: Alert.  Speech fluent.  Sensation to light touch intact.  MSK: Muscle bulk is normal bilaterally. Strength 4+/5 with bilateral elbow flexion, elbow extension, and .  Strength 4+/5 in the bilateral lower limbs.  LIMBS: Edema present in bilateral lower limbs - stable.  SKIN: Warm and dry with good turgor.  PSYCH: Mood WNL. Affect WNL. Appropriately interactive.    Diagnostics:     CBC:   Recent Labs     06/22/24  0645 06/23/24  0641   WBC 12.0* 11.5*   RBC 3.24* 3.30*   HGB 10.4* 10.4*   HCT 30.5* 30.8*   MCV 94.2 93.3   RDW 13.6 13.8    188     BMP:   Recent Labs     06/21/24  1424 06/22/24  0645    136   K 4.6 4.1   CL 99 102   CO2 23 27   BUN 21 16   CREATININE 1.1 0.8   GLUCOSE 218* 114*     BNP: No results for input(s): \"BNP\" in the last 72 hours.  PT/INR: No results for input(s): \"PROTIME\", \"INR\" in the last 72 hours.  APTT: No results for input(s): \"APTT\" in the last 72 hours.  CARDIAC ENZYMES: No results for input(s): \"CKMB\", \"CKMBINDEX\", \"TROPONINT\" in the last 72 hours.    Invalid input(s): \"CKTOTAL;3\"  FASTING LIPID PANEL:  Lab Results   Component Value Date    CHOL 182 07/28/2022    HDL 38 (L) 07/28/2022    TRIG 123 07/28/2022     LIVER PROFILE:   Recent Labs     06/22/24  0645   AST 36   ALT 43*   BILIDIR 0.3*   BILITOT 0.9   ALKPHOS 107

## 2024-06-23 NOTE — PLAN OF CARE
Problem: Discharge Planning  Goal: Discharge to home or other facility with appropriate resources  Outcome: Progressing     Problem: Safety - Adult  Goal: Free from fall injury  Outcome: Progressing     Problem: Pain  Goal: Verbalizes/displays adequate comfort level or baseline comfort level  Outcome: Progressing     Problem: ABCDS Injury Assessment  Goal: Absence of physical injury  Outcome: Progressing     Problem: Skin/Tissue Integrity  Goal: Absence of new skin breakdown  Description: 1.  Monitor for areas of redness and/or skin breakdown  2.  Assess vascular access sites hourly  3.  Every 4-6 hours minimum:  Change oxygen saturation probe site  4.  Every 4-6 hours:  If on nasal continuous positive airway pressure, respiratory therapy assess nares and determine need for appliance change or resting period.  Outcome: Progressing     Problem: Risk for Elopement  Goal: Patient will not exit the unit/facility without proper excort  Outcome: Progressing     Problem: Nutrition Deficit:  Goal: Optimize nutritional status  Outcome: Progressing  Flowsheets (Taken 6/22/2024 8395 by Meredith Grayson, RD, LD)  Nutrient intake appropriate for improving, restoring, or maintaining nutritional needs: Monitor oral intake, labs, and treatment plans

## 2024-06-23 NOTE — PROGRESS NOTES
Physical Therapy  Facility/Department: [facility] ACUTE REHAB      NAME: Anuj Jovel  : 1940 (83 y.o.)  MRN: [id number]  CODE STATUS: Full Code    Date of Service: 24      Past Medical History:   Diagnosis Date    Diabetes (HCC)     Diverticulitis     History of allergy     Hyperlipidemia     Hypertension     Osteoarthritis     Pneumonia due to COVID-19 virus 2020     Past Surgical History:   Procedure Laterality Date    BACK SURGERY  2017    [facility], lumbar fusion    CARDIAC CATHETERIZATION      COLECTOMY      partial colectomy due to diveritc    COLONOSCOPY      TOTAL KNEE ARTHROPLASTY Right        Chart Reviewed: No  Patient assessed for rehabilitation services?: Yes  Additional Pertinent Hx: Mr. Anuj Jovel is a 83 y.o.  male who was admitted to [facility] on 2024 with lower extremity weakness, dyspnea, estrogen and heart block has history of obstructive apnea, hypertension, hyperlipidemia, type 2 diabetes, BPH, coronary artery disease with stent in , sick sinus syndrome with Mobitz type I block heart failure with preserved ejection fraction.  He was noted to be hypoxic and.  CT chest negative for PE chest x-ray unremarkable CT head unremarkable he was positive COVID-19 CT chest showed interstitial groundglass opacities.  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  Monitor heart rate     ID COVID-positive  has received vaccinations altered mental status resolved treated with with remdesivir, Decadron Baricitinibn, out of window for Paxlovid monitor CRP,  02-Oct-2019

## 2024-06-23 NOTE — PROGRESS NOTES
[facility]   Acute Rehabilitation Occupational Therapy Daily Treatment Note    Date: 24  Patient Name: Anuj Jovel       Room: 2632/2632-01  MRN: [id number]  Account: [id number]   : 1940  (83 y.o.) Gender: male       Referring Practitioner: Issac Fountain MD  Diagnosis: Debility secondary to symptomatic bradycardia, COVID-19 with encephalopathy  Additional Pertinent Hx: Per PMR consult: \"Mr. Anuj Jovel is a 83 y.o.  male who was admitted to [facility] on 2024 with No chief complaint on file.     83-year-old male admitted with lower extremity weakness, dyspnea, estrogen and heart block has history of obstructive apnea, hypertension, hyperlipidemia, type 2 diabetes, BPH, coronary artery disease with stent in , sick sinus syndrome with Mobitz type I block heart failure with preserved ejection fraction.  He was noted to be hypoxic and.  CT chest negative for PE chest x-ray unremarkable CT head unremarkable he was positive COVID-19 CT chest showed interstitial groundglass opacities.  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  Monitor heart rate     ID COVID-positive  has received vaccinations altered mental status resolved treated with with remdesivir, Decadron Baricitinibn, out of window for Paxlovid monitor CRP, monitor off antibiotics off COVID isolation      Internal medicine acute hypoxic respite failure likely sec to pneumonia due to COVID-19 as above delirium resolved leg pain resume home gabapentin     Neuro  transient episode of confusion

## 2024-06-23 NOTE — PLAN OF CARE
compensatory techniques in order to increase IND with BADLs.  Short Term Goal 2: Pt will complete LBD tasks with use of compensatory techniques/AE with MIN A in order to return to increase IND with BADLs.  Short Term Goal 3: Pt will complete toileting tasks with MIN A with use of AE/compensatory techniques and fair safety to increase IND with BADLs.  Short Term Goal 4: Pt will complete functional transfers with MIN A with use of LRAD and fair safety in order to decrease fall risk while maintaining good adherence to pacemaker precautions.  Short Term Goal 5: Pt will tolerate standing 8+ minutes while engaging in ADLs/functional tasks of choice with intermittent unilateral support in order to increase safety and IND with ADLs.  Additional Goals?: Yes  Short Term Goal 6: Pt will complete functional mobility with CGA with use of LRAD with fair safety in order to decrease fall risk.  Short Term Goal 7: Pt will participate in 30+ minutes of therapeutic exercises/functional activities/social participatino to increase safety and IND with daily activities and improve overall quality of life.  Long Term Goals  Time Frame for Long Term Goals : By discharge,  Long Term Goal 1: Pt will complete UBD/UBB tasks with SUP with use of compensatory techniques PRN with good safety in order to return to PLOF.  Long Term Goal 2: Pt will complete LBD/LBB with SBA with use of AE/compensatory techniques and good safety awareness in order to increase IND with BADLs.  Long Term Goal 3: Pt will complete toileting tasks with SBA with min cues or less for safety in order to increase IND with BADLs.  Long Term Goal 4: Pt will tolerate standing 15+ minutes while engaging in ADLs/functional tasks of choice with intermittent unilateral support in order to increase safety and IND with ADLs.  Long Term Goal 5: Pt will complete functional transfers/functional mobility with SBA with use of LRAD and min cues or less for safety in order to decrease fall  risk while maintaining good adherence to pacemaker precautions.  Additional Goals?: Yes  Long Term Goal 6: Pt will demo increased bilat UE FMC as evidenced by improved 9HPT bilaterally by 10 seconds in order to increase IND with ADLs.  Long Term Goal 7: Pt and family will demo/verbalize 100% carryover of AE for ADLs as needed and GOOD understanding of home/safety fall prevention strategies to increase safety and IND with self-care and mobility.    Plan of Care: Patient to be seen by occupational therapy services 1 Hour 30 Minutes per day at least 5 out of 7 days per week     Anticipated interventions may include ADL and IADL retraining, strengthening, safety education and training, patient/caregiver education and training, equipment evaluation/ training/procurement, neuromuscular reeducation, wheelchair mobility training.      SPEECH THERAPY:   Goals:      Dysphagia Goals: The patient will tolerate recommended diet without observed clinical signs of aspiration, The patient will recall and perform compensatory strategies, with no cues., The patient will improve oral motor function via therapeutic oral motor exercises to 5/5 each trial.      Short Term Goals  Goal 1: Pt will complete STM/delayed recall tasks with 75% accuracy.  Goal 2: Pt will complete problem solving/executive function tasks with 75% accuracy.  Goal 3: Pt will complete reasoning/thought organization tasks with 90% accuracy.        Plan of Care: Pt to be seen by speech therapy services 1 Hour per day at least 5 out of 7 days per week    Anticipated interventions may include speech/language/communication therapy, cognitive training, group therapy, education, and/or dysphagia therapy based on the above goals.      CASE MANAGEMENT:  Goals:   Assist patient/family with discharge planning, patient/family counseling,  and coordination with insurance during the inpatient rehabilitation stay.         Other members of the multidisciplinary rehabilitation team

## 2024-06-23 NOTE — PROGRESS NOTES
IN-PATIENT SERVICE  [facility] Internal Medicine  [facility] Internal Medicine  Jung Sung MD; Marck Trinidad MD; Valdo Emery MD; MD Lynda Cisneros MD; Sammy Gee MD; Janina Calvin MD        CONSULTATION / HISTORY AND PHYSICAL EXAMINATION            Date:   6/23/2024  Patient name:  Anuj Jovel  Date of admission:  6/21/2024  7:14 PM  MRN:   [id number]  Account:  [id number]  YOB: 1940  PCP:    Jeanne Simpson MD  Room:   [address] [place] [address]  Code Status:    Full Code    Physician Requesting Consult: Mara Bernstein MD    Reason for Consult:  Medical management    Chief Complaint:     No chief complaint on file.  Debility    History Obtained From:     Patient, EMR, nursing staff    History of Present Illness:     83-year-old male history of CAD, BPH, HTN, T2DM, HLD, BPH initially admitted to [facility] on 6/8/2024 when presented for shortness of breath diagnosed with COVID-19 pneumonia noted to be bradycardic with a high-grade AV block and transferred to [facility] for EP evaluation, transiently on dopamine infusion pacemaker was placed on 6/17/2024 while at [facility].  Patient also completed treatment for COVID-19 pneumonia-baricitinib and remdesivir completed .  Was also treated for acute gout      Past Medical History:     Past Medical History:   Diagnosis Date    Diabetes (HCC)     Diverticulitis     History of allergy     Hyperlipidemia     Hypertension     Osteoarthritis     Pneumonia due to COVID-19 virus 12/29/2020        Past Surgical History:     Past Surgical History:   Procedure Laterality Date    BACK SURGERY  09/03/2017     [facility]  Dr Cueva, lumbar fusion    CARDIAC CATHETERIZATION      COLECTOMY  2015    partial colectomy due to diveritc    COLONOSCOPY      TOTAL KNEE ARTHROPLASTY Right         Medications Prior to Admission:     Prior to Admission medications    Medication Sig Start Date End Date Taking?

## 2024-06-24 ENCOUNTER — APPOINTMENT (OUTPATIENT)
Dept: GENERAL RADIOLOGY | Age: 84
End: 2024-06-24
Attending: STUDENT IN AN ORGANIZED HEALTH CARE EDUCATION/TRAINING PROGRAM
Payer: MEDICARE

## 2024-06-24 LAB
GLUCOSE BLD-MCNC: 122 MG/DL (ref 75–110)
GLUCOSE BLD-MCNC: 155 MG/DL (ref 75–110)
GLUCOSE BLD-MCNC: 161 MG/DL (ref 75–110)
GLUCOSE BLD-MCNC: 162 MG/DL (ref 75–110)

## 2024-06-24 PROCEDURE — 71046 X-RAY EXAM CHEST 2 VIEWS: CPT

## 2024-06-24 PROCEDURE — 97129 THER IVNTJ 1ST 15 MIN: CPT

## 2024-06-24 PROCEDURE — 97130 THER IVNTJ EA ADDL 15 MIN: CPT

## 2024-06-24 PROCEDURE — 99213 OFFICE O/P EST LOW 20 MIN: CPT

## 2024-06-24 PROCEDURE — 97116 GAIT TRAINING THERAPY: CPT

## 2024-06-24 PROCEDURE — 97530 THERAPEUTIC ACTIVITIES: CPT

## 2024-06-24 PROCEDURE — 97110 THERAPEUTIC EXERCISES: CPT

## 2024-06-24 PROCEDURE — 97535 SELF CARE MNGMENT TRAINING: CPT

## 2024-06-24 PROCEDURE — 99232 SBSQ HOSP IP/OBS MODERATE 35: CPT | Performed by: INTERNAL MEDICINE

## 2024-06-24 PROCEDURE — 1180000000 HC REHAB R&B

## 2024-06-24 PROCEDURE — 99232 SBSQ HOSP IP/OBS MODERATE 35: CPT | Performed by: PHYSICAL MEDICINE & REHABILITATION

## 2024-06-24 PROCEDURE — 6370000000 HC RX 637 (ALT 250 FOR IP): Performed by: PHYSICAL MEDICINE & REHABILITATION

## 2024-06-24 PROCEDURE — 6360000002 HC RX W HCPCS: Performed by: INTERNAL MEDICINE

## 2024-06-24 PROCEDURE — 82947 ASSAY GLUCOSE BLOOD QUANT: CPT

## 2024-06-24 PROCEDURE — 6370000000 HC RX 637 (ALT 250 FOR IP): Performed by: INTERNAL MEDICINE

## 2024-06-24 RX ORDER — DEXTROSE MONOHYDRATE 100 MG/ML
INJECTION, SOLUTION INTRAVENOUS CONTINUOUS PRN
Status: DISCONTINUED | OUTPATIENT
Start: 2024-06-24 | End: 2024-07-04 | Stop reason: HOSPADM

## 2024-06-24 RX ORDER — SENNOSIDES A AND B 8.6 MG/1
2 TABLET, FILM COATED ORAL NIGHTLY
Status: DISCONTINUED | OUTPATIENT
Start: 2024-06-24 | End: 2024-06-27

## 2024-06-24 RX ADMIN — METFORMIN HYDROCHLORIDE 1000 MG: 1000 TABLET ORAL at 08:11

## 2024-06-24 RX ADMIN — DOXYCYCLINE 100 MG: 100 CAPSULE ORAL at 08:11

## 2024-06-24 RX ADMIN — INSULIN GLARGINE 10 UNITS: 100 INJECTION, SOLUTION SUBCUTANEOUS at 22:03

## 2024-06-24 RX ADMIN — ATORVASTATIN CALCIUM 40 MG: 40 TABLET, FILM COATED ORAL at 22:02

## 2024-06-24 RX ADMIN — METFORMIN HYDROCHLORIDE 1000 MG: 1000 TABLET ORAL at 16:32

## 2024-06-24 RX ADMIN — ACETAMINOPHEN 650 MG: 325 TABLET ORAL at 14:51

## 2024-06-24 RX ADMIN — PREGABALIN 125 MG: 25 CAPSULE ORAL at 08:11

## 2024-06-24 RX ADMIN — PANTOPRAZOLE SODIUM 40 MG: 40 TABLET, DELAYED RELEASE ORAL at 05:06

## 2024-06-24 RX ADMIN — AMLODIPINE BESYLATE 5 MG: 5 TABLET ORAL at 08:11

## 2024-06-24 RX ADMIN — PREGABALIN 125 MG: 25 CAPSULE ORAL at 22:02

## 2024-06-24 RX ADMIN — TAMSULOSIN HYDROCHLORIDE 0.4 MG: 0.4 CAPSULE ORAL at 08:11

## 2024-06-24 RX ADMIN — POLYETHYLENE GLYCOL 3350 17 G: 17 POWDER, FOR SOLUTION ORAL at 08:12

## 2024-06-24 RX ADMIN — ASPIRIN 81 MG: 81 TABLET, COATED ORAL at 08:11

## 2024-06-24 RX ADMIN — SENNOSIDES 17.2 MG: 8.6 TABLET, FILM COATED ORAL at 22:03

## 2024-06-24 RX ADMIN — ENOXAPARIN SODIUM 40 MG: 100 INJECTION SUBCUTANEOUS at 08:11

## 2024-06-24 RX ADMIN — FUROSEMIDE 40 MG: 40 TABLET ORAL at 08:11

## 2024-06-24 ASSESSMENT — PAIN DESCRIPTION - LOCATION
LOCATION: FOOT
LOCATION: FOOT

## 2024-06-24 ASSESSMENT — PAIN SCALES - GENERAL
PAINLEVEL_OUTOF10: 9
PAINLEVEL_OUTOF10: 5
PAINLEVEL_OUTOF10: 0
PAINLEVEL_OUTOF10: 9

## 2024-06-24 ASSESSMENT — PAIN DESCRIPTION - DESCRIPTORS: DESCRIPTORS: ACHING

## 2024-06-24 ASSESSMENT — PAIN DESCRIPTION - ORIENTATION
ORIENTATION: LEFT;RIGHT
ORIENTATION: LEFT;RIGHT

## 2024-06-24 ASSESSMENT — PAIN - FUNCTIONAL ASSESSMENT: PAIN_FUNCTIONAL_ASSESSMENT: ACTIVITIES ARE NOT PREVENTED

## 2024-06-24 NOTE — PROGRESS NOTES
SPEECH LANGUAGE PATHOLOGY  Speech Language Pathology  [facility]    Cognitive Treatment Note    Date: 6/24/2024  Patient’s Name: Anuj Jovel  MRN: [id number]  Diagnosis:   Patient Active Problem List   Diagnosis Code    Acquired deviated nasal septum J34.2    Actinic keratoses L57.0    Arthritis M19.90    Benign prostatic hyperplasia N40.0    Bloating R14.0    Chronic serous otitis media H65.20    Claustrophobia F40.240    Coronary artery disease I25.10    Esophageal reflux K21.9    Flatus R14.3    Hearing loss H91.90    Hemorrhoids K64.9    History of allergy Z88.9    Hyperlipidemia E78.5    Essential hypertension I10    Leg swelling M79.89    Lumbar radiculopathy M54.16    Lumbar stenosis M48.061    Skin neoplasm D49.2    Tinea corporis B35.4    Ventral hernia K43.9    Weight gain R63.5    Type 2 diabetes mellitus with complication (HCC) E11.8    Obesity E66.9    Other osteoporosis without current pathological fracture M81.8    Presence of coronary angioplasty implant and graft Z95.5    Elevated C-reactive protein (CRP) R79.82    KAREY (obstructive sleep apnea) G47.33    Hypoxia R09.02    Chronic pain syndrome G89.4    AV block, Mobitz 1 I44.1    History of placement of stent in LAD coronary artery Z95.5    Chronic heart failure with preserved ejection fraction (HFpEF) (HCC) I50.32    Mild mitral valve regurgitation I34.0    Pulmonary hypertension (HCC) I27.20    Spondylolisthesis of lumbar region M43.16    Second degree AV block, Mobitz type I I44.1    Systolic congestive heart failure (HCC) I50.20    Advanced age R54    Acute hypoxic respiratory failure (HCC) J96.01    COVID U07.1    Shortness of breath R06.02    Symptomatic bradycardia R00.1    Complete heart block (HCC) I44.2    Cardiac pacemaker in situ Z95.0    Encephalopathy acute G93.40    MCI (mild cognitive impairment) G31.84    Encephalopathy G93.40    Debility R53.81       Pain: 5/10 (feet)    Cognitive Treatment    Treatment time:

## 2024-06-24 NOTE — PROGRESS NOTES
06/24/24 1427   Encounter Summary   Encounter Overview/Reason Volunteer Encounter   Last Encounter  06/24/24   Rituals, Rites and Sacraments   Type Rastafari Communion

## 2024-06-24 NOTE — PLAN OF CARE
Problem: Discharge Planning  Goal: Discharge to home or other facility with appropriate resources  6/24/2024 0834 by Katt Miller RN  Outcome: Progressing     Problem: Safety - Adult  Goal: Free from fall injury  6/24/2024 0834 by Katt Miller RN  Outcome: Progressing     Problem: Pain  Goal: Verbalizes/displays adequate comfort level or baseline comfort level  6/24/2024 0834 by Katt Miller RN  Outcome: Progressing     Problem: ABCDS Injury Assessment  Goal: Absence of physical injury  6/24/2024 0834 by Katt Miller RN  Outcome: Progressing     Problem: Skin/Tissue Integrity  Goal: Absence of new skin breakdown  Description: 1.  Monitor for areas of redness and/or skin breakdown  2.  Assess vascular access sites hourly  3.  Every 4-6 hours minimum:  Change oxygen saturation probe site  4.  Every 4-6 hours:  If on nasal continuous positive airway pressure, respiratory therapy assess nares and determine need for appliance change or resting period.  6/24/2024 0834 by Katt Miller RN  Outcome: Progressing     Problem: Nutrition Deficit:  Goal: Optimize nutritional status  6/24/2024 0834 by Katt Miller RN  Outcome: Progressing     Problem: Chronic Conditions and Co-morbidities  Goal: Patient's chronic conditions and co-morbidity symptoms are monitored and maintained or improved  Outcome: Progressing

## 2024-06-24 NOTE — PROGRESS NOTES
IN-PATIENT SERVICE  [facility]  [facility]  Jung Sung MD; Marck Trinidad MD; Valdo Emery MD; MD Lynda Cisneros MD; Sammy Gee MD; Janina Calvin MD        CONSULTATION / HISTORY AND PHYSICAL EXAMINATION            Date:   6/24/2024  Patient name:  Anuj Jovel  Date of admission:  6/21/2024  7:14 PM  MRN:   [id number]  Account:  [id number]  YOB: 1940  PCP:    Jeanne Simpson MD  Room:   [address] [place] [address]  Code Status:    Full Code    Physician Requesting Consult: Mara Bernstein MD    Reason for Consult:  Medical management    Chief Complaint:     No chief complaint on file.  Debility    History Obtained From:     Patient, EMR, nursing staff    History of Present Illness:     83-year-old male history of CAD, BPH, HTN, T2DM, HLD, BPH initially admitted to [facility] on 6/8/2024 when presented for shortness of breath diagnosed with COVID-19 pneumonia noted to be bradycardic with a high-grade AV block and transferred to [place] for EP evaluation, transiently on dopamine infusion pacemaker was placed on 6/17/2024 while at [place].  Patient also completed treatment for COVID-19 pneumonia-baricitinib and remdesivir completed .  Was also treated for acute gout      Past Medical History:     Past Medical History:   Diagnosis Date    Diabetes (HCC)     Diverticulitis     History of allergy     Hyperlipidemia     Hypertension     Osteoarthritis     Pneumonia due to COVID-19 virus 12/29/2020        Past Surgical History:     Past Surgical History:   Procedure Laterality Date    BACK SURGERY  09/03/2017     [facility]  Dr Cueva, lumbar fusion    CARDIAC CATHETERIZATION      COLECTOMY  2015    partial colectomy due to diveritc    COLONOSCOPY      TOTAL KNEE ARTHROPLASTY Right         Medications Prior to Admission:     Prior to Admission medications    Medication Sig Start Date End Date Taking?  the past 24 hour(s))   POC Glucose Fingerstick    Collection Time: 06/23/24  4:21 PM   Result Value Ref Range    POC Glucose 213 (H) 75 - 110 mg/dL   POC Glucose Fingerstick    Collection Time: 06/23/24  8:18 PM   Result Value Ref Range    POC Glucose 236 (H) 75 - 110 mg/dL   POC Glucose Fingerstick    Collection Time: 06/24/24  5:08 AM   Result Value Ref Range    POC Glucose 122 (H) 75 - 110 mg/dL   POC Glucose Fingerstick    Collection Time: 06/24/24 11:13 AM   Result Value Ref Range    POC Glucose 162 (H) 75 - 110 mg/dL       Imaging/Diagonstics:  Recent data reviewed    Assessment :      Primary Problem  Encephalopathy    Active Hospital Problems    Diagnosis Date Noted    Debility [R53.81] 06/23/2024    Encephalopathy [G93.40] 06/21/2024       Plan:   83-year-old male history of CAD, BPH, HTN, T2DM, HLD, BPH initially admitted to [facility] on 6/8/2024 when presented for shortness of breath diagnosed with COVID-19 pneumonia noted to be bradycardic with a third degree AV block and transferred to [place] for EP evaluation, transiently on dopamine infusion pacemaker was placed on 6/17/2024 while at [place].  Patient also completed treatment for COVID-19 pneumonia-baricitinib and remdesivir completed .  Was also treated for acute gout  Status post pacemaker-denies any shortness of breath chest pain or palpitation heart rate controlled  Recent COVID-19 infection completed treatment with remdesivir and baricitinib-complaining of persistent cough, ordering Tessalon Perles.  Recent downtrending hemoglobin currently at 10.4, hemoglobin on 6/17 was 14.3, check stool Hemoccult, will repeat CBC tomorrow.  Hypertension blood pressure controlled resume home medication  Type 2 diabetes blood sugars controlled continue metformin, Lantus sliding scale  Coronary artery disease previous PCI to LAD-continue aspirin statin  HFpEF-evidence of some fluid overload, bilateral lower lung crackles, grade 1 bilateral

## 2024-06-24 NOTE — PATIENT CARE CONFERENCE
[facility] Acute Inpatient Rehabilitation  TEAM CONFERENCE NOTE  Date: 24  Patient Name: Anuj Jovel       Room: 2632/2632-01  MRN: [id number]       : 1940  (83 y.o.)     Gender: male           PRINCIPAL DIAGNOSIS: Encephalopathy    NURSING    Bladder Continence  Always Continent  Bowel Continence Always Continent    Date of Last BM: 2024    Bladder/Bowel Program Interventions: Bladder Program In Place    Wounds/Incisions/Ulcers: No skin issues identified    Pain Control: No pain concerns to address    Pain Medication Regimen Usage Pattern: MAR reviewed and pain medications are being used at the following frequency (Specify Medication, # Doses Administered on average per day, identified patterns of use - for example: time of day, prior to activity/therapy)  Tylenol 650 mg Q4H Last Dose: 2024     Fall Risk:  Falling star program initiated    Medication Education Program: Patient able to manage medications and being educated by nursing    Discharge Preparation Patient/Responsible Party Education In Progress:   No Educational Needs Identified    Nursing specific communication for TEAM: No additional information identified requiring communication at this time    PHYSICAL THERAPY      Bed Mobility:    Sit to Supine: Moderate assistance (Assist with trunk and bilat. LE's)  Scooting: Moderate assistance    2 assist for sit>supine.    Transfers:  Sit to Stand: Maximum Assistance  Stand to Sit: Maximum Assistance  Bed to Chair: Maximum assistance  Squat Pivot Transfers: Maximum Assistance    Ambulation  Surface: Level tile  Device: Rolling Walker  Assistance: Moderate assistance  Quality of Gait: Decreased endurance. Cues for upright posture and RW safety.  Gait Deviations: Slow Erin;Decreased step length;Decreased step height  Distance: 15 ft and 30 ft in AM;  16 ft and 32 ft in PM  Comments: Patient continues to have edema in bilat. LE's and painful feet upon standing.    Goals  Time  Level: Dependent  Skilled Clinical Factors: TA to don footies and TEDs       Toileting  Assistance Level: Moderate assistance  Skilled Clinical Factors: pt sat on toilet but did not have BM. mod A for clothing mgt       Toilet Transfers  Technique: Stand pivot, To the left, To the right  Equipment: Standard toilet, Grab bars  Additional Factors: Set-up, Verbal cues, Cues for hand placement, Increased time to complete  Assistance Level: Moderate assistance  Skilled Clinical Factors: VCs for safety/tech and to use RUE when pulling self c GB for prior to transfer on/off toilet. After toileting pt req  education to only use RUE to pull up from c G return demo no LOB    Short Term Goals  Time Frame for Short Term Goals: By 7-10 days,  Short Term Goal 1: Pt will complete UBD tasks with SBA with use of compensatory techniques in order to increase IND with BADLs.  Short Term Goal 2: Pt will complete LBD tasks with use of compensatory techniques/AE with MIN A in order to return to increase IND with BADLs.  Short Term Goal 3: Pt will complete toileting tasks with MIN A with use of AE/compensatory techniques and fair safety to increase IND with BADLs.  Short Term Goal 4: Pt will complete functional transfers with MIN A with use of LRAD and fair safety in order to decrease fall risk while maintaining good adherence to pacemaker precautions.  Short Term Goal 5: Pt will tolerate standing 8+ minutes while engaging in ADLs/functional tasks of choice with intermittent unilateral support in order to increase safety and IND with ADLs.  Additional Goals?: Yes  Short Term Goal 6: Pt will complete functional mobility with CGA with use of LRAD with fair safety in order to decrease fall risk.  Short Term Goal 7: Pt will participate in 30+ minutes of therapeutic exercises/functional activities/social participatino to increase safety and IND with daily activities and improve overall quality of life.     Hand Dominance: Hand Dominance  Hand

## 2024-06-24 NOTE — CONSULTS
[facility]  Consult Note       NAME:  Anuj Jovel  MEDICAL RECORD NUMBER:  [id number]  AGE: 83 y.o.   GENDER: male  : 1940  TODAY'S DATE:  2024    Subjective:      Anuj Jovel is a 83 y.o. male with inpatient referral to [facility] for:  Buttock wound      Wound Identification:  Wound Type:  MASD  Contributing Factors: diabetes, smoking, and decreased tissue oxygenation    Wound History: Patient unaware that he had any wounds, wound present on admission to rehab unit  Current Wound Care Treatment: None    Patient Goal of Care:  [x] Wound Healing  [] Odor Control  [] Palliative Care  [] Pain Control   [] Other:         PAST MEDICAL HISTORY        Diagnosis Date    Diabetes (HCC)     Diverticulitis     History of allergy     Hyperlipidemia     Hypertension     Osteoarthritis     Pneumonia due to COVID-19 virus 2020       PAST SURGICAL HISTORY    Past Surgical History:   Procedure Laterality Date    BACK SURGERY  2017    [facility], lumbar fusion    CARDIAC CATHETERIZATION      COLECTOMY      partial colectomy due to diveritc    COLONOSCOPY      TOTAL KNEE ARTHROPLASTY Right        FAMILY HISTORY    No family history on file.    SOCIAL HISTORY    Social History     Tobacco Use    Smoking status: Former     Current packs/day: 0.00     Average packs/day: 1.5 packs/day for 40.0 years (60.0 ttl pk-yrs)     Types: Cigarettes     Start date: 1951     Quit date: 1991     Years since quittin.5    Smokeless tobacco: Never   Vaping Use    Vaping Use: Never used   Substance Use Topics    Alcohol use: Not Currently     Alcohol/week: 1.0 standard drink of alcohol     Types: 1 Standard drinks or equivalent per week     Comment: social    Drug use: No         ALLERGIES    Allergies   Allergen Reactions    Feldene [Piroxicam]     Flagyl [Metronidazole]     Lisinopril Dizziness or Vertigo    Naprosyn [Naproxen]     Penicillins

## 2024-06-24 NOTE — PLAN OF CARE
Problem: Discharge Planning  Goal: Discharge to home or other facility with appropriate resources  Outcome: Progressing     Problem: Safety - Adult  Goal: Free from fall injury  Outcome: Progressing     Problem: Pain  Goal: Verbalizes/displays adequate comfort level or baseline comfort level  Outcome: Progressing     Problem: ABCDS Injury Assessment  Goal: Absence of physical injury  Outcome: Progressing     Problem: Skin/Tissue Integrity  Goal: Absence of new skin breakdown  Description: 1.  Monitor for areas of redness and/or skin breakdown  2.  Assess vascular access sites hourly  3.  Every 4-6 hours minimum:  Change oxygen saturation probe site  4.  Every 4-6 hours:  If on nasal continuous positive airway pressure, respiratory therapy assess nares and determine need for appliance change or resting period.  Outcome: Progressing     Problem: Risk for Elopement  Goal: Patient will not exit the unit/facility without proper excort  Outcome: Progressing     Problem: Nutrition Deficit:  Goal: Optimize nutritional status  Outcome: Progressing

## 2024-06-24 NOTE — PROGRESS NOTES
Physical Medicine & Rehabilitation  Progress Note    6/24/2024 1:02 PM     CC: Ambulatory and ADL dysfunction due to encephalopathy and debilitating COVID-19, third-degree HV block status post dual-chamber pacemaker    Subjective:   No complaints.  Feels well.  No further episodes of nosebleeds    ROS:  Denies fevers, chills, sweats.  No chest pain, palpitations, lightheadedness.  Denies coughing, wheezing or shortness of breath.  Denies abdominal pain, nausea, diarrhea or constipation.  No new areas of joint pain.  Denies new areas of numbness or weakness.  Denies new anxiety or depression issues.  No new skin problems.    Rehabilitation:   PT:    Bed mobility  Supine to Sit: Maximum assistance  Sit to Supine: Moderate assistance (Assist with trunk and bilat. LE's)  Scooting: Moderate assistance  Bed Mobility Comments: Patient up in WC and Recliner in AM.  Returned to bed in PM.    Transfers  Sit to Stand: Maximum Assistance  Stand to Sit: Maximum Assistance  Bed to Chair: Maximum assistance  Stand Pivot Transfers: Maximum Assistance  Squat Pivot Transfers: Maximum Assistance  Comment: Assessed with RW, Pt completed x 3 sit<>stands.  Pt presenting with decreased balance d/t posterior lean .Pt required verbal cues for posture/not to push/pull wiht L UE, with moderate to poor return. Pt demonstrating poor standing tolerance this date.    Ambulation  Surface: Level tile  Device: Rolling Walker  Other Apparatus: Wheelchair follow  Assistance: Moderate assistance  Quality of Gait: Poor weight shifting and step length.  Amb. occurred during during transfer.  Gait Deviations: Slow Erin, Decreased step length, Decreased step height  Distance: 4 ft  Comments: Patient continues to have edema in bilat. LE's and painful feet upon standing.  More Ambulation?: No                OT:      Feeding  Assistance Level: Independent  Skilled Clinical Factors: per pt report  Grooming/Oral Hygiene  Assistance Level: Set-up  Skilled  intact to light touch.   MSK: 4+/5 distal upper extremities and lower extremities able to straight leg raise, PICC line right upper extremity  EXTREMITIES: No calf tenderness to palpation bilaterally. No edema BLEs  SKIN: warm dry and intact with good turgor, left chest wall pacer site decreased erythema  PSYCH: appropriately interactive. Affect WNL.         Medications   Scheduled Meds:   furosemide  40 mg Oral Daily    polyethylene glycol  17 g Oral Daily    amLODIPine  5 mg Oral Daily    aspirin EC  81 mg Oral Daily    atorvastatin  40 mg Oral Nightly    doxycycline monohydrate  100 mg Oral 2 times per day    enoxaparin  40 mg SubCUTAneous Daily    insulin glargine  10 Units SubCUTAneous Nightly    insulin lispro  0-16 Units SubCUTAneous TID WC    metFORMIN  1,000 mg Oral BID WC    pantoprazole  40 mg Oral QAM AC    pregabalin  125 mg Oral BID    tamsulosin  0.4 mg Oral Daily     Continuous Infusions:  PRN Meds:.magnesium hydroxide, sodium chloride, benzonatate, acetaminophen, senna, bisacodyl, butalbital-APAP-caffeine, melatonin     Diagnostics:     CBC:   Recent Labs     06/22/24  0645 06/23/24  0641   WBC 12.0* 11.5*   RBC 3.24* 3.30*   HGB 10.4* 10.4*   HCT 30.5* 30.8*   MCV 94.2 93.3   RDW 13.6 13.8    188     BMP:   Recent Labs     06/21/24  1424 06/22/24  0645    136   K 4.6 4.1   CL 99 102   CO2 23 27   BUN 21 16   CREATININE 1.1 0.8     BNP: No results for input(s): \"BNP\" in the last 72 hours.  PT/INR: No results for input(s): \"PROTIME\", \"INR\" in the last 72 hours.  APTT: No results for input(s): \"APTT\" in the last 72 hours.  CARDIAC ENZYMES: No results for input(s): \"CKMB\", \"CKMBINDEX\", \"TROPONINT\" in the last 72 hours.    Invalid input(s): \"CKTOTAL;3\"  FASTING LIPID PANEL:  Lab Results   Component Value Date    CHOL 182 07/28/2022    HDL 38 (L) 07/28/2022    TRIG 123 07/28/2022     LIVER PROFILE:   Recent Labs     06/22/24  0645   AST 36   ALT 43*   BILIDIR 0.3*   BILITOT 0.9   ALKPHOS 107

## 2024-06-24 NOTE — PROGRESS NOTES
Comprehensive Nutrition Assessment    Type and Reason for Visit:  Reassess    Nutrition Recommendations/Plan:   Continue current diet.   Continue oral nutrition supplements.      Malnutrition Assessment:  Malnutrition Status:  Insufficient data (06/22/24 1752)    Context:  Malnutrition Status:  Insufficient data (06/22/24 1752)    Context:  Chronic Illness     Findings of the 6 clinical characteristics of malnutrition:  Energy Intake:   Unable to assess time frame for decrease.  Weight Loss:      4.6% loss x 6 months; 16.1% loss x 1 year  Body Fat Loss:  Unable to assess     Muscle Mass Loss:  Unable to assess    Fluid Accumulation:  Mild (to moderate. May not be related to nutritional status) Extremities   Strength:   Not measured       Nutrition Assessment:    Pt states that he has been eating fairly well and likes Glucerna. Normal (dry) wt is 202 lb.    Nutrition Related Findings:    Edema: Trace generalized; +2 pitting RLE, LLE. POC Glucose: 162, 122. Other labs and meds reviewed. Wound Type: Surgical Incision       Current Nutrition Intake & Therapies:    Average Meal Intake: 51-75%, %  Average Supplements Intake: %  ADULT DIET; Regular; 4 carb choices (60 gm/meal)  ADULT ORAL NUTRITION SUPPLEMENT; Breakfast, Lunch, Dinner; Diabetic Oral Supplement    Anthropometric Measures:  Height: 170.2 cm (5' 7\")  Ideal Body Weight (IBW): 148 lbs (67 kg)    Admission Body Weight: 93.1 kg (205 lb 4 oz)  Current Body Weight: 97.8 kg (215 lb 9.8 oz), 138.7 % IBW. Weight Source: Bed Scale  Current BMI (kg/m2): 33.8  Usual Body Weight:  (202 lb dr wt per pt; wt loss susptected over time. Fluid shifts also affect wt.)  % Weight Change (Calculated): -4.6                    BMI Categories: Obese Class 1 (BMI 30.0-34.9)    Estimated Daily Nutrient Needs:  Energy Requirements Based On: Formula  Weight Used for Energy Requirements: Admission  Energy (kcal/day): 8233-9377 based on Sargent-St. Jeor with 1.1-1.2

## 2024-06-24 NOTE — PROGRESS NOTES
Occupational Therapy  [facility]   Acute Rehabilitation Occupational Therapy Daily Treatment Note    Date: 24  Patient Name: Anuj Jovel       Room: 2632/2632-01  MRN: [id number]  Account: [id number]   : 1940  (83 y.o.) Gender: male       Referring Practitioner: Issac Fountain MD  Diagnosis: Debility secondary to symptomatic bradycardia, COVID-19 with encephalopathy  Additional Pertinent Hx: Per PMR consult: \"Mr. Anuj Jovel is a 83 y.o.  male who was admitted to [facility] on 2024 with No chief complaint on file.     83-year-old male admitted with lower extremity weakness, dyspnea, estrogen and heart block has history of obstructive apnea, hypertension, hyperlipidemia, type 2 diabetes, BPH, coronary artery disease with stent in , sick sinus syndrome with Mobitz type I block heart failure with preserved ejection fraction.  He was noted to be hypoxic and.  CT chest negative for PE chest x-ray unremarkable CT head unremarkable he was positive COVID-19 CT chest showed interstitial groundglass opacities.  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  Monitor heart rate     ID COVID-positive  has received vaccinations altered mental status resolved treated with with remdesivir, Decadron Baricitinibn, out of window for Paxlovid monitor CRP, monitor off antibiotics off COVID isolation      Internal medicine acute hypoxic respite failure likely sec to pneumonia due to COVID-19 as above delirium resolved leg pain resume home gabapentin     Neuro  transient  pt tolerates 2 minutes x 3                   Assessment  Assessment  Activity Tolerance: Patient limited by fatigue;Patient limited by endurance;Patient limited by pain  Discharge Recommendations: Continue to assess pending progress;Home with Home health OT    Patient Education  Education  Education Provided: Role of Therapy;Plan of Care;Precautions;Safety;ADL Function;Energy Conservation;Fall Prevention Strategies  Education Method: Verbal;Demonstration  Education Outcome: Demonstrated understanding;Verbalized understanding;Continued education needed    OT Equipment Recommendations  Other: TBD            Goals     Short Term Goals  Time Frame for Short Term Goals: By 7-10 days,  Short Term Goal 1: Pt will complete UBD tasks with SBA with use of compensatory techniques in order to increase IND with BADLs.  Short Term Goal 2: Pt will complete LBD tasks with use of compensatory techniques/AE with MIN A in order to return to increase IND with BADLs.  Short Term Goal 3: Pt will complete toileting tasks with MIN A with use of AE/compensatory techniques and fair safety to increase IND with BADLs.  Short Term Goal 4: Pt will complete functional transfers with MIN A with use of LRAD and fair safety in order to decrease fall risk while maintaining good adherence to pacemaker precautions.  Short Term Goal 5: Pt will tolerate standing 8+ minutes while engaging in ADLs/functional tasks of choice with intermittent unilateral support in order to increase safety and IND with ADLs.  Additional Goals?: Yes  Short Term Goal 6: Pt will complete functional mobility with CGA with use of LRAD with fair safety in order to decrease fall risk.  Short Term Goal 7: Pt will participate in 30+ minutes of therapeutic exercises/functional activities/social participatino to increase safety and IND with daily activities and improve overall quality of life.    Long Term Goals  Time Frame for Long Term Goals : By discharge,  Long Term Goal 1: Pt will

## 2024-06-24 NOTE — INTERDISCIPLINARY ROUNDS
[facility] Acute Inpatient Rehabilitation  INTERDISCIPLINARY MEETING  Date: 24  Patient Name: Anuj Jovel       Room: 2632/2632-01  MRN: [id number]       : 1940  (83 y.o.)     Gender: male     Patient's care team, including nursing, speech language pathologist, occupational therapist, and/or physical therapist, met to discuss patient's care needs. Any patient concerns, change in medical status, and current transfer/mobility status were identified at this time.     Any Additional Pertinent Information:   Pt is 2 Assist with Mere Mckeon for transfers with nursing staff    Participating Team Members:  Nurse: Katt Miller, RN       Physical Therapist: Chanda Gilbert PT

## 2024-06-24 NOTE — PROGRESS NOTES
30 ft in AM;  16 ft and 32 ft in PM  Comments: Patient continues to have edema in bilat. LE's and painful feet upon standing.  Stairs/Curb  Stairs?: No    PT Exercises  Exercise Treatment: Session ended with returning to bed and Completing Supine bilat. LE circulation ex. and elevated them onto a large wedge with 2 pillows. family present during education also.  A/AROM Exercises: Seated bilat. LE ex. 2 x10 reps.  Nose bleeds started with seated ex.  Circulation/Endurance Exercises: UBE with R UE only for endurance.  5 mins. fwd and retro.  no resistance.  Pressure Relief Exercises: Self adjusts.  Functional Mobility Circuit Training: functional transfers throughout sessions.      Vitals  O2 Device: None (Room air)    Activity Tolerance  Activity Tolerance: Patient limited by fatigue;Patient limited by endurance;Patient limited by pain  Activity Tolerance Comments: Pain in B feet limiting standing tolerance      Performance Deficits/Impairments: Decreased functional mobility ;Decreased strength;Decreased cognition;Decreased endurance;Decreased safe awareness;Decreased balance;Decreased posture;Increased pain;Decreased ROM  Discharge Recommendations: Patient would benefit from continued therapy after discharge;Home with assist PRN;Home with Home health PT  PT Equipment Recommendations  Equipment Needed:  (TBD)        GOALS  Patient Goals   Patient Goals : get ride of this swelling, so I can walk  Short Term Goals  Time Frame for Short Term Goals: 1 week  Short Term Goal 1: Pt will be able to perform bed mobility at  max A consistently  Short Term Goal 2: Pt will be able to perform sit<.stand transfers at min A consistently  Short Term Goal 3: Pt to perform lateral /pivot transfers at CGA with rolling walker  Short Term Goal 4: Pt will ambulate with rolling walker distance of 50 ft  with rolling walker, CGA  Short Term Goal 5: Pt will navigate 5 steps with 2 rails, mod A  Long Term Goals  Time Frame for Long Term Goals  : By DC  Long Term Goal 1: Pt to perform bed mobility at min/mod A  (pt sleeps in a recliner at home)  Long Term Goal 2: Pt able to perform transfers at supervision/SBA  with RW/Rollator  Long Term Goal 3: Pt to ambulate household distances with rolling walker/Rollator, at SBA/supervision level.  Long Term Goal 4: Pt able to go up and down 4 to 5 steps with 1 Rail and HHA from caregiver at min A.  Long Term Goal 5: Pt to ambulate distance of atlest 40 ft for 2MWT to improve endurance.  Long term goal 6: Car transfers  at min A  Long term goal 7: Pt able to perform 5 X sit<>stand with B UE use, < 20 seconds to improve endurance and B LE strength.  Long term goal 8: Ambulate on outside terraine with rolling walker, distance of 20 to 40 ft, CGA    PLAN OF CARE  Physical Therapy Plan  General Plan:  minutes of therapy at least 5 out of 7 days a week (5-6x/wk)  Current Treatment Recommendations: Strengthening;Balance training;Gait training;Functional mobility training;Stair training;Transfer training;Endurance training;Home exercise program;Safety education & training;Patient/Caregiver education & training;Equipment evaluation, education, & procurement;Therapeutic activities;ROM;Positioning  Safety Devices  Type of Devices: Call light within reach;Chair alarm in place;Gait belt;Nurse notified;Left in chair  Restraints  Restraints Initially in Place: No      Therapy Time     06/24/24 1110 06/24/24 1400   PT Individual Minutes   Time In 1110 1400   Time Out 1215 1430   Minutes 65 30               Danisha Horton, PTA, 06/24/24 at 5:31 PM

## 2024-06-25 LAB
GLUCOSE BLD-MCNC: 100 MG/DL (ref 75–110)
GLUCOSE BLD-MCNC: 133 MG/DL (ref 75–110)
GLUCOSE BLD-MCNC: 133 MG/DL (ref 75–110)
GLUCOSE BLD-MCNC: 139 MG/DL (ref 75–110)

## 2024-06-25 PROCEDURE — 97535 SELF CARE MNGMENT TRAINING: CPT

## 2024-06-25 PROCEDURE — 97110 THERAPEUTIC EXERCISES: CPT

## 2024-06-25 PROCEDURE — 6370000000 HC RX 637 (ALT 250 FOR IP): Performed by: PHYSICAL MEDICINE & REHABILITATION

## 2024-06-25 PROCEDURE — 97530 THERAPEUTIC ACTIVITIES: CPT

## 2024-06-25 PROCEDURE — 6370000000 HC RX 637 (ALT 250 FOR IP): Performed by: INTERNAL MEDICINE

## 2024-06-25 PROCEDURE — 99231 SBSQ HOSP IP/OBS SF/LOW 25: CPT | Performed by: INTERNAL MEDICINE

## 2024-06-25 PROCEDURE — 82947 ASSAY GLUCOSE BLOOD QUANT: CPT

## 2024-06-25 PROCEDURE — 97116 GAIT TRAINING THERAPY: CPT

## 2024-06-25 PROCEDURE — 97129 THER IVNTJ 1ST 15 MIN: CPT

## 2024-06-25 PROCEDURE — 51798 US URINE CAPACITY MEASURE: CPT

## 2024-06-25 PROCEDURE — 1180000000 HC REHAB R&B

## 2024-06-25 PROCEDURE — 51701 INSERT BLADDER CATHETER: CPT

## 2024-06-25 PROCEDURE — 97130 THER IVNTJ EA ADDL 15 MIN: CPT

## 2024-06-25 PROCEDURE — 6360000002 HC RX W HCPCS: Performed by: INTERNAL MEDICINE

## 2024-06-25 PROCEDURE — 99232 SBSQ HOSP IP/OBS MODERATE 35: CPT | Performed by: PHYSICAL MEDICINE & REHABILITATION

## 2024-06-25 RX ORDER — POLYETHYLENE GLYCOL 3350 17 G/17G
17 POWDER, FOR SOLUTION ORAL 2 TIMES DAILY
Status: DISCONTINUED | OUTPATIENT
Start: 2024-06-25 | End: 2024-06-26

## 2024-06-25 RX ADMIN — BISACODYL 10 MG: 10 SUPPOSITORY RECTAL at 17:11

## 2024-06-25 RX ADMIN — ASPIRIN 81 MG: 81 TABLET, COATED ORAL at 08:36

## 2024-06-25 RX ADMIN — AMLODIPINE BESYLATE 5 MG: 5 TABLET ORAL at 08:36

## 2024-06-25 RX ADMIN — POLYETHYLENE GLYCOL 3350 17 G: 17 POWDER, FOR SOLUTION ORAL at 21:36

## 2024-06-25 RX ADMIN — TAMSULOSIN HYDROCHLORIDE 0.4 MG: 0.4 CAPSULE ORAL at 08:36

## 2024-06-25 RX ADMIN — POLYETHYLENE GLYCOL 3350 17 G: 17 POWDER, FOR SOLUTION ORAL at 08:36

## 2024-06-25 RX ADMIN — PREGABALIN 125 MG: 25 CAPSULE ORAL at 21:36

## 2024-06-25 RX ADMIN — FUROSEMIDE 40 MG: 40 TABLET ORAL at 08:36

## 2024-06-25 RX ADMIN — PANTOPRAZOLE SODIUM 40 MG: 40 TABLET, DELAYED RELEASE ORAL at 06:14

## 2024-06-25 RX ADMIN — SENNOSIDES 17.2 MG: 8.6 TABLET, FILM COATED ORAL at 21:36

## 2024-06-25 RX ADMIN — INSULIN GLARGINE 10 UNITS: 100 INJECTION, SOLUTION SUBCUTANEOUS at 21:35

## 2024-06-25 RX ADMIN — METFORMIN HYDROCHLORIDE 1000 MG: 1000 TABLET ORAL at 08:36

## 2024-06-25 RX ADMIN — ENOXAPARIN SODIUM 40 MG: 100 INJECTION SUBCUTANEOUS at 08:36

## 2024-06-25 RX ADMIN — ATORVASTATIN CALCIUM 40 MG: 40 TABLET, FILM COATED ORAL at 21:36

## 2024-06-25 RX ADMIN — METFORMIN HYDROCHLORIDE 1000 MG: 1000 TABLET ORAL at 17:09

## 2024-06-25 RX ADMIN — PREGABALIN 125 MG: 25 CAPSULE ORAL at 08:35

## 2024-06-25 ASSESSMENT — PAIN SCALES - GENERAL: PAINLEVEL_OUTOF10: 3

## 2024-06-25 ASSESSMENT — PAIN DESCRIPTION - LOCATION: LOCATION: INCISION

## 2024-06-25 ASSESSMENT — PAIN DESCRIPTION - PAIN TYPE: TYPE: ACUTE PAIN;SURGICAL PAIN

## 2024-06-25 ASSESSMENT — PAIN DESCRIPTION - ORIENTATION: ORIENTATION: LEFT

## 2024-06-25 NOTE — PROGRESS NOTES
SPEECH LANGUAGE PATHOLOGY  Speech Language Pathology  ST ACUTE REHAB    Cognitive Treatment Note    Date: 6/25/2024  Patient’s Name: Anuj Jovel  MRN: [id number]  Diagnosis:   Patient Active Problem List   Diagnosis Code    Acquired deviated nasal septum J34.2    Actinic keratoses L57.0    Arthritis M19.90    Benign prostatic hyperplasia N40.0    Bloating R14.0    Chronic serous otitis media H65.20    Claustrophobia F40.240    Coronary artery disease I25.10    Esophageal reflux K21.9    Flatus R14.3    Hearing loss H91.90    Hemorrhoids K64.9    History of allergy Z88.9    Hyperlipidemia E78.5    Essential hypertension I10    Leg swelling M79.89    Lumbar radiculopathy M54.16    Lumbar stenosis M48.061    Skin neoplasm D49.2    Tinea corporis B35.4    Ventral hernia K43.9    Weight gain R63.5    Type 2 diabetes mellitus with complication (HCC) E11.8    Obesity E66.9    Other osteoporosis without current pathological fracture M81.8    Presence of coronary angioplasty implant and graft Z95.5    Elevated C-reactive protein (CRP) R79.82    KAREY (obstructive sleep apnea) G47.33    Hypoxia R09.02    Chronic pain syndrome G89.4    AV block, Mobitz 1 I44.1    History of placement of stent in LAD coronary artery Z95.5    Chronic heart failure with preserved ejection fraction (HFpEF) (HCC) I50.32    Mild mitral valve regurgitation I34.0    Pulmonary hypertension (HCC) I27.20    Spondylolisthesis of lumbar region M43.16    Second degree AV block, Mobitz type I I44.1    Systolic congestive heart failure (HCC) I50.20    Advanced age R54    Acute hypoxic respiratory failure (HCC) J96.01    COVID U07.1    Shortness of breath R06.02    Symptomatic bradycardia R00.1    Complete heart block (HCC) I44.2    Cardiac pacemaker in situ Z95.0    Encephalopathy acute G93.40    MCI (mild cognitive impairment) G31.84    Encephalopathy G93.40    Debility R53.81       Pain: denies     Cognitive Treatment    Treatment time:  9332-0283      Subjective: [x] Alert [x] Cooperative     [] Confused     [] Agitated    [] Lethargic      Objective/Assessment:  Attention: Occasional repetition required throughout.     Orientation: Orientation log administered, O-Log score- 23/30 (disoriented to date, year and pathology deficits).     Recall: Image retention (fishing, 12 min delay)- 75% accuracy (I), increasing to 92% c min cues.      Organization: n/a    Problem Solving/Reasoning: Reasoning, ID similar category items/odd one out (field of 3)- 71% accuracy (I), increasing to 100% c min cues.      Other: No family present.      Plan:  [x] Continue ST services    [] Discharge from ST:      Discharge recommendations: [] Inpatient Rehab   [] Skilled Nursing Facility   [] Outpatient Therapy  [] Follow up at trauma clinic   [] Other:       Treatment completed by: Shantal Rivero M.A., CCC-SLP

## 2024-06-25 NOTE — PROGRESS NOTES
[facility]   Acute Rehabilitation Occupational Therapy Daily Treatment Note    Date: 24  Patient Name: Anuj Jovel       Room: 2632/2632-01  MRN: [id number]  Account: [id number]   : 1940  (83 y.o.) Gender: male       Referring Practitioner: Issac Fountain MD  Diagnosis: Debility secondary to symptomatic bradycardia, COVID-19 with encephalopathy  Additional Pertinent Hx: Per PMR consult: \"Mr. Anuj Jovel is a 83 y.o.  male who was admitted to [facility] on 2024 with No chief complaint on file.     83-year-old male admitted with lower extremity weakness, dyspnea, estrogen and heart block has history of obstructive apnea, hypertension, hyperlipidemia, type 2 diabetes, BPH, coronary artery disease with stent in , sick sinus syndrome with Mobitz type I block heart failure with preserved ejection fraction.  He was noted to be hypoxic and.  CT chest negative for PE chest x-ray unremarkable CT head unremarkable he was positive COVID-19 CT chest showed interstitial groundglass opacities.  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  Monitor heart rate     ID COVID-positive  has received vaccinations altered mental status resolved treated with with remdesivir, Decadron Baricitinibn, out of window for Paxlovid monitor CRP, monitor off antibiotics off COVID isolation      Internal medicine acute hypoxic respite failure likely sec to pneumonia due to COVID-19 as above delirium resolved leg pain resume home gabapentin     Neuro  transient episode of confusion

## 2024-06-25 NOTE — PROGRESS NOTES
Encouraged pt to move about in bed. Pt v/u. Pt ate <50% of his dinner. Pt's bottom is purple but remains blanchable. Pt was incontinent of urine today. Pt rolls sufficiently unassisted.

## 2024-06-25 NOTE — PLAN OF CARE
Problem: Discharge Planning  Goal: Discharge to home or other facility with appropriate resources  6/25/2024 1259 by Lissa Rojas LPN  Outcome: Progressing     Problem: Safety - Adult  Goal: Free from fall injury  6/25/2024 1259 by Lissa Rojas LPN  Outcome: Progressing  Flowsheets (Taken 6/25/2024 1252)  Free From Fall Injury: Instruct family/caregiver on patient safety     Problem: Pain  Goal: Verbalizes/displays adequate comfort level or baseline comfort level  6/25/2024 1259 by Lissa Rojas LPN  Outcome: Progressing     Problem: Skin/Tissue Integrity  Goal: Absence of new skin breakdown  Description: 1.  Monitor for areas of redness and/or skin breakdown  2.  Assess vascular access sites hourly  3.  Every 4-6 hours minimum:  Change oxygen saturation probe site  4.  Every 4-6 hours:  If on nasal continuous positive airway pressure, respiratory therapy assess nares and determine need for appliance change or resting period.  6/25/2024 1259 by Lissa Rojas LPN  Outcome: Progressing     Problem: Risk for Elopement  Goal: Patient will not exit the unit/facility without proper excort  Outcome: Progressing     Problem: Nutrition Deficit:  Goal: Optimize nutritional status  Outcome: Progressing     Problem: Chronic Conditions and Co-morbidities  Goal: Patient's chronic conditions and co-morbidity symptoms are monitored and maintained or improved  Outcome: Progressing

## 2024-06-25 NOTE — PLAN OF CARE
Problem: Discharge Planning  Goal: Discharge to home or other facility with appropriate resources  6/25/2024 1702 by Breann Horton RN  Outcome: Progressing  Flowsheets (Taken 6/25/2024 1702)  Discharge to home or other facility with appropriate resources: Identify barriers to discharge with patient and caregiver  Note: Discharge planning remains ongoing     Problem: Safety - Adult  Goal: Free from fall injury  6/25/2024 1702 by Breann Horton RN  Outcome: Progressing  Note: Pt calls out appropriately, A&O x 4     Problem: Pain  Goal: Verbalizes/displays adequate comfort level or baseline comfort level  6/25/2024 1702 by Breann Horton RN  Outcome: Progressing  Flowsheets (Taken 6/25/2024 1702)  Verbalizes/displays adequate comfort level or baseline comfort level: Assess pain using appropriate pain scale  Note: Denies pain     Problem: ABCDS Injury Assessment  Goal: Absence of physical injury  6/25/2024 1702 by Breann Horton RN  Outcome: Progressing     Problem: Skin/Tissue Integrity  Goal: Absence of new skin breakdown  Description: 1.  Monitor for areas of redness and/or skin breakdown  2.  Assess vascular access sites hourly  3.  Every 4-6 hours minimum:  Change oxygen saturation probe site  4.  Every 4-6 hours:  If on nasal continuous positive airway pressure, respiratory therapy assess nares and determine need for appliance change or resting period.  6/25/2024 1702 by Breann Horton RN  Outcome: Progressing     Problem: Risk for Elopement  Goal: Patient will not exit the unit/facility without proper excort  6/25/2024 1702 by Breann Horton RN  Outcome: Progressing  Note: No exit seeking behaviors     Problem: Nutrition Deficit:  Goal: Optimize nutritional status  6/25/2024 1702 by Breann Horton RN  Outcome: Progressing  Note: Pt states appetite remains intact     Problem: Chronic Conditions and Co-morbidities  Goal: Patient's chronic conditions and co-morbidity symptoms are  monitored and maintained or improved  6/25/2024 1702 by Breann Horton, RN  Outcome: Progressing

## 2024-06-25 NOTE — PROGRESS NOTES
Physical Medicine & Rehabilitation  Progress Note    6/25/2024 2:48 PM     CC: Ambulatory and ADL dysfunction due to encephalopathy and debilitating COVID-19, third-degree HV block status post dual-chamber pacemaker    Subjective:   No complaints.  Feels well.  No further episodes of nosebleeds notes constipation difficulty voiding last BM 5 days ago    ROS:  Denies fevers, chills, sweats.  No chest pain, palpitations, lightheadedness.  Denies coughing, wheezing or shortness of breath.  Denies abdominal pain, nausea, diarrhea or constipation.  No new areas of joint pain.  Denies new areas of numbness or weakness.  Denies new anxiety or depression issues.  No new skin problems.    Rehabilitation:   PT:    Bed mobility  Supine to Sit: Maximum assistance  Sit to Supine: Moderate assistance (Assist with trunk and bilat. LE's)  Scooting: Moderate assistance  Bed Mobility Comments: Patient up in WC and Recliner in AM.  Returned to bed in PM.    Transfers  Sit to Stand: Maximum Assistance  Stand to Sit: Maximum Assistance  Bed to Chair: Maximum assistance  Stand Pivot Transfers: Maximum Assistance  Squat Pivot Transfers: Maximum Assistance  Comment: Assessed with RW, Pt completed x 3 sit<>stands.  Pt presenting with decreased balance d/t posterior lean .Pt required verbal cues for posture/not to push/pull wiht L UE, with moderate to poor return. Pt demonstrating poor standing tolerance this date.    Ambulation  Surface: Level tile  Device: Rolling Walker  Other Apparatus: Wheelchair follow  Assistance: Moderate assistance  Quality of Gait: Decreased endurance. Cues for upright posture and RW safety.  Gait Deviations: Slow Erin, Decreased step length, Decreased step height  Distance: 15 ft and 30 ft in AM;  16 ft and 32 ft in PM  Comments: Patient continues to have edema in bilat. LE's and painful feet upon standing.  More Ambulation?: No                OT:      Feeding  Assistance Level: Independent  Skilled Clinical

## 2024-06-25 NOTE — PROGRESS NOTES
Physical Therapy  Facility/Department: [facility] ACUTE REHAB    NAME: Anuj Jovel  : 1940 (83 y.o.)  MRN: [id number]  CODE STATUS: Full Code    Date of Service: 24      Past Medical History:   Diagnosis Date    Diabetes (HCC)     Diverticulitis     History of allergy     Hyperlipidemia     Hypertension     Osteoarthritis     Pneumonia due to COVID-19 virus 2020     Past Surgical History:   Procedure Laterality Date    BACK SURGERY  2017    [facility], lumbar fusion    CARDIAC CATHETERIZATION      COLECTOMY      partial colectomy due to diveritc    COLONOSCOPY      TOTAL KNEE ARTHROPLASTY Right        Chart Reviewed: No  Patient assessed for rehabilitation services?: Yes  Additional Pertinent Hx: Mr. Anuj Jovel is a 83 y.o.  male who was admitted to [facility] on 2024 with lower extremity weakness, dyspnea, estrogen and heart block has history of obstructive apnea, hypertension, hyperlipidemia, type 2 diabetes, BPH, coronary artery disease with stent in , sick sinus syndrome with Mobitz type I block heart failure with preserved ejection fraction.  He was noted to be hypoxic and.  CT chest negative for PE chest x-ray unremarkable CT head unremarkable he was positive COVID-19 CT chest showed interstitial groundglass opacities.  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  Monitor heart rate     ID COVID-positive  has received vaccinations altered mental status resolved treated with with remdesivir, Decadron Baricitinibn, out of window for Paxlovid monitor CRP,  with 2 rails, mod A  Long Term Goals  Time Frame for Long Term Goals : By DC  Long Term Goal 1: Pt to perform bed mobility at min/mod A  (pt sleeps in a recliner at home)  Long Term Goal 2: Pt able to perform transfers at supervision/SBA  with RW/Rollator  Long Term Goal 3: Pt to ambulate household distances with rolling walker/Rollator, at SBA/supervision level.  Long Term Goal 4: Pt able to go up and down 4 to 5 steps with 1 Rail and HHA from caregiver at min A.  Long Term Goal 5: Pt to ambulate distance of atlest 40 ft for 2MWT to improve endurance.  Long term goal 6: Car transfers  at min A  Long term goal 7: Pt able to perform 5 X sit<>stand with B UE use, < 20 seconds to improve endurance and B LE strength.  Long term goal 8: Ambulate on outside terraine with rolling walker, distance of 20 to 40 ft, CGA    PLAN OF CARE  Physical Therapy Plan  General Plan:  minutes of therapy at least 5 out of 7 days a week (5-6x/wk)  Current Treatment Recommendations: Strengthening;Balance training;Gait training;Functional mobility training;Stair training;Transfer training;Endurance training;Home exercise program;Safety education & training;Patient/Caregiver education & training;Equipment evaluation, education, & procurement;Therapeutic activities;ROM;Positioning  Safety Devices  Type of Devices: Call light within reach;Chair alarm in place;Gait belt;Nurse notified;Left in chair  Restraints  Restraints Initially in Place: No      Therapy Time     06/25/24 1100 06/25/24 1336   PT Individual Minutes   Time In 1100 1336   Time Out 1200 1408   Minutes 60 32               Danisha Horton, PTA, 06/25/24 at 5:04 PM

## 2024-06-25 NOTE — PROGRESS NOTES
Cardio consult to NOLBERTO Flowers covering, Message sent via [employer]:    New consult:    \"pacer site check - rather than sending out for eval\"  Site still covered in steri-strips    Pacer implanted 6/24 per notes    Thank you.

## 2024-06-25 NOTE — PLAN OF CARE
Problem: Discharge Planning  Goal: Discharge to home or other facility with appropriate resources  Outcome: Progressing     Problem: Safety - Adult  Goal: Free from fall injury  Outcome: Progressing  Flowsheets (Taken 6/25/2024 7241)  Free From Fall Injury:   Instruct family/caregiver on patient safety   Based on caregiver fall risk screen, instruct family/caregiver to ask for assistance with transferring infant if caregiver noted to have fall risk factors     Problem: Pain  Goal: Verbalizes/displays adequate comfort level or baseline comfort level  Outcome: Progressing     Problem: ABCDS Injury Assessment  Goal: Absence of physical injury  Outcome: Progressing     Problem: Skin/Tissue Integrity  Goal: Absence of new skin breakdown  Description: 1.  Monitor for areas of redness and/or skin breakdown  2.  Assess vascular access sites hourly  3.  Every 4-6 hours minimum:  Change oxygen saturation probe site  4.  Every 4-6 hours:  If on nasal continuous positive airway pressure, respiratory therapy assess nares and determine need for appliance change or resting period.  Outcome: Progressing

## 2024-06-25 NOTE — PROGRESS NOTES
IN-PATIENT SERVICE  [facility] Internal Medicine  [facility] Internal Medicine  Jung Sung MD; Marck Trinidad MD; Valdo Emery MD; MD Lynda Cisneros MD; Sammy Gee MD; Janina Calvin MD        CONSULTATION / HISTORY AND PHYSICAL EXAMINATION            Date:   6/25/2024  Patient name:  Anuj Jovel  Date of admission:  6/21/2024  7:14 PM  MRN:   [id number]  Account:  [id number]  YOB: 1940  PCP:    Jeanne Simpson MD  Room:   [address] [place] [address]  Code Status:    Full Code    Physician Requesting Consult: Mara Bernstein MD    Reason for Consult:  Medical management    Chief Complaint:     No chief complaint on file.  Debility    History Obtained From:     Patient, EMR, nursing staff    History of Present Illness:     83-year-old male history of CAD, BPH, HTN, T2DM, HLD, BPH initially admitted to [facility] on 6/8/2024 when presented for shortness of breath diagnosed with COVID-19 pneumonia noted to be bradycardic with a high-grade AV block and transferred to [place] for EP evaluation, transiently on dopamine infusion pacemaker was placed on 6/17/2024 while at [place].  Patient also completed treatment for COVID-19 pneumonia-baricitinib and remdesivir completed .  Was also treated for acute gout      Past Medical History:     Past Medical History:   Diagnosis Date    Diabetes (HCC)     Diverticulitis     History of allergy     Hyperlipidemia     Hypertension     Osteoarthritis     Pneumonia due to COVID-19 virus 12/29/2020        Past Surgical History:     Past Surgical History:   Procedure Laterality Date    BACK SURGERY  09/03/2017     [facility]  Dr Cueva, lumbar fusion    CARDIAC CATHETERIZATION      COLECTOMY  2015    partial colectomy due to diveritc    COLONOSCOPY      TOTAL KNEE ARTHROPLASTY Right         Medications Prior to Admission:     Prior to Admission medications    Medication Sig Start Date End Date Taking?  palpation  Psych: normal affect    Investigations:      Laboratory Testing:  Recent Results (from the past 24 hour(s))   POC Glucose Fingerstick    Collection Time: 06/24/24  4:10 PM   Result Value Ref Range    POC Glucose 155 (H) 75 - 110 mg/dL   POC Glucose Fingerstick    Collection Time: 06/24/24  8:10 PM   Result Value Ref Range    POC Glucose 161 (H) 75 - 110 mg/dL   POC Glucose Fingerstick    Collection Time: 06/25/24  6:13 AM   Result Value Ref Range    POC Glucose 100 75 - 110 mg/dL   POC Glucose Fingerstick    Collection Time: 06/25/24 11:32 AM   Result Value Ref Range    POC Glucose 133 (H) 75 - 110 mg/dL       Imaging/Diagonstics:  Recent data reviewed    Assessment :      Primary Problem  Encephalopathy    Active Hospital Problems    Diagnosis Date Noted    Debility [R53.81] 06/23/2024    Encephalopathy [G93.40] 06/21/2024       Plan:   83-year-old male history of CAD, BPH, HTN, T2DM, HLD, BPH initially admitted to [facility] on 6/8/2024 when presented for shortness of breath diagnosed with COVID-19 pneumonia noted to be bradycardic with a third degree AV block and transferred to [place] for EP evaluation, transiently on dopamine infusion pacemaker was placed on 6/17/2024 while at [place].  Patient also completed treatment for COVID-19 pneumonia-baricitinib and remdesivir completed .  Was also treated for acute gout  Status post pacemaker-denies any shortness of breath chest pain or palpitation heart rate controlled  Recent COVID-19 infection completed treatment with remdesivir and baricitinib-complaining of persistent cough, ordering Tessalon Perles.  Recent downtrending hemoglobin currently at 10.4, hemoglobin on 6/17 was 14.3, check stool Hemoccult, will repeat CBC tomorrow.  Hypertension blood pressure controlled resume home medication  Type 2 diabetes blood sugars controlled continue metformin, Lantus sliding scale  Coronary artery disease previous PCI to LAD-continue aspirin

## 2024-06-26 ENCOUNTER — TELEPHONE (OUTPATIENT)
Dept: UROLOGY | Age: 84
End: 2024-06-26

## 2024-06-26 LAB
BACTERIA URNS QL MICRO: ABNORMAL
BILIRUB UR QL STRIP: NEGATIVE
CASTS #/AREA URNS LPF: ABNORMAL /LPF
CLARITY UR: CLEAR
COLOR UR: YELLOW
EPI CELLS #/AREA URNS HPF: ABNORMAL /HPF
GLUCOSE BLD-MCNC: 167 MG/DL (ref 75–110)
GLUCOSE BLD-MCNC: 205 MG/DL (ref 75–110)
GLUCOSE BLD-MCNC: 87 MG/DL (ref 75–110)
GLUCOSE BLD-MCNC: 96 MG/DL (ref 75–110)
GLUCOSE UR STRIP-MCNC: NEGATIVE MG/DL
HGB UR QL STRIP.AUTO: ABNORMAL
KETONES UR STRIP-MCNC: NEGATIVE MG/DL
LEUKOCYTE ESTERASE UR QL STRIP: ABNORMAL
NITRITE UR QL STRIP: NEGATIVE
PH UR STRIP: 5.5 [PH] (ref 5–8)
PROT UR STRIP-MCNC: NEGATIVE MG/DL
RBC #/AREA URNS HPF: ABNORMAL /HPF
SP GR UR STRIP: 1.02 (ref 1–1.03)
UROBILINOGEN UR STRIP-ACNC: ABNORMAL EU/DL (ref 0–1)
WBC #/AREA URNS HPF: ABNORMAL /HPF

## 2024-06-26 PROCEDURE — 97530 THERAPEUTIC ACTIVITIES: CPT

## 2024-06-26 PROCEDURE — 51798 US URINE CAPACITY MEASURE: CPT

## 2024-06-26 PROCEDURE — 6370000000 HC RX 637 (ALT 250 FOR IP): Performed by: PHYSICAL MEDICINE & REHABILITATION

## 2024-06-26 PROCEDURE — 51702 INSERT TEMP BLADDER CATH: CPT

## 2024-06-26 PROCEDURE — 97110 THERAPEUTIC EXERCISES: CPT

## 2024-06-26 PROCEDURE — 6370000000 HC RX 637 (ALT 250 FOR IP): Performed by: INTERNAL MEDICINE

## 2024-06-26 PROCEDURE — 6360000002 HC RX W HCPCS: Performed by: INTERNAL MEDICINE

## 2024-06-26 PROCEDURE — 51701 INSERT BLADDER CATHETER: CPT

## 2024-06-26 PROCEDURE — 97129 THER IVNTJ 1ST 15 MIN: CPT

## 2024-06-26 PROCEDURE — 99232 SBSQ HOSP IP/OBS MODERATE 35: CPT | Performed by: INTERNAL MEDICINE

## 2024-06-26 PROCEDURE — 82947 ASSAY GLUCOSE BLOOD QUANT: CPT

## 2024-06-26 PROCEDURE — 81001 URINALYSIS AUTO W/SCOPE: CPT

## 2024-06-26 PROCEDURE — 1180000000 HC REHAB R&B

## 2024-06-26 PROCEDURE — 97116 GAIT TRAINING THERAPY: CPT

## 2024-06-26 PROCEDURE — 97535 SELF CARE MNGMENT TRAINING: CPT

## 2024-06-26 PROCEDURE — 99232 SBSQ HOSP IP/OBS MODERATE 35: CPT | Performed by: PHYSICAL MEDICINE & REHABILITATION

## 2024-06-26 PROCEDURE — 6370000000 HC RX 637 (ALT 250 FOR IP): Performed by: NURSE PRACTITIONER

## 2024-06-26 PROCEDURE — 97130 THER IVNTJ EA ADDL 15 MIN: CPT

## 2024-06-26 PROCEDURE — 97112 NEUROMUSCULAR REEDUCATION: CPT

## 2024-06-26 RX ORDER — POLYETHYLENE GLYCOL 3350 17 G/17G
17 POWDER, FOR SOLUTION ORAL DAILY
Status: DISCONTINUED | OUTPATIENT
Start: 2024-06-27 | End: 2024-07-04 | Stop reason: HOSPADM

## 2024-06-26 RX ORDER — FINASTERIDE 5 MG/1
5 TABLET, FILM COATED ORAL DAILY
Status: DISCONTINUED | OUTPATIENT
Start: 2024-06-26 | End: 2024-07-04 | Stop reason: HOSPADM

## 2024-06-26 RX ADMIN — ASPIRIN 81 MG: 81 TABLET, COATED ORAL at 07:22

## 2024-06-26 RX ADMIN — ENOXAPARIN SODIUM 40 MG: 100 INJECTION SUBCUTANEOUS at 07:22

## 2024-06-26 RX ADMIN — SENNOSIDES 17.2 MG: 8.6 TABLET, FILM COATED ORAL at 20:21

## 2024-06-26 RX ADMIN — METFORMIN HYDROCHLORIDE 1000 MG: 1000 TABLET ORAL at 16:57

## 2024-06-26 RX ADMIN — PREGABALIN 125 MG: 25 CAPSULE ORAL at 20:21

## 2024-06-26 RX ADMIN — PREGABALIN 125 MG: 25 CAPSULE ORAL at 07:21

## 2024-06-26 RX ADMIN — FUROSEMIDE 40 MG: 40 TABLET ORAL at 07:23

## 2024-06-26 RX ADMIN — TAMSULOSIN HYDROCHLORIDE 0.4 MG: 0.4 CAPSULE ORAL at 07:21

## 2024-06-26 RX ADMIN — FINASTERIDE 5 MG: 5 TABLET, FILM COATED ORAL at 16:57

## 2024-06-26 RX ADMIN — AMLODIPINE BESYLATE 5 MG: 5 TABLET ORAL at 07:22

## 2024-06-26 RX ADMIN — INSULIN LISPRO 4 UNITS: 100 INJECTION, SOLUTION INTRAVENOUS; SUBCUTANEOUS at 16:57

## 2024-06-26 RX ADMIN — POLYETHYLENE GLYCOL 3350 17 G: 17 POWDER, FOR SOLUTION ORAL at 07:22

## 2024-06-26 RX ADMIN — ATORVASTATIN CALCIUM 40 MG: 40 TABLET, FILM COATED ORAL at 20:21

## 2024-06-26 RX ADMIN — PANTOPRAZOLE SODIUM 40 MG: 40 TABLET, DELAYED RELEASE ORAL at 05:24

## 2024-06-26 RX ADMIN — METFORMIN HYDROCHLORIDE 1000 MG: 1000 TABLET ORAL at 07:22

## 2024-06-26 ASSESSMENT — PAIN DESCRIPTION - PAIN TYPE: TYPE: ACUTE PAIN

## 2024-06-26 ASSESSMENT — PAIN SCALES - GENERAL: PAINLEVEL_OUTOF10: 4

## 2024-06-26 ASSESSMENT — PAIN DESCRIPTION - LOCATION: LOCATION: FOOT

## 2024-06-26 ASSESSMENT — PAIN DESCRIPTION - ORIENTATION: ORIENTATION: RIGHT;LEFT;UPPER

## 2024-06-26 NOTE — PROGRESS NOTES
[facility]   Acute Rehabilitation Occupational Therapy Daily Treatment Note    Date: 24  Patient Name: Anuj Jovel       Room: 2632/2632-01  MRN: [id number]  Account: [id number]   : 1940  (83 y.o.) Gender: male       Referring Practitioner: Issac Fountain MD  Diagnosis: Debility secondary to symptomatic bradycardia, COVID-19 with encephalopathy  Additional Pertinent Hx: Per PMR consult: \"Mr. Anuj Jovel is a 83 y.o.  male who was admitted to [facility] on 2024 with No chief complaint on file.     83-year-old male admitted with lower extremity weakness, dyspnea, estrogen and heart block has history of obstructive apnea, hypertension, hyperlipidemia, type 2 diabetes, BPH, coronary artery disease with stent in , sick sinus syndrome with Mobitz type I block heart failure with preserved ejection fraction.  He was noted to be hypoxic and.  CT chest negative for PE chest x-ray unremarkable CT head unremarkable he was positive COVID-19 CT chest showed interstitial groundglass opacities.  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  Monitor heart rate     ID COVID-positive  has received vaccinations altered mental status resolved treated with with remdesivir, Decadron Baricitinibn, out of window for Paxlovid monitor CRP, monitor off antibiotics off COVID isolation      Internal medicine acute hypoxic respite failure likely sec to pneumonia due to COVID-19 as above delirium resolved leg pain resume home gabapentin     Neuro  transient episode of confusion

## 2024-06-26 NOTE — PLAN OF CARE
Problem: Discharge Planning  Goal: Discharge to home or other facility with appropriate resources  6/26/2024 1032 by Katt Miller RN  Outcome: Progressing     Problem: Safety - Adult  Goal: Free from fall injury  6/26/2024 1032 by Katt Miller RN  Outcome: Progressing     Problem: Pain  Goal: Verbalizes/displays adequate comfort level or baseline comfort level  6/26/2024 1032 by Katt Miller RN  Outcome: Progressing     Problem: ABCDS Injury Assessment  Goal: Absence of physical injury  6/26/2024 1032 by Katt Miller RN  Outcome: Progressing     Problem: Skin/Tissue Integrity  Goal: Absence of new skin breakdown  Description: 1.  Monitor for areas of redness and/or skin breakdown  2.  Assess vascular access sites hourly  3.  Every 4-6 hours minimum:  Change oxygen saturation probe site  4.  Every 4-6 hours:  If on nasal continuous positive airway pressure, respiratory therapy assess nares and determine need for appliance change or resting period.  6/26/2024 1032 by Katt Miller RN  Outcome: Progressing     Problem: Nutrition Deficit:  Goal: Optimize nutritional status  Outcome: Progressing     Problem: Chronic Conditions and Co-morbidities  Goal: Patient's chronic conditions and co-morbidity symptoms are monitored and maintained or improved  Outcome: Progressing

## 2024-06-26 NOTE — PLAN OF CARE
Problem: Discharge Planning  Goal: Discharge to home or other facility with appropriate resources  6/26/2024 0232 by Sita Neal RN  Outcome: Progressing     Problem: Safety - Adult  Goal: Free from fall injury  6/26/2024 0232 by Sita Neal RN  Outcome: Progressing     Problem: Pain  Goal: Verbalizes/displays adequate comfort level or baseline comfort level  6/26/2024 0232 by Sita Neal RN  Outcome: Progressing     Problem: ABCDS Injury Assessment  Goal: Absence of physical injury  6/26/2024 0232 by Sita Neal RN  Outcome: Progressing     Problem: Skin/Tissue Integrity  Goal: Absence of new skin breakdown  Description: 1.  Monitor for areas of redness and/or skin breakdown  2.  Assess vascular access sites hourly  3.  Every 4-6 hours minimum:  Change oxygen saturation probe site  4.  Every 4-6 hours:  If on nasal continuous positive airway pressure, respiratory therapy assess nares and determine need for appliance change or resting period.  6/26/2024 0232 by Sita Neal, RN  Outcome: Progressing

## 2024-06-26 NOTE — CONSULTS
Department of Urology  Urology Consult Note    Patient:  Anuj Jovel  MRN: [id number]  YOB: 1940    Reason for Consult:  retention  Requesting Physician:  Issac Fountain MD    CHIEF COMPLAINT:    No chief complaint on file.      History Obtained From:   patient, electronic medical record    HISTORY OF PRESENT ILLNESS:    The patient is a 83 y.o. male who is admitted to inpatient rehab d/t encephalopathy and debilitating covid-19.  Urology was asked to evaluate patient for urinary retention. He is a poor historian. I see that he was established with Dr. Escobar in the past for bph with urgency/frequency (previously on hytrin and myrbetriq), stones, and balanitis.  He has not been seen in the office in many years. He had a bladder scan this admission that showed >1 L in his bladder. Graff was placed with ~1300cc out. He is tolerating the catheter ok. Urine is clear and yellow. He is taking flomax 0.4mg po qd, which he was on prior to admission according to med rec. Bowels are moving. He has hx of DM.     Past Medical History:        Diagnosis Date    Diabetes (HCC)     Diverticulitis     History of allergy     Hyperlipidemia     Hypertension     Osteoarthritis     Pneumonia due to COVID-19 virus 12/29/2020     Past Surgical History:        Procedure Laterality Date    [facility], lumbar [facility]    CARDIAC CATHETERIZATION      COLECTOMY  2015    partial colectomy due to diveritc    COLONOSCOPY      TOTAL KNEE ARTHROPLASTY Right      Current Medications:   Current Facility-Administered Medications: [START ON 6/27/2024] polyethylene glycol (GLYCOLAX) packet 17 g, 17 g, Oral, Daily  senna (SENOKOT) tablet 17.2 mg, 2 tablet, Oral, Nightly  glucose chewable tablet 16 g, 4 tablet, Oral, PRN  dextrose bolus 10% 125 mL, 125 mL, IntraVENous, PRN **OR** dextrose bolus 10% 250 mL, 250 mL, IntraVENous, PRN  glucagon injection 1 mg, 1 mg, SubCUTAneous, PRN  dextrose 10 %  infusion, , IntraVENous, Continuous PRN  magnesium hydroxide (MILK OF MAGNESIA) 400 MG/5ML suspension 30 mL, 30 mL, Oral, Daily PRN  sodium chloride (OCEAN, BABY AYR) 0.65 % nasal spray 1 spray, 1 spray, Each Nostril, PRN  furosemide (LASIX) tablet 40 mg, 40 mg, Oral, Daily  benzonatate (TESSALON) capsule 200 mg, 200 mg, Oral, TID PRN  acetaminophen (TYLENOL) tablet 650 mg, 650 mg, Oral, Q4H PRN  bisacodyl (DULCOLAX) suppository 10 mg, 10 mg, Rectal, Daily PRN  aspirin EC tablet 81 mg, 81 mg, Oral, Daily  atorvastatin (LIPITOR) tablet 40 mg, 40 mg, Oral, Nightly  butalbital-APAP-caffeine -40 MG per capsule 1 capsule, 1 capsule, Oral, Q4H PRN  enoxaparin (LOVENOX) injection 40 mg, 40 mg, SubCUTAneous, Daily  insulin glargine (LANTUS) injection vial 10 Units, 10 Units, SubCUTAneous, Nightly  insulin lispro (HUMALOG,ADMELOG) injection vial 0-16 Units, 0-16 Units, SubCUTAneous, TID WC  melatonin tablet 3 mg, 3 mg, Oral, Nightly PRN  metFORMIN (GLUCOPHAGE) tablet 1,000 mg, 1,000 mg, Oral, BID WC  pantoprazole (PROTONIX) tablet 40 mg, 40 mg, Oral, QAM AC  pregabalin (LYRICA) capsule 125 mg, 125 mg, Oral, BID  tamsulosin (FLOMAX) capsule 0.4 mg, 0.4 mg, Oral, Daily    Allergies: ALG@    Social History:   Social History     Socioeconomic History    Marital status:      Spouse name: Not on file    Number of children: Not on file    Years of education: Not on file    Highest education level: Not on file   Occupational History     Comment: retired   Tobacco Use    Smoking status: Former     Current packs/day: 0.00     Average packs/day: 1.5 packs/day for 40.0 years (60.0 ttl pk-yrs)     Types: Cigarettes     Start date: 1951     Quit date: 1991     Years since quittin.5    Smokeless tobacco: Never   Vaping Use    Vaping Use: Never used   Substance and Sexual Activity    Alcohol use: Not Currently     Alcohol/week: 1.0 standard drink of alcohol     Types: 1 Standard drinks or equivalent per week

## 2024-06-26 NOTE — PROGRESS NOTES
IN-PATIENT SERVICE  [facility] Internal Medicine  [facility] Internal Medicine  Jung Sung MD; Marck Trinidad MD; Valdo Emery MD; MD Lynda Cisneros MD; Sammy Gee MD; Janina Calvin MD        CONSULTATION / HISTORY AND PHYSICAL EXAMINATION            Date:   6/26/2024  Patient name:  Anuj Jovel  Date of admission:  6/21/2024  7:14 PM  MRN:   [id number]  Account:  [id number]  YOB: 1940  PCP:    Jeanne Simpson MD  Room:   [address] [place] [address]  Code Status:    Full Code    Physician Requesting Consult: Mara Bernstein MD    Reason for Consult:  Medical management    Chief Complaint:     No chief complaint on file.  Debility    History Obtained From:     Patient, EMR, nursing staff    History of Present Illness:     83-year-old male history of CAD, BPH, HTN, T2DM, HLD, BPH initially admitted to [facility] on 6/8/2024 when presented for shortness of breath diagnosed with COVID-19 pneumonia noted to be bradycardic with a high-grade AV block and transferred to [place] for EP evaluation, transiently on dopamine infusion pacemaker was placed on 6/17/2024 while at [place].  Patient also completed treatment for COVID-19 pneumonia-baricitinib and remdesivir completed .  Was also treated for acute gout      Past Medical History:     Past Medical History:   Diagnosis Date    Diabetes (HCC)     Diverticulitis     History of allergy     Hyperlipidemia     Hypertension     Osteoarthritis     Pneumonia due to COVID-19 virus 12/29/2020        Past Surgical History:     Past Surgical History:   Procedure Laterality Date    BACK SURGERY  09/03/2017     [facility]  Dr Cueva, lumbar fusion    CARDIAC CATHETERIZATION      COLECTOMY  2015    partial colectomy due to diveritc    COLONOSCOPY      TOTAL KNEE ARTHROPLASTY Right         Medications Prior to Admission:     Prior to Admission medications    Medication Sig Start Date End Date Taking?  air no new issues  Blood sugars controlled  Anticipate in therapy  New urinary retention noted yesterday bladder scan showed more than 1 L in the bladder Graff inserted.  Urology consulted.  Patient was already on Flomax, Proscar added.  UA was done earlier today not concerning for infection.    Consultations:   IP CONSULT TO DIETITIAN  IP CONSULT TO SOCIAL WORK  IP CONSULT TO INTERNAL MEDICINE  IP CONSULT TO CARDIOLOGY  IP CONSULT TO UROLOGY      Lynda Contreras MD  6/26/2024  2:12 PM    Copy sent to Jeanne Mariscal MD    Please note that this chart was generated using voice recognition Dragon dictation software.  Although every effort was made to ensure the accuracy of this automated transcription, some errors in transcription may have occurred.

## 2024-06-26 NOTE — PROGRESS NOTES
Writer sent message to Dr. Kaba for consult to check pacemaker site rather then sending patient out for appt.

## 2024-06-26 NOTE — PROGRESS NOTES
Physical Therapy  Facility/Department: [facility]  Rehabilitation Physical Therapy Progress Note    NAME: Anuj Jovel  : 1940 (83 y.o.)  MRN: [id number]  CODE STATUS: Full Code    Date of Service: 24      Past Medical History:   Diagnosis Date    Diabetes (HCC)     Diverticulitis     History of allergy     Hyperlipidemia     Hypertension     Osteoarthritis     Pneumonia due to COVID-19 virus 2020     Past Surgical History:   Procedure Laterality Date    BACK SURGERY  2017    [facility], lumbar fusion    CARDIAC CATHETERIZATION      COLECTOMY      partial colectomy due to diveritc    COLONOSCOPY      TOTAL KNEE ARTHROPLASTY Right        Chart Reviewed: No  Patient assessed for rehabilitation services?: Yes  Additional Pertinent Hx: Mr. Anuj Jovel is a 83 y.o.  male who was admitted to [facility] on 2024 with lower extremity weakness, dyspnea, estrogen and heart block has history of obstructive apnea, hypertension, hyperlipidemia, type 2 diabetes, BPH, coronary artery disease with stent in , sick sinus syndrome with Mobitz type I block heart failure with preserved ejection fraction.  He was noted to be hypoxic and.  CT chest negative for PE chest x-ray unremarkable CT head unremarkable he was positive COVID-19 CT chest showed interstitial groundglass opacities.  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  Monitor heart rate     ID COVID-positive  has received vaccinations altered mental status resolved treated with with remdesivir, Decadron  week  Physical Therapy Plan  General Plan:  minutes of therapy at least 5 out of 7 days a week (5-6x/wk)  Safety Devices  Type of Devices: Call light within reach;Chair alarm in place;Gait belt;Nurse notified;Left in chair  Restraints  Restraints Initially in Place: No    EDUCATION  Education  Education Given To: Patient  Education Provided: Role of Therapy;Plan of Care;Precautions;Safety;Mobility Training;Fall Prevention Strategies;Energy Conservation;Transfer Training  Education Provided Comments: Precautions for pacemaker, safe mobility  Education Method: Demonstration;Verbal  Barriers to Learning: Cognition;Hearing  Education Outcome: Continued education needed;Demonstrated understanding;Verbalized understanding        Therapy Time     06/26/24 1100 06/26/24 1600   PT Individual Minutes   Time In 1100 1430   Time Out 1205 1505   Minutes 65 35               Nahomi Damian PTA, 06/26/24 at 4:01 PM

## 2024-06-26 NOTE — PROGRESS NOTES
return demo no LOB- Pt completes tranfer 4 times d/t constant urge for BM;      ST:    Subjective: [x] Alert     [x] Cooperative     [] Confused     [] Agitated    [] Lethargic        Objective/Assessment:  Attention: ST modified environment to reduce distractions.      Orientation: x2/4 temporal, 0/2 spatial concepts. Improved with mod A for use of visual supports.      Recall: Delayed image recall (living room, 5 minutes): 50% (I) improved to 80% with mod-max A.      Organization: NT     Problem Solving/Reasoning: Multiple causes for problem scenarios: 55% (I), required max A to expand responses and improve accuracy to 80%.      Other: Pt with visitor arriving at end of session, Pt in bed with call light in reach.         Objective:  BP (!) 101/54   Pulse 72   Temp 98.4 °F (36.9 °C) (Oral)   Resp 18   Ht 1.702 m (5' 7\")   Wt 97.8 kg (215 lb 9.6 oz)   SpO2 95%   BMI 33.77 kg/m²  I Body mass index is 33.77 kg/m². I   Wt Readings from Last 1 Encounters:   24 97.8 kg (215 lb 9.6 oz)      Temp (24hrs), Av.3 °F (36.8 °C), Min:98.1 °F (36.7 °C), Max:98.4 °F (36.9 °C)         GEN: well developed, well nourished, no acute distress  HEENT: Normocephalic atraumatic, EOMI, mucous membranes pink and moist  CV: RRR, no murmurs, rubs or gallops  PULM: CTAB, no rales or rhonchi. Respirations WNL and unlabored  ABD: soft, NT, ND, +BS x 4 and equal no guarding or rebound, soft  NEURO: A&O x3. Sensation intact to light touch.   MSK: 4+/5 distal upper extremities and lower extremities able to straight leg raise, PICC line right upper extremity  EXTREMITIES: No calf tenderness to palpation bilaterally. No edema BLEs  SKIN: warm dry and intact with good turgor, left chest wall pacer site decreased erythema  PSYCH: appropriately interactive. Affect WNL.         Medications   Scheduled Meds:   polyethylene glycol  17 g Oral BID    senna  2 tablet Oral Nightly    furosemide  40 mg Oral Daily    amLODIPine  5 mg Oral Daily     aspirin EC  81 mg Oral Daily    atorvastatin  40 mg Oral Nightly    enoxaparin  40 mg SubCUTAneous Daily    insulin glargine  10 Units SubCUTAneous Nightly    insulin lispro  0-16 Units SubCUTAneous TID WC    metFORMIN  1,000 mg Oral BID WC    pantoprazole  40 mg Oral QAM AC    pregabalin  125 mg Oral BID    tamsulosin  0.4 mg Oral Daily     Continuous Infusions:   dextrose       PRN Meds:.glucose, dextrose bolus **OR** dextrose bolus, glucagon (rDNA), dextrose, magnesium hydroxide, sodium chloride, benzonatate, acetaminophen, bisacodyl, butalbital-APAP-caffeine, melatonin     Diagnostics:     CBC:   No results for input(s): \"WBC\", \"RBC\", \"HGB\", \"HCT\", \"MCV\", \"RDW\", \"PLT\" in the last 72 hours.    BMP:   No results for input(s): \"NA\", \"K\", \"CL\", \"CO2\", \"PHOS\", \"BUN\", \"CREATININE\" in the last 72 hours.    Invalid input(s): \"CA\"    BNP: No results for input(s): \"BNP\" in the last 72 hours.  PT/INR: No results for input(s): \"PROTIME\", \"INR\" in the last 72 hours.  APTT: No results for input(s): \"APTT\" in the last 72 hours.  CARDIAC ENZYMES: No results for input(s): \"CKMB\", \"CKMBINDEX\", \"TROPONINT\" in the last 72 hours.    Invalid input(s): \"CKTOTAL;3\"  FASTING LIPID PANEL:  Lab Results   Component Value Date    CHOL 182 07/28/2022    HDL 38 (L) 07/28/2022    TRIG 123 07/28/2022     LIVER PROFILE:   No results for input(s): \"AST\", \"ALT\", \"BILIDIR\", \"BILITOT\", \"ALKPHOS\" in the last 72 hours.    Invalid input(s): \"ALB\"       I/O (24Hr):    Intake/Output Summary (Last 24 hours) at 6/26/2024 1354  Last data filed at 6/26/2024 0555  Gross per 24 hour   Intake --   Output 2000 ml   Net -2000 ml         Glu last 24 hour  Recent Labs     06/25/24  1617 06/25/24  2111 06/26/24  0544 06/26/24  1126   POCGLU 133* 139* 96 87         Recent Labs     06/26/24  0949   COLORU Yellow   PHUR 5.5   PROTEINU NEGATIVE   RBCUA 21 TO 50*   BACTERIA None   NITRU NEGATIVE   WBCUA 3 to 5*   LEUKOCYTESUR TRACE*   GLUCOSEU NEGATIVE   BILIRUBINUR

## 2024-06-26 NOTE — TELEPHONE ENCOUNTER
Patient was admitted, needs follow up.     Provider: Dr. Escobar  To follow up in office in: within 4 weeks of d/c  Reason for visit: hospital f/u, retention 1300cc, possible [place]

## 2024-06-26 NOTE — PROGRESS NOTES
SPEECH LANGUAGE PATHOLOGY  Speech Language Pathology  ST ACUTE REHAB    Cognitive Treatment Note    Date: 6/26/2024  Patient’s Name: Anuj Jovel  MRN: [id number]  Diagnosis:   Patient Active Problem List   Diagnosis Code    Acquired deviated nasal septum J34.2    Actinic keratoses L57.0    Arthritis M19.90    Benign prostatic hyperplasia N40.0    Bloating R14.0    Chronic serous otitis media H65.20    Claustrophobia F40.240    Coronary artery disease I25.10    Esophageal reflux K21.9    Flatus R14.3    Hearing loss H91.90    Hemorrhoids K64.9    History of allergy Z88.9    Hyperlipidemia E78.5    Essential hypertension I10    Leg swelling M79.89    Lumbar radiculopathy M54.16    Lumbar stenosis M48.061    Skin neoplasm D49.2    Tinea corporis B35.4    Ventral hernia K43.9    Weight gain R63.5    Type 2 diabetes mellitus with complication (HCC) E11.8    Obesity E66.9    Other osteoporosis without current pathological fracture M81.8    Presence of coronary angioplasty implant and graft Z95.5    Elevated C-reactive protein (CRP) R79.82    KAREY (obstructive sleep apnea) G47.33    Hypoxia R09.02    Chronic pain syndrome G89.4    AV block, Mobitz 1 I44.1    History of placement of stent in LAD coronary artery Z95.5    Chronic heart failure with preserved ejection fraction (HFpEF) ([facility]) I50.32    Mild mitral valve regurgitation I34.0    Pulmonary hypertension (HCC) I27.20    Spondylolisthesis of lumbar region M43.16    Second degree AV block, Mobitz type I I44.1    Systolic congestive heart failure (HCC) I50.20    Advanced age R54    Acute hypoxic respiratory failure (HCC) J96.01    COVID U07.1    Shortness of breath R06.02    Symptomatic bradycardia R00.1    Complete heart block (HCC) I44.2    Cardiac pacemaker in situ Z95.0    Encephalopathy acute G93.40    MCI (mild cognitive impairment) G31.84    Encephalopathy G93.40    Debility R53.81       Pain Rating: no c/o pain  Pain Location: N/A  Non-Pharmaceutical Pain

## 2024-06-26 NOTE — PROGRESS NOTES
06/26/24 1256   Encounter Summary   Encounter Overview/Reason Volunteer Encounter   Service Provided For Patient   Referral/Consult From Rounding   Last Encounter  06/26/24   Complexity of Encounter Low   Spiritual/Emotional needs   Type Spiritual Support   Rituals, Rites and Sacraments   Type Tenriism Communion   Assessment/Intervention/Outcome   Intervention Prayer (assurance of)/Wilson

## 2024-06-27 LAB
GLUCOSE BLD-MCNC: 106 MG/DL (ref 75–110)
GLUCOSE BLD-MCNC: 136 MG/DL (ref 75–110)
GLUCOSE BLD-MCNC: 150 MG/DL (ref 75–110)
GLUCOSE BLD-MCNC: 156 MG/DL (ref 75–110)

## 2024-06-27 PROCEDURE — 6370000000 HC RX 637 (ALT 250 FOR IP): Performed by: NURSE PRACTITIONER

## 2024-06-27 PROCEDURE — 97116 GAIT TRAINING THERAPY: CPT

## 2024-06-27 PROCEDURE — 99232 SBSQ HOSP IP/OBS MODERATE 35: CPT | Performed by: PHYSICAL MEDICINE & REHABILITATION

## 2024-06-27 PROCEDURE — 82947 ASSAY GLUCOSE BLOOD QUANT: CPT

## 2024-06-27 PROCEDURE — 6370000000 HC RX 637 (ALT 250 FOR IP): Performed by: INTERNAL MEDICINE

## 2024-06-27 PROCEDURE — 97130 THER IVNTJ EA ADDL 15 MIN: CPT

## 2024-06-27 PROCEDURE — 97530 THERAPEUTIC ACTIVITIES: CPT

## 2024-06-27 PROCEDURE — 6360000002 HC RX W HCPCS: Performed by: INTERNAL MEDICINE

## 2024-06-27 PROCEDURE — 97535 SELF CARE MNGMENT TRAINING: CPT

## 2024-06-27 PROCEDURE — 97129 THER IVNTJ 1ST 15 MIN: CPT

## 2024-06-27 PROCEDURE — 97110 THERAPEUTIC EXERCISES: CPT

## 2024-06-27 PROCEDURE — 99232 SBSQ HOSP IP/OBS MODERATE 35: CPT | Performed by: INTERNAL MEDICINE

## 2024-06-27 PROCEDURE — 6370000000 HC RX 637 (ALT 250 FOR IP): Performed by: PHYSICAL MEDICINE & REHABILITATION

## 2024-06-27 PROCEDURE — 1180000000 HC REHAB R&B

## 2024-06-27 RX ORDER — SENNOSIDES A AND B 8.6 MG/1
2 TABLET, FILM COATED ORAL NIGHTLY PRN
Status: DISCONTINUED | OUTPATIENT
Start: 2024-06-27 | End: 2024-07-04 | Stop reason: HOSPADM

## 2024-06-27 RX ORDER — FUROSEMIDE 20 MG/1
20 TABLET ORAL DAILY
Status: DISCONTINUED | OUTPATIENT
Start: 2024-06-28 | End: 2024-07-04 | Stop reason: HOSPADM

## 2024-06-27 RX ADMIN — PREGABALIN 125 MG: 25 CAPSULE ORAL at 08:01

## 2024-06-27 RX ADMIN — PANTOPRAZOLE SODIUM 40 MG: 40 TABLET, DELAYED RELEASE ORAL at 05:38

## 2024-06-27 RX ADMIN — INSULIN GLARGINE 10 UNITS: 100 INJECTION, SOLUTION SUBCUTANEOUS at 19:57

## 2024-06-27 RX ADMIN — METFORMIN HYDROCHLORIDE 1000 MG: 1000 TABLET ORAL at 08:01

## 2024-06-27 RX ADMIN — METFORMIN HYDROCHLORIDE 1000 MG: 1000 TABLET ORAL at 16:50

## 2024-06-27 RX ADMIN — ACETAMINOPHEN 650 MG: 325 TABLET ORAL at 15:09

## 2024-06-27 RX ADMIN — ATORVASTATIN CALCIUM 40 MG: 40 TABLET, FILM COATED ORAL at 19:56

## 2024-06-27 RX ADMIN — ENOXAPARIN SODIUM 40 MG: 100 INJECTION SUBCUTANEOUS at 08:01

## 2024-06-27 RX ADMIN — TAMSULOSIN HYDROCHLORIDE 0.4 MG: 0.4 CAPSULE ORAL at 08:01

## 2024-06-27 RX ADMIN — FINASTERIDE 5 MG: 5 TABLET, FILM COATED ORAL at 08:01

## 2024-06-27 RX ADMIN — FUROSEMIDE 40 MG: 40 TABLET ORAL at 08:01

## 2024-06-27 RX ADMIN — ASPIRIN 81 MG: 81 TABLET, COATED ORAL at 08:01

## 2024-06-27 RX ADMIN — PREGABALIN 125 MG: 25 CAPSULE ORAL at 19:56

## 2024-06-27 ASSESSMENT — PAIN DESCRIPTION - DESCRIPTORS: DESCRIPTORS: ACHING

## 2024-06-27 ASSESSMENT — PAIN DESCRIPTION - LOCATION
LOCATION: HEAD
LOCATION: FOOT

## 2024-06-27 ASSESSMENT — PAIN - FUNCTIONAL ASSESSMENT: PAIN_FUNCTIONAL_ASSESSMENT: ACTIVITIES ARE NOT PREVENTED

## 2024-06-27 ASSESSMENT — PAIN DESCRIPTION - ORIENTATION
ORIENTATION: LEFT;RIGHT
ORIENTATION: ANTERIOR

## 2024-06-27 ASSESSMENT — PAIN SCALES - GENERAL
PAINLEVEL_OUTOF10: 8
PAINLEVEL_OUTOF10: 4
PAINLEVEL_OUTOF10: 5

## 2024-06-27 ASSESSMENT — PAIN DESCRIPTION - PAIN TYPE: TYPE: ACUTE PAIN

## 2024-06-27 NOTE — PLAN OF CARE
Problem: Discharge Planning  Goal: Discharge to home or other facility with appropriate resources  Outcome: Progressing  Flowsheets (Taken 6/27/2024 0230)  Discharge to home or other facility with appropriate resources:   Identify barriers to discharge with patient and caregiver   Arrange for needed discharge resources and transportation as appropriate   Identify discharge learning needs (meds, wound care, etc)     Problem: Safety - Adult  Goal: Free from fall injury  Outcome: Progressing  Flowsheets (Taken 6/25/2024 1252 by Lissa Rojas LPN)  Free From Fall Injury: Instruct family/caregiver on patient safety

## 2024-06-27 NOTE — PLAN OF CARE
Problem: Discharge Planning  Goal: Discharge to home or other facility with appropriate resources  6/27/2024 1058 by Katt Miller, RN  Outcome: Progressing     Problem: Safety - Adult  Goal: Free from fall injury  6/27/2024 1058 by Katt Miller, RN  Outcome: Progressing     Problem: Pain  Goal: Verbalizes/displays adequate comfort level or baseline comfort level  Outcome: Progressing     Problem: ABCDS Injury Assessment  Goal: Absence of physical injury  Outcome: Progressing     Problem: Skin/Tissue Integrity  Goal: Absence of new skin breakdown  Description: 1.  Monitor for areas of redness and/or skin breakdown  2.  Assess vascular access sites hourly  3.  Every 4-6 hours minimum:  Change oxygen saturation probe site  4.  Every 4-6 hours:  If on nasal continuous positive airway pressure, respiratory therapy assess nares and determine need for appliance change or resting period.  Outcome: Progressing     Problem: Risk for Elopement  Goal: Patient will not exit the unit/facility without proper excort  Outcome: Progressing     Problem: Nutrition Deficit:  Goal: Optimize nutritional status  Outcome: Progressing     Problem: Chronic Conditions and Co-morbidities  Goal: Patient's chronic conditions and co-morbidity symptoms are monitored and maintained or improved  Outcome: Progressing

## 2024-06-27 NOTE — PROGRESS NOTES
Physical Therapy  Facility/Department: [facility] ACUTE REHAB  Rehabilitation Physical Therapy    NAME: Anuj Jovel  : 1940 (83 y.o.)  MRN: [id number]  CODE STATUS: Full Code    Date of Service: 24      Past Medical History:   Diagnosis Date    Diabetes (HCC)     Diverticulitis     History of allergy     Hyperlipidemia     Hypertension     Osteoarthritis     Pneumonia due to COVID-19 virus 2020     Past Surgical History:   Procedure Laterality Date    BACK SURGERY  2017    [facility]  Dr Cueva, lumbar fusion    CARDIAC CATHETERIZATION      COLECTOMY      partial colectomy due to diveritc    COLONOSCOPY      TOTAL KNEE ARTHROPLASTY Right        Chart Reviewed: No  Patient assessed for rehabilitation services?: Yes  Additional Pertinent Hx: Mr. Anuj Jovel is a 83 y.o.  male who was admitted to [facility] on 2024 with lower extremity weakness, dyspnea, estrogen and heart block has history of obstructive apnea, hypertension, hyperlipidemia, type 2 diabetes, BPH, coronary artery disease with stent in , sick sinus syndrome with Mobitz type I block heart failure with preserved ejection fraction.  He was noted to be hypoxic and.  CT chest negative for PE chest x-ray unremarkable CT head unremarkable he was positive COVID-19 CT chest showed interstitial groundglass opacities.  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  Monitor heart rate     ID COVID-positive  has received vaccinations altered mental status resolved treated with with remdesivir, Decadron Baricitinibn, out of

## 2024-06-27 NOTE — PROGRESS NOTES
Physical Medicine & Rehabilitation  Progress Note    6/27/2024 6:37 PM     CC: Ambulatory and ADL dysfunction due to encephalopathy and debilitating COVID-19, third-degree HV block status post dual-chamber pacemaker    Subjective:   No complaints.  Feels well.  Positive large BM.  Has Graff in place      ROS:  Denies fevers, chills, sweats.  No chest pain, palpitations, lightheadedness.  Denies coughing, wheezing or shortness of breath.  Denies abdominal pain, nausea, diarrhea or constipation.  No new areas of joint pain.  Denies new areas of numbness or weakness.  Denies new anxiety or depression issues.  No new skin problems.    Rehabilitation:   PT:    Bed mobility  Supine to Sit: Maximum assistance  Sit to Supine: Moderate assistance (Assist with trunk and bilat. LE's)  Scooting: Moderate assistance  Bed Mobility Comments: Patient up in WC and Recliner in AM.  Returned to bed in PM.    Transfers  Sit to Stand: Minimal Assistance  Stand to Sit: Minimal Assistance  Bed to Chair: Minimal assistance  Stand Pivot Transfers: Contact guard assistance  Squat Pivot Transfers: Moderate Assistance  Comment: Assessed with RW, Pt completed x 3 sit<>stands.  Pt presenting with decreased balance d/t posterior lean .Pt required verbal cues for posture/not to push/pull wiht L UE, with moderate to poor return. Pt demonstrating poor standing tolerance this date.    Ambulation  Surface: Level tile  Device: Rolling Walker  Other Apparatus: Wheelchair follow  Assistance: Minimal assistance (2nd assist for WC follow)  Quality of Gait: Decreased endurance. Cues for upright posture and RW safety.  Gait Deviations: Slow Erin, Decreased step length, Decreased step height  Distance: 84ft (long seated rest break and then repeated 84ft)  Comments: Pt limited by bilat foot pain. Pt unable to complete ambulation during PM session d/t uncontrollable diahrrea with transfers.  More Ambulation?: No    Stairs  # Steps : 5  Stairs Height:  (4\"  Sensation intact to light touch.   MSK: 4+/5 distal upper extremities and lower extremities able to straight leg raise, PICC line right upper extremity site clean this morning  EXTREMITIES: No calf tenderness to palpation bilaterally mild edema BLEs  SKIN: warm dry and intact with good turgor, left chest wall pacer site decreased erythema  PSYCH: appropriately interactive. Affect WNL.         Medications   Scheduled Meds:   [START ON 6/28/2024] furosemide  20 mg Oral Daily    polyethylene glycol  17 g Oral Daily    finasteride  5 mg Oral Daily    senna  2 tablet Oral Nightly    aspirin EC  81 mg Oral Daily    atorvastatin  40 mg Oral Nightly    enoxaparin  40 mg SubCUTAneous Daily    insulin glargine  10 Units SubCUTAneous Nightly    insulin lispro  0-16 Units SubCUTAneous TID WC    metFORMIN  1,000 mg Oral BID WC    pantoprazole  40 mg Oral QAM AC    pregabalin  125 mg Oral BID    tamsulosin  0.4 mg Oral Daily     Continuous Infusions:   dextrose       PRN Meds:.glucose, dextrose bolus **OR** dextrose bolus, glucagon (rDNA), dextrose, magnesium hydroxide, sodium chloride, benzonatate, acetaminophen, bisacodyl, butalbital-APAP-caffeine, melatonin     Diagnostics:     CBC:   No results for input(s): \"WBC\", \"RBC\", \"HGB\", \"HCT\", \"MCV\", \"RDW\", \"PLT\" in the last 72 hours.    BMP:   No results for input(s): \"NA\", \"K\", \"CL\", \"CO2\", \"PHOS\", \"BUN\", \"CREATININE\" in the last 72 hours.    Invalid input(s): \"CA\"    BNP: No results for input(s): \"BNP\" in the last 72 hours.  PT/INR: No results for input(s): \"PROTIME\", \"INR\" in the last 72 hours.  APTT: No results for input(s): \"APTT\" in the last 72 hours.  CARDIAC ENZYMES: No results for input(s): \"CKMB\", \"CKMBINDEX\", \"TROPONINT\" in the last 72 hours.    Invalid input(s): \"CKTOTAL;3\"  FASTING LIPID PANEL:  Lab Results   Component Value Date    CHOL 182 07/28/2022    HDL 38 (L) 07/28/2022    TRIG 123 07/28/2022     LIVER PROFILE:   No results for input(s): \"AST\", \"ALT\", \"BILIDIR\",

## 2024-06-27 NOTE — PROGRESS NOTES
06/27/24 1735   Encounter Summary   Encounter Overview/Reason  Encounter   Service Provided For Patient   Last Encounter  06/27/24   Rituals, Rites and Sacraments   Type Blessings

## 2024-06-27 NOTE — PROGRESS NOTES
IN-PATIENT SERVICE  [facility] Internal Medicine  [facility] Internal Medicine  Jung Sung MD; Marck Trinidad MD; Valdo Emery MD; MD Lynda Cisneros MD; Sammy Gee MD; Janina Calvin MD        CONSULTATION / HISTORY AND PHYSICAL EXAMINATION            Date:   6/27/2024  Patient name:  Anuj Jovel  Date of admission:  6/21/2024  7:14 PM  MRN:   [id number]  Account:  [id number]  YOB: 1940  PCP:    Jeanne Simpson MD  Room:   [address] [place] [address]  Code Status:    Full Code    Physician Requesting Consult: Mara Bernstein MD    Reason for Consult:  Medical management    Chief Complaint:     No chief complaint on file.  Debility    History Obtained From:     Patient, EMR, nursing staff    History of Present Illness:     83-year-old male history of CAD, BPH, HTN, T2DM, HLD, BPH initially admitted to [facility] on 6/8/2024 when presented for shortness of breath diagnosed with COVID-19 pneumonia noted to be bradycardic with a high-grade AV block and transferred to [place] for EP evaluation, transiently on dopamine infusion pacemaker was placed on 6/17/2024 while at [place].  Patient also completed treatment for COVID-19 pneumonia-baricitinib and remdesivir completed .  Was also treated for acute gout      Past Medical History:     Past Medical History:   Diagnosis Date    Diabetes (HCC)     Diverticulitis     History of allergy     Hyperlipidemia     Hypertension     Osteoarthritis     Pneumonia due to COVID-19 virus 12/29/2020        Past Surgical History:     Past Surgical History:   Procedure Laterality Date    BACK SURGERY  09/03/2017     [facility]  Dr Cueva, lumbar fusion    CARDIAC CATHETERIZATION      COLECTOMY  2015    partial colectomy due to diveritc    COLONOSCOPY      TOTAL KNEE ARTHROPLASTY Right         Medications Prior to Admission:     Prior to Admission medications    Medication Sig Start Date End Date Taking?

## 2024-06-27 NOTE — PROGRESS NOTES
06/27/24 1742   Encounter Summary   Encounter Overview/Reason Volunteer Encounter   Service Provided For Patient   Last Encounter  06/27/24   Rituals, Rites and Sacraments   Type Episcopal Communion

## 2024-06-27 NOTE — PROGRESS NOTES
[facility]   Acute Rehabilitation Occupational Therapy Daily Treatment Note    Date: 24  Patient Name: Anuj Jovel       Room: 2632/2632-01  MRN: [id number]  Account: [id number]   : 1940  (83 y.o.) Gender: male       Referring Practitioner: Issac Fountain MD  Diagnosis: Debility secondary to symptomatic bradycardia, COVID-19 with encephalopathy  Additional Pertinent Hx: Per PMR consult: \"Mr. Anuj Jovel is a 83 y.o.  male who was admitted to [facility] on 2024 with No chief complaint on file.     83-year-old male admitted with lower extremity weakness, dyspnea, estrogen and heart block has history of obstructive apnea, hypertension, hyperlipidemia, type 2 diabetes, BPH, coronary artery disease with stent in , sick sinus syndrome with Mobitz type I block heart failure with preserved ejection fraction.  He was noted to be hypoxic and.  CT chest negative for PE chest x-ray unremarkable CT head unremarkable he was positive COVID-19 CT chest showed interstitial groundglass opacities.  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  Monitor heart rate     ID COVID-positive  has received vaccinations altered mental status resolved treated with with remdesivir, Decadron Baricitinibn, out of window for Paxlovid monitor CRP, monitor off antibiotics off COVID isolation      Internal medicine acute hypoxic respite failure likely sec to pneumonia due to COVID-19 as above delirium resolved leg pain resume home gabapentin     Neuro  transient episode of confusion  increase IND with BADLs.  Long Term Goal 4: Pt will tolerate standing 15+ minutes while engaging in ADLs/functional tasks of choice with intermittent unilateral support in order to increase safety and IND with ADLs.  Long Term Goal 5: Pt will complete functional transfers/functional mobility with SBA with use of LRAD and min cues or less for safety in order to decrease fall risk while maintaining good adherence to pacemaker precautions.  Additional Goals?: Yes  Long Term Goal 6: Pt will demo increased bilat UE FMC as evidenced by improved 9HPT bilaterally by 10 seconds in order to increase IND with ADLs.  Long Term Goal 7: Pt and family will demo/verbalize 100% carryover of AE for ADLs as needed and GOOD understanding of home/safety fall prevention strategies to increase safety and IND with self-care and mobility.    Plan  Occupational Therapy Plan  Times Per Week: 5-7  Times Per Day: Twice a day  Current Treatment Recommendations: Strengthening, ROM, Balance training, Functional mobility training, Endurance training, Cognitive reorientation, Pain management, Safety education & training, Patient/Caregiver education & training, Equipment evaluation, education, & procurement, Self-Care / ADL, Home management training, Group Therapy       06/27/24 1006 06/27/24 1259   OT Individual Minutes   Time In 1006 1259   Time Out 1104 1331   Minutes 58 32              Electronically signed by DOMINAG Yan on 6/27/24 at 3:30 PM EDT

## 2024-06-28 PROBLEM — Z95.0 S/P PLACEMENT OF CARDIAC PACEMAKER: Status: ACTIVE | Noted: 2024-06-28

## 2024-06-28 PROBLEM — Z48.89 ENCOUNTER FOR POSTOPERATIVE WOUND CHECK: Status: ACTIVE | Noted: 2024-06-28

## 2024-06-28 LAB
GLUCOSE BLD-MCNC: 104 MG/DL (ref 75–110)
GLUCOSE BLD-MCNC: 113 MG/DL (ref 75–110)
GLUCOSE BLD-MCNC: 98 MG/DL (ref 75–110)
GLUCOSE BLD-MCNC: 99 MG/DL (ref 75–110)

## 2024-06-28 PROCEDURE — 99233 SBSQ HOSP IP/OBS HIGH 50: CPT

## 2024-06-28 PROCEDURE — 97530 THERAPEUTIC ACTIVITIES: CPT

## 2024-06-28 PROCEDURE — 6370000000 HC RX 637 (ALT 250 FOR IP): Performed by: INTERNAL MEDICINE

## 2024-06-28 PROCEDURE — 97535 SELF CARE MNGMENT TRAINING: CPT

## 2024-06-28 PROCEDURE — 6370000000 HC RX 637 (ALT 250 FOR IP): Performed by: NURSE PRACTITIONER

## 2024-06-28 PROCEDURE — 97130 THER IVNTJ EA ADDL 15 MIN: CPT

## 2024-06-28 PROCEDURE — 82947 ASSAY GLUCOSE BLOOD QUANT: CPT

## 2024-06-28 PROCEDURE — 97110 THERAPEUTIC EXERCISES: CPT

## 2024-06-28 PROCEDURE — 6370000000 HC RX 637 (ALT 250 FOR IP): Performed by: PHYSICAL MEDICINE & REHABILITATION

## 2024-06-28 PROCEDURE — 1180000000 HC REHAB R&B

## 2024-06-28 PROCEDURE — 97116 GAIT TRAINING THERAPY: CPT

## 2024-06-28 PROCEDURE — 6360000002 HC RX W HCPCS: Performed by: INTERNAL MEDICINE

## 2024-06-28 PROCEDURE — 97129 THER IVNTJ 1ST 15 MIN: CPT

## 2024-06-28 PROCEDURE — 99232 SBSQ HOSP IP/OBS MODERATE 35: CPT | Performed by: PHYSICAL MEDICINE & REHABILITATION

## 2024-06-28 RX ADMIN — Medication 3 MG: at 21:07

## 2024-06-28 RX ADMIN — ASPIRIN 81 MG: 81 TABLET, COATED ORAL at 10:00

## 2024-06-28 RX ADMIN — FINASTERIDE 5 MG: 5 TABLET, FILM COATED ORAL at 10:00

## 2024-06-28 RX ADMIN — METFORMIN HYDROCHLORIDE 1000 MG: 1000 TABLET ORAL at 10:00

## 2024-06-28 RX ADMIN — PREGABALIN 125 MG: 25 CAPSULE ORAL at 10:00

## 2024-06-28 RX ADMIN — FUROSEMIDE 20 MG: 20 TABLET ORAL at 10:00

## 2024-06-28 RX ADMIN — INSULIN GLARGINE 10 UNITS: 100 INJECTION, SOLUTION SUBCUTANEOUS at 21:05

## 2024-06-28 RX ADMIN — ENOXAPARIN SODIUM 40 MG: 100 INJECTION SUBCUTANEOUS at 10:00

## 2024-06-28 RX ADMIN — ATORVASTATIN CALCIUM 40 MG: 40 TABLET, FILM COATED ORAL at 21:05

## 2024-06-28 RX ADMIN — METFORMIN HYDROCHLORIDE 1000 MG: 1000 TABLET ORAL at 17:25

## 2024-06-28 RX ADMIN — POLYETHYLENE GLYCOL 3350 17 G: 17 POWDER, FOR SOLUTION ORAL at 10:05

## 2024-06-28 RX ADMIN — PANTOPRAZOLE SODIUM 40 MG: 40 TABLET, DELAYED RELEASE ORAL at 05:47

## 2024-06-28 RX ADMIN — PREGABALIN 125 MG: 25 CAPSULE ORAL at 21:05

## 2024-06-28 RX ADMIN — TAMSULOSIN HYDROCHLORIDE 0.4 MG: 0.4 CAPSULE ORAL at 10:00

## 2024-06-28 ASSESSMENT — PAIN SCALES - GENERAL
PAINLEVEL_OUTOF10: 0
PAINLEVEL_OUTOF10: 8

## 2024-06-28 ASSESSMENT — PAIN DESCRIPTION - LOCATION: LOCATION: BACK;BUTTOCKS

## 2024-06-28 NOTE — CONSULTS
[facility]    Consult                        Today's Date: 6/28/2024  Patient Name: Anuj Jovel  Date of admission: 6/21/2024  7:14 PM  Patient's age: 83 y.o., 1940  Admission Dx: Encephalopathy [G93.40]    Reason for Consult:  Cardiac evaluation    Requesting Physician: Mara Bernstein MD    CHIEF COMPLAINT:  wound check on PPM site    History Obtained From:  patient, electronic medical record    HISTORY OF PRESENT ILLNESS:      \"83-year-old male history of CAD, BPH, HTN, T2DM, HLD, BPH initially admitted to [facility] on 6/8/2024 when presented for shortness of breath diagnosed with COVID-19 pneumonia noted to be bradycardic with a high-grade AV block and transferred to [facility] [facility] for EP evaluation, transiently on dopamine infusion pacemaker was placed on 6/17/2024 while at [place] [facility]. Patient also completed treatment for COVID-19 pneumonia-baricitinib and remdesivir completed . Was also treated for acute \" per prior documentation    Cardiology consulted for wound check while in acute rehab.     Past Medical History:   has a past medical history of Diabetes (HCC), Diverticulitis, History of allergy, Hyperlipidemia, Hypertension, Osteoarthritis, and Pneumonia due to COVID-19 virus.    Past Surgical History:   has a past surgical history that includes Cardiac catheterization; Colonoscopy; Total knee arthroplasty (Right); colectomy (2015); and back surgery (09/03/2017).     Home Medications:    Prior to Admission medications    Medication Sig Start Date End Date Taking? Authorizing Provider   tamsulosin (FLOMAX) 0.4 MG capsule Take 1 capsule by mouth daily 6/3/24   Marla Galvez PA-C   atorvastatin (LIPITOR) 40 MG tablet TAKE 1 TABLET AT BEDTIME 5/20/24   Stella Chew DO   pregabalin (LYRICA) 100 MG capsule Take 1 capsule by mouth 2 times daily for 90 days. Max Daily Amount: 200 mg 5/20/24 8/18/24  Mony Catalan MD   nitroGLYCERIN (NITROSTAT) 0.4 MG SL  \"BNP\" in the last 72 hours.  PT/INR: No results for input(s): \"PROTIME\", \"INR\" in the last 72 hours.  APTT:No results for input(s): \"APTT\" in the last 72 hours.  CARDIAC ENZYMES:No results for input(s): \"CKTOTAL\", \"CKMB\", \"CKMBINDEX\", \"TROPONINI\" in the last 72 hours.  FASTING LIPID PANEL:  Lab Results   Component Value Date/Time    HDL 38 07/28/2022 08:54 AM    TRIG 123 07/28/2022 08:54 AM     LIVER PROFILE:No results for input(s): \"AST\", \"ALT\", \"LABALBU\" in the last 72 hours.    IMPRESSION:      Symptomatic bradycardia   3rd degree AV block   S/p dual chamber pacemaker  6/17/24  [employer]. Model #W1DR01  Serial #[id number].  DDDR mode   COVID-19 infection/pneumonia  History of CAD with previous PCI to LAD  HFpEF  Diabetes mellitus  Hypertension    RECOMMENDATIONS:  Consulted for Wound check while inpt s/p PPM on 6/17/24. Steri-strips intact. No redness/tenderness, excessive swelling, or ecchymosis.   Discussed in detail with patient continued activity restriction including but not limited to site care, diet, exercise/activity restrictions, lifting and arm movement restrictions, pain control, use of ice for swelling, and follow-up with TCC.       Discussed with patient and Nurse    LILLI Sneed - CNP    Attending Cardiologist Addendum: I have reviewed the history, physical, subjective, objective, assessment, and plan with the CNP and agree with the note. I have made changes to the note above as needed.    Thank you for allowing me to participate in the care of this patient, please do not hesitate to call if you have any questions.    Santosh Kaba DO, [facility]  Board Certified Cardiologist  Fellow of the American College of Cardiology    Obesity & Weight Loss Medicine   of Medicine [facility]   of Medicine [facility]  [facility]  [phone]

## 2024-06-28 NOTE — PROGRESS NOTES
[id number]      Subjective: [x] Alert [x] Cooperative     [] Confused     [] Agitated    [] Lethargic      Objective/Assessment:  Attention: Occasional repetition required throughout.     Orientation: n/a    Recall: Pt. Education provided re: compensatory strategies to facilitate recall (e.g., ID key words, repetition and rehearsal).  Pt. Verbalized understanding and facilitating strategy to complete the following task:    Paragraph recall (4-7 sentences): 94% accuracy (I), increasing to 100% c cues.    Organization: Convergent categorization, ID category of 3 similar items (abstract)- 70% accuracy (I), increasing to 100% c min cues.  Divergent thinking, add 1 to list of 3 similar items (abstract)- 70% accuracy (I), increasing to 100% c min cues.  Pt recognizing some difficulty with task, \"wow, I'm really losing it\".  Encouragement and cueing provided by writer.     Problem Solving/Reasoning: n/a    Other: No family present.  Pt pleasant and conversational throughout.     Plan:  [x] Continue ST services    [] Discharge from ST:      Discharge recommendations: [] Inpatient Rehab   [] Skilled Nursing Facility   [] Outpatient Therapy  [] Follow up at trauma clinic   [] Other:       Treatment completed by: Shantal Rivero M.A., CCC-SLP

## 2024-06-28 NOTE — PLAN OF CARE
Problem: Discharge Planning  Goal: Discharge to home or other facility with appropriate resources  Outcome: Progressing  Flowsheets (Taken 6/27/2024 0230)  Discharge to home or other facility with appropriate resources:   Identify barriers to discharge with patient and caregiver   Arrange for needed discharge resources and transportation as appropriate   Identify discharge learning needs (meds, wound care, etc)     Problem: Safety - Adult  Goal: Free from fall injury  Outcome: Progressing  Flowsheets (Taken 6/28/2024 0453)  Free From Fall Injury: Instruct family/caregiver on patient safety     Problem: Discharge Planning  Goal: Discharge to home or other facility with appropriate resources  Outcome: Progressing  Flowsheets (Taken 6/27/2024 0230)  Discharge to home or other facility with appropriate resources:   Identify barriers to discharge with patient and caregiver   Arrange for needed discharge resources and transportation as appropriate   Identify discharge learning needs (meds, wound care, etc)     Problem: Safety - Adult  Goal: Free from fall injury  Outcome: Progressing  Flowsheets (Taken 6/28/2024 0453)  Free From Fall Injury: Instruct family/caregiver on patient safety

## 2024-06-28 NOTE — PROGRESS NOTES
Physical Therapy  Facility/Department: [facility] ACUTE REHAB  Rehabilitation Physical Therapy    NAME: Anuj Jovel  : 1940 (83 y.o.)  MRN: [id number]  CODE STATUS: Full Code    Date of Service: 24      Past Medical History:   Diagnosis Date    Diabetes (HCC)     Diverticulitis     History of allergy     Hyperlipidemia     Hypertension     Osteoarthritis     Pneumonia due to COVID-19 virus 2020     Past Surgical History:   Procedure Laterality Date    BACK SURGERY  2017    [facility]  Dr Cueva, lumbar fusion    CARDIAC CATHETERIZATION      COLECTOMY      partial colectomy due to diveritc    COLONOSCOPY      TOTAL KNEE ARTHROPLASTY Right        Chart Reviewed: No  Patient assessed for rehabilitation services?: Yes  Additional Pertinent Hx: Mr. Anuj Jovel is a 83 y.o.  male who was admitted to [facility] on 2024 with lower extremity weakness, dyspnea, estrogen and heart block has history of obstructive apnea, hypertension, hyperlipidemia, type 2 diabetes, BPH, coronary artery disease with stent in , sick sinus syndrome with Mobitz type I block heart failure with preserved ejection fraction.  He was noted to be hypoxic and.  CT chest negative for PE chest x-ray unremarkable CT head unremarkable he was positive COVID-19 CT chest showed interstitial groundglass opacities.  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  Monitor heart rate     ID COVID-positive  has received vaccinations altered mental status resolved treated with with remdesivir, Decadron Baricitinibn, out of  Exercises: Seated UBE right UE 5 min forward.  Functional Mobility Circuit Training: STS x5 in a row with correct posture and safe hand placement  Dynamic Standing Balance Exercises: Dynamic reaching out of base of support  Exercise Equipment: NuStep x 15 mins. Level 3 resistence.  Bilat LE only (arms resting in lap). 3 rest breaks.  Exercise: Wheelchair propulsion with bilat LE for strengthening and endurance - 110ft      ASSESSMENT       Activity Tolerance  Activity Tolerance: Patient tolerated treatment well    Assessment  Performance Deficits/Impairments: Decreased functional mobility ;Decreased strength;Decreased cognition;Decreased endurance;Decreased safe awareness;Decreased balance;Decreased posture;Increased pain;Decreased ROM  Treatment Diagnosis: Impaired mobility  Therapy Prognosis: Fair  Decision Making: Medium Complexity  Barriers to Learning: Cognition  Discharge Recommendations: Patient would benefit from continued therapy after discharge;Home with assist PRN;Home with Home health PT  PT D/C Equipment  Walker: Rolling  Other: CTA  PT Equipment Recommendations  Equipment Needed:  (TBD)  Walker: Rolling  Other: CTA    CLINICAL IMPRESSION        GOALS  Patient Goals   Patient Goals : get rid of this swelling, so I can walk  Short Term Goals  Time Frame for Short Term Goals: 1 week  Short Term Goal 1: Pt will be able to perform bed mobility at  max A consistently  Short Term Goal 2: Pt will be able to perform sit<.stand transfers at min A consistently  Short Term Goal 3: Pt to perform lateral /pivot transfers at CGA with rolling walker  Short Term Goal 4: Pt will ambulate with rolling walker distance of 50 ft  with rolling walker, CGA  Short Term Goal 5: Pt will navigate 5 steps with 2 rails, mod A  Long Term Goals  Time Frame for Long Term Goals : By DC  Long Term Goal 1: Pt to perform bed mobility at min/mod A  (pt sleeps in a recliner at home)  Long Term Goal 2: Pt able to perform transfers at

## 2024-06-28 NOTE — PROGRESS NOTES
06/28/24 1352   Encounter Summary   Encounter Overview/Reason Volunteer Encounter   Service Provided For Patient   Referral/Consult From Rounding   Last Encounter  06/28/24   Complexity of Encounter Low   Spiritual/Emotional needs   Type Spiritual Support   Rituals, Rites and Sacraments   Type Rastafarian Communion   Assessment/Intervention/Outcome   Intervention Prayer (assurance of)/[place]

## 2024-06-28 NOTE — PLAN OF CARE
Problem: Discharge Planning  Goal: Discharge to home or other facility with appropriate resources  6/28/2024 1510 by Esther Nelson RN  Outcome: Progressing  Flowsheets (Taken 6/28/2024 0800)  Discharge to home or other facility with appropriate resources:   Identify barriers to discharge with patient and caregiver   Arrange for needed discharge resources and transportation as appropriate  6/28/2024 0453 by Lisbeth Herrera RN  Outcome: Progressing  Flowsheets (Taken 6/27/2024 0230)  Discharge to home or other facility with appropriate resources:   Identify barriers to discharge with patient and caregiver   Arrange for needed discharge resources and transportation as appropriate   Identify discharge learning needs (meds, wound care, etc)     Problem: Safety - Adult  Goal: Free from fall injury  6/28/2024 1510 by Esther Nelson RN  Outcome: Progressing  6/28/2024 0453 by Lisbeth Herrera RN  Outcome: Progressing  Flowsheets (Taken 6/28/2024 0453)  Free From Fall Injury: Instruct family/caregiver on patient safety     Problem: Pain  Goal: Verbalizes/displays adequate comfort level or baseline comfort level  Outcome: Progressing     Problem: ABCDS Injury Assessment  Goal: Absence of physical injury  Outcome: Progressing     Problem: Skin/Tissue Integrity  Goal: Absence of new skin breakdown  Description: 1.  Monitor for areas of redness and/or skin breakdown  2.  Assess vascular access sites hourly  3.  Every 4-6 hours minimum:  Change oxygen saturation probe site  4.  Every 4-6 hours:  If on nasal continuous positive airway pressure, respiratory therapy assess nares and determine need for appliance change or resting period.  Outcome: Progressing     Problem: Risk for Elopement  Goal: Patient will not exit the unit/facility without proper excort  Outcome: Progressing     Problem: Nutrition Deficit:  Goal: Optimize nutritional status  Outcome: Progressing     Problem: Chronic Conditions and Co-morbidities  Goal: Patient's

## 2024-06-28 NOTE — PROGRESS NOTES
extremities able to straight leg raise,   EXTREMITIES: No calf tenderness to palpation bilaterally mild edema BLEs  SKIN: warm dry and intact with good turgor, left chest wall pacer site clean no signs or symptom infection 6/28  PSYCH: appropriately interactive. Affect WNL.         Medications   Scheduled Meds:   furosemide  20 mg Oral Daily    polyethylene glycol  17 g Oral Daily    finasteride  5 mg Oral Daily    aspirin EC  81 mg Oral Daily    atorvastatin  40 mg Oral Nightly    enoxaparin  40 mg SubCUTAneous Daily    insulin glargine  10 Units SubCUTAneous Nightly    insulin lispro  0-16 Units SubCUTAneous TID WC    metFORMIN  1,000 mg Oral BID WC    pantoprazole  40 mg Oral QAM AC    pregabalin  125 mg Oral BID    tamsulosin  0.4 mg Oral Daily     Continuous Infusions:   dextrose       PRN Meds:.senna, glucose, dextrose bolus **OR** dextrose bolus, glucagon (rDNA), dextrose, magnesium hydroxide, sodium chloride, benzonatate, acetaminophen, bisacodyl, butalbital-APAP-caffeine, melatonin     Diagnostics:     CBC:   No results for input(s): \"WBC\", \"RBC\", \"HGB\", \"HCT\", \"MCV\", \"RDW\", \"PLT\" in the last 72 hours.    BMP:   No results for input(s): \"NA\", \"K\", \"CL\", \"CO2\", \"PHOS\", \"BUN\", \"CREATININE\" in the last 72 hours.    Invalid input(s): \"CA\"    BNP: No results for input(s): \"BNP\" in the last 72 hours.  PT/INR: No results for input(s): \"PROTIME\", \"INR\" in the last 72 hours.  APTT: No results for input(s): \"APTT\" in the last 72 hours.  CARDIAC ENZYMES: No results for input(s): \"CKMB\", \"CKMBINDEX\", \"TROPONINT\" in the last 72 hours.    Invalid input(s): \"CKTOTAL;3\"  FASTING LIPID PANEL:  Lab Results   Component Value Date    CHOL 182 07/28/2022    HDL 38 (L) 07/28/2022    TRIG 123 07/28/2022     LIVER PROFILE:   No results for input(s): \"AST\", \"ALT\", \"BILIDIR\", \"BILITOT\", \"ALKPHOS\" in the last 72 hours.    Invalid input(s): \"ALB\"       I/O (24Hr):    Intake/Output Summary (Last 24 hours) at 6/28/2024 1525  Last data  outpatient consider trial DC next week  Anemia hemoglobin 10.4- stool Hemoccult ordered 6/22 per internal medicine, await result recheck in a.m.  Obesity:  BMI 32.14.  Dietician following  Bowel Management: Miralax daily changed to twice daily, senokot nightly, dulcolax prn.  senna to nightly, MiraLAX to twice daily change back to daily-positive large BM 6/28  Sacral wound-wound care follow  DVT Prophylaxis:  low molecular weight heparin  Internal Medicine for medical management  Follow up PCP 1-2 weeks, Cardiology, Neurology 4-6 weeks      Issac Ch MD     Electronically signed by Issac Ch MD on 6/28/2024 at 3:25 PM     This note is created with the assistance of a speech recognition program.  While intending to generate a document that actually reflects the content of the visit, the document can still have some errors including those of syntax and sound a like substitutions which may escape proof reading.  In such instances, actual meaning can be extrapolated by contextual diversion

## 2024-06-28 NOTE — PROGRESS NOTES
inpatient mild cognitive impairment, CT head, CTA head and neck unremarkable not a candidate for MRI plan to get EEG, follow-up in clinic 4 to 6 weeks, suspected delirium with low cognitive reserve     Pulmonary-questional on doxycycline, Bumex held echo shows mild pulm hypertension will need follow-up as outpatient few months, recommend CPAP for sleep apnea and pulmonary rehab Home O2 evaluation\"    Treatment Diagnosis: Impaired self-care status    Past Medical History:  has a past medical history of Diabetes (HCC), Diverticulitis, History of allergy, Hyperlipidemia, Hypertension, Osteoarthritis, and Pneumonia due to COVID-19 virus.    Past Surgical History:   has a past surgical history that includes Cardiac catheterization; Colonoscopy; Total knee arthroplasty (Right); colectomy (2015); and back surgery (09/03/2017).    Restrictions  Restrictions/Precautions  Restrictions/Precautions: Fall Risk, Other (comment)  Required Braces or Orthoses?: No  Implants present? : Pacemaker, Metal implants (pacemaker placed 6/17, bilat TKA)     Position Activity Restriction  Other position/activity restrictions: activity as tolerated, Recent pacemaker insertion on 6/17/24-Avoid pushing/lifting with LUE avois lifting L UE over shoulder height.     Vitals  Vital Signs  O2 Device: None (Room air)     Subjective  Subjective  Subjective: AM: Pt alert and laying in bed upon arrival. PM: Pt alert and sitting in recliner chair upon arrival.  Pain: AM: pt reports 0/10 . PM: Pt rates 8/10 achy pain in his lower back and bottom.  Pain Assessment  Pain Assessment: 0-10  Pain Level: 8  Pain Location: Back;Buttocks    Objective  Cognition  Overall Orientation Status: Within Functional Limits     Cognition  Overall Cognitive Status: Exceptions  Arousal/Alertness: Appropriate responses to stimuli  Following Commands: Follows multistep commands with increased time  Attention Span: Attends with cues to redirect  Memory: Decreased recall of  Therapy     06/28/24 0759 06/28/24 1300   OT Individual Minutes   Time In 0759 1300   Time Out 0900 1330   Minutes 61 30   Time Code Minutes    Timed Code Treatment Minutes 61 Minutes 30 Minutes     Electronically signed by PHUC PRUITT S/OT on 6/28/24 at 3:03 PM EDT

## 2024-06-29 LAB
ANION GAP SERPL CALCULATED.3IONS-SCNC: 9 MMOL/L (ref 9–17)
BASOPHILS # BLD: 0.1 K/UL (ref 0–0.2)
BASOPHILS NFR BLD: 1 % (ref 0–2)
BUN SERPL-MCNC: 16 MG/DL (ref 8–23)
CALCIUM SERPL-MCNC: 8.3 MG/DL (ref 8.6–10.4)
CHLORIDE SERPL-SCNC: 101 MMOL/L (ref 98–107)
CO2 SERPL-SCNC: 26 MMOL/L (ref 20–31)
CREAT SERPL-MCNC: 0.7 MG/DL (ref 0.7–1.2)
EOSINOPHIL # BLD: 0.3 K/UL (ref 0–0.4)
EOSINOPHILS RELATIVE PERCENT: 3 % (ref 0–4)
ERYTHROCYTE [DISTWIDTH] IN BLOOD BY AUTOMATED COUNT: 13.7 % (ref 11.5–14.9)
GFR, ESTIMATED: >90 ML/MIN/1.73M2
GLUCOSE BLD-MCNC: 105 MG/DL (ref 75–110)
GLUCOSE BLD-MCNC: 133 MG/DL (ref 75–110)
GLUCOSE BLD-MCNC: 153 MG/DL (ref 75–110)
GLUCOSE BLD-MCNC: 96 MG/DL (ref 75–110)
GLUCOSE SERPL-MCNC: 105 MG/DL (ref 70–99)
HCT VFR BLD AUTO: 29.6 % (ref 41–53)
HGB BLD-MCNC: 10 G/DL (ref 13.5–17.5)
LYMPHOCYTES NFR BLD: 1.2 K/UL (ref 1–4.8)
LYMPHOCYTES RELATIVE PERCENT: 15 % (ref 24–44)
MCH RBC QN AUTO: 31.9 PG (ref 26–34)
MCHC RBC AUTO-ENTMCNC: 33.6 G/DL (ref 31–37)
MCV RBC AUTO: 94.9 FL (ref 80–100)
MONOCYTES NFR BLD: 0.5 K/UL (ref 0.1–1.3)
MONOCYTES NFR BLD: 6 % (ref 1–7)
NEUTROPHILS NFR BLD: 75 % (ref 36–66)
NEUTS SEG NFR BLD: 5.8 K/UL (ref 1.3–9.1)
PLATELET # BLD AUTO: 218 K/UL (ref 150–450)
PMV BLD AUTO: 7 FL (ref 6–12)
POTASSIUM SERPL-SCNC: 4.2 MMOL/L (ref 3.7–5.3)
RBC # BLD AUTO: 3.12 M/UL (ref 4.5–5.9)
SODIUM SERPL-SCNC: 136 MMOL/L (ref 135–144)
WBC OTHER # BLD: 7.8 K/UL (ref 3.5–11)

## 2024-06-29 PROCEDURE — 97116 GAIT TRAINING THERAPY: CPT

## 2024-06-29 PROCEDURE — 97110 THERAPEUTIC EXERCISES: CPT

## 2024-06-29 PROCEDURE — 99232 SBSQ HOSP IP/OBS MODERATE 35: CPT | Performed by: PHYSICAL MEDICINE & REHABILITATION

## 2024-06-29 PROCEDURE — 97535 SELF CARE MNGMENT TRAINING: CPT

## 2024-06-29 PROCEDURE — 6370000000 HC RX 637 (ALT 250 FOR IP): Performed by: NURSE PRACTITIONER

## 2024-06-29 PROCEDURE — 97530 THERAPEUTIC ACTIVITIES: CPT

## 2024-06-29 PROCEDURE — 97129 THER IVNTJ 1ST 15 MIN: CPT

## 2024-06-29 PROCEDURE — 85025 COMPLETE CBC W/AUTO DIFF WBC: CPT

## 2024-06-29 PROCEDURE — 6360000002 HC RX W HCPCS: Performed by: INTERNAL MEDICINE

## 2024-06-29 PROCEDURE — 82947 ASSAY GLUCOSE BLOOD QUANT: CPT

## 2024-06-29 PROCEDURE — 97130 THER IVNTJ EA ADDL 15 MIN: CPT

## 2024-06-29 PROCEDURE — 6370000000 HC RX 637 (ALT 250 FOR IP): Performed by: INTERNAL MEDICINE

## 2024-06-29 PROCEDURE — 99232 SBSQ HOSP IP/OBS MODERATE 35: CPT | Performed by: INTERNAL MEDICINE

## 2024-06-29 PROCEDURE — 80048 BASIC METABOLIC PNL TOTAL CA: CPT

## 2024-06-29 PROCEDURE — 6370000000 HC RX 637 (ALT 250 FOR IP): Performed by: PHYSICAL MEDICINE & REHABILITATION

## 2024-06-29 PROCEDURE — 1180000000 HC REHAB R&B

## 2024-06-29 PROCEDURE — 36415 COLL VENOUS BLD VENIPUNCTURE: CPT

## 2024-06-29 RX ADMIN — ENOXAPARIN SODIUM 40 MG: 100 INJECTION SUBCUTANEOUS at 07:44

## 2024-06-29 RX ADMIN — TAMSULOSIN HYDROCHLORIDE 0.4 MG: 0.4 CAPSULE ORAL at 07:44

## 2024-06-29 RX ADMIN — ATORVASTATIN CALCIUM 40 MG: 40 TABLET, FILM COATED ORAL at 21:51

## 2024-06-29 RX ADMIN — INSULIN GLARGINE 10 UNITS: 100 INJECTION, SOLUTION SUBCUTANEOUS at 21:50

## 2024-06-29 RX ADMIN — POLYETHYLENE GLYCOL 3350 17 G: 17 POWDER, FOR SOLUTION ORAL at 07:44

## 2024-06-29 RX ADMIN — PANTOPRAZOLE SODIUM 40 MG: 40 TABLET, DELAYED RELEASE ORAL at 05:06

## 2024-06-29 RX ADMIN — METFORMIN HYDROCHLORIDE 1000 MG: 1000 TABLET ORAL at 07:44

## 2024-06-29 RX ADMIN — ACETAMINOPHEN 650 MG: 325 TABLET ORAL at 12:31

## 2024-06-29 RX ADMIN — PREGABALIN 125 MG: 25 CAPSULE ORAL at 21:51

## 2024-06-29 RX ADMIN — ASPIRIN 81 MG: 81 TABLET, COATED ORAL at 07:44

## 2024-06-29 RX ADMIN — FUROSEMIDE 20 MG: 20 TABLET ORAL at 07:45

## 2024-06-29 RX ADMIN — METFORMIN HYDROCHLORIDE 1000 MG: 1000 TABLET ORAL at 18:44

## 2024-06-29 RX ADMIN — PREGABALIN 125 MG: 25 CAPSULE ORAL at 07:44

## 2024-06-29 RX ADMIN — FINASTERIDE 5 MG: 5 TABLET, FILM COATED ORAL at 07:44

## 2024-06-29 ASSESSMENT — PAIN SCALES - WONG BAKER: WONGBAKER_NUMERICALRESPONSE: HURTS A LITTLE BIT

## 2024-06-29 ASSESSMENT — PAIN DESCRIPTION - ONSET: ONSET: GRADUAL

## 2024-06-29 ASSESSMENT — PAIN DESCRIPTION - LOCATION: LOCATION: BACK

## 2024-06-29 ASSESSMENT — PAIN DESCRIPTION - ORIENTATION: ORIENTATION: ANTERIOR

## 2024-06-29 ASSESSMENT — PAIN SCALES - GENERAL
PAINLEVEL_OUTOF10: 2
PAINLEVEL_OUTOF10: 0
PAINLEVEL_OUTOF10: 10

## 2024-06-29 ASSESSMENT — PAIN - FUNCTIONAL ASSESSMENT: PAIN_FUNCTIONAL_ASSESSMENT: ACTIVITIES ARE NOT PREVENTED

## 2024-06-29 ASSESSMENT — PAIN DESCRIPTION - FREQUENCY: FREQUENCY: INTERMITTENT

## 2024-06-29 ASSESSMENT — PAIN DESCRIPTION - PAIN TYPE: TYPE: ACUTE PAIN

## 2024-06-29 ASSESSMENT — PAIN DESCRIPTION - DESCRIPTORS: DESCRIPTORS: ACHING

## 2024-06-29 NOTE — PLAN OF CARE
Problem: Discharge Planning  Goal: Discharge to home or other facility with appropriate resources  6/28/2024 2340 by Dianne Lilly LPN  Outcome: Progressing     Problem: Safety - Adult  Goal: Free from fall injury  6/28/2024 2340 by Dianne Lilly LPN  Outcome: Progressing     Problem: Pain  Goal: Verbalizes/displays adequate comfort level or baseline comfort level  6/28/2024 2340 by Dianne Lilly LPN  Outcome: Progressing     Problem: ABCDS Injury Assessment  Goal: Absence of physical injury  6/28/2024 2340 by Dianne Lilly LPN  Outcome: Progressing     Problem: Skin/Tissue Integrity  Goal: Absence of new skin breakdown  Description: 1.  Monitor for areas of redness and/or skin breakdown  2.  Assess vascular access sites hourly  3.  Every 4-6 hours minimum:  Change oxygen saturation probe site  4.  Every 4-6 hours:  If on nasal continuous positive airway pressure, respiratory therapy assess nares and determine need for appliance change or resting period.  6/28/2024 2340 by Dianne Lilly LPN  Outcome: Progressing     Problem: Risk for Elopement  Goal: Patient will not exit the unit/facility without proper excort  6/28/2024 2340 by Dianne Lilly LPN  Outcome: Progressing     Problem: Nutrition Deficit:  Goal: Optimize nutritional status  6/28/2024 2340 by Dianne Lilly LPN  Outcome: Progressing     Problem: Chronic Conditions and Co-morbidities  Goal: Patient's chronic conditions and co-morbidity symptoms are monitored and maintained or improved  6/28/2024 2340 by Dianne Lilly LPN  Outcome: Progressing

## 2024-06-29 NOTE — PROGRESS NOTES
SPEECH LANGUAGE PATHOLOGY  Speech Language Pathology  ST ACUTE REHAB    Cognitive Treatment Note    Date: 6/29/2024  Patient’s Name: Anuj Jovel  MRN: [id number]  Diagnosis:   Patient Active Problem List   Diagnosis Code    Acquired deviated nasal septum J34.2    Actinic keratoses L57.0    Arthritis M19.90    Benign prostatic hyperplasia N40.0    Bloating R14.0    Chronic serous otitis media H65.20    Claustrophobia F40.240    Coronary artery disease I25.10    Esophageal reflux K21.9    Flatus R14.3    Hearing loss H91.90    Hemorrhoids K64.9    History of allergy Z88.9    Hyperlipidemia E78.5    Essential hypertension I10    Leg swelling M79.89    Lumbar radiculopathy M54.16    Lumbar stenosis M48.061    Skin neoplasm D49.2    Tinea corporis B35.4    Ventral hernia K43.9    Weight gain R63.5    Type 2 diabetes mellitus with complication (HCC) E11.8    Obesity E66.9    Other osteoporosis without current pathological fracture M81.8    Presence of coronary angioplasty implant and graft Z95.5    Elevated C-reactive protein (CRP) R79.82    KAREY (obstructive sleep apnea) G47.33    Hypoxia R09.02    Chronic pain syndrome G89.4    AV block, Mobitz 1 I44.1    History of placement of stent in LAD coronary artery Z95.5    Chronic heart failure with preserved ejection fraction (HFpEF) (HCC) I50.32    Mild mitral valve regurgitation I34.0    Pulmonary hypertension (HCC) I27.20    Spondylolisthesis of lumbar region M43.16    Second degree AV block, Mobitz type I I44.1    Systolic congestive heart failure (HCC) I50.20    Advanced age R54    Acute hypoxic respiratory failure (HCC) J96.01    COVID U07.1    Shortness of breath R06.02    Symptomatic bradycardia R00.1    Complete heart block (HCC) I44.2    Cardiac pacemaker in situ Z95.0    Encephalopathy acute G93.40    MCI (mild cognitive impairment) G31.84    Encephalopathy G93.40    Debility R53.81    Encounter for postoperative wound check Z48.89    S/P placement of cardiac

## 2024-06-29 NOTE — PROGRESS NOTES
Physical Therapy  Facility/Department: [facility] ACUTE REHAB  Rehabilitation Physical Therapy Progress Note    NAME: Anuj Jovel  : 1940 (83 y.o.)  MRN: [id number]  CODE STATUS: Full Code    Date of Service: 24      Past Medical History:   Diagnosis Date    Diabetes (HCC)     Diverticulitis     History of allergy     Hyperlipidemia     Hypertension     Osteoarthritis     Pneumonia due to COVID-19 virus 2020     Past Surgical History:   Procedure Laterality Date    BACK SURGERY  2017    [facility], lumbar fusion    CARDIAC CATHETERIZATION      COLECTOMY      partial colectomy due to diveritc    COLONOSCOPY      TOTAL KNEE ARTHROPLASTY Right        Chart Reviewed: No  Patient assessed for rehabilitation services?: Yes  Additional Pertinent Hx: Mr. Anuj Jovel is a 83 y.o.  male who was admitted to [facility] on 2024 with lower extremity weakness, dyspnea, estrogen and heart block has history of obstructive apnea, hypertension, hyperlipidemia, type 2 diabetes, BPH, coronary artery disease with stent in , sick sinus syndrome with Mobitz type I block heart failure with preserved ejection fraction.  He was noted to be hypoxic and.  CT chest negative for PE chest x-ray unremarkable CT head unremarkable he was positive COVID-19 CT chest showed interstitial groundglass opacities.  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  Monitor heart rate     ID COVID-positive  has received vaccinations altered mental status resolved treated with with remdesivir, Decadron

## 2024-06-29 NOTE — PROGRESS NOTES
IN-PATIENT SERVICE  [facility][note 3] Internal Medicine  [facility] Internal Medicine  Jung Sung MD; Marck Trinidad MD; Valdo Emery MD; MD Lynda Cisneros MD; Sammy Gee MD; Janina Calvin MD        CONSULTATION / HISTORY AND PHYSICAL EXAMINATION            Date:   6/29/2024  Patient name:  Anuj Jovel  Date of admission:  6/21/2024  7:14 PM  MRN:   [id number]  Account:  [id number]  YOB: 1940  PCP:    Jeanne Simpson MD  Room:   [address] [place] [address]  Code Status:    Full Code    Physician Requesting Consult: Mara Bernstein MD    Reason for Consult:  Medical management    Chief Complaint:     No chief complaint on file.  Debility    History Obtained From:     Patient, EMR, nursing staff    History of Present Illness:     83-year-old male history of CAD, BPH, HTN, T2DM, HLD, BPH initially admitted to [facility] on 6/8/2024 when presented for shortness of breath diagnosed with COVID-19 pneumonia noted to be bradycardic with a high-grade AV block and transferred to [place] for EP evaluation, transiently on dopamine infusion pacemaker was placed on 6/17/2024 while at [place].  Patient also completed treatment for COVID-19 pneumonia-baricitinib and remdesivir completed .  Was also treated for acute gout      Past Medical History:     Past Medical History:   Diagnosis Date    Diabetes (HCC)     Diverticulitis     History of allergy     Hyperlipidemia     Hypertension     Osteoarthritis     Pneumonia due to COVID-19 virus 12/29/2020        Past Surgical History:     Past Surgical History:   Procedure Laterality Date    BACK SURGERY  09/03/2017     [facility]  Dr Cueva, lumbar fusion    CARDIAC CATHETERIZATION      COLECTOMY  2015    partial colectomy due to diveritc    COLONOSCOPY      TOTAL KNEE ARTHROPLASTY Right         Medications Prior to Admission:     Prior to Admission medications    Medication Sig Start Date End Date Taking?

## 2024-06-29 NOTE — PROGRESS NOTES
Occupational Therapy  [facility]   Acute Rehabilitation Occupational Therapy Daily Treatment Note    Date: 24  Patient Name: Anuj Jovel       Room: 2632/2632-01  MRN: [id number]  Account: [id number]   : 1940  (83 y.o.) Gender: male       Referring Practitioner: Issac Fountain MD  Diagnosis: Debility secondary to symptomatic bradycardia, COVID-19 with encephalopathy  Additional Pertinent Hx: Per PMR consult: \"Mr. Anuj Jovel is a 83 y.o.  male who was admitted to [facility] on 2024 with No chief complaint on file.     83-year-old male admitted with lower extremity weakness, dyspnea, estrogen and heart block has history of obstructive apnea, hypertension, hyperlipidemia, type 2 diabetes, BPH, coronary artery disease with stent in , sick sinus syndrome with Mobitz type I block heart failure with preserved ejection fraction.  He was noted to be hypoxic and.  CT chest negative for PE chest x-ray unremarkable CT head unremarkable he was positive COVID-19 CT chest showed interstitial groundglass opacities.  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  Monitor heart rate     ID COVID-positive  has received vaccinations altered mental status resolved treated with with remdesivir, Decadron Baricitinibn, out of window for Paxlovid monitor CRP, monitor off antibiotics off COVID isolation      Internal medicine acute hypoxic respite failure likely sec to pneumonia due to COVID-19 as above delirium resolved leg pain resume home gabapentin     Neuro  transient  demo/verbalize 100% carryover of AE for ADLs as needed and GOOD understanding of home/safety fall prevention strategies to increase safety and IND with self-care and mobility.    Plan  Occupational Therapy Plan  Times Per Week: 5-7  Times Per Day: Twice a day  Current Treatment Recommendations: Strengthening, ROM, Balance training, Functional mobility training, Endurance training, Cognitive reorientation, Pain management, Safety education & training, Patient/Caregiver education & training, Equipment evaluation, education, & procurement, Self-Care / ADL, Home management training, Group Therapy         06/29/24 1010 06/29/24 1011   OT Individual Minutes   Time In 1010 1300   Time Out 1118 1330   Minutes 68 30         Electronically signed by DOMINGA Nunez on 6/29/24 at 3:50 PM EDT

## 2024-06-30 ENCOUNTER — APPOINTMENT (OUTPATIENT)
Dept: GENERAL RADIOLOGY | Age: 84
End: 2024-06-30
Attending: STUDENT IN AN ORGANIZED HEALTH CARE EDUCATION/TRAINING PROGRAM
Payer: MEDICARE

## 2024-06-30 LAB
GLUCOSE BLD-MCNC: 107 MG/DL (ref 75–110)
GLUCOSE BLD-MCNC: 116 MG/DL (ref 75–110)
GLUCOSE BLD-MCNC: 70 MG/DL (ref 75–110)
GLUCOSE BLD-MCNC: 96 MG/DL (ref 75–110)

## 2024-06-30 PROCEDURE — 99231 SBSQ HOSP IP/OBS SF/LOW 25: CPT | Performed by: INTERNAL MEDICINE

## 2024-06-30 PROCEDURE — 99232 SBSQ HOSP IP/OBS MODERATE 35: CPT | Performed by: PHYSICAL MEDICINE & REHABILITATION

## 2024-06-30 PROCEDURE — 97110 THERAPEUTIC EXERCISES: CPT

## 2024-06-30 PROCEDURE — 73610 X-RAY EXAM OF ANKLE: CPT

## 2024-06-30 PROCEDURE — 82947 ASSAY GLUCOSE BLOOD QUANT: CPT

## 2024-06-30 PROCEDURE — 97530 THERAPEUTIC ACTIVITIES: CPT

## 2024-06-30 PROCEDURE — 6360000002 HC RX W HCPCS: Performed by: INTERNAL MEDICINE

## 2024-06-30 PROCEDURE — 6370000000 HC RX 637 (ALT 250 FOR IP): Performed by: INTERNAL MEDICINE

## 2024-06-30 PROCEDURE — 97116 GAIT TRAINING THERAPY: CPT

## 2024-06-30 PROCEDURE — 6370000000 HC RX 637 (ALT 250 FOR IP): Performed by: NURSE PRACTITIONER

## 2024-06-30 PROCEDURE — 1180000000 HC REHAB R&B

## 2024-06-30 PROCEDURE — 6370000000 HC RX 637 (ALT 250 FOR IP): Performed by: PHYSICAL MEDICINE & REHABILITATION

## 2024-06-30 RX ADMIN — FUROSEMIDE 20 MG: 20 TABLET ORAL at 08:06

## 2024-06-30 RX ADMIN — FINASTERIDE 5 MG: 5 TABLET, FILM COATED ORAL at 08:06

## 2024-06-30 RX ADMIN — INSULIN GLARGINE 10 UNITS: 100 INJECTION, SOLUTION SUBCUTANEOUS at 22:34

## 2024-06-30 RX ADMIN — ENOXAPARIN SODIUM 40 MG: 100 INJECTION SUBCUTANEOUS at 08:05

## 2024-06-30 RX ADMIN — PANTOPRAZOLE SODIUM 40 MG: 40 TABLET, DELAYED RELEASE ORAL at 05:50

## 2024-06-30 RX ADMIN — POLYETHYLENE GLYCOL 3350 17 G: 17 POWDER, FOR SOLUTION ORAL at 08:05

## 2024-06-30 RX ADMIN — TAMSULOSIN HYDROCHLORIDE 0.4 MG: 0.4 CAPSULE ORAL at 08:05

## 2024-06-30 RX ADMIN — METFORMIN HYDROCHLORIDE 1000 MG: 1000 TABLET ORAL at 16:56

## 2024-06-30 RX ADMIN — ATORVASTATIN CALCIUM 40 MG: 40 TABLET, FILM COATED ORAL at 22:34

## 2024-06-30 RX ADMIN — ACETAMINOPHEN 650 MG: 325 TABLET ORAL at 08:05

## 2024-06-30 RX ADMIN — PREGABALIN 125 MG: 25 CAPSULE ORAL at 22:34

## 2024-06-30 RX ADMIN — METFORMIN HYDROCHLORIDE 1000 MG: 1000 TABLET ORAL at 08:05

## 2024-06-30 RX ADMIN — PREGABALIN 125 MG: 25 CAPSULE ORAL at 08:06

## 2024-06-30 RX ADMIN — ASPIRIN 81 MG: 81 TABLET, COATED ORAL at 08:05

## 2024-06-30 ASSESSMENT — PAIN DESCRIPTION - LOCATION: LOCATION: BACK;ANKLE

## 2024-06-30 ASSESSMENT — PAIN - FUNCTIONAL ASSESSMENT: PAIN_FUNCTIONAL_ASSESSMENT: ACTIVITIES ARE NOT PREVENTED

## 2024-06-30 ASSESSMENT — PAIN SCALES - WONG BAKER
WONGBAKER_NUMERICALRESPONSE: HURTS A LITTLE BIT

## 2024-06-30 ASSESSMENT — PAIN DESCRIPTION - ONSET: ONSET: AWAKENED FROM SLEEP

## 2024-06-30 ASSESSMENT — PAIN DESCRIPTION - DESCRIPTORS: DESCRIPTORS: ACHING

## 2024-06-30 ASSESSMENT — PAIN DESCRIPTION - ORIENTATION: ORIENTATION: RIGHT

## 2024-06-30 ASSESSMENT — PAIN DESCRIPTION - PAIN TYPE: TYPE: ACUTE PAIN

## 2024-06-30 ASSESSMENT — PAIN SCALES - GENERAL
PAINLEVEL_OUTOF10: 8
PAINLEVEL_OUTOF10: 4

## 2024-06-30 ASSESSMENT — PAIN DESCRIPTION - FREQUENCY: FREQUENCY: INTERMITTENT

## 2024-06-30 NOTE — PROGRESS NOTES
IN-PATIENT SERVICE  [facility] Internal Medicine  [facility] Internal Medicine  Jung Sung MD; Marck Trinidad MD; Valdo Emery MD; MD Lynda Cisneros MD; Sammy Gee MD; Janina Calvin MD        CONSULTATION / HISTORY AND PHYSICAL EXAMINATION            Date:   6/30/2024  Patient name:  Anuj Jovel  Date of admission:  6/21/2024  7:14 PM  MRN:   [id number]  Account:  [id number]  YOB: 1940  PCP:    Jeanne Simpson MD  Room:   [address] [place] [address]  Code Status:    Full Code    Physician Requesting Consult: Mara Bernstein MD    Reason for Consult:  Medical management    Chief Complaint:     No chief complaint on file.  Debility    History Obtained From:     Patient, EMR, nursing staff    History of Present Illness:     83-year-old male history of CAD, BPH, HTN, T2DM, HLD, BPH initially admitted to [facility] on 6/8/2024 when presented for shortness of breath diagnosed with COVID-19 pneumonia noted to be bradycardic with a high-grade AV block and transferred to [place] for EP evaluation, transiently on dopamine infusion pacemaker was placed on 6/17/2024 while at [place].  Patient also completed treatment for COVID-19 pneumonia-baricitinib and remdesivir completed .  Was also treated for acute gout      Past Medical History:     Past Medical History:   Diagnosis Date    Diabetes (HCC)     Diverticulitis     History of allergy     Hyperlipidemia     Hypertension     Osteoarthritis     Pneumonia due to COVID-19 virus 12/29/2020        Past Surgical History:     Past Surgical History:   Procedure Laterality Date    BACK SURGERY  09/03/2017     [facility]  Dr Cueva, lumbar fusion    CARDIAC CATHETERIZATION      COLECTOMY  2015    partial colectomy due to diveritc    COLONOSCOPY      TOTAL KNEE ARTHROPLASTY Right         Medications Prior to Admission:     Prior to Admission medications    Medication Sig Start Date End Date Taking?

## 2024-06-30 NOTE — PROGRESS NOTES
06/30/24 1317   Encounter Summary   Encounter Overview/Reason Volunteer Encounter   Service Provided For Patient   Referral/Consult From Rounding   Last Encounter  06/30/24   Complexity of Encounter Low   Spiritual/Emotional needs   Type Spiritual Support   Rituals, Rites and Sacraments   Type [employer] Communion

## 2024-06-30 NOTE — PROGRESS NOTES
Physical Medicine & Rehabilitation  Progress Note    6/30/2024 12:16 PM     CC: Ambulatory and ADL dysfunction due to encephalopathy and debilitating COVID-19, third-degree HV block status post dual-chamber pacemaker    Subjective:   No complaints.  Feels well.  Positive large BM.  Has Graff in place, notes right ankle pain      ROS:  Denies fevers, chills, sweats.  No chest pain, palpitations, lightheadedness.  Denies coughing, wheezing or shortness of breath.  Denies abdominal pain, nausea, diarrhea or constipation.  No new areas of joint pain.  Denies new areas of numbness or weakness.  Denies new anxiety or depression issues.  No new skin problems.    Rehabilitation:   PT:    Bed mobility  Supine to Sit: Maximum assistance  Sit to Supine: Moderate assistance (bilat LE  (pt sleeps in recliner at home))  Scooting: Dependent/Total (to HOB)  Bed Mobility Comments: Patient up in WC and Recliner in AM.  Returned to bed in PM.    Transfers  Sit to Stand: Stand by assistance  Stand to Sit: Stand by assistance  Bed to Chair: Stand by assistance  Stand Pivot Transfers: Stand by assistance (Pt completing without AD)  Squat Pivot Transfers: Stand by assistance (No AD)  Comment: Transfers completed with and without rollator from home. Pt having difficulty with erect posture and verbalizes without cuing \"I know, stand up straight\"    Ambulation  Surface: Level tile  Device: Rollator  Other Apparatus: Wheelchair follow  Assistance: Stand by assistance  Quality of Gait: Decreased endurance. Cues for upright posture and RW safety.  Gait Deviations: Slow Erin, Decreased step length, Decreased step height  Distance: 10ft in AM.  Comments: Pt continues with kypotic posture, minimal dorsiflexion, shoes are shuffling on the floor, unable to achieve B TKE and expressing pain in B feet/ankle with prolonged standing  More Ambulation?: Yes  Ambulation 2  Surface - 2: level tile  Device 2: Rollator  Assistance 2: Contact guard  -consider trial DC next few days as patient now having good BMs  Anemia hemoglobin 10.0- stool Hemoccult ordered 6/22 per internal medicine, await result r  Obesity:  BMI 32.14.  Dietician following  Bowel Management: Miralax daily changed to twice daily, senokot nightly, dulcolax prn.  senna to nightly, MiraLAX to twice daily change back to daily-positive large BM 6/30  Sacral wound-wound care follow  DVT Prophylaxis:  low molecular weight heparin  Internal Medicine for medical management  Follow up PCP 1-2 weeks, Cardiology, Neurology 4-6 weeks      Issac Ch MD     Electronically signed by Issac Ch MD on 6/30/2024 at 12:16 PM     This note is created with the assistance of a speech recognition program.  While intending to generate a document that actually reflects the content of the visit, the document can still have some errors including those of syntax and sound a like substitutions which may escape proof reading.  In such instances, actual meaning can be extrapolated by contextual diversion

## 2024-06-30 NOTE — PROGRESS NOTES
Physical Therapy  Facility/Department: [facility] ACUTE REHAB  Rehabilitation Physical Therapy Progress Notes    NAME: Anuj Jovel  : 1940 (83 y.o.)  MRN: [id number]  CODE STATUS: Full Code    Date of Service: 24      Past Medical History:   Diagnosis Date    Diabetes (HCC)     Diverticulitis     History of allergy     Hyperlipidemia     Hypertension     Osteoarthritis     Pneumonia due to COVID-19 virus 2020     Past Surgical History:   Procedure Laterality Date    BACK SURGERY  2017    [facility], lumbar fusion    CARDIAC CATHETERIZATION      COLECTOMY      partial colectomy due to diveritc    COLONOSCOPY      TOTAL KNEE ARTHROPLASTY Right        Chart Reviewed: No  Patient assessed for rehabilitation services?: Yes  Additional Pertinent Hx: Mr. Anuj Jovel is a 83 y.o.  male who was admitted to [facility] on 2024 with lower extremity weakness, dyspnea, estrogen and heart block has history of obstructive apnea, hypertension, hyperlipidemia, type 2 diabetes, BPH, coronary artery disease with stent in , sick sinus syndrome with Mobitz type I block heart failure with preserved ejection fraction.  He was noted to be hypoxic and.  CT chest negative for PE chest x-ray unremarkable CT head unremarkable he was positive COVID-19 CT chest showed interstitial groundglass opacities.  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  Monitor heart rate     ID COVID-positive  has received vaccinations altered mental status resolved treated with with remdesivir, Decadron

## 2024-06-30 NOTE — PLAN OF CARE
Problem: Discharge Planning  Goal: Discharge to home or other facility with appropriate resources  Outcome: Progressing     Problem: Safety - Adult  Goal: Free from fall injury  Outcome: Progressing     Problem: Pain  Goal: Verbalizes/displays adequate comfort level or baseline comfort level  Outcome: Progressing     Problem: ABCDS Injury Assessment  Goal: Absence of physical injury  Outcome: Progressing     Problem: Skin/Tissue Integrity  Goal: Absence of new skin breakdown  Description: 1.  Monitor for areas of redness and/or skin breakdown  2.  Assess vascular access sites hourly  3.  Every 4-6 hours minimum:  Change oxygen saturation probe site  4.  Every 4-6 hours:  If on nasal continuous positive airway pressure, respiratory therapy assess nares and determine need for appliance change or resting period.  Outcome: Progressing     Problem: Risk for Elopement  Goal: Patient will not exit the unit/facility without proper excort  Outcome: Progressing     Problem: Nutrition Deficit:  Goal: Optimize nutritional status  Outcome: Progressing     Problem: Chronic Conditions and Co-morbidities  Goal: Patient's chronic conditions and co-morbidity symptoms are monitored and maintained or improved  Outcome: Progressing

## 2024-06-30 NOTE — PROGRESS NOTES
Occupational Therapy  [facility]   Acute Rehabilitation Occupational Therapy Daily Treatment Note    Date: 24  Patient Name: Anuj Jovel       Room: 2632/2632-01  MRN: [id number]  Account: [id number]   : 1940  (83 y.o.) Gender: male       Referring Practitioner: Issac Fountain MD  Diagnosis: Debility secondary to symptomatic bradycardia, COVID-19 with encephalopathy  Additional Pertinent Hx: Per PMR consult: \"Mr. nAuj Jovel is a 83 y.o.  male who was admitted to [facility] on 2024 with No chief complaint on file.     83-year-old male admitted with lower extremity weakness, dyspnea, estrogen and heart block has history of obstructive apnea, hypertension, hyperlipidemia, type 2 diabetes, BPH, coronary artery disease with stent in , sick sinus syndrome with Mobitz type I block heart failure with preserved ejection fraction.  He was noted to be hypoxic and.  CT chest negative for PE chest x-ray unremarkable CT head unremarkable he was positive COVID-19 CT chest showed interstitial groundglass opacities.  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  Monitor heart rate     ID COVID-positive  has received vaccinations altered mental status resolved treated with with remdesivir, Decadron Baricitinibn, out of window for Paxlovid monitor CRP, monitor off antibiotics off COVID isolation      Internal medicine acute hypoxic respite failure likely sec to pneumonia due to COVID-19 as above delirium resolved leg pain resume home gabapentin     Neuro  transient

## 2024-07-01 PROBLEM — L25.8 INCONTINENCE ASSOCIATED DERMATITIS: Status: ACTIVE | Noted: 2024-07-01

## 2024-07-01 PROBLEM — R32 INCONTINENCE ASSOCIATED DERMATITIS: Status: ACTIVE | Noted: 2024-07-01

## 2024-07-01 LAB
GLUCOSE BLD-MCNC: 135 MG/DL (ref 75–110)
GLUCOSE BLD-MCNC: 92 MG/DL (ref 75–110)

## 2024-07-01 PROCEDURE — 97116 GAIT TRAINING THERAPY: CPT

## 2024-07-01 PROCEDURE — 1180000000 HC REHAB R&B

## 2024-07-01 PROCEDURE — 82947 ASSAY GLUCOSE BLOOD QUANT: CPT

## 2024-07-01 PROCEDURE — 6370000000 HC RX 637 (ALT 250 FOR IP): Performed by: INTERNAL MEDICINE

## 2024-07-01 PROCEDURE — 97129 THER IVNTJ 1ST 15 MIN: CPT

## 2024-07-01 PROCEDURE — 6370000000 HC RX 637 (ALT 250 FOR IP): Performed by: NURSE PRACTITIONER

## 2024-07-01 PROCEDURE — 97130 THER IVNTJ EA ADDL 15 MIN: CPT

## 2024-07-01 PROCEDURE — 6360000002 HC RX W HCPCS: Performed by: INTERNAL MEDICINE

## 2024-07-01 PROCEDURE — 97110 THERAPEUTIC EXERCISES: CPT

## 2024-07-01 PROCEDURE — 99232 SBSQ HOSP IP/OBS MODERATE 35: CPT | Performed by: PHYSICAL MEDICINE & REHABILITATION

## 2024-07-01 PROCEDURE — 6370000000 HC RX 637 (ALT 250 FOR IP): Performed by: PHYSICAL MEDICINE & REHABILITATION

## 2024-07-01 PROCEDURE — 97535 SELF CARE MNGMENT TRAINING: CPT

## 2024-07-01 PROCEDURE — 97530 THERAPEUTIC ACTIVITIES: CPT

## 2024-07-01 PROCEDURE — 99231 SBSQ HOSP IP/OBS SF/LOW 25: CPT | Performed by: INTERNAL MEDICINE

## 2024-07-01 PROCEDURE — 99221 1ST HOSP IP/OBS SF/LOW 40: CPT

## 2024-07-01 RX ADMIN — PANTOPRAZOLE SODIUM 40 MG: 40 TABLET, DELAYED RELEASE ORAL at 06:38

## 2024-07-01 RX ADMIN — PREGABALIN 125 MG: 25 CAPSULE ORAL at 08:31

## 2024-07-01 RX ADMIN — ENOXAPARIN SODIUM 40 MG: 100 INJECTION SUBCUTANEOUS at 08:31

## 2024-07-01 RX ADMIN — FUROSEMIDE 20 MG: 20 TABLET ORAL at 08:31

## 2024-07-01 RX ADMIN — METFORMIN HYDROCHLORIDE 1000 MG: 1000 TABLET ORAL at 17:25

## 2024-07-01 RX ADMIN — INSULIN GLARGINE 10 UNITS: 100 INJECTION, SOLUTION SUBCUTANEOUS at 22:11

## 2024-07-01 RX ADMIN — ACETAMINOPHEN 650 MG: 325 TABLET ORAL at 22:17

## 2024-07-01 RX ADMIN — METFORMIN HYDROCHLORIDE 1000 MG: 1000 TABLET ORAL at 08:31

## 2024-07-01 RX ADMIN — TAMSULOSIN HYDROCHLORIDE 0.4 MG: 0.4 CAPSULE ORAL at 08:31

## 2024-07-01 RX ADMIN — POLYETHYLENE GLYCOL 3350 17 G: 17 POWDER, FOR SOLUTION ORAL at 09:39

## 2024-07-01 RX ADMIN — PREGABALIN 125 MG: 25 CAPSULE ORAL at 22:10

## 2024-07-01 RX ADMIN — ATORVASTATIN CALCIUM 40 MG: 40 TABLET, FILM COATED ORAL at 22:11

## 2024-07-01 RX ADMIN — ASPIRIN 81 MG: 81 TABLET, COATED ORAL at 08:31

## 2024-07-01 RX ADMIN — FINASTERIDE 5 MG: 5 TABLET, FILM COATED ORAL at 08:31

## 2024-07-01 ASSESSMENT — PAIN SCALES - GENERAL
PAINLEVEL_OUTOF10: 8
PAINLEVEL_OUTOF10: 0
PAINLEVEL_OUTOF10: 7
PAINLEVEL_OUTOF10: 0

## 2024-07-01 ASSESSMENT — PAIN - FUNCTIONAL ASSESSMENT
PAIN_FUNCTIONAL_ASSESSMENT: ACTIVITIES ARE NOT PREVENTED
PAIN_FUNCTIONAL_ASSESSMENT: PREVENTS OR INTERFERES SOME ACTIVE ACTIVITIES AND ADLS

## 2024-07-01 ASSESSMENT — PAIN DESCRIPTION - LOCATION
LOCATION: FOOT;BUTTOCKS
LOCATION: BUTTOCKS

## 2024-07-01 ASSESSMENT — PAIN DESCRIPTION - DESCRIPTORS
DESCRIPTORS: BURNING;SHARP;SORE
DESCRIPTORS: ACHING

## 2024-07-01 ASSESSMENT — PAIN DESCRIPTION - ORIENTATION
ORIENTATION: RIGHT;LEFT
ORIENTATION: MID

## 2024-07-01 NOTE — INTERDISCIPLINARY ROUNDS
[facility] Inpatient Rehabilitation  INTERDISCIPLINARY MEETING  Date: 24  Patient Name: Anuj Jovel       Room: 2632/2632-01  MRN: [id number]       : 1940  (83 y.o.)     Gender: male     Patient's care team, including nursing, speech language pathologist, occupational therapist, and/or physical therapist, met to discuss patient's care needs. Any patient concerns, change in medical status, and current transfer/mobility status were identified at this time.     Any Additional Pertinent Information:   1 person assistance with RW for nursing staff.    Participating Team Members:  Nurse: Esther Nelson, RN    Occupational Therapist: Maria Isabel Mclean OT   Physical Therapist: Abdelrahman Tang PT  Speech Therapist:  N/A

## 2024-07-01 NOTE — PROGRESS NOTES
inpatient mild cognitive impairment, CT head, CTA head and neck unremarkable not a candidate for MRI plan to get EEG, follow-up in clinic 4 to 6 weeks, suspected delirium with low cognitive reserve     Pulmonary-questional on doxycycline, Bumex held echo shows mild pulm hypertension will need follow-up as outpatient few months, recommend CPAP for sleep apnea and pulmonary rehab Home O2 evaluation\"    Treatment Diagnosis: Impaired self-care status    Past Medical History:  has a past medical history of Diabetes (HCC), Diverticulitis, History of allergy, Hyperlipidemia, Hypertension, Osteoarthritis, and Pneumonia due to COVID-19 virus.    Past Surgical History:   has a past surgical history that includes Cardiac catheterization; Colonoscopy; Total knee arthroplasty (Right); colectomy (2015); and back surgery (09/03/2017).    Restrictions  Restrictions/Precautions  Restrictions/Precautions: Fall Risk, Other (comment)  Required Braces or Orthoses?: No  Implants present? : Pacemaker, Metal implants (placed 6/17)     Position Activity Restriction  Other position/activity restrictions: activity as tolerated, Recent pacemaker insertion on 6/17/24-Avoid pushing/lifting with LUlydia LIU lifting L UE over shoulder height.        Vitals      Subjective  Subjective  Subjective: \"it burns\" pt states in regards to his backside, daughter who pt lives with is present for family training  Pain Assessment  Pain Assessment: 0-10  Pain Level: 8  Pain Location: Foot;Buttocks  Pain Orientation: Right;Left  Pain Descriptors: Burning;Sharp;Sore  Functional Pain Assessment: Prevents or interferes some active activities and ADLs    Objective  Cognition  Overall Orientation Status: Within Functional Limits  Orientation Level: Oriented to person    Cognition  Overall Cognitive Status: Exceptions  Arousal/Alertness: Appropriate responses to stimuli  Following Commands: Follows multistep commands with increased time  Attention Span: Attends with  EDT

## 2024-07-01 NOTE — PROGRESS NOTES
7.8   RBC 3.12*   HGB 10.0*   HCT 29.6*   MCV 94.9   RDW 13.7        BMP:   Recent Labs     06/29/24  0652      K 4.2      CO2 26   BUN 16   CREATININE 0.7   GLUCOSE 105*     BNP: No results for input(s): \"BNP\" in the last 72 hours.  PT/INR: No results for input(s): \"PROTIME\", \"INR\" in the last 72 hours.  APTT: No results for input(s): \"APTT\" in the last 72 hours.  CARDIAC ENZYMES: No results for input(s): \"CKMB\", \"CKMBINDEX\", \"TROPONINT\" in the last 72 hours.    Invalid input(s): \"CKTOTAL;3\" troponins   FASTING LIPID PANEL:  Lab Results   Component Value Date    CHOL 182 07/28/2022    HDL 38 (L) 07/28/2022    TRIG 123 07/28/2022     LIVER PROFILE: No results for input(s): \"AST\", \"ALT\", \"BILIDIR\", \"BILITOT\", \"ALKPHOS\" in the last 72 hours.    Invalid input(s): \"ALB\"     R ANKLE XRAY 6/30/24  FINDINGS:  No fracture or malalignment identified.  Mature ossification about the tip of  the lateral malleolus.  The joint spaces are maintained.  Mild diffuse soft  tissue swelling in the lower leg and ankle..   Impression:     No acute osseous abnormality identified.       Current Medications:   Current Facility-Administered Medications: furosemide (LASIX) tablet 20 mg, 20 mg, Oral, Daily  senna (SENOKOT) tablet 17.2 mg, 2 tablet, Oral, Nightly PRN  polyethylene glycol (GLYCOLAX) packet 17 g, 17 g, Oral, Daily  finasteride (PROSCAR) tablet 5 mg, 5 mg, Oral, Daily  glucose chewable tablet 16 g, 4 tablet, Oral, PRN  dextrose bolus 10% 125 mL, 125 mL, IntraVENous, PRN **OR** dextrose bolus 10% 250 mL, 250 mL, IntraVENous, PRN  glucagon injection 1 mg, 1 mg, SubCUTAneous, PRN  dextrose 10 % infusion, , IntraVENous, Continuous PRN  magnesium hydroxide (MILK OF MAGNESIA) 400 MG/5ML suspension 30 mL, 30 mL, Oral, Daily PRN  sodium chloride (OCEAN, BABY AYR) 0.65 % nasal spray 1 spray, 1 spray, Each Nostril, PRN  benzonatate (TESSALON) capsule 200 mg, 200 mg, Oral, TID PRN  acetaminophen (TYLENOL) tablet 650 mg,    Sacral wound: wound care following  DVT Prophylaxis:  low molecular weight heparin, TEDs during the day, EPCs at night  Internal Medicine for medical management  Follow up PCP 1-2 weeks, Cardiology, Neurology 4-6 weeks      Electronically signed by GILBERT BOYCE MD on 7/1/2024 at 11:49 AM      This note is created with the assistance of a speech recognition program.  While intending to generate a document that actually reflects the content of the visit, the document can still have some errors including those of syntax and sound a like substitutions which may escape proof reading.  In such instances, actual meaning can be extrapolated by contextual diversion.

## 2024-07-01 NOTE — PROGRESS NOTES
07/01/24 1531   Encounter Summary   Encounter Overview/Reason Volunteer Encounter   Service Provided For Patient   Last Encounter  07/01/24   Rituals, Rites and Sacraments   Type [employer] Communion

## 2024-07-01 NOTE — PROGRESS NOTES
home.)  Assistance: Stand by assistance  Quality of Gait: Decreased endurance. Cues for upright posture and RW safety.  Gait Deviations: Slow Erin;Decreased step length;Decreased step height  Distance: 100 ft x 2 in AM;  short distances within gym  Comments: No amb. completed in PM due to fatigue.  Stairs/Curb  Stairs?: Yes  Stairs  # Steps : 5 (x2)  Stairs Height:  (4\" and 6\")  Rails: Bilateral  Assistance: Stand by assistance  Comment: Pt completing stair navigation, refusing to attempt this date with single hand rail stating \"what do I need to do that for?\" and pt edu provided on home re-entry having one handrail. Pt daughter and granddaughter present.    PT Exercises  Exercise Treatment: Car transfer completed in AM;  CGA for safety at first attempt.  A/AROM Exercises: supine ex. 2x 10 reps completed in PM  Resistive Exercises: Seated B LE ex's x15 with #2.5 and LGTB  Functional Mobility Circuit Training: Functional transfers with safety education.  Exercise Equipment: NuStep x 15 mins. Level 4 resistence.  Bilat LE only (arms resting in lap).      Activity Tolerance  Activity Tolerance: Patient limited by endurance;Patient limited by pain    Assessment  Performance Deficits/Impairments: Decreased functional mobility ;Decreased strength;Decreased cognition;Decreased endurance;Decreased safe awareness;Decreased balance;Decreased posture;Increased pain;Decreased ROM  Treatment Diagnosis: Impaired mobility  Therapy Prognosis: Fair  Barriers to Learning: Cognition  Discharge Recommendations: Patient would benefit from continued therapy after discharge;Home with assist PRN;Home with Home health PT  PT D/C Equipment  Walker: Rollator (4 Wheeled) (Patient demonstrated safe technique with brakes and also seated breaks due to poor endurance.)  PT Equipment Recommendations  Walker: Rollator (4 Wheeled) (Patient demonstrated safe technique with brakes and also seated breaks due to poor endurance.)      GOALS  Patient

## 2024-07-01 NOTE — PLAN OF CARE
Problem: Discharge Planning  Goal: Discharge to home or other facility with appropriate resources  Outcome: Progressing  Flowsheets (Taken 7/1/2024 0730)  Discharge to home or other facility with appropriate resources:   Identify barriers to discharge with patient and caregiver   Arrange for needed discharge resources and transportation as appropriate     Problem: Safety - Adult  Goal: Free from fall injury  Outcome: Progressing     Problem: Pain  Goal: Verbalizes/displays adequate comfort level or baseline comfort level  Outcome: Progressing     Problem: ABCDS Injury Assessment  Goal: Absence of physical injury  Outcome: Progressing     Problem: Skin/Tissue Integrity  Goal: Absence of new skin breakdown  Description: 1.  Monitor for areas of redness and/or skin breakdown  2.  Assess vascular access sites hourly  3.  Every 4-6 hours minimum:  Change oxygen saturation probe site  4.  Every 4-6 hours:  If on nasal continuous positive airway pressure, respiratory therapy assess nares and determine need for appliance change or resting period.  Outcome: Progressing     Problem: Risk for Elopement  Goal: Patient will not exit the unit/facility without proper excort  Outcome: Progressing     Problem: Nutrition Deficit:  Goal: Optimize nutritional status  Outcome: Progressing  Flowsheets (Taken 7/1/2024 1434 by Paige Cortez, BIJAN)  Nutrient intake appropriate for improving, restoring, or maintaining nutritional needs: Monitor oral intake, labs, and treatment plans     Problem: Chronic Conditions and Co-morbidities  Goal: Patient's chronic conditions and co-morbidity symptoms are monitored and maintained or improved  Outcome: Progressing  Flowsheets (Taken 7/1/2024 0730)  Care Plan - Patient's Chronic Conditions and Co-Morbidity Symptoms are Monitored and Maintained or Improved:   Monitor and assess patient's chronic conditions and comorbid symptoms for stability, deterioration, or improvement   Collaborate with

## 2024-07-01 NOTE — PATIENT CARE CONFERENCE
[facility] Acute Inpatient Rehabilitation  TEAM CONFERENCE NOTE  Date: 24  Patient Name: Anuj Jovel       Room: 2632/2632-01  MRN: [id number]       : 1940  (83 y.o.)     Gender: male           PRINCIPAL DIAGNOSIS: Encephalopathy    NURSING    Bladder Continence  Not Applicable - Device in Use (Indwelling Catheter, Condom Catheter or Ostomy)  Bowel Continence Always Continent    Date of Last BM: 24    Bladder/Bowel Program Interventions: Both Bowel & Bladder Program In Place     Wounds/Incisions/Ulcers: Incision healing well    Pain Control: Patient's pain currently controlled and pain regimen effective as ordered    Pain Medication Regimen Usage Pattern: MAR reviewed and pain medications are being used at the following frequency (Specify Medication, # Doses Administered on average per day, identified patterns of use - for example: time of day, prior to activity/therapy)  APAP q4h prn    Fall Risk:  Falling star program initiated    Medication Education Program: Patient able to manage medications and being educated by nursing    Discharge Preparation Patient/Responsible Party Education In Progress:   Fingerstick Glucose Monitoring and Insulin Management and Urinary Catheter Management    Nursing specific communication for TEAM: No additional information identified requiring communication at this time    PHYSICAL THERAPY      Bed Mobility:        Supine to Sit: Maximum assistance  Sit to Supine: Moderate assistance (bilat LE  (pt sleeps in recliner at home))  Scooting: Dependent/Total (to HOB)  Bed Mobility Comments: Patient up in WC and Recliner in AM.  Returned to bed     Transfers:  Sit to Stand: Stand by assistance  Stand to Sit: Stand by assistance  Bed to Chair: Stand by assistance  Squat Pivot Transfers: Stand by assistance (No AD)    Ambulation  Surface: Level tile  Device: Rollator (Patient used his rollator from home.)  Assistance: Stand by assistance  Quality of Gait:  IND with BADLs.  Long Term Goal 4: Pt will tolerate standing 15+ minutes while engaging in ADLs/functional tasks of choice with intermittent unilateral support in order to increase safety and IND with ADLs.  Long Term Goal 5: Pt will complete functional transfers/functional mobility with SBA with use of LRAD and min cues or less for safety in order to decrease fall risk while maintaining good adherence to pacemaker precautions.  Additional Goals?: Yes  Long Term Goal 6: Pt will demo increased bilat UE FMC as evidenced by improved 9HPT bilaterally by 10 seconds in order to increase IND with ADLs.  Long Term Goal 7: Pt and family will demo/verbalize 100% carryover of AE for ADLs as needed and GOOD understanding of home/safety fall prevention strategies to increase safety and IND with self-care and mobility.  ST: Independent for comprehension; Supervision for problem solving and memory     Treating Interdisciplinary Team Professionals/Participating Team Members with current knowledge of Anuj ERICKA Med:  /:  Junie Chu RN  Occupational Therapist:  Luis A Dominguez OT   Physical Therapist: Abdelrahman Tang PT  Speech Therapist:  Shantal Rivero M.A., CCC-SLP   Nurse: Wellington Fischer RN   Dietary/Nutrition:  Paige Cortez, MS, LD  Pastoral Care: Chaplain Marilee Madrigal  CMG: Chasity Bruno RN    I attest to leading the interdisciplinary team meeting, required attendees are present as listed above, I approve the established interdisciplinary plan of care as documented within the medical record of Anuj Jovel.Void trial today. Family training completed. Medically stable. Will move up discharge date.    Elizabeth Hahn MD

## 2024-07-01 NOTE — PROGRESS NOTES
07/01/24 1536   Encounter Summary   Encounter Overview/Reason  Encounter   Service Provided For Patient   Last Encounter  07/01/24   Assessment/Intervention/Outcome   Intervention Prayer (assurance of)/[place]

## 2024-07-01 NOTE — PROGRESS NOTES
SPEECH LANGUAGE PATHOLOGY  Speech Language Pathology  ST ACUTE REHAB    Cognitive Treatment Note    Date: 7/1/2024  Patient’s Name: Anuj Jovel  MRN: [id number]  Diagnosis:   Patient Active Problem List   Diagnosis Code    Acquired deviated nasal septum J34.2    Actinic keratoses L57.0    Arthritis M19.90    Benign prostatic hyperplasia N40.0    Bloating R14.0    Chronic serous otitis media H65.20    Claustrophobia F40.240    Coronary artery disease I25.10    Esophageal reflux K21.9    Flatus R14.3    Hearing loss H91.90    Hemorrhoids K64.9    History of allergy Z88.9    Hyperlipidemia E78.5    Essential hypertension I10    Leg swelling M79.89    Lumbar radiculopathy M54.16    Lumbar stenosis M48.061    Skin neoplasm D49.2    Tinea corporis B35.4    Ventral hernia K43.9    Weight gain R63.5    Type 2 diabetes mellitus with complication (HCC) E11.8    Obesity E66.9    Other osteoporosis without current pathological fracture M81.8    Presence of coronary angioplasty implant and graft Z95.5    Elevated C-reactive protein (CRP) R79.82    KAREY (obstructive sleep apnea) G47.33    Hypoxia R09.02    Chronic pain syndrome G89.4    AV block, Mobitz 1 I44.1    History of placement of stent in LAD coronary artery Z95.5    Chronic heart failure with preserved ejection fraction (HFpEF) (HCC) I50.32    Mild mitral valve regurgitation I34.0    Pulmonary hypertension (HCC) I27.20    Spondylolisthesis of lumbar region M43.16    Second degree AV block, Mobitz type I I44.1    Systolic congestive heart failure (HCC) I50.20    Advanced age R54    Acute hypoxic respiratory failure (HCC) J96.01    COVID U07.1    Shortness of breath R06.02    Symptomatic bradycardia R00.1    Complete heart block (HCC) I44.2    Cardiac pacemaker in situ Z95.0    Encephalopathy acute G93.40    MCI (mild cognitive impairment) G31.84    Encephalopathy G93.40    Debility R53.81    Encounter for postoperative wound check Z48.89    S/P placement of cardiac  pacemaker Z95.0       Pain: 2/10 (ankles)      Cognitive Treatment    Treatment time: 1781-9573      Subjective: [x] Alert [x] Cooperative     [] Confused     [] Agitated    [] Lethargic      Objective/Assessment:  Attention: Occasional repetition required throughout.     Orientation: Pt. Oriented to year and name of hospital c written cues only.  Pt. Oriented to all other aspects.    Recall: Word list retention (4 units, category inclusion)- 70% accuracy (I), increasing to 100% c mod cues.    Education provided re: use of internal and external memory aides (e.g., calendar, notepad, cell-phone) to facilitate recall of important information.  Pt verbalized understanding and agreeable, has been using external memory aides while in hospital.        Organization: n/a      Problem Solving/Reasoning: Pt's daughter present for family training.  Education provided re: cognitive stimulation throughout day and at home following d/c (e.g., word puzzles, card games, reading tasks, etc), use of memory strategies at home, family assistance with managing medications/finances/time.  Pt and daughter verbalized understanding and agreeable.  Pt's daughter reports has noticed Pt has trouble with memory at baseline and already managing medications/time/finances for Pt.  Reinforced this.       Other: Pt daughter present, reports feels Pt's cognition has returned to baseline (memory deficits with age).  Pt pleasant and conversational throughout.     Plan:  [x] Continue ST services    [] Discharge from ST:      Discharge recommendations: [] Inpatient Rehab   [] Skilled Nursing Facility   [] Outpatient Therapy  [] Follow up at trauma clinic   [] Other:       Treatment completed by: Shantal Rivero M.A., CCC-SLP

## 2024-07-01 NOTE — PROGRESS NOTES
Comprehensive Nutrition Assessment    Type and Reason for Visit:  Reassess    Nutrition Recommendations/Plan:   Continue current diet.  Continue Glucerna with meals.     Malnutrition Assessment:  Malnutrition Status:  Insufficient data (06/22/24 1752)    Context:  Chronic Illness          Nutrition Assessment:    Pt in good spirits, shares 50% intake of meals and 100% of all three Glucerna supplements daily.    Nutrition Related Findings:    Gluc 105. Trace edema generalized, RUE, LUE; 2+ pitting RLE, LLE. BM 6/30. Meds reviewed. Wound Type: Surgical Incision       Current Nutrition Intake & Therapies:    Average Meal Intake: 51-75%  Average Supplements Intake: %  ADULT DIET; Regular; 4 carb choices (60 gm/meal)  ADULT ORAL NUTRITION SUPPLEMENT; Breakfast, Lunch, Dinner; Diabetic Oral Supplement    Anthropometric Measures:  Height: 170.2 cm (5' 7\")  Ideal Body Weight (IBW): 148 lbs (67 kg)    Admission Body Weight: 93.1 kg (205 lb 4 oz)  Current Body Weight: 97.8 kg (215 lb 9.8 oz), 138.7 % IBW. Weight Source: Bed Scale  Current BMI (kg/m2): 33.8  Usual Body Weight:  (202 lb dr wt per pt; wt loss susptected over time. Fluid shifts also affect wt.)  % Weight Change (Calculated): -4.6                    BMI Categories: Obese Class 1 (BMI 30.0-34.9)    Estimated Daily Nutrient Needs:  Energy Requirements Based On: Formula  Weight Used for Energy Requirements: Admission  Energy (kcal/day): 2730-2141 based on [place] with 1.1-1.2 factor  Weight Used for Protein Requirements: Ideal  Protein (g/day):  based on 1.4-1.6 gm per kg Ideal Body Wt     Fluid (ml/day): per physician    Nutrition Diagnosis:   Inadequate oral intake related to  (decreased appetite) as evidenced by poor intake prior to admission (suspected wt loss)    Nutrition Interventions:   Food and/or Nutrient Delivery: Continue Current Diet, Continue Oral Nutrition Supplement  Nutrition Education/Counseling: No recommendation at this

## 2024-07-01 NOTE — PROGRESS NOTES
Wound Ostomy Continence Nursing  Follow Up Visit      NAME:  Anuj Jovel  MEDICAL RECORD NUMBER:  [id number]  AGE: 83 y.o.   GENDER: male  : 1940  TODAY'S DATE:  2024    Subjective        Follow-up visit today for ongoing management of moisture associated skin damage to the buttocks and coccyx area.  The patient states the area is still a little tender when he sits, but it does feel like it is getting better.    Review of Systems:  Constitutional: negative for chills and fevers  Respiratory: negative for cough and shortness of breath  Cardiovascular: negative for chest pain and palpitations  Gastrointestinal: negative for abdominal pain and nausea  Genitourinary: Has Graff catheter in place  Integument: Buttock skin concerns  Endocrine: negative  Musculoskeletal: General debility-improving per patient.  Behavioral/Psych: negative  Pain: Tolerable      Objective     Vitals:  /66   Pulse 69   Temp 97.9 °F (36.6 °C) (Oral)   Resp 18   Ht 1.702 m (5' 7\")   Wt 97.8 kg (215 lb 9.6 oz)   SpO2 98%   BMI 33.77 kg/m²    TEMPERATURE:  Current - Temp: 97.9 °F (36.6 °C); Max - Temp  Av.8 °F (36.6 °C)  Min: 97.6 °F (36.4 °C)  Max: 97.9 °F (36.6 °C)    Baljinder Risk Score Baljinder Scale Score: 17    INTAKE/OUTPUT:      Intake/Output Summary (Last 24 hours) at 2024 1201  Last data filed at 2024 0645  Gross per 24 hour   Intake --   Output 1700 ml   Net -1700 ml                 Physical Exam:  General Appearance: alert and oriented to person, place and time, well-developed and well-nourished, in no acute distress  Head: normocephalic and atraumatic  Pulmonary/Chest: normal air movement, no respiratory distress  Skin: Hyperpigmentation noted to the perianal and coccyx area.  There is 1 remaining open area just right of the gluteal fold buttock area.  Coccyx area has closed.  Overall improved.  Cardiovascular/Circulation: Minimal edema, no cyanosis  Extremities: no cyanosis and no

## 2024-07-01 NOTE — PROGRESS NOTES
edema  ALLERGIC/IMMUNOLOGIC:  negative for urticaria , itching  ENDOCRINE:  negative increase in drinking, increase in urination, hot or cold intolerance  MUSCULOSKELETAL:  negative joint pains, muscle aches, swelling of joints  NEUROLOGICAL:  negative for headaches, dizziness, lightheadedness, numbness, pain, tingling extremities      Physical Exam:     /66   Pulse 69   Temp 97.9 °F (36.6 °C) (Oral)   Resp 18   Ht 1.702 m (5' 7\")   Wt 97.8 kg (215 lb 9.6 oz)   SpO2 98%   BMI 33.77 kg/m²   Temp (24hrs), Av.8 °F (36.6 °C), Min:97.6 °F (36.4 °C), Max:97.9 °F (36.6 °C)    Recent Labs     24  1614 24  2117 24  0642 24  1158   POCGLU 107 116* 92 135*         Intake/Output Summary (Last 24 hours) at 2024 1803  Last data filed at 2024 0645  Gross per 24 hour   Intake --   Output 1700 ml   Net -1700 ml         General Appearance:  alert, well appearing, and in no acute distress  Head:  normocephalic, atraumatic.  Eye: no icterus, redness, pupils equal and reactive, extraocular eye movements intact, conjunctiva clear  Ear: normal external ear, no discharge, hearing intact  Nose:  no drainage noted  Mouth: mucous membranes moist  Neck: supple, no carotid bruits, thyroid not palpable  Chest wall-incision of pacemaker insertion left upper chest healing appropriately  Lungs: Bilateral equal air entry, clear to ausculation, no wheezing, fine crackles bilateral lung bases   cardiovascular: normal rate, regular rhythm, no murmur, gallop, rub.  Abdomen: Soft, nontender, nondistended, normal bowel sounds, no hepatomegaly or splenomegaly  Neurologic: There are no new focal motor or sensory deficits, normal muscle tone and bulk, no abnormal sensation, normal speech, cranial nerves II through XII grossly intact  Skin: No gross lesions, rashes, bruising or bleeding on exposed skin area  Extremities: Grade 1 bilateral lower extremity edema, peripheral pulses palpable, no  calf pain with  CARDIOLOGY      Sammy Gee MD  7/1/2024  6:03 PM    Copy sent to Jeanne Mariscal MD    Please note that this chart was generated using voice recognition Dragon dictation software.  Although every effort was made to ensure the accuracy of this automated transcription, some errors in transcription may have occurred.

## 2024-07-02 LAB
GLUCOSE BLD-MCNC: 106 MG/DL (ref 75–110)
GLUCOSE BLD-MCNC: 124 MG/DL (ref 75–110)
GLUCOSE BLD-MCNC: 151 MG/DL (ref 75–110)
GLUCOSE BLD-MCNC: 85 MG/DL (ref 75–110)
GLUCOSE BLD-MCNC: 99 MG/DL (ref 75–110)

## 2024-07-02 PROCEDURE — 97530 THERAPEUTIC ACTIVITIES: CPT

## 2024-07-02 PROCEDURE — 51798 US URINE CAPACITY MEASURE: CPT

## 2024-07-02 PROCEDURE — 97535 SELF CARE MNGMENT TRAINING: CPT

## 2024-07-02 PROCEDURE — 99233 SBSQ HOSP IP/OBS HIGH 50: CPT | Performed by: PHYSICAL MEDICINE & REHABILITATION

## 2024-07-02 PROCEDURE — 6370000000 HC RX 637 (ALT 250 FOR IP): Performed by: INTERNAL MEDICINE

## 2024-07-02 PROCEDURE — 97129 THER IVNTJ 1ST 15 MIN: CPT

## 2024-07-02 PROCEDURE — 97116 GAIT TRAINING THERAPY: CPT

## 2024-07-02 PROCEDURE — 6370000000 HC RX 637 (ALT 250 FOR IP): Performed by: NURSE PRACTITIONER

## 2024-07-02 PROCEDURE — 97110 THERAPEUTIC EXERCISES: CPT

## 2024-07-02 PROCEDURE — 1180000000 HC REHAB R&B

## 2024-07-02 PROCEDURE — 82947 ASSAY GLUCOSE BLOOD QUANT: CPT

## 2024-07-02 PROCEDURE — 97130 THER IVNTJ EA ADDL 15 MIN: CPT

## 2024-07-02 PROCEDURE — 99232 SBSQ HOSP IP/OBS MODERATE 35: CPT | Performed by: INTERNAL MEDICINE

## 2024-07-02 PROCEDURE — 6370000000 HC RX 637 (ALT 250 FOR IP): Performed by: PHYSICAL MEDICINE & REHABILITATION

## 2024-07-02 PROCEDURE — 6360000002 HC RX W HCPCS: Performed by: INTERNAL MEDICINE

## 2024-07-02 RX ADMIN — Medication 3 MG: at 22:28

## 2024-07-02 RX ADMIN — ASPIRIN 81 MG: 81 TABLET, COATED ORAL at 08:16

## 2024-07-02 RX ADMIN — PREGABALIN 125 MG: 25 CAPSULE ORAL at 08:16

## 2024-07-02 RX ADMIN — FUROSEMIDE 20 MG: 20 TABLET ORAL at 08:16

## 2024-07-02 RX ADMIN — FINASTERIDE 5 MG: 5 TABLET, FILM COATED ORAL at 08:16

## 2024-07-02 RX ADMIN — ATORVASTATIN CALCIUM 40 MG: 40 TABLET, FILM COATED ORAL at 22:28

## 2024-07-02 RX ADMIN — METFORMIN HYDROCHLORIDE 1000 MG: 1000 TABLET ORAL at 08:16

## 2024-07-02 RX ADMIN — BUTALBITA,ACETAMINOPHEN AND CAFFEINE 1 CAPSULE: 50; 300; 40 CAPSULE ORAL at 13:10

## 2024-07-02 RX ADMIN — INSULIN GLARGINE 10 UNITS: 100 INJECTION, SOLUTION SUBCUTANEOUS at 22:28

## 2024-07-02 RX ADMIN — PANTOPRAZOLE SODIUM 40 MG: 40 TABLET, DELAYED RELEASE ORAL at 05:10

## 2024-07-02 RX ADMIN — PREGABALIN 125 MG: 25 CAPSULE ORAL at 22:28

## 2024-07-02 RX ADMIN — ACETAMINOPHEN 650 MG: 325 TABLET ORAL at 08:16

## 2024-07-02 RX ADMIN — TAMSULOSIN HYDROCHLORIDE 0.4 MG: 0.4 CAPSULE ORAL at 08:16

## 2024-07-02 RX ADMIN — METFORMIN HYDROCHLORIDE 1000 MG: 1000 TABLET ORAL at 16:29

## 2024-07-02 RX ADMIN — ENOXAPARIN SODIUM 40 MG: 100 INJECTION SUBCUTANEOUS at 08:16

## 2024-07-02 ASSESSMENT — PAIN DESCRIPTION - ORIENTATION
ORIENTATION: RIGHT;LEFT
ORIENTATION: ANTERIOR
ORIENTATION: RIGHT;LEFT
ORIENTATION: RIGHT;LEFT

## 2024-07-02 ASSESSMENT — PAIN DESCRIPTION - LOCATION
LOCATION: HEAD
LOCATION: FOOT

## 2024-07-02 ASSESSMENT — PAIN DESCRIPTION - PAIN TYPE
TYPE: CHRONIC PAIN
TYPE: ACUTE PAIN
TYPE: ACUTE PAIN

## 2024-07-02 ASSESSMENT — PAIN DESCRIPTION - DESCRIPTORS
DESCRIPTORS: ACHING
DESCRIPTORS: BURNING;SHARP
DESCRIPTORS: TIGHTNESS
DESCRIPTORS: ACHING;GNAWING

## 2024-07-02 ASSESSMENT — PAIN SCALES - GENERAL
PAINLEVEL_OUTOF10: 0
PAINLEVEL_OUTOF10: 8
PAINLEVEL_OUTOF10: 3
PAINLEVEL_OUTOF10: 8
PAINLEVEL_OUTOF10: 5
PAINLEVEL_OUTOF10: 6
PAINLEVEL_OUTOF10: 9

## 2024-07-02 ASSESSMENT — PAIN - FUNCTIONAL ASSESSMENT
PAIN_FUNCTIONAL_ASSESSMENT: ACTIVITIES ARE NOT PREVENTED

## 2024-07-02 ASSESSMENT — PAIN SCALES - WONG BAKER: WONGBAKER_NUMERICALRESPONSE: HURTS LITTLE MORE

## 2024-07-02 NOTE — PROGRESS NOTES
Physical Therapy  Facility/Department: [facility] ACUTE REHAB      NAME: Anuj Jovel  : 1940 (83 y.o.)  MRN: [id number]  CODE STATUS: Full Code    Date of Service: 24      Past Medical History:   Diagnosis Date    Diabetes (HCC)     Diverticulitis     History of allergy     Hyperlipidemia     Hypertension     Osteoarthritis     Pneumonia due to COVID-19 virus 2020     Past Surgical History:   Procedure Laterality Date    BACK SURGERY  2017    [facility], lumbar fusion    CARDIAC CATHETERIZATION      COLECTOMY  2015    partial colectomy due to diveritc    COLONOSCOPY      TOTAL KNEE ARTHROPLASTY Right        Referral Date : 24  Diagnosis: Dr Bernstein    Restrictions:  Restrictions/Precautions: Fall Risk;Other (comment)  Position Activity Restriction  Other position/activity restrictions: activity as tolerated, Recent pacemaker insertion on 24-Avoid pushing/lifting with LUE, avois lifting L UE over shoulder height.     SUBJECTIVE  Subjective: Patient continues to report fatigue.       Post Treatment Pain Screening       Prior Level of Function:  Social/Functional History  Lives With: Family (daughter, son in law, grandson)  Type of Home: House  Home Layout: One level  Home Access: Stairs to enter with rails  Entrance Stairs - Number of Steps: 3  Entrance Stairs - Rails: Left  Bathroom Shower/Tub: Tub/Shower unit, Shower chair with back, Curtain (Per pt, family is putting in a walk in shower for him)  Bathroom Toilet: Standard  Bathroom Equipment: Shower chair, Grab bars in shower, Hand-held shower (Pt reports planning on doing a bathroom rennovation in near future)  Bathroom Accessibility: Walker accessible  Home Equipment: Cane, Rollator, Wheelchair - Electric, Grab bars, Lift chair (pt reports just using power wheelchair for outside)  Has the patient had two or more falls in the past year or any fall with injury in the past year?: No (just one)  Receives Help From:  walker  Short Term Goal 4: Pt will ambulate with rolling walker distance of 50 ft  with rolling walker, CGA  Short Term Goal 5: Pt will navigate 5 steps with 2 rails, mod A  Long Term Goals  Time Frame for Long Term Goals : By DC  Long Term Goal 1: Pt to perform bed mobility at min/mod A  (pt sleeps in a recliner at home)  Long Term Goal 2: Pt able to perform transfers at supervision/SBA  with RW/Rollator  Long Term Goal 3: Pt to ambulate household distances with rolling walker/Rollator, at SBA/supervision level.  Long Term Goal 4: Pt able to go up and down 4 to 5 steps with 1 Rail and HHA from caregiver at min A.  Long Term Goal 5: Pt to ambulate distance of atlest 40 ft for 2MWT to improve endurance.  Long term goal 6: Car transfers  at min A  Long term goal 7: Pt able to perform 5 X sit<>stand with B UE use, < 20 seconds to improve endurance and B LE strength.  Long term goal 8: Ambulate on outside terraine with rolling walker, distance of 20 to 40 ft, CGA    PLAN OF CARE  Physical Therapy Plan  General Plan:  minutes of therapy at least 5 out of 7 days a week (5-6x/wk)  Safety Devices  Type of Devices: Call light within reach;Chair alarm in place;Gait belt;Nurse notified;Left in chair  Restraints  Restraints Initially in Place: No    EDUCATION  Education  Education Given To: Patient  Education Provided: Role of Therapy;Plan of Care;Precautions;Safety;Mobility Training;Fall Prevention Strategies;Energy Conservation;Transfer Training  Education Provided Comments: Importance of trialing mobility to closely simulate home environment. Positioning and elevation to improve edema  Education Method: Demonstration;Verbal  Barriers to Learning: Cognition;Hearing  Education Outcome: Continued education needed;Demonstrated understanding;Verbalized understanding          Therapy Time   Individual Concurrent Group Co-treatment   Time In 1102         Time Out 1210         Minutes 68              Variance: 30  Reason:

## 2024-07-02 NOTE — PLAN OF CARE
Problem: Discharge Planning  Goal: Discharge to home or other facility with appropriate resources  7/2/2024 1028 by Katt Miller RN  Outcome: Progressing     Problem: Safety - Adult  Goal: Free from fall injury  7/2/2024 1028 by Katt Miller RN  Outcome: Progressing     Problem: Pain  Goal: Verbalizes/displays adequate comfort level or baseline comfort level  7/2/2024 1028 by Katt Miller RN  Outcome: Progressing     Problem: ABCDS Injury Assessment  Goal: Absence of physical injury  7/2/2024 1028 by Katt Miller RN  Outcome: Progressing     Problem: Skin/Tissue Integrity  Goal: Absence of new skin breakdown  Description: 1.  Monitor for areas of redness and/or skin breakdown  2.  Assess vascular access sites hourly  3.  Every 4-6 hours minimum:  Change oxygen saturation probe site  4.  Every 4-6 hours:  If on nasal continuous positive airway pressure, respiratory therapy assess nares and determine need for appliance change or resting period.  7/2/2024 1028 by Katt Miller RN  Outcome: Progressing     Problem: Risk for Elopement  Goal: Patient will not exit the unit/facility without proper excort  7/2/2024 1028 by Katt Miller RN  Outcome: Progressing     Problem: Nutrition Deficit:  Goal: Optimize nutritional status  7/2/2024 1028 by Katt Miller RN  Outcome: Progressing     Problem: Chronic Conditions and Co-morbidities  Goal: Patient's chronic conditions and co-morbidity symptoms are monitored and maintained or improved  7/2/2024 1028 by Katt Miller RN  Outcome: Progressing

## 2024-07-02 NOTE — PROGRESS NOTES
SPEECH LANGUAGE PATHOLOGY  Speech Language Pathology  ST ACUTE REHAB    Cognitive Treatment Note    Date: 7/2/2024  Patient’s Name: Anuj Jovel  MRN: [id number]  Diagnosis:   Patient Active Problem List   Diagnosis Code    Acquired deviated nasal septum J34.2    Actinic keratoses L57.0    Arthritis M19.90    Benign prostatic hyperplasia N40.0    Bloating R14.0    Chronic serous otitis media H65.20    Claustrophobia F40.240    Coronary artery disease I25.10    Esophageal reflux K21.9    Flatus R14.3    Hearing loss H91.90    Hemorrhoids K64.9    History of allergy Z88.9    Hyperlipidemia E78.5    Essential hypertension I10    Leg swelling M79.89    Lumbar radiculopathy M54.16    Lumbar stenosis M48.061    Skin neoplasm D49.2    Tinea corporis B35.4    Ventral hernia K43.9    Weight gain R63.5    Type 2 diabetes mellitus with complication (HCC) E11.8    Obesity E66.9    Other osteoporosis without current pathological fracture M81.8    Presence of coronary angioplasty implant and graft Z95.5    Elevated C-reactive protein (CRP) R79.82    KAREY (obstructive sleep apnea) G47.33    Hypoxia R09.02    Chronic pain syndrome G89.4    AV block, Mobitz 1 I44.1    History of placement of stent in LAD coronary artery Z95.5    Chronic heart failure with preserved ejection fraction (HFpEF) (HCC) I50.32    Mild mitral valve regurgitation I34.0    Pulmonary hypertension (HCC) I27.20    Spondylolisthesis of lumbar region M43.16    Second degree AV block, Mobitz type I I44.1    Systolic congestive heart failure (HCC) I50.20    Advanced age R54    Acute hypoxic respiratory failure (HCC) J96.01    COVID U07.1    Shortness of breath R06.02    Symptomatic bradycardia R00.1    Complete heart block (HCC) I44.2    Cardiac pacemaker in situ Z95.0    Encephalopathy acute G93.40    MCI (mild cognitive impairment) G31.84    Encephalopathy G93.40    Debility R53.81    Encounter for postoperative wound check Z48.89    S/P placement of cardiac  pacemaker Z95.0    Incontinence associated dermatitis L25.8, R32       Pain: 10/10 (R foot)      Cognitive Treatment    Treatment time: 9987-1576      Subjective: [x] Alert [x] Cooperative     [] Confused     [] Agitated    [] Lethargic      Objective/Assessment:  Attention: Occasional repetition required throughout.     Orientation: Orientation log administered, O-Log score- 27/30 (disoriented to name of hospital and pathology deficits only).  Pt continues to demonstrate difficulty with recall of name of facility.     Recall: Image retention (street, 10 min delay)- 40% accuracy (I), increasing to 90% c mod cues.  Pt recognizing difficulty with task -- encouragement required throughout.     Organization: n/a      Problem Solving/Reasoning: Multiple definitions- 100% accuracy (I).      Other: No family present.  Pt pleasant and conversational throughout.     Plan:  [x] Continue ST services    [] Discharge from ST:      Discharge recommendations: [] Inpatient Rehab   [] Skilled Nursing Facility   [] Outpatient Therapy  [] Follow up at trauma clinic   [] Other:       Treatment completed by: Shantal Rivero M.A., CCC-SLP

## 2024-07-02 NOTE — PROGRESS NOTES
IN-PATIENT SERVICE  [facility] Internal Medicine  [facility] Internal Medicine  Jung Sung MD; Marck Triniadd MD; Valdo Emery MD; MD Lynda Cisneros MD; Sammy Gee MD; Janina Calvin MD        CONSULTATION / HISTORY AND PHYSICAL EXAMINATION            Date:   7/2/2024  Patient name:  Anuj Jovel  Date of admission:  6/21/2024  7:14 PM  MRN:   [id number]  Account:  [id number]  YOB: 1940  PCP:    Jeanne Simpson MD  Room:   [address] [place] [address]  Code Status:    Full Code    Physician Requesting Consult: aMra Bernstein MD    Reason for Consult:  Medical management    Chief Complaint:     No chief complaint on file.  Debility    History Obtained From:     Patient, EMR, nursing staff    History of Present Illness:     83-year-old male history of CAD, BPH, HTN, T2DM, HLD, BPH initially admitted to [facility] on 6/8/2024 when presented for shortness of breath diagnosed with COVID-19 pneumonia noted to be bradycardic with a high-grade AV block and transferred to [facility] for EP evaluation, transiently on dopamine infusion pacemaker was placed on 6/17/2024 while at [facility].  Patient also completed treatment for COVID-19 pneumonia-baricitinib and remdesivir completed .  Was also treated for acute gout      Past Medical History:     Past Medical History:   Diagnosis Date    Diabetes (HCC)     Diverticulitis     History of allergy     Hyperlipidemia     Hypertension     Osteoarthritis     Pneumonia due to COVID-19 virus 12/29/2020        Past Surgical History:     Past Surgical History:   Procedure Laterality Date    BACK SURGERY  09/03/2017     [facility]  Dr [facility], lumbar fusion    CARDIAC CATHETERIZATION      COLECTOMY  2015    partial colectomy due to diveritc    COLONOSCOPY      TOTAL KNEE ARTHROPLASTY Right         Medications Prior to Admission:     Prior to Admission medications    Medication Sig Start Date End Date Taking? Authorizing  tomorrow.  Hypertension blood pressure controlled resume home medication  Type 2 diabetes blood sugars controlled continue metformin, Lantus sliding scale  Coronary artery disease previous PCI to LAD-continue aspirin statin  HFpEF-evidence of some fluid overload, bilateral lower lung crackles, grade 1 bilateral lower extremity edema, will increase dose of Lasix.  DVT prophylaxis-SC Lovenox    6/23  Heart rate blood pressure controlled, blood sugars controlled  Hemoglobin stable at 10.4  Some relief in cough with Tessalon Perles  Participating in therapy  Continue current management    6/24  Patient reports feeling well, participating in therapy.  Reports cough was initially improving but appears to be getting worse now.  Will check chest x-ray.  Saturating well on room air blood pressure heart rate controlled  Blood sugars controlled  Discontinue doxycycline has completed 1 week.  Per discharge summary from [facility] [facility] from 6/21 doxycycline was discontinued prior to discharge.    6/25  Chest x-ray stable  Patient saturating well on room air  Cough unchanged continue Tessalon Perles  Blood pressure blood sugars controlled  Patient participating in therapy  Continue current management.    6/26  Blood pressure on the lower side-will DC amlodipine, encourage p.o. intake  Cough is getting better patient saturating well on room air no new issues  Blood sugars controlled  Anticipate in therapy  New urinary retention noted yesterday bladder scan showed more than 1 L in the bladder Graff inserted.  Urology consulted.  Patient was already on Flomax, Proscar added.  UA was done earlier today not concerning for infection.    July 2  Seen and examined face-to-face,  Blood pressure running well at this time, last reading was 121/66  Continue same medications at this time,  Patient participating in therapy   Graff draining good amount of urine.  Void trial prior to discharge.  Probable cystoscopy outpatient.  Post voiding trial

## 2024-07-02 NOTE — PROGRESS NOTES
Physical Medicine & Rehabilitation  Progress Note      Subjective:      83 year-old male with Encephalopathy and Debility secondary to COVID-19 and 3rd degree heart block. Patient is doing well today. He is spontaneously voiding with low PVRs on voiding trial initiated today. He is noting onset of intermittent headache today with some relief from prn medication. He continues to progress with therapies.     ROS:  Denies fevers, chills, sweats.  No chest pain, palpitations, lightheadedness.  Denies coughing, wheezing or shortness of breath.  Denies abdominal pain, nausea, diarrhea or constipation.  No new areas of joint pain.  Denies new areas of numbness or weakness.  Denies new anxiety or depression issues.  No new skin problems.    Rehabilitation:       PT:    Bed mobility  Supine to Sit: Maximum assistance  Sit to Supine: Moderate assistance (bilat LE  (pt sleeps in recliner at home))  Scooting: Dependent/Total (to HOB)  Bed Mobility Comments: Patient up in WC and Recliner in AM.  Returned to bed in PM.         Transfers  Sit to Stand: Stand by assistance  Stand to Sit: Stand by assistance  Bed to Chair: Stand by assistance  Stand Pivot Transfers: Stand by assistance (Pt completing without AD)  Squat Pivot Transfers: Stand by assistance (No AD)  Car Transfer: Contact guard assistance  Comment: Transfers completed with and without rollator from home. Pt having difficulty with erect posture and verbalizes without cuing \"I know, stand up straight\"         Ambulation  Surface: Level tile  Device: Rollator (Patient used his rollator from home.)  Other Apparatus: Wheelchair follow  Assistance: Stand by assistance  Quality of Gait: Decreased endurance. Cues for upright posture and RW safety.  Gait Deviations: Slow Erin, Decreased step length, Decreased step height  Distance: 100 ft x 2 in AM;  short distances within gym  Comments: No amb. completed in PM due to fatigue.  More Ambulation?: Yes  Ambulation 2  Surface -  management  Follow up PCP 1-2 weeks, Cardiology, Neurology 4-6 weeks  Patient's care discussed in interdisciplinary team conference today. Medical decision making high regarding continued inpatient rehabilitation hospitalization and discharge planning. High MDM regarding coordination of care, review of medical records including consulting services management and recommendations, medications, and lab results.     Anuj Jovel requires a toilet safety frame due to impaired mobility and that he is physically incapable of utilizing regular toilet facilities. Current body weight: Weight - Scale: 97.8 kg (215 lb 9.6 oz).       Electronically signed by GILBERT BOYCE MD on 7/2/2024 at 9:34 AM      This note is created with the assistance of a speech recognition program.  While intending to generate a document that actually reflects the content of the visit, the document can still have some errors including those of syntax and sound a like substitutions which may escape proof reading.  In such instances, actual meaning can be extrapolated by contextual diversion.

## 2024-07-02 NOTE — PROGRESS NOTES
redirect  Memory: Decreased recall of precautions  Safety Judgement: Decreased awareness of need for assistance;Decreased awareness of need for safety  Problem Solving: Assistance required to identify errors made;Assistance required to correct errors made;Decreased awareness of errors  Insights: Decreased awareness of deficits  Initiation: Requires cues for some  Sequencing: Requires cues for some  Cognition Comment: extra time to complete tasks due to pain in backside  Patient affect:: Normal    Activities of Daily Living  Feeding  Assistance Level: Independent  Skilled Clinical Factors: per pt report    Grooming/Oral Hygiene  Assistance Level: Set-up  Skilled Clinical Factors: seated in  for oral care and washing face    Upper Extremity Bathing  Assistance Level: Set-up  Skilled Clinical Factors: seated in  sinkside, CHG completed    Lower Extremity Bathing  Assistance Level: Set-up  Skilled Clinical Factors: pt washed BLE's while seated in w/c, declines marce-care this date    Upper Extremity Dressing  Assistance Level: Set-up  Skilled Clinical Factors: with increased time pt able to don/doff OH shirt while maintaining pace maker precautions, seated in w/c    Lower Extremity Dressing  Assistance Level: Minimal assistance  Skilled Clinical Factors: A threading/unthreading palmer, increased time req for clothing mgmt, CGA/SBA standing to pull up/over hips and intermittent touching assist    Putting On/Taking Off Footwear  Equipment Provided: Sock aid  Assistance Level: Minimal assistance  Skilled Clinical Factors: TA for TEDs, sock aid setup to don footies, pt able to doff footies with max time    Toileting  Skilled Clinical Factors: has palmer      Mobility        Roll Left  Assistance Level: Stand by assist  Skilled Clinical Factors: using RUE on bed rail  Roll Right  Assistance Level: Stand by assist  Skilled Clinical Factors: using RUE on bed rail and max time  Sit to Supine  Assistance Level: Stand by  assist  Skilled Clinical Factors: pt able to bring BLE's into bed  Supine to Sit  Assistance Level: Stand by assist  Skilled Clinical Factors: using bed rail, max time req  Scooting  Assistance Level: Supervision  Skilled Clinical Factors: hips to EOB    Transfers  Surface: To bed;From bed;Wheelchair  Additional Factors: Set-up;Verbal cues;Hand placement cues;Increased time to complete  Device: Walker (rollator)  Sit to Stand  Assistance Level: Stand by assist  Skilled Clinical Factors: intermittent cuing for hand placement  Stand to Sit  Assistance Level: Stand by assist  Skilled Clinical Factors: cuing for hand placement                      Functional Mobility  Device: 4-Wheeled walker  Activity: To/From bathroom  Assistance Level: Stand by assist  Skilled Clinical Factors: using 4WW, SBA req with increased time, good safety noted with brakes            OT Exercises  Exercise Treatment: pt engaged in RUE ex's using 2# weights to support mobility, transfers and overall endurance, RB's req as needed but tolerated well, 20 reps per ex in all available planes, did not complete on LUE due to pacemaker precautions and discomfort in chest with movements  Dynamic Standing Balance Exercises: dynamic standing and functional mobility using 4WW to retrieve cones at various heights to address balance and safety while engaging in a functional task, SBA req with no LOB, cuing req for positioning to increase safety with reaching                   Assessment  Assessment  Activity Tolerance: Patient limited by fatigue;Patient limited by endurance;Patient limited by pain (pt c/o of increasing pain in head this date)  Discharge Recommendations: Patient would benefit from continued therapy after discharge;Continue to assess pending progress    Patient Education  Education  Education Given To: Patient;Family (daughter present)  Education Provided: Plan of Care;Precautions;Safety;ADL Function;Mobility Training;Transfer Training;Family

## 2024-07-02 NOTE — PLAN OF CARE
Problem: Discharge Planning  Goal: Discharge to home or other facility with appropriate resources  7/2/2024 0134 by Chika Goldsmith LPN  Outcome: Progressing     Problem: Safety - Adult  Goal: Free from fall injury  7/2/2024 0134 by Chika Goldsmith LPN  Outcome: Progressing     Problem: Pain  Goal: Verbalizes/displays adequate comfort level or baseline comfort level  7/2/2024 0134 by Chika Goldsmith LPN  Outcome: Progressing     Problem: ABCDS Injury Assessment  Goal: Absence of physical injury  7/2/2024 0134 by Chika Goldsmith LPN  Outcome: Progressing     Problem: Skin/Tissue Integrity  Goal: Absence of new skin breakdown  Description: 1.  Monitor for areas of redness and/or skin breakdown  2.  Assess vascular access sites hourly  3.  Every 4-6 hours minimum:  Change oxygen saturation probe site  4.  Every 4-6 hours:  If on nasal continuous positive airway pressure, respiratory therapy assess nares and determine need for appliance change or resting period.  7/2/2024 0134 by Chika Goldsmith LPN  Outcome: Progressing     Problem: Risk for Elopement  Goal: Patient will not exit the unit/facility without proper excort  7/2/2024 0134 by Chika Goldsmith LPN  Outcome: Progressing     Problem: Nutrition Deficit:  Goal: Optimize nutritional status  7/2/2024 0134 by Chika Goldsmith LPN  Outcome: Progressing     Problem: Chronic Conditions and Co-morbidities  Goal: Patient's chronic conditions and co-morbidity symptoms are monitored and maintained or improved  7/2/2024 0134 by Chika Goldsmith LPN  Outcome: Progressing

## 2024-07-02 NOTE — PROGRESS NOTES
Department of Urology  Urology Progress Note    Patient:  Anuj Jovel  MRN: [id number]  YOB: 1940    Subjective:  Doing well.   On combo therapy flomax and finasteride.  Graff was removed around 1045AM.   He still needs to urinate.         Patient Vitals for the past 24 hrs:   BP Temp Temp src Pulse Resp SpO2   07/02/24 0815 (!) 120/58 98.4 °F (36.9 °C) Oral 72 16 96 %   07/01/24 1927 (!) 119/58 98.1 °F (36.7 °C) Oral 80 16 95 %       Intake/Output Summary (Last 24 hours) at 7/2/2024 1139  Last data filed at 7/2/2024 1051  Gross per 24 hour   Intake --   Output 2925 ml   Net -2925 ml       No results for input(s): \"WBC\", \"HGB\", \"HCT\", \"MCV\", \"PLT\" in the last 72 hours.  No results for input(s): \"NA\", \"K\", \"CL\", \"CO2\", \"PHOS\", \"BUN\", \"CREATININE\" in the last 72 hours.    Invalid input(s): \"CA\"    No results for input(s): \"COLORU\", \"PHUR\", \"LABCAST\", \"WBCUA\", \"RBCUA\", \"MUCUS\", \"TRICHOMONAS\", \"YEAST\", \"BACTERIA\", \"CLARITYU\", \"SPECGRAV\", \"LEUKOCYTESUR\", \"UROBILINOGEN\", \"BILIRUBINUR\", \"BLOODU\" in the last 72 hours.    Invalid input(s): \"NITRATE\", \"GLUCOSEUKETONESUAMORPHOUS\"    Additional Lab/culture results:    Physical Exam: patient out of room.    Interval Imaging Findings:   No results found.    Impression:    Patient Active Problem List   Diagnosis    Acquired deviated nasal septum    Actinic keratoses    Arthritis    Benign prostatic hyperplasia    Bloating    Chronic serous otitis media    Claustrophobia    Coronary artery disease    Esophageal reflux    Flatus    Hearing loss    Hemorrhoids    History of allergy    Hyperlipidemia    Essential hypertension    Leg swelling    Lumbar radiculopathy    Lumbar stenosis    Skin neoplasm    Tinea corporis    Ventral hernia    Weight gain    Type 2 diabetes mellitus with complication (HCC)    Obesity    Other osteoporosis without current pathological fracture    Presence of coronary angioplasty implant and graft    Elevated C-reactive protein (CRP)    KAREY

## 2024-07-03 LAB
GLUCOSE BLD-MCNC: 109 MG/DL (ref 75–110)
GLUCOSE BLD-MCNC: 121 MG/DL (ref 75–110)
GLUCOSE BLD-MCNC: 90 MG/DL (ref 75–110)

## 2024-07-03 PROCEDURE — 6370000000 HC RX 637 (ALT 250 FOR IP): Performed by: NURSE PRACTITIONER

## 2024-07-03 PROCEDURE — 97110 THERAPEUTIC EXERCISES: CPT

## 2024-07-03 PROCEDURE — 97535 SELF CARE MNGMENT TRAINING: CPT

## 2024-07-03 PROCEDURE — 82947 ASSAY GLUCOSE BLOOD QUANT: CPT

## 2024-07-03 PROCEDURE — 97530 THERAPEUTIC ACTIVITIES: CPT

## 2024-07-03 PROCEDURE — 99231 SBSQ HOSP IP/OBS SF/LOW 25: CPT | Performed by: INTERNAL MEDICINE

## 2024-07-03 PROCEDURE — 99232 SBSQ HOSP IP/OBS MODERATE 35: CPT | Performed by: PHYSICAL MEDICINE & REHABILITATION

## 2024-07-03 PROCEDURE — 1180000000 HC REHAB R&B

## 2024-07-03 PROCEDURE — 97130 THER IVNTJ EA ADDL 15 MIN: CPT

## 2024-07-03 PROCEDURE — 6370000000 HC RX 637 (ALT 250 FOR IP): Performed by: INTERNAL MEDICINE

## 2024-07-03 PROCEDURE — 97129 THER IVNTJ 1ST 15 MIN: CPT

## 2024-07-03 PROCEDURE — 97116 GAIT TRAINING THERAPY: CPT

## 2024-07-03 PROCEDURE — 6360000002 HC RX W HCPCS: Performed by: INTERNAL MEDICINE

## 2024-07-03 RX ORDER — BUTALBITAL, ACETAMINOPHEN AND CAFFEINE 300; 40; 50 MG/1; MG/1; MG/1
1 CAPSULE ORAL EVERY 6 HOURS PRN
Qty: 84 CAPSULE | Refills: 0 | Status: SHIPPED | OUTPATIENT
Start: 2024-07-03

## 2024-07-03 RX ORDER — PREGABALIN 100 MG/1
100 CAPSULE ORAL 2 TIMES DAILY
Qty: 60 CAPSULE | Refills: 0 | Status: SHIPPED | OUTPATIENT
Start: 2024-07-04 | End: 2024-08-03

## 2024-07-03 RX ORDER — PREGABALIN 25 MG/1
25 CAPSULE ORAL 2 TIMES DAILY
Qty: 60 CAPSULE | Refills: 0 | Status: SHIPPED | OUTPATIENT
Start: 2024-07-03 | End: 2024-08-02

## 2024-07-03 RX ORDER — FINASTERIDE 5 MG/1
5 TABLET, FILM COATED ORAL DAILY
Qty: 30 TABLET | Refills: 3 | Status: SHIPPED | OUTPATIENT
Start: 2024-07-04

## 2024-07-03 RX ORDER — POLYETHYLENE GLYCOL 3350 17 G/17G
17 POWDER, FOR SOLUTION ORAL DAILY PRN
Qty: 527 G | Refills: 1 | COMMUNITY
Start: 2024-07-03

## 2024-07-03 RX ORDER — PEN NEEDLE, DIABETIC 29 GAUGE
1 NEEDLE, DISPOSABLE MISCELLANEOUS DAILY
Qty: 100 EACH | Refills: 3 | Status: SHIPPED | OUTPATIENT
Start: 2024-07-03

## 2024-07-03 RX ORDER — INSULIN GLARGINE 100 [IU]/ML
10 INJECTION, SOLUTION SUBCUTANEOUS NIGHTLY
Qty: 5 ADJUSTABLE DOSE PRE-FILLED PEN SYRINGE | Refills: 3 | Status: SHIPPED | OUTPATIENT
Start: 2024-07-03

## 2024-07-03 RX ORDER — FUROSEMIDE 20 MG/1
20 TABLET ORAL DAILY
Qty: 60 TABLET | Refills: 3 | Status: SHIPPED | OUTPATIENT
Start: 2024-07-04

## 2024-07-03 RX ORDER — TAMSULOSIN HYDROCHLORIDE 0.4 MG/1
0.4 CAPSULE ORAL DAILY
Qty: 30 CAPSULE | Refills: 3 | Status: SHIPPED | OUTPATIENT
Start: 2024-07-04

## 2024-07-03 RX ORDER — BENZONATATE 200 MG/1
200 CAPSULE ORAL 3 TIMES DAILY PRN
Qty: 21 CAPSULE | Refills: 0 | Status: SHIPPED | OUTPATIENT
Start: 2024-07-03 | End: 2024-07-10

## 2024-07-03 RX ORDER — LANOLIN ALCOHOL/MO/W.PET/CERES
3 CREAM (GRAM) TOPICAL NIGHTLY PRN
Refills: 3 | COMMUNITY
Start: 2024-07-03

## 2024-07-03 RX ADMIN — METFORMIN HYDROCHLORIDE 1000 MG: 1000 TABLET ORAL at 17:41

## 2024-07-03 RX ADMIN — PREGABALIN 125 MG: 25 CAPSULE ORAL at 09:01

## 2024-07-03 RX ADMIN — PREGABALIN 125 MG: 25 CAPSULE ORAL at 22:15

## 2024-07-03 RX ADMIN — FINASTERIDE 5 MG: 5 TABLET, FILM COATED ORAL at 09:01

## 2024-07-03 RX ADMIN — TAMSULOSIN HYDROCHLORIDE 0.4 MG: 0.4 CAPSULE ORAL at 09:01

## 2024-07-03 RX ADMIN — ENOXAPARIN SODIUM 40 MG: 100 INJECTION SUBCUTANEOUS at 09:02

## 2024-07-03 RX ADMIN — METFORMIN HYDROCHLORIDE 1000 MG: 1000 TABLET ORAL at 09:01

## 2024-07-03 RX ADMIN — INSULIN GLARGINE 10 UNITS: 100 INJECTION, SOLUTION SUBCUTANEOUS at 22:15

## 2024-07-03 RX ADMIN — BUTALBITA,ACETAMINOPHEN AND CAFFEINE 1 CAPSULE: 50; 300; 40 CAPSULE ORAL at 02:00

## 2024-07-03 RX ADMIN — FUROSEMIDE 20 MG: 20 TABLET ORAL at 09:01

## 2024-07-03 RX ADMIN — PANTOPRAZOLE SODIUM 40 MG: 40 TABLET, DELAYED RELEASE ORAL at 06:19

## 2024-07-03 RX ADMIN — ASPIRIN 81 MG: 81 TABLET, COATED ORAL at 09:01

## 2024-07-03 RX ADMIN — ATORVASTATIN CALCIUM 40 MG: 40 TABLET, FILM COATED ORAL at 22:15

## 2024-07-03 ASSESSMENT — 9 HOLE PEG TEST
TEST_RESULT: IMPAIRED
TESTTIME_SECONDS: 36
TEST_RESULT: FUNCTIONAL
TESTTIME_SECONDS: 31

## 2024-07-03 ASSESSMENT — PAIN DESCRIPTION - ORIENTATION
ORIENTATION: RIGHT;LEFT
ORIENTATION: ANTERIOR

## 2024-07-03 ASSESSMENT — PAIN DESCRIPTION - LOCATION
LOCATION: HEAD
LOCATION: FOOT

## 2024-07-03 ASSESSMENT — PAIN SCALES - GENERAL
PAINLEVEL_OUTOF10: 0
PAINLEVEL_OUTOF10: 8
PAINLEVEL_OUTOF10: 6
PAINLEVEL_OUTOF10: 2

## 2024-07-03 ASSESSMENT — PAIN DESCRIPTION - DESCRIPTORS
DESCRIPTORS: BURNING
DESCRIPTORS: ACHING;GNAWING;SQUEEZING

## 2024-07-03 ASSESSMENT — PAIN - FUNCTIONAL ASSESSMENT
PAIN_FUNCTIONAL_ASSESSMENT: PREVENTS OR INTERFERES SOME ACTIVE ACTIVITIES AND ADLS
PAIN_FUNCTIONAL_ASSESSMENT: PREVENTS OR INTERFERES SOME ACTIVE ACTIVITIES AND ADLS

## 2024-07-03 NOTE — DISCHARGE INSTR - COC
Isolation            No Isolation          Patient Infection Status       Infection Onset Added Last Indicated Last Indicated By Review Planned Expiration Resolved Resolved By    None active    Resolved    COVID-19 24 COVID-19 & Influenza Combo   24 Carolyn Douglas, RN    COVID-19 20 COVID-19   21 Infection                        Nurse Assessment:  Last Vital Signs: /61   Pulse 72   Temp 97.7 °F (36.5 °C) (Oral)   Resp 16   Ht 1.702 m (5' 7\")   Wt 97.8 kg (215 lb 9.6 oz)   SpO2 93%   BMI 33.77 kg/m²     Last documented pain score (0-10 scale): Pain Level: 8  Last Weight:   Wt Readings from Last 1 Encounters:   24 97.8 kg (215 lb 9.6 oz)     Mental Status:  alert    IV Access:  - None    Nursing Mobility/ADLs:  Walking   Assisted  Transfer  Assisted  Bathing  Assisted  Dressing  Assisted  Toileting  Assisted  Feeding  Independent  Med Admin  Assisted  Med Delivery   whole    Wound Care Documentation and Therapy:  Wound 24 Buttocks Gluteal Cleft - Moisture Associated Skin Damage (Active)   Wound Image   24 1006   Wound Etiology Other 24 1006   Dressing Status New dressing applied 24 1006   Wound Cleansed Soap and water 24 0907   Dressing/Treatment Triad hydro/zinc oxide-based hydrophilic paste 24 0907   Wound Assessment Pink/red 24 2225   Drainage Amount None (dry) 24 222   Drainage Description Serosanguinous 24 1006   Odor None 24 2225   Yelitza-wound Assessment Blanchable erythema;Erosion 24 2225   Margins Attached edges;Defined edges 24 1006   Number of days: 8       Incision 24 Chest Lateral;Left;Upper (Active)   Dressing Status Other (Comment) 24 2225   Incision Cleansed Not Cleansed 24 0907   Dressing/Treatment Steri-strips 24 0907   Closure Surgical glue;Steri-Strips 24 0907   Margins Approximated 24 0907

## 2024-07-03 NOTE — PLAN OF CARE
Problem: Discharge Planning  Goal: Discharge to home or other facility with appropriate resources  Outcome: Progressing     Problem: Safety - Adult  Goal: Free from fall injury  Outcome: Progressing     Problem: Pain  Goal: Verbalizes/displays adequate comfort level or baseline comfort level  Outcome: Progressing     Problem: ABCDS Injury Assessment  Goal: Absence of physical injury  Outcome: Progressing     Problem: Skin/Tissue Integrity  Goal: Absence of new skin breakdown  Outcome: Progressing     Problem: Nutrition Deficit:  Goal: Optimize nutritional status  Outcome: Progressing

## 2024-07-03 NOTE — PROGRESS NOTES
IN-PATIENT SERVICE  [facility] Internal Medicine  [facility] Internal Medicine  Jung Sung MD; Marck Trinidad MD; Valdo Emery MD; MD Lynda Cisneros MD; Sammy Gee MD; Janina Calvin MD        CONSULTATION / HISTORY AND PHYSICAL EXAMINATION            Date:   7/3/2024  Patient name:  Anuj Jovel  Date of admission:  6/21/2024  7:14 PM  MRN:   [id number]  Account:  [id number]  YOB: 1940  PCP:    María Draper MD  Room:   [address] [place] [address]  Code Status:    Full Code    Physician Requesting Consult: Mara Bernstein MD    Reason for Consult:  Medical management    Chief Complaint:     No chief complaint on file.  Debility    History Obtained From:     Patient, EMR, nursing staff    History of Present Illness:     83-year-old male history of CAD, BPH, HTN, T2DM, HLD, BPH initially admitted to [facility] on 6/8/2024 when presented for shortness of breath diagnosed with COVID-19 pneumonia noted to be bradycardic with a high-grade AV block and transferred to [place] for EP evaluation, transiently on dopamine infusion pacemaker was placed on 6/17/2024 while at [place].  Patient also completed treatment for COVID-19 pneumonia-baricitinib and remdesivir completed .  Was also treated for acute gout      Past Medical History:     Past Medical History:   Diagnosis Date    Diabetes (HCC)     Diverticulitis     History of allergy     Hyperlipidemia     Hypertension     Osteoarthritis     Pneumonia due to COVID-19 virus 12/29/2020        Past Surgical History:     Past Surgical History:   Procedure Laterality Date    BACK SURGERY  09/03/2017    [facility]  Dr Cueva, lumbar fusion    CARDIAC CATHETERIZATION      COLECTOMY  2015    partial colectomy due to diveritc    COLONOSCOPY      TOTAL KNEE ARTHROPLASTY Right         Medications Prior to Admission:     Prior to Admission medications    Medication Sig Start Date End Date Taking?  tomorrow.  Hypertension blood pressure controlled resume home medication  Type 2 diabetes blood sugars controlled continue metformin, Lantus sliding scale  Coronary artery disease previous PCI to LAD-continue aspirin statin  HFpEF-evidence of some fluid overload, bilateral lower lung crackles, grade 1 bilateral lower extremity edema, will increase dose of Lasix.  DVT prophylaxis-SC Lovenox    6/23  Heart rate blood pressure controlled, blood sugars controlled  Hemoglobin stable at 10.4  Some relief in cough with Tessalon Perles  Participating in therapy  Continue current management    6/24  Patient reports feeling well, participating in therapy.  Reports cough was initially improving but appears to be getting worse now.  Will check chest x-ray.  Saturating well on room air blood pressure heart rate controlled  Blood sugars controlled  Discontinue doxycycline has completed 1 week.  Per discharge summary from [facility] from 6/21 doxycycline was discontinued prior to discharge.    6/25  Chest x-ray stable  Patient saturating well on room air  Cough unchanged continue Tessalon Perles  Blood pressure blood sugars controlled  Patient participating in therapy  Continue current management.    6/26  Blood pressure on the lower side-will DC amlodipine, encourage p.o. intake  Cough is getting better patient saturating well on room air no new issues  Blood sugars controlled  Anticipate in therapy  New urinary retention noted yesterday bladder scan showed more than 1 L in the bladder Graff inserted.  Urology consulted.  Patient was already on Flomax, Proscar added.  UA was done earlier today not concerning for infection.    July 3  Seen and examined face-to-face,  Blood pressure running well at this time, last reading was 129/61  Continue same medications at this time,  Patient participating in therapy   Graff draining good amount of urine.  Void trial prior to discharge.  Probable cystoscopy outpatient.  Post voiding trial

## 2024-07-03 NOTE — CARE COORDINATION
[facility] Acute Inpatient Rehabilitation  Case Management Discharge Note    Discharge Disposition: Home with daughter    Discharge Transportation Method: family car ,  Time: 12pm    IMM Letter Status: Patient/Responsible Party agreeable with discharge: Second Signature Obtained, Patient/Responsible Party offered four hours to make informed decision regarding appeal process - Completed Letter Placed in Patient Chart    Home Healthcare Services: Company: [employer]    Outpatient Therapy Services: Not Applicable    DME:  TSF, hip kit - daughter ordered    Current reconciled medication list sent to subsequent provider via: Electronic Health Record      Patient Health Questionnaire-9 (PHQ-2 to 9)   Over the last 2 weeks, how often have you been bothered by any of the following problems?    1. Little Interest or pleasure in doing things?   Never or 1 Day - Score 0    2. Feeling down, depressed or hopeless?   Never or 1 Day - Score 0    3. Trouble falling or staying asleep, or sleeping too much?   Never or 1 Day - Score 0    4. Feeling tired or having little energy?   Never or 1 Day - Score 0    5. Poor appetite or overeating?   2-6 Days (Several Days) - Score 1    6. Feeling bad about yourself-or that you are a failure or have let yourself or your family down?   Never or 1 Day - Score 0    7. Trouble concentrating on things, such as reading the newspaper or watching television?    Never or 1 Day - Score 0    8. Moving or speaking so slowly that other people could have noticed? Or the opposite-being so fidgety or restless that you have been moving around a lot more than usual?  2-6 Days (Several Days) - Score 1    9. Thoughts that you would be better off dead or of hurting yourself in some way?   Never or 1 Day - Score 0    Total Score: 2  If score is above 15, Notify PM&R Physician    If you checked off any problems, how difficult have these problems made it for you to do your work, take care of 
ARU CASE MANAGEMENT NOTE:    Admission Date:  6/21/2024 Anuj Jovel is a 83 y.o.  male    Admitted for : Encephalopathy [G93.40]    Patient is alert and oriented x4. Spoke with patient and daughter, Sushma, regarding discharge tomorrow. Plan remains to discharge home with daughter and Kota Hernandes. The patient's daughter will be at rehab to take him home around 12pm tomorrow. Spoke with Tab at [facility] - they will pull the [facility] from [facility] tomorrow and contact the patient to arrange SOC.    Outside appointments while in ARU: none    DME:TSF, hip kit - patient's daughter purchased    Will continue to follow for additional discharge needs.    Electronically signed by Junie Chu RN on 7/3/2024 at 1:59 PM    
ARU CASE MANAGEMENT NOTE:    Admission Date:  6/21/2024 Anuj Jovel is a 83 y.o.  male    Admitted for : Encephalopathy [G93.40]    RNCM called [address] to verify whether they were able to accept. Per Sania, they are at [place] capacity for [employer] patients at this time. Discussed this with the patient's daughter and she requested that referrals be sent to 1) [facility] and 2) [facility]. Referral sent to [facility] through [facility]. Will send referral to [facility] if [facility] unable to accept.     Will continue to follow for additional discharge needs.    Electronically signed by Junie Chu RN on 6/26/2024 at 5:59 PM    
ARU CASE MANAGEMENT NOTE:    Admission Date:  6/21/2024 Anuj Jovel is a 83 y.o.  male    Admitted for : Encephalopathy [G93.40]    Spoke with Tab from [facility] - they are able to accept. Plan remains to discharge home with his daughter.    Will continue to follow for additional discharge needs.    Electronically signed by Junie Chu RN on 6/27/2024 at 3:38 PM    
failure or have let yourself or your family down?   Never or 1 Day - Score 0    7. Trouble concentrating on things, such as reading the newspaper or watching television?    Never or 1 Day - Score 0    8. Moving or speaking so slowly that other people could have noticed? Or the opposite-being so fidgety or restless that you have been moving around a lot more than usual?  2-6 Days (Several Days) - Score 1    9. Thoughts that you would be better off dead or of hurting yourself in some way?   Never or 1 Day - Score 0    Total Score: 2  If score is above 15, Notify PM&R Physician    If you checked off any problems, how difficult have these problems made it for you to do your work, take care of things at home, or get along with other people?    Not Applicable - No Problems Identified         Social Isolation     How often do you feel lonely or isolated from those around you?  Never       Access To Transportation     2.In the past six months to a year, has lack of transportation kept you from medical appointments or from getting your medications?”     No    3.In the past six months to a year, has lack of transportation kept you from non-medical meetings, appointments, work, or from getting things that you need?     No    The responses to access to transportation remain applicable for the duration of the Acute Inpatient Rehabilitation encounter unless otherwise specified.     Junie Chu RN  Acute Inpatient Rehabilitation Case Management Department  Ph:[phone] Fax: [phone]    Smart Phrase Template Revision: 06/13/2024

## 2024-07-03 NOTE — PROGRESS NOTES
Physical Medicine & Rehabilitation  Progress Note      Subjective:      83 year-old male with Encephalopathy and Debility secondary to COVID-19 and 3rd degree heart block. Patient is doing well today. He continues to void spontaneously with void trial. No new issues with sleep, appetite, or bowel.     ROS:  Denies fevers, chills, sweats.  No chest pain, palpitations, lightheadedness.  Denies coughing, wheezing or shortness of breath.  Denies abdominal pain, nausea, diarrhea or constipation.  No new areas of joint pain.  Denies new areas of numbness or weakness.  Denies new anxiety or depression issues.  No new skin problems.    Rehabilitation:       PT:    Bed mobility  Supine to Sit: Maximum assistance  Sit to Supine: Moderate assistance (bilat LE  (pt sleeps in recliner at home))  Scooting: Dependent/Total (to HOB)  Bed Mobility Comments: Patient reports he sleeps in a recliner at home.  Patient up in WC this date prior to therapy.         Transfers  Sit to Stand: Stand by assistance  Stand to Sit: Stand by assistance  Bed to Chair: Stand by assistance  Stand Pivot Transfers: Stand by assistance (Pt completing without AD)  Squat Pivot Transfers: Stand by assistance (No AD)  Car Transfer: Contact guard assistance  Comment: Transfers completed with and without rollator from home. Pt having difficulty with erect posture and verbalizes without cuing \"I know, stand up straight\"         Ambulation  Surface: Level tile  Device: Rollator (Patient used his rollator from home.)  Other Apparatus: Wheelchair follow  Assistance: Stand by assistance  Quality of Gait: Decreased endurance. Cues for upright posture and RW safety.  Gait Deviations: Slow Erin, Decreased step length, Decreased step height  Distance: 100 ft x 2 in AM;  short distances within gym  Comments: No amb. completed in PM due to fatigue.  More Ambulation?: Yes  Ambulation 2  Surface - 2: level tile  Device 2: Rollator  Assistance 2: Contact guard

## 2024-07-03 NOTE — PROGRESS NOTES
SPEECH LANGUAGE PATHOLOGY  Speech Language Pathology  ST ACUTE REHAB    Cognitive Treatment Note    Date: 7/3/2024  Patient’s Name: Anuj Jovel  MRN: [id number]  Diagnosis:   Patient Active Problem List   Diagnosis Code    Acquired deviated nasal septum J34.2    Actinic keratoses L57.0    Arthritis M19.90    Benign prostatic hyperplasia N40.0    Bloating R14.0    Chronic serous otitis media H65.20    Claustrophobia F40.240    Coronary artery disease I25.10    Esophageal reflux K21.9    Flatus R14.3    Hearing loss H91.90    Hemorrhoids K64.9    History of allergy Z88.9    Hyperlipidemia E78.5    Essential hypertension I10    Leg swelling M79.89    Lumbar radiculopathy M54.16    Lumbar stenosis M48.061    Skin neoplasm D49.2    Tinea corporis B35.4    Ventral hernia K43.9    Weight gain R63.5    Type 2 diabetes mellitus with complication (HCC) E11.8    Obesity E66.9    Other osteoporosis without current pathological fracture M81.8    Presence of coronary angioplasty implant and graft Z95.5    Elevated C-reactive protein (CRP) R79.82    KAREY (obstructive sleep apnea) G47.33    Hypoxia R09.02    Chronic pain syndrome G89.4    AV block, Mobitz 1 I44.1    History of placement of stent in LAD coronary artery Z95.5    Chronic heart failure with preserved ejection fraction (HFpEF) (HCC) I50.32    Mild mitral valve regurgitation I34.0    Pulmonary hypertension (HCC) I27.20    Spondylolisthesis of lumbar region M43.16    Second degree AV block, Mobitz type I I44.1    Systolic congestive heart failure (HCC) I50.20    Advanced age R54    Acute hypoxic respiratory failure (HCC) J96.01    COVID U07.1    Shortness of breath R06.02    Symptomatic bradycardia R00.1    Complete heart block (HCC) I44.2    Cardiac pacemaker in situ Z95.0    Encephalopathy acute G93.40    MCI (mild cognitive impairment) G31.84    Encephalopathy G93.40    Debility R53.81    Encounter for postoperative wound check Z48.89    S/P placement of cardiac

## 2024-07-03 NOTE — PROGRESS NOTES
[facility]   Acute Rehabilitation Occupational Therapy Daily Treatment Note    Date: 7/3/24  Patient Name: Anuj Jovel       Room: 2632/2632-01  MRN: [id number]  Account: [id number]   : 1940  (83 y.o.) Gender: male     Referring Practitioner: Issac Fountain MD  Diagnosis: Debility secondary to symptomatic bradycardia, COVID-19 with encephalopathy  Additional Pertinent Hx: Per PMR consult: \"Mr. Anuj Jovel is a 83 y.o.  male who was admitted to [facility] on 2024 with No chief complaint on file.     83-year-old male admitted with lower extremity weakness, dyspnea, estrogen and heart block has history of obstructive apnea, hypertension, hyperlipidemia, type 2 diabetes, BPH, coronary artery disease with stent in , sick sinus syndrome with Mobitz type I block heart failure with preserved ejection fraction.  He was noted to be hypoxic and.  CT chest negative for PE chest x-ray unremarkable CT head unremarkable he was positive COVID-19 CT chest showed interstitial groundglass opacities.  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  Monitor heart rate     ID COVID-positive  has received vaccinations altered mental status resolved treated with with remdesivir, Decadron Baricitinibn, out of window for Paxlovid monitor CRP, monitor off antibiotics off COVID isolation      Internal medicine acute hypoxic respite failure likely sec to pneumonia due to COVID-19 as above delirium resolved leg pain resume home gabapentin     Neuro  transient episode of confusion  with BADLs.  Short Term Goal 3: Pt will complete toileting tasks with MIN A with use of AE/compensatory techniques and fair safety to increase IND with BADLs.  Short Term Goal 4: Pt will complete functional transfers with MIN A with use of LRAD and fair safety in order to decrease fall risk while maintaining good adherence to pacemaker precautions.  Short Term Goal 5: Pt will tolerate standing 8+ minutes while engaging in ADLs/functional tasks of choice with intermittent unilateral support in order to increase safety and IND with ADLs.  Additional Goals?: Yes  Short Term Goal 6: Pt will complete functional mobility with CGA with use of LRAD with fair safety in order to decrease fall risk.  Short Term Goal 7: Pt will participate in 30+ minutes of therapeutic exercises/functional activities/social participatino to increase safety and IND with daily activities and improve overall quality of life.    Long Term Goals  Time Frame for Long Term Goals : By discharge,  Long Term Goal 1: Pt will complete UBD/UBB tasks with SUP with use of compensatory techniques PRN with good safety in order to return to PLOF.  Long Term Goal 2: Pt will complete LBD/LBB with SBA with use of AE/compensatory techniques and good safety awareness in order to increase IND with BADLs.  Long Term Goal 3: Pt will complete toileting tasks with SBA with min cues or less for safety in order to increase IND with BADLs.  Long Term Goal 4: Pt will tolerate standing 15+ minutes while engaging in ADLs/functional tasks of choice with intermittent unilateral support in order to increase safety and IND with ADLs.  Long Term Goal 5: Pt will complete functional transfers/functional mobility with SBA with use of LRAD and min cues or less for safety in order to decrease fall risk while maintaining good adherence to pacemaker precautions.  Additional Goals?: Yes  Long Term Goal 6: Pt will demo increased bilat UE FMC as evidenced by improved 9HPT bilaterally by 10

## 2024-07-03 NOTE — PROGRESS NOTES
Physical Therapy  Facility/Department: [facility] ACUTE REHAB  Rehabilitation Physical Therapy Daily Treatment Note    NAME: Anuj Jovel  : 1940 (83 y.o.)  MRN: [id number]  CODE STATUS: Full Code    Date of Service: 7/3/24      Past Medical History:   Diagnosis Date    Diabetes (HCC)     Diverticulitis     History of allergy     Hyperlipidemia     Hypertension     Osteoarthritis     Pneumonia due to COVID-19 virus 2020     Past Surgical History:   Procedure Laterality Date    BACK SURGERY  2017    [facility], lumbar fusion    CARDIAC CATHETERIZATION      COLECTOMY      partial colectomy due to diveritc    COLONOSCOPY      TOTAL KNEE ARTHROPLASTY Right        Referral Date : 24  Diagnosis: Dr Bernstein    Restrictions:  Restrictions/Precautions: Fall Risk;Other (comment)  Position Activity Restriction  Other position/activity restrictions: activity as tolerated, Recent pacemaker insertion on 24-Avoid pushing/lifting with LUE, avois lifting L UE over shoulder height.     SUBJECTIVE  Subjective: Reports fatigue but pleasant andd cooperative with therapy  Pain: Pt reporting 10/10 pain in B feet with standing and ambulating. Pain decreases with sitting rests returns with any standing    OBJECTIVE      Cognition  Overall Cognitive Status: Exceptions  Arousal/Alertness: Appropriate responses to stimuli  Following Commands: Follows multistep commands with increased time  Attention Span: Attends with cues to redirect  Memory: Decreased recall of precautions  Safety Judgement: Decreased awareness of need for assistance;Decreased awareness of need for safety  Problem Solving: Assistance required to identify errors made;Assistance required to correct errors made;Decreased awareness of errors  Insights: Decreased awareness of deficits  Initiation: Requires cues for some  Sequencing: Requires cues for some      Functional Mobility  Bed mobility  Rolling to Left: Stand by assistance  Rolling

## 2024-07-04 VITALS
BODY MASS INDEX: 33.84 KG/M2 | HEART RATE: 73 BPM | WEIGHT: 215.6 LBS | OXYGEN SATURATION: 94 % | DIASTOLIC BLOOD PRESSURE: 56 MMHG | TEMPERATURE: 97.9 F | RESPIRATION RATE: 18 BRPM | SYSTOLIC BLOOD PRESSURE: 112 MMHG | HEIGHT: 67 IN

## 2024-07-04 LAB
GLUCOSE BLD-MCNC: 136 MG/DL (ref 75–110)
GLUCOSE BLD-MCNC: 98 MG/DL (ref 75–110)

## 2024-07-04 PROCEDURE — 6370000000 HC RX 637 (ALT 250 FOR IP): Performed by: INTERNAL MEDICINE

## 2024-07-04 PROCEDURE — 6370000000 HC RX 637 (ALT 250 FOR IP): Performed by: PHYSICAL MEDICINE & REHABILITATION

## 2024-07-04 PROCEDURE — 97129 THER IVNTJ 1ST 15 MIN: CPT

## 2024-07-04 PROCEDURE — 97130 THER IVNTJ EA ADDL 15 MIN: CPT

## 2024-07-04 PROCEDURE — 6370000000 HC RX 637 (ALT 250 FOR IP): Performed by: NURSE PRACTITIONER

## 2024-07-04 PROCEDURE — 82947 ASSAY GLUCOSE BLOOD QUANT: CPT

## 2024-07-04 PROCEDURE — 99238 HOSP IP/OBS DSCHRG MGMT 30/<: CPT | Performed by: PHYSICAL MEDICINE & REHABILITATION

## 2024-07-04 RX ADMIN — FUROSEMIDE 20 MG: 20 TABLET ORAL at 08:33

## 2024-07-04 RX ADMIN — METFORMIN HYDROCHLORIDE 1000 MG: 1000 TABLET ORAL at 08:33

## 2024-07-04 RX ADMIN — ACETAMINOPHEN 650 MG: 325 TABLET ORAL at 06:04

## 2024-07-04 RX ADMIN — ACETAMINOPHEN 650 MG: 325 TABLET ORAL at 12:11

## 2024-07-04 RX ADMIN — TAMSULOSIN HYDROCHLORIDE 0.4 MG: 0.4 CAPSULE ORAL at 08:32

## 2024-07-04 RX ADMIN — ASPIRIN 81 MG: 81 TABLET, COATED ORAL at 08:33

## 2024-07-04 RX ADMIN — FINASTERIDE 5 MG: 5 TABLET, FILM COATED ORAL at 08:33

## 2024-07-04 RX ADMIN — PANTOPRAZOLE SODIUM 40 MG: 40 TABLET, DELAYED RELEASE ORAL at 06:04

## 2024-07-04 RX ADMIN — PREGABALIN 125 MG: 25 CAPSULE ORAL at 08:32

## 2024-07-04 ASSESSMENT — PAIN DESCRIPTION - PAIN TYPE: TYPE: ACUTE PAIN

## 2024-07-04 ASSESSMENT — PAIN SCALES - GENERAL
PAINLEVEL_OUTOF10: 7
PAINLEVEL_OUTOF10: 4
PAINLEVEL_OUTOF10: 2
PAINLEVEL_OUTOF10: 6
PAINLEVEL_OUTOF10: 5

## 2024-07-04 ASSESSMENT — PAIN DESCRIPTION - LOCATION
LOCATION: HEAD
LOCATION: HEAD
LOCATION: FOOT

## 2024-07-04 ASSESSMENT — PAIN - FUNCTIONAL ASSESSMENT: PAIN_FUNCTIONAL_ASSESSMENT: ACTIVITIES ARE NOT PREVENTED

## 2024-07-04 ASSESSMENT — PAIN DESCRIPTION - DESCRIPTORS
DESCRIPTORS: ACHING

## 2024-07-04 ASSESSMENT — PAIN DESCRIPTION - ORIENTATION
ORIENTATION: ANTERIOR
ORIENTATION: RIGHT;LEFT

## 2024-07-04 ASSESSMENT — PAIN DESCRIPTION - FREQUENCY: FREQUENCY: INTERMITTENT

## 2024-07-04 ASSESSMENT — PAIN DESCRIPTION - ONSET: ONSET: ON-GOING

## 2024-07-04 NOTE — PROGRESS NOTES
SPEECH LANGUAGE PATHOLOGY  Speech Language Pathology  ST ACUTE REHAB    Cognitive Treatment Note    Date: 7/4/2024  Patient’s Name: Anuj Jovel  MRN: [id number]  Diagnosis:   Patient Active Problem List   Diagnosis Code    Acquired deviated nasal septum J34.2    Actinic keratoses L57.0    Arthritis M19.90    Benign prostatic hyperplasia N40.0    Bloating R14.0    Chronic serous otitis media H65.20    Claustrophobia F40.240    Coronary artery disease I25.10    Esophageal reflux K21.9    Flatus R14.3    Hearing loss H91.90    Hemorrhoids K64.9    History of allergy Z88.9    Hyperlipidemia E78.5    Essential hypertension I10    Leg swelling M79.89    Lumbar radiculopathy M54.16    Lumbar stenosis M48.061    Skin neoplasm D49.2    Tinea corporis B35.4    Ventral hernia K43.9    Weight gain R63.5    Type 2 diabetes mellitus with complication (HCC) E11.8    Obesity E66.9    Other osteoporosis without current pathological fracture M81.8    Presence of coronary angioplasty implant and graft Z95.5    Elevated C-reactive protein (CRP) R79.82    KAREY (obstructive sleep apnea) G47.33    Hypoxia R09.02    Chronic pain syndrome G89.4    AV block, Mobitz 1 I44.1    History of placement of stent in LAD coronary artery Z95.5    Chronic heart failure with preserved ejection fraction (HFpEF) (HCC) I50.32    Mild mitral valve regurgitation I34.0    Pulmonary hypertension (HCC) I27.20    Spondylolisthesis of lumbar region M43.16    Second degree AV block, Mobitz type I I44.1    Systolic congestive heart failure (HCC) I50.20    Advanced age R54    Acute hypoxic respiratory failure (HCC) J96.01    COVID U07.1    Shortness of breath R06.02    Symptomatic bradycardia R00.1    Complete heart block (HCC) I44.2    Cardiac pacemaker in situ Z95.0    Encephalopathy acute G93.40    MCI (mild cognitive impairment) G31.84    Encephalopathy G93.40    Debility R53.81    Encounter for postoperative wound check Z48.89    S/P placement of cardiac  pacemaker Z95.0    Incontinence associated dermatitis L25.8, R32       Pain: 2/10 (feet)    Cognitive Treatment    Treatment time: 7060-1670      Subjective: [x] Alert [x] Cooperative     [] Confused     [] Agitated    [] Lethargic      Objective/Assessment:  Attention: Occasional repetition required throughout.     Orientation: Oriented x4 (with use of external memory aide for place).     Recall: Image retention (pool, 15 min delay)- 50% accuracy (I), increasing to 100% c mod cues.      Continued education provided re: use of internal and external memory aides (e.g., calendar, notepad, cell-phone) to facilitate recall of important information.  Pt verbalized understanding and agreeable, reports has been trying to write down information for recall while in hospital and will continue to do so at home.      Organization: n/a       Problem Solving/Reasoning: Problem solving, missing equipment- 90% accuracy (I), increasing to 100% c min cue.    Reasoning, ID similarities between 2 items- 80% accuracy (I), increasing to 100% cued.  ID differences between 2 items- 100% accuracy (I).        Other: Pt's daughter present.  Pt pleasant and conversational throughout, excited for anticipated d/c home today.     Continued education provided re: cognitive stimulation throughout day and at home following d/c (e.g., word puzzles, card games, reading tasks, etc).  Pt verbalized understanding and agreeable, reports does not normally do these tasks, but can try to following d/c.      Plan:  [x] Continue ST services    [] Discharge from ST:      Discharge recommendations: [] Inpatient Rehab   [] Skilled Nursing Facility   [] Outpatient Therapy  [] Follow up at trauma clinic   [] Other:       Treatment completed by: Shantal Rivero M.A., CCC-SLP

## 2024-07-04 NOTE — PLAN OF CARE
Problem: Discharge Planning  Goal: Discharge to home or other facility with appropriate resources  7/4/2024 1338 by Esther Nelson RN  Outcome: Adequate for Discharge  Flowsheets (Taken 7/4/2024 0800)  Discharge to home or other facility with appropriate resources:   Identify barriers to discharge with patient and caregiver   Arrange for needed discharge resources and transportation as appropriate  7/4/2024 0403 by Chika Goldsmith LPN  Outcome: Progressing     Problem: Safety - Adult  Goal: Free from fall injury  7/4/2024 1338 by Esther Nelson RN  Outcome: Adequate for Discharge  7/4/2024 0403 by Chika Goldsmith LPN  Outcome: Progressing     Problem: Pain  Goal: Verbalizes/displays adequate comfort level or baseline comfort level  7/4/2024 1338 by Esther Nelson RN  Outcome: Adequate for Discharge  7/4/2024 0403 by Chika Goldsmith LPN  Outcome: Progressing     Problem: ABCDS Injury Assessment  Goal: Absence of physical injury  7/4/2024 1338 by Esther Nelson RN  Outcome: Adequate for Discharge  7/4/2024 0403 by Chika Goldsmith LPN  Outcome: Progressing     Problem: Skin/Tissue Integrity  Goal: Absence of new skin breakdown  Description: 1.  Monitor for areas of redness and/or skin breakdown  2.  Assess vascular access sites hourly  3.  Every 4-6 hours minimum:  Change oxygen saturation probe site  4.  Every 4-6 hours:  If on nasal continuous positive airway pressure, respiratory therapy assess nares and determine need for appliance change or resting period.  7/4/2024 1338 by Esther Nelson RN  Outcome: Adequate for Discharge  7/4/2024 0403 by Chika Goldsmith LPN  Outcome: Progressing     Problem: Risk for Elopement  Goal: Patient will not exit the unit/facility without proper excort  7/4/2024 1338 by Esther Nelson RN  Outcome: Adequate for Discharge  7/4/2024 0403 by Chika Goldsmith LPN  Outcome: Progressing     Problem: Nutrition Deficit:  Goal: Optimize nutritional status  7/4/2024 1338 by Esther Nelson RN  Outcome:  Adequate for Discharge  7/4/2024 0403 by Chika Goldsmith LPN  Outcome: Progressing     Problem: Chronic Conditions and Co-morbidities  Goal: Patient's chronic conditions and co-morbidity symptoms are monitored and maintained or improved  7/4/2024 1338 by Esther Nelson, RN  Outcome: Adequate for Discharge  Flowsheets (Taken 7/4/2024 0800)  Care Plan - Patient's Chronic Conditions and Co-Morbidity Symptoms are Monitored and Maintained or Improved:   Monitor and assess patient's chronic conditions and comorbid symptoms for stability, deterioration, or improvement   Collaborate with multidisciplinary team to address chronic and comorbid conditions and prevent exacerbation or deterioration  7/4/2024 0403 by Chika Goldsmith LPN  Outcome: Progressing

## 2024-07-04 NOTE — PROGRESS NOTES
ACUTE INPATIENT REHABILITATION DISCHARGE  [facility]    Patient Name: Anuj Jovel  MRN: [id number]     Patient discharged in stable condition as per order of attending physician.     AVS provided by nurse at time of discharge, which includes all necessary medical information pertaining to the patients current course of illness, treatment, medications, post-discharge goals of care, and treatment preferences.     Provision of Current Reconciled Medication List to Patient at Discharge   Indicate the route(s) of transmission of the current reconciled medication list to the patient/family/caregiver. Electronic Health Record (e.g. electronic access to patient portal), Verbal (e.g. in person, telephone, video conferencing), and Paper Based (e.g. fax, copies, print outs)     Availability of \"My Chart\" offered to patient as a tool for updated health record.  Steps for activation discussed with patient as mentioned on AVS.      Patient/responsible party verbalize understanding of discharge plan and are in agreement with goal/plan/treatment preferences.     Belongings including  CELL PHONE  AND CLOTHING  sent with patient/responsible party.      Home medications sent home with patient/responsible party N/A    Car Transfer   Level of assistance required for patient transfer into vehicle:   SUPERVISION OR TOUCHING ASSISTANCE: [place] provides verbal cues and/or touching/steadying and/or contact guard assistance as patient completes activity. Assistance may be provided throughout the activity or intermittently.     High-Risk Drug Classes: Use and Indication   Check if the patient is taking any medications by pharmacological classification If yes, check if there is an indication noted for all meds in the drug class   Antipsychotic No If yes: indication noted?  [] If no indication noted, follow up with provider for order clarification   Anticoagulant Yes If yes: indication noted?  []    Antibiotic No If yes:

## 2024-07-04 NOTE — PLAN OF CARE
Problem: Discharge Planning  Goal: Discharge to home or other facility with appropriate resources  7/4/2024 0403 by Chika Goldsmith LPN  Outcome: Progressing     Problem: Safety - Adult  Goal: Free from fall injury  7/4/2024 0403 by Chika Goldsmith LPN  Outcome: Progressing     Problem: Pain  Goal: Verbalizes/displays adequate comfort level or baseline comfort level  7/4/2024 0403 by Chika Goldsmith LPN  Outcome: Progressing     Problem: ABCDS Injury Assessment  Goal: Absence of physical injury  7/4/2024 0403 by Chika Goldsmith LPN  Outcome: Progressing     Problem: Skin/Tissue Integrity  Goal: Absence of new skin breakdown  Description: 1.  Monitor for areas of redness and/or skin breakdown  2.  Assess vascular access sites hourly  3.  Every 4-6 hours minimum:  Change oxygen saturation probe site  4.  Every 4-6 hours:  If on nasal continuous positive airway pressure, respiratory therapy assess nares and determine need for appliance change or resting period.  7/4/2024 0403 by Chika Goldsmith LPN  Outcome: Progressing     Problem: Risk for Elopement  Goal: Patient will not exit the unit/facility without proper excort  Outcome: Progressing     Problem: Nutrition Deficit:  Goal: Optimize nutritional status  7/4/2024 0403 by Chika Goldsmith LPN  Outcome: Progressing     Problem: Chronic Conditions and Co-morbidities  Goal: Patient's chronic conditions and co-morbidity symptoms are monitored and maintained or improved  Outcome: Progressing

## 2024-07-04 NOTE — DISCHARGE SUMMARY
Physical Medicine & Rehabilitation  Discharge Summary     Patient Identification:  Anuj Jovel  : 1940  Admit date: 2024  Discharge date: 2024   Attending provider: No att. providers found      Discharging provider: GILBERT BOYCE MD    Primary care provider: María Draper MD     Discharge Diagnoses:   Encephalopathy  Debility secondary to COVID-19  Third-degree AV block  Cognitive impairment  Anemia  Epistaxis  CAD/HLD  Heart Failure preserved EF  HTN  DM II  KAREY  BPH with urine retention  Anemia  R ankle sprain  Obesity    Discharge Functional Status:    Physical Therapy:  Bed Mobility:   Bed mobility  Rolling to Left: Stand by assistance  Rolling to Right: Stand by assistance  Supine to Sit: Moderate assistance  Sit to Supine: Moderate assistance  Scooting: Stand by assistance (scooting in chair and along EOB)  Bed Mobility Comments: Pt reports he sleeps in a recliner at home         Transfers:   Transfers  Sit to Stand: Stand by assistance  Stand to Sit: Stand by assistance  Bed to Chair: Stand by assistance  Stand Pivot Transfers: Stand by assistance (Pt completing without AD)  Squat Pivot Transfers: Stand by assistance (No AD)  Car Transfer: Stand by assistance (elevated car simulator seat height to match Lynn walker that pt will be picked up in.)  Comment: Transfers completed both with and without rollator. Pt reaches UE for support when pivotting without AD         Mobility:   Ambulation  Surface: Level tile  Device: Rollator (Patient used his rollator from home.)  Other Apparatus: Wheelchair follow  Assistance: Stand by assistance  Quality of Gait: Decreased endurance. Cues for upright posture and RW safety.  Gait Deviations: Slow Erin, Decreased step length, Decreased step height  Distance: 138' total, 117' 2MWT  Comments: Increased Pain in B feet with ambulation.  More Ambulation?: Yes  Ambulation 2  Surface - 2: outdoors  Device 2: Rollator  Assistance 2: Stand by  directions carefully, and ask your doctor or other care provider to review them with you.                CONTINUE taking these medications      aspirin EC 81 MG EC tablet     atorvastatin 40 MG tablet  Commonly known as: LIPITOR  TAKE 1 TABLET AT BEDTIME     blood glucose test strips strip  Commonly known as: ASCENSIA AUTODISC VI;ONE TOUCH ULTRA TEST VI  Use with associated glucose meter. Use daily. Type 2 DM not on insulin     glucose 40 % Gel  Commonly known as: GLUTOSE  Take 37.5 mLs by mouth as needed (low sugar)     Handicap Placard Misc  by Does not apply route Cannot walk 200 Feet due to orthopedic disease  Expires 3/18/2027     Lancets Misc  1 each by Does not apply route daily     metFORMIN 500 MG tablet  Commonly known as: GLUCOPHAGE  TAKE 2 TABLETS TWICE A DAY WITH MEALS     Multi-Vitamin Daily Tabs     nitroGLYCERIN 0.4 MG SL tablet  Commonly known as: NITROSTAT     omeprazole 40 MG delayed release capsule  Commonly known as: PRILOSEC  TAKE 1 CAPSULE EVERY DAY     tamsulosin 0.4 MG capsule  Commonly known as: FLOMAX  Take 1 capsule by mouth daily     True Metrix Air Glucose Meter w/Device Kit  Type 2 DM, test daily            STOP taking these medications      docusate sodium 100 MG capsule  Commonly known as: Colace     hydroCHLOROthiazide 25 MG tablet  Commonly known as: HYDRODIURIL     loratadine 10 MG tablet  Commonly known as: CLARITIN     magnesium oxide 400 (240 Mg) MG tablet  Commonly known as: MAG-OX     Omega III EPA+DHA 1000 MG Caps     potassium chloride 20 MEQ extended release tablet  Commonly known as: KLOR-CON M     Trelegy Ellipta 200-62.5-25 MCG/ACT Aepb inhaler  Generic drug: fluticasone-umeclidin-vilant               Where to Get Your Medications        These medications were sent to [id number] - [place], [place] - [address] [facility] [address] - P [phone] - F [phone]  [address] [address], [facility] [address]      Phone: [phone]   benzonatate 200 MG

## 2024-07-05 LAB — ECHO BSA: 2.15 M2

## 2024-07-08 LAB — ECHO BSA: 2.15 M2

## 2024-07-18 ENCOUNTER — CLINICAL DOCUMENTATION (OUTPATIENT)
Dept: SPIRITUAL SERVICES | Age: 84
End: 2024-07-18

## 2024-07-19 NOTE — ACP (ADVANCE CARE PLANNING)
Advance Care Planning   Ambulatory ACP Specialist Patient Outreach    Date:  7/18/2024    ACP Specialist:  STEVIE Sanders    Outreach call to patient in follow-up to ACP Specialist referral from:María Draper MD    [x] PCP  [] Provider   [] Ambulatory Care Management [] Other     For:                  [x] Advance Directive Assistance              [x] Complete Portable DNR order              [] Complete POST/POLST/MOST              [] Code Status Discussion             [] Discuss Goals of Care             [] Early ACP Decision-Making              [] Other (Specify)    Date Referral Received: 07/17/2024    Next Step:   [x] ACP scheduled conversation  [] Outreach again in one week               [] Email / Mail ACP Info Sheets  [] Email / Mail Advance Directive   [] Closing referral.  Routing closure to referring provider/staff and to ACP Specialist .    [] Closure letter mailed to patient with invitation to contact ACP Specialist if / when ready.   [] Other (Specify here):       [x] At this time, Healthcare Decision Maker Is:         [] Primary agent named in scanned advance directive.    [x] Legal Next of Kin.     [] Unable to determine legal decision maker at this time.    Outreaches:       [x] 1st -  Date:   07/18/2024               Intervention:  [x] Spoke with Patient's dtr   [] Left Voice mail [] Email / Mail    [] Getup Cloudhart  [] Other (Specify) :     Outcomes: ACP specialist made pt's initial outreach call to pt's home/mobile number listed reflecting [phone] (this is pt's dtr Sushma's phone number). Sushma answered this call & states that pt is sleeping at the time/is unavailable. Sushma was prepared for this call & states that the pt would like to schedule an appt to complete these ACP conversation & AD documents.  Pt has a scheduled ACP appt for weds 07/24 at 10am, per dtr request. An e-mail with the ACP packet & DNR-CC form has been sent to Sushma's confirmed e-mail

## 2024-07-25 ENCOUNTER — CLINICAL DOCUMENTATION (OUTPATIENT)
Dept: SPIRITUAL SERVICES | Age: 84
End: 2024-07-25

## 2024-07-25 NOTE — ACP (ADVANCE CARE PLANNING)
Advance Care Planning     Advance Care Planning Clinical Specialist  Conversation Note      Date of ACP Conversation: 7/25/2024    Conversation Conducted with: Patient with Decision Making Capacity    ACP Clinical Specialist: STEVIE Sanders  Healthcare Decision Maker:     Current Designated Healthcare Decision Maker:     Primary Decision Maker: DAVIN TINAJERO - Child - [phone]    Secondary Decision Maker: POLLY CORTEZ - Child - [phone]  Click here to complete Healthcare Decision Makers including section of the Healthcare Decision Maker Relationship (ie \"Primary\")  Today we completed the ACP conversation, nomination of HCDM's and completed pt's AD documents via [employer].     Care Preferences    Hospitalization:  \"If your health worsens and it becomes clear that your chance of recovery is unlikely, what would your preference be regarding hospitalization?\"    Choice:  [] The patient wants hospitalization.  [x] The patient prefers comfort-focused treatment without hospitalization.    Ventilation:  \"If you were in your present state of health and suddenly became very ill and were unable to breathe on your own, what would your preference be about the use of a ventilator (breathing machine) if it were available to you?\"      If the patient would desire the use of ventilator (breathing machine), answer \"yes\".  If not, \"no\": no    \"If your health worsens and it becomes clear that your chance of recovery is unlikely, what would your preference be about the use of a ventilator (breathing machine) if it were available to you?\"     Would the patient desire the use of ventilator (breathing machine)?: No      Resuscitation  \"CPR works best to restart the heart when there is a sudden event, like a heart attack, in someone who is otherwise healthy. Unfortunately, CPR does not typically restart the heart for people who have serious health conditions or who are very sick.\"    \"In the event your heart stopped as a

## 2024-07-29 ENCOUNTER — OFFICE VISIT (OUTPATIENT)
Dept: UROLOGY | Age: 84
End: 2024-07-29
Payer: MEDICARE

## 2024-07-29 VITALS
BODY MASS INDEX: 33.74 KG/M2 | HEART RATE: 72 BPM | OXYGEN SATURATION: 90 % | SYSTOLIC BLOOD PRESSURE: 126 MMHG | WEIGHT: 215 LBS | TEMPERATURE: 97.3 F | DIASTOLIC BLOOD PRESSURE: 67 MMHG | HEIGHT: 67 IN

## 2024-07-29 DIAGNOSIS — N40.1 BPH WITH OBSTRUCTION/LOWER URINARY TRACT SYMPTOMS: ICD-10-CM

## 2024-07-29 DIAGNOSIS — R33.9 URINARY RETENTION: Primary | ICD-10-CM

## 2024-07-29 DIAGNOSIS — N13.8 BPH WITH OBSTRUCTION/LOWER URINARY TRACT SYMPTOMS: ICD-10-CM

## 2024-07-29 PROCEDURE — 1111F DSCHRG MED/CURRENT MED MERGE: CPT | Performed by: UROLOGY

## 2024-07-29 PROCEDURE — 3078F DIAST BP <80 MM HG: CPT | Performed by: UROLOGY

## 2024-07-29 PROCEDURE — 99214 OFFICE O/P EST MOD 30 MIN: CPT | Performed by: UROLOGY

## 2024-07-29 PROCEDURE — 1123F ACP DISCUSS/DSCN MKR DOCD: CPT | Performed by: UROLOGY

## 2024-07-29 PROCEDURE — 1036F TOBACCO NON-USER: CPT | Performed by: UROLOGY

## 2024-07-29 PROCEDURE — G8417 CALC BMI ABV UP PARAM F/U: HCPCS | Performed by: UROLOGY

## 2024-07-29 PROCEDURE — 3074F SYST BP LT 130 MM HG: CPT | Performed by: UROLOGY

## 2024-07-29 PROCEDURE — G8427 DOCREV CUR MEDS BY ELIG CLIN: HCPCS | Performed by: UROLOGY

## 2024-07-29 ASSESSMENT — ENCOUNTER SYMPTOMS
DIARRHEA: 0
NAUSEA: 0
VOMITING: 0
SHORTNESS OF BREATH: 0
CONSTIPATION: 0
BACK PAIN: 0
EYE REDNESS: 0
ABDOMINAL PAIN: 0
WHEEZING: 0
EYE PAIN: 0
COUGH: 0

## 2024-07-29 NOTE — PROGRESS NOTES
[facility] PHYSICIANS [facility] UROLOGY CENTER  [address]  [facility] [address]  Dept: [phone]    [facility] Urology Office Note - Established    Patient:  Anuj Jovel  YOB: 1940  Date: 7/29/2024    The patient is a 83 y.o. male who presents todayfor evaluation of the following problems:   Chief Complaint   Patient presents with    Other     [address] [place] [address] f/u, retention 1300cc, possible palmer       HPI  This is an 83-year-old gentleman who was hospitalized last month.  He did have high-volume urinary retention.  He had about 1300 cc in his bladder.  He underwent a voiding trial but prior to discharge and was emptying to completion on bladder scan.  He has been on finasteride and tamsulosin and is now voiding at his baseline without any difficulty.    Summary of old records: N/A    Additional History: N/A    Procedures Today: N/A    Urinalysis today:  No results found for this visit on 07/29/24.  Last several PSA's:  Lab Results   Component Value Date    PSA 1.35 10/04/2023     Last total testosterone:  No results found for: \"TESTOSTERONE\"    AUA Symptom Score (7/29/2024):  INCOMPLETE EMPTYING: How often have you had the sensation of not emptying your bladder?: Not at all  FREQUENCY: How often do you have to urinate less than every two hours?: Not at all  INTERMITTENCY: How often have you found you stopped and started again several times when you urinated?: Not at all  URGENCY: How often have you found it difficult to postpone urination?: Not at all  WEAK STREAM: How often have you had a weak urinary stream?: Not at all  STRAINING: How often have you had to strain to start  urination?: Less than 1 to 5 times  NOCTURIA: How many times did you typically get up at night to uriniate?: 1 Time  TOTAL I-PSS SCORE:: 2  How would you feel if you were to spend the rest of your life with your urinary condition?: Pleased    Last BUN and creatinine:  Lab

## 2024-07-30 ENCOUNTER — OFFICE VISIT (OUTPATIENT)
Dept: PODIATRY | Age: 84
End: 2024-07-30
Payer: MEDICARE

## 2024-07-30 VITALS — BODY MASS INDEX: 33.74 KG/M2 | HEIGHT: 67 IN | WEIGHT: 215 LBS

## 2024-07-30 DIAGNOSIS — M79.675 PAIN OF TOES OF BOTH FEET: ICD-10-CM

## 2024-07-30 DIAGNOSIS — B35.1 ONYCHOMYCOSIS OF TOENAIL: Primary | ICD-10-CM

## 2024-07-30 DIAGNOSIS — M79.674 PAIN OF TOES OF BOTH FEET: ICD-10-CM

## 2024-07-30 DIAGNOSIS — I89.0 LYMPHEDEMA OF LEFT LOWER EXTREMITY: ICD-10-CM

## 2024-07-30 DIAGNOSIS — I89.0 LYMPHEDEMA OF RIGHT LOWER EXTREMITY: ICD-10-CM

## 2024-07-30 DIAGNOSIS — E11.42 DIABETIC POLYNEUROPATHY ASSOCIATED WITH TYPE 2 DIABETES MELLITUS (HCC): ICD-10-CM

## 2024-07-30 DIAGNOSIS — I87.2 CHRONIC VENOUS INSUFFICIENCY: ICD-10-CM

## 2024-07-30 PROCEDURE — G8427 DOCREV CUR MEDS BY ELIG CLIN: HCPCS | Performed by: PODIATRIST

## 2024-07-30 PROCEDURE — 11721 DEBRIDE NAIL 6 OR MORE: CPT | Performed by: PODIATRIST

## 2024-07-30 PROCEDURE — 1111F DSCHRG MED/CURRENT MED MERGE: CPT | Performed by: PODIATRIST

## 2024-07-30 PROCEDURE — 99203 OFFICE O/P NEW LOW 30 MIN: CPT | Performed by: PODIATRIST

## 2024-07-30 PROCEDURE — 1123F ACP DISCUSS/DSCN MKR DOCD: CPT | Performed by: PODIATRIST

## 2024-07-30 PROCEDURE — 1036F TOBACCO NON-USER: CPT | Performed by: PODIATRIST

## 2024-07-30 PROCEDURE — 3044F HG A1C LEVEL LT 7.0%: CPT | Performed by: PODIATRIST

## 2024-07-30 PROCEDURE — G8417 CALC BMI ABV UP PARAM F/U: HCPCS | Performed by: PODIATRIST

## 2024-07-30 RX ORDER — LANOLIN ALCOHOL/MO/W.PET/CERES
400 CREAM (GRAM) TOPICAL DAILY
COMMUNITY
Start: 2024-07-18

## 2024-07-30 ASSESSMENT — ENCOUNTER SYMPTOMS
SHORTNESS OF BREATH: 0
DIARRHEA: 0
BACK PAIN: 0
NAUSEA: 0
COLOR CHANGE: 0

## 2024-07-30 NOTE — PROGRESS NOTES
Range of motion to the right ankle is  free of pain or grinding.     Range of motion to the left ankle is  free of pain or grinding.     Range of motion to the right subtalar joint is  free of pain or grinding.     Range of motion to the left subtalar joint is  free of pain or grinding.     No abnormalities, asymmetries, or misalignments are seen between the extremities.    Weightbearing evaluation does reveal rearfoot eversion, medial prominence of the talar head, loss of the medial longitudinal arch height, and too many toes sign bilaterally.    The lesser digits of the right foot are not contracted.     The lesser digits of the left foot are not contracted.     There is no prominence noted to the first metatarsal head without abduction of the hallux of the right foot.     There is no prominence noted to the first metatarsal head without abduction of the hallux of the left foot.    Shoe examination was performed.    Biomechanical Exam: normal bilaterally.    Asessment: Patient is a 83 y.o. male with:    Diagnosis Orders   1. Onychomycosis of toenail  MA DEBRIDEMENT NAIL ANY METHOD 6/>    HM DIABETES FOOT EXAM      2. Pain of toes of both feet  MA DEBRIDEMENT NAIL ANY METHOD 6/>    HM DIABETES FOOT EXAM      3. Chronic venous insufficiency  HM DIABETES FOOT EXAM    Siena Arreola MD, [facility], [place]      4. Lymphedema of left lower extremity  HM DIABETES FOOT EXAM    Siena Arreola MD, [facility], [place]      5. Lymphedema of right lower extremity  HM DIABETES FOOT EXAM    Siena Arreola MD, [facility], [place]      6. Diabetic polyneuropathy associated with type 2 diabetes mellitus (HCC)  MA DEBRIDEMENT NAIL ANY METHOD 6/>    HM DIABETES FOOT EXAM    Siena Arreola MD, [facility], [place]          Plan:  1. Clinical evaluation of the patient. 2.  Toenails 1-5 of the right foot and 1-5 of the left foot were debrided in length and thickness using a nail nipper and a .

## 2024-08-11 DIAGNOSIS — Z79.4 CONTROLLED TYPE 2 DIABETES MELLITUS WITH DIABETIC NEUROPATHY, WITH LONG-TERM CURRENT USE OF INSULIN (HCC): ICD-10-CM

## 2024-08-11 DIAGNOSIS — E11.40 CONTROLLED TYPE 2 DIABETES MELLITUS WITH DIABETIC NEUROPATHY, WITH LONG-TERM CURRENT USE OF INSULIN (HCC): ICD-10-CM

## 2024-08-11 RX ORDER — PREGABALIN 150 MG/1
150 CAPSULE ORAL 2 TIMES DAILY
Qty: 180 CAPSULE | Refills: 1 | Status: SHIPPED | OUTPATIENT
Start: 2024-08-11 | End: 2025-02-07

## 2024-08-11 RX ORDER — TAMSULOSIN HYDROCHLORIDE 0.4 MG/1
0.4 CAPSULE ORAL DAILY
Qty: 90 CAPSULE | Refills: 3 | Status: SHIPPED | OUTPATIENT
Start: 2024-08-11

## 2024-09-09 ENCOUNTER — OFFICE VISIT (OUTPATIENT)
Dept: UROLOGY | Age: 84
End: 2024-09-09
Payer: MEDICARE

## 2024-09-09 VITALS
TEMPERATURE: 97.8 F | SYSTOLIC BLOOD PRESSURE: 112 MMHG | HEART RATE: 68 BPM | HEIGHT: 67 IN | WEIGHT: 225 LBS | OXYGEN SATURATION: 95 % | DIASTOLIC BLOOD PRESSURE: 68 MMHG | BODY MASS INDEX: 35.31 KG/M2

## 2024-09-09 DIAGNOSIS — N13.8 BPH WITH OBSTRUCTION/LOWER URINARY TRACT SYMPTOMS: ICD-10-CM

## 2024-09-09 DIAGNOSIS — N40.1 BPH WITH OBSTRUCTION/LOWER URINARY TRACT SYMPTOMS: ICD-10-CM

## 2024-09-09 DIAGNOSIS — R33.9 URINARY RETENTION: Primary | ICD-10-CM

## 2024-09-09 PROCEDURE — 99214 OFFICE O/P EST MOD 30 MIN: CPT | Performed by: UROLOGY

## 2024-09-09 PROCEDURE — G8427 DOCREV CUR MEDS BY ELIG CLIN: HCPCS | Performed by: UROLOGY

## 2024-09-09 PROCEDURE — 3074F SYST BP LT 130 MM HG: CPT | Performed by: UROLOGY

## 2024-09-09 PROCEDURE — G8417 CALC BMI ABV UP PARAM F/U: HCPCS | Performed by: UROLOGY

## 2024-09-09 PROCEDURE — 3078F DIAST BP <80 MM HG: CPT | Performed by: UROLOGY

## 2024-09-09 PROCEDURE — 51798 US URINE CAPACITY MEASURE: CPT | Performed by: UROLOGY

## 2024-09-09 PROCEDURE — 1036F TOBACCO NON-USER: CPT | Performed by: UROLOGY

## 2024-09-09 PROCEDURE — 1123F ACP DISCUSS/DSCN MKR DOCD: CPT | Performed by: UROLOGY

## 2024-09-09 ASSESSMENT — ENCOUNTER SYMPTOMS
VOMITING: 0
COUGH: 0
COLOR CHANGE: 0
EYE PAIN: 0
BACK PAIN: 0
EYES NEGATIVE: 1
WHEEZING: 0
EYE REDNESS: 0
ALLERGIC/IMMUNOLOGIC NEGATIVE: 1
NAUSEA: 0
RESPIRATORY NEGATIVE: 1
SHORTNESS OF BREATH: 0
GASTROINTESTINAL NEGATIVE: 1
ABDOMINAL PAIN: 0

## 2024-09-27 NOTE — PROGRESS NOTES
Cystoscopy Operative Note (9/30/24)  Surgeon: Hayden Escobar MD  Anesthesia: Urethral 2% Xylocaine   Indications: BPH, Urinary Retention  Position: Dorsal Lithotomy    Findings:   Risks and Benefits discussed with patient prior to procedure.  The patient was prepped and draped in the usual sterile fashion.  The flexible cystoscope was advanced through the urethra and into the bladder.  The bladder was thoroughly inspected and the following was noted:    Residual Urine: mild  Urethra: normal appearing urethra with no masses, tenderness or lesions  Prostate: partially obstructing lateral lobes of prostate; median lobe present? no.   Bladder: No tumors or CIS noted.  No bladder diverticulum.  There was moderate trabeculation noted.  Ureters: Clear efflux from both ureters.  Orifices with normal configuration and location.    The cystoscope was removed.  The patient tolerated the procedure well.     Agree with the ROS entered by the MA.      Patient's prostate does not look severely obstructive visually.  He does have high postvoid residual on recent PVR but feels like he is voiding better.  Will reassess in 3 months with office visit and bladder scan.  If he continues to retain high-volume, will offer an laser vaporization of the prostate at that time.

## 2024-09-30 ENCOUNTER — PROCEDURE VISIT (OUTPATIENT)
Dept: UROLOGY | Age: 84
End: 2024-09-30
Payer: MEDICARE

## 2024-09-30 VITALS — SYSTOLIC BLOOD PRESSURE: 118 MMHG | DIASTOLIC BLOOD PRESSURE: 60 MMHG | TEMPERATURE: 97.3 F | HEART RATE: 84 BPM

## 2024-09-30 DIAGNOSIS — N40.1 BPH WITH OBSTRUCTION/LOWER URINARY TRACT SYMPTOMS: Primary | ICD-10-CM

## 2024-09-30 DIAGNOSIS — N13.8 BPH WITH OBSTRUCTION/LOWER URINARY TRACT SYMPTOMS: Primary | ICD-10-CM

## 2024-09-30 PROCEDURE — 52000 CYSTOURETHROSCOPY: CPT | Performed by: UROLOGY

## 2024-10-03 ENCOUNTER — OFFICE VISIT (OUTPATIENT)
Dept: PODIATRY | Age: 84
End: 2024-10-03

## 2024-10-03 VITALS — BODY MASS INDEX: 34.53 KG/M2 | WEIGHT: 220 LBS | HEIGHT: 67 IN

## 2024-10-03 DIAGNOSIS — E11.42 DIABETIC POLYNEUROPATHY ASSOCIATED WITH TYPE 2 DIABETES MELLITUS (HCC): ICD-10-CM

## 2024-10-03 DIAGNOSIS — M79.675 PAIN OF TOES OF BOTH FEET: ICD-10-CM

## 2024-10-03 DIAGNOSIS — M79.674 PAIN OF TOES OF BOTH FEET: ICD-10-CM

## 2024-10-03 DIAGNOSIS — B35.1 ONYCHOMYCOSIS OF TOENAIL: Primary | ICD-10-CM

## 2024-10-03 RX ORDER — OXYCODONE AND ACETAMINOPHEN 5; 325 MG/1; MG/1
1 TABLET ORAL 2 TIMES DAILY
COMMUNITY

## 2024-11-16 DIAGNOSIS — E11.69 TYPE 2 DIABETES MELLITUS WITH OTHER SPECIFIED COMPLICATION, UNSPECIFIED WHETHER LONG TERM INSULIN USE (HCC): ICD-10-CM

## 2024-11-16 DIAGNOSIS — E11.8 TYPE 2 DIABETES MELLITUS WITH COMPLICATION (HCC): ICD-10-CM

## 2024-11-19 RX ORDER — GLUCOSAM/CHON-MSM1/C/MANG/BOSW 500-416.6
TABLET ORAL
Qty: 100 EACH | Refills: 3 | OUTPATIENT
Start: 2024-11-19

## 2024-11-19 RX ORDER — CALCIUM CITRATE/VITAMIN D3 200MG-6.25
TABLET ORAL
Qty: 100 STRIP | Refills: 3 | OUTPATIENT
Start: 2024-11-19

## 2024-11-19 RX ORDER — OMEPRAZOLE 40 MG/1
CAPSULE, DELAYED RELEASE ORAL
Qty: 90 CAPSULE | Refills: 3 | OUTPATIENT
Start: 2024-11-19

## 2024-11-19 NOTE — TELEPHONE ENCOUNTER
LAST VISIT:   5/7/2024     Future Appointments   Date Time Provider Department Center   11/25/2024  2:10 PM María Draper MD [facility]   12/6/2024  9:30 AM Abad [place] DPM [place] [facility]   1/6/2025  9:00 AM Hayden Escobar MD [facility]   2/13/2025  8:15 AM SCHEDULE, [facility]       Pharmacy verified? Yes     [facility] - [place], [place] - [address] - P [phone] - F [phone]  [address]  [facility] [address]  Phone: [phone] Fax: [phone]

## 2024-12-06 ENCOUNTER — OFFICE VISIT (OUTPATIENT)
Dept: PODIATRY | Age: 84
End: 2024-12-06
Payer: MEDICARE

## 2024-12-06 VITALS — BODY MASS INDEX: 37.67 KG/M2 | HEIGHT: 67 IN | WEIGHT: 240 LBS

## 2024-12-06 DIAGNOSIS — M79.674 PAIN OF TOES OF BOTH FEET: ICD-10-CM

## 2024-12-06 DIAGNOSIS — M79.675 PAIN OF TOES OF BOTH FEET: ICD-10-CM

## 2024-12-06 DIAGNOSIS — B35.1 ONYCHOMYCOSIS OF TOENAIL: Primary | ICD-10-CM

## 2024-12-06 DIAGNOSIS — E11.42 DIABETIC POLYNEUROPATHY ASSOCIATED WITH TYPE 2 DIABETES MELLITUS (HCC): ICD-10-CM

## 2024-12-06 PROCEDURE — 11721 DEBRIDE NAIL 6 OR MORE: CPT | Performed by: PODIATRIST

## 2024-12-06 PROCEDURE — 99999 PR OFFICE/OUTPT VISIT,PROCEDURE ONLY: CPT | Performed by: PODIATRIST

## 2024-12-06 NOTE — PROGRESS NOTES
Anuj Jovel is a 84 y.o. male who presents to the office for follow-up evaluation of thick painful toenails to both feet.  Chief Complaint   Patient presents with    Nail Problem     Nail trim/last saw Dr.Tara Draper 11/25/24    Diabetes     Blood sugar 245    Patient states that his toenails are painful with shoe gear and ambulation.    Allergies   Allergen Reactions    Feldene [Piroxicam]     Flagyl [Metronidazole]     Lisinopril Dizziness or Vertigo    Naprosyn [Naproxen]     Penicillins     Sulfa Antibiotics     Sulfasalazine        Past Medical History:   Diagnosis Date    Allergic rhinitis     Chronic back pain     Diabetes (HCC)     Diverticulitis     Headache     History of allergy     Hyperlipidemia     Hypertension     Osteoarthritis     Pneumonia due to COVID-19 virus 12/29/2020    Type 2 diabetes mellitus without complication (HCC)        Prior to Admission medications    Medication Sig Start Date End Date Taking? Authorizing Provider   pregabalin (LYRICA) 150 MG capsule Take 1 capsule by mouth 2 times daily for 180 days. Max Daily Amount: 300 mg 11/25/24 5/24/25 Yes María Draper MD   finasteride (PROSCAR) 5 MG tablet Take 1 tablet by mouth daily 11/25/24  Yes María Draper MD   furosemide (LASIX) 20 MG tablet Take 1 tablet by mouth daily 11/12/24  Yes Dre Lancaster MD   nitroGLYCERIN (NITROSTAT) 0.4 MG SL tablet Place 1 tablet under the tongue every 5 minutes as needed for Chest pain up to max of 3 total doses. If no relief after 1 dose, call 911. 11/12/24  Yes Dre Lancaster MD   oxyCODONE-acetaminophen (PERCOCET) 5-325 MG per tablet Take 1 tablet by mouth 2 times daily.   Yes Sher Levine MD   tamsulosin (FLOMAX) 0.4 MG capsule Take 1 capsule by mouth daily 8/11/24  Yes María Draper MD   magnesium oxide (MAG-OX) 400 (240 Mg) MG tablet Take 1 tablet by mouth daily 7/18/24  Yes Sher Levine MD   butalbital-APAP-caffeine -40 MG CAPS per

## 2024-12-09 ASSESSMENT — ENCOUNTER SYMPTOMS
COLOR CHANGE: 0
SHORTNESS OF BREATH: 0
DIARRHEA: 0
BACK PAIN: 0
NAUSEA: 0

## 2024-12-13 ENCOUNTER — HOSPITAL ENCOUNTER (OUTPATIENT)
Age: 84
Setting detail: SPECIMEN
Discharge: HOME | End: 2024-12-13

## 2024-12-13 DIAGNOSIS — E11.40 CONTROLLED TYPE 2 DIABETES MELLITUS WITH DIABETIC NEUROPATHY, WITH LONG-TERM CURRENT USE OF INSULIN (HCC): ICD-10-CM

## 2024-12-13 DIAGNOSIS — Z79.4 CONTROLLED TYPE 2 DIABETES MELLITUS WITH DIABETIC NEUROPATHY, WITH LONG-TERM CURRENT USE OF INSULIN (HCC): ICD-10-CM

## 2024-12-13 LAB
ALBUMIN SERPL-MCNC: 3.9 G/DL (ref 3.5–5.2)
ALBUMIN/GLOB SERPL: 1.6 {RATIO} (ref 1–2.5)
ALP SERPL-CCNC: 100 U/L (ref 40–129)
ALT SERPL-CCNC: 17 U/L (ref 10–50)
ANION GAP SERPL CALCULATED.3IONS-SCNC: 10 MMOL/L (ref 9–16)
AST SERPL-CCNC: 19 U/L (ref 10–50)
BASOPHILS # BLD: 0.05 K/UL (ref 0–0.2)
BASOPHILS NFR BLD: 1 % (ref 0–2)
BILIRUB SERPL-MCNC: 0.7 MG/DL (ref 0–1.2)
BUN SERPL-MCNC: 20 MG/DL (ref 8–23)
CALCIUM SERPL-MCNC: 9 MG/DL (ref 8.6–10.4)
CHLORIDE SERPL-SCNC: 106 MMOL/L (ref 98–107)
CHOLEST SERPL-MCNC: 144 MG/DL (ref 0–199)
CHOLESTEROL/HDL RATIO: 3.3
CO2 SERPL-SCNC: 26 MMOL/L (ref 20–31)
CREAT SERPL-MCNC: 1 MG/DL (ref 0.7–1.2)
CREAT UR-MCNC: 85.5 MG/DL (ref 39–259)
EOSINOPHIL # BLD: 0.21 K/UL (ref 0–0.44)
EOSINOPHILS RELATIVE PERCENT: 3 % (ref 1–4)
ERYTHROCYTE [DISTWIDTH] IN BLOOD BY AUTOMATED COUNT: 15.3 % (ref 11.8–14.4)
GFR, ESTIMATED: 74 ML/MIN/1.73M2
GLUCOSE SERPL-MCNC: 108 MG/DL (ref 74–99)
HCT VFR BLD AUTO: 44.5 % (ref 40.7–50.3)
HDLC SERPL-MCNC: 44 MG/DL
HGB BLD-MCNC: 14 G/DL (ref 13–17)
IMM GRANULOCYTES # BLD AUTO: <0.03 K/UL (ref 0–0.3)
IMM GRANULOCYTES NFR BLD: 0 %
LDLC SERPL CALC-MCNC: 74 MG/DL (ref 0–100)
LYMPHOCYTES NFR BLD: 1.58 K/UL (ref 1.1–3.7)
LYMPHOCYTES RELATIVE PERCENT: 25 % (ref 24–43)
MCH RBC QN AUTO: 29.9 PG (ref 25.2–33.5)
MCHC RBC AUTO-ENTMCNC: 31.5 G/DL (ref 28.4–34.8)
MCV RBC AUTO: 94.9 FL (ref 82.6–102.9)
MICROALBUMIN UR-MCNC: <12 MG/L (ref 0–20)
MICROALBUMIN/CREAT UR-RTO: NORMAL MCG/MG CREAT (ref 0–17)
MONOCYTES NFR BLD: 0.51 K/UL (ref 0.1–1.2)
MONOCYTES NFR BLD: 8 % (ref 3–12)
NEUTROPHILS NFR BLD: 63 % (ref 36–65)
NEUTS SEG NFR BLD: 3.98 K/UL (ref 1.5–8.1)
NRBC BLD-RTO: 0 PER 100 WBC
PLATELET # BLD AUTO: 176 K/UL (ref 138–453)
PMV BLD AUTO: 10.3 FL (ref 8.1–13.5)
POTASSIUM SERPL-SCNC: 4.5 MMOL/L (ref 3.7–5.3)
PROT SERPL-MCNC: 6.4 G/DL (ref 6.6–8.7)
RBC # BLD AUTO: 4.69 M/UL (ref 4.21–5.77)
RBC # BLD: ABNORMAL 10*6/UL
SODIUM SERPL-SCNC: 142 MMOL/L (ref 136–145)
TRIGL SERPL-MCNC: 130 MG/DL
VLDLC SERPL CALC-MCNC: 26 MG/DL (ref 1–30)
WBC OTHER # BLD: 6.4 K/UL (ref 3.5–11.3)

## 2024-12-23 NOTE — DISCHARGE INSTR - COC
Continuity of Care Form    Patient Name: Anuj Jovel   :  1940  MRN:  [id number]    Admit date:  2024  Discharge date:  ***    Code Status Order: Full Code   Advance Directives:     Admitting Physician:  Davi Patel MD  PCP: Jeanne Simpson MD    Discharging Nurse: ***  Discharging Hospital Unit/Room#: 3011/3011-01  Discharging Unit Phone Number: [phone]    Emergency Contact:   Extended Emergency Contact Information  Primary Emergency Contact: TANVIDAVIN  Address: [address] [place] [address] [facility]  Home Phone: [phone]  Relation: Child  Secondary Emergency Contact: POLLY CORTEZ  Address: [address] [place] [address] [place] [address] [facility]  Home Phone: [phone]  Relation: Child    Past Surgical History:  Past Surgical History:   Procedure Laterality Date    BACK SURGERY  2017     TAMARA  Dr [facility], lumbar fusion    CARDIAC CATHETERIZATION      COLECTOMY  2015    partial colectomy due to diveritc    COLONOSCOPY      TOTAL KNEE ARTHROPLASTY Right        Immunization History:   Immunization History   Administered Date(s) Administered    COVID-19, MODERNA BLUE border, Primary or Immunocompromised, (age 12y+), IM, 100 mcg/0.5mL 2021, 2021, 2021, 2022    COVID-19, PFIZER Bivalent, DO NOT Dilute, (age 12y+), IM, 30 mcg/0.3 mL 10/25/2022    Influenza Virus Vaccine 2010, 10/02/2014, 10/12/2015    Influenza, FLUAD, (age 65 y+), Adjuvanted, 0.5mL 2020, 2021, 10/25/2022, 10/04/2023    Influenza, High Dose (Fluzone 65 yrs and older) 10/12/2016, 2017, 2018    Influenza, Triv, inactivated, subunit, adjuvanted, IM (Fluad 65 yrs and older) 2019    Pneumococcal, PCV-13, PREVNAR 13, (age 6w+), IM, 0.5mL 2017    Pneumococcal, PPSV23, PNEUMOVAX 23, (age 2y+), SC/IM, 0.5mL 2014    TDaP, ADACEL (age 10y-64y), BOOSTRIX (age 10y+), IM, 0.5mL 
Yes

## 2025-01-02 DIAGNOSIS — E11.8 TYPE 2 DIABETES MELLITUS WITH COMPLICATION (HCC): ICD-10-CM

## 2025-01-06 ENCOUNTER — OFFICE VISIT (OUTPATIENT)
Dept: UROLOGY | Age: 85
End: 2025-01-06
Payer: MEDICARE

## 2025-01-06 VITALS
OXYGEN SATURATION: 95 % | HEIGHT: 67 IN | HEART RATE: 88 BPM | BODY MASS INDEX: 37.67 KG/M2 | TEMPERATURE: 98 F | DIASTOLIC BLOOD PRESSURE: 74 MMHG | SYSTOLIC BLOOD PRESSURE: 116 MMHG | WEIGHT: 240 LBS

## 2025-01-06 DIAGNOSIS — R33.9 URINARY RETENTION: Primary | ICD-10-CM

## 2025-01-06 DIAGNOSIS — N13.8 BPH WITH OBSTRUCTION/LOWER URINARY TRACT SYMPTOMS: ICD-10-CM

## 2025-01-06 DIAGNOSIS — N40.1 BPH WITH OBSTRUCTION/LOWER URINARY TRACT SYMPTOMS: ICD-10-CM

## 2025-01-06 PROCEDURE — G8428 CUR MEDS NOT DOCUMENT: HCPCS | Performed by: UROLOGY

## 2025-01-06 PROCEDURE — 3074F SYST BP LT 130 MM HG: CPT | Performed by: UROLOGY

## 2025-01-06 PROCEDURE — G8417 CALC BMI ABV UP PARAM F/U: HCPCS | Performed by: UROLOGY

## 2025-01-06 PROCEDURE — 99213 OFFICE O/P EST LOW 20 MIN: CPT | Performed by: UROLOGY

## 2025-01-06 PROCEDURE — 51798 US URINE CAPACITY MEASURE: CPT | Performed by: UROLOGY

## 2025-01-06 PROCEDURE — 1036F TOBACCO NON-USER: CPT | Performed by: UROLOGY

## 2025-01-06 PROCEDURE — 1123F ACP DISCUSS/DSCN MKR DOCD: CPT | Performed by: UROLOGY

## 2025-01-06 PROCEDURE — 3078F DIAST BP <80 MM HG: CPT | Performed by: UROLOGY

## 2025-01-06 RX ORDER — HYDROCODONE BITARTRATE AND ACETAMINOPHEN 5; 325 MG/1; MG/1
1 TABLET ORAL 3 TIMES DAILY PRN
COMMUNITY
Start: 2024-12-27

## 2025-01-06 ASSESSMENT — ENCOUNTER SYMPTOMS
GASTROINTESTINAL NEGATIVE: 1
BACK PAIN: 0
WHEEZING: 0
EYE PAIN: 0
VOMITING: 0
SHORTNESS OF BREATH: 0
NAUSEA: 0
COUGH: 0
ABDOMINAL PAIN: 0
ALLERGIC/IMMUNOLOGIC NEGATIVE: 1
EYE REDNESS: 0
COLOR CHANGE: 0
EYES NEGATIVE: 1
RESPIRATORY NEGATIVE: 1

## 2025-01-06 NOTE — PROGRESS NOTES
Review of Systems   Constitutional: Negative.  Negative for appetite change, chills and fever.   HENT: Negative.     Eyes: Negative.  Negative for pain, redness and visual disturbance.   Respiratory: Negative.  Negative for cough, shortness of breath and wheezing.    Cardiovascular: Negative.  Negative for chest pain and leg swelling.   Gastrointestinal: Negative.  Negative for abdominal pain, nausea and vomiting.   Endocrine: Negative.    Genitourinary:  Positive for difficulty urinating. Negative for dysuria, flank pain, frequency, hematuria, testicular pain and urgency.   Musculoskeletal: Negative.  Negative for back pain, joint swelling and myalgias.   Skin: Negative.  Negative for color change, rash and wound.   Allergic/Immunologic: Negative.    Neurological: Negative.  Negative for dizziness, tremors, weakness, numbness and headaches.   Hematological: Negative.  Negative for adenopathy. Does not bruise/bleed easily.   Psychiatric/Behavioral: Negative.       Indication: Urinary retention   Diagnosis: Urinary retention     Patient presents to the office for PVR. PVR obtained per office provider protocol. Patient voided prior, PVR 24ml. Patient tolerated procedure with no complications. Further instructions provided to patient for follow up care.    
1    melatonin 3 MG TABS tablet Take 1 tablet by mouth nightly as needed (insomnia)  3    atorvastatin (LIPITOR) 40 MG tablet TAKE 1 TABLET AT BEDTIME 90 tablet 3    metFORMIN (GLUCOPHAGE) 500 MG tablet TAKE 2 TABLETS TWICE A DAY WITH MEALS 360 tablet 3    Blood Glucose Monitoring Suppl (TRUE METRIX AIR GLUCOSE METER) w/Device KIT Type 2 DM, test daily 1 kit 0    Lancets MISC 1 each by Does not apply route daily 100 each 3    omeprazole (PRILOSEC) 40 MG delayed release capsule TAKE 1 CAPSULE EVERY DAY 90 capsule 3    Handicap Placard MISC by Does not apply route Cannot walk 200 Feet due to orthopedic disease  Expires 3/18/2027 1 each 0    glucose (GLUTOSE) 40 % GEL Take 37.5 mLs by mouth as needed (low sugar) 45 g 1    aspirin EC 81 MG EC tablet Take 1 tablet by mouth daily      Multiple Vitamin (MULTI-VITAMIN DAILY) TABS Take 1 tablet by mouth daily         Feldene [piroxicam], Flagyl [metronidazole], Lisinopril, Naprosyn [naproxen], Penicillins, Sulfa antibiotics, and Sulfasalazine  Social History     Tobacco Use   Smoking Status Former    Current packs/day: 0.00    Average packs/day: 1.5 packs/day for 40.0 years (60.0 ttl pk-yrs)    Types: Cigarettes    Start date: 1951    Quit date: 1991    Years since quittin.0   Smokeless Tobacco Never     (Ifpatient a smoker, smoking cessation counseling offered)    Social History     Substance and Sexual Activity   Alcohol Use Yes    Alcohol/week: 1.0 standard drink of alcohol    Types: 1 Glasses of wine per week    Comment: social       REVIEW OF SYSTEMS:  Review of Systems    Physical Exam:      Vitals:    25 0847   BP: 116/74   Pulse: 88   Temp: 98 °F (36.7 °C)   SpO2: 95%     Body mass index is 37.59 kg/m².  Patient is a 84 y.o. male in no acute distress and alert and oriented to person, place and time.  Physical Exam  Constitutional: Patient in no acute distress.  Neuro: Alert and oriented to person, place and time.  Psych: Mood normal, affect

## 2025-03-14 ENCOUNTER — OFFICE VISIT (OUTPATIENT)
Dept: PODIATRY | Age: 85
End: 2025-03-14
Payer: MEDICARE

## 2025-03-14 VITALS — BODY MASS INDEX: 37.67 KG/M2 | HEIGHT: 67 IN | WEIGHT: 240 LBS

## 2025-03-14 DIAGNOSIS — E11.42 DIABETIC POLYNEUROPATHY ASSOCIATED WITH TYPE 2 DIABETES MELLITUS: ICD-10-CM

## 2025-03-14 DIAGNOSIS — M79.675 PAIN OF TOES OF BOTH FEET: ICD-10-CM

## 2025-03-14 DIAGNOSIS — M79.674 PAIN OF TOES OF BOTH FEET: ICD-10-CM

## 2025-03-14 DIAGNOSIS — B35.1 ONYCHOMYCOSIS OF TOENAIL: Primary | ICD-10-CM

## 2025-03-14 PROCEDURE — 11721 DEBRIDE NAIL 6 OR MORE: CPT | Performed by: PODIATRIST

## 2025-03-14 PROCEDURE — 99999 PR OFFICE/OUTPT VISIT,PROCEDURE ONLY: CPT | Performed by: PODIATRIST

## 2025-03-17 ASSESSMENT — ENCOUNTER SYMPTOMS
SHORTNESS OF BREATH: 0
DIARRHEA: 0
NAUSEA: 0
BACK PAIN: 0
COLOR CHANGE: 0

## 2025-03-17 NOTE — PROGRESS NOTES
SUBJECTIVE: Anuj Jovel is a 84 y.o. male who returns to the office with chief complaint of painful fungal toenails. Patient relates toe nails are thickened/difficult to trim as well as painful with ambulation and with shoe gear.   Chief Complaint   Patient presents with    Nail Problem     B/l nail trim,last seen María Draper MD 11/25/24     Review of Systems   Constitutional:  Negative for activity change, appetite change, chills, diaphoresis, fatigue and fever.   Respiratory:  Negative for shortness of breath.    Cardiovascular:  Negative for leg swelling.   Gastrointestinal:  Negative for diarrhea and nausea.   Endocrine: Negative for cold intolerance, heat intolerance and polyuria.   Musculoskeletal:  Positive for arthralgias. Negative for back pain, gait problem, joint swelling and myalgias.   Skin:  Negative for color change, pallor, rash and wound.   Allergic/Immunologic: Negative for environmental allergies and food allergies.   Neurological:  Negative for dizziness, weakness, light-headedness and numbness.   Hematological:  Does not bruise/bleed easily.   Psychiatric/Behavioral:  Negative for behavioral problems, confusion and self-injury. The patient is not nervous/anxious.      OBJECTIVE: Clinical evaluation of patient reveals nails 1,2,3,4,5 of the right foot and nails 1,2,3,4,5 of the left foot to present with thickness, elongation, discoloration, brittleness, and subungual debris. There was pain with palpation and debridement of the toenails of the bilateral feet. No open lesions noted to either foot today.   The right DP pulse is palpable.   The left DP pulse is palpable.   The right PT pulse is palpable.   The left PT pulse is palpable.   Protective sensation is absent to the right plantar foot as noted with a 5.07 Alpha-Hernandez monofilament.   Protective sensation is absent to the left plantar foot as noted with a 5.07 Alpha-Hernandez monofilament.   Glucose: Patient states that they do not

## 2025-05-16 ENCOUNTER — OFFICE VISIT (OUTPATIENT)
Dept: PODIATRY | Age: 85
End: 2025-05-16

## 2025-05-16 VITALS — BODY MASS INDEX: 40.18 KG/M2 | HEIGHT: 66 IN | WEIGHT: 250 LBS

## 2025-05-16 DIAGNOSIS — M79.674 PAIN OF TOES OF BOTH FEET: ICD-10-CM

## 2025-05-16 DIAGNOSIS — E11.42 DIABETIC POLYNEUROPATHY ASSOCIATED WITH TYPE 2 DIABETES MELLITUS (HCC): ICD-10-CM

## 2025-05-16 DIAGNOSIS — B35.1 ONYCHOMYCOSIS OF TOENAIL: Primary | ICD-10-CM

## 2025-05-16 DIAGNOSIS — M79.675 PAIN OF TOES OF BOTH FEET: ICD-10-CM

## 2025-05-16 ASSESSMENT — ENCOUNTER SYMPTOMS
COLOR CHANGE: 0
SHORTNESS OF BREATH: 0
DIARRHEA: 0
BACK PAIN: 0
NAUSEA: 0

## 2025-05-16 NOTE — PROGRESS NOTES
SUBJECTIVE: Anuj Jovel is a 84 y.o. male who returns to the office with chief complaint of painful fungal toenails. Patient relates toe nails are thickened/difficult to trim as well as painful with ambulation and with shoe gear.   Chief Complaint   Patient presents with    Nail Problem     B/l nail trim, last seen María Draper MD 11/25/24     Review of Systems   Constitutional:  Negative for activity change, appetite change, chills, diaphoresis, fatigue and fever.   Respiratory:  Negative for shortness of breath.    Cardiovascular:  Negative for leg swelling.   Gastrointestinal:  Negative for diarrhea and nausea.   Endocrine: Negative for cold intolerance, heat intolerance and polyuria.   Musculoskeletal:  Positive for arthralgias. Negative for back pain, gait problem, joint swelling and myalgias.   Skin:  Negative for color change, pallor, rash and wound.   Allergic/Immunologic: Negative for environmental allergies and food allergies.   Neurological:  Negative for dizziness, weakness, light-headedness and numbness.   Hematological:  Does not bruise/bleed easily.   Psychiatric/Behavioral:  Negative for behavioral problems, confusion and self-injury. The patient is not nervous/anxious.      OBJECTIVE: Clinical evaluation of patient reveals nails 1,2,3,4,5 of the right foot and nails 1,2,3,4,5 of the left foot to present with thickness, elongation, discoloration, brittleness, and subungual debris. There was pain with palpation and debridement of the toenails of the bilateral feet. No open lesions noted to either foot today.   The right DP pulse is palpable.   The left DP pulse is palpable.   The right PT pulse is palpable.   The left PT pulse is palpable.   Protective sensation is absent to the right plantar foot as noted with a 5.07 Houston-Hernandez monofilament.   Protective sensation is absent to the left plantar foot as noted with a 5.07 Houston-Hernandez monofilament.   Glucose: Patient states that they do

## 2025-05-27 PROBLEM — I44.2 COMPLETE HEART BLOCK (HCC): Status: RESOLVED | Noted: 2024-06-08 | Resolved: 2025-05-27

## 2025-05-27 PROBLEM — J96.01 ACUTE HYPOXIC RESPIRATORY FAILURE (HCC): Status: RESOLVED | Noted: 2024-06-09 | Resolved: 2025-05-27

## 2025-05-27 PROBLEM — E66.813 OBESITY, CLASS 3 (HCC): Status: ACTIVE | Noted: 2025-05-27

## 2025-07-07 ENCOUNTER — OFFICE VISIT (OUTPATIENT)
Dept: UROLOGY | Age: 85
End: 2025-07-07
Payer: MEDICARE

## 2025-07-07 VITALS
BODY MASS INDEX: 39.4 KG/M2 | DIASTOLIC BLOOD PRESSURE: 64 MMHG | HEART RATE: 68 BPM | OXYGEN SATURATION: 94 % | SYSTOLIC BLOOD PRESSURE: 118 MMHG | HEIGHT: 66 IN | TEMPERATURE: 97.3 F | RESPIRATION RATE: 18 BRPM

## 2025-07-07 DIAGNOSIS — N13.8 BPH WITH OBSTRUCTION/LOWER URINARY TRACT SYMPTOMS: ICD-10-CM

## 2025-07-07 DIAGNOSIS — N40.1 BPH WITH OBSTRUCTION/LOWER URINARY TRACT SYMPTOMS: ICD-10-CM

## 2025-07-07 DIAGNOSIS — R33.9 URINARY RETENTION: Primary | ICD-10-CM

## 2025-07-07 PROCEDURE — 1036F TOBACCO NON-USER: CPT | Performed by: UROLOGY

## 2025-07-07 PROCEDURE — 3078F DIAST BP <80 MM HG: CPT | Performed by: UROLOGY

## 2025-07-07 PROCEDURE — G8417 CALC BMI ABV UP PARAM F/U: HCPCS | Performed by: UROLOGY

## 2025-07-07 PROCEDURE — 1123F ACP DISCUSS/DSCN MKR DOCD: CPT | Performed by: UROLOGY

## 2025-07-07 PROCEDURE — G8427 DOCREV CUR MEDS BY ELIG CLIN: HCPCS | Performed by: UROLOGY

## 2025-07-07 PROCEDURE — 3074F SYST BP LT 130 MM HG: CPT | Performed by: UROLOGY

## 2025-07-07 PROCEDURE — 1159F MED LIST DOCD IN RCRD: CPT | Performed by: UROLOGY

## 2025-07-07 PROCEDURE — 51798 US URINE CAPACITY MEASURE: CPT | Performed by: UROLOGY

## 2025-07-07 PROCEDURE — 99213 OFFICE O/P EST LOW 20 MIN: CPT | Performed by: UROLOGY

## 2025-07-07 RX ORDER — TAMSULOSIN HYDROCHLORIDE 0.4 MG/1
0.4 CAPSULE ORAL DAILY
Qty: 90 CAPSULE | Refills: 3 | Status: SHIPPED | OUTPATIENT
Start: 2025-07-07

## 2025-07-07 RX ORDER — FINASTERIDE 5 MG/1
5 TABLET, FILM COATED ORAL DAILY
Qty: 90 TABLET | Refills: 3 | Status: SHIPPED | OUTPATIENT
Start: 2025-07-07

## 2025-07-07 ASSESSMENT — ENCOUNTER SYMPTOMS
EYE REDNESS: 0
CONSTIPATION: 0
COUGH: 0
DIARRHEA: 0
WHEEZING: 0
BACK PAIN: 0
EYE PAIN: 0
SHORTNESS OF BREATH: 0
ABDOMINAL PAIN: 0
NAUSEA: 0
VOMITING: 0

## 2025-07-07 NOTE — PROGRESS NOTES
Review of Systems   Constitutional:  Negative for chills, fatigue and fever.   Eyes:  Negative for pain, redness and visual disturbance.   Respiratory:  Negative for cough, shortness of breath and wheezing.    Cardiovascular:  Negative for chest pain and leg swelling.   Gastrointestinal:  Negative for abdominal pain, constipation, diarrhea, nausea and vomiting.   Genitourinary:  Negative for difficulty urinating, dysuria, flank pain, frequency, hematuria, scrotal swelling, testicular pain and urgency.   Musculoskeletal:  Negative for back pain, joint swelling and myalgias.   Skin:  Negative for rash and wound.   Neurological:  Negative for dizziness, tremors and numbness.   Hematological:  Does not bruise/bleed easily.        ml, last time using bathroom was about a half hr ago.

## 2025-07-07 NOTE — PROGRESS NOTES
[facility] PHYSICIANS [facility] UROLOGY CENTER  [address]  [facility] [address]  Dept: [phone]    [facility] Urology Office Note - Established    Patient:  Anuj Jovel  YOB: 1940  Date: 7/7/2025    The patient is a 84 y.o. male who presents todayfor evaluation of the following problems:   Chief Complaint   Patient presents with    Urinary Retention     6 month PVR       HPI  This is a very pleasant 84-year-old gentleman with a history of BPH.  He was started on finasteride and Flomax for high-volume urinary retention.  His voiding has been much better.  His bladder scan volume today was 223 cc but he had voided within the last hour.  He feels that he generally does empty his bladder reasonably well.    Summary of old records: N/A    Additional History: N/A    Procedures Today: N/A    Urinalysis today:  No results found for this visit on 07/07/25.  Last several PSA's:  Lab Results   Component Value Date    PSA 1.35 10/04/2023     Last total testosterone:  No results found for: \"TESTOSTERONE\"    AUA Symptom Score (7/7/2025):                               Last BUN and creatinine:  Lab Results   Component Value Date    BUN 20 12/13/2024     Lab Results   Component Value Date    CREATININE 1.0 12/13/2024       Additional Lab/Culture results: none    Imaging Reviewed during this Office Visit: none  (results were independently reviewed by physician and radiology report verified)    PAST MEDICAL, FAMILY AND SOCIAL HISTORY UPDATE:  Past Medical History:   Diagnosis Date    Allergic rhinitis     Chronic back pain     Diabetes (HCC)     Diverticulitis     Headache     History of allergy     Hyperlipidemia     Hypertension     Osteoarthritis     Pneumonia due to COVID-19 virus 12/29/2020    Type 2 diabetes mellitus without complication (HCC)      Past Surgical History:   Procedure Laterality Date    BACK SURGERY  09/03/2017    [facility],

## 2025-07-18 ENCOUNTER — OFFICE VISIT (OUTPATIENT)
Dept: PODIATRY | Age: 85
End: 2025-07-18
Payer: MEDICARE

## 2025-07-18 VITALS — BODY MASS INDEX: 39.21 KG/M2 | WEIGHT: 244 LBS | HEIGHT: 66 IN

## 2025-07-18 DIAGNOSIS — M79.675 PAIN OF TOES OF BOTH FEET: ICD-10-CM

## 2025-07-18 DIAGNOSIS — E11.42 DIABETIC POLYNEUROPATHY ASSOCIATED WITH TYPE 2 DIABETES MELLITUS (HCC): ICD-10-CM

## 2025-07-18 DIAGNOSIS — B35.1 ONYCHOMYCOSIS OF TOENAIL: Primary | ICD-10-CM

## 2025-07-18 DIAGNOSIS — M79.674 PAIN OF TOES OF BOTH FEET: ICD-10-CM

## 2025-07-18 PROCEDURE — 11721 DEBRIDE NAIL 6 OR MORE: CPT | Performed by: PODIATRIST

## 2025-07-18 PROCEDURE — 99999 PR OFFICE/OUTPT VISIT,PROCEDURE ONLY: CPT | Performed by: PODIATRIST

## 2025-07-21 ASSESSMENT — ENCOUNTER SYMPTOMS
COLOR CHANGE: 0
NAUSEA: 0
SHORTNESS OF BREATH: 0
DIARRHEA: 0
BACK PAIN: 0

## 2025-07-21 NOTE — PROGRESS NOTES
SUBJECTIVE: Anuj Jovel is a 84 y.o. male who returns to the office with chief complaint of painful fungal toenails. Patient relates toe nails are thickened/difficult to trim as well as painful with ambulation and with shoe gear.   Chief Complaint   Patient presents with    Nail Problem     Nail trim/last saw Dr.Tara Draper 6/4/2025    Diabetes     Blood sugar 241     Review of Systems   Constitutional:  Negative for activity change, appetite change, chills, diaphoresis, fatigue and fever.   Respiratory:  Negative for shortness of breath.    Cardiovascular:  Negative for leg swelling.   Gastrointestinal:  Negative for diarrhea and nausea.   Endocrine: Negative for cold intolerance, heat intolerance and polyuria.   Musculoskeletal:  Positive for arthralgias. Negative for back pain, gait problem, joint swelling and myalgias.   Skin:  Negative for color change, pallor, rash and wound.   Allergic/Immunologic: Negative for environmental allergies and food allergies.   Neurological:  Negative for dizziness, weakness, light-headedness and numbness.   Hematological:  Does not bruise/bleed easily.   Psychiatric/Behavioral:  Negative for behavioral problems, confusion and self-injury. The patient is not nervous/anxious.      OBJECTIVE: Clinical evaluation of patient reveals nails 1,2,3,4,5 of the right foot and nails 1,2,3,4,5 of the left foot to present with thickness, elongation, discoloration, brittleness, and subungual debris. There was pain with palpation and debridement of the toenails of the bilateral feet. No open lesions noted to either foot today.   The right DP pulse is palpable.   The left DP pulse is palpable.   The right PT pulse is palpable.   The left PT pulse is palpable.   Protective sensation is absent to the right plantar foot as noted with a 5.07 [place] monofilament.   Protective sensation is absent to the left plantar foot as noted with a 5.07 [place] monofilament.   Glucose: 241